# Patient Record
Sex: FEMALE | Race: BLACK OR AFRICAN AMERICAN | Employment: UNEMPLOYED | URBAN - METROPOLITAN AREA
[De-identification: names, ages, dates, MRNs, and addresses within clinical notes are randomized per-mention and may not be internally consistent; named-entity substitution may affect disease eponyms.]

---

## 2018-03-11 ENCOUNTER — APPOINTMENT (OUTPATIENT)
Dept: GENERAL RADIOLOGY | Age: 38
End: 2018-03-11
Attending: PHYSICIAN ASSISTANT
Payer: MEDICARE

## 2018-03-11 ENCOUNTER — APPOINTMENT (OUTPATIENT)
Dept: CT IMAGING | Age: 38
End: 2018-03-11
Attending: PHYSICIAN ASSISTANT
Payer: MEDICARE

## 2018-03-11 ENCOUNTER — HOSPITAL ENCOUNTER (EMERGENCY)
Age: 38
Discharge: ANOTHER HOSPITAL | End: 2018-03-11
Attending: EMERGENCY MEDICINE
Payer: MEDICARE

## 2018-03-11 VITALS
WEIGHT: 185 LBS | BODY MASS INDEX: 31.58 KG/M2 | RESPIRATION RATE: 18 BRPM | OXYGEN SATURATION: 100 % | TEMPERATURE: 99.1 F | SYSTOLIC BLOOD PRESSURE: 101 MMHG | HEIGHT: 64 IN | DIASTOLIC BLOOD PRESSURE: 58 MMHG | HEART RATE: 81 BPM

## 2018-03-11 DIAGNOSIS — N30.00 ACUTE CYSTITIS WITHOUT HEMATURIA: ICD-10-CM

## 2018-03-11 DIAGNOSIS — R55 SYNCOPE, UNSPECIFIED SYNCOPE TYPE: Primary | ICD-10-CM

## 2018-03-11 LAB
ALBUMIN SERPL-MCNC: 3.1 G/DL (ref 3.5–5)
ALBUMIN/GLOB SERPL: 0.7 {RATIO} (ref 1.1–2.2)
ALP SERPL-CCNC: 46 U/L (ref 45–117)
ALT SERPL-CCNC: 17 U/L (ref 12–78)
ANION GAP SERPL CALC-SCNC: 8 MMOL/L (ref 5–15)
APPEARANCE UR: ABNORMAL
AST SERPL-CCNC: 18 U/L (ref 15–37)
BACTERIA URNS QL MICRO: NEGATIVE /HPF
BASOPHILS # BLD: 0.1 K/UL (ref 0–0.1)
BASOPHILS NFR BLD: 1 % (ref 0–1)
BILIRUB SERPL-MCNC: 0.3 MG/DL (ref 0.2–1)
BILIRUB UR QL: NEGATIVE
BUN SERPL-MCNC: 12 MG/DL (ref 6–20)
BUN/CREAT SERPL: 10 (ref 12–20)
CALCIUM SERPL-MCNC: 8.9 MG/DL (ref 8.5–10.1)
CHLORIDE SERPL-SCNC: 108 MMOL/L (ref 97–108)
CK SERPL-CCNC: 168 U/L (ref 26–192)
CO2 SERPL-SCNC: 27 MMOL/L (ref 21–32)
COLOR UR: ABNORMAL
CREAT SERPL-MCNC: 1.26 MG/DL (ref 0.55–1.02)
DIFFERENTIAL METHOD BLD: ABNORMAL
EOSINOPHIL # BLD: 0.4 K/UL (ref 0–0.4)
EOSINOPHIL NFR BLD: 4 % (ref 0–7)
EPITH CASTS URNS QL MICRO: ABNORMAL /LPF
ERYTHROCYTE [DISTWIDTH] IN BLOOD BY AUTOMATED COUNT: 13.4 % (ref 11.5–14.5)
GLOBULIN SER CALC-MCNC: 4.2 G/DL (ref 2–4)
GLUCOSE BLD STRIP.AUTO-MCNC: 82 MG/DL (ref 65–100)
GLUCOSE SERPL-MCNC: 84 MG/DL (ref 65–100)
GLUCOSE UR STRIP.AUTO-MCNC: NEGATIVE MG/DL
HCT VFR BLD AUTO: 35.9 % (ref 35–47)
HGB BLD-MCNC: 12.7 G/DL (ref 11.5–16)
HGB UR QL STRIP: NEGATIVE
IMM GRANULOCYTES # BLD: 0 K/UL (ref 0–0.04)
IMM GRANULOCYTES NFR BLD AUTO: 0 % (ref 0–0.5)
KETONES UR QL STRIP.AUTO: NEGATIVE MG/DL
LEUKOCYTE ESTERASE UR QL STRIP.AUTO: ABNORMAL
LYMPHOCYTES # BLD: 2 K/UL (ref 0.8–3.5)
LYMPHOCYTES NFR BLD: 18 % (ref 12–49)
MCH RBC QN AUTO: 27.3 PG (ref 26–34)
MCHC RBC AUTO-ENTMCNC: 35.4 G/DL (ref 30–36.5)
MCV RBC AUTO: 77 FL (ref 80–99)
MONOCYTES # BLD: 0.7 K/UL (ref 0–1)
MONOCYTES NFR BLD: 6 % (ref 5–13)
NEUTS SEG # BLD: 8.1 K/UL (ref 1.8–8)
NEUTS SEG NFR BLD: 72 % (ref 32–75)
NITRITE UR QL STRIP.AUTO: NEGATIVE
NRBC # BLD: 0 K/UL (ref 0–0.01)
NRBC BLD-RTO: 0 PER 100 WBC
PH UR STRIP: 5.5 [PH] (ref 5–8)
PLATELET # BLD AUTO: 208 K/UL (ref 150–400)
PMV BLD AUTO: 10.6 FL (ref 8.9–12.9)
POTASSIUM SERPL-SCNC: 4.4 MMOL/L (ref 3.5–5.1)
PROT SERPL-MCNC: 7.3 G/DL (ref 6.4–8.2)
PROT UR STRIP-MCNC: ABNORMAL MG/DL
RBC # BLD AUTO: 4.66 M/UL (ref 3.8–5.2)
RBC #/AREA URNS HPF: ABNORMAL /HPF (ref 0–5)
SERVICE CMNT-IMP: NORMAL
SODIUM SERPL-SCNC: 143 MMOL/L (ref 136–145)
SP GR UR REFRACTOMETRY: 1.01 (ref 1–1.03)
TROPONIN I SERPL-MCNC: <0.04 NG/ML
UA: UC IF INDICATED,UAUC: ABNORMAL
UROBILINOGEN UR QL STRIP.AUTO: 1 EU/DL (ref 0.2–1)
WBC # BLD AUTO: 11.3 K/UL (ref 3.6–11)
WBC URNS QL MICRO: ABNORMAL /HPF (ref 0–4)

## 2018-03-11 PROCEDURE — 87086 URINE CULTURE/COLONY COUNT: CPT | Performed by: PHYSICIAN ASSISTANT

## 2018-03-11 PROCEDURE — 71045 X-RAY EXAM CHEST 1 VIEW: CPT

## 2018-03-11 PROCEDURE — 93005 ELECTROCARDIOGRAM TRACING: CPT

## 2018-03-11 PROCEDURE — 36415 COLL VENOUS BLD VENIPUNCTURE: CPT | Performed by: PHYSICIAN ASSISTANT

## 2018-03-11 PROCEDURE — 82962 GLUCOSE BLOOD TEST: CPT

## 2018-03-11 PROCEDURE — 81001 URINALYSIS AUTO W/SCOPE: CPT | Performed by: PHYSICIAN ASSISTANT

## 2018-03-11 PROCEDURE — 96366 THER/PROPH/DIAG IV INF ADDON: CPT

## 2018-03-11 PROCEDURE — 82550 ASSAY OF CK (CPK): CPT | Performed by: PHYSICIAN ASSISTANT

## 2018-03-11 PROCEDURE — 99285 EMERGENCY DEPT VISIT HI MDM: CPT

## 2018-03-11 PROCEDURE — 85025 COMPLETE CBC W/AUTO DIFF WBC: CPT | Performed by: PHYSICIAN ASSISTANT

## 2018-03-11 PROCEDURE — 80053 COMPREHEN METABOLIC PANEL: CPT | Performed by: PHYSICIAN ASSISTANT

## 2018-03-11 PROCEDURE — 96374 THER/PROPH/DIAG INJ IV PUSH: CPT

## 2018-03-11 PROCEDURE — 84484 ASSAY OF TROPONIN QUANT: CPT | Performed by: PHYSICIAN ASSISTANT

## 2018-03-11 PROCEDURE — 74011000258 HC RX REV CODE- 258: Performed by: PHYSICIAN ASSISTANT

## 2018-03-11 PROCEDURE — 96361 HYDRATE IV INFUSION ADD-ON: CPT

## 2018-03-11 PROCEDURE — 74011250636 HC RX REV CODE- 250/636: Performed by: PHYSICIAN ASSISTANT

## 2018-03-11 PROCEDURE — 70450 CT HEAD/BRAIN W/O DYE: CPT

## 2018-03-11 PROCEDURE — 96365 THER/PROPH/DIAG IV INF INIT: CPT

## 2018-03-11 RX ORDER — RISPERIDONE 0.5 MG/1
1 TABLET, FILM COATED ORAL
Status: ON HOLD | COMMUNITY
End: 2020-01-01 | Stop reason: DRUGHIGH

## 2018-03-11 RX ORDER — LEVOTHYROXINE SODIUM 50 UG/1
50 TABLET ORAL
COMMUNITY

## 2018-03-11 RX ORDER — MELATONIN
1000 DAILY
COMMUNITY

## 2018-03-11 RX ADMIN — SODIUM CHLORIDE 500 ML: 900 INJECTION, SOLUTION INTRAVENOUS at 14:37

## 2018-03-11 RX ADMIN — CEFTRIAXONE 1 G: 1 INJECTION, POWDER, FOR SOLUTION INTRAMUSCULAR; INTRAVENOUS at 16:23

## 2018-03-11 NOTE — ED NOTES
TRANSFER - OUT REPORT:    Verbal report given to HEMA Rodriguez (name) on Oj Moncada  being transferred to [facility] acute care medicine(unit) for routine progression of care       Report consisted of patients Situation, Background, Assessment and   Recommendations(SBAR). Information from the following report(s) SBAR, ED Summary, MAR, Recent Results, Med Rec Status and Cardiac Rhythm NSR with frequent unifocal PVC's was reviewed with the receiving nurse. Lines:   Peripheral IV 03/11/18 Left Antecubital (Active)   Site Assessment Clean, dry, & intact 3/11/2018 10:53 AM   Phlebitis Assessment 0 3/11/2018 10:53 AM   Infiltration Assessment 0 3/11/2018 10:53 AM   Dressing Status Clean, dry, & intact 3/11/2018 10:53 AM   Dressing Type Transparent 3/11/2018 10:53 AM   Hub Color/Line Status Pink;Flushed 3/11/2018 10:53 AM   Action Taken Blood drawn 3/11/2018 10:53 AM        Opportunity for questions and clarification was provided.       Patient transported with:  TARYN SIMS

## 2018-03-11 NOTE — ED NOTES
Pt with a hx of chromosomal abnormality and inability to care for self presents to the ED with c/o fall X2 hours ago. Pt presents with parents/caretaker. Pt unable to verbalize symptoms precipitating fall. Pt's speech is baseline per family. Pt's mother states \" She was in bed eating cereal and then fell onto the floor. \" Pt's mom states that \"after about 10 minutes the patient helped herself up and got onto the sofa. \" Pt has no hx of seizures. Pt is eating and drinking appropriately. Pt is ambulatory with assistance. Emergency Department Nursing Plan of Care       The Nursing Plan of Care is developed from the Nursing assessment and Emergency Department Attending provider initial evaluation. The plan of care may be reviewed in the ED Provider note.     The Plan of Care was developed with the following considerations:   Patient / Family readiness to learn indicated by:verbalized understanding  Persons(s) to be included in education: patient and family  Barriers to Learning/Limitations:No    Signed     Lashon Mitchell    3/11/2018   10:21 AM

## 2018-03-11 NOTE — ED NOTES
Patient provided with regular meal tray. Meal tray ordered and provided for family members as well. Awaiting transfer.

## 2018-03-11 NOTE — CONSULTS
Cardiology consultation:    I was asked to evaluate Ms. Marcelino Coburn in the [facility] ED. She is a 30year old female with severe handicaps, under the care of her mother and family. She began today with new onset syncopal episodes with possible seizure activity. She is nonverbal with significant skeletal deformity. 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 She has a history of having a heart murmur with no need for directed treatment. She menstruated in the past but she has been treated with depot style hormone manipulation and she is currently amenorrheic. Her mother is unable to add any further history, except that today's black out events are new. One such event was witnessed in the ED and there were no associated arrhythmias despite frequent unifocal PVC's at rest.     Ms. Delia Puente is completely nonverbal. She makes eye contact at times and responds to some verbal information with little high pitched grunts. She appears comfortable if frightened. JV do not fill supine. Lungs are clear. Cardiac rhythm is regular with ectopic beats. She has a high pitched midsystolic murmur at her left upper sternal border followed by an enhanced P2 and then an abbreviated diastolic murmur. There is a probable S4 lower down along the left sternal border. Peripheral pulses are intact. She has wide spaced nipples with rudimentary breast development. Her neck is short and webbed by splayed sternocleidomastoid placement. Her mandible is large for her body habitus. There is no stridor. There is no edema and no sign of DVT. Abdomen is nontender but was not examined in detail. There is no nystagmus and no twitching. 12 lead EKG initially shows sinus rhythm, leftward axis, frequent unifocal PVC's. A second EKG after a syncope or seizure was unchanged. She has a very short SD interval but there is no proximal deformity of the QRS. There is no sign of ASD. Chest X ray shows massive RV enlargement. Laboratory results are as follows:    Results for Ashok Rivas (MRN [id number]) as of 3/11/2018 17:24   Ref. Range 3/11/2018 10:35   WBC Latest Ref Range: 3.6 - 11.0 K/uL 11.3 (H)   NRBC Latest Ref Range: 0  WBC 0.0   RBC Latest Ref Range: 3.80 - 5.20 M/uL 4.66   HGB Latest Ref Range: 11.5 - 16.0 g/dL 12.7   HCT Latest Ref Range: 35.0 - 47.0 % 35.9   MCV Latest Ref Range: 80.0 - 99.0 FL 77.0 (L)   MCH Latest Ref Range: 26.0 - 34.0 PG 27.3   MCHC Latest Ref Range: 30.0 - 36.5 g/dL 35.4   RDW Latest Ref Range: 11.5 - 14.5 % 13.4   PLATELET Latest Ref Range: 150 - 400 K/uL 208   MPV Latest Ref Range: 8.9 - 12.9 FL 10.6   NEUTROPHILS Latest Ref Range: 32 - 75 % 72   LYMPHOCYTES Latest Ref Range: 12 - 49 % 18   MONOCYTES Latest Ref Range: 5 - 13 % 6   EOSINOPHILS Latest Ref Range: 0 - 7 % 4   BASOPHILS Latest Ref Range: 0 - 1 % 1   IMMATURE GRANULOCYTES Latest Ref Range: 0.0 - 0.5 % 0   DF Latest Units:   AUTOMATED   ABSOLUTE NRBC Latest Ref Range: 0.00 - 0.01 K/uL 0.00   ABS. NEUTROPHILS Latest Ref Range: 1.8 - 8.0 K/UL 8.1 (H)   ABS. IMM. GRANS. Latest Ref Range: 0.00 - 0.04 K/UL 0.0   ABS. LYMPHOCYTES Latest Ref Range: 0.8 - 3.5 K/UL 2.0   ABS. MONOCYTES Latest Ref Range: 0.0 - 1.0 K/UL 0.7   ABS. EOSINOPHILS Latest Ref Range: 0.0 - 0.4 K/UL 0.4   ABS. BASOPHILS Latest Ref Range: 0.0 - 0.1 K/UL 0.1     Results for Ashok Rivas (MRN [id number]) as of 3/11/2018 17:24   Ref.  Range 3/11/2018 13:37   Color Latest Units:   YELLOW/STRAW   Appearance Latest Ref Range: CLEAR   CLOUDY (A)   Specific gravity Latest Ref Range: 1.003 - 1.030   1.015   pH (UA) Latest Ref Range: 5.0 - 8.0   5.5   Protein Latest Ref Range: NEG mg/dL TRACE (A)   Glucose Latest Ref Range: NEG mg/dL NEGATIVE   Ketone Latest Ref Range: NEG mg/dL NEGATIVE   Blood Latest Ref Range: NEG   NEGATIVE   Bilirubin Latest Ref Range: NEG   NEGATIVE   Urobilinogen Latest Ref Range: 0.2 - 1.0 EU/dL 1.0   Nitrites Latest Ref Range: NEG   NEGATIVE   Leukocyte Esterase Latest Ref Range: NEG   LARGE (A)   Epithelial cells Latest Ref Range: FEW /lpf FEW   WBC Latest Ref Range: 0 - 4 /hpf 20-50   RBC Latest Ref Range: 0 - 5 /hpf 0-5   Bacteria Latest Ref Range: NEG /hpf NEGATIVE     Results for Laura Galaviz (MRN [id number]) as of 3/11/2018 17:24   Ref. Range 3/11/2018 10:35   Sodium Latest Ref Range: 136 - 145 mmol/L 143   Potassium Latest Ref Range: 3.5 - 5.1 mmol/L 4.4   Chloride Latest Ref Range: 97 - 108 mmol/L 108   CO2 Latest Ref Range: 21 - 32 mmol/L 27   Anion gap Latest Ref Range: 5 - 15 mmol/L 8   Glucose Latest Ref Range: 65 - 100 mg/dL 84   BUN Latest Ref Range: 6 - 20 MG/DL 12   Creatinine Latest Ref Range: 0.55 - 1.02 MG/DL 1.26 (H)   BUN/Creatinine ratio Latest Ref Range: 12 - 20   10 (L)   Calcium Latest Ref Range: 8.5 - 10.1 MG/DL 8.9   GFR est non-AA Latest Ref Range: >60 ml/min/1.73m2 48 (L)   GFR est AA Latest Ref Range: >60 ml/min/1.73m2 59 (L)   Bilirubin, total Latest Ref Range: 0.2 - 1.0 MG/DL 0.3   Protein, total Latest Ref Range: 6.4 - 8.2 g/dL 7.3   Albumin Latest Ref Range: 3.5 - 5.0 g/dL 3.1 (L)   Globulin Latest Ref Range: 2.0 - 4.0 g/dL 4.2 (H)   A-G Ratio Latest Ref Range: 1.1 - 2.2   0.7 (L)   ALT (SGPT) Latest Ref Range: 12 - 78 U/L 17   AST Latest Ref Range: 15 - 37 U/L 18   Alk. phosphatase Latest Ref Range: 45 - 117 U/L 46   CK Latest Ref Range: 26 - 192 U/L 168   Troponin-I, Qt. Latest Ref Range: <0.05 ng/mL <0.04     CT scan of the head does not reveal any injury    Bedside echocardiography was performed with saline contrast injection. The suprasternal window was not workable because of skeletal distortion. A supraclavicular window on the left was only minimally useful. The left ventricle is normal.  The right ventricle is significantly enlarged with some hypertrophy of the free wall.   There may be a small membranous VSD but saline contrast examination did not hint that right to left shunting. The atrial septum appears to be intact which fits with a normal twelve-lead EKG. Pulmonary artery pressure is estimated at 40-45 mm and there is moderate pulmonic valve regurgitation which is also audible on physical exam.    Impression: There is no sign of cyanotic heart disease    There may be a small membranous VSD. 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 Given the history offered by the mother comorbid Down syndrome is possible but not manifest    We were not fully able to assess the great vessels by echo. This would be the commonest vascular abnormality in Gn syndrome    New onset neurologic events are not related to cardiac rhythm. 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

## 2018-03-11 NOTE — ED NOTES
Provider at beside to re-evaluate patient. Cardiac leads replaced and repeat EKG in progress. Pt currently in bigeminy. Skin is warm and dry to touch.

## 2018-03-11 NOTE — PROGRESS NOTES
Echocardiogram 2D complete adult completed as ordered.  Procedure explained. Full report to follow.   Agitated Saline given intravenously for image enhancement,

## 2018-03-11 NOTE — ED NOTES
Entered patient's room to recheck vitals and reassess patient. Pt was sitting upright at the side of the bed. Pt started to shift body weight forward, nurse assisted patient back into the stretcher. Pt was drooling and appeared drowsy. Vital signs reassessed and provider notified of change in patient mental status.

## 2018-03-11 NOTE — PROGRESS NOTES
Cardiology:    Case discussed and I will evaluate in person shortly. EKG shows frequent PVC's. Echo ordered at bedside to exclude congenital heart disease, which is a bit more common in Down's Syndrome.      Para Domenico SOTO

## 2018-03-11 NOTE — ED PROVIDER NOTES
EMERGENCY DEPARTMENT HISTORY AND PHYSICAL EXAM    Date: 3/11/2018  Patient Name: Jose Alfredo Dale    History of Presenting Illness     Chief Complaint   Patient presents with    Fall         History Provided By: Patient's Mother    HPI: Jose Alfredo Dale is a 30 y.o. female with PMH of down's syndrome who presents with mother who states pt was eating a bowl of cereal and then fell out of the bed. Mom states she is unsure if pt had a seizure but \"her eyes rolled in the back of her head\"  Mom denies any shaking or history of seizures in the past.  Mom reports episode last about 10 minutes before pt was alert and back to her normal self. Pt is non verbal so unable to get any other info. PCP: Not On File [facility]    Current Facility-Administered Medications   Medication Dose Route Frequency Provider Last Rate Last Dose    cefTRIAXone (ROCEPHIN) 1 g in 0.9% sodium chloride (MBP/ADV) 50 mL  1 g IntraVENous NOW Jefry Ochoa PA-C 100 mL/hr at 03/11/18 1623 1 g at 03/11/18 1623     Current Outpatient Prescriptions   Medication Sig Dispense Refill    cholecalciferol (VITAMIN D3) 1,000 unit tablet Take 1,000 Units by mouth daily.  risperiDONE (RISPERDAL) 0.5 mg tablet Take 0.5 mg by mouth nightly.  LEVOTHYROXINE SODIUM (LEVOTHYROXINE PO) Take  by mouth. Past History     Past Medical History:  Past Medical History:   Diagnosis Date    Endocrine disease     Hypothyroid 03/11/2018    Ill-defined condition     Down's Syndrome       Past Surgical History:  History reviewed. No pertinent surgical history. Family History:  History reviewed. No pertinent family history.     Social History:  Social History   Substance Use Topics    Smoking status: Never Smoker    Smokeless tobacco: Never Used    Alcohol use No       Allergies:  No Known Allergies      Review of Systems   Review of Systems   Unable to perform ROS: Patient nonverbal       Physical Exam     Vitals:    03/11/18 1426 03/11/18 1442 03/11/18 1458 03/11/18 1615   BP: 99/78   133/77   Pulse: 76 (!) 58 68 80   Resp: 18      Temp: 98.6 °F (37 °C)   99.1 °F (37.3 °C)   SpO2: 100% 92%  94%   Weight:       Height:         Physical Exam   Constitutional: She appears well-developed and well-nourished. No distress. HENT:   Head: Normocephalic and atraumatic. Mouth/Throat: Oropharynx is clear and moist.   Eyes: Conjunctivae are normal.   Cardiovascular: Regular rhythm and normal heart sounds. Bradycardia present. Pulmonary/Chest: Effort normal and breath sounds normal. No respiratory distress. She has no wheezes. She has no rales. Abdominal: Soft. Musculoskeletal: Normal range of motion. Neurological: She is alert. Skin: Skin is warm and dry. Psychiatric: She has a normal mood and affect. She is noncommunicative. Nursing note and vitals reviewed.   at 4:30 PM      Diagnostic Study Results     Labs -     Recent Results (from the past 12 hour(s))   EKG, 12 LEAD, INITIAL    Collection Time: 03/11/18 10:33 AM   Result Value Ref Range    Ventricular Rate 69 BPM    Atrial Rate 60 BPM    P-R Interval 112 ms    QRS Duration 62 ms    Q-T Interval 370 ms    QTC Calculation (Bezet) 396 ms    Calculated P Axis 48 degrees    Calculated R Axis 11 degrees    Calculated T Axis 45 degrees    Diagnosis       Sinus rhythm with frequent premature ventricular complexes  Otherwise normal ECG     CBC WITH AUTOMATED DIFF    Collection Time: 03/11/18 10:35 AM   Result Value Ref Range    WBC 11.3 (H) 3.6 - 11.0 K/uL    RBC 4.66 3.80 - 5.20 M/uL    HGB 12.7 11.5 - 16.0 g/dL    HCT 35.9 35.0 - 47.0 %    MCV 77.0 (L) 80.0 - 99.0 FL    MCH 27.3 26.0 - 34.0 PG    MCHC 35.4 30.0 - 36.5 g/dL    RDW 13.4 11.5 - 14.5 %    PLATELET 516 631 - 424 K/uL    MPV 10.6 8.9 - 12.9 FL    NRBC 0.0 0  WBC    ABSOLUTE NRBC 0.00 0.00 - 0.01 K/uL    NEUTROPHILS 72 32 - 75 %    LYMPHOCYTES 18 12 - 49 %    MONOCYTES 6 5 - 13 %    EOSINOPHILS 4 0 - 7 %    BASOPHILS 1 0 - 1 %    IMMATURE GRANULOCYTES 0 0.0 - 0.5 %    ABS. NEUTROPHILS 8.1 (H) 1.8 - 8.0 K/UL    ABS. LYMPHOCYTES 2.0 0.8 - 3.5 K/UL    ABS. MONOCYTES 0.7 0.0 - 1.0 K/UL    ABS. EOSINOPHILS 0.4 0.0 - 0.4 K/UL    ABS. BASOPHILS 0.1 0.0 - 0.1 K/UL    ABS. IMM. GRANS. 0.0 0.00 - 0.04 K/UL    DF AUTOMATED     METABOLIC PANEL, COMPREHENSIVE    Collection Time: 03/11/18 10:35 AM   Result Value Ref Range    Sodium 143 136 - 145 mmol/L    Potassium 4.4 3.5 - 5.1 mmol/L    Chloride 108 97 - 108 mmol/L    CO2 27 21 - 32 mmol/L    Anion gap 8 5 - 15 mmol/L    Glucose 84 65 - 100 mg/dL    BUN 12 6 - 20 MG/DL    Creatinine 1.26 (H) 0.55 - 1.02 MG/DL    BUN/Creatinine ratio 10 (L) 12 - 20      GFR est AA 59 (L) >60 ml/min/1.73m2    GFR est non-AA 48 (L) >60 ml/min/1.73m2    Calcium 8.9 8.5 - 10.1 MG/DL    Bilirubin, total 0.3 0.2 - 1.0 MG/DL    ALT (SGPT) 17 12 - 78 U/L    AST (SGOT) 18 15 - 37 U/L    Alk.  phosphatase 46 45 - 117 U/L    Protein, total 7.3 6.4 - 8.2 g/dL    Albumin 3.1 (L) 3.5 - 5.0 g/dL    Globulin 4.2 (H) 2.0 - 4.0 g/dL    A-G Ratio 0.7 (L) 1.1 - 2.2     CK W/ REFLX CKMB    Collection Time: 03/11/18 10:35 AM   Result Value Ref Range     26 - 192 U/L   TROPONIN I    Collection Time: 03/11/18 10:35 AM   Result Value Ref Range    Troponin-I, Qt. <0.04 <0.05 ng/mL   URINALYSIS W/ REFLEX CULTURE    Collection Time: 03/11/18  1:37 PM   Result Value Ref Range    Color YELLOW/STRAW      Appearance CLOUDY (A) CLEAR      Specific gravity 1.015 1.003 - 1.030      pH (UA) 5.5 5.0 - 8.0      Protein TRACE (A) NEG mg/dL    Glucose NEGATIVE  NEG mg/dL    Ketone NEGATIVE  NEG mg/dL    Bilirubin NEGATIVE  NEG      Blood NEGATIVE  NEG      Urobilinogen 1.0 0.2 - 1.0 EU/dL    Nitrites NEGATIVE  NEG      Leukocyte Esterase LARGE (A) NEG      WBC 20-50 0 - 4 /hpf    RBC 0-5 0 - 5 /hpf    Epithelial cells FEW FEW /lpf    Bacteria NEGATIVE  NEG /hpf    UA:UC IF INDICATED URINE CULTURE ORDERED (A) CNI     GLUCOSE, POC    Collection Time: 03/11/18  2:32 PM   Result Value Ref Range    Glucose (POC) 82 65 - 100 mg/dL    Performed by Sara Champion    EKG, 12 LEAD, SUBSEQUENT    Collection Time: 03/11/18  2:32 PM   Result Value Ref Range    Ventricular Rate 57 BPM    Atrial Rate 57 BPM    P-R Interval 106 ms    QRS Duration 64 ms    Q-T Interval 386 ms    QTC Calculation (Bezet) 375 ms    Calculated P Axis 43 degrees    Calculated R Axis -6 degrees    Calculated T Axis 44 degrees    Diagnosis       Sinus bradycardia with short WV  Minimal voltage criteria for LVH, may be normal variant  Borderline ECG         Radiologic Studies -   XR CHEST PORT   Final Result      CT HEAD WO CONT   Final Result        CT Results  (Last 48 hours)               03/11/18 1058  CT HEAD WO CONT Final result    Impression:  IMPRESSION:        No acute intracranial process is identified. Narrative:  EXAM:  CT HEAD WO CONT   Clinical history: New onset of seizure this morning. INDICATION:   New onset of seizure. COMPARISON: None. CONTRAST:  None. TECHNIQUE: Unenhanced CT of the head was performed using 5 mm images. Brain and   bone windows were generated. CT dose reduction was achieved through use of a   standardized protocol tailored for this examination and automatic exposure   control for dose modulation. FINDINGS:   The ventricles and sulci are normal in size, shape and configuration and   midline. There is no significant white matter disease. There is no intracranial   hemorrhage, extra-axial collection, mass, mass effect or midline shift. The   basilar cisterns are open. No acute infarct is identified. The bone windows   demonstrate no abnormalities. The visualized portions of the paranasal sinuses   and mastoid air cells are clear. CXR Results  (Last 48 hours)               03/11/18 1057  XR CHEST PORT Final result    Impression:  IMPRESSION:   No acute thoracic disease.            Narrative:  Clinical history: Syncope, seizure INDICATION: Syncope, seizure       COMPARISON: None       FINDINGS:    AP semiupright view of the chest is obtained . The cardiopericardial silhouette is mildly prominent. . There is no pleural   effusion, pneumothorax or focal consolidation present. No acute osseous finding. Patient is on a cardiac monitor. Medical Decision Making   I am the first provider for this patient. I reviewed the vital signs, available nursing notes, past medical history, past surgical history, family history and social history. Vital Signs-Reviewed the patient's vital signs. Pulse Oximetry Analysis - 100% on RA    Cardiac Monitor:  Rate: 56  Rhythm: NSR    EKG: Interpreted by the EP. attending, Dr Lane Burkitt   Time Interpreted: [id number]   Rate: 69   Rhythm: NSR   Interpretation: frequent PVC's   Comparison: none    ED Course:   10:53 AM  Discussed case with attending, Dr Lane Burkitt, agrees with w/u thus far. Will wait test results and determine plan thereafter    11:50 AM    I spoke with Dr. Luzma Hidalgo, Consult for Cardiology. Discussed available diagnostic tests and clinical findings. He will come in and see pt  Janice Iniguez PA-C    1:48 PM  Dr Luzma Hidalgo in route in the next 10 min but already ordered ECHO for pt now. 2:33 PM  Per RN pt's BP dropped to 60/40 while pt was sitting on bed and she leaned over in the bed \"as if she was going to pass out\". Went in to see pt with attending, pt reexamined, gallop heard now on heart sounds, repeat EKG ordered. Pt did not appear post ictal.    2:44 PM  Dr Luzma Hidalgo in ED to see pt now    3:25 PM  Dr Luzma Hidalgo will consult hospitalist at [address] [place] [address] for transfer, he initially offered [facility] but family wants to stay locally. 3:28 PM  [facility] Transfer center will have cardiology call back    4:10 PM  Dr Luzma Hidalgo spoke with Dr Isidro Skelton, cardiologist at [address] [place] [address] and agrees pt needs admission and transfer.   We reid now await hospitalist call back for an accepting physician      4:29 PM    Attending,  Sony Cardoso, spoke with Dr. Wellington Jones, Consult for Hospitalist at [address] [place] [address]. Discussed available diagnostic tests and clinical findings. He is in agreement with care plans as outlined. He will accept pt for transfer and they will call back once they get a bed. Also to note, pt seems to be going in and out of [facility] while on cardiac monitor which was relayed to hospitalist during consult  Jer Chisholm PA-C    4:40P  Family made aware of plan for transfer. Disposition:  Transfer to [address] [place] [address]      Provider Notes (Medical Decision Making):   DDX: syncope, seizure, arrhythmia, electrolyte imbalance, orthostatic hypotension    Procedures      Diagnosis     Clinical Impression:   1. Syncope, unspecified syncope type    2.  Acute cystitis without hematuria

## 2018-03-11 NOTE — ED NOTES
ECHO tech at bedside. Exam in progress. Dr. Jean Paul Hawkins (cardiology at bedside to perform agitated saline exam).

## 2018-03-11 NOTE — ED TRIAGE NOTES
Patient arrives in ED with her mother after the patient fell to the ground out of her chair while eating this morning. 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 Patient's mother denies history of seizures.

## 2018-03-12 LAB
ATRIAL RATE: 57 BPM
ATRIAL RATE: 60 BPM
CALCULATED P AXIS, ECG09: 43 DEGREES
CALCULATED P AXIS, ECG09: 48 DEGREES
CALCULATED R AXIS, ECG10: -6 DEGREES
CALCULATED R AXIS, ECG10: 11 DEGREES
CALCULATED T AXIS, ECG11: 44 DEGREES
CALCULATED T AXIS, ECG11: 45 DEGREES
DIAGNOSIS, 93000: NORMAL
DIAGNOSIS, 93000: NORMAL
P-R INTERVAL, ECG05: 106 MS
P-R INTERVAL, ECG05: 112 MS
Q-T INTERVAL, ECG07: 370 MS
Q-T INTERVAL, ECG07: 386 MS
QRS DURATION, ECG06: 62 MS
QRS DURATION, ECG06: 64 MS
QTC CALCULATION (BEZET), ECG08: 375 MS
QTC CALCULATION (BEZET), ECG08: 396 MS
VENTRICULAR RATE, ECG03: 57 BPM
VENTRICULAR RATE, ECG03: 69 BPM

## 2018-03-12 NOTE — ED NOTES
Report given to [facility] crew. Copy of chart, face sheet, CT, and U/S given to crew for [facility]. Pr transferred to ambulance stretcher.  Pt transferred to  [address]

## 2018-03-13 LAB
BACTERIA SPEC CULT: NORMAL
CC UR VC: NORMAL
SERVICE CMNT-IMP: NORMAL

## 2020-01-01 ENCOUNTER — APPOINTMENT (OUTPATIENT)
Dept: GENERAL RADIOLOGY | Age: 40
DRG: 871 | End: 2020-01-01
Attending: EMERGENCY MEDICINE
Payer: MEDICARE

## 2020-01-01 ENCOUNTER — APPOINTMENT (OUTPATIENT)
Dept: CT IMAGING | Age: 40
DRG: 871 | End: 2020-01-01
Attending: STUDENT IN AN ORGANIZED HEALTH CARE EDUCATION/TRAINING PROGRAM
Payer: MEDICARE

## 2020-01-01 ENCOUNTER — TELEPHONE (OUTPATIENT)
Dept: CARDIOLOGY CLINIC | Age: 40
End: 2020-01-01

## 2020-01-01 ENCOUNTER — APPOINTMENT (OUTPATIENT)
Dept: GENERAL RADIOLOGY | Age: 40
DRG: 871 | End: 2020-01-01
Attending: INTERNAL MEDICINE
Payer: MEDICARE

## 2020-01-01 ENCOUNTER — APPOINTMENT (OUTPATIENT)
Dept: GENERAL RADIOLOGY | Age: 40
DRG: 871 | End: 2020-01-01
Attending: STUDENT IN AN ORGANIZED HEALTH CARE EDUCATION/TRAINING PROGRAM
Payer: MEDICARE

## 2020-01-01 ENCOUNTER — APPOINTMENT (OUTPATIENT)
Dept: GENERAL RADIOLOGY | Age: 40
DRG: 004 | End: 2020-01-01
Attending: INTERNAL MEDICINE
Payer: MEDICARE

## 2020-01-01 ENCOUNTER — APPOINTMENT (OUTPATIENT)
Dept: GENERAL RADIOLOGY | Age: 40
End: 2020-01-01
Attending: EMERGENCY MEDICINE
Payer: MEDICARE

## 2020-01-01 ENCOUNTER — HOSPITAL ENCOUNTER (EMERGENCY)
Age: 40
Discharge: OTHER INSTITUTION | End: 2020-11-19
Attending: EMERGENCY MEDICINE
Payer: MEDICARE

## 2020-01-01 ENCOUNTER — APPOINTMENT (OUTPATIENT)
Dept: GENERAL RADIOLOGY | Age: 40
DRG: 004 | End: 2020-01-01
Attending: HOSPITALIST
Payer: MEDICARE

## 2020-01-01 ENCOUNTER — APPOINTMENT (OUTPATIENT)
Dept: GENERAL RADIOLOGY | Age: 40
DRG: 004 | End: 2020-01-01
Attending: STUDENT IN AN ORGANIZED HEALTH CARE EDUCATION/TRAINING PROGRAM
Payer: MEDICARE

## 2020-01-01 ENCOUNTER — TELEPHONE (OUTPATIENT)
Dept: CASE MANAGEMENT | Age: 40
End: 2020-01-01

## 2020-01-01 ENCOUNTER — HOSPITAL ENCOUNTER (INPATIENT)
Age: 40
LOS: 2 days | Discharge: ANOTHER HOSPITAL | DRG: 871 | End: 2020-11-08
Attending: EMERGENCY MEDICINE | Admitting: STUDENT IN AN ORGANIZED HEALTH CARE EDUCATION/TRAINING PROGRAM
Payer: MEDICARE

## 2020-01-01 ENCOUNTER — APPOINTMENT (OUTPATIENT)
Dept: NON INVASIVE DIAGNOSTICS | Age: 40
DRG: 871 | End: 2020-01-01
Attending: INTERNAL MEDICINE
Payer: MEDICARE

## 2020-01-01 ENCOUNTER — HOSPITAL ENCOUNTER (EMERGENCY)
Age: 40
Discharge: HOME | End: 2020-11-02
Attending: EMERGENCY MEDICINE
Payer: MEDICARE

## 2020-01-01 ENCOUNTER — HOSPITAL ENCOUNTER (INPATIENT)
Age: 40
LOS: 26 days | DRG: 004 | End: 2021-01-18
Attending: STUDENT IN AN ORGANIZED HEALTH CARE EDUCATION/TRAINING PROGRAM | Admitting: INTERNAL MEDICINE
Payer: MEDICARE

## 2020-01-01 ENCOUNTER — HOSPITAL ENCOUNTER (INPATIENT)
Age: 40
LOS: 8 days | Discharge: HOME | DRG: 871 | End: 2020-11-16
Attending: FAMILY MEDICINE | Admitting: HOSPITALIST
Payer: MEDICARE

## 2020-01-01 ENCOUNTER — HOSPITAL ENCOUNTER (INPATIENT)
Age: 40
LOS: 17 days | Discharge: HOME HEALTH | DRG: 871 | End: 2020-12-06
Attending: FAMILY MEDICINE | Admitting: INTERNAL MEDICINE
Payer: MEDICARE

## 2020-01-01 ENCOUNTER — APPOINTMENT (OUTPATIENT)
Dept: ULTRASOUND IMAGING | Age: 40
DRG: 871 | End: 2020-01-01
Attending: STUDENT IN AN ORGANIZED HEALTH CARE EDUCATION/TRAINING PROGRAM
Payer: MEDICARE

## 2020-01-01 ENCOUNTER — HOSPITAL ENCOUNTER (INPATIENT)
Age: 40
LOS: 1 days | Discharge: ANOTHER HOSPITAL | DRG: 871 | End: 2020-12-23
Attending: EMERGENCY MEDICINE | Admitting: STUDENT IN AN ORGANIZED HEALTH CARE EDUCATION/TRAINING PROGRAM
Payer: MEDICARE

## 2020-01-01 ENCOUNTER — PATIENT OUTREACH (OUTPATIENT)
Dept: CASE MANAGEMENT | Age: 40
End: 2020-01-01

## 2020-01-01 ENCOUNTER — APPOINTMENT (OUTPATIENT)
Dept: CT IMAGING | Age: 40
DRG: 871 | End: 2020-01-01
Attending: INTERNAL MEDICINE
Payer: MEDICARE

## 2020-01-01 ENCOUNTER — APPOINTMENT (OUTPATIENT)
Dept: CT IMAGING | Age: 40
End: 2020-01-01
Attending: EMERGENCY MEDICINE
Payer: MEDICARE

## 2020-01-01 VITALS
HEART RATE: 79 BPM | DIASTOLIC BLOOD PRESSURE: 73 MMHG | SYSTOLIC BLOOD PRESSURE: 104 MMHG | HEIGHT: 64 IN | RESPIRATION RATE: 16 BRPM | BODY MASS INDEX: 31.76 KG/M2 | TEMPERATURE: 98.2 F | WEIGHT: 186 LBS | OXYGEN SATURATION: 95 %

## 2020-01-01 VITALS
HEART RATE: 96 BPM | OXYGEN SATURATION: 95 % | DIASTOLIC BLOOD PRESSURE: 72 MMHG | TEMPERATURE: 97.4 F | RESPIRATION RATE: 19 BRPM | HEIGHT: 64 IN | BODY MASS INDEX: 31.76 KG/M2 | WEIGHT: 186 LBS | SYSTOLIC BLOOD PRESSURE: 125 MMHG

## 2020-01-01 VITALS
WEIGHT: 191 LBS | RESPIRATION RATE: 20 BRPM | HEIGHT: 63 IN | SYSTOLIC BLOOD PRESSURE: 95 MMHG | TEMPERATURE: 103.4 F | OXYGEN SATURATION: 91 % | DIASTOLIC BLOOD PRESSURE: 52 MMHG | HEART RATE: 135 BPM | BODY MASS INDEX: 33.84 KG/M2

## 2020-01-01 VITALS
SYSTOLIC BLOOD PRESSURE: 130 MMHG | OXYGEN SATURATION: 99 % | DIASTOLIC BLOOD PRESSURE: 28 MMHG | HEART RATE: 102 BPM | BODY MASS INDEX: 31.58 KG/M2 | RESPIRATION RATE: 18 BRPM | HEIGHT: 64 IN | WEIGHT: 185 LBS | TEMPERATURE: 100.4 F

## 2020-01-01 VITALS
BODY MASS INDEX: 32.61 KG/M2 | SYSTOLIC BLOOD PRESSURE: 100 MMHG | HEIGHT: 64 IN | HEART RATE: 85 BPM | DIASTOLIC BLOOD PRESSURE: 68 MMHG | OXYGEN SATURATION: 97 % | RESPIRATION RATE: 16 BRPM | TEMPERATURE: 98.5 F | WEIGHT: 191 LBS

## 2020-01-01 VITALS
BODY MASS INDEX: 30.73 KG/M2 | HEIGHT: 64 IN | HEART RATE: 117 BPM | WEIGHT: 180 LBS | DIASTOLIC BLOOD PRESSURE: 47 MMHG | OXYGEN SATURATION: 96 % | TEMPERATURE: 99.9 F | RESPIRATION RATE: 22 BRPM | SYSTOLIC BLOOD PRESSURE: 133 MMHG

## 2020-01-01 DIAGNOSIS — D72.829 LEUKOCYTOSIS, UNSPECIFIED TYPE: ICD-10-CM

## 2020-01-01 DIAGNOSIS — H10.9 CONJUNCTIVITIS OF LEFT EYE, UNSPECIFIED CONJUNCTIVITIS TYPE: ICD-10-CM

## 2020-01-01 DIAGNOSIS — J12.82 PNEUMONIA DUE TO COVID-19 VIRUS: ICD-10-CM

## 2020-01-01 DIAGNOSIS — U07.1 PNEUMONIA DUE TO COVID-19 VIRUS: ICD-10-CM

## 2020-01-01 DIAGNOSIS — R00.0 TACHYCARDIA: ICD-10-CM

## 2020-01-01 DIAGNOSIS — J96.01 ACUTE RESPIRATORY FAILURE WITH HYPOXIA (HCC): Primary | ICD-10-CM

## 2020-01-01 DIAGNOSIS — U07.1 SEPSIS DUE TO COVID-19 (HCC): ICD-10-CM

## 2020-01-01 DIAGNOSIS — Q90.9 DOWN'S SYNDROME: ICD-10-CM

## 2020-01-01 DIAGNOSIS — Z71.89 GOALS OF CARE, COUNSELING/DISCUSSION: ICD-10-CM

## 2020-01-01 DIAGNOSIS — A41.89 SEPSIS DUE TO COVID-19 (HCC): ICD-10-CM

## 2020-01-01 DIAGNOSIS — I42.0 DILATED CARDIOMYOPATHY (HCC): ICD-10-CM

## 2020-01-01 DIAGNOSIS — R00.0 SINUS TACHYCARDIA: ICD-10-CM

## 2020-01-01 DIAGNOSIS — R91.8 PULMONARY INFILTRATES ON CXR: ICD-10-CM

## 2020-01-01 DIAGNOSIS — N17.9 AKI (ACUTE KIDNEY INJURY) (HCC): ICD-10-CM

## 2020-01-01 DIAGNOSIS — U07.1 PNEUMONIA DUE TO COVID-19 VIRUS: Primary | ICD-10-CM

## 2020-01-01 DIAGNOSIS — N28.9 ACUTE RENAL INSUFFICIENCY: ICD-10-CM

## 2020-01-01 DIAGNOSIS — J18.9 COMMUNITY ACQUIRED PNEUMONIA, UNSPECIFIED LATERALITY: Primary | ICD-10-CM

## 2020-01-01 DIAGNOSIS — A41.9 SEPSIS WITHOUT ACUTE ORGAN DYSFUNCTION, DUE TO UNSPECIFIED ORGANISM (HCC): Primary | ICD-10-CM

## 2020-01-01 DIAGNOSIS — Z99.11 DEPENDENT ON VENTILATOR (HCC): ICD-10-CM

## 2020-01-01 DIAGNOSIS — J96.01 ACUTE HYPOXEMIC RESPIRATORY FAILURE (HCC): ICD-10-CM

## 2020-01-01 DIAGNOSIS — J22 LOWER RESP. TRACT INFECTION: Primary | ICD-10-CM

## 2020-01-01 DIAGNOSIS — R06.82 TACHYPNEA: ICD-10-CM

## 2020-01-01 DIAGNOSIS — R06.03 RESPIRATORY DISTRESS: ICD-10-CM

## 2020-01-01 DIAGNOSIS — N17.0 ACUTE RENAL FAILURE WITH TUBULAR NECROSIS (HCC): ICD-10-CM

## 2020-01-01 DIAGNOSIS — J12.82 PNEUMONIA DUE TO COVID-19 VIRUS: Primary | ICD-10-CM

## 2020-01-01 DIAGNOSIS — Z20.822 SUSPECTED COVID-19 VIRUS INFECTION: ICD-10-CM

## 2020-01-01 DIAGNOSIS — R50.9 FEVER, UNSPECIFIED FEVER CAUSE: ICD-10-CM

## 2020-01-01 LAB
25(OH)D3 SERPL-MCNC: 22.2 NG/ML (ref 30–100)
25(OH)D3 SERPL-MCNC: 39.1 NG/ML (ref 30–100)
ABO + RH BLD: NORMAL
ABO + RH BLD: NORMAL
ALBUMIN SERPL-MCNC: 1.6 G/DL (ref 3.5–5)
ALBUMIN SERPL-MCNC: 1.7 G/DL (ref 3.5–5)
ALBUMIN SERPL-MCNC: 1.8 G/DL (ref 3.5–5)
ALBUMIN SERPL-MCNC: 2 G/DL (ref 3.5–5)
ALBUMIN SERPL-MCNC: 2.1 G/DL (ref 3.5–5)
ALBUMIN SERPL-MCNC: 2.2 G/DL (ref 3.5–5)
ALBUMIN SERPL-MCNC: 2.3 G/DL (ref 3.5–5)
ALBUMIN SERPL-MCNC: 2.3 G/DL (ref 3.5–5)
ALBUMIN SERPL-MCNC: 2.8 G/DL (ref 3.5–5)
ALBUMIN SERPL-MCNC: 2.9 G/DL (ref 3.5–5)
ALBUMIN/GLOB SERPL: 0.4 {RATIO} (ref 1.1–2.2)
ALBUMIN/GLOB SERPL: 0.5 {RATIO} (ref 1.1–2.2)
ALBUMIN/GLOB SERPL: 0.6 {RATIO} (ref 1.1–2.2)
ALBUMIN/GLOB SERPL: 0.7 {RATIO} (ref 1.1–2.2)
ALP SERPL-CCNC: 35 U/L (ref 45–117)
ALP SERPL-CCNC: 36 U/L (ref 45–117)
ALP SERPL-CCNC: 37 U/L (ref 45–117)
ALP SERPL-CCNC: 38 U/L (ref 45–117)
ALP SERPL-CCNC: 40 U/L (ref 45–117)
ALP SERPL-CCNC: 41 U/L (ref 45–117)
ALP SERPL-CCNC: 42 U/L (ref 45–117)
ALP SERPL-CCNC: 42 U/L (ref 45–117)
ALP SERPL-CCNC: 44 U/L (ref 45–117)
ALP SERPL-CCNC: 46 U/L (ref 45–117)
ALP SERPL-CCNC: 47 U/L (ref 45–117)
ALP SERPL-CCNC: 47 U/L (ref 45–117)
ALP SERPL-CCNC: 48 U/L (ref 45–117)
ALP SERPL-CCNC: 61 U/L (ref 45–117)
ALP SERPL-CCNC: 68 U/L (ref 45–117)
ALP SERPL-CCNC: 68 U/L (ref 45–117)
ALP SERPL-CCNC: 74 U/L (ref 45–117)
ALP SERPL-CCNC: 78 U/L (ref 45–117)
ALT SERPL-CCNC: 11 U/L (ref 12–78)
ALT SERPL-CCNC: 11 U/L (ref 12–78)
ALT SERPL-CCNC: 14 U/L (ref 12–78)
ALT SERPL-CCNC: 14 U/L (ref 12–78)
ALT SERPL-CCNC: 15 U/L (ref 12–78)
ALT SERPL-CCNC: 20 U/L (ref 12–78)
ALT SERPL-CCNC: 20 U/L (ref 12–78)
ALT SERPL-CCNC: 26 U/L (ref 12–78)
ALT SERPL-CCNC: 28 U/L (ref 12–78)
ALT SERPL-CCNC: 29 U/L (ref 12–78)
ALT SERPL-CCNC: 30 U/L (ref 12–78)
ALT SERPL-CCNC: 45 U/L (ref 12–78)
ALT SERPL-CCNC: 53 U/L (ref 12–78)
AMORPH CRY URNS QL MICRO: ABNORMAL
ANION GAP SERPL CALC-SCNC: 10 MMOL/L (ref 5–15)
ANION GAP SERPL CALC-SCNC: 11 MMOL/L (ref 5–15)
ANION GAP SERPL CALC-SCNC: 13 MMOL/L (ref 5–15)
ANION GAP SERPL CALC-SCNC: 14 MMOL/L (ref 5–15)
ANION GAP SERPL CALC-SCNC: 2 MMOL/L (ref 5–15)
ANION GAP SERPL CALC-SCNC: 3 MMOL/L (ref 5–15)
ANION GAP SERPL CALC-SCNC: 3 MMOL/L (ref 5–15)
ANION GAP SERPL CALC-SCNC: 4 MMOL/L (ref 5–15)
ANION GAP SERPL CALC-SCNC: 4 MMOL/L (ref 5–15)
ANION GAP SERPL CALC-SCNC: 5 MMOL/L (ref 5–15)
ANION GAP SERPL CALC-SCNC: 6 MMOL/L (ref 5–15)
ANION GAP SERPL CALC-SCNC: 7 MMOL/L (ref 5–15)
ANION GAP SERPL CALC-SCNC: 8 MMOL/L (ref 5–15)
ANION GAP SERPL CALC-SCNC: 9 MMOL/L (ref 5–15)
ANION GAP SERPL CALC-SCNC: 9 MMOL/L (ref 5–15)
APPEARANCE UR: ABNORMAL
APPEARANCE UR: CLEAR
APTT PPP: 26.8 SEC (ref 22.1–31)
APTT PPP: 28 SEC (ref 22.1–32)
APTT PPP: 31.9 SEC (ref 22.1–31)
APTT PPP: 40 SEC (ref 22.1–31)
APTT PPP: 56.3 SEC (ref 22.1–31)
APTT PPP: 56.7 SEC (ref 22.1–31)
APTT PPP: 68 SEC (ref 22.1–31)
ARTERIAL PATENCY WRIST A: ABNORMAL
ARTERIAL PATENCY WRIST A: NO
ARTERIAL PATENCY WRIST A: NO
AST SERPL-CCNC: 15 U/L (ref 15–37)
AST SERPL-CCNC: 16 U/L (ref 15–37)
AST SERPL-CCNC: 18 U/L (ref 15–37)
AST SERPL-CCNC: 20 U/L (ref 15–37)
AST SERPL-CCNC: 23 U/L (ref 15–37)
AST SERPL-CCNC: 23 U/L (ref 15–37)
AST SERPL-CCNC: 24 U/L (ref 15–37)
AST SERPL-CCNC: 26 U/L (ref 15–37)
AST SERPL-CCNC: 27 U/L (ref 15–37)
AST SERPL-CCNC: 27 U/L (ref 15–37)
AST SERPL-CCNC: 32 U/L (ref 15–37)
AST SERPL-CCNC: 34 U/L (ref 15–37)
AST SERPL-CCNC: 37 U/L (ref 15–37)
AST SERPL-CCNC: 44 U/L (ref 15–37)
AST SERPL-CCNC: 44 U/L (ref 15–37)
AST SERPL-CCNC: 45 U/L (ref 15–37)
ATRIAL RATE: 0 BPM
ATRIAL RATE: 102 BPM
ATRIAL RATE: 120 BPM
ATRIAL RATE: 120 BPM
ATRIAL RATE: 123 BPM
ATRIAL RATE: 123 BPM
ATRIAL RATE: 125 BPM
ATRIAL RATE: 126 BPM
ATRIAL RATE: 88 BPM
B PERT DNA SPEC QL NAA+PROBE: NOT DETECTED
BACTERIA SPEC CULT: NORMAL
BACTERIA URNS QL MICRO: ABNORMAL /HPF
BACTERIA URNS QL MICRO: NEGATIVE /HPF
BACTERIA URNS QL MICRO: NEGATIVE /HPF
BASE DEFICIT BLD-SCNC: 11 MMOL/L
BASE DEFICIT BLD-SCNC: 2 MMOL/L
BASE DEFICIT BLD-SCNC: 7 MMOL/L
BASE DEFICIT BLD-SCNC: 9 MMOL/L
BASE DEFICIT BLDA-SCNC: 1.4 MMOL/L
BASE DEFICIT BLDA-SCNC: 2.1 MMOL/L
BASE DEFICIT BLDA-SCNC: 3.3 MMOL/L
BASE DEFICIT BLDA-SCNC: 5.6 MMOL/L
BASOPHILS # BLD: 0 K/UL (ref 0–0.1)
BASOPHILS # BLD: 0.1 K/UL (ref 0–0.1)
BASOPHILS # BLD: 0.2 K/UL (ref 0–0.1)
BASOPHILS # BLD: 0.2 K/UL (ref 0–0.1)
BASOPHILS NFR BLD: 0 % (ref 0–1)
BASOPHILS NFR BLD: 1 % (ref 0–1)
BDY SITE: ABNORMAL
BILIRUB DIRECT SERPL-MCNC: 0.1 MG/DL (ref 0–0.2)
BILIRUB SERPL-MCNC: 0.2 MG/DL (ref 0.2–1)
BILIRUB SERPL-MCNC: 0.2 MG/DL (ref 0.2–1)
BILIRUB SERPL-MCNC: 0.3 MG/DL (ref 0.2–1)
BILIRUB SERPL-MCNC: 0.4 MG/DL (ref 0.2–1)
BILIRUB SERPL-MCNC: 0.5 MG/DL (ref 0.2–1)
BILIRUB SERPL-MCNC: 0.6 MG/DL (ref 0.2–1)
BILIRUB SERPL-MCNC: 0.8 MG/DL (ref 0.2–1)
BILIRUB UR QL CFM: NEGATIVE
BILIRUB UR QL: NEGATIVE
BLOOD GROUP ANTIBODIES SERPL: NORMAL
BLOOD GROUP ANTIBODIES SERPL: NORMAL
BNP SERPL-MCNC: 156 PG/ML (ref 0–125)
BNP SERPL-MCNC: 52 PG/ML
BNP SERPL-MCNC: 89 PG/ML
BNP SERPL-MCNC: 94 PG/ML
BORDETELLA PARAPERTUSSIS PCR, BORPAR: NOT DETECTED
BREATHS.SPONTANEOUS ON VENT: 32
BREATHS.SPONTANEOUS ON VENT: 32
BREATHS.SPONTANEOUS ON VENT: 33
BUN SERPL-MCNC: 11 MG/DL (ref 6–20)
BUN SERPL-MCNC: 11 MG/DL (ref 6–20)
BUN SERPL-MCNC: 13 MG/DL (ref 6–20)
BUN SERPL-MCNC: 15 MG/DL (ref 6–20)
BUN SERPL-MCNC: 16 MG/DL (ref 6–20)
BUN SERPL-MCNC: 16 MG/DL (ref 6–20)
BUN SERPL-MCNC: 17 MG/DL (ref 6–20)
BUN SERPL-MCNC: 17 MG/DL (ref 6–20)
BUN SERPL-MCNC: 18 MG/DL (ref 6–20)
BUN SERPL-MCNC: 18 MG/DL (ref 6–20)
BUN SERPL-MCNC: 19 MG/DL (ref 6–20)
BUN SERPL-MCNC: 21 MG/DL (ref 6–20)
BUN SERPL-MCNC: 21 MG/DL (ref 6–20)
BUN SERPL-MCNC: 22 MG/DL (ref 6–20)
BUN SERPL-MCNC: 22 MG/DL (ref 6–20)
BUN SERPL-MCNC: 23 MG/DL (ref 6–20)
BUN SERPL-MCNC: 25 MG/DL (ref 6–20)
BUN SERPL-MCNC: 25 MG/DL (ref 6–20)
BUN SERPL-MCNC: 26 MG/DL (ref 6–20)
BUN SERPL-MCNC: 26 MG/DL (ref 6–20)
BUN SERPL-MCNC: 28 MG/DL (ref 6–20)
BUN SERPL-MCNC: 28 MG/DL (ref 6–20)
BUN SERPL-MCNC: 29 MG/DL (ref 6–20)
BUN SERPL-MCNC: 36 MG/DL (ref 6–20)
BUN SERPL-MCNC: 49 MG/DL (ref 6–20)
BUN SERPL-MCNC: 54 MG/DL (ref 6–20)
BUN SERPL-MCNC: 70 MG/DL (ref 6–20)
BUN SERPL-MCNC: 73 MG/DL (ref 6–20)
BUN SERPL-MCNC: 83 MG/DL (ref 6–20)
BUN SERPL-MCNC: 91 MG/DL (ref 6–20)
BUN SERPL-MCNC: 97 MG/DL (ref 6–20)
BUN/CREAT SERPL: 10 (ref 12–20)
BUN/CREAT SERPL: 11 (ref 12–20)
BUN/CREAT SERPL: 11 (ref 12–20)
BUN/CREAT SERPL: 12 (ref 12–20)
BUN/CREAT SERPL: 13 (ref 12–20)
BUN/CREAT SERPL: 14 (ref 12–20)
BUN/CREAT SERPL: 15 (ref 12–20)
BUN/CREAT SERPL: 15 (ref 12–20)
BUN/CREAT SERPL: 16 (ref 12–20)
BUN/CREAT SERPL: 17 (ref 12–20)
BUN/CREAT SERPL: 20 (ref 12–20)
BUN/CREAT SERPL: 24 (ref 12–20)
BUN/CREAT SERPL: 25 (ref 12–20)
BUN/CREAT SERPL: 26 (ref 12–20)
BUN/CREAT SERPL: 28 (ref 12–20)
BUN/CREAT SERPL: 29 (ref 12–20)
BUN/CREAT SERPL: 9 (ref 12–20)
BUN/CREAT SERPL: 9 (ref 12–20)
C PNEUM DNA SPEC QL NAA+PROBE: NOT DETECTED
CA-I BLD-SCNC: 0.98 MMOL/L (ref 1.12–1.32)
CA-I BLD-SCNC: 0.98 MMOL/L (ref 1.12–1.32)
CA-I BLD-SCNC: 1.16 MMOL/L (ref 1.12–1.32)
CA-I BLD-SCNC: 1.2 MMOL/L (ref 1.12–1.32)
CALCIUM SERPL-MCNC: 10.1 MG/DL (ref 8.5–10.1)
CALCIUM SERPL-MCNC: 10.6 MG/DL (ref 8.5–10.1)
CALCIUM SERPL-MCNC: 10.9 MG/DL (ref 8.5–10.1)
CALCIUM SERPL-MCNC: 7 MG/DL (ref 8.5–10.1)
CALCIUM SERPL-MCNC: 7.1 MG/DL (ref 8.5–10.1)
CALCIUM SERPL-MCNC: 7.1 MG/DL (ref 8.5–10.1)
CALCIUM SERPL-MCNC: 7.3 MG/DL (ref 8.5–10.1)
CALCIUM SERPL-MCNC: 7.6 MG/DL (ref 8.5–10.1)
CALCIUM SERPL-MCNC: 7.8 MG/DL (ref 8.5–10.1)
CALCIUM SERPL-MCNC: 7.9 MG/DL (ref 8.5–10.1)
CALCIUM SERPL-MCNC: 8.1 MG/DL (ref 8.5–10.1)
CALCIUM SERPL-MCNC: 8.1 MG/DL (ref 8.5–10.1)
CALCIUM SERPL-MCNC: 8.2 MG/DL (ref 8.5–10.1)
CALCIUM SERPL-MCNC: 8.3 MG/DL (ref 8.5–10.1)
CALCIUM SERPL-MCNC: 8.4 MG/DL (ref 8.5–10.1)
CALCIUM SERPL-MCNC: 8.5 MG/DL (ref 8.5–10.1)
CALCIUM SERPL-MCNC: 8.6 MG/DL (ref 8.5–10.1)
CALCIUM SERPL-MCNC: 8.7 MG/DL (ref 8.5–10.1)
CALCIUM SERPL-MCNC: 8.8 MG/DL (ref 8.5–10.1)
CALCIUM SERPL-MCNC: 9 MG/DL (ref 8.5–10.1)
CALCIUM SERPL-MCNC: 9.4 MG/DL (ref 8.5–10.1)
CALCULATED P AXIS, ECG09: 30 DEGREES
CALCULATED P AXIS, ECG09: 35 DEGREES
CALCULATED P AXIS, ECG09: 41 DEGREES
CALCULATED P AXIS, ECG09: 42 DEGREES
CALCULATED P AXIS, ECG09: 43 DEGREES
CALCULATED P AXIS, ECG09: 44 DEGREES
CALCULATED P AXIS, ECG09: 51 DEGREES
CALCULATED P AXIS, ECG09: 54 DEGREES
CALCULATED R AXIS, ECG10: -2 DEGREES
CALCULATED R AXIS, ECG10: -5 DEGREES
CALCULATED R AXIS, ECG10: -6 DEGREES
CALCULATED R AXIS, ECG10: 0 DEGREES
CALCULATED R AXIS, ECG10: 1 DEGREES
CALCULATED R AXIS, ECG10: 3 DEGREES
CALCULATED R AXIS, ECG10: 47 DEGREES
CALCULATED R AXIS, ECG10: 62 DEGREES
CALCULATED R AXIS, ECG10: 66 DEGREES
CALCULATED T AXIS, ECG11: -2 DEGREES
CALCULATED T AXIS, ECG11: -91 DEGREES
CALCULATED T AXIS, ECG11: 0 DEGREES
CALCULATED T AXIS, ECG11: 14 DEGREES
CALCULATED T AXIS, ECG11: 21 DEGREES
CALCULATED T AXIS, ECG11: 57 DEGREES
CALCULATED T AXIS, ECG11: 58 DEGREES
CALCULATED T AXIS, ECG11: 68 DEGREES
CALCULATED T AXIS, ECG11: 81 DEGREES
CHLORIDE SERPL-SCNC: 102 MMOL/L (ref 97–108)
CHLORIDE SERPL-SCNC: 105 MMOL/L (ref 97–108)
CHLORIDE SERPL-SCNC: 105 MMOL/L (ref 97–108)
CHLORIDE SERPL-SCNC: 106 MMOL/L (ref 97–108)
CHLORIDE SERPL-SCNC: 107 MMOL/L (ref 97–108)
CHLORIDE SERPL-SCNC: 108 MMOL/L (ref 97–108)
CHLORIDE SERPL-SCNC: 109 MMOL/L (ref 97–108)
CHLORIDE SERPL-SCNC: 109 MMOL/L (ref 97–108)
CHLORIDE SERPL-SCNC: 110 MMOL/L (ref 97–108)
CHLORIDE SERPL-SCNC: 111 MMOL/L (ref 97–108)
CHLORIDE SERPL-SCNC: 112 MMOL/L (ref 97–108)
CHLORIDE SERPL-SCNC: 113 MMOL/L (ref 97–108)
CHLORIDE SERPL-SCNC: 114 MMOL/L (ref 97–108)
CHLORIDE SERPL-SCNC: 115 MMOL/L (ref 97–108)
CHLORIDE SERPL-SCNC: 116 MMOL/L (ref 97–108)
CK SERPL-CCNC: 297 U/L (ref 26–192)
CO2 SERPL-SCNC: 18 MMOL/L (ref 21–32)
CO2 SERPL-SCNC: 19 MMOL/L (ref 21–32)
CO2 SERPL-SCNC: 20 MMOL/L (ref 21–32)
CO2 SERPL-SCNC: 21 MMOL/L (ref 21–32)
CO2 SERPL-SCNC: 22 MMOL/L (ref 21–32)
CO2 SERPL-SCNC: 23 MMOL/L (ref 21–32)
CO2 SERPL-SCNC: 23 MMOL/L (ref 21–32)
CO2 SERPL-SCNC: 24 MMOL/L (ref 21–32)
CO2 SERPL-SCNC: 25 MMOL/L (ref 21–32)
CO2 SERPL-SCNC: 26 MMOL/L (ref 21–32)
CO2 SERPL-SCNC: 27 MMOL/L (ref 21–32)
CO2 SERPL-SCNC: 31 MMOL/L (ref 21–32)
COLOR UR: ABNORMAL
COMMENT, HOLDF: NORMAL
COVID-19 RAPID TEST, COVR: NOT DETECTED
COVID-19, XGCOVT: DETECTED
CREAT SERPL-MCNC: 0.89 MG/DL (ref 0.55–1.02)
CREAT SERPL-MCNC: 0.91 MG/DL (ref 0.55–1.02)
CREAT SERPL-MCNC: 0.93 MG/DL (ref 0.55–1.02)
CREAT SERPL-MCNC: 0.97 MG/DL (ref 0.55–1.02)
CREAT SERPL-MCNC: 0.97 MG/DL (ref 0.55–1.02)
CREAT SERPL-MCNC: 0.98 MG/DL (ref 0.55–1.02)
CREAT SERPL-MCNC: 1 MG/DL (ref 0.55–1.02)
CREAT SERPL-MCNC: 1.01 MG/DL (ref 0.55–1.02)
CREAT SERPL-MCNC: 1.02 MG/DL (ref 0.55–1.02)
CREAT SERPL-MCNC: 1.02 MG/DL (ref 0.55–1.02)
CREAT SERPL-MCNC: 1.03 MG/DL (ref 0.55–1.02)
CREAT SERPL-MCNC: 1.05 MG/DL (ref 0.55–1.02)
CREAT SERPL-MCNC: 1.06 MG/DL (ref 0.55–1.02)
CREAT SERPL-MCNC: 1.12 MG/DL (ref 0.55–1.02)
CREAT SERPL-MCNC: 1.15 MG/DL (ref 0.55–1.02)
CREAT SERPL-MCNC: 1.19 MG/DL (ref 0.55–1.02)
CREAT SERPL-MCNC: 1.19 MG/DL (ref 0.55–1.02)
CREAT SERPL-MCNC: 1.27 MG/DL (ref 0.55–1.02)
CREAT SERPL-MCNC: 1.27 MG/DL (ref 0.55–1.02)
CREAT SERPL-MCNC: 1.29 MG/DL (ref 0.55–1.02)
CREAT SERPL-MCNC: 1.31 MG/DL (ref 0.55–1.02)
CREAT SERPL-MCNC: 1.37 MG/DL (ref 0.55–1.02)
CREAT SERPL-MCNC: 1.44 MG/DL (ref 0.55–1.02)
CREAT SERPL-MCNC: 1.69 MG/DL (ref 0.55–1.02)
CREAT SERPL-MCNC: 2.2 MG/DL (ref 0.55–1.02)
CREAT SERPL-MCNC: 2.42 MG/DL (ref 0.55–1.02)
CREAT SERPL-MCNC: 3.18 MG/DL (ref 0.55–1.02)
CREAT SERPL-MCNC: 3.69 MG/DL (ref 0.55–1.02)
CREAT SERPL-MCNC: 3.72 MG/DL (ref 0.55–1.02)
CREAT SERPL-MCNC: 3.75 MG/DL (ref 0.55–1.02)
CREAT SERPL-MCNC: 3.78 MG/DL (ref 0.55–1.02)
CREAT SERPL-MCNC: 4.09 MG/DL (ref 0.55–1.02)
CREAT SERPL-MCNC: 4.16 MG/DL (ref 0.55–1.02)
CREAT SERPL-MCNC: 4.18 MG/DL (ref 0.55–1.02)
CREAT UR-MCNC: 67 MG/DL
CRP SERPL HS-MCNC: >9.5 MG/L
CRP SERPL-MCNC: 10.1 MG/DL (ref 0–0.6)
CRP SERPL-MCNC: 11 MG/DL (ref 0–0.6)
CRP SERPL-MCNC: 18.6 MG/DL (ref 0–0.6)
CRP SERPL-MCNC: 19.9 MG/DL (ref 0–0.6)
CRP SERPL-MCNC: 2.13 MG/DL (ref 0–0.6)
CRP SERPL-MCNC: 5.51 MG/DL (ref 0–0.6)
CRP SERPL-MCNC: 7.22 MG/DL (ref 0–0.6)
D DIMER PPP FEU-MCNC: 0.49 MG/L FEU (ref 0–0.65)
D DIMER PPP FEU-MCNC: 0.5 MG/L FEU (ref 0–0.65)
D DIMER PPP FEU-MCNC: 0.72 MG/L FEU (ref 0–0.65)
D DIMER PPP FEU-MCNC: 0.93 MG/L FEU (ref 0–0.65)
D DIMER PPP FEU-MCNC: 0.95 MG/L FEU (ref 0–0.65)
D DIMER PPP FEU-MCNC: 0.97 MG/L FEU (ref 0–0.65)
D DIMER PPP FEU-MCNC: 1.15 MG/L FEU (ref 0–0.65)
D DIMER PPP FEU-MCNC: 1.27 MG/L FEU (ref 0–0.65)
D DIMER PPP FEU-MCNC: 1.39 MG/L FEU (ref 0–0.65)
D DIMER PPP FEU-MCNC: 1.76 MG/L FEU (ref 0–0.65)
D DIMER PPP FEU-MCNC: 1.79 MG/L FEU (ref 0–0.65)
D DIMER PPP FEU-MCNC: 1.83 MG/L FEU (ref 0–0.65)
D DIMER PPP FEU-MCNC: 1.93 MG/L FEU (ref 0–0.65)
D DIMER PPP FEU-MCNC: 2.35 MG/L FEU (ref 0–0.65)
D DIMER PPP FEU-MCNC: 2.4 MG/L FEU (ref 0–0.65)
D DIMER PPP FEU-MCNC: 2.94 MG/L FEU (ref 0–0.65)
D DIMER PPP FEU-MCNC: 23.83 MG/L FEU (ref 0–0.65)
D DIMER PPP FEU-MCNC: 3.03 MG/L FEU (ref 0–0.65)
D DIMER PPP FEU-MCNC: 3.26 MG/L FEU (ref 0–0.65)
D DIMER PPP FEU-MCNC: 4.14 MG/L FEU (ref 0–0.65)
D DIMER PPP FEU-MCNC: 4.34 MG/L FEU (ref 0–0.65)
D DIMER PPP FEU-MCNC: 4.76 MG/L FEU (ref 0–0.65)
D DIMER PPP FEU-MCNC: 5.82 MG/L FEU (ref 0–0.65)
D DIMER PPP FEU-MCNC: 7.11 MG/L FEU (ref 0–0.65)
D DIMER PPP FEU-MCNC: 8.76 MG/L FEU (ref 0–0.65)
D DIMER PPP FEU-MCNC: 9.19 MG/L FEU (ref 0–0.65)
D DIMER PPP FEU-MCNC: >35.2 MG/L FEU (ref 0–0.65)
DATE LAST DOSE: ABNORMAL
DATE LAST DOSE: ABNORMAL
DEPRECATED S PYO AG THROAT QL EIA: NEGATIVE
DIAGNOSIS, 93000: NORMAL
DIFFERENTIAL METHOD BLD: ABNORMAL
ECHO AO ROOT DIAM: 2.43 CM
ECHO AV AREA PEAK VELOCITY: 1.3 CM2
ECHO AV AREA/BSA PEAK VELOCITY: 0.7 CM2/M2
ECHO AV PEAK GRADIENT: 13.33 MMHG
ECHO AV PEAK VELOCITY: 182.58 CM/S
ECHO LA MAJOR AXIS: 2.55 CM
ECHO LA MINOR AXIS: 1.33 CM
ECHO LV INTERNAL DIMENSION DIASTOLIC: 4.37 CM (ref 3.9–5.3)
ECHO LV INTERNAL DIMENSION SYSTOLIC: 3.13 CM
ECHO LV IVSD: 0.8 CM (ref 0.6–0.9)
ECHO LV MASS 2D: 93.5 G (ref 67–162)
ECHO LV MASS INDEX 2D: 48.7 G/M2 (ref 43–95)
ECHO LV POSTERIOR WALL DIASTOLIC: 0.63 CM (ref 0.6–0.9)
ECHO LVOT DIAM: 1.7 CM
ECHO LVOT PEAK GRADIENT: 4.31 MMHG
ECHO LVOT PEAK VELOCITY: 103.82 CM/S
ECHO MV A VELOCITY: 57.08 CM/S
ECHO MV AREA PHT: 6.4 CM2
ECHO MV E DECELERATION TIME (DT): 118.5 MS
ECHO MV E VELOCITY: 88.98 CM/S
ECHO MV E/A RATIO: 1.56
ECHO MV PRESSURE HALF TIME (PHT): 34.36 MS
ECHO PV PEAK INSTANTANEOUS GRADIENT SYSTOLIC: 4.71 MMHG
EOSINOPHIL # BLD: 0 K/UL (ref 0–0.4)
EOSINOPHIL # BLD: 0.2 K/UL (ref 0–0.4)
EOSINOPHIL # BLD: 0.2 K/UL (ref 0–0.4)
EOSINOPHIL # BLD: 0.3 K/UL (ref 0–0.4)
EOSINOPHIL # BLD: 0.5 K/UL (ref 0–0.4)
EOSINOPHIL # BLD: 0.5 K/UL (ref 0–0.4)
EOSINOPHIL # BLD: 0.6 K/UL (ref 0–0.4)
EOSINOPHIL # BLD: 1.1 K/UL (ref 0–0.4)
EOSINOPHIL # BLD: 2.5 K/UL (ref 0–0.4)
EOSINOPHIL NFR BLD: 0 % (ref 0–7)
EOSINOPHIL NFR BLD: 1 % (ref 0–7)
EOSINOPHIL NFR BLD: 11 % (ref 0–7)
EOSINOPHIL NFR BLD: 3 % (ref 0–7)
EOSINOPHIL NFR BLD: 4 % (ref 0–7)
EOSINOPHIL NFR BLD: 5 % (ref 0–7)
EOSINOPHIL NFR BLD: 5 % (ref 0–7)
EPAP/CPAP/PEEP, PAPEEP: 10
EPAP/CPAP/PEEP, PAPEEP: 18
EPITH CASTS URNS QL MICRO: ABNORMAL /LPF
ERYTHROCYTE [DISTWIDTH] IN BLOOD BY AUTOMATED COUNT: 13.6 % (ref 11.5–14.5)
ERYTHROCYTE [DISTWIDTH] IN BLOOD BY AUTOMATED COUNT: 13.7 % (ref 11.5–14.5)
ERYTHROCYTE [DISTWIDTH] IN BLOOD BY AUTOMATED COUNT: 13.8 % (ref 11.5–14.5)
ERYTHROCYTE [DISTWIDTH] IN BLOOD BY AUTOMATED COUNT: 13.9 % (ref 11.5–14.5)
ERYTHROCYTE [DISTWIDTH] IN BLOOD BY AUTOMATED COUNT: 13.9 % (ref 11.5–14.5)
ERYTHROCYTE [DISTWIDTH] IN BLOOD BY AUTOMATED COUNT: 14 % (ref 11.5–14.5)
ERYTHROCYTE [DISTWIDTH] IN BLOOD BY AUTOMATED COUNT: 14.1 % (ref 11.5–14.5)
ERYTHROCYTE [DISTWIDTH] IN BLOOD BY AUTOMATED COUNT: 15.9 % (ref 11.5–14.5)
ERYTHROCYTE [DISTWIDTH] IN BLOOD BY AUTOMATED COUNT: 16.2 % (ref 11.5–14.5)
ERYTHROCYTE [DISTWIDTH] IN BLOOD BY AUTOMATED COUNT: 16.4 % (ref 11.5–14.5)
ERYTHROCYTE [DISTWIDTH] IN BLOOD BY AUTOMATED COUNT: 16.6 % (ref 11.5–14.5)
ERYTHROCYTE [DISTWIDTH] IN BLOOD BY AUTOMATED COUNT: 16.7 % (ref 11.5–14.5)
ERYTHROCYTE [DISTWIDTH] IN BLOOD BY AUTOMATED COUNT: 16.9 % (ref 11.5–14.5)
ERYTHROCYTE [DISTWIDTH] IN BLOOD BY AUTOMATED COUNT: 17 % (ref 11.5–14.5)
ERYTHROCYTE [DISTWIDTH] IN BLOOD BY AUTOMATED COUNT: 17.1 % (ref 11.5–14.5)
ERYTHROCYTE [DISTWIDTH] IN BLOOD BY AUTOMATED COUNT: 17.2 % (ref 11.5–14.5)
ERYTHROCYTE [DISTWIDTH] IN BLOOD BY AUTOMATED COUNT: 17.2 % (ref 11.5–14.5)
ERYTHROCYTE [DISTWIDTH] IN BLOOD BY AUTOMATED COUNT: 17.3 % (ref 11.5–14.5)
ERYTHROCYTE [DISTWIDTH] IN BLOOD BY AUTOMATED COUNT: 17.4 % (ref 11.5–14.5)
ERYTHROCYTE [DISTWIDTH] IN BLOOD BY AUTOMATED COUNT: 17.5 % (ref 11.5–14.5)
ERYTHROCYTE [DISTWIDTH] IN BLOOD BY AUTOMATED COUNT: 17.5 % (ref 11.5–14.5)
ERYTHROCYTE [DISTWIDTH] IN BLOOD BY AUTOMATED COUNT: 17.6 % (ref 11.5–14.5)
ERYTHROCYTE [DISTWIDTH] IN BLOOD BY AUTOMATED COUNT: 17.7 % (ref 11.5–14.5)
ERYTHROCYTE [DISTWIDTH] IN BLOOD BY AUTOMATED COUNT: 18.6 % (ref 11.5–14.5)
ERYTHROCYTE [DISTWIDTH] IN BLOOD BY AUTOMATED COUNT: 18.8 % (ref 11.5–14.5)
ERYTHROCYTE [DISTWIDTH] IN BLOOD BY AUTOMATED COUNT: 18.8 % (ref 11.5–14.5)
ERYTHROCYTE [SEDIMENTATION RATE] IN BLOOD: 69 MM/HR (ref 0–20)
EST. AVERAGE GLUCOSE BLD GHB EST-MCNC: 94 MG/DL
FERRITIN SERPL-MCNC: 1007 NG/ML (ref 8–252)
FERRITIN SERPL-MCNC: 1234 NG/ML (ref 8–252)
FERRITIN SERPL-MCNC: 1401 NG/ML (ref 8–252)
FERRITIN SERPL-MCNC: 1594 NG/ML (ref 8–252)
FERRITIN SERPL-MCNC: 1606 NG/ML (ref 8–252)
FERRITIN SERPL-MCNC: 373 NG/ML (ref 26–388)
FERRITIN SERPL-MCNC: 394 NG/ML (ref 26–388)
FERRITIN SERPL-MCNC: 422 NG/ML (ref 8–252)
FERRITIN SERPL-MCNC: 441 NG/ML (ref 8–252)
FERRITIN SERPL-MCNC: 505 NG/ML (ref 26–388)
FERRITIN SERPL-MCNC: 555 NG/ML (ref 8–252)
FERRITIN SERPL-MCNC: 584 NG/ML (ref 8–252)
FERRITIN SERPL-MCNC: 601 NG/ML (ref 8–252)
FERRITIN SERPL-MCNC: 619 NG/ML (ref 8–252)
FERRITIN SERPL-MCNC: 704 NG/ML (ref 8–252)
FERRITIN SERPL-MCNC: 755 NG/ML (ref 26–388)
FERRITIN SERPL-MCNC: 795 NG/ML (ref 26–388)
FIBRINOGEN PPP-MCNC: 552 MG/DL (ref 200–475)
FIBRINOGEN PPP-MCNC: 595 MG/DL (ref 200–475)
FIBRINOGEN PPP-MCNC: 602 MG/DL (ref 200–475)
FIBRINOGEN PPP-MCNC: 613 MG/DL (ref 200–475)
FIBRINOGEN PPP-MCNC: 624 MG/DL (ref 200–475)
FIBRINOGEN PPP-MCNC: 624 MG/DL (ref 200–475)
FIBRINOGEN PPP-MCNC: 673 MG/DL (ref 200–475)
FIBRINOGEN PPP-MCNC: 684 MG/DL (ref 200–475)
FIBRINOGEN PPP-MCNC: 688 MG/DL (ref 200–475)
FIBRINOGEN PPP-MCNC: 745 MG/DL (ref 200–475)
FIBRINOGEN PPP-MCNC: 761 MG/DL (ref 200–475)
FIBRINOGEN PPP-MCNC: 774 MG/DL (ref 200–475)
FIBRINOGEN PPP-MCNC: >800 MG/DL (ref 200–475)
FIO2 ON VENT: 100 %
FIO2 ON VENT: 100 %
FIO2 ON VENT: 90 %
FLUAV AG NPH QL IA: NEGATIVE
FLUAV H1 2009 PAND RNA SPEC QL NAA+PROBE: NOT DETECTED
FLUAV H1 RNA SPEC QL NAA+PROBE: NOT DETECTED
FLUAV H3 RNA SPEC QL NAA+PROBE: NOT DETECTED
FLUAV SUBTYP SPEC NAA+PROBE: NOT DETECTED
FLUBV AG NOSE QL IA: NEGATIVE
FLUBV RNA SPEC QL NAA+PROBE: NOT DETECTED
FLUID CULTURE, SPNG2: NORMAL
FOLATE SERPL-MCNC: 18.2 NG/ML (ref 5–21)
GAS FLOW.O2 O2 DELIVERY SYS: 15 L/MIN
GAS FLOW.O2 O2 DELIVERY SYS: 4 L/MIN
GAS FLOW.O2 O2 DELIVERY SYS: ABNORMAL L/MIN
GAS FLOW.O2 SETTING OXYMISER: 22 BPM
GAS FLOW.O2 SETTING OXYMISER: 22 L/MIN
GAS FLOW.O2 SETTING OXYMISER: 24 BPM
GAS FLOW.O2 SETTING OXYMISER: 24 BPM
GLOBULIN SER CALC-MCNC: 3.7 G/DL (ref 2–4)
GLOBULIN SER CALC-MCNC: 3.7 G/DL (ref 2–4)
GLOBULIN SER CALC-MCNC: 3.8 G/DL (ref 2–4)
GLOBULIN SER CALC-MCNC: 3.9 G/DL (ref 2–4)
GLOBULIN SER CALC-MCNC: 4 G/DL (ref 2–4)
GLOBULIN SER CALC-MCNC: 4.2 G/DL (ref 2–4)
GLOBULIN SER CALC-MCNC: 4.2 G/DL (ref 2–4)
GLOBULIN SER CALC-MCNC: 4.3 G/DL (ref 2–4)
GLOBULIN SER CALC-MCNC: 4.4 G/DL (ref 2–4)
GLOBULIN SER CALC-MCNC: 4.5 G/DL (ref 2–4)
GLOBULIN SER CALC-MCNC: 4.7 G/DL (ref 2–4)
GLOBULIN SER CALC-MCNC: 4.8 G/DL (ref 2–4)
GLOBULIN SER CALC-MCNC: 4.9 G/DL (ref 2–4)
GLOBULIN SER CALC-MCNC: 4.9 G/DL (ref 2–4)
GLUCOSE BLD STRIP.AUTO-MCNC: 101 MG/DL (ref 65–100)
GLUCOSE BLD STRIP.AUTO-MCNC: 102 MG/DL (ref 65–100)
GLUCOSE BLD STRIP.AUTO-MCNC: 106 MG/DL (ref 65–100)
GLUCOSE BLD STRIP.AUTO-MCNC: 108 MG/DL (ref 65–100)
GLUCOSE BLD STRIP.AUTO-MCNC: 108 MG/DL (ref 65–100)
GLUCOSE BLD STRIP.AUTO-MCNC: 112 MG/DL (ref 65–100)
GLUCOSE BLD STRIP.AUTO-MCNC: 116 MG/DL (ref 65–100)
GLUCOSE BLD STRIP.AUTO-MCNC: 117 MG/DL (ref 65–100)
GLUCOSE BLD STRIP.AUTO-MCNC: 122 MG/DL (ref 65–100)
GLUCOSE BLD STRIP.AUTO-MCNC: 126 MG/DL (ref 65–100)
GLUCOSE BLD STRIP.AUTO-MCNC: 128 MG/DL (ref 65–100)
GLUCOSE BLD STRIP.AUTO-MCNC: 128 MG/DL (ref 65–100)
GLUCOSE BLD STRIP.AUTO-MCNC: 130 MG/DL (ref 65–100)
GLUCOSE BLD STRIP.AUTO-MCNC: 137 MG/DL (ref 65–100)
GLUCOSE BLD STRIP.AUTO-MCNC: 138 MG/DL (ref 65–100)
GLUCOSE BLD STRIP.AUTO-MCNC: 142 MG/DL (ref 65–100)
GLUCOSE BLD STRIP.AUTO-MCNC: 144 MG/DL (ref 65–100)
GLUCOSE BLD STRIP.AUTO-MCNC: 146 MG/DL (ref 65–100)
GLUCOSE BLD STRIP.AUTO-MCNC: 153 MG/DL (ref 65–100)
GLUCOSE BLD STRIP.AUTO-MCNC: 160 MG/DL (ref 65–100)
GLUCOSE BLD STRIP.AUTO-MCNC: 168 MG/DL (ref 65–100)
GLUCOSE BLD STRIP.AUTO-MCNC: 170 MG/DL (ref 65–100)
GLUCOSE BLD STRIP.AUTO-MCNC: 190 MG/DL (ref 65–100)
GLUCOSE BLD STRIP.AUTO-MCNC: 218 MG/DL (ref 65–100)
GLUCOSE BLD STRIP.AUTO-MCNC: 80 MG/DL (ref 65–100)
GLUCOSE BLD STRIP.AUTO-MCNC: 87 MG/DL (ref 65–100)
GLUCOSE BLD STRIP.AUTO-MCNC: 88 MG/DL (ref 65–100)
GLUCOSE BLD STRIP.AUTO-MCNC: 90 MG/DL (ref 65–100)
GLUCOSE BLD STRIP.AUTO-MCNC: 97 MG/DL (ref 65–100)
GLUCOSE BLD STRIP.AUTO-MCNC: 98 MG/DL (ref 65–100)
GLUCOSE BLD STRIP.AUTO-MCNC: 99 MG/DL (ref 65–100)
GLUCOSE SERPL-MCNC: 105 MG/DL (ref 65–100)
GLUCOSE SERPL-MCNC: 108 MG/DL (ref 65–100)
GLUCOSE SERPL-MCNC: 110 MG/DL (ref 65–100)
GLUCOSE SERPL-MCNC: 112 MG/DL (ref 65–100)
GLUCOSE SERPL-MCNC: 120 MG/DL (ref 65–100)
GLUCOSE SERPL-MCNC: 126 MG/DL (ref 65–100)
GLUCOSE SERPL-MCNC: 136 MG/DL (ref 65–100)
GLUCOSE SERPL-MCNC: 137 MG/DL (ref 65–100)
GLUCOSE SERPL-MCNC: 142 MG/DL (ref 65–100)
GLUCOSE SERPL-MCNC: 143 MG/DL (ref 65–100)
GLUCOSE SERPL-MCNC: 161 MG/DL (ref 65–100)
GLUCOSE SERPL-MCNC: 161 MG/DL (ref 65–100)
GLUCOSE SERPL-MCNC: 163 MG/DL (ref 65–100)
GLUCOSE SERPL-MCNC: 164 MG/DL (ref 65–100)
GLUCOSE SERPL-MCNC: 169 MG/DL (ref 65–100)
GLUCOSE SERPL-MCNC: 169 MG/DL (ref 65–100)
GLUCOSE SERPL-MCNC: 186 MG/DL (ref 65–100)
GLUCOSE SERPL-MCNC: 270 MG/DL (ref 65–100)
GLUCOSE SERPL-MCNC: 60 MG/DL (ref 65–100)
GLUCOSE SERPL-MCNC: 77 MG/DL (ref 65–100)
GLUCOSE SERPL-MCNC: 78 MG/DL (ref 65–100)
GLUCOSE SERPL-MCNC: 80 MG/DL (ref 65–100)
GLUCOSE SERPL-MCNC: 83 MG/DL (ref 65–100)
GLUCOSE SERPL-MCNC: 84 MG/DL (ref 65–100)
GLUCOSE SERPL-MCNC: 86 MG/DL (ref 65–100)
GLUCOSE SERPL-MCNC: 87 MG/DL (ref 65–100)
GLUCOSE SERPL-MCNC: 90 MG/DL (ref 65–100)
GLUCOSE SERPL-MCNC: 90 MG/DL (ref 65–100)
GLUCOSE SERPL-MCNC: 96 MG/DL (ref 65–100)
GLUCOSE SERPL-MCNC: 97 MG/DL (ref 65–100)
GLUCOSE SERPL-MCNC: 98 MG/DL (ref 65–100)
GLUCOSE SERPL-MCNC: 99 MG/DL (ref 65–100)
GLUCOSE SERPL-MCNC: 99 MG/DL (ref 65–100)
GLUCOSE UR STRIP.AUTO-MCNC: 100 MG/DL
GLUCOSE UR STRIP.AUTO-MCNC: NEGATIVE MG/DL
GRAM STN SPEC: NORMAL
GRAM STN SPEC: NORMAL
GRAN CASTS URNS QL MICRO: ABNORMAL /LPF
GRAN CASTS URNS QL MICRO: ABNORMAL /LPF
HADV DNA SPEC QL NAA+PROBE: NOT DETECTED
HBA1C MFR BLD: 4.9 % (ref 4–5.6)
HCG UR QL: NEGATIVE
HCO3 BLD-SCNC: 16.3 MMOL/L (ref 22–26)
HCO3 BLD-SCNC: 17.6 MMOL/L (ref 22–26)
HCO3 BLD-SCNC: 20.3 MMOL/L (ref 22–26)
HCO3 BLD-SCNC: 24.4 MMOL/L (ref 22–26)
HCO3 BLDA-SCNC: 20 MMOL/L (ref 22–26)
HCO3 BLDA-SCNC: 20 MMOL/L (ref 22–26)
HCO3 BLDA-SCNC: 21 MMOL/L (ref 22–26)
HCO3 BLDA-SCNC: 21 MMOL/L (ref 22–26)
HCOV 229E RNA SPEC QL NAA+PROBE: NOT DETECTED
HCOV HKU1 RNA SPEC QL NAA+PROBE: NOT DETECTED
HCOV NL63 RNA SPEC QL NAA+PROBE: NOT DETECTED
HCOV OC43 RNA SPEC QL NAA+PROBE: NOT DETECTED
HCT VFR BLD AUTO: 21 % (ref 35–47)
HCT VFR BLD AUTO: 21.9 % (ref 35–47)
HCT VFR BLD AUTO: 22.3 % (ref 35–47)
HCT VFR BLD AUTO: 23.4 % (ref 35–47)
HCT VFR BLD AUTO: 24.1 % (ref 35–47)
HCT VFR BLD AUTO: 24.3 % (ref 35–47)
HCT VFR BLD AUTO: 24.8 % (ref 35–47)
HCT VFR BLD AUTO: 25 % (ref 35–47)
HCT VFR BLD AUTO: 25.1 % (ref 35–47)
HCT VFR BLD AUTO: 25.2 % (ref 35–47)
HCT VFR BLD AUTO: 26.1 % (ref 35–47)
HCT VFR BLD AUTO: 26.4 % (ref 35–47)
HCT VFR BLD AUTO: 26.6 % (ref 35–47)
HCT VFR BLD AUTO: 27.3 % (ref 35–47)
HCT VFR BLD AUTO: 27.5 % (ref 35–47)
HCT VFR BLD AUTO: 28.5 % (ref 35–47)
HCT VFR BLD AUTO: 28.7 % (ref 35–47)
HCT VFR BLD AUTO: 29.3 % (ref 35–47)
HCT VFR BLD AUTO: 29.3 % (ref 35–47)
HCT VFR BLD AUTO: 29.6 % (ref 35–47)
HCT VFR BLD AUTO: 32.6 % (ref 35–47)
HCT VFR BLD AUTO: 33.1 % (ref 35–47)
HCT VFR BLD AUTO: 33.2 % (ref 35–47)
HCT VFR BLD AUTO: 33.3 % (ref 35–47)
HCT VFR BLD AUTO: 33.7 % (ref 35–47)
HCT VFR BLD AUTO: 34 % (ref 35–47)
HCT VFR BLD AUTO: 34 % (ref 35–47)
HCT VFR BLD AUTO: 34.8 % (ref 35–47)
HCT VFR BLD AUTO: 35.2 % (ref 35–47)
HCT VFR BLD AUTO: 39 % (ref 35–47)
HEALTH STATUS, XMCV2T: ABNORMAL
HEALTH STATUS, XMCV2T: ABNORMAL
HEALTH STATUS, XMCV2T: NORMAL
HEMOCCULT STL QL: POSITIVE
HGB BLD-MCNC: 10.1 G/DL (ref 11.5–16)
HGB BLD-MCNC: 11.2 G/DL (ref 11.5–16)
HGB BLD-MCNC: 11.2 G/DL (ref 11.5–16)
HGB BLD-MCNC: 11.3 G/DL (ref 11.5–16)
HGB BLD-MCNC: 11.4 G/DL (ref 11.5–16)
HGB BLD-MCNC: 11.5 G/DL (ref 11.5–16)
HGB BLD-MCNC: 11.5 G/DL (ref 11.5–16)
HGB BLD-MCNC: 11.8 G/DL (ref 11.5–16)
HGB BLD-MCNC: 11.8 G/DL (ref 11.5–16)
HGB BLD-MCNC: 11.9 G/DL (ref 11.5–16)
HGB BLD-MCNC: 12.1 G/DL (ref 11.5–16)
HGB BLD-MCNC: 12.3 G/DL (ref 11.5–16)
HGB BLD-MCNC: 13.4 G/DL (ref 11.5–16)
HGB BLD-MCNC: 6.7 G/DL (ref 11.5–16)
HGB BLD-MCNC: 7 G/DL (ref 11.5–16)
HGB BLD-MCNC: 7.4 G/DL (ref 11.5–16)
HGB BLD-MCNC: 7.5 G/DL (ref 11.5–16)
HGB BLD-MCNC: 7.9 G/DL (ref 11.5–16)
HGB BLD-MCNC: 8.1 G/DL (ref 11.5–16)
HGB BLD-MCNC: 8.2 G/DL (ref 11.5–16)
HGB BLD-MCNC: 8.4 G/DL (ref 11.5–16)
HGB BLD-MCNC: 8.5 G/DL (ref 11.5–16)
HGB BLD-MCNC: 8.6 G/DL (ref 11.5–16)
HGB BLD-MCNC: 8.6 G/DL (ref 11.5–16)
HGB BLD-MCNC: 8.7 G/DL (ref 11.5–16)
HGB BLD-MCNC: 8.7 G/DL (ref 11.5–16)
HGB BLD-MCNC: 8.9 G/DL (ref 11.5–16)
HGB BLD-MCNC: 9.2 G/DL (ref 11.5–16)
HGB BLD-MCNC: 9.3 G/DL (ref 11.5–16)
HGB BLD-MCNC: 9.6 G/DL (ref 11.5–16)
HGB BLD-MCNC: 9.6 G/DL (ref 11.5–16)
HGB UR QL STRIP: ABNORMAL
HISTORY CHECKED?,CKHIST: NORMAL
HMPV RNA SPEC QL NAA+PROBE: NOT DETECTED
HPIV1 RNA SPEC QL NAA+PROBE: NOT DETECTED
HPIV2 RNA SPEC QL NAA+PROBE: NOT DETECTED
HPIV3 RNA SPEC QL NAA+PROBE: NOT DETECTED
HPIV4 RNA SPEC QL NAA+PROBE: NOT DETECTED
HYALINE CASTS URNS QL MICRO: ABNORMAL /LPF (ref 0–5)
IL6 SERPL-MCNC: 64.4 PG/ML (ref 0–13)
IMM GRANULOCYTES # BLD AUTO: 0 K/UL
IMM GRANULOCYTES # BLD AUTO: 0 K/UL (ref 0–0.04)
IMM GRANULOCYTES # BLD AUTO: 0.1 K/UL (ref 0–0.04)
IMM GRANULOCYTES # BLD AUTO: 0.2 K/UL (ref 0–0.04)
IMM GRANULOCYTES # BLD AUTO: 0.2 K/UL (ref 0–0.04)
IMM GRANULOCYTES # BLD AUTO: 0.4 K/UL (ref 0–0.04)
IMM GRANULOCYTES # BLD AUTO: 0.5 K/UL (ref 0–0.04)
IMM GRANULOCYTES NFR BLD AUTO: 0 %
IMM GRANULOCYTES NFR BLD AUTO: 0 % (ref 0–0.5)
IMM GRANULOCYTES NFR BLD AUTO: 1 % (ref 0–0.5)
IMM GRANULOCYTES NFR BLD AUTO: 2 % (ref 0–0.5)
IMM GRANULOCYTES NFR BLD AUTO: 2 % (ref 0–0.5)
INR PPP: 1.1 (ref 0.9–1.1)
INR PPP: 1.2 (ref 0.9–1.1)
INSPIRATION.DURATION SETTING TIME VENT: 0.9 SEC
IPAP/PIP, IPAPIP: 30
IRON SATN MFR SERPL: 23 % (ref 20–50)
IRON SERPL-MCNC: 65 UG/DL (ref 35–150)
KETONES UR QL STRIP.AUTO: 40 MG/DL
KETONES UR QL STRIP.AUTO: ABNORMAL MG/DL
KETONES UR QL STRIP.AUTO: NEGATIVE MG/DL
L PNEUMO1 AG UR QL IA: NEGATIVE
LACTATE SERPL-SCNC: 0.6 MMOL/L (ref 0.4–2)
LACTATE SERPL-SCNC: 0.7 MMOL/L (ref 0.4–2)
LACTATE SERPL-SCNC: 0.8 MMOL/L (ref 0.4–2)
LACTATE SERPL-SCNC: 0.9 MMOL/L (ref 0.4–2)
LACTATE SERPL-SCNC: 1 MMOL/L (ref 0.4–2)
LACTATE SERPL-SCNC: 1.1 MMOL/L (ref 0.4–2)
LACTATE SERPL-SCNC: 1.1 MMOL/L (ref 0.4–2)
LACTATE SERPL-SCNC: 1.3 MMOL/L (ref 0.4–2)
LACTATE SERPL-SCNC: 1.6 MMOL/L (ref 0.4–2)
LACTATE SERPL-SCNC: 2.3 MMOL/L (ref 0.4–2)
LACTATE SERPL-SCNC: 3.3 MMOL/L (ref 0.4–2)
LDH SERPL L TO P-CCNC: 301 U/L (ref 81–246)
LDH SERPL L TO P-CCNC: 324 U/L (ref 81–246)
LDH SERPL L TO P-CCNC: 329 U/L (ref 81–246)
LDH SERPL L TO P-CCNC: 329 U/L (ref 81–246)
LDH SERPL L TO P-CCNC: 360 U/L (ref 81–246)
LDH SERPL L TO P-CCNC: 403 U/L (ref 81–246)
LDH SERPL L TO P-CCNC: 406 U/L (ref 81–246)
LDH SERPL L TO P-CCNC: 406 U/L (ref 81–246)
LDH SERPL L TO P-CCNC: 418 U/L (ref 81–246)
LDH SERPL L TO P-CCNC: 420 U/L (ref 81–246)
LDH SERPL L TO P-CCNC: 425 U/L (ref 81–246)
LDH SERPL L TO P-CCNC: 433 U/L (ref 81–246)
LDH SERPL L TO P-CCNC: 438 U/L (ref 81–246)
LDH SERPL L TO P-CCNC: 460 U/L (ref 81–246)
LDH SERPL L TO P-CCNC: 464 U/L (ref 81–246)
LDH SERPL L TO P-CCNC: 480 U/L (ref 81–246)
LDH SERPL L TO P-CCNC: 522 U/L (ref 81–246)
LEUKOCYTE ESTERASE UR QL STRIP.AUTO: ABNORMAL
LEUKOCYTE ESTERASE UR QL STRIP.AUTO: NEGATIVE
LEUKOCYTE ESTERASE UR QL STRIP.AUTO: NEGATIVE
LYMPHOCYTES # BLD: 0.6 K/UL (ref 0.8–3.5)
LYMPHOCYTES # BLD: 1 K/UL (ref 0.8–3.5)
LYMPHOCYTES # BLD: 1.1 K/UL (ref 0.8–3.5)
LYMPHOCYTES # BLD: 1.2 K/UL (ref 0.8–3.5)
LYMPHOCYTES # BLD: 1.2 K/UL (ref 0.8–3.5)
LYMPHOCYTES # BLD: 1.3 K/UL (ref 0.8–3.5)
LYMPHOCYTES # BLD: 1.3 K/UL (ref 0.8–3.5)
LYMPHOCYTES # BLD: 1.5 K/UL (ref 0.8–3.5)
LYMPHOCYTES # BLD: 1.5 K/UL (ref 0.8–3.5)
LYMPHOCYTES # BLD: 1.6 K/UL (ref 0.8–3.5)
LYMPHOCYTES # BLD: 1.6 K/UL (ref 0.8–3.5)
LYMPHOCYTES # BLD: 1.7 K/UL (ref 0.8–3.5)
LYMPHOCYTES # BLD: 1.9 K/UL (ref 0.8–3.5)
LYMPHOCYTES # BLD: 1.9 K/UL (ref 0.8–3.5)
LYMPHOCYTES # BLD: 2 K/UL (ref 0.8–3.5)
LYMPHOCYTES # BLD: 2.2 K/UL (ref 0.8–3.5)
LYMPHOCYTES # BLD: 2.3 K/UL (ref 0.8–3.5)
LYMPHOCYTES # BLD: 2.4 K/UL (ref 0.8–3.5)
LYMPHOCYTES # BLD: 2.4 K/UL (ref 0.8–3.5)
LYMPHOCYTES # BLD: 2.5 K/UL (ref 0.8–3.5)
LYMPHOCYTES # BLD: 4.4 K/UL (ref 0.8–3.5)
LYMPHOCYTES NFR BLD: 10 % (ref 12–49)
LYMPHOCYTES NFR BLD: 11 % (ref 12–49)
LYMPHOCYTES NFR BLD: 12 % (ref 12–49)
LYMPHOCYTES NFR BLD: 13 % (ref 12–49)
LYMPHOCYTES NFR BLD: 16 % (ref 12–49)
LYMPHOCYTES NFR BLD: 16 % (ref 12–49)
LYMPHOCYTES NFR BLD: 2 % (ref 12–49)
LYMPHOCYTES NFR BLD: 20 % (ref 12–49)
LYMPHOCYTES NFR BLD: 20 % (ref 12–49)
LYMPHOCYTES NFR BLD: 23 % (ref 12–49)
LYMPHOCYTES NFR BLD: 3 % (ref 12–49)
LYMPHOCYTES NFR BLD: 5 % (ref 12–49)
LYMPHOCYTES NFR BLD: 6 % (ref 12–49)
LYMPHOCYTES NFR BLD: 6 % (ref 12–49)
LYMPHOCYTES NFR BLD: 7 % (ref 12–49)
LYMPHOCYTES NFR BLD: 7 % (ref 12–49)
LYMPHOCYTES NFR BLD: 8 % (ref 12–49)
LYMPHOCYTES NFR BLD: 8 % (ref 12–49)
LYMPHOCYTES NFR BLD: 9 % (ref 12–49)
M PNEUMO DNA SPEC QL NAA+PROBE: NOT DETECTED
MAGNESIUM SERPL-MCNC: 1.2 MG/DL (ref 1.6–2.4)
MAGNESIUM SERPL-MCNC: 1.5 MG/DL (ref 1.6–2.4)
MAGNESIUM SERPL-MCNC: 1.8 MG/DL (ref 1.6–2.4)
MAGNESIUM SERPL-MCNC: 2 MG/DL (ref 1.6–2.4)
MAGNESIUM SERPL-MCNC: 2.1 MG/DL (ref 1.6–2.4)
MAGNESIUM SERPL-MCNC: 2.1 MG/DL (ref 1.6–2.4)
MAGNESIUM SERPL-MCNC: 2.3 MG/DL (ref 1.6–2.4)
MAGNESIUM SERPL-MCNC: 3 MG/DL (ref 1.6–2.4)
MAGNESIUM SERPL-MCNC: 3 MG/DL (ref 1.6–2.4)
MAGNESIUM SERPL-MCNC: 3.1 MG/DL (ref 1.6–2.4)
MAGNESIUM SERPL-MCNC: 3.2 MG/DL (ref 1.6–2.4)
MCH RBC QN AUTO: 26.3 PG (ref 26–34)
MCH RBC QN AUTO: 26.3 PG (ref 26–34)
MCH RBC QN AUTO: 26.4 PG (ref 26–34)
MCH RBC QN AUTO: 26.6 PG (ref 26–34)
MCH RBC QN AUTO: 26.6 PG (ref 26–34)
MCH RBC QN AUTO: 26.7 PG (ref 26–34)
MCH RBC QN AUTO: 26.8 PG (ref 26–34)
MCH RBC QN AUTO: 26.8 PG (ref 26–34)
MCH RBC QN AUTO: 26.9 PG (ref 26–34)
MCH RBC QN AUTO: 27 PG (ref 26–34)
MCH RBC QN AUTO: 27.1 PG (ref 26–34)
MCH RBC QN AUTO: 27.2 PG (ref 26–34)
MCH RBC QN AUTO: 27.2 PG (ref 26–34)
MCH RBC QN AUTO: 27.3 PG (ref 26–34)
MCH RBC QN AUTO: 27.4 PG (ref 26–34)
MCHC RBC AUTO-ENTMCNC: 33 G/DL (ref 30–36.5)
MCHC RBC AUTO-ENTMCNC: 33.3 G/DL (ref 30–36.5)
MCHC RBC AUTO-ENTMCNC: 33.3 G/DL (ref 30–36.5)
MCHC RBC AUTO-ENTMCNC: 33.4 G/DL (ref 30–36.5)
MCHC RBC AUTO-ENTMCNC: 33.5 G/DL (ref 30–36.5)
MCHC RBC AUTO-ENTMCNC: 33.5 G/DL (ref 30–36.5)
MCHC RBC AUTO-ENTMCNC: 33.6 G/DL (ref 30–36.5)
MCHC RBC AUTO-ENTMCNC: 33.7 G/DL (ref 30–36.5)
MCHC RBC AUTO-ENTMCNC: 33.7 G/DL (ref 30–36.5)
MCHC RBC AUTO-ENTMCNC: 33.8 G/DL (ref 30–36.5)
MCHC RBC AUTO-ENTMCNC: 33.9 G/DL (ref 30–36.5)
MCHC RBC AUTO-ENTMCNC: 34 G/DL (ref 30–36.5)
MCHC RBC AUTO-ENTMCNC: 34.1 G/DL (ref 30–36.5)
MCHC RBC AUTO-ENTMCNC: 34.3 G/DL (ref 30–36.5)
MCHC RBC AUTO-ENTMCNC: 34.4 G/DL (ref 30–36.5)
MCHC RBC AUTO-ENTMCNC: 34.4 G/DL (ref 30–36.5)
MCHC RBC AUTO-ENTMCNC: 34.5 G/DL (ref 30–36.5)
MCHC RBC AUTO-ENTMCNC: 34.5 G/DL (ref 30–36.5)
MCHC RBC AUTO-ENTMCNC: 34.6 G/DL (ref 30–36.5)
MCHC RBC AUTO-ENTMCNC: 34.7 G/DL (ref 30–36.5)
MCHC RBC AUTO-ENTMCNC: 34.9 G/DL (ref 30–36.5)
MCHC RBC AUTO-ENTMCNC: 35 G/DL (ref 30–36.5)
MCHC RBC AUTO-ENTMCNC: 35 G/DL (ref 30–36.5)
MCHC RBC AUTO-ENTMCNC: 35.3 G/DL (ref 30–36.5)
MCHC RBC AUTO-ENTMCNC: 35.6 G/DL (ref 30–36.5)
MCV RBC AUTO: 76.1 FL (ref 80–99)
MCV RBC AUTO: 76.2 FL (ref 80–99)
MCV RBC AUTO: 76.5 FL (ref 80–99)
MCV RBC AUTO: 76.9 FL (ref 80–99)
MCV RBC AUTO: 77.4 FL (ref 80–99)
MCV RBC AUTO: 77.5 FL (ref 80–99)
MCV RBC AUTO: 77.8 FL (ref 80–99)
MCV RBC AUTO: 77.9 FL (ref 80–99)
MCV RBC AUTO: 78.6 FL (ref 80–99)
MCV RBC AUTO: 78.6 FL (ref 80–99)
MCV RBC AUTO: 78.7 FL (ref 80–99)
MCV RBC AUTO: 78.9 FL (ref 80–99)
MCV RBC AUTO: 78.9 FL (ref 80–99)
MCV RBC AUTO: 79 FL (ref 80–99)
MCV RBC AUTO: 79.4 FL (ref 80–99)
MCV RBC AUTO: 79.7 FL (ref 80–99)
MCV RBC AUTO: 80.2 FL (ref 80–99)
MCV RBC AUTO: 80.4 FL (ref 80–99)
MCV RBC AUTO: 80.4 FL (ref 80–99)
MCV RBC AUTO: 80.5 FL (ref 80–99)
MCV RBC AUTO: 80.7 FL (ref 80–99)
MCV RBC AUTO: 80.8 FL (ref 80–99)
MCV RBC AUTO: 81.1 FL (ref 80–99)
MCV RBC AUTO: 81.2 FL (ref 80–99)
MCV RBC AUTO: 81.3 FL (ref 80–99)
MCV RBC AUTO: 81.4 FL (ref 80–99)
METAMYELOCYTES NFR BLD MANUAL: 1 %
METAMYELOCYTES NFR BLD MANUAL: 3 %
MONOCYTES # BLD: 0.2 K/UL (ref 0–1)
MONOCYTES # BLD: 0.3 K/UL (ref 0–1)
MONOCYTES # BLD: 0.3 K/UL (ref 0–1)
MONOCYTES # BLD: 0.6 K/UL (ref 0–1)
MONOCYTES # BLD: 0.7 K/UL (ref 0–1)
MONOCYTES # BLD: 0.8 K/UL (ref 0–1)
MONOCYTES # BLD: 0.8 K/UL (ref 0–1)
MONOCYTES # BLD: 0.9 K/UL (ref 0–1)
MONOCYTES # BLD: 1 K/UL (ref 0–1)
MONOCYTES # BLD: 1 K/UL (ref 0–1)
MONOCYTES # BLD: 1.1 K/UL (ref 0–1)
MONOCYTES # BLD: 1.2 K/UL (ref 0–1)
MONOCYTES # BLD: 1.3 K/UL (ref 0–1)
MONOCYTES # BLD: 1.4 K/UL (ref 0–1)
MONOCYTES # BLD: 1.5 K/UL (ref 0–1)
MONOCYTES # BLD: 1.6 K/UL (ref 0–1)
MONOCYTES # BLD: 2 K/UL (ref 0–1)
MONOCYTES # BLD: 2.2 K/UL (ref 0–1)
MONOCYTES # BLD: 2.8 K/UL (ref 0–1)
MONOCYTES NFR BLD: 1 % (ref 5–13)
MONOCYTES NFR BLD: 1 % (ref 5–13)
MONOCYTES NFR BLD: 10 % (ref 5–13)
MONOCYTES NFR BLD: 12 % (ref 5–13)
MONOCYTES NFR BLD: 2 % (ref 5–13)
MONOCYTES NFR BLD: 2 % (ref 5–13)
MONOCYTES NFR BLD: 3 % (ref 5–13)
MONOCYTES NFR BLD: 4 % (ref 5–13)
MONOCYTES NFR BLD: 5 % (ref 5–13)
MONOCYTES NFR BLD: 6 % (ref 5–13)
MONOCYTES NFR BLD: 7 % (ref 5–13)
MONOCYTES NFR BLD: 8 % (ref 5–13)
MONOCYTES NFR BLD: 8 % (ref 5–13)
MYELOCYTES NFR BLD MANUAL: 1 %
MYELOCYTES NFR BLD MANUAL: 1 %
MYELOCYTES NFR BLD MANUAL: 2 %
MYELOCYTES NFR BLD MANUAL: 4 %
NEUTS BAND NFR BLD MANUAL: 1 %
NEUTS BAND NFR BLD MANUAL: 1 % (ref 0–6)
NEUTS BAND NFR BLD MANUAL: 2 % (ref 0–6)
NEUTS BAND NFR BLD MANUAL: 3 % (ref 0–6)
NEUTS SEG # BLD: 12.8 K/UL (ref 1.8–8)
NEUTS SEG # BLD: 12.8 K/UL (ref 1.8–8)
NEUTS SEG # BLD: 13.9 K/UL (ref 1.8–8)
NEUTS SEG # BLD: 14.3 K/UL (ref 1.8–8)
NEUTS SEG # BLD: 15 K/UL (ref 1.8–8)
NEUTS SEG # BLD: 15.4 K/UL (ref 1.8–8)
NEUTS SEG # BLD: 15.9 K/UL (ref 1.8–8)
NEUTS SEG # BLD: 15.9 K/UL (ref 1.8–8)
NEUTS SEG # BLD: 16.8 K/UL (ref 1.8–8)
NEUTS SEG # BLD: 17.2 K/UL (ref 1.8–8)
NEUTS SEG # BLD: 18.6 K/UL (ref 1.8–8)
NEUTS SEG # BLD: 19.3 K/UL (ref 1.8–8)
NEUTS SEG # BLD: 20 K/UL (ref 1.8–8)
NEUTS SEG # BLD: 25.8 K/UL (ref 1.8–8)
NEUTS SEG # BLD: 30 K/UL (ref 1.8–8)
NEUTS SEG # BLD: 32.2 K/UL (ref 1.8–8)
NEUTS SEG # BLD: 38.7 K/UL (ref 1.8–8)
NEUTS SEG # BLD: 6.1 K/UL (ref 1.8–8)
NEUTS SEG # BLD: 6.6 K/UL (ref 1.8–8)
NEUTS SEG # BLD: 6.9 K/UL (ref 1.8–8)
NEUTS SEG # BLD: 7.3 K/UL (ref 1.8–8)
NEUTS SEG # BLD: 8.3 K/UL (ref 1.8–8)
NEUTS SEG # BLD: 8.3 K/UL (ref 1.8–8)
NEUTS SEG NFR BLD: 56 % (ref 32–75)
NEUTS SEG NFR BLD: 63 % (ref 32–75)
NEUTS SEG NFR BLD: 65 % (ref 32–75)
NEUTS SEG NFR BLD: 66 % (ref 32–75)
NEUTS SEG NFR BLD: 69 % (ref 32–75)
NEUTS SEG NFR BLD: 74 % (ref 32–75)
NEUTS SEG NFR BLD: 75 % (ref 32–75)
NEUTS SEG NFR BLD: 77 % (ref 32–75)
NEUTS SEG NFR BLD: 77 % (ref 32–75)
NEUTS SEG NFR BLD: 78 % (ref 32–75)
NEUTS SEG NFR BLD: 79 % (ref 32–75)
NEUTS SEG NFR BLD: 80 % (ref 32–75)
NEUTS SEG NFR BLD: 81 % (ref 32–75)
NEUTS SEG NFR BLD: 83 % (ref 32–75)
NEUTS SEG NFR BLD: 83 % (ref 32–75)
NEUTS SEG NFR BLD: 84 % (ref 32–75)
NEUTS SEG NFR BLD: 86 % (ref 32–75)
NEUTS SEG NFR BLD: 87 % (ref 32–75)
NEUTS SEG NFR BLD: 89 % (ref 32–75)
NEUTS SEG NFR BLD: 90 % (ref 32–75)
NEUTS SEG NFR BLD: 93 % (ref 32–75)
NEUTS SEG NFR BLD: 94 % (ref 32–75)
NEUTS SEG NFR BLD: 94 % (ref 32–75)
NITRITE UR QL STRIP.AUTO: NEGATIVE
NRBC # BLD: 0 K/UL (ref 0–0.01)
NRBC # BLD: 0.02 K/UL (ref 0–0.01)
NRBC # BLD: 0.03 K/UL (ref 0–0.01)
NRBC # BLD: 0.03 K/UL (ref 0–0.01)
NRBC # BLD: 0.05 K/UL (ref 0–0.01)
NRBC # BLD: 0.09 K/UL (ref 0–0.01)
NRBC # BLD: 0.1 K/UL (ref 0–0.01)
NRBC BLD-RTO: 0 PER 100 WBC
NRBC BLD-RTO: 0.1 PER 100 WBC
NRBC BLD-RTO: 0.2 PER 100 WBC
NRBC BLD-RTO: 0.3 PER 100 WBC
NRBC BLD-RTO: 0.3 PER 100 WBC
O2/TOTAL GAS SETTING VFR VENT: 100 %
ORGANISM ID, SPNG3: NORMAL
P-R INTERVAL, ECG05: 120 MS
P-R INTERVAL, ECG05: 122 MS
P-R INTERVAL, ECG05: 122 MS
P-R INTERVAL, ECG05: 124 MS
P-R INTERVAL, ECG05: 124 MS
P-R INTERVAL, ECG05: 128 MS
P-R INTERVAL, ECG05: 98 MS
P-R INTERVAL, ECG05: 98 MS
PATH REV BLD -IMP: ABNORMAL
PATH REV BLD -IMP: NORMAL
PCO2 BLD: 35.4 MMHG (ref 35–45)
PCO2 BLD: 37.4 MMHG (ref 35–45)
PCO2 BLD: 44.5 MMHG (ref 35–45)
PCO2 BLD: 47.7 MMHG (ref 35–45)
PCO2 BLDA: 29 MMHG (ref 35–45)
PCO2 BLDA: 30 MMHG (ref 35–45)
PCO2 BLDA: 36 MMHG (ref 35–45)
PCO2 BLDA: 38 MMHG (ref 35–45)
PEEP RESPIRATORY: 18 CMH2O
PH BLD: 7.27 [PH] (ref 7.35–7.45)
PH BLD: 7.27 [PH] (ref 7.35–7.45)
PH BLD: 7.28 [PH] (ref 7.35–7.45)
PH BLD: 7.32 [PH] (ref 7.35–7.45)
PH BLDA: 7.33 [PH] (ref 7.35–7.45)
PH BLDA: 7.39 [PH] (ref 7.35–7.45)
PH BLDA: 7.47 [PH] (ref 7.35–7.45)
PH BLDA: 7.47 [PH] (ref 7.35–7.45)
PH UR STRIP: 5 [PH] (ref 5–8)
PH UR STRIP: 5 [PH] (ref 5–8)
PH UR STRIP: 5.5 [PH] (ref 5–8)
PH UR STRIP: 5.5 [PH] (ref 5–8)
PH UR STRIP: 6 [PH] (ref 5–8)
PH UR STRIP: 6.5 [PH] (ref 5–8)
PHOSPHATE SERPL-MCNC: 2.9 MG/DL (ref 2.6–4.7)
PHOSPHATE SERPL-MCNC: 3.1 MG/DL (ref 2.6–4.7)
PHOSPHATE SERPL-MCNC: 4 MG/DL (ref 2.6–4.7)
PHOSPHATE SERPL-MCNC: 5.3 MG/DL (ref 2.6–4.7)
PHOSPHATE SERPL-MCNC: 5.4 MG/DL (ref 2.6–4.7)
PHOSPHATE SERPL-MCNC: 5.6 MG/DL (ref 2.6–4.7)
PHOSPHATE SERPL-MCNC: 5.7 MG/DL (ref 2.6–4.7)
PHOSPHATE SERPL-MCNC: 5.8 MG/DL (ref 2.6–4.7)
PHOSPHATE SERPL-MCNC: 5.8 MG/DL (ref 2.6–4.7)
PHOSPHATE SERPL-MCNC: 6.1 MG/DL (ref 2.6–4.7)
PIP ISTAT,IPIP: 30
PLATELET # BLD AUTO: 177 K/UL (ref 150–400)
PLATELET # BLD AUTO: 183 K/UL (ref 150–400)
PLATELET # BLD AUTO: 186 K/UL (ref 150–400)
PLATELET # BLD AUTO: 201 K/UL (ref 150–400)
PLATELET # BLD AUTO: 212 K/UL (ref 150–400)
PLATELET # BLD AUTO: 219 K/UL (ref 150–400)
PLATELET # BLD AUTO: 225 K/UL (ref 150–400)
PLATELET # BLD AUTO: 225 K/UL (ref 150–400)
PLATELET # BLD AUTO: 229 K/UL (ref 150–400)
PLATELET # BLD AUTO: 241 K/UL (ref 150–400)
PLATELET # BLD AUTO: 243 K/UL (ref 150–400)
PLATELET # BLD AUTO: 248 K/UL (ref 150–400)
PLATELET # BLD AUTO: 254 K/UL (ref 150–400)
PLATELET # BLD AUTO: 267 K/UL (ref 150–400)
PLATELET # BLD AUTO: 283 K/UL (ref 150–400)
PLATELET # BLD AUTO: 287 K/UL (ref 150–400)
PLATELET # BLD AUTO: 307 K/UL (ref 150–400)
PLATELET # BLD AUTO: 316 K/UL (ref 150–400)
PLATELET # BLD AUTO: 320 K/UL (ref 150–400)
PLATELET # BLD AUTO: 324 K/UL (ref 150–400)
PLATELET # BLD AUTO: 327 K/UL (ref 150–400)
PLATELET # BLD AUTO: 351 K/UL (ref 150–400)
PLATELET # BLD AUTO: 356 K/UL (ref 150–400)
PLATELET # BLD AUTO: 397 K/UL (ref 150–400)
PLATELET # BLD AUTO: 408 K/UL (ref 150–400)
PLATELET # BLD AUTO: 468 K/UL (ref 150–400)
PLATELET # BLD AUTO: 522 K/UL (ref 150–400)
PLATELET # BLD AUTO: 623 K/UL (ref 150–400)
PLATELET # BLD AUTO: 693 K/UL (ref 150–400)
PLATELET # BLD AUTO: 714 K/UL (ref 150–400)
PLATELET COMMENTS,PCOM: ABNORMAL
PLEASE NOTE, SPNG4: NORMAL
PMV BLD AUTO: 10.1 FL (ref 8.9–12.9)
PMV BLD AUTO: 10.2 FL (ref 8.9–12.9)
PMV BLD AUTO: 10.3 FL (ref 8.9–12.9)
PMV BLD AUTO: 10.3 FL (ref 8.9–12.9)
PMV BLD AUTO: 10.4 FL (ref 8.9–12.9)
PMV BLD AUTO: 10.5 FL (ref 8.9–12.9)
PMV BLD AUTO: 10.6 FL (ref 8.9–12.9)
PMV BLD AUTO: 10.6 FL (ref 8.9–12.9)
PMV BLD AUTO: 10.7 FL (ref 8.9–12.9)
PMV BLD AUTO: 10.8 FL (ref 8.9–12.9)
PMV BLD AUTO: 10.9 FL (ref 8.9–12.9)
PMV BLD AUTO: 11 FL (ref 8.9–12.9)
PMV BLD AUTO: 11.1 FL (ref 8.9–12.9)
PMV BLD AUTO: 11.2 FL (ref 8.9–12.9)
PMV BLD AUTO: 11.4 FL (ref 8.9–12.9)
PMV BLD AUTO: 11.5 FL (ref 8.9–12.9)
PMV BLD AUTO: 12 FL (ref 8.9–12.9)
PMV BLD AUTO: 8.9 FL (ref 8.9–12.9)
PMV BLD AUTO: 9 FL (ref 8.9–12.9)
PMV BLD AUTO: 9.1 FL (ref 8.9–12.9)
PMV BLD AUTO: 9.8 FL (ref 8.9–12.9)
PO2 BLD: 158 MMHG (ref 80–100)
PO2 BLD: 54 MMHG (ref 80–100)
PO2 BLD: 68 MMHG (ref 80–100)
PO2 BLD: 69 MMHG (ref 80–100)
PO2 BLDA: 59 MMHG (ref 80–100)
PO2 BLDA: 67 MMHG (ref 80–100)
PO2 BLDA: 68 MMHG (ref 80–100)
PO2 BLDA: 80 MMHG (ref 80–100)
POTASSIUM SERPL-SCNC: 3.4 MMOL/L (ref 3.5–5.1)
POTASSIUM SERPL-SCNC: 3.5 MMOL/L (ref 3.5–5.1)
POTASSIUM SERPL-SCNC: 3.6 MMOL/L (ref 3.5–5.1)
POTASSIUM SERPL-SCNC: 3.6 MMOL/L (ref 3.5–5.1)
POTASSIUM SERPL-SCNC: 3.7 MMOL/L (ref 3.5–5.1)
POTASSIUM SERPL-SCNC: 3.8 MMOL/L (ref 3.5–5.1)
POTASSIUM SERPL-SCNC: 3.9 MMOL/L (ref 3.5–5.1)
POTASSIUM SERPL-SCNC: 4 MMOL/L (ref 3.5–5.1)
POTASSIUM SERPL-SCNC: 4 MMOL/L (ref 3.5–5.1)
POTASSIUM SERPL-SCNC: 4.1 MMOL/L (ref 3.5–5.1)
POTASSIUM SERPL-SCNC: 4.2 MMOL/L (ref 3.5–5.1)
POTASSIUM SERPL-SCNC: 4.3 MMOL/L (ref 3.5–5.1)
POTASSIUM SERPL-SCNC: 4.3 MMOL/L (ref 3.5–5.1)
POTASSIUM SERPL-SCNC: 4.4 MMOL/L (ref 3.5–5.1)
POTASSIUM SERPL-SCNC: 4.5 MMOL/L (ref 3.5–5.1)
POTASSIUM SERPL-SCNC: 4.6 MMOL/L (ref 3.5–5.1)
POTASSIUM SERPL-SCNC: 4.7 MMOL/L (ref 3.5–5.1)
POTASSIUM SERPL-SCNC: 4.8 MMOL/L (ref 3.5–5.1)
POTASSIUM SERPL-SCNC: 4.9 MMOL/L (ref 3.5–5.1)
POTASSIUM SERPL-SCNC: 5 MMOL/L (ref 3.5–5.1)
POTASSIUM SERPL-SCNC: 5.1 MMOL/L (ref 3.5–5.1)
POTASSIUM SERPL-SCNC: 5.2 MMOL/L (ref 3.5–5.1)
POTASSIUM SERPL-SCNC: 5.3 MMOL/L (ref 3.5–5.1)
PROCALCITONIN SERPL-MCNC: 0.05 NG/ML
PROCALCITONIN SERPL-MCNC: 0.09 NG/ML
PROCALCITONIN SERPL-MCNC: 0.09 NG/ML
PROCALCITONIN SERPL-MCNC: 0.12 NG/ML
PROCALCITONIN SERPL-MCNC: 0.12 NG/ML
PROCALCITONIN SERPL-MCNC: 0.14 NG/ML
PROCALCITONIN SERPL-MCNC: 0.24 NG/ML
PROCALCITONIN SERPL-MCNC: 0.55 NG/ML
PROCALCITONIN SERPL-MCNC: 0.64 NG/ML
PROCALCITONIN SERPL-MCNC: 165.31 NG/ML
PROCALCITONIN SERPL-MCNC: 407.9 NG/ML
PROT SERPL-MCNC: 5.7 G/DL (ref 6.4–8.2)
PROT SERPL-MCNC: 5.8 G/DL (ref 6.4–8.2)
PROT SERPL-MCNC: 5.8 G/DL (ref 6.4–8.2)
PROT SERPL-MCNC: 5.9 G/DL (ref 6.4–8.2)
PROT SERPL-MCNC: 6 G/DL (ref 6.4–8.2)
PROT SERPL-MCNC: 6.1 G/DL (ref 6.4–8.2)
PROT SERPL-MCNC: 6.2 G/DL (ref 6.4–8.2)
PROT SERPL-MCNC: 6.2 G/DL (ref 6.4–8.2)
PROT SERPL-MCNC: 6.5 G/DL (ref 6.4–8.2)
PROT SERPL-MCNC: 6.6 G/DL (ref 6.4–8.2)
PROT SERPL-MCNC: 6.8 G/DL (ref 6.4–8.2)
PROT SERPL-MCNC: 7 G/DL (ref 6.4–8.2)
PROT SERPL-MCNC: 7.1 G/DL (ref 6.4–8.2)
PROT SERPL-MCNC: 7.8 G/DL (ref 6.4–8.2)
PROT UR STRIP-MCNC: 100 MG/DL
PROT UR STRIP-MCNC: 100 MG/DL
PROT UR STRIP-MCNC: 30 MG/DL
PROT UR STRIP-MCNC: >300 MG/DL
PROT UR STRIP-MCNC: ABNORMAL MG/DL
PROT UR STRIP-MCNC: NEGATIVE MG/DL
PROTHROMBIN TIME: 11.5 SEC (ref 9–11.1)
PROTHROMBIN TIME: 11.5 SEC (ref 9–11.1)
PROTHROMBIN TIME: 11.8 SEC (ref 9–11.1)
PROTHROMBIN TIME: 11.8 SEC (ref 9–11.1)
PROTHROMBIN TIME: 12.1 SEC (ref 9–11.1)
PROTHROMBIN TIME: 12.4 SEC (ref 9–11.1)
PROTHROMBIN TIME: 12.5 SEC (ref 9–11.1)
PROTHROMBIN TIME: 12.6 SEC (ref 9–11.1)
PROTHROMBIN TIME: 12.7 SEC (ref 9–11.1)
PTH-INTACT SERPL-MCNC: 21.3 PG/ML (ref 18.4–88)
Q-T INTERVAL, ECG07: 0 MS
Q-T INTERVAL, ECG07: 280 MS
Q-T INTERVAL, ECG07: 298 MS
Q-T INTERVAL, ECG07: 304 MS
Q-T INTERVAL, ECG07: 308 MS
Q-T INTERVAL, ECG07: 308 MS
Q-T INTERVAL, ECG07: 310 MS
Q-T INTERVAL, ECG07: 312 MS
Q-T INTERVAL, ECG07: 398 MS
QRS DURATION, ECG06: 0 MS
QRS DURATION, ECG06: 58 MS
QRS DURATION, ECG06: 60 MS
QRS DURATION, ECG06: 62 MS
QRS DURATION, ECG06: 64 MS
QRS DURATION, ECG06: 66 MS
QRS DURATION, ECG06: 82 MS
QTC CALCULATION (BEZET), ECG08: 0 MS
QTC CALCULATION (BEZET), ECG08: 372 MS
QTC CALCULATION (BEZET), ECG08: 404 MS
QTC CALCULATION (BEZET), ECG08: 406 MS
QTC CALCULATION (BEZET), ECG08: 421 MS
QTC CALCULATION (BEZET), ECG08: 435 MS
QTC CALCULATION (BEZET), ECG08: 435 MS
QTC CALCULATION (BEZET), ECG08: 443 MS
QTC CALCULATION (BEZET), ECG08: 576 MS
RBC # BLD AUTO: 2.66 M/UL (ref 3.8–5.2)
RBC # BLD AUTO: 2.71 M/UL (ref 3.8–5.2)
RBC # BLD AUTO: 2.77 M/UL (ref 3.8–5.2)
RBC # BLD AUTO: 2.9 M/UL (ref 3.8–5.2)
RBC # BLD AUTO: 2.96 M/UL (ref 3.8–5.2)
RBC # BLD AUTO: 3.08 M/UL (ref 3.8–5.2)
RBC # BLD AUTO: 3.15 M/UL (ref 3.8–5.2)
RBC # BLD AUTO: 3.21 M/UL (ref 3.8–5.2)
RBC # BLD AUTO: 3.25 M/UL (ref 3.8–5.2)
RBC # BLD AUTO: 3.28 M/UL (ref 3.8–5.2)
RBC # BLD AUTO: 3.42 M/UL (ref 3.8–5.2)
RBC # BLD AUTO: 3.57 M/UL (ref 3.8–5.2)
RBC # BLD AUTO: 3.59 M/UL (ref 3.8–5.2)
RBC # BLD AUTO: 3.73 M/UL (ref 3.8–5.2)
RBC # BLD AUTO: 4.15 M/UL (ref 3.8–5.2)
RBC # BLD AUTO: 4.2 M/UL (ref 3.8–5.2)
RBC # BLD AUTO: 4.21 M/UL (ref 3.8–5.2)
RBC # BLD AUTO: 4.25 M/UL (ref 3.8–5.2)
RBC # BLD AUTO: 4.29 M/UL (ref 3.8–5.2)
RBC # BLD AUTO: 4.29 M/UL (ref 3.8–5.2)
RBC # BLD AUTO: 4.42 M/UL (ref 3.8–5.2)
RBC # BLD AUTO: 4.42 M/UL (ref 3.8–5.2)
RBC # BLD AUTO: 4.47 M/UL (ref 3.8–5.2)
RBC # BLD AUTO: 4.49 M/UL (ref 3.8–5.2)
RBC # BLD AUTO: 4.6 M/UL (ref 3.8–5.2)
RBC # BLD AUTO: 5.01 M/UL (ref 3.8–5.2)
RBC #/AREA URNS HPF: ABNORMAL /HPF (ref 0–5)
RBC MORPH BLD: ABNORMAL
REPORTED DOSE,DOSE: ABNORMAL UNITS
REPORTED DOSE,DOSE: ABNORMAL UNITS
REPORTED DOSE/TIME,TMG: ABNORMAL
REPORTED DOSE/TIME,TMG: ABNORMAL
RSV RNA SPEC QL NAA+PROBE: NOT DETECTED
RV+EV RNA SPEC QL NAA+PROBE: NOT DETECTED
S PNEUM AG SPEC QL LA: NEGATIVE
SAMPLES BEING HELD,HOLD: NORMAL
SAO2 % BLD: 83 % (ref 92–97)
SAO2 % BLD: 91 % (ref 92–97)
SAO2 % BLD: 91 % (ref 92–97)
SAO2 % BLD: 93 % (ref 92–97)
SAO2 % BLD: 93 % (ref 92–97)
SAO2 % BLD: 95 % (ref 92–97)
SAO2 % BLD: 95 % (ref 92–97)
SAO2 % BLD: 99 % (ref 92–97)
SAO2% DEVICE SAO2% SENSOR NAME: ABNORMAL
SARS-COV-2, COV2: DETECTED
SERVICE CMNT-IMP: ABNORMAL
SERVICE CMNT-IMP: NORMAL
SODIUM SERPL-SCNC: 136 MMOL/L (ref 136–145)
SODIUM SERPL-SCNC: 138 MMOL/L (ref 136–145)
SODIUM SERPL-SCNC: 139 MMOL/L (ref 136–145)
SODIUM SERPL-SCNC: 140 MMOL/L (ref 136–145)
SODIUM SERPL-SCNC: 141 MMOL/L (ref 136–145)
SODIUM SERPL-SCNC: 141 MMOL/L (ref 136–145)
SODIUM SERPL-SCNC: 142 MMOL/L (ref 136–145)
SODIUM SERPL-SCNC: 143 MMOL/L (ref 136–145)
SODIUM SERPL-SCNC: 144 MMOL/L (ref 136–145)
SODIUM SERPL-SCNC: 146 MMOL/L (ref 136–145)
SODIUM SERPL-SCNC: 146 MMOL/L (ref 136–145)
SODIUM SERPL-SCNC: 147 MMOL/L (ref 136–145)
SOURCE, COVRS: ABNORMAL
SOURCE, COVRS: ABNORMAL
SOURCE, COVRS: NORMAL
SP GR UR REFRACTOMETRY: 1.01
SP GR UR REFRACTOMETRY: 1.01 (ref 1–1.03)
SP GR UR REFRACTOMETRY: 1.02 (ref 1–1.03)
SP GR UR REFRACTOMETRY: 1.02 (ref 1–1.03)
SP GR UR REFRACTOMETRY: 1.03 (ref 1–1.03)
SP GR UR REFRACTOMETRY: 1.03 (ref 1–1.03)
SPECIMEN EXP DATE BLD: NORMAL
SPECIMEN EXP DATE BLD: NORMAL
SPECIMEN SITE: ABNORMAL
SPECIMEN SOURCE, FCOV2M: ABNORMAL
SPECIMEN SOURCE, FCOV2M: ABNORMAL
SPECIMEN SOURCE, FCOV2M: NORMAL
SPECIMEN SOURCE: NORMAL
SPECIMEN SOURCE: NORMAL
SPECIMEN TYPE, XMCV1T: ABNORMAL
SPECIMEN TYPE, XMCV1T: ABNORMAL
SPECIMEN TYPE, XMCV1T: NORMAL
SPECIMEN TYPE: ABNORMAL
SPECIMEN, SPNG1: NORMAL
T3FREE SERPL-MCNC: 2 PG/ML (ref 2.2–4)
T4 FREE SERPL-MCNC: 1 NG/DL (ref 0.8–1.5)
THERAPEUTIC RANGE,PTTT: ABNORMAL SECS (ref 58–77)
THERAPEUTIC RANGE,PTTT: NORMAL SECS (ref 58–77)
THERAPEUTIC RANGE,PTTT: NORMAL SECS (ref 58–77)
TIBC SERPL-MCNC: 279 UG/DL (ref 250–450)
TOTAL RESP. RATE, ITRR: 26
TOTAL RESP. RATE, ITRR: 35
TOTAL RESP. RATE, ITRR: 41
TOTAL RESP. RATE, ITRR: 41
TROPONIN I SERPL-MCNC: <0.05 NG/ML
TSH SERPL DL<=0.05 MIU/L-ACNC: 33.2 UIU/ML (ref 0.36–3.74)
UA: UC IF INDICATED,UAUC: ABNORMAL
UR CULT HOLD, URHOLD: NORMAL
UROBILINOGEN UR QL STRIP.AUTO: 0.2 EU/DL (ref 0.2–1)
UROBILINOGEN UR QL STRIP.AUTO: 1 EU/DL (ref 0.2–1)
VANCOMYCIN SERPL-MCNC: 20.4 UG/ML
VANCOMYCIN SERPL-MCNC: 24.9 UG/ML
VANCOMYCIN SERPL-MCNC: 35.1 UG/ML
VANCOMYCIN TROUGH SERPL-MCNC: 12.7 UG/ML (ref 5–10)
VANCOMYCIN TROUGH SERPL-MCNC: 19.7 UG/ML (ref 5–10)
VENTILATION MODE VENT: ABNORMAL
VENTRICULAR RATE, ECG03: 0 BPM
VENTRICULAR RATE, ECG03: 102 BPM
VENTRICULAR RATE, ECG03: 120 BPM
VENTRICULAR RATE, ECG03: 120 BPM
VENTRICULAR RATE, ECG03: 123 BPM
VENTRICULAR RATE, ECG03: 123 BPM
VENTRICULAR RATE, ECG03: 125 BPM
VENTRICULAR RATE, ECG03: 126 BPM
VENTRICULAR RATE, ECG03: 88 BPM
VIT B12 SERPL-MCNC: 588 PG/ML (ref 193–986)
WBC # BLD AUTO: 10.1 K/UL (ref 3.6–11)
WBC # BLD AUTO: 10.4 K/UL (ref 3.6–11)
WBC # BLD AUTO: 10.5 K/UL (ref 3.6–11)
WBC # BLD AUTO: 10.6 K/UL (ref 3.6–11)
WBC # BLD AUTO: 10.7 K/UL (ref 3.6–11)
WBC # BLD AUTO: 11 K/UL (ref 3.6–11)
WBC # BLD AUTO: 13.8 K/UL (ref 3.6–11)
WBC # BLD AUTO: 14.1 K/UL (ref 3.6–11)
WBC # BLD AUTO: 14.7 K/UL (ref 3.6–11)
WBC # BLD AUTO: 15.5 K/UL (ref 3.6–11)
WBC # BLD AUTO: 15.6 K/UL (ref 3.6–11)
WBC # BLD AUTO: 16.3 K/UL (ref 3.6–11)
WBC # BLD AUTO: 18.4 K/UL (ref 3.6–11)
WBC # BLD AUTO: 19.1 K/UL (ref 3.6–11)
WBC # BLD AUTO: 19.1 K/UL (ref 3.6–11)
WBC # BLD AUTO: 19.3 K/UL (ref 3.6–11)
WBC # BLD AUTO: 19.3 K/UL (ref 3.6–11)
WBC # BLD AUTO: 19.9 K/UL (ref 3.6–11)
WBC # BLD AUTO: 20.4 K/UL (ref 3.6–11)
WBC # BLD AUTO: 21.4 K/UL (ref 3.6–11)
WBC # BLD AUTO: 21.6 K/UL (ref 3.6–11)
WBC # BLD AUTO: 22.7 K/UL (ref 3.6–11)
WBC # BLD AUTO: 23.3 K/UL (ref 3.6–11)
WBC # BLD AUTO: 27.7 K/UL (ref 3.6–11)
WBC # BLD AUTO: 30.7 K/UL (ref 3.6–11)
WBC # BLD AUTO: 31.9 K/UL (ref 3.6–11)
WBC # BLD AUTO: 33.4 K/UL (ref 3.6–11)
WBC # BLD AUTO: 34.2 K/UL (ref 3.6–11)
WBC # BLD AUTO: 40.7 K/UL (ref 3.6–11)
WBC # BLD AUTO: 9.4 K/UL (ref 3.6–11)
WBC CASTS URNS QL MICRO: ABNORMAL /LPF
WBC MORPH BLD: ABNORMAL
WBC MORPH BLD: ABNORMAL
WBC URNS QL MICRO: >100 /HPF (ref 0–4)
WBC URNS QL MICRO: ABNORMAL /HPF (ref 0–4)
YEAST BUDDING URNS QL: PRESENT

## 2020-01-01 PROCEDURE — 83880 ASSAY OF NATRIURETIC PEPTIDE: CPT

## 2020-01-01 PROCEDURE — 71045 X-RAY EXAM CHEST 1 VIEW: CPT

## 2020-01-01 PROCEDURE — 80048 BASIC METABOLIC PNL TOTAL CA: CPT

## 2020-01-01 PROCEDURE — 83735 ASSAY OF MAGNESIUM: CPT

## 2020-01-01 PROCEDURE — 82607 VITAMIN B-12: CPT

## 2020-01-01 PROCEDURE — 65660000000 HC RM CCU STEPDOWN

## 2020-01-01 PROCEDURE — 74011000250 HC RX REV CODE- 250: Performed by: NURSE PRACTITIONER

## 2020-01-01 PROCEDURE — C9113 INJ PANTOPRAZOLE SODIUM, VIA: HCPCS | Performed by: PHYSICIAN ASSISTANT

## 2020-01-01 PROCEDURE — 74011250637 HC RX REV CODE- 250/637: Performed by: INTERNAL MEDICINE

## 2020-01-01 PROCEDURE — 74011250636 HC RX REV CODE- 250/636: Performed by: NURSE PRACTITIONER

## 2020-01-01 PROCEDURE — 83605 ASSAY OF LACTIC ACID: CPT

## 2020-01-01 PROCEDURE — 84145 PROCALCITONIN (PCT): CPT

## 2020-01-01 PROCEDURE — 65270000029 HC RM PRIVATE

## 2020-01-01 PROCEDURE — 86140 C-REACTIVE PROTEIN: CPT

## 2020-01-01 PROCEDURE — 86141 C-REACTIVE PROTEIN HS: CPT

## 2020-01-01 PROCEDURE — P9016 RBC LEUKOCYTES REDUCED: HCPCS

## 2020-01-01 PROCEDURE — 74011250637 HC RX REV CODE- 250/637: Performed by: STUDENT IN AN ORGANIZED HEALTH CARE EDUCATION/TRAINING PROGRAM

## 2020-01-01 PROCEDURE — 85025 COMPLETE CBC W/AUTO DIFF WBC: CPT

## 2020-01-01 PROCEDURE — 81025 URINE PREGNANCY TEST: CPT

## 2020-01-01 PROCEDURE — 99285 EMERGENCY DEPT VISIT HI MDM: CPT

## 2020-01-01 PROCEDURE — 74011000250 HC RX REV CODE- 250: Performed by: EMERGENCY MEDICINE

## 2020-01-01 PROCEDURE — 85379 FIBRIN DEGRADATION QUANT: CPT

## 2020-01-01 PROCEDURE — 82728 ASSAY OF FERRITIN: CPT

## 2020-01-01 PROCEDURE — 82962 GLUCOSE BLOOD TEST: CPT

## 2020-01-01 PROCEDURE — 94760 N-INVAS EAR/PLS OXIMETRY 1: CPT

## 2020-01-01 PROCEDURE — 2709999900 HC NON-CHARGEABLE SUPPLY

## 2020-01-01 PROCEDURE — 36415 COLL VENOUS BLD VENIPUNCTURE: CPT

## 2020-01-01 PROCEDURE — 93005 ELECTROCARDIOGRAM TRACING: CPT

## 2020-01-01 PROCEDURE — 74011250636 HC RX REV CODE- 250/636: Performed by: HOSPITALIST

## 2020-01-01 PROCEDURE — 74011250637 HC RX REV CODE- 250/637: Performed by: HOSPITALIST

## 2020-01-01 PROCEDURE — 74011250636 HC RX REV CODE- 250/636: Performed by: INTERNAL MEDICINE

## 2020-01-01 PROCEDURE — 80053 COMPREHEN METABOLIC PANEL: CPT

## 2020-01-01 PROCEDURE — C9113 INJ PANTOPRAZOLE SODIUM, VIA: HCPCS | Performed by: NURSE PRACTITIONER

## 2020-01-01 PROCEDURE — 70491 CT SOFT TISSUE NECK W/DYE: CPT

## 2020-01-01 PROCEDURE — 65660000001 HC RM ICU INTERMED STEPDOWN

## 2020-01-01 PROCEDURE — 74011000258 HC RX REV CODE- 258

## 2020-01-01 PROCEDURE — 87070 CULTURE OTHR SPECIMN AEROBIC: CPT

## 2020-01-01 PROCEDURE — 97535 SELF CARE MNGMENT TRAINING: CPT

## 2020-01-01 PROCEDURE — 85610 PROTHROMBIN TIME: CPT

## 2020-01-01 PROCEDURE — 87635 SARS-COV-2 COVID-19 AMP PRB: CPT

## 2020-01-01 PROCEDURE — 92610 EVALUATE SWALLOWING FUNCTION: CPT

## 2020-01-01 PROCEDURE — 71260 CT THORAX DX C+: CPT

## 2020-01-01 PROCEDURE — 80202 ASSAY OF VANCOMYCIN: CPT

## 2020-01-01 PROCEDURE — 82570 ASSAY OF URINE CREATININE: CPT

## 2020-01-01 PROCEDURE — 74011000250 HC RX REV CODE- 250: Performed by: PHYSICIAN ASSISTANT

## 2020-01-01 PROCEDURE — 86901 BLOOD TYPING SEROLOGIC RH(D): CPT

## 2020-01-01 PROCEDURE — 81001 URINALYSIS AUTO W/SCOPE: CPT

## 2020-01-01 PROCEDURE — 83615 LACTATE (LD) (LDH) ENZYME: CPT

## 2020-01-01 PROCEDURE — 74011636637 HC RX REV CODE- 636/637: Performed by: INTERNAL MEDICINE

## 2020-01-01 PROCEDURE — 74011000258 HC RX REV CODE- 258: Performed by: INTERNAL MEDICINE

## 2020-01-01 PROCEDURE — 74011000258 HC RX REV CODE- 258: Performed by: STUDENT IN AN ORGANIZED HEALTH CARE EDUCATION/TRAINING PROGRAM

## 2020-01-01 PROCEDURE — 71275 CT ANGIOGRAPHY CHEST: CPT

## 2020-01-01 PROCEDURE — 74011000258 HC RX REV CODE- 258: Performed by: NURSE PRACTITIONER

## 2020-01-01 PROCEDURE — 84484 ASSAY OF TROPONIN QUANT: CPT

## 2020-01-01 PROCEDURE — 0BH17EZ INSERTION OF ENDOTRACHEAL AIRWAY INTO TRACHEA, VIA NATURAL OR ARTIFICIAL OPENING: ICD-10-PCS | Performed by: INTERNAL MEDICINE

## 2020-01-01 PROCEDURE — 36600 WITHDRAWAL OF ARTERIAL BLOOD: CPT

## 2020-01-01 PROCEDURE — 74011000250 HC RX REV CODE- 250: Performed by: INTERNAL MEDICINE

## 2020-01-01 PROCEDURE — 74011000258 HC RX REV CODE- 258: Performed by: RADIOLOGY

## 2020-01-01 PROCEDURE — 97165 OT EVAL LOW COMPLEX 30 MIN: CPT

## 2020-01-01 PROCEDURE — 94003 VENT MGMT INPAT SUBQ DAY: CPT

## 2020-01-01 PROCEDURE — 65220000003 HC RM SEMIPRIVATE PSYCH

## 2020-01-01 PROCEDURE — 74011000258 HC RX REV CODE- 258: Performed by: HOSPITALIST

## 2020-01-01 PROCEDURE — 96366 THER/PROPH/DIAG IV INF ADDON: CPT

## 2020-01-01 PROCEDURE — 85652 RBC SED RATE AUTOMATED: CPT

## 2020-01-01 PROCEDURE — 74011250636 HC RX REV CODE- 250/636: Performed by: PHYSICIAN ASSISTANT

## 2020-01-01 PROCEDURE — 85027 COMPLETE CBC AUTOMATED: CPT

## 2020-01-01 PROCEDURE — 77030038269 HC DRN EXT URIN PURWCK BARD -A

## 2020-01-01 PROCEDURE — 74011000250 HC RX REV CODE- 250: Performed by: HOSPITALIST

## 2020-01-01 PROCEDURE — 85384 FIBRINOGEN ACTIVITY: CPT

## 2020-01-01 PROCEDURE — 0BH17EZ INSERTION OF ENDOTRACHEAL AIRWAY INTO TRACHEA, VIA NATURAL OR ARTIFICIAL OPENING: ICD-10-PCS | Performed by: EMERGENCY MEDICINE

## 2020-01-01 PROCEDURE — 74011000636 HC RX REV CODE- 636: Performed by: EMERGENCY MEDICINE

## 2020-01-01 PROCEDURE — 65610000006 HC RM INTENSIVE CARE

## 2020-01-01 PROCEDURE — 97530 THERAPEUTIC ACTIVITIES: CPT

## 2020-01-01 PROCEDURE — 74011250637 HC RX REV CODE- 250/637: Performed by: EMERGENCY MEDICINE

## 2020-01-01 PROCEDURE — 86923 COMPATIBILITY TEST ELECTRIC: CPT

## 2020-01-01 PROCEDURE — 87086 URINE CULTURE/COLONY COUNT: CPT

## 2020-01-01 PROCEDURE — 97116 GAIT TRAINING THERAPY: CPT

## 2020-01-01 PROCEDURE — 85018 HEMOGLOBIN: CPT

## 2020-01-01 PROCEDURE — 90686 IIV4 VACC NO PRSV 0.5 ML IM: CPT | Performed by: INTERNAL MEDICINE

## 2020-01-01 PROCEDURE — 99233 SBSQ HOSP IP/OBS HIGH 50: CPT | Performed by: NURSE PRACTITIONER

## 2020-01-01 PROCEDURE — 97161 PT EVAL LOW COMPLEX 20 MIN: CPT

## 2020-01-01 PROCEDURE — 84100 ASSAY OF PHOSPHORUS: CPT

## 2020-01-01 PROCEDURE — 77030002996 HC SUT SLK J&J -A

## 2020-01-01 PROCEDURE — 96368 THER/DIAG CONCURRENT INF: CPT

## 2020-01-01 PROCEDURE — 94002 VENT MGMT INPAT INIT DAY: CPT

## 2020-01-01 PROCEDURE — 5A1955Z RESPIRATORY VENTILATION, GREATER THAN 96 CONSECUTIVE HOURS: ICD-10-PCS | Performed by: INTERNAL MEDICINE

## 2020-01-01 PROCEDURE — 85730 THROMBOPLASTIN TIME PARTIAL: CPT

## 2020-01-01 PROCEDURE — 77010033711 HC HIGH FLOW OXYGEN

## 2020-01-01 PROCEDURE — 74018 RADEX ABDOMEN 1 VIEW: CPT

## 2020-01-01 PROCEDURE — 77010033678 HC OXYGEN DAILY

## 2020-01-01 PROCEDURE — 74011250636 HC RX REV CODE- 250/636

## 2020-01-01 PROCEDURE — 87040 BLOOD CULTURE FOR BACTERIA: CPT

## 2020-01-01 PROCEDURE — 83540 ASSAY OF IRON: CPT

## 2020-01-01 PROCEDURE — 75810000455 HC PLCMT CENT VENOUS CATH LVL 2 5182

## 2020-01-01 PROCEDURE — 74011250636 HC RX REV CODE- 250/636: Performed by: STUDENT IN AN ORGANIZED HEALTH CARE EDUCATION/TRAINING PROGRAM

## 2020-01-01 PROCEDURE — 99223 1ST HOSP IP/OBS HIGH 75: CPT | Performed by: INTERNAL MEDICINE

## 2020-01-01 PROCEDURE — 84481 FREE ASSAY (FT-3): CPT

## 2020-01-01 PROCEDURE — 82746 ASSAY OF FOLIC ACID SERUM: CPT

## 2020-01-01 PROCEDURE — 99284 EMERGENCY DEPT VISIT MOD MDM: CPT

## 2020-01-01 PROCEDURE — 80076 HEPATIC FUNCTION PANEL: CPT

## 2020-01-01 PROCEDURE — 83970 ASSAY OF PARATHORMONE: CPT

## 2020-01-01 PROCEDURE — 87880 STREP A ASSAY W/OPTIC: CPT

## 2020-01-01 PROCEDURE — 96360 HYDRATION IV INFUSION INIT: CPT

## 2020-01-01 PROCEDURE — 65270000032 HC RM SEMIPRIVATE

## 2020-01-01 PROCEDURE — 74011000636 HC RX REV CODE- 636: Performed by: RADIOLOGY

## 2020-01-01 PROCEDURE — 84439 ASSAY OF FREE THYROXINE: CPT

## 2020-01-01 PROCEDURE — 96374 THER/PROPH/DIAG INJ IV PUSH: CPT

## 2020-01-01 PROCEDURE — 99233 SBSQ HOSP IP/OBS HIGH 50: CPT | Performed by: INTERNAL MEDICINE

## 2020-01-01 PROCEDURE — 86900 BLOOD TYPING SEROLOGIC ABO: CPT

## 2020-01-01 PROCEDURE — XW033E5 INTRODUCTION OF REMDESIVIR ANTI-INFECTIVE INTO PERIPHERAL VEIN, PERCUTANEOUS APPROACH, NEW TECHNOLOGY GROUP 5: ICD-10-PCS | Performed by: STUDENT IN AN ORGANIZED HEALTH CARE EDUCATION/TRAINING PROGRAM

## 2020-01-01 PROCEDURE — 5A1935Z RESPIRATORY VENTILATION, LESS THAN 24 CONSECUTIVE HOURS: ICD-10-PCS | Performed by: EMERGENCY MEDICINE

## 2020-01-01 PROCEDURE — P9047 ALBUMIN (HUMAN), 25%, 50ML: HCPCS | Performed by: INTERNAL MEDICINE

## 2020-01-01 PROCEDURE — 94640 AIRWAY INHALATION TREATMENT: CPT

## 2020-01-01 PROCEDURE — 82550 ASSAY OF CK (CPK): CPT

## 2020-01-01 PROCEDURE — 87899 AGENT NOS ASSAY W/OPTIC: CPT

## 2020-01-01 PROCEDURE — 87804 INFLUENZA ASSAY W/OPTIC: CPT

## 2020-01-01 PROCEDURE — 82803 BLOOD GASES ANY COMBINATION: CPT

## 2020-01-01 PROCEDURE — 74011250637 HC RX REV CODE- 250/637: Performed by: PHYSICIAN ASSISTANT

## 2020-01-01 PROCEDURE — 76770 US EXAM ABDO BACK WALL COMP: CPT

## 2020-01-01 PROCEDURE — 74011000250 HC RX REV CODE- 250: Performed by: STUDENT IN AN ORGANIZED HEALTH CARE EDUCATION/TRAINING PROGRAM

## 2020-01-01 PROCEDURE — 77030018798 HC PMP KT ENTRL FED COVD -A

## 2020-01-01 PROCEDURE — 77030005402 HC CATH RAD ART LN KT TELE -B

## 2020-01-01 PROCEDURE — 83036 HEMOGLOBIN GLYCOSYLATED A1C: CPT

## 2020-01-01 PROCEDURE — 82306 VITAMIN D 25 HYDROXY: CPT

## 2020-01-01 PROCEDURE — 74011636637 HC RX REV CODE- 636/637: Performed by: NURSE PRACTITIONER

## 2020-01-01 PROCEDURE — 99232 SBSQ HOSP IP/OBS MODERATE 35: CPT | Performed by: SPECIALIST

## 2020-01-01 PROCEDURE — 74011250637 HC RX REV CODE- 250/637: Performed by: NURSE PRACTITIONER

## 2020-01-01 PROCEDURE — 94762 N-INVAS EAR/PLS OXIMTRY CONT: CPT

## 2020-01-01 PROCEDURE — 74177 CT ABD & PELVIS W/CONTRAST: CPT

## 2020-01-01 PROCEDURE — 74011250636 HC RX REV CODE- 250/636: Performed by: EMERGENCY MEDICINE

## 2020-01-01 PROCEDURE — 99291 CRITICAL CARE FIRST HOUR: CPT

## 2020-01-01 PROCEDURE — 03HY32Z INSERTION OF MONITORING DEVICE INTO UPPER ARTERY, PERCUTANEOUS APPROACH: ICD-10-PCS | Performed by: INTERNAL MEDICINE

## 2020-01-01 PROCEDURE — 0100U RESPIRATORY PANEL,PCR,NASOPHARYNGEAL: CPT

## 2020-01-01 PROCEDURE — C8929 TTE W OR WO FOL WCON,DOPPLER: HCPCS

## 2020-01-01 PROCEDURE — 87449 NOS EACH ORGANISM AG IA: CPT

## 2020-01-01 PROCEDURE — 77030019896 HC KT ARTERIAL LN TELE -B

## 2020-01-01 PROCEDURE — 51702 INSERT TEMP BLADDER CATH: CPT

## 2020-01-01 PROCEDURE — 83520 IMMUNOASSAY QUANT NOS NONAB: CPT

## 2020-01-01 PROCEDURE — 84443 ASSAY THYROID STIM HORMONE: CPT

## 2020-01-01 PROCEDURE — 99223 1ST HOSP IP/OBS HIGH 75: CPT | Performed by: NURSE PRACTITIONER

## 2020-01-01 PROCEDURE — C1751 CATH, INF, PER/CENT/MIDLINE: HCPCS

## 2020-01-01 PROCEDURE — 36430 TRANSFUSION BLD/BLD COMPNT: CPT

## 2020-01-01 PROCEDURE — 96375 TX/PRO/DX INJ NEW DRUG ADDON: CPT

## 2020-01-01 PROCEDURE — 82272 OCCULT BLD FECES 1-3 TESTS: CPT

## 2020-01-01 PROCEDURE — 90471 IMMUNIZATION ADMIN: CPT

## 2020-01-01 PROCEDURE — 96365 THER/PROPH/DIAG IV INF INIT: CPT

## 2020-01-01 RX ORDER — UREA 10 %
220 LOTION (ML) TOPICAL DAILY
Qty: 7 TAB | Refills: 0 | Status: SHIPPED | OUTPATIENT
Start: 2020-01-01

## 2020-01-01 RX ORDER — ALBUTEROL SULFATE 90 UG/1
2 AEROSOL, METERED RESPIRATORY (INHALATION)
Status: DISCONTINUED | OUTPATIENT
Start: 2020-01-01 | End: 2020-01-01 | Stop reason: HOSPADM

## 2020-01-01 RX ORDER — ONDANSETRON 2 MG/ML
4 INJECTION INTRAMUSCULAR; INTRAVENOUS
Status: DISCONTINUED | OUTPATIENT
Start: 2020-01-01 | End: 2021-01-01 | Stop reason: HOSPADM

## 2020-01-01 RX ORDER — ENOXAPARIN SODIUM 100 MG/ML
80 INJECTION SUBCUTANEOUS EVERY 12 HOURS
Status: CANCELLED | OUTPATIENT
Start: 2020-01-01

## 2020-01-01 RX ORDER — ACETAMINOPHEN 325 MG/1
650 TABLET ORAL
Status: DISCONTINUED | OUTPATIENT
Start: 2020-01-01 | End: 2020-01-01 | Stop reason: HOSPADM

## 2020-01-01 RX ORDER — ENOXAPARIN SODIUM 100 MG/ML
80 INJECTION SUBCUTANEOUS EVERY 12 HOURS
Status: DISCONTINUED | OUTPATIENT
Start: 2020-01-01 | End: 2020-01-01 | Stop reason: HOSPADM

## 2020-01-01 RX ORDER — GUAIFENESIN/DEXTROMETHORPHAN 100-10MG/5
10 SYRUP ORAL
Status: DISCONTINUED | OUTPATIENT
Start: 2020-01-01 | End: 2020-01-01 | Stop reason: HOSPADM

## 2020-01-01 RX ORDER — PROMETHAZINE HYDROCHLORIDE 25 MG/1
12.5 TABLET ORAL
Status: DISCONTINUED | OUTPATIENT
Start: 2020-01-01 | End: 2020-01-01 | Stop reason: HOSPADM

## 2020-01-01 RX ORDER — HEPARIN SODIUM 5000 [USP'U]/ML
5000 INJECTION, SOLUTION INTRAVENOUS; SUBCUTANEOUS EVERY 8 HOURS
Status: DISCONTINUED | OUTPATIENT
Start: 2020-01-01 | End: 2021-01-01

## 2020-01-01 RX ORDER — HEPARIN 100 UNIT/ML
SYRINGE INTRAVENOUS
Status: COMPLETED
Start: 2020-01-01 | End: 2020-01-01

## 2020-01-01 RX ORDER — SENNOSIDES 8.6 MG/1
1 TABLET ORAL DAILY
Status: DISCONTINUED | OUTPATIENT
Start: 2020-01-01 | End: 2020-01-01

## 2020-01-01 RX ORDER — ZINC SULFATE 50(220)MG
1 CAPSULE ORAL DAILY
Status: DISCONTINUED | OUTPATIENT
Start: 2020-01-01 | End: 2020-01-01 | Stop reason: ALTCHOICE

## 2020-01-01 RX ORDER — OXYBUTYNIN CHLORIDE 5 MG/1
5 TABLET ORAL 2 TIMES DAILY
Status: DISCONTINUED | OUTPATIENT
Start: 2020-01-01 | End: 2020-01-01 | Stop reason: HOSPADM

## 2020-01-01 RX ORDER — HEPARIN SODIUM 5000 [USP'U]/ML
2000 INJECTION, SOLUTION INTRAVENOUS; SUBCUTANEOUS AS NEEDED
Status: DISCONTINUED | OUTPATIENT
Start: 2020-01-01 | End: 2020-01-01

## 2020-01-01 RX ORDER — MAGNESIUM SULFATE HEPTAHYDRATE 40 MG/ML
2 INJECTION, SOLUTION INTRAVENOUS ONCE
Status: DISCONTINUED | OUTPATIENT
Start: 2020-01-01 | End: 2020-01-01 | Stop reason: HOSPADM

## 2020-01-01 RX ORDER — POLYETHYLENE GLYCOL 3350 17 G/17G
17 POWDER, FOR SOLUTION ORAL DAILY
Status: DISCONTINUED | OUTPATIENT
Start: 2020-01-01 | End: 2020-01-01 | Stop reason: HOSPADM

## 2020-01-01 RX ORDER — BALSAM PERU/CASTOR OIL
OINTMENT (GRAM) TOPICAL 2 TIMES DAILY
Status: DISCONTINUED | OUTPATIENT
Start: 2020-01-01 | End: 2021-01-01

## 2020-01-01 RX ORDER — ALBUMIN HUMAN 250 G/1000ML
25 SOLUTION INTRAVENOUS EVERY 6 HOURS
Status: DISPENSED | OUTPATIENT
Start: 2020-01-01 | End: 2020-01-01

## 2020-01-01 RX ORDER — ACETAMINOPHEN 160 MG/5ML
1000 SOLUTION ORAL ONCE
Status: COMPLETED | OUTPATIENT
Start: 2020-01-01 | End: 2020-01-01

## 2020-01-01 RX ORDER — RISPERIDONE 0.25 MG/1
1 TABLET, FILM COATED ORAL
Status: DISCONTINUED | OUTPATIENT
Start: 2020-01-01 | End: 2021-01-01

## 2020-01-01 RX ORDER — DEXAMETHASONE 4 MG/1
6 TABLET ORAL EVERY 24 HOURS
Status: COMPLETED | OUTPATIENT
Start: 2020-01-01 | End: 2020-01-01

## 2020-01-01 RX ORDER — BISACODYL 5 MG
5 TABLET, DELAYED RELEASE (ENTERIC COATED) ORAL DAILY
Status: DISCONTINUED | OUTPATIENT
Start: 2020-01-01 | End: 2020-01-01

## 2020-01-01 RX ORDER — SODIUM CHLORIDE 0.9 % (FLUSH) 0.9 %
5-40 SYRINGE (ML) INJECTION AS NEEDED
Status: DISCONTINUED | OUTPATIENT
Start: 2020-01-01 | End: 2020-01-01 | Stop reason: HOSPADM

## 2020-01-01 RX ORDER — DEXAMETHASONE SODIUM PHOSPHATE 10 MG/ML
6 INJECTION INTRAMUSCULAR; INTRAVENOUS EVERY 24 HOURS
Status: DISCONTINUED | OUTPATIENT
Start: 2020-01-01 | End: 2020-01-01 | Stop reason: CLARIF

## 2020-01-01 RX ORDER — MAGNESIUM SULFATE HEPTAHYDRATE 40 MG/ML
4 INJECTION, SOLUTION INTRAVENOUS ONCE
Status: COMPLETED | OUTPATIENT
Start: 2020-01-01 | End: 2020-01-01

## 2020-01-01 RX ORDER — PANTOPRAZOLE SODIUM 40 MG/1
40 TABLET, DELAYED RELEASE ORAL
Status: DISCONTINUED | OUTPATIENT
Start: 2020-01-01 | End: 2020-01-01

## 2020-01-01 RX ORDER — MIDAZOLAM HYDROCHLORIDE 5 MG/ML
5 INJECTION, SOLUTION INTRAMUSCULAR; INTRAVENOUS
Status: DISCONTINUED | OUTPATIENT
Start: 2020-01-01 | End: 2020-01-01

## 2020-01-01 RX ORDER — ALBUTEROL SULFATE 90 UG/1
2 AEROSOL, METERED RESPIRATORY (INHALATION)
Status: CANCELLED | OUTPATIENT
Start: 2020-01-01

## 2020-01-01 RX ORDER — ROCURONIUM BROMIDE 10 MG/ML
60 INJECTION, SOLUTION INTRAVENOUS
Status: COMPLETED | OUTPATIENT
Start: 2020-01-01 | End: 2020-01-01

## 2020-01-01 RX ORDER — SODIUM CHLORIDE 900 MG/100ML
INJECTION INTRAVENOUS
Status: COMPLETED
Start: 2020-01-01 | End: 2020-01-01

## 2020-01-01 RX ORDER — ALBUTEROL SULFATE 90 UG/1
2 AEROSOL, METERED RESPIRATORY (INHALATION)
Status: DISCONTINUED | OUTPATIENT
Start: 2020-01-01 | End: 2020-01-01

## 2020-01-01 RX ORDER — RISPERIDONE 1 MG/1
1 TABLET, FILM COATED ORAL
Status: DISCONTINUED | OUTPATIENT
Start: 2020-01-01 | End: 2020-01-01 | Stop reason: HOSPADM

## 2020-01-01 RX ORDER — ENOXAPARIN SODIUM 100 MG/ML
40 INJECTION SUBCUTANEOUS EVERY 12 HOURS
Status: DISCONTINUED | OUTPATIENT
Start: 2020-01-01 | End: 2020-01-01

## 2020-01-01 RX ORDER — DEXAMETHASONE 4 MG/1
6 TABLET ORAL DAILY
Status: DISCONTINUED | OUTPATIENT
Start: 2020-01-01 | End: 2020-01-01

## 2020-01-01 RX ORDER — SODIUM CHLORIDE 0.9 % (FLUSH) 0.9 %
10 SYRINGE (ML) INJECTION
Status: COMPLETED | OUTPATIENT
Start: 2020-01-01 | End: 2020-01-01

## 2020-01-01 RX ORDER — DOCOSANOL 100 MG/G
CREAM TOPICAL
Status: DISCONTINUED | OUTPATIENT
Start: 2020-01-01 | End: 2020-01-01

## 2020-01-01 RX ORDER — METOPROLOL SUCCINATE 25 MG/1
25 TABLET, EXTENDED RELEASE ORAL DAILY
Qty: 30 TAB | Refills: 0 | Status: SHIPPED | OUTPATIENT
Start: 2020-01-01

## 2020-01-01 RX ORDER — LEVOFLOXACIN 5 MG/ML
750 INJECTION, SOLUTION INTRAVENOUS EVERY 24 HOURS
Status: DISCONTINUED | OUTPATIENT
Start: 2020-01-01 | End: 2020-01-01

## 2020-01-01 RX ORDER — ASCORBIC ACID 500 MG
500 TABLET ORAL 2 TIMES DAILY
Status: DISCONTINUED | OUTPATIENT
Start: 2020-01-01 | End: 2020-01-01 | Stop reason: HOSPADM

## 2020-01-01 RX ORDER — SODIUM CHLORIDE 9 MG/ML
75 INJECTION, SOLUTION INTRAVENOUS ONCE
Status: COMPLETED | OUTPATIENT
Start: 2020-01-01 | End: 2020-01-01

## 2020-01-01 RX ORDER — SENNOSIDES 8.6 MG/1
1 TABLET ORAL DAILY PRN
Status: DISCONTINUED | OUTPATIENT
Start: 2020-01-01 | End: 2020-01-01 | Stop reason: HOSPADM

## 2020-01-01 RX ORDER — ACETAMINOPHEN 325 MG/1
650 TABLET ORAL
Status: CANCELLED | OUTPATIENT
Start: 2020-01-01

## 2020-01-01 RX ORDER — PROPOFOL 10 MG/ML
0-50 VIAL (ML) INTRAVENOUS
Status: DISCONTINUED | OUTPATIENT
Start: 2020-01-01 | End: 2020-01-01 | Stop reason: HOSPADM

## 2020-01-01 RX ORDER — ONDANSETRON 2 MG/ML
4 INJECTION INTRAMUSCULAR; INTRAVENOUS
Status: DISCONTINUED | OUTPATIENT
Start: 2020-01-01 | End: 2020-01-01

## 2020-01-01 RX ORDER — ACETAMINOPHEN 650 MG/1
650 SUPPOSITORY RECTAL
Status: DISCONTINUED | OUTPATIENT
Start: 2020-01-01 | End: 2020-01-01 | Stop reason: HOSPADM

## 2020-01-01 RX ORDER — LEVOTHYROXINE SODIUM 25 UG/1
50 TABLET ORAL
Status: DISCONTINUED | OUTPATIENT
Start: 2020-01-01 | End: 2020-01-01 | Stop reason: HOSPADM

## 2020-01-01 RX ORDER — DEXMEDETOMIDINE HYDROCHLORIDE 4 UG/ML
.1-1.5 INJECTION, SOLUTION INTRAVENOUS
Status: DISCONTINUED | OUTPATIENT
Start: 2020-01-01 | End: 2020-01-01

## 2020-01-01 RX ORDER — VANCOMYCIN 2 GRAM/500 ML IN 0.9 % SODIUM CHLORIDE INTRAVENOUS
2000 ONCE
Status: COMPLETED | OUTPATIENT
Start: 2020-01-01 | End: 2020-01-01

## 2020-01-01 RX ORDER — ACETAMINOPHEN 650 MG/1
650 SUPPOSITORY RECTAL
Status: DISCONTINUED | OUTPATIENT
Start: 2020-01-01 | End: 2021-01-01 | Stop reason: HOSPADM

## 2020-01-01 RX ORDER — SODIUM CHLORIDE 9 MG/ML
50 INJECTION, SOLUTION INTRAVENOUS CONTINUOUS
Status: DISCONTINUED | OUTPATIENT
Start: 2020-01-01 | End: 2020-01-01

## 2020-01-01 RX ORDER — METOPROLOL SUCCINATE 25 MG/1
25 TABLET, EXTENDED RELEASE ORAL DAILY
Status: DISCONTINUED | OUTPATIENT
Start: 2020-01-01 | End: 2020-01-01 | Stop reason: HOSPADM

## 2020-01-01 RX ORDER — SODIUM CHLORIDE 0.9 % (FLUSH) 0.9 %
5-40 SYRINGE (ML) INJECTION AS NEEDED
Status: CANCELLED | OUTPATIENT
Start: 2020-01-01

## 2020-01-01 RX ORDER — SODIUM CHLORIDE 0.9 % (FLUSH) 0.9 %
5-40 SYRINGE (ML) INJECTION EVERY 8 HOURS
Status: DISCONTINUED | OUTPATIENT
Start: 2020-01-01 | End: 2020-01-01 | Stop reason: HOSPADM

## 2020-01-01 RX ORDER — ENOXAPARIN SODIUM 100 MG/ML
40 INJECTION SUBCUTANEOUS DAILY
Status: DISCONTINUED | OUTPATIENT
Start: 2020-01-01 | End: 2020-01-01

## 2020-01-01 RX ORDER — MIDAZOLAM HYDROCHLORIDE 1 MG/ML
3 INJECTION, SOLUTION INTRAMUSCULAR; INTRAVENOUS
Status: DISCONTINUED | OUTPATIENT
Start: 2020-01-01 | End: 2020-01-01

## 2020-01-01 RX ORDER — METOPROLOL SUCCINATE 25 MG/1
25 TABLET, EXTENDED RELEASE ORAL DAILY
Qty: 30 TAB | Refills: 0 | Status: SHIPPED | OUTPATIENT
Start: 2020-01-01 | End: 2020-01-01 | Stop reason: SDUPTHER

## 2020-01-01 RX ORDER — FAMOTIDINE 20 MG/1
20 TABLET, FILM COATED ORAL DAILY
Status: DISCONTINUED | OUTPATIENT
Start: 2020-01-01 | End: 2021-01-01

## 2020-01-01 RX ORDER — OXYBUTYNIN CHLORIDE 5 MG/1
10 TABLET, EXTENDED RELEASE ORAL DAILY
Status: CANCELLED | OUTPATIENT
Start: 2020-01-01

## 2020-01-01 RX ORDER — ZINC SULFATE 50(220)MG
220 CAPSULE ORAL DAILY
Status: DISCONTINUED | OUTPATIENT
Start: 2020-01-01 | End: 2020-01-01 | Stop reason: HOSPADM

## 2020-01-01 RX ORDER — ENOXAPARIN SODIUM 100 MG/ML
1 INJECTION SUBCUTANEOUS EVERY 12 HOURS
Status: DISCONTINUED | OUTPATIENT
Start: 2020-01-01 | End: 2020-01-01

## 2020-01-01 RX ORDER — SUCCINYLCHOLINE CHLORIDE 20 MG/ML
100 INJECTION INTRAMUSCULAR; INTRAVENOUS
Status: COMPLETED | OUTPATIENT
Start: 2020-01-01 | End: 2020-01-01

## 2020-01-01 RX ORDER — ACETAMINOPHEN 325 MG/1
650 TABLET ORAL
Status: DISCONTINUED | OUTPATIENT
Start: 2020-01-01 | End: 2020-01-01

## 2020-01-01 RX ORDER — MIDAZOLAM HYDROCHLORIDE 1 MG/ML
5 INJECTION, SOLUTION INTRAMUSCULAR; INTRAVENOUS
Status: DISCONTINUED | OUTPATIENT
Start: 2020-01-01 | End: 2020-01-01 | Stop reason: SDUPTHER

## 2020-01-01 RX ORDER — POLYETHYLENE GLYCOL 3350 17 G/17G
17 POWDER, FOR SOLUTION ORAL DAILY PRN
Status: DISCONTINUED | OUTPATIENT
Start: 2020-01-01 | End: 2020-01-01 | Stop reason: HOSPADM

## 2020-01-01 RX ORDER — PROMETHAZINE HYDROCHLORIDE 25 MG/1
12.5 TABLET ORAL
Status: CANCELLED | OUTPATIENT
Start: 2020-01-01

## 2020-01-01 RX ORDER — ENOXAPARIN SODIUM 100 MG/ML
40 INJECTION SUBCUTANEOUS EVERY 12 HOURS
Status: DISCONTINUED | OUTPATIENT
Start: 2020-01-01 | End: 2020-01-01 | Stop reason: HOSPADM

## 2020-01-01 RX ORDER — MELATONIN
1000 DAILY
Status: DISCONTINUED | OUTPATIENT
Start: 2020-01-01 | End: 2020-01-01 | Stop reason: HOSPADM

## 2020-01-01 RX ORDER — METOPROLOL SUCCINATE 25 MG/1
25 TABLET, EXTENDED RELEASE ORAL DAILY
Status: DISCONTINUED | OUTPATIENT
Start: 2020-01-01 | End: 2020-01-01

## 2020-01-01 RX ORDER — POLYETHYLENE GLYCOL 3350 17 G/17G
17 POWDER, FOR SOLUTION ORAL DAILY PRN
Status: DISCONTINUED | OUTPATIENT
Start: 2020-01-01 | End: 2020-01-01

## 2020-01-01 RX ORDER — OXYBUTYNIN CHLORIDE 5 MG/1
10 TABLET, EXTENDED RELEASE ORAL DAILY
Status: DISCONTINUED | OUTPATIENT
Start: 2020-01-01 | End: 2020-01-01 | Stop reason: HOSPADM

## 2020-01-01 RX ORDER — SODIUM CHLORIDE 0.9 % (FLUSH) 0.9 %
5-10 SYRINGE (ML) INJECTION
Status: COMPLETED | OUTPATIENT
Start: 2020-01-01 | End: 2020-01-01

## 2020-01-01 RX ORDER — ENOXAPARIN SODIUM 100 MG/ML
30 INJECTION SUBCUTANEOUS EVERY 12 HOURS
Status: DISCONTINUED | OUTPATIENT
Start: 2020-01-01 | End: 2020-01-01

## 2020-01-01 RX ORDER — ALBUTEROL SULFATE 90 UG/1
2 AEROSOL, METERED RESPIRATORY (INHALATION)
Qty: 1 INHALER | Refills: 0 | Status: SHIPPED | OUTPATIENT
Start: 2020-01-01

## 2020-01-01 RX ORDER — LEVOTHYROXINE SODIUM 50 UG/1
50 TABLET ORAL
Status: DISCONTINUED | OUTPATIENT
Start: 2020-01-01 | End: 2020-01-01 | Stop reason: HOSPADM

## 2020-01-01 RX ORDER — LEVOTHYROXINE SODIUM 50 UG/1
50 TABLET ORAL DAILY
Status: DISCONTINUED | OUTPATIENT
Start: 2020-01-01 | End: 2021-01-01

## 2020-01-01 RX ORDER — RISPERIDONE 1 MG/1
1 TABLET, FILM COATED ORAL
COMMUNITY

## 2020-01-01 RX ORDER — MELATONIN
2000 DAILY
Status: DISCONTINUED | OUTPATIENT
Start: 2020-01-01 | End: 2020-01-01

## 2020-01-01 RX ORDER — VANCOMYCIN HYDROCHLORIDE
1250
Status: DISCONTINUED | OUTPATIENT
Start: 2020-01-01 | End: 2020-01-01

## 2020-01-01 RX ORDER — RISPERIDONE 1 MG/1
1 TABLET, FILM COATED ORAL
Status: CANCELLED | OUTPATIENT
Start: 2020-01-01

## 2020-01-01 RX ORDER — ALBUTEROL SULFATE 90 UG/1
1 AEROSOL, METERED RESPIRATORY (INHALATION)
Status: DISCONTINUED | OUTPATIENT
Start: 2020-01-01 | End: 2020-01-01 | Stop reason: HOSPADM

## 2020-01-01 RX ORDER — ACETAMINOPHEN 650 MG/1
650 SUPPOSITORY RECTAL
Status: DISCONTINUED | OUTPATIENT
Start: 2020-01-01 | End: 2020-01-01

## 2020-01-01 RX ORDER — NOREPINEPHRINE BITARTRATE/D5W 8 MG/250ML
.5-3 PLASTIC BAG, INJECTION (ML) INTRAVENOUS
Status: DISCONTINUED | OUTPATIENT
Start: 2020-01-01 | End: 2020-01-01

## 2020-01-01 RX ORDER — LEVOTHYROXINE SODIUM 50 UG/1
50 TABLET ORAL
Status: DISCONTINUED | OUTPATIENT
Start: 2020-01-01 | End: 2020-01-01

## 2020-01-01 RX ORDER — MEDROXYPROGESTERONE ACETATE 150 MG/ML
INJECTION, SUSPENSION INTRAMUSCULAR
COMMUNITY
Start: 2019-12-05

## 2020-01-01 RX ORDER — AZITHROMYCIN 500 MG/1
500 TABLET, FILM COATED ORAL DAILY
Status: DISCONTINUED | OUTPATIENT
Start: 2020-01-01 | End: 2020-01-01

## 2020-01-01 RX ORDER — DEXAMETHASONE SODIUM PHOSPHATE 4 MG/ML
6 INJECTION, SOLUTION INTRA-ARTICULAR; INTRALESIONAL; INTRAMUSCULAR; INTRAVENOUS; SOFT TISSUE EVERY 24 HOURS
Status: DISCONTINUED | OUTPATIENT
Start: 2020-01-01 | End: 2020-01-01

## 2020-01-01 RX ORDER — POLYETHYLENE GLYCOL 3350 17 G/17G
17 POWDER, FOR SOLUTION ORAL DAILY
Status: DISCONTINUED | OUTPATIENT
Start: 2020-01-01 | End: 2021-01-01

## 2020-01-01 RX ORDER — OXYBUTYNIN CHLORIDE 5 MG/1
10 TABLET, EXTENDED RELEASE ORAL DAILY
Status: DISCONTINUED | OUTPATIENT
Start: 2020-01-01 | End: 2020-01-01 | Stop reason: ALTCHOICE

## 2020-01-01 RX ORDER — ACETAMINOPHEN 650 MG/1
650 SUPPOSITORY RECTAL
Status: DISCONTINUED | OUTPATIENT
Start: 2020-01-01 | End: 2020-01-01 | Stop reason: SDUPTHER

## 2020-01-01 RX ORDER — ALBUMIN HUMAN 250 G/1000ML
25 SOLUTION INTRAVENOUS EVERY 6 HOURS
Status: COMPLETED | OUTPATIENT
Start: 2020-01-01 | End: 2021-01-01

## 2020-01-01 RX ORDER — LEVOTHYROXINE SODIUM 50 UG/1
50 TABLET ORAL
Status: CANCELLED | OUTPATIENT
Start: 2020-01-01

## 2020-01-01 RX ORDER — SODIUM CHLORIDE 9 MG/ML
250 INJECTION, SOLUTION INTRAVENOUS AS NEEDED
Status: DISCONTINUED | OUTPATIENT
Start: 2020-01-01 | End: 2020-01-01

## 2020-01-01 RX ORDER — GUAIFENESIN/DEXTROMETHORPHAN 100-10MG/5
10 SYRUP ORAL
Status: CANCELLED | OUTPATIENT
Start: 2020-01-01

## 2020-01-01 RX ORDER — AZITHROMYCIN 200 MG/5ML
250 POWDER, FOR SUSPENSION ORAL DAILY
Qty: 25.2 ML | Refills: 0 | Status: SHIPPED | OUTPATIENT
Start: 2020-01-01 | End: 2020-01-01

## 2020-01-01 RX ORDER — SODIUM CHLORIDE 0.9 % (FLUSH) 0.9 %
5-40 SYRINGE (ML) INJECTION AS NEEDED
Status: DISCONTINUED | OUTPATIENT
Start: 2020-01-01 | End: 2021-01-01 | Stop reason: HOSPADM

## 2020-01-01 RX ORDER — DEXTROSE 50 % IN WATER (D50W) INTRAVENOUS SYRINGE
25-50 AS NEEDED
Status: DISCONTINUED | OUTPATIENT
Start: 2020-01-01 | End: 2021-01-01 | Stop reason: HOSPADM

## 2020-01-01 RX ORDER — ASCORBIC ACID 500 MG
500 TABLET ORAL 2 TIMES DAILY
Status: CANCELLED | OUTPATIENT
Start: 2020-01-01

## 2020-01-01 RX ORDER — GUAIFENESIN 100 MG/5ML
200 SOLUTION ORAL
Status: DISCONTINUED | OUTPATIENT
Start: 2020-01-01 | End: 2020-01-01 | Stop reason: HOSPADM

## 2020-01-01 RX ORDER — HEPARIN 100 UNIT/ML
300 SYRINGE INTRAVENOUS AS NEEDED
Status: DISCONTINUED | OUTPATIENT
Start: 2020-01-01 | End: 2021-01-01 | Stop reason: HOSPADM

## 2020-01-01 RX ORDER — NOREPINEPHRINE BITARTRATE/D5W 8 MG/250ML
1-100 PLASTIC BAG, INJECTION (ML) INTRAVENOUS
Status: DISCONTINUED | OUTPATIENT
Start: 2020-01-01 | End: 2020-01-01

## 2020-01-01 RX ORDER — SODIUM BICARBONATE 1 MEQ/ML
50 SYRINGE (ML) INTRAVENOUS ONCE
Status: COMPLETED | OUTPATIENT
Start: 2020-01-01 | End: 2020-01-01

## 2020-01-01 RX ORDER — ALBUTEROL SULFATE 90 UG/1
6 AEROSOL, METERED RESPIRATORY (INHALATION)
Status: DISCONTINUED | OUTPATIENT
Start: 2020-01-01 | End: 2020-01-01

## 2020-01-01 RX ORDER — BISACODYL 5 MG
5 TABLET, DELAYED RELEASE (ENTERIC COATED) ORAL DAILY PRN
Status: DISCONTINUED | OUTPATIENT
Start: 2020-01-01 | End: 2020-01-01 | Stop reason: HOSPADM

## 2020-01-01 RX ORDER — INSULIN GLARGINE 100 [IU]/ML
15 INJECTION, SOLUTION SUBCUTANEOUS DAILY
Status: DISCONTINUED | OUTPATIENT
Start: 2020-01-01 | End: 2020-01-01

## 2020-01-01 RX ORDER — MIDAZOLAM HYDROCHLORIDE 1 MG/ML
INJECTION, SOLUTION INTRAMUSCULAR; INTRAVENOUS
Status: COMPLETED
Start: 2020-01-01 | End: 2020-01-01

## 2020-01-01 RX ORDER — PROPOFOL 10 MG/ML
INJECTION, EMULSION INTRAVENOUS
Status: COMPLETED
Start: 2020-01-01 | End: 2020-01-01

## 2020-01-01 RX ORDER — RISPERIDONE 1 MG/1
1 TABLET, FILM COATED ORAL
Status: DISCONTINUED | OUTPATIENT
Start: 2020-01-01 | End: 2020-01-01 | Stop reason: ALTCHOICE

## 2020-01-01 RX ORDER — CHLORHEXIDINE GLUCONATE 1.2 MG/ML
15 RINSE ORAL EVERY 12 HOURS
Status: DISCONTINUED | OUTPATIENT
Start: 2020-01-01 | End: 2020-01-01

## 2020-01-01 RX ORDER — DEXAMETHASONE SODIUM PHOSPHATE 10 MG/ML
10 INJECTION INTRAMUSCULAR; INTRAVENOUS EVERY 24 HOURS
Status: DISCONTINUED | OUTPATIENT
Start: 2020-01-01 | End: 2020-01-01

## 2020-01-01 RX ORDER — MAGNESIUM SULFATE 100 %
4 CRYSTALS MISCELLANEOUS AS NEEDED
Status: DISCONTINUED | OUTPATIENT
Start: 2020-01-01 | End: 2020-01-01

## 2020-01-01 RX ORDER — INSULIN LISPRO 100 [IU]/ML
INJECTION, SOLUTION INTRAVENOUS; SUBCUTANEOUS EVERY 6 HOURS
Status: DISCONTINUED | OUTPATIENT
Start: 2020-01-01 | End: 2021-01-01

## 2020-01-01 RX ORDER — LANOLIN ALCOHOL/MO/W.PET/CERES
500 CREAM (GRAM) TOPICAL DAILY
Qty: 7 TAB | Refills: 0 | Status: SHIPPED | OUTPATIENT
Start: 2020-01-01 | End: 2020-01-01

## 2020-01-01 RX ORDER — DEXAMETHASONE SODIUM PHOSPHATE 10 MG/ML
10 INJECTION INTRAMUSCULAR; INTRAVENOUS
Status: COMPLETED | OUTPATIENT
Start: 2020-01-01 | End: 2020-01-01

## 2020-01-01 RX ORDER — BENZONATATE 100 MG/1
100 CAPSULE ORAL
Status: DISCONTINUED | OUTPATIENT
Start: 2020-01-01 | End: 2020-01-01 | Stop reason: HOSPADM

## 2020-01-01 RX ORDER — MAGNESIUM SULFATE 4 G/50ML
4 INJECTION INTRAVENOUS ONCE
Status: DISCONTINUED | OUTPATIENT
Start: 2020-01-01 | End: 2020-01-01 | Stop reason: SDUPTHER

## 2020-01-01 RX ORDER — DEXAMETHASONE 4 MG/1
6 TABLET ORAL DAILY
Status: DISCONTINUED | OUTPATIENT
Start: 2020-01-01 | End: 2020-01-01 | Stop reason: HOSPADM

## 2020-01-01 RX ORDER — ONDANSETRON 2 MG/ML
4 INJECTION INTRAMUSCULAR; INTRAVENOUS
Status: DISCONTINUED | OUTPATIENT
Start: 2020-01-01 | End: 2020-01-01 | Stop reason: HOSPADM

## 2020-01-01 RX ORDER — SODIUM CHLORIDE 0.9 % (FLUSH) 0.9 %
5-10 SYRINGE (ML) INJECTION AS NEEDED
Status: DISCONTINUED | OUTPATIENT
Start: 2020-01-01 | End: 2020-01-01 | Stop reason: HOSPADM

## 2020-01-01 RX ORDER — CHLORHEXIDINE GLUCONATE 0.12 MG/ML
15 RINSE ORAL EVERY 12 HOURS
Status: DISCONTINUED | OUTPATIENT
Start: 2020-01-01 | End: 2021-01-01 | Stop reason: HOSPADM

## 2020-01-01 RX ORDER — HEPARIN SODIUM 10000 [USP'U]/100ML
10-25 INJECTION, SOLUTION INTRAVENOUS
Status: DISCONTINUED | OUTPATIENT
Start: 2020-01-01 | End: 2020-01-01

## 2020-01-01 RX ORDER — ENOXAPARIN SODIUM 100 MG/ML
30 INJECTION SUBCUTANEOUS EVERY 12 HOURS
Status: DISCONTINUED | OUTPATIENT
Start: 2020-01-01 | End: 2020-01-01 | Stop reason: DRUGHIGH

## 2020-01-01 RX ORDER — DEXAMETHASONE SODIUM PHOSPHATE 10 MG/ML
10 INJECTION INTRAMUSCULAR; INTRAVENOUS
Status: DISCONTINUED | OUTPATIENT
Start: 2020-01-01 | End: 2020-01-01

## 2020-01-01 RX ORDER — ERYTHROMYCIN 5 MG/G
OINTMENT OPHTHALMIC
Qty: 1 G | Refills: 0 | Status: SHIPPED | OUTPATIENT
Start: 2020-01-01 | End: 2020-01-01

## 2020-01-01 RX ORDER — SODIUM CHLORIDE 0.9 % (FLUSH) 0.9 %
5-40 SYRINGE (ML) INJECTION EVERY 8 HOURS
Status: DISCONTINUED | OUTPATIENT
Start: 2020-01-01 | End: 2021-01-01

## 2020-01-01 RX ORDER — PROPOFOL 10 MG/ML
5 VIAL (ML) INTRAVENOUS CONTINUOUS
Status: DISCONTINUED | OUTPATIENT
Start: 2020-01-01 | End: 2020-01-01

## 2020-01-01 RX ORDER — PANTOPRAZOLE SODIUM 40 MG/1
40 TABLET, DELAYED RELEASE ORAL
Status: DISCONTINUED | OUTPATIENT
Start: 2020-01-01 | End: 2020-01-01 | Stop reason: ALTCHOICE

## 2020-01-01 RX ORDER — POLYETHYLENE GLYCOL 3350 17 G/17G
17 POWDER, FOR SOLUTION ORAL DAILY PRN
Status: CANCELLED | OUTPATIENT
Start: 2020-01-01

## 2020-01-01 RX ORDER — GUAIFENESIN/DEXTROMETHORPHAN 100-10MG/5
5 SYRUP ORAL
Status: DISCONTINUED | OUTPATIENT
Start: 2020-01-01 | End: 2020-01-01

## 2020-01-01 RX ORDER — ACETAMINOPHEN 650 MG/1
650 SUPPOSITORY RECTAL
Status: CANCELLED | OUTPATIENT
Start: 2020-01-01

## 2020-01-01 RX ORDER — PROMETHAZINE HYDROCHLORIDE 25 MG/1
12.5 TABLET ORAL
Status: DISCONTINUED | OUTPATIENT
Start: 2020-01-01 | End: 2020-01-01

## 2020-01-01 RX ORDER — ENOXAPARIN SODIUM 100 MG/ML
1 INJECTION SUBCUTANEOUS EVERY 24 HOURS
Status: DISCONTINUED | OUTPATIENT
Start: 2020-01-01 | End: 2020-01-01

## 2020-01-01 RX ORDER — RISPERIDONE 0.25 MG/1
1 TABLET, FILM COATED ORAL
Status: DISCONTINUED | OUTPATIENT
Start: 2020-01-01 | End: 2020-01-01

## 2020-01-01 RX ORDER — AZITHROMYCIN 250 MG/1
250 TABLET, FILM COATED ORAL DAILY
Qty: 4 TAB | Refills: 0 | OUTPATIENT
Start: 2020-01-01 | End: 2020-01-01

## 2020-01-01 RX ORDER — SOLIFENACIN SUCCINATE 10 MG/1
TABLET, FILM COATED ORAL
COMMUNITY
Start: 2020-01-01

## 2020-01-01 RX ORDER — SODIUM CHLORIDE 9 MG/ML
250 INJECTION, SOLUTION INTRAVENOUS AS NEEDED
Status: DISCONTINUED | OUTPATIENT
Start: 2020-01-01 | End: 2021-01-01 | Stop reason: SDUPTHER

## 2020-01-01 RX ORDER — ACETAMINOPHEN 325 MG/1
650 TABLET ORAL
Status: DISCONTINUED | OUTPATIENT
Start: 2020-01-01 | End: 2020-01-01 | Stop reason: SDUPTHER

## 2020-01-01 RX ORDER — FUROSEMIDE 20 MG/1
20 TABLET ORAL DAILY
Status: DISCONTINUED | OUTPATIENT
Start: 2020-01-01 | End: 2020-01-01

## 2020-01-01 RX ORDER — DOXYCYCLINE HYCLATE 100 MG
100 TABLET ORAL EVERY 12 HOURS
Status: DISCONTINUED | OUTPATIENT
Start: 2020-01-01 | End: 2020-01-01 | Stop reason: HOSPADM

## 2020-01-01 RX ORDER — ZINC SULFATE 50(220)MG
220 CAPSULE ORAL DAILY
Status: CANCELLED | OUTPATIENT
Start: 2020-01-01

## 2020-01-01 RX ORDER — ONDANSETRON 2 MG/ML
4 INJECTION INTRAMUSCULAR; INTRAVENOUS
Status: CANCELLED | OUTPATIENT
Start: 2020-01-01

## 2020-01-01 RX ORDER — AZITHROMYCIN 100 MG/5ML
500 POWDER, FOR SUSPENSION ORAL
Status: COMPLETED | OUTPATIENT
Start: 2020-01-01 | End: 2020-01-01

## 2020-01-01 RX ORDER — AZITHROMYCIN 500 MG/1
500 TABLET, FILM COATED ORAL
Status: DISCONTINUED | OUTPATIENT
Start: 2020-01-01 | End: 2020-01-01

## 2020-01-01 RX ORDER — RISPERIDONE 1 MG/ML
1 SOLUTION ORAL
Status: DISCONTINUED | OUTPATIENT
Start: 2020-01-01 | End: 2020-01-01 | Stop reason: HOSPADM

## 2020-01-01 RX ORDER — ACETAMINOPHEN 325 MG/1
650 TABLET ORAL
Status: DISCONTINUED | OUTPATIENT
Start: 2020-01-01 | End: 2021-01-01 | Stop reason: HOSPADM

## 2020-01-01 RX ORDER — DEXAMETHASONE 4 MG/1
6 TABLET ORAL DAILY
Status: CANCELLED | OUTPATIENT
Start: 2020-01-01 | End: 2020-01-01

## 2020-01-01 RX ORDER — ENOXAPARIN SODIUM 100 MG/ML
40 INJECTION SUBCUTANEOUS
Status: COMPLETED | OUTPATIENT
Start: 2020-01-01 | End: 2020-01-01

## 2020-01-01 RX ORDER — ETOMIDATE 2 MG/ML
30 INJECTION INTRAVENOUS ONCE
Status: COMPLETED | OUTPATIENT
Start: 2020-01-01 | End: 2020-01-01

## 2020-01-01 RX ORDER — SODIUM CHLORIDE 0.9 % (FLUSH) 0.9 %
5-40 SYRINGE (ML) INJECTION EVERY 8 HOURS
Status: CANCELLED | OUTPATIENT
Start: 2020-01-01

## 2020-01-01 RX ORDER — ACETAMINOPHEN 500 MG
1000 TABLET ORAL
Status: COMPLETED | OUTPATIENT
Start: 2020-01-01 | End: 2020-01-01

## 2020-01-01 RX ORDER — ZINC SULFATE 50(220)MG
1 CAPSULE ORAL DAILY
Status: DISPENSED | OUTPATIENT
Start: 2020-01-01 | End: 2020-01-01

## 2020-01-01 RX ADMIN — LEVOTHYROXINE SODIUM 50 MCG: 0.03 TABLET ORAL at 06:23

## 2020-01-01 RX ADMIN — SODIUM CHLORIDE 40 MG: 9 INJECTION INTRAMUSCULAR; INTRAVENOUS; SUBCUTANEOUS at 20:50

## 2020-01-01 RX ADMIN — OXYCODONE HYDROCHLORIDE AND ACETAMINOPHEN 500 MG: 500 TABLET ORAL at 17:33

## 2020-01-01 RX ADMIN — METOPROLOL SUCCINATE 25 MG: 25 TABLET, EXTENDED RELEASE ORAL at 13:51

## 2020-01-01 RX ADMIN — MEROPENEM 500 MG: 500 INJECTION, POWDER, FOR SOLUTION INTRAVENOUS at 20:38

## 2020-01-01 RX ADMIN — SODIUM CHLORIDE 75 ML/HR: 9 INJECTION, SOLUTION INTRAVENOUS at 23:47

## 2020-01-01 RX ADMIN — DOCOSANOL 10 % TOPICAL CREAM: at 17:52

## 2020-01-01 RX ADMIN — OXYCODONE HYDROCHLORIDE AND ACETAMINOPHEN 500 MG: 500 TABLET ORAL at 17:39

## 2020-01-01 RX ADMIN — ACETAMINOPHEN 650 MG: 650 SUPPOSITORY RECTAL at 02:50

## 2020-01-01 RX ADMIN — MEROPENEM 500 MG: 500 INJECTION, POWDER, FOR SOLUTION INTRAVENOUS at 21:04

## 2020-01-01 RX ADMIN — VANCOMYCIN HYDROCHLORIDE 1250 MG: 10 INJECTION, POWDER, LYOPHILIZED, FOR SOLUTION INTRAVENOUS at 13:03

## 2020-01-01 RX ADMIN — CEFEPIME 2 G: 2 INJECTION, POWDER, FOR SOLUTION INTRAVENOUS at 01:39

## 2020-01-01 RX ADMIN — CEFEPIME 2 G: 2 INJECTION, POWDER, FOR SOLUTION INTRAVENOUS at 01:13

## 2020-01-01 RX ADMIN — Medication 1 AMPULE: at 20:40

## 2020-01-01 RX ADMIN — INSULIN LISPRO 3 UNITS: 100 INJECTION, SOLUTION INTRAVENOUS; SUBCUTANEOUS at 11:56

## 2020-01-01 RX ADMIN — VANCOMYCIN HYDROCHLORIDE 2000 MG: 10 INJECTION, POWDER, LYOPHILIZED, FOR SOLUTION INTRAVENOUS at 12:19

## 2020-01-01 RX ADMIN — Medication 10 ML: at 20:32

## 2020-01-01 RX ADMIN — SODIUM CHLORIDE 10 ML: 9 INJECTION, SOLUTION INTRAMUSCULAR; INTRAVENOUS; SUBCUTANEOUS at 00:11

## 2020-01-01 RX ADMIN — CEFEPIME HYDROCHLORIDE 2 G: 2 INJECTION, POWDER, FOR SOLUTION INTRAVENOUS at 02:36

## 2020-01-01 RX ADMIN — DEXAMETHASONE SODIUM PHOSPHATE 10 MG: 10 INJECTION INTRAMUSCULAR; INTRAVENOUS at 18:00

## 2020-01-01 RX ADMIN — CASTOR OIL AND BALSAM, PERU: 788; 87 OINTMENT TOPICAL at 17:28

## 2020-01-01 RX ADMIN — OXYCODONE HYDROCHLORIDE AND ACETAMINOPHEN 500 MG: 500 TABLET ORAL at 09:12

## 2020-01-01 RX ADMIN — CHLORHEXIDINE GLUCONATE 15 ML: 0.12 RINSE ORAL at 08:31

## 2020-01-01 RX ADMIN — ENOXAPARIN SODIUM 30 MG: 30 INJECTION SUBCUTANEOUS at 17:48

## 2020-01-01 RX ADMIN — Medication 1 AMPULE: at 20:03

## 2020-01-01 RX ADMIN — OXYBUTYNIN CHLORIDE 5 MG: 5 TABLET ORAL at 09:44

## 2020-01-01 RX ADMIN — ACETAMINOPHEN 650 MG: 325 TABLET ORAL at 23:24

## 2020-01-01 RX ADMIN — HEPARIN SODIUM 10 UNITS/KG/HR: 10000 INJECTION, SOLUTION INTRAVENOUS at 12:00

## 2020-01-01 RX ADMIN — OXYCODONE HYDROCHLORIDE AND ACETAMINOPHEN 500 MG: 500 TABLET ORAL at 09:41

## 2020-01-01 RX ADMIN — ENOXAPARIN SODIUM 40 MG: 100 INJECTION SUBCUTANEOUS at 17:17

## 2020-01-01 RX ADMIN — OXYCODONE HYDROCHLORIDE AND ACETAMINOPHEN 500 MG: 500 TABLET ORAL at 18:03

## 2020-01-01 RX ADMIN — Medication 10 ML: at 13:34

## 2020-01-01 RX ADMIN — ENOXAPARIN SODIUM 40 MG: 100 INJECTION SUBCUTANEOUS at 22:25

## 2020-01-01 RX ADMIN — PANTOPRAZOLE SODIUM 40 MG: 40 TABLET, DELAYED RELEASE ORAL at 09:33

## 2020-01-01 RX ADMIN — Medication 10 ML: at 21:02

## 2020-01-01 RX ADMIN — Medication 1 MG: at 21:01

## 2020-01-01 RX ADMIN — ACETAMINOPHEN 650 MG: 650 SUPPOSITORY RECTAL at 23:46

## 2020-01-01 RX ADMIN — PANTOPRAZOLE SODIUM 40 MG: 40 TABLET, DELAYED RELEASE ORAL at 07:04

## 2020-01-01 RX ADMIN — OXYBUTYNIN CHLORIDE 5 MG: 5 TABLET ORAL at 17:50

## 2020-01-01 RX ADMIN — ALBUMIN (HUMAN) 25 G: 0.25 INJECTION, SOLUTION INTRAVENOUS at 17:29

## 2020-01-01 RX ADMIN — ACETAMINOPHEN 650 MG: 325 TABLET, FILM COATED ORAL at 14:44

## 2020-01-01 RX ADMIN — CEFEPIME 2 G: 2 INJECTION, POWDER, FOR SOLUTION INTRAVENOUS at 11:33

## 2020-01-01 RX ADMIN — MIDAZOLAM HYDROCHLORIDE 10 MG/HR: 5 INJECTION, SOLUTION INTRAMUSCULAR; INTRAVENOUS at 20:37

## 2020-01-01 RX ADMIN — CHLORHEXIDINE GLUCONATE 15 ML: 0.12 RINSE ORAL at 20:16

## 2020-01-01 RX ADMIN — OXYCODONE HYDROCHLORIDE AND ACETAMINOPHEN 500 MG: 500 TABLET ORAL at 08:59

## 2020-01-01 RX ADMIN — OXYCODONE HYDROCHLORIDE AND ACETAMINOPHEN 500 MG: 500 TABLET ORAL at 08:31

## 2020-01-01 RX ADMIN — ACETAMINOPHEN 650 MG: 325 TABLET ORAL at 17:52

## 2020-01-01 RX ADMIN — SODIUM CHLORIDE 40 MG: 9 INJECTION INTRAMUSCULAR; INTRAVENOUS; SUBCUTANEOUS at 17:45

## 2020-01-01 RX ADMIN — SODIUM CHLORIDE 50 ML/HR: 900 INJECTION, SOLUTION INTRAVENOUS at 02:02

## 2020-01-01 RX ADMIN — IVABRADINE 5 MG: 5 TABLET, FILM COATED ORAL at 17:51

## 2020-01-01 RX ADMIN — LANSOPRAZOLE 30 MG: KIT at 06:32

## 2020-01-01 RX ADMIN — SODIUM CHLORIDE 40 MG: 9 INJECTION INTRAMUSCULAR; INTRAVENOUS; SUBCUTANEOUS at 09:59

## 2020-01-01 RX ADMIN — VANCOMYCIN HYDROCHLORIDE 2000 MG: 1 INJECTION, POWDER, LYOPHILIZED, FOR SOLUTION INTRAVENOUS at 15:38

## 2020-01-01 RX ADMIN — SODIUM CHLORIDE 20 ML: 9 INJECTION, SOLUTION INTRAMUSCULAR; INTRAVENOUS; SUBCUTANEOUS at 13:43

## 2020-01-01 RX ADMIN — MIDAZOLAM HYDROCHLORIDE 5 MG: 1 INJECTION, SOLUTION INTRAMUSCULAR; INTRAVENOUS at 20:41

## 2020-01-01 RX ADMIN — SODIUM CHLORIDE 40 MG: 9 INJECTION INTRAMUSCULAR; INTRAVENOUS; SUBCUTANEOUS at 08:55

## 2020-01-01 RX ADMIN — OXYCODONE HYDROCHLORIDE AND ACETAMINOPHEN 500 MG: 500 TABLET ORAL at 17:42

## 2020-01-01 RX ADMIN — ENOXAPARIN SODIUM 40 MG: 100 INJECTION SUBCUTANEOUS at 11:19

## 2020-01-01 RX ADMIN — Medication 10 ML: at 13:33

## 2020-01-01 RX ADMIN — Medication 10 ML: at 13:05

## 2020-01-01 RX ADMIN — LANSOPRAZOLE 30 MG: KIT at 06:38

## 2020-01-01 RX ADMIN — OXYCODONE HYDROCHLORIDE AND ACETAMINOPHEN 500 MG: 500 TABLET ORAL at 09:34

## 2020-01-01 RX ADMIN — SODIUM CHLORIDE 10 ML: 9 INJECTION, SOLUTION INTRAMUSCULAR; INTRAVENOUS; SUBCUTANEOUS at 13:09

## 2020-01-01 RX ADMIN — POLYETHYLENE GLYCOL 3350 17 G: 17 POWDER, FOR SOLUTION ORAL at 12:42

## 2020-01-01 RX ADMIN — ACETAMINOPHEN 650 MG: 325 TABLET ORAL at 09:47

## 2020-01-01 RX ADMIN — CEFEPIME 2 G: 2 INJECTION, POWDER, FOR SOLUTION INTRAVENOUS at 19:25

## 2020-01-01 RX ADMIN — DEXMEDETOMIDINE 0.3 MCG/KG/HR: 100 INJECTION, SOLUTION, CONCENTRATE INTRAVENOUS at 02:19

## 2020-01-01 RX ADMIN — INSULIN GLARGINE 15 UNITS: 100 INJECTION, SOLUTION SUBCUTANEOUS at 10:20

## 2020-01-01 RX ADMIN — OXYCODONE HYDROCHLORIDE AND ACETAMINOPHEN 500 MG: 500 TABLET ORAL at 09:58

## 2020-01-01 RX ADMIN — AZITHROMYCIN 500 MG: 500 INJECTION, POWDER, LYOPHILIZED, FOR SOLUTION INTRAVENOUS at 23:53

## 2020-01-01 RX ADMIN — REMDESIVIR 100 MG: 100 INJECTION, POWDER, LYOPHILIZED, FOR SOLUTION INTRAVENOUS at 12:08

## 2020-01-01 RX ADMIN — ENOXAPARIN SODIUM 40 MG: 100 INJECTION SUBCUTANEOUS at 11:34

## 2020-01-01 RX ADMIN — INSULIN LISPRO 2 UNITS: 100 INJECTION, SOLUTION INTRAVENOUS; SUBCUTANEOUS at 05:58

## 2020-01-01 RX ADMIN — ZINC SULFATE 220 MG (50 MG) CAPSULE 1 CAPSULE: CAPSULE at 09:08

## 2020-01-01 RX ADMIN — SENNOSIDES 8.6 MG: 8.6 TABLET, FILM COATED ORAL at 08:59

## 2020-01-01 RX ADMIN — ENOXAPARIN SODIUM 40 MG: 100 INJECTION SUBCUTANEOUS at 23:12

## 2020-01-01 RX ADMIN — ENOXAPARIN SODIUM 40 MG: 100 INJECTION SUBCUTANEOUS at 10:58

## 2020-01-01 RX ADMIN — Medication 1 MG: at 22:03

## 2020-01-01 RX ADMIN — DOCOSANOL 10 % TOPICAL CREAM: at 20:41

## 2020-01-01 RX ADMIN — SACUBITRIL AND VALSARTAN 1 TABLET: 24; 26 TABLET, FILM COATED ORAL at 21:11

## 2020-01-01 RX ADMIN — DOCOSANOL 10 % TOPICAL CREAM: at 15:21

## 2020-01-01 RX ADMIN — DOCOSANOL 10 % TOPICAL CREAM: at 21:01

## 2020-01-01 RX ADMIN — Medication 1 AMPULE: at 20:36

## 2020-01-01 RX ADMIN — ONDANSETRON 4 MG: 2 INJECTION INTRAMUSCULAR; INTRAVENOUS at 23:16

## 2020-01-01 RX ADMIN — DEXAMETHASONE SODIUM PHOSPHATE 6 MG: 4 INJECTION, SOLUTION INTRAMUSCULAR; INTRAVENOUS at 00:03

## 2020-01-01 RX ADMIN — GUAIFENESIN AND DEXTROMETHORPHAN 10 ML: 100; 10 SYRUP ORAL at 12:45

## 2020-01-01 RX ADMIN — ONDANSETRON 4 MG: 2 INJECTION INTRAMUSCULAR; INTRAVENOUS at 08:07

## 2020-01-01 RX ADMIN — OXYCODONE HYDROCHLORIDE AND ACETAMINOPHEN 500 MG: 500 TABLET ORAL at 18:48

## 2020-01-01 RX ADMIN — Medication 1 AMPULE: at 21:02

## 2020-01-01 RX ADMIN — Medication 125 MCG/HR: at 04:45

## 2020-01-01 RX ADMIN — CEFEPIME 2 G: 2 INJECTION, POWDER, FOR SOLUTION INTRAVENOUS at 17:33

## 2020-01-01 RX ADMIN — OXYCODONE HYDROCHLORIDE AND ACETAMINOPHEN 500 MG: 500 TABLET ORAL at 09:20

## 2020-01-01 RX ADMIN — ZINC SULFATE 220 MG (50 MG) CAPSULE 1 CAPSULE: CAPSULE at 09:44

## 2020-01-01 RX ADMIN — DOCOSANOL 10 % TOPICAL CREAM: at 13:04

## 2020-01-01 RX ADMIN — OXYCODONE HYDROCHLORIDE AND ACETAMINOPHEN 500 MG: 500 TABLET ORAL at 09:22

## 2020-01-01 RX ADMIN — CEFTRIAXONE SODIUM 1 G: 1 INJECTION, POWDER, FOR SOLUTION INTRAMUSCULAR; INTRAVENOUS at 12:47

## 2020-01-01 RX ADMIN — Medication 10 ML: at 20:43

## 2020-01-01 RX ADMIN — REMDESIVIR 100 MG: 100 INJECTION, POWDER, LYOPHILIZED, FOR SOLUTION INTRAVENOUS at 11:54

## 2020-01-01 RX ADMIN — MIDAZOLAM HYDROCHLORIDE 4 MG/HR: 5 INJECTION, SOLUTION INTRAMUSCULAR; INTRAVENOUS at 00:55

## 2020-01-01 RX ADMIN — Medication 10 ML: at 00:35

## 2020-01-01 RX ADMIN — Medication 10 ML: at 06:24

## 2020-01-01 RX ADMIN — VANCOMYCIN HYDROCHLORIDE 1250 MG: 10 INJECTION, POWDER, LYOPHILIZED, FOR SOLUTION INTRAVENOUS at 04:29

## 2020-01-01 RX ADMIN — CHLORHEXIDINE GLUCONATE 15 ML: 0.12 RINSE ORAL at 20:36

## 2020-01-01 RX ADMIN — MEROPENEM 500 MG: 500 INJECTION, POWDER, FOR SOLUTION INTRAVENOUS at 08:30

## 2020-01-01 RX ADMIN — SODIUM CHLORIDE 50 ML/HR: 900 INJECTION, SOLUTION INTRAVENOUS at 17:34

## 2020-01-01 RX ADMIN — AZITHROMYCIN 500 MG: 500 INJECTION, POWDER, LYOPHILIZED, FOR SOLUTION INTRAVENOUS at 22:01

## 2020-01-01 RX ADMIN — LANSOPRAZOLE 30 MG: KIT at 06:30

## 2020-01-01 RX ADMIN — OXYCODONE HYDROCHLORIDE AND ACETAMINOPHEN 500 MG: 500 TABLET ORAL at 08:54

## 2020-01-01 RX ADMIN — Medication 1 AMPULE: at 08:35

## 2020-01-01 RX ADMIN — DOCOSANOL 10 % TOPICAL CREAM: at 11:33

## 2020-01-01 RX ADMIN — OXYCODONE HYDROCHLORIDE AND ACETAMINOPHEN 500 MG: 500 TABLET ORAL at 18:05

## 2020-01-01 RX ADMIN — CHLORHEXIDINE GLUCONATE 15 ML: 0.12 RINSE ORAL at 21:04

## 2020-01-01 RX ADMIN — VANCOMYCIN HYDROCHLORIDE 1250 MG: 10 INJECTION, POWDER, LYOPHILIZED, FOR SOLUTION INTRAVENOUS at 19:33

## 2020-01-01 RX ADMIN — SODIUM CHLORIDE 250 ML: 900 INJECTION, SOLUTION INTRAVENOUS at 15:24

## 2020-01-01 RX ADMIN — LEVOTHYROXINE SODIUM 50 MCG: 0.05 TABLET ORAL at 11:40

## 2020-01-01 RX ADMIN — Medication 125 MCG/HR: at 12:10

## 2020-01-01 RX ADMIN — ENOXAPARIN SODIUM 40 MG: 100 INJECTION SUBCUTANEOUS at 12:45

## 2020-01-01 RX ADMIN — LEVOTHYROXINE SODIUM 50 MCG: 0.03 TABLET ORAL at 05:56

## 2020-01-01 RX ADMIN — OXYCODONE HYDROCHLORIDE AND ACETAMINOPHEN 500 MG: 500 TABLET ORAL at 09:27

## 2020-01-01 RX ADMIN — SODIUM CHLORIDE 1000 ML: 9 INJECTION, SOLUTION INTRAVENOUS at 19:49

## 2020-01-01 RX ADMIN — LEVOTHYROXINE SODIUM 50 MCG: 0.05 TABLET ORAL at 09:00

## 2020-01-01 RX ADMIN — IVABRADINE 5 MG: 5 TABLET, FILM COATED ORAL at 10:19

## 2020-01-01 RX ADMIN — Medication 10 ML: at 22:21

## 2020-01-01 RX ADMIN — HEPARIN SODIUM 5000 UNITS: 5000 INJECTION INTRAVENOUS; SUBCUTANEOUS at 12:40

## 2020-01-01 RX ADMIN — SACUBITRIL AND VALSARTAN 1 TABLET: 24; 26 TABLET, FILM COATED ORAL at 10:10

## 2020-01-01 RX ADMIN — ENOXAPARIN SODIUM 40 MG: 100 INJECTION SUBCUTANEOUS at 11:05

## 2020-01-01 RX ADMIN — Medication 125 MCG/HR: at 00:51

## 2020-01-01 RX ADMIN — LEVOTHYROXINE SODIUM 50 MCG: 0.03 TABLET ORAL at 05:07

## 2020-01-01 RX ADMIN — CHLORHEXIDINE GLUCONATE 15 ML: 0.12 RINSE ORAL at 08:41

## 2020-01-01 RX ADMIN — CEFEPIME 2 G: 2 INJECTION, POWDER, FOR SOLUTION INTRAVENOUS at 09:00

## 2020-01-01 RX ADMIN — CASTOR OIL AND BALSAM, PERU: 788; 87 OINTMENT TOPICAL at 08:31

## 2020-01-01 RX ADMIN — ONDANSETRON 4 MG: 2 INJECTION INTRAMUSCULAR; INTRAVENOUS at 02:59

## 2020-01-01 RX ADMIN — Medication 5 MCG/KG/MIN: at 18:05

## 2020-01-01 RX ADMIN — OXYCODONE HYDROCHLORIDE AND ACETAMINOPHEN 500 MG: 500 TABLET ORAL at 10:13

## 2020-01-01 RX ADMIN — Medication 1 TABLET: at 08:58

## 2020-01-01 RX ADMIN — INSULIN LISPRO 2 UNITS: 100 INJECTION, SOLUTION INTRAVENOUS; SUBCUTANEOUS at 12:13

## 2020-01-01 RX ADMIN — ENOXAPARIN SODIUM 40 MG: 100 INJECTION SUBCUTANEOUS at 04:47

## 2020-01-01 RX ADMIN — CHLORHEXIDINE GLUCONATE 15 ML: 0.12 RINSE ORAL at 08:08

## 2020-01-01 RX ADMIN — SODIUM CHLORIDE 10 ML: 9 INJECTION, SOLUTION INTRAMUSCULAR; INTRAVENOUS; SUBCUTANEOUS at 14:37

## 2020-01-01 RX ADMIN — CASTOR OIL AND BALSAM, PERU: 788; 87 OINTMENT TOPICAL at 17:12

## 2020-01-01 RX ADMIN — ALBUTEROL SULFATE 2 PUFF: 90 AEROSOL, METERED RESPIRATORY (INHALATION) at 22:09

## 2020-01-01 RX ADMIN — SODIUM CHLORIDE 1000 ML: 900 INJECTION, SOLUTION INTRAVENOUS at 17:06

## 2020-01-01 RX ADMIN — REMDESIVIR 100 MG: 100 INJECTION, POWDER, LYOPHILIZED, FOR SOLUTION INTRAVENOUS at 11:25

## 2020-01-01 RX ADMIN — MIDAZOLAM HYDROCHLORIDE 3 MG/HR: 5 INJECTION, SOLUTION INTRAMUSCULAR; INTRAVENOUS at 12:00

## 2020-01-01 RX ADMIN — INFLUENZA VIRUS VACCINE 0.5 ML: 15; 15; 15; 15 SUSPENSION INTRAMUSCULAR at 16:22

## 2020-01-01 RX ADMIN — ENOXAPARIN SODIUM 40 MG: 100 INJECTION SUBCUTANEOUS at 23:09

## 2020-01-01 RX ADMIN — VASOPRESSIN 0.03 UNITS/MIN: 20 INJECTION INTRAVENOUS at 23:35

## 2020-01-01 RX ADMIN — DEXAMETHASONE SODIUM PHOSPHATE 6 MG: 10 INJECTION INTRAMUSCULAR; INTRAVENOUS at 17:49

## 2020-01-01 RX ADMIN — SODIUM CHLORIDE 75 ML/HR: 900 INJECTION, SOLUTION INTRAVENOUS at 11:05

## 2020-01-01 RX ADMIN — CHLORHEXIDINE GLUCONATE 15 ML: 0.12 RINSE ORAL at 20:03

## 2020-01-01 RX ADMIN — ZINC SULFATE 220 MG (50 MG) CAPSULE 1 CAPSULE: CAPSULE at 09:34

## 2020-01-01 RX ADMIN — ENOXAPARIN SODIUM 40 MG: 100 INJECTION SUBCUTANEOUS at 23:54

## 2020-01-01 RX ADMIN — ENOXAPARIN SODIUM 40 MG: 100 INJECTION SUBCUTANEOUS at 22:20

## 2020-01-01 RX ADMIN — Medication 1 MG: at 21:16

## 2020-01-01 RX ADMIN — ONDANSETRON 4 MG: 2 INJECTION INTRAMUSCULAR; INTRAVENOUS at 02:34

## 2020-01-01 RX ADMIN — SODIUM CHLORIDE 250 ML: 900 INJECTION, SOLUTION INTRAVENOUS at 10:01

## 2020-01-01 RX ADMIN — Medication 1 TABLET: at 08:54

## 2020-01-01 RX ADMIN — LEVOTHYROXINE SODIUM 50 MCG: 0.05 TABLET ORAL at 09:32

## 2020-01-01 RX ADMIN — SODIUM BICARBONATE 50 MEQ: 84 INJECTION, SOLUTION INTRAVENOUS at 03:14

## 2020-01-01 RX ADMIN — OXYCODONE HYDROCHLORIDE AND ACETAMINOPHEN 500 MG: 500 TABLET ORAL at 09:01

## 2020-01-01 RX ADMIN — DOCOSANOL 10 % TOPICAL CREAM: at 06:06

## 2020-01-01 RX ADMIN — SODIUM CHLORIDE 100 ML: 900 INJECTION INTRAVENOUS at 01:39

## 2020-01-01 RX ADMIN — ENOXAPARIN SODIUM 40 MG: 100 INJECTION SUBCUTANEOUS at 23:22

## 2020-01-01 RX ADMIN — SODIUM CHLORIDE 40 MG: 9 INJECTION INTRAMUSCULAR; INTRAVENOUS; SUBCUTANEOUS at 08:31

## 2020-01-01 RX ADMIN — IOPAMIDOL 100 ML: 755 INJECTION, SOLUTION INTRAVENOUS at 19:18

## 2020-01-01 RX ADMIN — OXYCODONE HYDROCHLORIDE AND ACETAMINOPHEN 500 MG: 500 TABLET ORAL at 09:44

## 2020-01-01 RX ADMIN — FAMOTIDINE 20 MG: 10 INJECTION, SOLUTION INTRAVENOUS at 08:24

## 2020-01-01 RX ADMIN — DEXAMETHASONE SODIUM PHOSPHATE 6 MG: 10 INJECTION INTRAMUSCULAR; INTRAVENOUS at 17:48

## 2020-01-01 RX ADMIN — OXYBUTYNIN CHLORIDE 10 MG: 5 TABLET, EXTENDED RELEASE ORAL at 09:33

## 2020-01-01 RX ADMIN — INSULIN GLARGINE 15 UNITS: 100 INJECTION, SOLUTION SUBCUTANEOUS at 10:38

## 2020-01-01 RX ADMIN — ZINC SULFATE 220 MG (50 MG) CAPSULE 220 MG: CAPSULE at 18:00

## 2020-01-01 RX ADMIN — Medication 10 ML: at 06:03

## 2020-01-01 RX ADMIN — OXYCODONE HYDROCHLORIDE AND ACETAMINOPHEN 500 MG: 500 TABLET ORAL at 17:30

## 2020-01-01 RX ADMIN — CHLORHEXIDINE GLUCONATE 15 ML: 0.12 RINSE ORAL at 21:02

## 2020-01-01 RX ADMIN — METOPROLOL SUCCINATE 25 MG: 25 TABLET, EXTENDED RELEASE ORAL at 14:01

## 2020-01-01 RX ADMIN — SACUBITRIL AND VALSARTAN 1 TABLET: 24; 26 TABLET, FILM COATED ORAL at 08:35

## 2020-01-01 RX ADMIN — OXYCODONE HYDROCHLORIDE AND ACETAMINOPHEN 500 MG: 500 TABLET ORAL at 19:00

## 2020-01-01 RX ADMIN — VANCOMYCIN HYDROCHLORIDE 1250 MG: 10 INJECTION, POWDER, LYOPHILIZED, FOR SOLUTION INTRAVENOUS at 11:23

## 2020-01-01 RX ADMIN — ACETAMINOPHEN 650 MG: 650 SUPPOSITORY RECTAL at 11:20

## 2020-01-01 RX ADMIN — CHLORHEXIDINE GLUCONATE 15 ML: 0.12 RINSE ORAL at 08:34

## 2020-01-01 RX ADMIN — METOPROLOL SUCCINATE 25 MG: 25 TABLET, EXTENDED RELEASE ORAL at 09:22

## 2020-01-01 RX ADMIN — SODIUM BICARBONATE 50 MEQ: 84 INJECTION INTRAVENOUS at 01:34

## 2020-01-01 RX ADMIN — VASOPRESSIN 0.03 UNITS/MIN: 20 INJECTION INTRAVENOUS at 20:42

## 2020-01-01 RX ADMIN — Medication 125 MCG/HR: at 01:17

## 2020-01-01 RX ADMIN — Medication 10 ML: at 17:51

## 2020-01-01 RX ADMIN — MEROPENEM 500 MG: 500 INJECTION, POWDER, FOR SOLUTION INTRAVENOUS at 19:49

## 2020-01-01 RX ADMIN — SODIUM CHLORIDE 1000 ML: 900 INJECTION, SOLUTION INTRAVENOUS at 18:30

## 2020-01-01 RX ADMIN — SODIUM CHLORIDE 10 ML: 9 INJECTION, SOLUTION INTRAMUSCULAR; INTRAVENOUS; SUBCUTANEOUS at 13:38

## 2020-01-01 RX ADMIN — NOREPINEPHRINE BITARTRATE 9 MCG/MIN: 1 INJECTION, SOLUTION, CONCENTRATE INTRAVENOUS at 04:00

## 2020-01-01 RX ADMIN — OXYCODONE HYDROCHLORIDE AND ACETAMINOPHEN 500 MG: 500 TABLET ORAL at 17:50

## 2020-01-01 RX ADMIN — SODIUM CHLORIDE 10 ML: 9 INJECTION, SOLUTION INTRAMUSCULAR; INTRAVENOUS; SUBCUTANEOUS at 22:43

## 2020-01-01 RX ADMIN — ALBUMIN (HUMAN) 25 G: 0.25 INJECTION, SOLUTION INTRAVENOUS at 01:42

## 2020-01-01 RX ADMIN — DOXYCYCLINE HYCLATE 100 MG: 100 TABLET, COATED ORAL at 14:44

## 2020-01-01 RX ADMIN — OXYBUTYNIN CHLORIDE 5 MG: 5 TABLET ORAL at 22:45

## 2020-01-01 RX ADMIN — Medication 1 MG: at 21:09

## 2020-01-01 RX ADMIN — MAGNESIUM SULFATE HEPTAHYDRATE 4 G: 40 INJECTION, SOLUTION INTRAVENOUS at 02:30

## 2020-01-01 RX ADMIN — CEFEPIME 2 G: 2 INJECTION, POWDER, FOR SOLUTION INTRAVENOUS at 01:08

## 2020-01-01 RX ADMIN — CHLORHEXIDINE GLUCONATE 15 ML: 0.12 RINSE ORAL at 08:47

## 2020-01-01 RX ADMIN — DOCOSANOL 10 % TOPICAL CREAM: at 14:32

## 2020-01-01 RX ADMIN — CEFEPIME 2 G: 2 INJECTION, POWDER, FOR SOLUTION INTRAVENOUS at 01:45

## 2020-01-01 RX ADMIN — CEFEPIME 2 G: 2 INJECTION, POWDER, FOR SOLUTION INTRAVENOUS at 14:50

## 2020-01-01 RX ADMIN — SODIUM CHLORIDE 10 ML: 9 INJECTION, SOLUTION INTRAMUSCULAR; INTRAVENOUS; SUBCUTANEOUS at 06:36

## 2020-01-01 RX ADMIN — Medication 10 ML: at 11:00

## 2020-01-01 RX ADMIN — DEXAMETHASONE SODIUM PHOSPHATE 6 MG: 10 INJECTION INTRAMUSCULAR; INTRAVENOUS at 17:17

## 2020-01-01 RX ADMIN — HEPARIN SODIUM 5000 UNITS: 5000 INJECTION INTRAVENOUS; SUBCUTANEOUS at 05:32

## 2020-01-01 RX ADMIN — ENOXAPARIN SODIUM 30 MG: 30 INJECTION SUBCUTANEOUS at 04:28

## 2020-01-01 RX ADMIN — Medication 1 AMPULE: at 08:08

## 2020-01-01 RX ADMIN — DOCOSANOL 10 % TOPICAL CREAM: at 08:55

## 2020-01-01 RX ADMIN — CEFEPIME 2 G: 2 INJECTION, POWDER, FOR SOLUTION INTRAVENOUS at 09:30

## 2020-01-01 RX ADMIN — CEFEPIME 2 G: 2 INJECTION, POWDER, FOR SOLUTION INTRAVENOUS at 09:24

## 2020-01-01 RX ADMIN — LEVOTHYROXINE SODIUM 50 MCG: 0.03 TABLET ORAL at 07:04

## 2020-01-01 RX ADMIN — SODIUM CHLORIDE 1000 ML: 900 INJECTION, SOLUTION INTRAVENOUS at 17:39

## 2020-01-01 RX ADMIN — SODIUM CHLORIDE 10 ML: 9 INJECTION, SOLUTION INTRAMUSCULAR; INTRAVENOUS; SUBCUTANEOUS at 21:03

## 2020-01-01 RX ADMIN — SODIUM CHLORIDE 10 ML: 9 INJECTION, SOLUTION INTRAMUSCULAR; INTRAVENOUS; SUBCUTANEOUS at 23:44

## 2020-01-01 RX ADMIN — CASTOR OIL AND BALSAM, PERU: 788; 87 OINTMENT TOPICAL at 08:47

## 2020-01-01 RX ADMIN — SODIUM CHLORIDE 10 ML: 9 INJECTION, SOLUTION INTRAMUSCULAR; INTRAVENOUS; SUBCUTANEOUS at 13:43

## 2020-01-01 RX ADMIN — OXYCODONE HYDROCHLORIDE AND ACETAMINOPHEN 500 MG: 500 TABLET ORAL at 17:47

## 2020-01-01 RX ADMIN — LANSOPRAZOLE 30 MG: KIT at 06:31

## 2020-01-01 RX ADMIN — VASOPRESSIN 0.03 UNITS/MIN: 20 INJECTION INTRAVENOUS at 20:35

## 2020-01-01 RX ADMIN — ACETAMINOPHEN 650 MG: 325 TABLET ORAL at 15:28

## 2020-01-01 RX ADMIN — PANTOPRAZOLE SODIUM 40 MG: 40 TABLET, DELAYED RELEASE ORAL at 07:30

## 2020-01-01 RX ADMIN — ENOXAPARIN SODIUM 40 MG: 100 INJECTION SUBCUTANEOUS at 17:08

## 2020-01-01 RX ADMIN — CEFEPIME 2 G: 2 INJECTION, POWDER, FOR SOLUTION INTRAVENOUS at 09:21

## 2020-01-01 RX ADMIN — Medication 10 ML: at 21:50

## 2020-01-01 RX ADMIN — Medication 125 MCG/HR: at 13:00

## 2020-01-01 RX ADMIN — OXYCODONE HYDROCHLORIDE AND ACETAMINOPHEN 500 MG: 500 TABLET ORAL at 19:25

## 2020-01-01 RX ADMIN — ZINC SULFATE 220 MG (50 MG) CAPSULE 1 CAPSULE: CAPSULE at 08:29

## 2020-01-01 RX ADMIN — DEXAMETHASONE 6 MG: 4 TABLET ORAL at 17:39

## 2020-01-01 RX ADMIN — SODIUM CHLORIDE 10 ML: 9 INJECTION, SOLUTION INTRAMUSCULAR; INTRAVENOUS; SUBCUTANEOUS at 05:33

## 2020-01-01 RX ADMIN — HEPARIN 300 UNITS: 100 SYRINGE at 15:46

## 2020-01-01 RX ADMIN — ZINC SULFATE 220 MG (50 MG) CAPSULE 1 CAPSULE: CAPSULE at 10:13

## 2020-01-01 RX ADMIN — SODIUM CHLORIDE 40 MG: 9 INJECTION INTRAMUSCULAR; INTRAVENOUS; SUBCUTANEOUS at 20:46

## 2020-01-01 RX ADMIN — Medication 10 ML: at 06:33

## 2020-01-01 RX ADMIN — FAMOTIDINE 20 MG: 10 INJECTION INTRAVENOUS at 08:08

## 2020-01-01 RX ADMIN — OXYBUTYNIN CHLORIDE 10 MG: 5 TABLET, EXTENDED RELEASE ORAL at 08:28

## 2020-01-01 RX ADMIN — OXYCODONE HYDROCHLORIDE AND ACETAMINOPHEN 500 MG: 500 TABLET ORAL at 08:24

## 2020-01-01 RX ADMIN — ENOXAPARIN SODIUM 40 MG: 100 INJECTION SUBCUTANEOUS at 11:41

## 2020-01-01 RX ADMIN — ALBUTEROL SULFATE 2 PUFF: 90 AEROSOL, METERED RESPIRATORY (INHALATION) at 08:26

## 2020-01-01 RX ADMIN — SODIUM CHLORIDE 30 ML: 9 INJECTION, SOLUTION INTRAMUSCULAR; INTRAVENOUS; SUBCUTANEOUS at 07:15

## 2020-01-01 RX ADMIN — SODIUM CHLORIDE 40 MG: 9 INJECTION INTRAMUSCULAR; INTRAVENOUS; SUBCUTANEOUS at 09:40

## 2020-01-01 RX ADMIN — SACUBITRIL AND VALSARTAN 1 TABLET: 24; 26 TABLET, FILM COATED ORAL at 21:49

## 2020-01-01 RX ADMIN — Medication 10 ML: at 15:24

## 2020-01-01 RX ADMIN — VANCOMYCIN HYDROCHLORIDE 1250 MG: 10 INJECTION, POWDER, LYOPHILIZED, FOR SOLUTION INTRAVENOUS at 20:50

## 2020-01-01 RX ADMIN — SODIUM CHLORIDE 40 MG: 9 INJECTION INTRAMUSCULAR; INTRAVENOUS; SUBCUTANEOUS at 21:33

## 2020-01-01 RX ADMIN — SODIUM CHLORIDE 40 MG: 9 INJECTION INTRAMUSCULAR; INTRAVENOUS; SUBCUTANEOUS at 21:38

## 2020-01-01 RX ADMIN — Medication 1 MG: at 21:40

## 2020-01-01 RX ADMIN — SODIUM CHLORIDE 40 MG: 9 INJECTION INTRAMUSCULAR; INTRAVENOUS; SUBCUTANEOUS at 20:57

## 2020-01-01 RX ADMIN — ONDANSETRON 4 MG: 2 INJECTION INTRAMUSCULAR; INTRAVENOUS at 13:31

## 2020-01-01 RX ADMIN — DOCOSANOL 10 % TOPICAL CREAM: at 22:40

## 2020-01-01 RX ADMIN — POLYETHYLENE GLYCOL 3350 17 G: 17 POWDER, FOR SOLUTION ORAL at 09:32

## 2020-01-01 RX ADMIN — SODIUM CHLORIDE 40 MG: 9 INJECTION INTRAMUSCULAR; INTRAVENOUS; SUBCUTANEOUS at 09:12

## 2020-01-01 RX ADMIN — OXYBUTYNIN CHLORIDE 5 MG: 5 TABLET ORAL at 10:13

## 2020-01-01 RX ADMIN — IVABRADINE 5 MG: 5 TABLET, FILM COATED ORAL at 08:35

## 2020-01-01 RX ADMIN — DEXMEDETOMIDINE 0.4 MCG/KG/HR: 100 INJECTION, SOLUTION, CONCENTRATE INTRAVENOUS at 12:04

## 2020-01-01 RX ADMIN — SODIUM CHLORIDE 40 MG: 9 INJECTION INTRAMUSCULAR; INTRAVENOUS; SUBCUTANEOUS at 20:33

## 2020-01-01 RX ADMIN — Medication 10 ML: at 19:19

## 2020-01-01 RX ADMIN — VANCOMYCIN HYDROCHLORIDE 1250 MG: 10 INJECTION, POWDER, LYOPHILIZED, FOR SOLUTION INTRAVENOUS at 03:20

## 2020-01-01 RX ADMIN — DOCOSANOL 10 % TOPICAL CREAM: at 06:34

## 2020-01-01 RX ADMIN — LEVOTHYROXINE SODIUM 50 MCG: 0.05 TABLET ORAL at 08:32

## 2020-01-01 RX ADMIN — ALBUTEROL SULFATE 2 PUFF: 90 AEROSOL, METERED RESPIRATORY (INHALATION) at 20:55

## 2020-01-01 RX ADMIN — Medication 60 MG: at 17:59

## 2020-01-01 RX ADMIN — Medication 300 UNITS: at 15:46

## 2020-01-01 RX ADMIN — MEROPENEM 500 MG: 500 INJECTION, POWDER, FOR SOLUTION INTRAVENOUS at 07:50

## 2020-01-01 RX ADMIN — DOCOSANOL 10 % TOPICAL CREAM: at 21:39

## 2020-01-01 RX ADMIN — DOCOSANOL 10 % TOPICAL CREAM: at 14:00

## 2020-01-01 RX ADMIN — NOREPINEPHRINE BITARTRATE 78 MCG/MIN: 1 INJECTION, SOLUTION, CONCENTRATE INTRAVENOUS at 04:33

## 2020-01-01 RX ADMIN — Medication 10 ML: at 06:32

## 2020-01-01 RX ADMIN — DOCOSANOL 10 % TOPICAL CREAM: at 06:23

## 2020-01-01 RX ADMIN — ENOXAPARIN SODIUM 40 MG: 40 INJECTION SUBCUTANEOUS at 23:17

## 2020-01-01 RX ADMIN — ACETAMINOPHEN 650 MG: 325 TABLET, FILM COATED ORAL at 13:33

## 2020-01-01 RX ADMIN — Medication 1 TABLET: at 09:44

## 2020-01-01 RX ADMIN — CHLORHEXIDINE GLUCONATE 15 ML: 0.12 RINSE ORAL at 08:48

## 2020-01-01 RX ADMIN — DOCOSANOL 10 % TOPICAL CREAM: at 07:05

## 2020-01-01 RX ADMIN — ALBUMIN (HUMAN) 25 G: 0.25 INJECTION, SOLUTION INTRAVENOUS at 06:36

## 2020-01-01 RX ADMIN — ENOXAPARIN SODIUM 40 MG: 100 INJECTION SUBCUTANEOUS at 18:05

## 2020-01-01 RX ADMIN — ZINC SULFATE 220 MG (50 MG) CAPSULE 220 MG: CAPSULE at 08:24

## 2020-01-01 RX ADMIN — Medication 10 ML: at 06:23

## 2020-01-01 RX ADMIN — OXYCODONE HYDROCHLORIDE AND ACETAMINOPHEN 500 MG: 500 TABLET ORAL at 17:52

## 2020-01-01 RX ADMIN — ALBUTEROL SULFATE 2 PUFF: 90 AEROSOL, METERED RESPIRATORY (INHALATION) at 00:00

## 2020-01-01 RX ADMIN — ALBUTEROL SULFATE 2 PUFF: 90 AEROSOL, METERED RESPIRATORY (INHALATION) at 11:54

## 2020-01-01 RX ADMIN — Medication 1 MG: at 21:39

## 2020-01-01 RX ADMIN — Medication 10 ML: at 23:47

## 2020-01-01 RX ADMIN — DEXAMETHASONE 6 MG: 4 TABLET ORAL at 08:28

## 2020-01-01 RX ADMIN — Medication 1 AMPULE: at 08:03

## 2020-01-01 RX ADMIN — OXYCODONE HYDROCHLORIDE AND ACETAMINOPHEN 500 MG: 500 TABLET ORAL at 17:17

## 2020-01-01 RX ADMIN — INSULIN LISPRO 2 UNITS: 100 INJECTION, SOLUTION INTRAVENOUS; SUBCUTANEOUS at 00:35

## 2020-01-01 RX ADMIN — CHLORHEXIDINE GLUCONATE 15 ML: 0.12 RINSE ORAL at 20:34

## 2020-01-01 RX ADMIN — OXYBUTYNIN CHLORIDE 10 MG: 5 TABLET, EXTENDED RELEASE ORAL at 08:58

## 2020-01-01 RX ADMIN — Medication 10 ML: at 14:42

## 2020-01-01 RX ADMIN — IVABRADINE 5 MG: 5 TABLET, FILM COATED ORAL at 17:30

## 2020-01-01 RX ADMIN — Medication 1 MG: at 22:27

## 2020-01-01 RX ADMIN — METOPROLOL SUCCINATE 25 MG: 25 TABLET, EXTENDED RELEASE ORAL at 11:38

## 2020-01-01 RX ADMIN — CEFEPIME 2 G: 2 INJECTION, POWDER, FOR SOLUTION INTRAVENOUS at 02:00

## 2020-01-01 RX ADMIN — CASTOR OIL AND BALSAM, PERU: 788; 87 OINTMENT TOPICAL at 17:06

## 2020-01-01 RX ADMIN — VANCOMYCIN HYDROCHLORIDE 1250 MG: 10 INJECTION, POWDER, LYOPHILIZED, FOR SOLUTION INTRAVENOUS at 09:21

## 2020-01-01 RX ADMIN — ENOXAPARIN SODIUM 40 MG: 100 INJECTION SUBCUTANEOUS at 21:40

## 2020-01-01 RX ADMIN — OXYCODONE HYDROCHLORIDE AND ACETAMINOPHEN 500 MG: 500 TABLET ORAL at 17:45

## 2020-01-01 RX ADMIN — ENOXAPARIN SODIUM 40 MG: 100 INJECTION SUBCUTANEOUS at 06:41

## 2020-01-01 RX ADMIN — ENOXAPARIN SODIUM 40 MG: 100 INJECTION SUBCUTANEOUS at 11:23

## 2020-01-01 RX ADMIN — SODIUM CHLORIDE 500 ML: 900 INJECTION, SOLUTION INTRAVENOUS at 17:57

## 2020-01-01 RX ADMIN — Medication 10 ML: at 06:06

## 2020-01-01 RX ADMIN — OXYCODONE HYDROCHLORIDE AND ACETAMINOPHEN 500 MG: 500 TABLET ORAL at 17:44

## 2020-01-01 RX ADMIN — HEPARIN SODIUM 5000 UNITS: 5000 INJECTION INTRAVENOUS; SUBCUTANEOUS at 19:49

## 2020-01-01 RX ADMIN — METOPROLOL SUCCINATE 25 MG: 25 TABLET, EXTENDED RELEASE ORAL at 11:23

## 2020-01-01 RX ADMIN — NOREPINEPHRINE BITARTRATE 10 MCG/MIN: 1 INJECTION, SOLUTION, CONCENTRATE INTRAVENOUS at 17:14

## 2020-01-01 RX ADMIN — FAMOTIDINE 20 MG: 10 INJECTION, SOLUTION INTRAVENOUS at 00:07

## 2020-01-01 RX ADMIN — NOREPINEPHRINE BITARTRATE 32 MCG/MIN: 1 INJECTION, SOLUTION, CONCENTRATE INTRAVENOUS at 15:42

## 2020-01-01 RX ADMIN — Medication 1 MG: at 22:21

## 2020-01-01 RX ADMIN — Medication 1 TABLET: at 10:13

## 2020-01-01 RX ADMIN — Medication 10 ML: at 05:12

## 2020-01-01 RX ADMIN — FAMOTIDINE 20 MG: 10 INJECTION INTRAVENOUS at 08:28

## 2020-01-01 RX ADMIN — SACUBITRIL AND VALSARTAN 1 TABLET: 24; 26 TABLET, FILM COATED ORAL at 21:39

## 2020-01-01 RX ADMIN — ACETAMINOPHEN 650 MG: 325 TABLET ORAL at 15:51

## 2020-01-01 RX ADMIN — BISACODYL 5 MG: 5 TABLET, COATED ORAL at 08:59

## 2020-01-01 RX ADMIN — DEXMEDETOMIDINE 0.4 MCG/KG/HR: 100 INJECTION, SOLUTION, CONCENTRATE INTRAVENOUS at 22:31

## 2020-01-01 RX ADMIN — ENOXAPARIN SODIUM 40 MG: 100 INJECTION SUBCUTANEOUS at 17:48

## 2020-01-01 RX ADMIN — MIDAZOLAM HYDROCHLORIDE 10 MG/HR: 5 INJECTION, SOLUTION INTRAMUSCULAR; INTRAVENOUS at 08:36

## 2020-01-01 RX ADMIN — DEXAMETHASONE 6 MG: 4 TABLET ORAL at 18:05

## 2020-01-01 RX ADMIN — Medication 10 ML: at 06:45

## 2020-01-01 RX ADMIN — SODIUM CHLORIDE 40 MG: 9 INJECTION INTRAMUSCULAR; INTRAVENOUS; SUBCUTANEOUS at 21:22

## 2020-01-01 RX ADMIN — Medication 125 MCG/HR: at 02:16

## 2020-01-01 RX ADMIN — LEVOTHYROXINE SODIUM 50 MCG: 0.03 TABLET ORAL at 06:02

## 2020-01-01 RX ADMIN — Medication 1 AMPULE: at 20:06

## 2020-01-01 RX ADMIN — ENOXAPARIN SODIUM 40 MG: 100 INJECTION SUBCUTANEOUS at 05:56

## 2020-01-01 RX ADMIN — FAMOTIDINE 20 MG: 10 INJECTION INTRAVENOUS at 08:30

## 2020-01-01 RX ADMIN — DOCOSANOL 10 % TOPICAL CREAM: at 17:27

## 2020-01-01 RX ADMIN — DOCOSANOL 10 % TOPICAL CREAM: at 22:20

## 2020-01-01 RX ADMIN — RISPERIDONE 1 MG: 0.25 TABLET ORAL at 22:43

## 2020-01-01 RX ADMIN — ENOXAPARIN SODIUM 40 MG: 100 INJECTION SUBCUTANEOUS at 17:47

## 2020-01-01 RX ADMIN — Medication 10 ML: at 22:27

## 2020-01-01 RX ADMIN — SODIUM CHLORIDE 10 ML: 9 INJECTION, SOLUTION INTRAMUSCULAR; INTRAVENOUS; SUBCUTANEOUS at 05:09

## 2020-01-01 RX ADMIN — SACUBITRIL AND VALSARTAN 1 TABLET: 24; 26 TABLET, FILM COATED ORAL at 08:59

## 2020-01-01 RX ADMIN — SODIUM CHLORIDE 40 MG: 9 INJECTION INTRAMUSCULAR; INTRAVENOUS; SUBCUTANEOUS at 09:22

## 2020-01-01 RX ADMIN — POLYETHYLENE GLYCOL 3350 17 G: 17 POWDER, FOR SOLUTION ORAL at 06:08

## 2020-01-01 RX ADMIN — CEFEPIME 2 G: 2 INJECTION, POWDER, FOR SOLUTION INTRAVENOUS at 17:42

## 2020-01-01 RX ADMIN — Medication 10 ML: at 05:31

## 2020-01-01 RX ADMIN — INSULIN LISPRO 2 UNITS: 100 INJECTION, SOLUTION INTRAVENOUS; SUBCUTANEOUS at 01:17

## 2020-01-01 RX ADMIN — DOCOSANOL 10 % TOPICAL CREAM: at 21:10

## 2020-01-01 RX ADMIN — ENOXAPARIN SODIUM 40 MG: 100 INJECTION SUBCUTANEOUS at 12:40

## 2020-01-01 RX ADMIN — SODIUM CHLORIDE 250 ML: 900 INJECTION, SOLUTION INTRAVENOUS at 17:33

## 2020-01-01 RX ADMIN — CASTOR OIL AND BALSAM, PERU: 788; 87 OINTMENT TOPICAL at 08:49

## 2020-01-01 RX ADMIN — SODIUM CHLORIDE 200 MG: 9 INJECTION, SOLUTION INTRAVENOUS at 11:50

## 2020-01-01 RX ADMIN — SODIUM CHLORIDE 250 ML: 900 INJECTION, SOLUTION INTRAVENOUS at 15:32

## 2020-01-01 RX ADMIN — Medication 10 ML: at 15:09

## 2020-01-01 RX ADMIN — MIDAZOLAM HYDROCHLORIDE 8 MG/HR: 5 INJECTION, SOLUTION INTRAMUSCULAR; INTRAVENOUS at 20:35

## 2020-01-01 RX ADMIN — AZITHROMYCIN 500 MG: 100 POWDER, FOR SUSPENSION ORAL at 22:03

## 2020-01-01 RX ADMIN — Medication 10 ML: at 20:24

## 2020-01-01 RX ADMIN — DOCOSANOL 10 % TOPICAL CREAM: at 13:15

## 2020-01-01 RX ADMIN — Medication 1 MG: at 21:49

## 2020-01-01 RX ADMIN — Medication 1 AMPULE: at 08:47

## 2020-01-01 RX ADMIN — CEFEPIME 2 G: 2 INJECTION, POWDER, FOR SOLUTION INTRAVENOUS at 18:23

## 2020-01-01 RX ADMIN — Medication 10 ML: at 21:40

## 2020-01-01 RX ADMIN — VANCOMYCIN HYDROCHLORIDE 1250 MG: 10 INJECTION, POWDER, LYOPHILIZED, FOR SOLUTION INTRAVENOUS at 12:58

## 2020-01-01 RX ADMIN — ACETAMINOPHEN 650 MG: 650 SUPPOSITORY RECTAL at 17:08

## 2020-01-01 RX ADMIN — ACETAMINOPHEN 1000 MG: 500 TABLET ORAL at 15:55

## 2020-01-01 RX ADMIN — INSULIN GLARGINE 15 UNITS: 100 INJECTION, SOLUTION SUBCUTANEOUS at 10:42

## 2020-01-01 RX ADMIN — Medication 10 ML: at 20:06

## 2020-01-01 RX ADMIN — AZITHROMYCIN 500 MG: 500 INJECTION, POWDER, LYOPHILIZED, FOR SOLUTION INTRAVENOUS at 22:10

## 2020-01-01 RX ADMIN — INSULIN LISPRO 2 UNITS: 100 INJECTION, SOLUTION INTRAVENOUS; SUBCUTANEOUS at 18:35

## 2020-01-01 RX ADMIN — Medication 10 ML: at 15:21

## 2020-01-01 RX ADMIN — Medication 1 MG: at 21:28

## 2020-01-01 RX ADMIN — PROPOFOL 5 MCG/KG/MIN: 10 INJECTION, EMULSION INTRAVENOUS at 18:05

## 2020-01-01 RX ADMIN — SODIUM CHLORIDE 10 ML: 9 INJECTION, SOLUTION INTRAMUSCULAR; INTRAVENOUS; SUBCUTANEOUS at 22:00

## 2020-01-01 RX ADMIN — OXYBUTYNIN CHLORIDE 5 MG: 5 TABLET ORAL at 18:05

## 2020-01-01 RX ADMIN — Medication 10 ML: at 14:32

## 2020-01-01 RX ADMIN — DOCOSANOL 10 % TOPICAL CREAM: at 18:00

## 2020-01-01 RX ADMIN — IVABRADINE 5 MG: 5 TABLET, FILM COATED ORAL at 08:59

## 2020-01-01 RX ADMIN — IVABRADINE 5 MG: 5 TABLET, FILM COATED ORAL at 21:40

## 2020-01-01 RX ADMIN — ENOXAPARIN SODIUM 40 MG: 100 INJECTION SUBCUTANEOUS at 23:02

## 2020-01-01 RX ADMIN — DOCOSANOL 10 % TOPICAL CREAM: at 06:32

## 2020-01-01 RX ADMIN — ENOXAPARIN SODIUM 40 MG: 100 INJECTION SUBCUTANEOUS at 22:43

## 2020-01-01 RX ADMIN — OXYCODONE HYDROCHLORIDE AND ACETAMINOPHEN 500 MG: 500 TABLET ORAL at 08:29

## 2020-01-01 RX ADMIN — DOCOSANOL 10 % TOPICAL CREAM: at 18:23

## 2020-01-01 RX ADMIN — LEVOTHYROXINE SODIUM 50 MCG: 0.03 TABLET ORAL at 06:32

## 2020-01-01 RX ADMIN — SODIUM CHLORIDE 1000 ML: 900 INJECTION, SOLUTION INTRAVENOUS at 17:40

## 2020-01-01 RX ADMIN — SODIUM CHLORIDE 40 MG: 9 INJECTION INTRAMUSCULAR; INTRAVENOUS; SUBCUTANEOUS at 09:43

## 2020-01-01 RX ADMIN — SODIUM CHLORIDE 30 ML: 9 INJECTION, SOLUTION INTRAMUSCULAR; INTRAVENOUS; SUBCUTANEOUS at 22:01

## 2020-01-01 RX ADMIN — Medication 10 ML: at 13:18

## 2020-01-01 RX ADMIN — POLYETHYLENE GLYCOL 3350 17 G: 17 POWDER, FOR SOLUTION ORAL at 08:36

## 2020-01-01 RX ADMIN — SODIUM CHLORIDE 100 ML: 900 INJECTION, SOLUTION INTRAVENOUS at 11:00

## 2020-01-01 RX ADMIN — HEPARIN SODIUM 5000 UNITS: 5000 INJECTION INTRAVENOUS; SUBCUTANEOUS at 20:06

## 2020-01-01 RX ADMIN — HUMAN INSULIN 10 UNITS: 100 INJECTION, SOLUTION SUBCUTANEOUS at 07:05

## 2020-01-01 RX ADMIN — NOREPINEPHRINE BITARTRATE 78 MCG/MIN: 1 INJECTION, SOLUTION, CONCENTRATE INTRAVENOUS at 01:44

## 2020-01-01 RX ADMIN — LANSOPRAZOLE 30 MG: KIT at 06:58

## 2020-01-01 RX ADMIN — DOCOSANOL 10 % TOPICAL CREAM: at 14:13

## 2020-01-01 RX ADMIN — CASTOR OIL AND BALSAM, PERU: 788; 87 OINTMENT TOPICAL at 20:21

## 2020-01-01 RX ADMIN — OXYBUTYNIN CHLORIDE 10 MG: 5 TABLET, EXTENDED RELEASE ORAL at 11:39

## 2020-01-01 RX ADMIN — OXYBUTYNIN CHLORIDE 5 MG: 5 TABLET ORAL at 09:20

## 2020-01-01 RX ADMIN — Medication 1 MG: at 21:10

## 2020-01-01 RX ADMIN — NOREPINEPHRINE BITARTRATE 8 MCG/MIN: 1 INJECTION, SOLUTION, CONCENTRATE INTRAVENOUS at 14:39

## 2020-01-01 RX ADMIN — CEFEPIME HYDROCHLORIDE 2 G: 2 INJECTION, POWDER, FOR SOLUTION INTRAVENOUS at 02:26

## 2020-01-01 RX ADMIN — Medication 10 ML: at 13:16

## 2020-01-01 RX ADMIN — OXYCODONE HYDROCHLORIDE AND ACETAMINOPHEN 500 MG: 500 TABLET ORAL at 18:00

## 2020-01-01 RX ADMIN — AZITHROMYCIN 500 MG: 500 INJECTION, POWDER, LYOPHILIZED, FOR SOLUTION INTRAVENOUS at 22:31

## 2020-01-01 RX ADMIN — LANSOPRAZOLE 30 MG: KIT at 05:56

## 2020-01-01 RX ADMIN — ALBUTEROL SULFATE 2 PUFF: 90 AEROSOL, METERED RESPIRATORY (INHALATION) at 11:38

## 2020-01-01 RX ADMIN — SODIUM CHLORIDE 250 ML: 900 INJECTION, SOLUTION INTRAVENOUS at 15:52

## 2020-01-01 RX ADMIN — OXYCODONE HYDROCHLORIDE AND ACETAMINOPHEN 500 MG: 500 TABLET ORAL at 09:43

## 2020-01-01 RX ADMIN — VANCOMYCIN HYDROCHLORIDE 1250 MG: 10 INJECTION, POWDER, LYOPHILIZED, FOR SOLUTION INTRAVENOUS at 05:12

## 2020-01-01 RX ADMIN — Medication 1 MG: at 23:01

## 2020-01-01 RX ADMIN — LANSOPRAZOLE 30 MG: KIT at 07:31

## 2020-01-01 RX ADMIN — Medication 10 ML: at 21:11

## 2020-01-01 RX ADMIN — DEXAMETHASONE SODIUM PHOSPHATE 6 MG: 10 INJECTION INTRAMUSCULAR; INTRAVENOUS at 17:08

## 2020-01-01 RX ADMIN — CEFEPIME HYDROCHLORIDE 2 G: 2 INJECTION, POWDER, FOR SOLUTION INTRAVENOUS at 03:21

## 2020-01-01 RX ADMIN — ENOXAPARIN SODIUM 40 MG: 100 INJECTION SUBCUTANEOUS at 14:22

## 2020-01-01 RX ADMIN — MEROPENEM 500 MG: 500 INJECTION, POWDER, FOR SOLUTION INTRAVENOUS at 20:00

## 2020-01-01 RX ADMIN — DEXAMETHASONE 6 MG: 4 TABLET ORAL at 17:47

## 2020-01-01 RX ADMIN — LEVOTHYROXINE SODIUM 50 MCG: 0.03 TABLET ORAL at 06:38

## 2020-01-01 RX ADMIN — CEFEPIME 2 G: 2 INJECTION, POWDER, FOR SOLUTION INTRAVENOUS at 01:09

## 2020-01-01 RX ADMIN — IOPAMIDOL 80 ML: 755 INJECTION, SOLUTION INTRAVENOUS at 11:00

## 2020-01-01 RX ADMIN — MEROPENEM 500 MG: 500 INJECTION, POWDER, FOR SOLUTION INTRAVENOUS at 08:47

## 2020-01-01 RX ADMIN — DOCOSANOL 10 % TOPICAL CREAM: at 17:34

## 2020-01-01 RX ADMIN — ACETAMINOPHEN 650 MG: 325 TABLET ORAL at 20:32

## 2020-01-01 RX ADMIN — ALBUMIN (HUMAN) 25 G: 0.25 INJECTION, SOLUTION INTRAVENOUS at 23:16

## 2020-01-01 RX ADMIN — LEVOTHYROXINE SODIUM 50 MCG: 0.03 TABLET ORAL at 06:30

## 2020-01-01 RX ADMIN — Medication 10 ML: at 14:00

## 2020-01-01 RX ADMIN — REMDESIVIR 100 MG: 100 INJECTION, POWDER, LYOPHILIZED, FOR SOLUTION INTRAVENOUS at 11:22

## 2020-01-01 RX ADMIN — Medication 10 ML: at 06:12

## 2020-01-01 RX ADMIN — SODIUM CHLORIDE 10 ML: 9 INJECTION, SOLUTION INTRAMUSCULAR; INTRAVENOUS; SUBCUTANEOUS at 21:04

## 2020-01-01 RX ADMIN — Medication 10 ML: at 21:29

## 2020-01-01 RX ADMIN — OXYCODONE HYDROCHLORIDE AND ACETAMINOPHEN 500 MG: 500 TABLET ORAL at 08:55

## 2020-01-01 RX ADMIN — DOCOSANOL 10 % TOPICAL CREAM: at 11:23

## 2020-01-01 RX ADMIN — CASTOR OIL AND BALSAM, PERU: 788; 87 OINTMENT TOPICAL at 17:37

## 2020-01-01 RX ADMIN — NOREPINEPHRINE BITARTRATE 0.5 MCG/MIN: 1 INJECTION, SOLUTION, CONCENTRATE INTRAVENOUS at 23:32

## 2020-01-01 RX ADMIN — Medication 1 TABLET: at 09:34

## 2020-01-01 RX ADMIN — LEVOTHYROXINE SODIUM 50 MCG: 0.03 TABLET ORAL at 06:06

## 2020-01-01 RX ADMIN — ENOXAPARIN SODIUM 40 MG: 100 INJECTION SUBCUTANEOUS at 06:43

## 2020-01-01 RX ADMIN — CASTOR OIL AND BALSAM, PERU: 788; 87 OINTMENT TOPICAL at 08:08

## 2020-01-01 RX ADMIN — LEVOTHYROXINE SODIUM 50 MCG: 0.03 TABLET ORAL at 09:00

## 2020-01-01 RX ADMIN — ENOXAPARIN SODIUM 40 MG: 100 INJECTION SUBCUTANEOUS at 05:53

## 2020-01-01 RX ADMIN — SODIUM CHLORIDE 50 ML/HR: 900 INJECTION, SOLUTION INTRAVENOUS at 03:31

## 2020-01-01 RX ADMIN — OXYCODONE HYDROCHLORIDE AND ACETAMINOPHEN 500 MG: 500 TABLET ORAL at 17:27

## 2020-01-01 RX ADMIN — SODIUM CHLORIDE 10 ML: 9 INJECTION, SOLUTION INTRAMUSCULAR; INTRAVENOUS; SUBCUTANEOUS at 16:11

## 2020-01-01 RX ADMIN — ENOXAPARIN SODIUM 40 MG: 100 INJECTION SUBCUTANEOUS at 10:00

## 2020-01-01 RX ADMIN — ENOXAPARIN SODIUM 40 MG: 100 INJECTION SUBCUTANEOUS at 22:44

## 2020-01-01 RX ADMIN — LEVOTHYROXINE SODIUM 50 MCG: 0.03 TABLET ORAL at 06:57

## 2020-01-01 RX ADMIN — IVABRADINE 5 MG: 5 TABLET, FILM COATED ORAL at 13:32

## 2020-01-01 RX ADMIN — MIDAZOLAM HYDROCHLORIDE 10 MG/HR: 5 INJECTION, SOLUTION INTRAMUSCULAR; INTRAVENOUS at 10:07

## 2020-01-01 RX ADMIN — SODIUM CHLORIDE 10 ML: 9 INJECTION, SOLUTION INTRAMUSCULAR; INTRAVENOUS; SUBCUTANEOUS at 05:25

## 2020-01-01 RX ADMIN — Medication 10 ML: at 21:34

## 2020-01-01 RX ADMIN — POLYETHYLENE GLYCOL 3350 17 G: 17 POWDER, FOR SOLUTION ORAL at 10:01

## 2020-01-01 RX ADMIN — OXYCODONE HYDROCHLORIDE AND ACETAMINOPHEN 500 MG: 500 TABLET ORAL at 10:10

## 2020-01-01 RX ADMIN — Medication 10 ML: at 18:04

## 2020-01-01 RX ADMIN — ENOXAPARIN SODIUM 40 MG: 100 INJECTION SUBCUTANEOUS at 05:42

## 2020-01-01 RX ADMIN — SODIUM CHLORIDE 40 MG: 9 INJECTION INTRAMUSCULAR; INTRAVENOUS; SUBCUTANEOUS at 21:16

## 2020-01-01 RX ADMIN — Medication 75 MCG/HR: at 18:07

## 2020-01-01 RX ADMIN — LEVOTHYROXINE SODIUM 50 MCG: 0.03 TABLET ORAL at 07:21

## 2020-01-01 RX ADMIN — DOCOSANOL 10 % TOPICAL CREAM: at 11:00

## 2020-01-01 RX ADMIN — ENOXAPARIN SODIUM 90 MG: 100 INJECTION SUBCUTANEOUS at 10:18

## 2020-01-01 RX ADMIN — LEVOTHYROXINE SODIUM 50 MCG: 0.03 TABLET ORAL at 05:28

## 2020-01-01 RX ADMIN — DOCOSANOL 10 % TOPICAL CREAM: at 06:12

## 2020-01-01 RX ADMIN — Medication 50 MCG/HR: at 15:36

## 2020-01-01 RX ADMIN — Medication 10 ML: at 06:57

## 2020-01-01 RX ADMIN — DOCOSANOL 10 % TOPICAL CREAM: at 21:29

## 2020-01-01 RX ADMIN — ALBUMIN (HUMAN) 25 G: 0.25 INJECTION, SOLUTION INTRAVENOUS at 11:32

## 2020-01-01 RX ADMIN — ZINC SULFATE 220 MG (50 MG) CAPSULE 220 MG: CAPSULE at 08:31

## 2020-01-01 RX ADMIN — ALBUMIN (HUMAN) 25 G: 0.25 INJECTION, SOLUTION INTRAVENOUS at 18:29

## 2020-01-01 RX ADMIN — Medication 125 MCG/HR: at 16:00

## 2020-01-01 RX ADMIN — DOCOSANOL 10 % TOPICAL CREAM: at 22:00

## 2020-01-01 RX ADMIN — OXYBUTYNIN CHLORIDE 5 MG: 5 TABLET ORAL at 17:47

## 2020-01-01 RX ADMIN — SODIUM CHLORIDE 40 MG: 9 INJECTION INTRAMUSCULAR; INTRAVENOUS; SUBCUTANEOUS at 09:24

## 2020-01-01 RX ADMIN — Medication 10 ML: at 21:01

## 2020-01-01 RX ADMIN — DOCOSANOL 10 % TOPICAL CREAM: at 17:44

## 2020-01-01 RX ADMIN — DOCOSANOL 10 % TOPICAL CREAM: at 21:23

## 2020-01-01 RX ADMIN — DOCOSANOL 10 % TOPICAL CREAM: at 11:05

## 2020-01-01 RX ADMIN — ZINC SULFATE 220 MG (50 MG) CAPSULE 1 CAPSULE: CAPSULE at 08:58

## 2020-01-01 RX ADMIN — ENOXAPARIN SODIUM 40 MG: 100 INJECTION SUBCUTANEOUS at 17:51

## 2020-01-01 RX ADMIN — VASOPRESSIN 0.03 UNITS/MIN: 20 INJECTION INTRAVENOUS at 09:14

## 2020-01-01 RX ADMIN — CHLORHEXIDINE GLUCONATE 15 ML: 0.12 RINSE ORAL at 08:03

## 2020-01-01 RX ADMIN — Medication 1 MG: at 22:45

## 2020-01-01 RX ADMIN — FAMOTIDINE 20 MG: 10 INJECTION INTRAVENOUS at 08:47

## 2020-01-01 RX ADMIN — Medication 1 AMPULE: at 20:34

## 2020-01-01 RX ADMIN — NOREPINEPHRINE BITARTRATE 22 MCG/MIN: 1 INJECTION, SOLUTION, CONCENTRATE INTRAVENOUS at 20:27

## 2020-01-01 RX ADMIN — NOREPINEPHRINE BITARTRATE 100 MCG/MIN: 1 INJECTION, SOLUTION, CONCENTRATE INTRAVENOUS at 23:30

## 2020-01-01 RX ADMIN — ENOXAPARIN SODIUM 40 MG: 100 INJECTION SUBCUTANEOUS at 23:38

## 2020-01-01 RX ADMIN — ENOXAPARIN SODIUM 40 MG: 100 INJECTION SUBCUTANEOUS at 22:26

## 2020-01-01 RX ADMIN — LEVOTHYROXINE SODIUM 50 MCG: 0.03 TABLET ORAL at 06:41

## 2020-01-01 RX ADMIN — IVABRADINE 5 MG: 5 TABLET, FILM COATED ORAL at 17:45

## 2020-01-01 RX ADMIN — OXYCODONE HYDROCHLORIDE AND ACETAMINOPHEN 500 MG: 500 TABLET ORAL at 18:15

## 2020-01-01 RX ADMIN — CHLORHEXIDINE GLUCONATE 15 ML: 0.12 RINSE ORAL at 08:39

## 2020-01-01 RX ADMIN — ENOXAPARIN SODIUM 40 MG: 100 INJECTION SUBCUTANEOUS at 23:16

## 2020-01-01 RX ADMIN — SODIUM CHLORIDE 10 ML: 9 INJECTION, SOLUTION INTRAMUSCULAR; INTRAVENOUS; SUBCUTANEOUS at 05:38

## 2020-01-01 RX ADMIN — DEXAMETHASONE SODIUM PHOSPHATE 6 MG: 4 INJECTION, SOLUTION INTRAMUSCULAR; INTRAVENOUS at 22:12

## 2020-01-01 RX ADMIN — SODIUM CHLORIDE 10 ML: 9 INJECTION, SOLUTION INTRAMUSCULAR; INTRAVENOUS; SUBCUTANEOUS at 05:28

## 2020-01-01 RX ADMIN — Medication 10 ML: at 22:17

## 2020-01-01 RX ADMIN — CEFEPIME 2 G: 2 INJECTION, POWDER, FOR SOLUTION INTRAVENOUS at 02:58

## 2020-01-01 RX ADMIN — ENOXAPARIN SODIUM 40 MG: 100 INJECTION SUBCUTANEOUS at 11:27

## 2020-01-01 RX ADMIN — AZITHROMYCIN 500 MG: 500 INJECTION, POWDER, LYOPHILIZED, FOR SOLUTION INTRAVENOUS at 22:54

## 2020-01-01 RX ADMIN — CHLORHEXIDINE GLUCONATE 15 ML: 0.12 RINSE ORAL at 20:40

## 2020-01-01 RX ADMIN — ENOXAPARIN SODIUM 40 MG: 100 INJECTION SUBCUTANEOUS at 23:46

## 2020-01-01 RX ADMIN — FUROSEMIDE 20 MG: 20 TABLET ORAL at 09:08

## 2020-01-01 RX ADMIN — CEFEPIME 2 G: 2 INJECTION, POWDER, FOR SOLUTION INTRAVENOUS at 18:03

## 2020-01-01 RX ADMIN — SODIUM CHLORIDE 40 MG: 9 INJECTION INTRAMUSCULAR; INTRAVENOUS; SUBCUTANEOUS at 09:58

## 2020-01-01 RX ADMIN — PANTOPRAZOLE SODIUM 40 MG: 40 TABLET, DELAYED RELEASE ORAL at 06:39

## 2020-01-01 RX ADMIN — Medication 10 ML: at 21:38

## 2020-01-01 RX ADMIN — IVABRADINE 5 MG: 5 TABLET, FILM COATED ORAL at 09:01

## 2020-01-01 RX ADMIN — CASTOR OIL AND BALSAM, PERU: 788; 87 OINTMENT TOPICAL at 08:03

## 2020-01-01 RX ADMIN — DOCOSANOL 10 % TOPICAL CREAM: at 17:42

## 2020-01-01 RX ADMIN — ENOXAPARIN SODIUM 90 MG: 100 INJECTION SUBCUTANEOUS at 00:02

## 2020-01-01 RX ADMIN — SODIUM CHLORIDE 30 ML: 9 INJECTION, SOLUTION INTRAMUSCULAR; INTRAVENOUS; SUBCUTANEOUS at 13:29

## 2020-01-01 RX ADMIN — DOCOSANOL 10 % TOPICAL CREAM: at 11:24

## 2020-01-01 RX ADMIN — HEPARIN SODIUM 5000 UNITS: 5000 INJECTION INTRAVENOUS; SUBCUTANEOUS at 11:52

## 2020-01-01 RX ADMIN — ENOXAPARIN SODIUM 40 MG: 100 INJECTION SUBCUTANEOUS at 13:31

## 2020-01-01 RX ADMIN — Medication 10 ML: at 06:10

## 2020-01-01 RX ADMIN — INSULIN LISPRO 2 UNITS: 100 INJECTION, SOLUTION INTRAVENOUS; SUBCUTANEOUS at 17:24

## 2020-01-01 RX ADMIN — ENOXAPARIN SODIUM 40 MG: 40 INJECTION SUBCUTANEOUS at 08:32

## 2020-01-01 RX ADMIN — INSULIN LISPRO 2 UNITS: 100 INJECTION, SOLUTION INTRAVENOUS; SUBCUTANEOUS at 18:00

## 2020-01-01 RX ADMIN — MIDAZOLAM HYDROCHLORIDE 2 MG/HR: 5 INJECTION, SOLUTION INTRAMUSCULAR; INTRAVENOUS at 00:25

## 2020-01-01 RX ADMIN — MEROPENEM 500 MG: 500 INJECTION, POWDER, FOR SOLUTION INTRAVENOUS at 08:32

## 2020-01-01 RX ADMIN — MIDAZOLAM HYDROCHLORIDE 7 MG/HR: 5 INJECTION, SOLUTION INTRAMUSCULAR; INTRAVENOUS at 10:43

## 2020-01-01 RX ADMIN — Medication 1 MG: at 21:37

## 2020-01-01 RX ADMIN — CEFEPIME 2 G: 2 INJECTION, POWDER, FOR SOLUTION INTRAVENOUS at 17:52

## 2020-01-01 RX ADMIN — VANCOMYCIN HYDROCHLORIDE 1250 MG: 10 INJECTION, POWDER, LYOPHILIZED, FOR SOLUTION INTRAVENOUS at 04:03

## 2020-01-01 RX ADMIN — Medication 1 MG: at 21:11

## 2020-01-01 RX ADMIN — CEFEPIME 2 G: 2 INJECTION, POWDER, FOR SOLUTION INTRAVENOUS at 18:13

## 2020-01-01 RX ADMIN — OXYCODONE HYDROCHLORIDE AND ACETAMINOPHEN 500 MG: 500 TABLET ORAL at 09:08

## 2020-01-01 RX ADMIN — Medication 1 MG: at 20:24

## 2020-01-01 RX ADMIN — CHLORHEXIDINE GLUCONATE 15 ML: 0.12 RINSE ORAL at 20:06

## 2020-01-01 RX ADMIN — ZINC SULFATE 220 MG (50 MG) CAPSULE 1 CAPSULE: CAPSULE at 09:20

## 2020-01-01 RX ADMIN — ACETAMINOPHEN ORAL SOLUTION 1000 MG: 650 SOLUTION ORAL at 18:00

## 2020-01-01 RX ADMIN — INSULIN LISPRO 2 UNITS: 100 INJECTION, SOLUTION INTRAVENOUS; SUBCUTANEOUS at 00:05

## 2020-01-01 RX ADMIN — OXYBUTYNIN CHLORIDE 5 MG: 5 TABLET ORAL at 08:54

## 2020-01-01 RX ADMIN — FAMOTIDINE 20 MG: 20 TABLET, FILM COATED ORAL at 08:02

## 2020-01-01 RX ADMIN — CASTOR OIL AND BALSAM, PERU: 788; 87 OINTMENT TOPICAL at 18:30

## 2020-01-01 RX ADMIN — ENOXAPARIN SODIUM 90 MG: 100 INJECTION SUBCUTANEOUS at 10:37

## 2020-01-01 RX ADMIN — SODIUM CHLORIDE 250 ML: 9 INJECTION, SOLUTION INTRAVENOUS at 11:55

## 2020-01-01 RX ADMIN — SODIUM CHLORIDE 50 ML/HR: 900 INJECTION, SOLUTION INTRAVENOUS at 03:20

## 2020-01-01 RX ADMIN — Medication 10 ML: at 21:16

## 2020-01-01 RX ADMIN — DOCOSANOL 10 % TOPICAL CREAM: at 15:24

## 2020-01-01 RX ADMIN — SACUBITRIL AND VALSARTAN 1 TABLET: 24; 26 TABLET, FILM COATED ORAL at 09:02

## 2020-01-01 RX ADMIN — Medication 10 ML: at 13:50

## 2020-01-01 RX ADMIN — OXYCODONE HYDROCHLORIDE AND ACETAMINOPHEN 500 MG: 500 TABLET ORAL at 17:09

## 2020-01-01 RX ADMIN — IOPAMIDOL 100 ML: 755 INJECTION, SOLUTION INTRAVENOUS at 20:06

## 2020-01-01 RX ADMIN — Medication 1 MG: at 20:42

## 2020-01-01 RX ADMIN — Medication 10 ML: at 21:23

## 2020-01-01 RX ADMIN — LEVOTHYROXINE SODIUM 50 MCG: 0.03 TABLET ORAL at 06:39

## 2020-01-01 RX ADMIN — DOCOSANOL 10 % TOPICAL CREAM: at 13:18

## 2020-01-01 RX ADMIN — HEPARIN SODIUM 5000 UNITS: 5000 INJECTION INTRAVENOUS; SUBCUTANEOUS at 12:13

## 2020-01-01 RX ADMIN — ENOXAPARIN SODIUM 40 MG: 100 INJECTION SUBCUTANEOUS at 04:04

## 2020-01-01 RX ADMIN — Medication 10 ML: at 23:24

## 2020-01-01 RX ADMIN — SODIUM CHLORIDE 10 ML: 9 INJECTION, SOLUTION INTRAMUSCULAR; INTRAVENOUS; SUBCUTANEOUS at 05:45

## 2020-01-01 RX ADMIN — Medication 1 TABLET: at 09:20

## 2020-01-01 RX ADMIN — DOCOSANOL 10 % TOPICAL CREAM: at 10:01

## 2020-01-01 RX ADMIN — VANCOMYCIN HYDROCHLORIDE 1250 MG: 10 INJECTION, POWDER, LYOPHILIZED, FOR SOLUTION INTRAVENOUS at 20:33

## 2020-01-01 RX ADMIN — ENOXAPARIN SODIUM 40 MG: 100 INJECTION SUBCUTANEOUS at 13:16

## 2020-01-01 RX ADMIN — SODIUM CHLORIDE 40 MG: 9 INJECTION INTRAMUSCULAR; INTRAVENOUS; SUBCUTANEOUS at 20:41

## 2020-01-01 RX ADMIN — DOCOSANOL 10 % TOPICAL CREAM: at 06:04

## 2020-01-01 RX ADMIN — Medication 1 TABLET: at 08:29

## 2020-01-01 RX ADMIN — Medication 50 MCG/HR: at 19:47

## 2020-01-01 RX ADMIN — DOCOSANOL 10 % TOPICAL CREAM: at 18:03

## 2020-01-01 RX ADMIN — Medication 10 ML: at 05:07

## 2020-01-01 RX ADMIN — Medication 1 AMPULE: at 08:31

## 2020-01-01 RX ADMIN — CEFEPIME 2 G: 2 INJECTION, POWDER, FOR SOLUTION INTRAVENOUS at 09:22

## 2020-01-01 RX ADMIN — ENOXAPARIN SODIUM 40 MG: 100 INJECTION SUBCUTANEOUS at 13:03

## 2020-01-01 RX ADMIN — GUAIFENESIN AND DEXTROMETHORPHAN 10 ML: 100; 10 SYRUP ORAL at 13:33

## 2020-01-01 RX ADMIN — Medication 10 ML: at 15:32

## 2020-01-01 RX ADMIN — SODIUM CHLORIDE 10 ML: 9 INJECTION, SOLUTION INTRAMUSCULAR; INTRAVENOUS; SUBCUTANEOUS at 13:15

## 2020-01-01 RX ADMIN — NOREPINEPHRINE BITARTRATE 78 MCG/MIN: 1 INJECTION, SOLUTION, CONCENTRATE INTRAVENOUS at 03:33

## 2020-01-01 RX ADMIN — DEXAMETHASONE 6 MG: 4 TABLET ORAL at 22:45

## 2020-01-01 RX ADMIN — Medication 1 MG: at 22:26

## 2020-01-01 RX ADMIN — POLYETHYLENE GLYCOL 3350 17 G: 17 POWDER, FOR SOLUTION ORAL at 08:31

## 2020-01-01 RX ADMIN — IVABRADINE 5 MG: 5 TABLET, FILM COATED ORAL at 09:41

## 2020-01-01 RX ADMIN — SUCCINYLCHOLINE CHLORIDE 100 MG: 20 INJECTION, SOLUTION INTRAMUSCULAR; INTRAVENOUS at 17:55

## 2020-01-01 RX ADMIN — SODIUM CHLORIDE 1.5 ML: 9 INJECTION INTRAMUSCULAR; INTRAVENOUS; SUBCUTANEOUS at 09:01

## 2020-01-01 RX ADMIN — Medication 10 ML: at 21:10

## 2020-01-01 RX ADMIN — Medication 10 ML: at 07:38

## 2020-01-01 RX ADMIN — DEXAMETHASONE SODIUM PHOSPHATE 10 MG: 10 INJECTION INTRAMUSCULAR; INTRAVENOUS at 08:24

## 2020-01-01 RX ADMIN — POLYETHYLENE GLYCOL 3350 17 G: 17 POWDER, FOR SOLUTION ORAL at 08:04

## 2020-01-01 RX ADMIN — SODIUM CHLORIDE 75 ML/HR: 900 INJECTION, SOLUTION INTRAVENOUS at 15:58

## 2020-01-01 RX ADMIN — Medication 125 MCG/HR: at 10:17

## 2020-01-01 RX ADMIN — INSULIN GLARGINE 15 UNITS: 100 INJECTION, SOLUTION SUBCUTANEOUS at 11:29

## 2020-01-01 RX ADMIN — CEFEPIME 2 G: 2 INJECTION, POWDER, FOR SOLUTION INTRAVENOUS at 09:43

## 2020-01-01 RX ADMIN — OXYCODONE HYDROCHLORIDE AND ACETAMINOPHEN 500 MG: 500 TABLET ORAL at 17:51

## 2020-01-01 RX ADMIN — ETOMIDATE 30 MG: 40 INJECTION, SOLUTION INTRAVENOUS at 17:55

## 2020-01-01 RX ADMIN — SODIUM CHLORIDE 10 ML: 9 INJECTION, SOLUTION INTRAMUSCULAR; INTRAVENOUS; SUBCUTANEOUS at 22:15

## 2020-01-01 RX ADMIN — IVABRADINE 5 MG: 5 TABLET, FILM COATED ORAL at 19:02

## 2020-01-01 RX ADMIN — HEPARIN SODIUM 5000 UNITS: 5000 INJECTION INTRAVENOUS; SUBCUTANEOUS at 06:36

## 2020-01-01 RX ADMIN — ACETAMINOPHEN 650 MG: 325 TABLET ORAL at 20:56

## 2020-01-01 RX ADMIN — Medication 10 ML: at 15:51

## 2020-01-01 RX ADMIN — LEVOTHYROXINE SODIUM 50 MCG: 0.03 TABLET ORAL at 05:12

## 2020-01-01 RX ADMIN — DOCOSANOL 10 % TOPICAL CREAM: at 10:00

## 2020-01-01 RX ADMIN — OXYCODONE HYDROCHLORIDE AND ACETAMINOPHEN 500 MG: 500 TABLET ORAL at 08:36

## 2020-01-01 RX ADMIN — Medication 10 ML: at 06:30

## 2020-01-01 RX ADMIN — IVABRADINE 5 MG: 5 TABLET, FILM COATED ORAL at 05:28

## 2020-01-01 RX ADMIN — ENOXAPARIN SODIUM 40 MG: 40 INJECTION SUBCUTANEOUS at 08:24

## 2020-01-01 RX ADMIN — DEXMEDETOMIDINE 0.6 MCG/KG/HR: 100 INJECTION, SOLUTION, CONCENTRATE INTRAVENOUS at 10:23

## 2020-01-01 RX ADMIN — Medication 10 ML: at 10:02

## 2020-01-01 RX ADMIN — VASOPRESSIN 0.03 UNITS/MIN: 20 INJECTION INTRAVENOUS at 09:00

## 2020-01-01 RX ADMIN — DOCOSANOL 10 % TOPICAL CREAM: at 18:15

## 2020-01-01 RX ADMIN — DOCOSANOL 10 % TOPICAL CREAM: at 10:58

## 2020-01-01 RX ADMIN — VANCOMYCIN HYDROCHLORIDE 1000 MG: 1 INJECTION, POWDER, LYOPHILIZED, FOR SOLUTION INTRAVENOUS at 10:33

## 2020-01-01 RX ADMIN — METOPROLOL SUCCINATE 25 MG: 25 TABLET, EXTENDED RELEASE ORAL at 08:59

## 2020-01-01 RX ADMIN — Medication 10 ML: at 14:13

## 2020-01-01 RX ADMIN — VASOPRESSIN 0.03 UNITS/MIN: 20 INJECTION INTRAVENOUS at 08:32

## 2020-01-01 RX ADMIN — CASTOR OIL AND BALSAM, PERU: 788; 87 OINTMENT TOPICAL at 17:24

## 2020-01-01 RX ADMIN — ALBUTEROL SULFATE 2 PUFF: 90 AEROSOL, METERED RESPIRATORY (INHALATION) at 04:00

## 2020-01-01 RX ADMIN — DOCOSANOL 10 % TOPICAL CREAM: at 06:08

## 2020-01-01 RX ADMIN — BENZONATATE 100 MG: 100 CAPSULE ORAL at 14:44

## 2020-01-01 RX ADMIN — Medication 50 MCG/HR: at 22:53

## 2020-01-01 RX ADMIN — Medication 40 ML: at 14:37

## 2020-01-01 RX ADMIN — ENOXAPARIN SODIUM 40 MG: 100 INJECTION SUBCUTANEOUS at 00:17

## 2020-01-01 RX ADMIN — OXYCODONE HYDROCHLORIDE AND ACETAMINOPHEN 500 MG: 500 TABLET ORAL at 22:45

## 2020-01-01 RX ADMIN — MEROPENEM 500 MG: 500 INJECTION, POWDER, FOR SOLUTION INTRAVENOUS at 07:56

## 2020-01-01 RX ADMIN — SODIUM CHLORIDE 40 MG: 9 INJECTION INTRAMUSCULAR; INTRAVENOUS; SUBCUTANEOUS at 21:28

## 2020-01-01 RX ADMIN — OXYCODONE HYDROCHLORIDE AND ACETAMINOPHEN 500 MG: 500 TABLET ORAL at 08:37

## 2020-01-01 RX ADMIN — HEPARIN SODIUM 5000 UNITS: 5000 INJECTION INTRAVENOUS; SUBCUTANEOUS at 19:59

## 2020-01-01 RX ADMIN — ONDANSETRON 4 MG: 2 INJECTION INTRAMUSCULAR; INTRAVENOUS at 06:04

## 2020-01-01 RX ADMIN — AZITHROMYCIN 500 MG: 500 INJECTION, POWDER, LYOPHILIZED, FOR SOLUTION INTRAVENOUS at 12:48

## 2020-01-01 RX ADMIN — LEVOTHYROXINE SODIUM 50 MCG: 0.03 TABLET ORAL at 06:45

## 2020-01-01 RX ADMIN — OXYCODONE HYDROCHLORIDE AND ACETAMINOPHEN 500 MG: 500 TABLET ORAL at 09:40

## 2020-01-01 RX ADMIN — HEPARIN SODIUM 5000 UNITS: 5000 INJECTION INTRAVENOUS; SUBCUTANEOUS at 03:06

## 2020-01-01 RX ADMIN — SODIUM CHLORIDE 40 MG: 9 INJECTION INTRAMUSCULAR; INTRAVENOUS; SUBCUTANEOUS at 08:36

## 2020-01-01 RX ADMIN — HEPARIN SODIUM 5000 UNITS: 5000 INJECTION INTRAVENOUS; SUBCUTANEOUS at 21:04

## 2020-01-01 RX ADMIN — DOCOSANOL 10 % TOPICAL CREAM: at 15:44

## 2020-01-01 RX ADMIN — PANTOPRAZOLE SODIUM 40 MG: 40 TABLET, DELAYED RELEASE ORAL at 06:32

## 2020-01-01 RX ADMIN — Medication 125 MCG/HR: at 23:03

## 2020-01-01 RX ADMIN — MEROPENEM 500 MG: 500 INJECTION, POWDER, FOR SOLUTION INTRAVENOUS at 19:33

## 2020-01-01 RX ADMIN — MEROPENEM 500 MG: 500 INJECTION, POWDER, FOR SOLUTION INTRAVENOUS at 20:06

## 2020-01-01 RX ADMIN — Medication 10 ML: at 07:21

## 2020-01-01 RX ADMIN — RISPERIDONE 1 MG: 1 TABLET ORAL at 22:55

## 2020-01-01 RX ADMIN — OXYCODONE HYDROCHLORIDE AND ACETAMINOPHEN 500 MG: 500 TABLET ORAL at 18:23

## 2020-01-01 RX ADMIN — Medication 1 AMPULE: at 21:04

## 2020-01-01 RX ADMIN — DEXMEDETOMIDINE 0.4 MCG/KG/HR: 100 INJECTION, SOLUTION, CONCENTRATE INTRAVENOUS at 18:24

## 2020-01-01 RX ADMIN — HEPARIN SODIUM 8 UNITS/KG/HR: 10000 INJECTION, SOLUTION INTRAVENOUS at 17:28

## 2020-01-01 RX ADMIN — Medication 125 MCG/HR: at 13:38

## 2020-01-01 RX ADMIN — DOCOSANOL 10 % TOPICAL CREAM: at 19:25

## 2020-01-01 RX ADMIN — FAMOTIDINE 20 MG: 10 INJECTION INTRAVENOUS at 08:34

## 2020-01-01 RX ADMIN — DOCOSANOL 10 % TOPICAL CREAM: at 06:58

## 2020-01-01 RX ADMIN — ENOXAPARIN SODIUM 40 MG: 100 INJECTION SUBCUTANEOUS at 23:57

## 2020-11-02 NOTE — ED PROVIDER NOTES
EMERGENCY DEPARTMENT HISTORY AND PHYSICAL EXAM 
 
 
Date: 11/2/2020 Patient Name: Fletcher Saenz History of Presenting Illness Chief Complaint Patient presents with  Fever History Provided By: Patient HPI: Fletcher Saenz, 47 y.o. female with PMHx as noted below presents emergency department for evaluation of left eye redness. Patient's caregiver noted her eyes to be red this afternoon after coming home from her adult  so brought her here for evaluation. History is limited as patient is nonverbal.  Otherwise caregiver notes she has been at her baseline and her but no other concerns. She has had no vomiting, diarrhea, change in urine output, altered mental status. PCP: Diego, MD [place] 
 
Facility-Administered Medications Ordered in Other Encounters Medication Dose Route Frequency Provider Last Rate Last Dose  enoxaparin (LOVENOX) injection 40 mg  40 mg SubCUTAneous Q12H Selina Eden MD      
 acetaminophen (TYLENOL) tablet 650 mg  650 mg Oral Q6H PRN Meek WFFLVSEAN WOOD MD      
 Or  
 acetaminophen (TYLENOL) suppository 650 mg  650 mg Rectal Q6H PRN STAN Eden MD      
 albuterol (PROVENTIL HFA, VENTOLIN HFA, PROAIR HFA) inhaler 2 Puff  2 Puff Inhalation Q4H PRN Harriet Eden MD   2 Puff at 11/08/20 2209  ascorbic acid (vitamin C) (VITAMIN C) tablet 500 mg  500 mg Oral BID Perla Crystal MD   Stopped at 11/09/20 0900  cholecalciferol (VITAMIN D3) (1000 Units /25 mcg) tablet 1 Tab  1,000 Units Oral DAILY Perla Crystal MD   Stopped at 11/09/20 0900  levothyroxine (SYNTHROID) tablet 50 mcg  50 mcg Oral TAMMY Eden IQNXCHIP WOOD MD   Stopped at 11/09/20 0730  
 risperiDONE (RisperDAL) tablet 1 mg  1 mg Oral QHS Perla Crystal MD   Stopped at 11/08/20 2200  pantoprazole (PROTONIX) tablet 40 mg  40 mg Oral Harriet Arthur MD   Stopped at 11/09/20 0730  
 dexamethasone (PF) (DECADRON) 10 mg/mL injection 6 mg  6 mg IntraVENous Q24H Meek ORSUDQH MD [place]   6 mg at 11/08/20 1749  oxybutynin chloride XL (DITROPAN XL) tablet 10 mg  10 mg Oral DAILY Cal Kathleen MD   Stopped at 11/09/20 0900  zinc sulfate (ZINCATE) 220 (50) mg capsule 1 Cap  1 Cap Oral DAILY Kolton Eden MD   Stopped at 11/09/20 0900  remdesivir 100 mg in 0.9% sodium chloride 250 mL IVPB  100 mg IntraVENous Q24H EPIFANIO Eden MD   100 mg at 11/09/20 1122 Past History Past Medical History: 
Past Medical History:  
Diagnosis Date  Endocrine disease  Hypothyroid 03/11/2018  Ill-defined condition Down's Syndrome Past Surgical History: 
History reviewed. No pertinent surgical history. Family History: 
History reviewed. No pertinent family history. Social History: 
Social History Tobacco Use  Smoking status: Never Smoker  Smokeless tobacco: Never Used Substance Use Topics  Alcohol use: No  
 Drug use: No  
 
 
Allergies: 
No Known Allergies Review of Systems Review of Systems Unable to perform ROS: Patient nonverbal  
 
 
Physical Exam  
Physical Exam 
 
GENERAL: alert and oriented, no acute distress EYES: PEERL, No injection, discharge or icterus. ENT: Mucous membranes pink and moist. 
NECK: Supple LUNGS: Airway patent. Non-labored respirations. Breath sounds clear with good air entry bilaterally. HEART: Tachycardic, regular rhythm. No peripheral edema ABDOMEN: Non-distended and non-tender, without guarding or rebound. SKIN:  warm, dry MSK/EXTREMITIES: Without swelling, tenderness or deformity, symmetric with normal ROM 
NEUROLOGICAL: Alert, oriented Diagnostic Study Results Labs - Recent Results (from the past 12 hour(s)) METABOLIC PANEL, COMPREHENSIVE Collection Time: 11/09/20  4:43 AM  
Result Value Ref Range Sodium 146 (H) 136 - 145 mmol/L Potassium 4.6 3.5 - 5.1 mmol/L Chloride 115 (H) 97 - 108 mmol/L  
 CO2 26 21 - 32 mmol/L  Anion gap 5 5 - 15 mmol/L  
 Glucose 137 (H) 65 - 100 mg/dL BUN 26 (H) 6 - 20 MG/DL Creatinine 1.05 (H) 0.55 - 1.02 MG/DL  
 BUN/Creatinine ratio 25 (H) 12 - 20 GFR est AA >60 >60 ml/min/1.73m2 GFR est non-AA 59 (L) >60 ml/min/1.73m2 Calcium 8.3 (L) 8.5 - 10.1 MG/DL Bilirubin, total 0.3 0.2 - 1.0 MG/DL  
 ALT (SGPT) 30 12 - 78 U/L  
 AST (SGOT) 45 (H) 15 - 37 U/L Alk. phosphatase 44 (L) 45 - 117 U/L Protein, total 6.2 (L) 6.4 - 8.2 g/dL Albumin 2.3 (L) 3.5 - 5.0 g/dL Globulin 3.9 2.0 - 4.0 g/dL A-G Ratio 0.6 (L) 1.1 - 2.2    
CBC WITH AUTOMATED DIFF Collection Time: 11/09/20  4:43 AM  
Result Value Ref Range WBC 21.4 (H) 3.6 - 11.0 K/uL  
 RBC 4.29 3.80 - 5.20 M/uL  
 HGB 11.4 (L) 11.5 - 16.0 g/dL HCT 33.2 (L) 35.0 - 47.0 % MCV 77.4 (L) 80.0 - 99.0 FL  
 MCH 26.6 26.0 - 34.0 PG  
 MCHC 34.3 30.0 - 36.5 g/dL  
 RDW 13.6 11.5 - 14.5 % PLATELET 989 210 - 889 K/uL MPV 11.2 8.9 - 12.9 FL  
 NRBC 0.0 0  WBC ABSOLUTE NRBC 0.00 0.00 - 0.01 K/uL NEUTROPHILS 90 (H) 32 - 75 % LYMPHOCYTES 9 (L) 12 - 49 % MONOCYTES 1 (L) 5 - 13 % EOSINOPHILS 0 0 - 7 % BASOPHILS 0 0 - 1 % IMMATURE GRANULOCYTES 0 %  
 ABS. NEUTROPHILS 19.3 (H) 1.8 - 8.0 K/UL  
 ABS. LYMPHOCYTES 1.9 0.8 - 3.5 K/UL  
 ABS. MONOCYTES 0.2 0.0 - 1.0 K/UL  
 ABS. EOSINOPHILS 0.0 0.0 - 0.4 K/UL  
 ABS. BASOPHILS 0.0 0.0 - 0.1 K/UL  
 ABS. IMM. GRANS. 0.0 K/UL  
 DF MANUAL    
 RBC COMMENTS MICROCYTOSIS 
1+ WBC COMMENTS ATYPICAL LYMPHOCYTES PRESENT    
PROTHROMBIN TIME + INR Collection Time: 11/09/20  4:43 AM  
Result Value Ref Range INR 1.1 0.9 - 1.1 Prothrombin time 11.8 (H) 9.0 - 11.1 sec FIBRINOGEN Collection Time: 11/09/20  4:43 AM  
Result Value Ref Range Fibrinogen 673 (H) 200 - 475 mg/dL LD Collection Time: 11/09/20  4:43 AM  
Result Value Ref Range  (H) 81 - 246 U/L FERRITIN Collection Time: 11/09/20  4:43 AM  
Result Value Ref Range Ferritin 1,234 (H) 8 - 252 NG/ML  
D DIMER Collection Time: 11/09/20  4:43 AM  
Result Value Ref Range D-dimer 8.76 (H) 0.00 - 0.65 mg/L FEU  
PROCALCITONIN Collection Time: 11/09/20  5:30 AM  
Result Value Ref Range Procalcitonin 0.05 ng/mL Radiologic Studies -  
CT NECK SOFT TISSUE W CONT Final Result IMPRESSION: No significant abnormalities. [facility] CT ABD PELV W CONT Final Result IMPRESSION:  
  
1. Scattered patchy areas of airspace disease in the lungs bilaterally, greater  
on the left. Nonspecific distribution. 2. Moderately large hiatus hernia. Distended fluid-filled esophagus. 3. No acute findings in the abdomen or pelvis. CT CHEST W CONT Final Result IMPRESSION:  
  
1. Scattered patchy areas of airspace disease in the lungs bilaterally, greater  
on the left. Nonspecific distribution. 2. Moderately large hiatus hernia. Distended fluid-filled esophagus. 3. No acute findings in the abdomen or pelvis. XR CHEST PORT Final Result  
 impression: Limited inspiration no infiltrate. CT Results  (Last 48 hours) None CXR Results  (Last 48 hours) 11/08/20 0927  XR CHEST PORT Final result Impression:  IMPRESSION: Small left pleural effusion and left basilar atelectasis. Left upper  
lung increased interstitial markings. Narrative:  INDICATION: Hypoxia. Shortness of breath. Portable AP semiupright view of the chest.  
   
Direct comparison made to prior chest x-ray dated November 6, 2020. Cardiomediastinal silhouette is stable. There is a small left pleural effusion  
and mild left basilar atelectasis. There are increased interstitial markings in  
the left upper lung. Right lung is grossly clear. No right pleural fluid is  
visualized. There is no pneumothorax. Medical Decision Making Nirmala Encarnacion MD am the first provider for this patient and am the attending of record for this patient encounter. I reviewed the vital signs, available nursing notes, past medical history, past surgical history, family history and social history. Vital Signs-Reviewed the patient's vital signs. No data found. Pulse Oximetry Analysis - 100% on RA Cardiac Monitor:  
Rate: 136 bpm 
Rhythm: Sinus tachycardia The cardiac monitor was ordered secondary to possible sepsis 
and to monitor the patient for dysrhythmia EKG interpretation: (Preliminary) Rhythm: Sinus tachycardia with frequent PVCs in a bigeminy pattern. Rate (approx.):120; Axis: normal; P wave: normal; QRS interval: normal ; ST/T wave: Nonspecific changes;  was interpreted by Yuri Chávez MD,ED Provider. Records Reviewed: Nursing Notes and Old Medical Records Provider Notes (Medical Decision Making): This is a 53-year-old female presented the emergency department with fever and conjunctival injection. On arrival she is tachycardic. History is limited secondary to her nonverbal status. Differential diagnosis was broad and included sepsis, severe sepsis, COVID-19, pneumonia, dehydration, electrolyte abnormalities, metabolic abnormalities. Patient at baseline mental status so meningitis felt less likely. I did consider pharyngeal pathology as she is not swallowing so obtain CT scan which showed no pathology in the neck. Findings most consistent with pneumonia, concern for possible COVID-19. Patient was started on antibiotics and given IV fluids and antipyretics with improvement in the patient's tachycardia. At this time, the patient is breathing comfortably maintaining adequate oxygen saturations on room air.   Given her underlying medical conditions I did discuss admission to the hospital with the patient's caregiver however she is comfortable with taking the patient home and will return to the emergency department with any worsening in her work of breathing, altered mental status or persistent nausea vomiting. ED Course:  
Initial assessment performed. The patients presenting problems have been discussed, and they are in agreement with the care plan formulated and outlined with them. I have encouraged them to ask questions as they arise throughout their visit. Medications  
acetaminophen (TYLENOL) tablet 1,000 mg (1,000 mg Oral Given 11/2/20 2025)  
sodium chloride 0.9 % bolus infusion 1,000 mL (0 mL IntraVENous IV Completed 11/2/20 1954) iopamidoL (ISOVUE-370) 76 % injection 100 mL (100 mL IntraVENous Given 11/2/20 2006)  
sodium chloride (NS) flush 5-10 mL (10 mL IntraVENous Given 11/2/20 2006) azithromycin (ZITHROMAX) 100 mg/5 mL oral suspension 500 mg (500 mg Oral Given 11/2/20 2203) PROGRESS [place]  results have been reviewed with her caregiver. She has been counseled regarding her diagnosis. She verbally conveys understanding and agreement of the signs, symptoms, diagnosis, treatment and prognosis and additionally agrees to follow up as recommended. She also agrees with the care-plan and conveys that all of her questions have been answered. I have also put together some discharge instructions for her that include: 1) educational information regarding their diagnosis, 2) how to care for their diagnosis at home, as well a 3) list of reasons why they would want to return to the ED prior to their follow-up appointment, should their condition change. Disposition: 
home PLAN: 
1. Discharge Medication List as of 11/2/2020  9:48 PM  
  
START taking these medications Details  
albuterol (PROVENTIL HFA, VENTOLIN HFA, PROAIR HFA) 90 mcg/actuation inhaler Take 2 Puffs by inhalation every four (4) hours as needed for Wheezing., Normal, Disp-1 Inhaler,R-0  
  
zinc sulfate 220 mg tablet Take 1 Tab by mouth daily. , Normal, Disp-7 Tab,R-0  
  
 ascorbic acid, vitamin C, (Vitamin C) 500 mg chew Take 1 Tab by mouth daily for 7 days. , Normal, Disp-7 Tab,R-0  
  
erythromycin (ILOTYCIN) ophthalmic ointment Apply 1 cm ribbon to the affected lower eyelid 4 times daily until symptoms resolve, Normal, Disp-1 g,R-0  
  
azithromycin (ZITHROMAX) 200 mg/5 mL suspension Take 6.3 mL by mouth daily for 4 days. , Normal, Disp-25.2 mL,R-0  
  
  
CONTINUE these medications which have NOT CHANGED Details  
solifenacin (VESICARE) 10 mg tablet TAKE 1 TABLET BY MOUTH EVERY DAY, Historical Med  
  
medroxyPROGESTERone (DEPO-PROVERA) 150 mg/mL injection 150 mg, IM, once, To be administered in clinic by nurse. See order comments for schedule., # 1 vial, 4 Refills, Pharmacy: [facility]/pharmacy #[id number], Historical Med  
  
cholecalciferol (VITAMIN D3) 1,000 unit tablet Take 1,000 Units by mouth daily. , Historical Med  
  
levothyroxine (SYNTHROID) 50 mcg tablet Take  by mouth., Historical Med  
  
risperiDONE (RISPERDAL) 0.5 mg tablet Take 0.5 mg by mouth nightly., Historical Med 2. Follow-up Information Follow up With Specialties Details Why Contact Info [facility] EMERGENCY DEPT Emergency Medicine   [phone] 
[phone] Follow-up with primary care physician in 48 hours Return to ED if worse Diagnosis Clinical Impression: 1. Lower resp. tract infection 2. Fever, unspecified fever cause 3. Conjunctivitis of left eye, unspecified conjunctivitis type Please note that this dictation was completed with Dragon, computer voice recognition software. Quite often unanticipated grammatical, syntax, homophones, and other interpretive errors are inadvertently transcribed by the computer software. Please disregard these errors. Additionally, please excuse any errors that have escaped final proofreading.

## 2020-11-02 NOTE — LETTER
[facility] EMERGENCY DEPT 
[address] [address] 
[phone] Work/School Note Date: 11/2/2020 To Whom It May concern: 
 
Emiliano Homans was evaulated by the following provider(s): 
Attending Provider: Nain Livingston MD.   [address] virus is suspected. Per the CDC guidelines we recommend home isolation until the following conditions are all met: 1. At least 10 days have passed since symptoms first appeared and 2. At least 24 hours have passed since last fever without the use of fever-reducing medications and 
3. Symptoms (e.g., cough, shortness of breath) have improved Sincerely, 
 
 
 
 
Mario Moreno MD

## 2020-11-02 NOTE — ED TRIAGE NOTES
Pt arrived in ED accompanied by family member for complaints of left eye redness noticed at her day support group. Per family member, pt has been acting normally at home. Pt febrile and tachycardic in triage.   Pt non-verbal.

## 2020-11-03 NOTE — ED NOTES
Bedside and Verbal shift change report given to Kat Catalan (oncoming nurse) by Neva Jamil RN (offgoing nurse).  Report included the following information SBAR, Kardex, ED Summary and MAR.

## 2020-11-03 NOTE — ED NOTES
Patient (s) mother given copy of dc instructions and 5 script(s). Patient (s) mother verbalized understanding of instructions and script (s). Patient given a current medication reconciliation form and verbalized understanding of their medications. Patient (s) mother verbalized understanding of the importance of discussing medications with  his or her physician or clinic they will be following up with. Patient alert and oriented and in no acute distress. Patient discharged home ambulatory with mother. Taken to car via wheelchair

## 2020-11-03 NOTE — ED NOTES
Patient arrives in ED with her mother. Pt is non verbal, Downs Syndrome. She went to the adult day care center today without a red left eye and came home with a red left eye. Pt's mother is looking for an answer about this red left eye. The patient was holding food in her mouth when she arrived in the treatment area and took a considerable amount of encouragement to swallow. Pt given water and I cut the Tylenol in half for her, she did swallow it, again with quite a lot of encouragement.

## 2020-11-03 NOTE — DISCHARGE INSTRUCTIONS
Patient Education        Pinkeye: Care Instructions  Your Care Instructions     Pinkeye is redness and swelling of the eye surface and the conjunctiva (the lining of the eyelid and the covering of the white part of the eye). Pinkeye is also called conjunctivitis. Pinkeye is often caused by infection with bacteria or a virus. Dry air, allergies, smoke, and chemicals are other common causes. Pinkeye often clears on its own in 7 to 10 days. Antibiotics only help if the pinkeye is caused by bacteria. Pinkeye caused by infection spreads easily. If an allergy or chemical is causing pinkeye, it will not go away unless you can avoid whatever is causing it. Follow-up care is a key part of your treatment and safety. Be sure to make and go to all appointments, and call your doctor if you are having problems. It's also a good idea to know your test results and keep a list of the medicines you take. How can you care for yourself at home? · Wash your hands often. Always wash them before and after you treat pinkeye or touch your eyes or face. · Use moist cotton or a clean, wet cloth to remove crust. Wipe from the inside corner of the eye to the outside. Use a clean part of the cloth for each wipe. · Put cold or warm wet cloths on your eye a few times a day if the eye hurts. · Do not wear contact lenses or eye makeup until the pinkeye is gone. Throw away any eye makeup you were using when you got pinkeye. Clean your contacts and storage case. If you wear disposable contacts, use a new pair when your eye has cleared and it is safe to wear contacts again. · If the doctor gave you antibiotic ointment or eyedrops, use them as directed. Use the medicine for as long as instructed, even if your eye starts looking better soon. Keep the bottle tip clean, and do not let it touch the eye area. · To put in eyedrops or ointment:  ? Tilt your head back, and pull your lower eyelid down with one finger. ?  Drop or squirt the medicine inside the lower lid. ? Close your eye for 30 to 60 seconds to let the drops or ointment move around. ? Do not touch the ointment or dropper tip to your eyelashes or any other surface. · Do not share towels, pillows, or washcloths while you have pinkeye. When should you call for help? Call your doctor now or seek immediate medical care if:    · You have pain in your eye, not just irritation on the surface.     · You have a change in vision or loss of vision.     · You have an increase in discharge from the eye.     · Your eye has not started to improve or begins to get worse within 48 hours after you start using antibiotics.     · Pinkeye lasts longer than 7 days. Watch closely for changes in your health, and be sure to contact your doctor if you have any problems. Where can you learn more? Go to http://www.gray.com/  Enter Y392 in the search box to learn more about \"Pinkeye: Care Instructions. \"  Current as of: June 26, 2019               Content Version: 12.6  © 4476-8105 [employer]. Care instructions adapted under license by [employer] (which disclaims liability or warranty for this information). If you have questions about a medical condition or this instruction, always ask your healthcare professional. [address] any warranty or liability for your use of this information. Patient Education        Learning About Coronavirus [phone]  Coronavirus [phone]): Overview  What is coronavirus (VGDOK-11)? The coronavirus disease (COVID-19) is caused by a virus. It is an illness that was first found in December 2019. It has since spread worldwide. The virus can cause fever, cough, and trouble breathing. In severe cases, it can cause pneumonia and make it hard to breathe without help. It can cause death. This virus spreads person-to-person through droplets from coughing and sneezing.  It can also spread when you are close to someone who is infected. And it can spread when you touch something that has the virus on it, such as a doorknob or a tabletop. Coronaviruses are a large group of viruses. They cause the common cold. They also cause more serious illnesses like Middle East respiratory syndrome (MERS) and severe acute respiratory syndrome (SARS). COVID-19 is caused by a novel coronavirus. That means it's a new type that has not been seen in people before. How is COVID-19 treated? Mild illness can be treated at home, but more serious illness needs to be treated in the hospital. Treatment may include medicines to reduce symptoms, plus breathing support such as oxygen therapy or a ventilator. Other treatments, such as antiviral medicines, may help people who have COVID-19. What can you do to protect yourself from COVID-19? The best way to protect yourself from getting sick is to:  · Avoid areas where there is an outbreak. · Avoid contact with people who may be infected. · Avoid crowds and try to stay at least 6 feet away from other people. · Wash your hands often, especially after you cough or sneeze. Use soap and water, and scrub for at least 20 seconds. If soap and water aren't available, use an alcohol-based hand . · Avoid touching your mouth, nose, and eyes. What can you do to avoid spreading the virus to others? To help avoid spreading the virus to others:  · Wash your hands often with soap or alcohol-based hand sanitizers. · Cover your mouth with a tissue when you cough or sneeze. Then throw the tissue in the trash. · Use a disinfectant to clean things that you touch often. These include doorknobs, remote controls, phones, and handles on your refrigerator and microwave. And don't forget countertops, tabletops, bathrooms, and computer keyboards. · Wear a cloth face cover if you have to go to public areas. If you know or suspect that you have COVID-19:  · Stay home. Don't go to school, work, or public areas. And don't use public transportation, ride-shares, or taxis unless you have no choice. · Leave your home only if you need to get medical care or testing. But call the doctor's office first so they know you're coming. And wear a face cover. · Limit contact with people in your home. If possible, stay in a separate bedroom and use a separate bathroom. · Wear a face cover whenever you're around other people. It can help stop the spread of the virus when you cough or sneeze. · Clean and disinfect your home every day. Use household  and disinfectant wipes or sprays. Take special care to clean things that you grab with your hands. · Self-isolate until it's safe to be around others again. ? If you have symptoms, it's safe when you haven't had a fever for 3 days and your symptoms have improved and it's been at least 10 days since your symptoms started. ? If you were exposed to the virus but don't have symptoms, it's safe to be around others 14 days after exposure. ? Talk to your doctor about whether you also need testing, especially if you have a weakened immune system. When to call for help  Call 911 anytime you think you may need emergency care. For example, call if:  · You have severe trouble breathing. (You can't talk at all.)  · You have constant chest pain or pressure. · You are severely dizzy or lightheaded. · You are confused or can't think clearly. · Your face and lips have a blue color. · You passed out (lost consciousness) or are very hard to wake up. Call your doctor now if you develop symptoms such as:  · Shortness of breath. · Fever. · Cough. If you need to get care, call ahead to the doctor's office for instructions before you go. Make sure you wear a face cover to prevent exposing other people to the virus. Where can you get the latest information? The following health organizations are tracking and studying this virus. Their websites contain the most up-to-date information.  Andrew Dhillon also learn what to do if you think you may have been exposed to the virus. · U.S. Centers for Disease Control and Prevention (CDC): The CDC provides updated news about the disease and travel advice. The website also tells you how to prevent the spread of infection. www.cdc.gov  · World Health Organization [facility]): WHO offers information about the virus outbreaks. WHO also has travel advice. www.who.int  Current as of: July 10, 2020               Content Version: 12.6  © 2006-2020 [employer]. Care instructions adapted under license by [employer] (which disclaims liability or warranty for this information). If you have questions about a medical condition or this instruction, always ask your healthcare professional. [address] any warranty or liability for your use of this information. Patient Education        Learning About Fever  What is a fever? A fever is a high body temperature. It's one way your body fights being sick. A fever shows that the body is responding to infection or other illnesses, both minor and severe. A fever is a symptom, not an illness by itself. A fever can be a sign that you are ill, but most fevers are not caused by a serious problem. You may have a fever with a minor illness, such as a cold. But sometimes a very serious infection may cause little or no fever. It is important to look at other symptoms, other conditions you have, and how you feel in general. In children, notice how they act and see what symptoms they complain of. What is a normal body temperature? A normal body temperature is about 98. 6ºF. Some people have a normal temperature that is a little higher or a little lower than this. Your temperature may be a little lower in the morning than it is later in the day. It may go up during hot weather or when you exercise, wear heavy clothes, or take a hot bath.   Your temperature may also be different depending on how you take it. A temperature taken in the mouth (oral) or under the arm may be a little lower than your core temperature (rectal). What is a fever temperature? A core temperature of 100.4°F or above is considered a fever. What can cause a fever? A fever may be caused by:  · Infections. This is the most common cause of a fever. Examples of infections that can cause a fever include the flu, a kidney infection, or pneumonia. · Some medicines. · Severe trauma or injury, such as a heart attack, stroke, heatstroke, or burns. · Other medical conditions, such as arthritis and some cancers. How can you treat a fever at home? · Ask your doctor if you can take an over-the-counter pain medicine, such as acetaminophen (Tylenol), ibuprofen (Advil, Motrin), or naproxen (Aleve). Be safe with medicines. Read and follow all instructions on the label. · To prevent dehydration, drink plenty of fluids. Choose water and other caffeine-free clear liquids until you feel better. If you have kidney, heart, or liver disease and have to limit fluids, talk with your doctor before you increase the amount of fluids you drink. Follow-up care is a key part of your treatment and safety. Be sure to make and go to all appointments, and call your doctor if you are having problems. It's also a good idea to know your test results and keep a list of the medicines you take. Where can you learn more? Go to http://www.gray.com/  Enter G732 in the search box to learn more about \"Learning About Fever. \"  Current as of: June 26, 2019               Content Version: 12.6  © 6025-2731 [employer]. Care instructions adapted under license by [employer] (which disclaims liability or warranty for this information).  If you have questions about a medical condition or this instruction, always ask your healthcare professional. [facility] any warranty or liability for your use of this information.

## 2020-11-03 NOTE — ED NOTES
Emergency Department Nursing Plan of Care The Nursing Plan of Care is developed from the Nursing assessment and Emergency Department Attending provider initial evaluation. The plan of care may be reviewed in the ED Provider note. The Plan of Care was developed with the following considerations:  
Patient / Family readiness to learn indicated by:verbalized understanding Persons(s) to be included in education: patient, mom Barriers to Learning/Limitations:No 
 
Signed Rommel Riddle RN   
11/3/2020   5:42 AM

## 2020-11-04 NOTE — PROGRESS NOTES
The patient was called for notification of a POSITIVE test result for COVID-19. The following information was given to the patient:  
   
The COVID-19 test result was positive Mild and stable symptoms are managed at home    
Treatment of coronavirus does not require an antibiotic Remain isolated for 10 days since symptoms first appeared AND at least 3 days have passed after recovery    
Recovery is defined as resolution of fever without the use of fever-reducing medications with progressive improvement or resolution of other symptoms    
Wash hands often with soap and water for at least 20 seconds or alternatively use hand  with at least 60% alcohol content Cover coughs and sneezes Wear a mask when around others if possible Clean all high-touch surfaces every day, such as doorknobs and cellphones Continually monitor symptoms.  Contact your medical provider if symptoms are worsening, such as difficulty breathing

## 2020-11-06 PROBLEM — J12.82 PNEUMONIA DUE TO COVID-19 VIRUS: Status: ACTIVE | Noted: 2020-01-01

## 2020-11-06 PROBLEM — U07.1 PNEUMONIA DUE TO COVID-19 VIRUS: Status: ACTIVE | Noted: 2020-01-01

## 2020-11-06 NOTE — H&P
Hospitalist Admission Note NAME: Twan Morales :  1980 MRN:  [id number] Room Number: TJ32/[id number]  @ [facility]  
 
Date/Time:  2020 5:52 PM 
 
Patient PCP: Keri Stratton MD 
______________________________________________________________________ Given the patient's current clinical presentation, I have a high level of concern for decompensation if discharged from the emergency department. Complex decision making was performed, which includes reviewing the patient's available past medical records, laboratory results, and x-ray films. My assessment of this patient's clinical condition and my plan of care is as follows. Assessment / Plan: Active Problems: 
  Pneumonia due to COVID-19 virus (2020) Sepsis due to COVID-19 PNA Sinus tachycardia d/t sepsis POA COVID test  positive. CT Chest  shows bilateral infiltrates. Patient is febrile, tachycardic, tachypneic. Chest x-ray pending. Follow-up  inflammatory markers and lactic acid Zinc, ascorbic acid, dexamethasone Supplemental oxygen to maintain O2 sats greater than 94% Albuterol as needed for dyspnea Robitussin-DM scheduled. Completed 5-day course of azithromycin as outpatient. Hypothyroidism POA Down's Syndrome POA 
-risperdal 0.5 mg at night 
-Levothyroxine Body mass index is 31.93 kg/m². Code Status: full Surrogate Decision Maker: Mother, Viridiana Suggs, [phone] DVT Prophylaxis: Lovenox GI Prophylaxis: not indicated Subjective: CHIEF COMPLAINT: Shortness of breath HISTORY OF PRESENT ILLNESS:    
Ean Ulloa is a 47 y.o. female with PMH of Hypothyroidism, Down's syndrome who presents to ED with c/o shortness of breath. Patient is accompanied by mother, Viridiana Suggs who is her primary caregiver and provides most of the history. Patient is nonverbal at baseline.   Patient was recently in the ER on 11/2 for left eye redness and was found to be febrile, tachycardic, CT scan with bilateral interstitial infiltrates, Covid test positive. She was at her baseline level of functioning without any shortness of breath or cough at the time and was sent home with prescription for zinc, ascorbic acid, azithromycin, dexamethasone which her mom states she is completed. However for the past few days her general condition has been worsening and she has been having fevers, lethargy, decreased oral intake, cough, shortness of breath, wheezing prompting her mother to bring her to the emergency room. We were asked to admit for work up and evaluation of the above problems. Past Medical History:  
Diagnosis Date  Endocrine disease  Hypothyroid 03/11/2018  Ill-defined condition Down's Syndrome No past surgical history on file. Social History Tobacco Use  Smoking status: Never Smoker  Smokeless tobacco: Never Used Substance Use Topics  Alcohol use: No  
  
 
No family history on file. No Known Allergies Prior to Admission medications Medication Sig Start Date End Date Taking? Authorizing Provider  
solifenacin (VESICARE) 10 mg tablet TAKE 1 TABLET BY MOUTH EVERY DAY 9/18/20   OtherKilo MD  
medroxyPROGESTERone (DEPO-PROVERA) 150 mg/mL injection 150 mg, IM, once, To be administered in clinic by nurse. See order comments for schedule., # 1 vial, 4 Refills, Pharmacy: [facility]/pharmacy #[id number] 12/5/19   Kilo Cortez MD  
albuterol (PROVENTIL HFA, VENTOLIN HFA, PROAIR HFA) 90 mcg/actuation inhaler Take 2 Puffs by inhalation every four (4) hours as needed for Wheezing. 11/2/20   Murray Etienne MD  
zinc sulfate 220 mg tablet Take 1 Tab by mouth daily. 11/2/20   Murray Etienne MD  
ascorbic acid, vitamin C, (Vitamin C) 500 mg chew Take 1 Tab by mouth daily for 7 days.  11/2/20 11/9/20  Murray Etienne MD  
 erythromycin (ILOTYCIN) ophthalmic ointment Apply 1 cm ribbon to the affected lower eyelid 4 times daily until symptoms resolve 11/2/20 11/9/20  Andres Magana MD  
azithromycin (ZITHROMAX) 200 mg/5 mL suspension Take 6.3 mL by mouth daily for 4 days. 11/2/20 11/6/20  Andres Magana MD  
cholecalciferol (VITAMIN D3) 1,000 unit tablet Take 1,000 Units by mouth daily. Kilo Cortez MD  
risperiDONE (RISPERDAL) 0.5 mg tablet Take 0.5 mg by mouth nightly. Kilo Cortez MD  
LEVOTHYROXINE SODIUM (LEVOTHYROXINE PO) Take  by mouth. Kilo Cortez MD  
 
 
REVIEW OF SYSTEMS:    
I am not able to complete the review of systems because: The patient is intubated and sedated The patient has altered mental status due to his acute medical problems The patient has baseline aphasia from prior stroke(s) The patient has baseline dementia and is not reliable historian The patient is in acute medical distress and unable to provide information  
x nonverbal at baseline Total of 12 systems reviewed as follows:   
   POSITIVE= underlined text  Negative = text not underlined General:  fever, chills, sweats, generalized weakness, weight loss/gain,  
   loss of appetite Eyes:    blurred vision, eye pain, loss of vision, double vision ENT:    rhinorrhea, pharyngitis Respiratory:   cough, sputum production, SOB, HALL, wheezing, pleuritic pain  
Cardiology:   chest pain, palpitations, orthopnea, PND, edema, syncope Gastrointestinal:  abdominal pain , N/V, diarrhea, dysphagia, constipation, bleeding Genitourinary:  frequency, urgency, dysuria, hematuria, incontinence Muskuloskeletal :  arthralgia, myalgia, back pain Hematology:  easy bruising, nose or gum bleeding, lymphadenopathy Dermatological: rash, ulceration, pruritis, color change / jaundice Endocrine:   hot flashes or polydipsia Neurological:  headache, dizziness, confusion, focal weakness, paresthesia, 
 Speech difficulties, memory loss, gait difficulty Psychological: Feelings of anxiety, depression, agitation Objective: VITALS:   
Visit Vitals /77 (BP 1 Location: Left arm, BP Patient Position: At rest) Pulse (!) 119 Temp (!) 100.6 °F (38.1 °C) Resp 26 Ht 5' 4\" (1.626 m) Wt 84.4 kg (186 lb) SpO2 93% BMI 31.93 kg/m² PHYSICAL EXAM: 
 
General:    Alert, cooperative, no distress, appears stated age. HEENT: Atraumatic, anicteric sclerae, pink conjunctivae No oral ulcers, mucosa moist, throat clear, dentition fair Neck:  Supple, symmetrical,  thyroid: non tender Lungs:   Decreased at left base, otherwise clear to auscultation with scattered wheezes,  No Rhonchi. No rales. Chest wall:  No tenderness  mild Accessory muscle use. Heart:   Tachycardic, Regular  rhythm,  No  murmur   No edema Abdomen:   Soft, non-tender. Not distended. Bowel sounds normal 
Extremities: No cyanosis. No clubbing,   
  Skin turgor normal, Capillary refill normal, Radial dial pulse 2+ Skin:     Not pale. Not Jaundiced  No rashes Psych:  Poor insight. Not depressed. Not anxious or agitated. Neurologic: EOMs intact. No facial asymmetry. Nonverbal at baseline  Alert 
 
______________________________________________________________________ Care Plan discussed with: Family  Nurse Expected  Disposition:  Home w/Family 
________________________________________________________________________ TOTAL TIME:  40 Minutes Critical Care Provided     Minutes non procedure based Comments  
 x Reviewed previous records  
>50% of visit spent in counseling and coordination of care x Discussion with patient and/or family and questions answered 
  
 
________________________________________________________________________ Signed: Jeannie Trinidad, MD 
 
Procedures: see electronic medical records for all procedures/Xrays and details which were not copied into this note but were reviewed prior to creation of Plan. LAB DATA REVIEWED:   
No results found for this or any previous visit (from the past 24 hour(s)).

## 2020-11-06 NOTE — ED NOTES
Patient brought here by mother with c/o SOB and cough. Patient was seen in the ER 4 days ago, sent home with medications however family reports patient's symptoms did not improve. Patient's family was called and informed of positive covid result during time at home. Patient non-verbal, history provided by family. Emergency Department Nursing Plan of Care The Nursing Plan of Care is developed from the Nursing assessment and Emergency Department Attending provider initial evaluation. The plan of care may be reviewed in the ED Provider note. The Plan of Care was developed with the following considerations:  
Patient / Family readiness to learn indicated by:verbalized understanding Persons(s) to be included in education: patient and family Barriers to Learning/Limitations:Cognitive/physical impairment:speech Signed [address]   
11/6/2020   7:32 PM

## 2020-11-06 NOTE — PROGRESS NOTES
Reason for Admission:   PNA  Due to COVID. RUR Score:   9 % Plan for utilizing home health:      Not at this time. Following for discharge planning. PCP: First and Last name:  Dr. Rob Novak 
 Name of Practice:  [facility] Are you a current patient: Yes/No: Yes Approximate date of last visit:  Week Can you participate in a virtual visit with your PCP:  No. Patient non-verbal  
                 
Current Advanced Directive/Advance Care Plan:  No ACP docs. Patient mother listed as emergency contact. Patient is Down's syndrome nonverbal at baseline. Legal next of kin by law. Transition of Care Plan:      [employer] Measure patient. CM opened case for assessment of D/C planning needs, CM reviewed chart. Pt presents with mother who answers questions for pt. Patient is accompanied by mother, Yamel Fransisco who is her primary caregiver and provides most of the history. Patient is Down's syndrome nonverbal at baseline. Covid test positive. Patient lives in a house with her mother no additional caretakers. No stairs in the home. No DME at home. Per mother patient non-verbal and normally points to things or \"mumbles\" to say what she wants. No MPOA on file for patient. No Pulmonologist. Patient PCP is at [facility] Dr. Rob Novak family in agreement for follow-up. Discuss follow-up appointment and family in agreement. Discuss IM notification with mother and verbalize understand and consent. Copy place in patient chart Plan of care for discharge 1. PCP follow-up with Dr. Mindi Wilks 2. Mother is mode of transportation on discharge 3. Confirm if mother is patient guardian (documentation) [facility] 
[phone]

## 2020-11-06 NOTE — ACP (ADVANCE CARE PLANNING)
Advance Care Planning (ACP) Provider Note - Comprehensive Date of ACP Conversation: 11/06/20 Persons included in Conversation:  POA Length of ACP Conversation in minutes:  16 minutes Authorized Decision Maker (if patient is incapable of making informed decisions): This person is: 
Next of Kin by law (only applies in absence of a Healthcare Power of  or Legal Guardian) True Payment For Serious or Chronic Illness: 
Understanding of medical condition Understanding of CPR, goals and expected outcomes, benefits and burdens discussed. Information on CPR success rates provided (e.g. for CPR in hospital, survival to d/c at two weeks is 22%, for chronically ill or elderly/frail survival is less than 3%); Individual asked to communicate understanding of information in his/her own words. Explored fears and concerns regarding CPR or possible outcomes Interventions Provided: 
Other Treatment or Goals of Care Decisions: Full code status noted (e.g. continue/discontinue dialysis; elect/forgo surgery)

## 2020-11-06 NOTE — ED PROVIDER NOTES
EMERGENCY DEPARTMENT HISTORY AND PHYSICAL EXAM 
 
 
Date: 11/6/2020 Patient Name: Ryan Hinson History of Presenting Illness Chief Complaint Patient presents with  Shortness of Breath History Provided By: Patient HPI: Ryan Hinson, 47 y.o. female with PMHx as noted below presents the emergency department with shortness of breath. History is limited secondary to patient being nonverbal.  Patient's caregiver reports that after being diagnosed with COVID-19 and is continued to have worsening shortness of breath despite using prescribed home medications. She does note intermittent fevers and a cough. Has had no change in mental status. There is been no vomiting or diarrhea. Reportedly has been tolerating p.o. PCP: Other, MD Kilo 
 
Current Facility-Administered Medications Medication Dose Route Frequency Provider Last Rate Last Dose  zinc sulfate (ZINCATE) 220 (50) mg capsule 220 mg  220 mg Oral DAILY Monica Burroughs MD   220 mg at 11/06/20 1800  
 ascorbic acid (vitamin C) (VITAMIN C) tablet 500 mg  500 mg Oral BID Monica Burroughs MD   500 mg at 11/06/20 1800  
 sodium chloride (NS) flush 5-40 mL  5-40 mL IntraVENous Q8H Monica Burroughs MD   10 mL at 11/06/20 1804  sodium chloride (NS) flush 5-40 mL  5-40 mL IntraVENous PRN Monica Burroughs MD      
 acetaminophen (TYLENOL) tablet 650 mg  650 mg Oral Q6H PRN Monica Burroughs MD      
 Or  
[facility] acetaminophen (TYLENOL) suppository 650 mg  650 mg Rectal Q6H PRN Monica Burroughs MD      
 polyethylene glycol (MIRALAX) packet 17 g  17 g Oral DAILY PRN Monica Burroughs MD      
 promethazine (PHENERGAN) tablet 12.5 mg  12.5 mg Oral Q6H PRN Monica Burroughs MD      
 Or  
 [facility]etron [facility]) injection 4 mg  4 mg IntraVENous Q6H PRN Monica Burroughs MD      
[facility] [START ON 11/7/2020] enoxaparin (LOVENOX) injection 40 mg  40 mg SubCUTAneous DAILY Monica Burroughs MD      
  albuterol (PROVENTIL HFA, VENTOLIN HFA, PROAIR HFA) inhaler 2 Puff++KEEP at BEDSIDE++  2 Puff Inhalation Q4H PRN Dmitry Cardoza MD      
[facility] [START ON 11/7/2020] dexamethasone (PF) (DECADRON) 10 mg/mL injection 10 mg  10 mg IntraVENous Q24H Dmitry Cardoza MD      
 
Current Outpatient Medications Medication Sig Dispense Refill  solifenacin (VESICARE) 10 mg tablet TAKE 1 TABLET BY MOUTH EVERY DAY  medroxyPROGESTERone (DEPO-PROVERA) 150 mg/mL injection 150 mg, IM, once, To be administered in clinic by nurse. See order comments for schedule., # 1 vial, 4 Refills, Pharmacy: [facility]/pharmacy #[id number]  albuterol (PROVENTIL HFA, VENTOLIN HFA, PROAIR HFA) 90 mcg/actuation inhaler Take 2 Puffs by inhalation every four (4) hours as needed for Wheezing. 1 Inhaler 0  
 zinc sulfate 220 mg tablet Take 1 Tab by mouth daily. 7 Tab 0  
 ascorbic acid, vitamin C, (Vitamin C) 500 mg chew Take 1 Tab by mouth daily for 7 days. 7 Tab 0  
 erythromycin (ILOTYCIN) ophthalmic ointment Apply 1 cm ribbon to the affected lower eyelid 4 times daily until symptoms resolve 1 g 0  
 azithromycin (ZITHROMAX) 200 mg/5 mL suspension Take 6.3 mL by mouth daily for 4 days. 25.2 mL 0  
 cholecalciferol (VITAMIN D3) 1,000 unit tablet Take 1,000 Units by mouth daily.  risperiDONE (RISPERDAL) 0.5 mg tablet Take 0.5 mg by mouth nightly.  LEVOTHYROXINE SODIUM (LEVOTHYROXINE PO) Take  by mouth. Past History Past Medical History: 
Past Medical History:  
Diagnosis Date  Endocrine disease  Hypothyroid 03/11/2018  Ill-defined condition Down's Syndrome Past Surgical History: No past surgical history on file. Family History: No family history on file. Social History: 
Social History Tobacco Use  Smoking status: Never Smoker  Smokeless tobacco: Never Used Substance Use Topics  Alcohol use: No  
 Drug use: No  
 
 
Allergies: 
No Known Allergies Review of Systems Review of Systems Unable to perform ROS: Patient nonverbal  
 
 
Physical Exam  
Physical Exam 
 
GENERAL: alert , no acute distress EYES: PEERL, No injection, discharge or icterus. ENT: Mucous membranes pink and moist. 
NECK: Supple LUNGS: Airway patent. Labored respirations, tachypneic, diffuse expiratory wheezes HEART: Tachycardic, regular rhythm. . No peripheral edema ABDOMEN: Non-distended and non-tender, without guarding or rebound. SKIN:  warm, dry EXTREMITIES: Without swelling, tenderness or deformity, symmetric with normal ROM 
NEUROLOGICAL: Alert, moves all 4 extremities Diagnostic Study Results Labs - Recent Results (from the past 12 hour(s)) CBC WITH AUTOMATED DIFF Collection Time: 11/06/20  5:51 PM  
Result Value Ref Range WBC 10.5 3.6 - 11.0 K/uL  
 RBC 4.47 3.80 - 5.20 M/uL  
 HGB 12.1 11.5 - 16.0 g/dL HCT 34.0 (L) 35.0 - 47.0 % MCV 76.1 (L) 80.0 - 99.0 FL  
 MCH 27.1 26.0 - 34.0 PG  
 MCHC 35.6 30.0 - 36.5 g/dL  
 RDW 13.7 11.5 - 14.5 % PLATELET 778 324 - 629 K/uL MPV 11.2 8.9 - 12.9 FL  
 NRBC 0.0 0  WBC ABSOLUTE NRBC 0.00 0.00 - 0.01 K/uL NEUTROPHILS 78 (H) 32 - 75 % LYMPHOCYTES 11 (L) 12 - 49 % MONOCYTES 10 5 - 13 % EOSINOPHILS 0 0 - 7 % BASOPHILS 0 0 - 1 % IMMATURE GRANULOCYTES 1 (H) 0.0 - 0.5 % ABS. NEUTROPHILS 8.3 (H) 1.8 - 8.0 K/UL  
 ABS. LYMPHOCYTES 1.1 0.8 - 3.5 K/UL  
 ABS. MONOCYTES 1.1 (H) 0.0 - 1.0 K/UL  
 ABS. EOSINOPHILS 0.0 0.0 - 0.4 K/UL  
 ABS. BASOPHILS 0.0 0.0 - 0.1 K/UL  
 ABS. IMM. GRANS. 0.1 (H) 0.00 - 0.04 K/UL  
 DF AUTOMATED METABOLIC PANEL, BASIC Collection Time: 11/06/20  5:51 PM  
Result Value Ref Range Sodium 140 136 - 145 mmol/L Potassium 4.0 3.5 - 5.1 mmol/L Chloride 106 97 - 108 mmol/L  
 CO2 25 21 - 32 mmol/L Anion gap 9 5 - 15 mmol/L Glucose 110 (H) 65 - 100 mg/dL BUN 19 6 - 20 MG/DL  Creatinine 1.31 (H) 0.55 - 1.02 MG/DL  
 BUN/Creatinine ratio 15 12 - 20    
 GFR est AA 55 (L) >60 ml/min/1.73m2 GFR est non-AA 45 (L) >60 ml/min/1.73m2 Calcium 8.3 (L) 8.5 - 10.1 MG/DL  
TROPONIN I Collection Time: 11/06/20  5:51 PM  
Result Value Ref Range Troponin-I, Qt. <0.05 <0.05 ng/mL MAGNESIUM Collection Time: 11/06/20  5:51 PM  
Result Value Ref Range Magnesium 1.8 1.6 - 2.4 mg/dL LACTIC ACID Collection Time: 11/06/20  5:51 PM  
Result Value Ref Range Lactic acid 0.7 0.4 - 2.0 MMOL/L  
LD Collection Time: 11/06/20  5:51 PM  
Result Value Ref Range  (H) 81 - 246 U/L  
PTT Collection Time: 11/06/20  5:51 PM  
Result Value Ref Range aPTT 28.0 22.1 - 32.0 sec  
 aPTT, therapeutic range     58.0 - 77.0 SECS  
PROTHROMBIN TIME + INR Collection Time: 11/06/20  5:51 PM  
Result Value Ref Range INR 1.2 (H) 0.9 - 1.1 Prothrombin time 11.5 (H) 9.0 - 11.1 sec Radiologic Studies -  
XR CHEST PORT Final Result IMPRESSION:  
Poor inspiratory effort with bilateral airspace disease roughlystable since 11/2/2020. CT Results  (Last 48 hours) None CXR Results  (Last 48 hours) 11/06/20 1739  XR CHEST PORT Final result Impression:  IMPRESSION:  
Poor inspiratory effort with bilateral airspace disease roughlystable since 11/2/2020. Narrative:  INDICATION:  SOB EXAM: Chest single view. COMPARISON: Chest x-ray and chest CT 11/2/2020. FINDINGS: A single frontal view of the chest at 1702 hours shows poor  
inspiratory effort with crowded lung markings in the lung bases and possible  
airspace disease bilaterally, as shown on prior chest CT. Thomasena Dilling The heart,  
mediastinum and pulmonary vasculature are stable . The bony thorax is  
unremarkable for age. .  
   
  
  
 
 
 
Medical Decision Making Vandana Lorenz MD am the first provider for this patient and am the attending of record for this patient encounter. I reviewed the vital signs, available nursing notes, past medical history, past surgical history, family history and social history. Vital Signs-Reviewed the patient's vital signs. Patient Vitals for the past 12 hrs: 
 Temp Pulse Resp BP SpO2  
11/06/20 1733     93 % 11/06/20 1552 (!) 100.6 °F (38.1 °C) (!) 119 26 119/77 94 % Pulse Oximetry Analysis - 94% on RA Cardiac Monitor:  
Rate: 119bpm 
Rhythm: Sinus Tachycardia The cardiac monitor was ordered secondary to respiratory distress 
and to monitor the patient for dysrhythmia Records Reviewed: Nursing Notes and Old Medical Records Provider Notes (Medical Decision Making):  
21-year-old female presenting in respiratory distress with recent diagnosis of COVID-19. On exam she does have labored respirations with wheezing noted. Differential included COVID-19 pneumonia, bacterial pneumonia, pulmonary embolism, anemia, metabolic abnormalities. Reviewed work-up from recent emergency department visit. Imaging at that time showed patchy airspace disease consistent with COVID-19 pneumonia. Otherwise reviewed labs today and the mitral rotation showed no significant derangements. Obtained x-ray today and on my interpretation did show patchy airspace disease as well. Patient was given steroids and albuterol treatment with improvement in her work of breathing. Given her initial tachypnea and respiratory distress will plan to admit for further observation and management. ED Course:  
Initial assessment performed. The patients presenting problems have been discussed, and they are in agreement with the care plan formulated and outlined with them. I have encouraged them to ask questions as they arise throughout their visit. PROGRESS: 
The patient has been re-evaluated and sx have improved. Reviewed available results with patient's caregiver and a are in agreement with plan for admission.  
 
 
CONSULT: 
 Marciano Black MD spoke with the hospitalist.  Discussed HPI and PE, available diagnostic tests and clinical findings. He is in agreement with care plans as outlined and will evaluate for admission Admit Note Patient is being admitted to the hospitalist.   
 
Disposition: 
admission PLAN: 
1. Admit Diagnosis Clinical Impression: 1. Pneumonia due to COVID-19 virus 2. Respiratory distress 3. [facility]) Please note that this dictation was completed with Dragon, computer voice recognition software. Quite often unanticipated grammatical, syntax, homophones, and other interpretive errors are inadvertently transcribed by the computer software. Please disregard these errors. Additionally, please excuse any errors that have escaped final proofreading.

## 2020-11-06 NOTE — ED TRIAGE NOTES
Pt presents with mother who answers questions for pt. Mother reports pt has been SOB since last visit despite taking prescriptions.

## 2020-11-07 NOTE — PROGRESS NOTES
[facility] Admission Pharmacy Medication Reconciliation Information obtained from: interview with primary caregiver, Tavo Aceves (patient's mother), RX Query RxQuery data available1: Y Comments/recommendations: 
 
1) COVID +; Patient is non-verbal. Medication information provided by patient's mother via telephone interview. 2) Medication changes (since last review): Added Per ED visit on 11/2, patient was prescribed: 
Azithromycin oral susp 250 mg po daily x 4 days Ascorbic acid 500 mg po daily x 7 days Zinc 220 mg po daily x 7 days Erythromycin ophthalmic ointment Albuterol inhaler Removed 
none Adjusted Risperidone from 0.5 mg to 1 mg po qhs 
  
1RxQuery pharmacy benefit data reflects medications filled and processed through the patient's insurance, however  
             this data does NOT capture whether the medication was picked up or is currently being taken by the patient. Total Time Spent: 15 minutes Past Medical History/Disease States: 
Past Medical History:  
Diagnosis Date Endocrine disease Hypothyroid 03/11/2018 Ill-defined condition Down's Syndrome Patient allergies: Allergies as of 11/06/2020 (No Known Allergies) Prior to Admission Medications Prescriptions Last Dose Informant  Taking? albuterol (PROVENTIL HFA, VENTOLIN HFA, PROAIR HFA) 90 mcg/actuation inhaler  Family Member  Yes Sig: Take 2 Puffs by inhalation every four (4) hours as needed for Wheezing. ascorbic acid, vitamin C, (Vitamin C) 500 mg chew 11/6/2020 at Unknown time Family Member  Yes Sig: Take 1 Tab by mouth daily for 7 days. azithromycin (ZITHROMAX) 200 mg/5 mL suspension 11/6/2020 at Unknown time Family Member  Yes Sig: Take 6.3 mL by mouth daily for 4 days. cholecalciferol (VITAMIN D3) 1,000 unit tablet 11/6/2020 at Unknown time Family Member  Yes Sig: Take 1,000 Units by mouth daily.   
erythromycin (ILOTYCIN) ophthalmic ointment 11/6/2020 at Unknown time Family Member  Yes Sig: Apply 1 cm ribbon to the affected lower eyelid 4 times daily until symptoms resolve  
levothyroxine (SYNTHROID) 50 mcg tablet 2020 at Unknown time Family Member  Yes Sig: Take 50 mcg by mouth Daily (before breakfast). medroxyPROGESTERone (DEPO-PROVERA) 150 mg/mL injection 10/28/2020 Family Member  Yes Si mg, IM, once, To be administered in clinic by nurse. See order comments for schedule., # 1 vial, 4 Refills, Pharmacy: [facility]/pharmacy #[id number]  
risperiDONE (RisperDAL) 1 mg tablet 2020 at Unknown time Family Member  Yes Sig: Take 1 mg by mouth nightly. solifenacin (VESICARE) 10 mg tablet 2020 at Unknown time Family Member  Yes Sig: TAKE 1 TABLET BY MOUTH EVERY DAY  
zinc sulfate 220 mg tablet 2020 at Unknown time Family Member  Yes Sig: Take 1 Tab by mouth daily. Thank you, Otis Esqueda, [facility]

## 2020-11-07 NOTE — PROGRESS NOTES
Problem: Altered Thought Process (Adult/Pediatric) Goal: *STG: Remains safe in hospital 
Outcome: Progressing Towards Goal 
  
Problem: Patient Education: Go to Patient Education Activity Goal: Patient/Family Education Outcome: Progressing Towards Goal 
 
Mother at bedside - mother stated understanding. Pt unable to D/T being mute but pt is following commands at this time.

## 2020-11-07 NOTE — PROGRESS NOTES
Bedside shift change report given to [address] (oncoming nurse) by Kurt Ramirez (offgoing nurse). Report included the following information SBAR, Kardex, Intake/Output, MAR and Accordion. 0830) In to see pt. Pt is nonverbal, in no obvious signs of pain or discomfort. Resp. Rate remains elevated, as it was overnight. RR ~30. Lung sounds diminished - or no obvious adverse sounds heard. Pt follows basic commands or is able to be guided some, but will not deep breathe upon request / does not understand what RN is asking. Breakfast tray set up for pt. Pt sat up 90 degrees, took a few bites iof eggs and potatoes, but did not seem to swallow. RN gave her a few sips of her orange juice and she swallowed. Some intermittent coughing and drooling. Tray and breakfast removed and MD notified. Requesting speech/swallow to see pt. Also, RN called pt mother who is FT caretaker. Her mother said this is not normal for her, but she was doing the same thing yesterday for her at home. 0900) Message to Dr. Maude Andres as follows: \"Hi Dr. Maude Andres, messaging in regards to [address]. Pt is covid +, pnuemonia. Non-verbal, Downs syndrome. Her mother is not here at present, who is her FT caretaker. Pt seemed to be having trouble swallowing breakfast, or not really attempting to swallow, but intermittent coughing. I removed breakfast and had her sip her orange juice. Called mother, and mother said this is not typical, but thats what she was doing yesterday for her mother as well. I would like speech/swallow to see her. Also, there were several labs ordered and completed in ER, but many reordered for 6 am. I am not sure that all of these needs to be repeated and told Laurita Pierre (night RN) that I would confirm with you, what actually needs to be drawn and when. Thank you, Renetta Rolle RN \" 
 
[phone]) Paged speech therapy for a call back. [phone]) Mother in to visit and assist with pt.  Mother inquired about cough. Spoke with Dr. Robin Wang, ordered cough syrup, administered at lunch time. 1440) All labs reviewed with Dr. Robin Wang, Dr. Robin Wang in to see pt. Pt. Remains afebrile oral temp 98.7F. Mother still in visiting and caring for pt. No complaints at this time, no obvious signs of distress or pain. RR remains elevated. O2 sat still good at 95%, all other vitals WNL. Sinus tach on the monitor. 1800) In to assist pt. With dinner. Pt in no obvious signs of distress, discomfort or pain. Ate 4-5 bites of pasta and bread, seemed to be swallowing OK. Tray cleared and pt. Rested comfortably in bed after dinner. All due labs drawn by Casandra Saini earlier in the shift, once verifying with Dr. Robin Wang what needed to be draw. Sent to lab. Results in. All due meds administered per orders with no adverse reactions noted.  
  
Safety measures in place, bed low and locked, call bell in reach, pt belongings bedside. Bedside shift change report given to [address] [place] [address] (oncoming nurse) by Celestina Mackey (offgoing nurse). Report included the following information SBAR, Kardex, Intake/Output, MAR and Accordion.

## 2020-11-07 NOTE — ED NOTES
TRANSFER - OUT REPORT: 
 
TELEPHONEVerbal report given to [facility] RN (name) on Twan Morales  being transferred to [address] [place] [address],[address] (unit) for routine progression of care Report consisted of patients Situation, Background, Assessment and  
Recommendations(SBAR). Information from the following report(s) SBAR, Kardex, ED Summary, Intake/Output, MAR, Recent Results and Cardiac Rhythm SINUS TACH was reviewed with the receiving nurse. Lines:  
Peripheral IV 11/06/20 Left Antecubital (Active) Opportunity for questions and clarification was provided. Patient transported with: 
 Monitor

## 2020-11-07 NOTE — PROGRESS NOTES
Telemed: Pt is experiencing Ventricular Bigeminy - please advise. Thank you. Plan: Spoke with nurse.  EKG

## 2020-11-07 NOTE — PROGRESS NOTES
Hospitalist Progress Note NAME: Patricia Gonzalez :  1980 MRN:  [id number] Room Number:  [address]  @ [facility]  
 
 
Interim Hospital Summary: 47 y.o. female whom presented on 2020 with Assessment / Plan: 
 
 
Sepsis due to COVID-19 PNA Sinus tachycardia d/t sepsis POA COVID test  positive. CT Chest  shows bilateral infiltrates. Patient febrile, tachycardic, tachypneic on admission. Chest x-ray reviewed - shows bilateral airspace disease. Completed 5-day course of azithromycin as outpatient. Inflammatory markers stable compared to yesterday. D-dimer 5.82.  
  
Trend inflammatory markers Zinc, ascorbic acid, dexamethasone Supplemental oxygen to maintain O2 sats greater than 94% Albuterol as needed for dyspnea Robitussin-DM scheduled. 
 
  
  
Hypothyroidism POA Down's Syndrome POA 
-risperdal 0.5 mg at night 
-Levothyroxine 
  
Body mass index is 31.93 kg/m². Code Status: full Surrogate Decision Maker: MotherBillie, [phone] 
  
DVT Prophylaxis: Lovenox GI Prophylaxis: not indicated Subjective: Chief Complaint / Reason for Physician Visit Patient is non verbal. Mother at bedside. RN and mother have noted that patient is not accepting food orally. Mother was able to convince patient to have juice. Discussed with RN events overnight. Review of Systems: 
Unable to obtain as patient is nonverbal at baseline Objective: VITALS:  
Last 24hrs VS reviewed since prior progress note. Most recent are: 
Patient Vitals for the past 24 hrs: 
 Temp Pulse Resp BP SpO2  
20 1200  (!) 110 (!) 36 117/78 95 % 20 1138  (!) 117 (!) 40 132/80 93 % 20 0800  (!) 111   93 % 20 2347  (!) 102 (!) 36 111/67 94 % 20 2345  (!) 101 (!) 33  93 % 20 2330  (!) 102 (!) 37 102/64 94 % 20 2329  (!) 103 (!) 38  93 % 20 2328  (!) 102 (!) 34  94 % 11/06/20 2327  (!) 102 (!) 35  93 % 11/06/20 2326  (!) 104 (!) 33  94 % 11/06/20 2325  (!) 107 (!) 31  95 % 11/06/20 2324  (!) 102 (!) 34  94 % 11/06/20 2323  (!) 104 (!) 31  94 % 11/06/20 2322  (!) 101 (!) 33  94 % 11/06/20 2321  (!) 104 (!) 37  94 % 11/06/20 2320  (!) 104 (!) 37  94 % 11/06/20 2319  (!) 106 (!) 33  94 % 11/06/20 2318  (!) 105 (!) 38  94 % 11/06/20 2317  (!) 105 (!) 31  94 % 11/06/20 2316  (!) 102 (!) 37  92 % 11/06/20 2315  (!) 101 30 113/78 93 % 11/06/20 2314  (!) 107 29  94 % 11/06/20 2313  (!) 102 (!) 38  94 % 11/06/20 2312  (!) 104 (!) 39  94 % 11/06/20 2311  (!) 104 (!) 40  94 % 11/06/20 2310  (!) 104 (!) 36  94 % 11/06/20 2309  (!) 103 (!) 34  94 % 11/06/20 2308  (!) 110 (!) 31  94 % 11/06/20 2307  (!) 103 (!) 34  93 % 11/06/20 2306  (!) 103 (!) 35  94 % 11/06/20 2305  100 29  93 % 11/06/20 2304  (!) 101 (!) 32  93 % 11/06/20 2303  (!) 101 (!) 31  93 % 11/06/20 2302  (!) 102 (!) 33  94 % 11/06/20 2301  (!) 103 (!) 34  90 % 11/06/20 2300  (!) 103 (!) 35 105/63 93 % 11/06/20 2259  (!) 103 (!) 36  93 % 11/06/20 2258  (!) 103 (!) 38  94 % 11/06/20 2257  (!) 105 (!) 36  92 % 11/06/20 2256  (!) 105 (!) 39  92 % 11/06/20 2255  (!) 105 (!) 37  92 % 11/06/20 2254  (!) 105 (!) 37  92 % 11/06/20 2253  (!) 105 (!) 38  93 % 11/06/20 2252  (!) 105 (!) 36  93 % 11/06/20 2251  (!) 103 (!) 39  92 % 11/06/20 2250  (!) 103 (!) 35  93 % 11/06/20 2249  (!) 103 (!) 32  93 % 11/06/20 2248  (!) 102 (!) 34  93 % 11/06/20 2247  (!) 103 (!) 35  93 % 11/06/20 2246  (!) 103 (!) 33  93 % 11/06/20 2245  (!) 104 30 108/72 93 % 11/06/20 2244  (!) 105 (!) 35  94 % 11/06/20 2243  (!) 107 (!) 35  93 % 11/06/20 2242  (!) 107 (!) 35  92 % 11/06/20 2241  (!) 107 (!) 37  93 % 11/06/20 2240  (!) 106 (!) 39  92 % 11/06/20 2239  (!) 107 (!) 38  93 % 11/06/20 2238  (!) 105 (!) 36  93 % 11/06/20 2237  (!) 106 (!) 36  93 % 11/06/20 2236  (!) 107 (!) 39  93 % 11/06/20 2235  (!) 107 (!) 39  93 % 11/06/20 2234  (!) 107 (!) 37  92 % 11/06/20 2233  (!) 107 (!) 38  93 % 11/06/20 2232  (!) 106 (!) 36  93 % 11/06/20 2231  (!) 107 (!) 35  92 % 11/06/20 2230  (!) 106 (!) 38 103/69 92 % 11/06/20 2229  (!) 105 (!) 34  92 % 11/06/20 2228  (!) 103 (!) 33  92 % 11/06/20 2227  (!) 105 (!) 36  93 % 11/06/20 2226  (!) 105 (!) 34  93 % 11/06/20 2225  (!) 104 30  93 % 11/06/20 2224  (!) 105 (!) 36  93 % 11/06/20 2223  (!) 105 (!) 35  93 % 11/06/20 2222  (!) 106 (!) 38  93 % 11/06/20 2221  (!) 106 (!) 40  93 % 11/06/20 2220  (!) 106 (!) 42  93 % 11/06/20 2219  (!) 107 (!) 39  93 % 11/06/20 2218  (!) 109 (!) 33  93 % 11/06/20 2217  (!) 104 (!) 32  93 % 11/06/20 2216  (!) 106 (!) 35  91 % 11/06/20 2215  (!) 104 (!) 32 115/73 92 % 11/06/20 2214  (!) 106 (!) 34  93 % 11/06/20 2213  (!) 106 (!) 34  93 % 11/06/20 2212  (!) 106 (!) 36  93 % 11/06/20 2211  (!) 106 (!) 35  93 % 11/06/20 2210  (!) 106 (!) 34  93 % 11/06/20 2209  (!) 106 (!) 35  93 % 11/06/20 2208  (!) 105 (!) 33  94 % 11/06/20 2207  (!) 106 (!) 33  94 % 11/06/20 2206  (!) 105 (!) 37  94 % 11/06/20 2205  (!) 106 (!) 37  94 % 11/06/20 2204  (!) 107 30  94 % 11/06/20 2203  (!) 107 (!) 40  94 % 11/06/20 2202  (!) 107 (!) 41  94 % 11/06/20 2201  (!) 107 (!) 41  92 % 11/06/20 2200  (!) 107 (!) 43 116/74 94 % 11/06/20 2159  (!) 107 (!) 40  94 % 11/06/20 2158  (!) 106 30  94 % 11/06/20 2157  (!) 106 (!) 41  94 % 11/06/20 2156  (!) 106 (!) 40  94 % 11/06/20 2155  (!) 106 (!) 41  94 % 11/06/20 2154  (!) 106 (!) 40  94 % 11/06/20 2153  (!) 106 (!) 41  94 % 11/06/20 2152  (!) 103 (!) 39  93 % 11/06/20 2151  (!) 109 (!) 37  93 % 11/06/20 2150  (!) 109 (!) 38  94 % 11/06/20 2149  (!) 107 (!) 39  93 % 11/06/20 2148  (!) 108 (!) 36  93 % 11/06/20 2147  (!) 107 (!) 41  93 % 11/06/20 2146  (!) 107 (!) 40  94 % 11/06/20 2145  (!) 107 (!) 38 124/69 94 % 11/06/20 2144  (!) 107 (!) 42  94 % 11/06/20 2143  (!) 107 (!) 40  93 % 11/06/20 2142  (!) 107 (!) 43  93 % 11/06/20 2141  (!) 108 (!) 43  93 % 11/06/20 2140  (!) 108 (!) 38  93 % 11/06/20 2139  (!) 106 (!) 42  94 % 11/06/20 2138  (!) 109 (!) 35  97 % 11/06/20 2137  (!) 110 (!) 34  95 % 11/06/20 2136  (!) 109 25  (!) 80 % 11/06/20 2135  (!) 106 (!) 38  (!) 63 % 11/06/20 2134  (!) 108 (!) 38  (!) 71 % 11/06/20 2133  (!) 109 (!) 42  100 % 11/06/20 2132  (!) 110 (!) 41  100 % 11/06/20 2131  (!) 110 (!) 41  100 % 11/06/20 2130  (!) 110 (!) 41 115/77   
11/06/20 2129  (!) 110 (!) 45  (!) 87 % 11/06/20 2128  (!) 110 (!) 42  99 % 11/06/20 2127  (!) 111 (!) 45  94 % 11/06/20 2126  (!) 114 (!) 38  95 % 11/06/20 2125  (!) 115 (!) 43  96 % 11/06/20 2124  (!) 113 (!) 41  97 % 11/06/20 2123  (!) 109 (!) 34  97 % 11/06/20 2122  (!) 113 26  92 % 11/06/20 2121  (!) 118 23  98 % 11/06/20 2120  (!) 114 (!) 37  97 % 11/06/20 2119  (!) 113 (!) 38  96 % 11/06/20 2118  (!) 117 (!) 34  98 % 11/06/20 2117  (!) 118 (!) 34  97 % 11/06/20 2116  (!) 117 (!) 36  100 % 11/06/20 2115  (!) 118 (!) 32 123/81 98 % 11/06/20 2114  (!) 118 26  98 % 11/06/20 2113  (!) 118 (!) 37  96 % 11/06/20 2112  (!) 119 (!) 36  97 % 11/06/20 2111  (!) 118 (!) 33  96 % 11/06/20 2110  (!) 119 (!) 40  96 % 11/06/20 2109  (!) 115 (!) 33  96 % 11/06/20 2108  (!) 115 25  98 % 11/06/20 2107  (!) 108 (!) 34  95 % 11/06/20 2106  (!) 108 (!) 34 110/86 96 % 11/06/20 2105  (!) 109 (!) 36  95 % 11/06/20 2104  (!) 116 24  94 % 11/06/20 2103  (!) 112 28  100 % 11/06/20 2102  (!) 116 24  98 % 11/06/20 2101  (!) 107 (!) 35  95 % 11/06/20 2100  (!) 104 (!) 42  95 % 11/06/20 2059  (!) 102 (!) 42  95 % 11/06/20 2058  (!) 103 (!) 42  95 % 11/06/20 2057  (!) 104 (!) 39  96 % 11/06/20 2056  (!) 102 (!) 36  96 % 11/06/20 2055  (!) 103 (!) 34  95 % 11/06/20 2054  (!) 103 (!) 38  96 % 11/06/20 2053  (!) 102 (!) 36  95 % 11/06/20 2052  (!) 103 (!) 33  95 % 11/06/20 2051  (!) 104 (!) 37  95 % 11/06/20 2050  (!) 102 (!) 37  95 % 11/06/20 2049  (!) 104 (!) 41  95 % 11/06/20 2048  (!) 105 (!) 35  95 % 11/06/20 2047  (!) 105 (!) 43  95 % 11/06/20 2046  (!) 105 (!) 45  95 % 11/06/20 2045  (!) 104 (!) 45  95 % 11/06/20 2044  (!) 107 (!) 33  96 % 11/06/20 2043  (!) 103 (!) 40  95 % 11/06/20 2042  (!) 104 (!) 39  95 % 11/06/20 2041  (!) 105 (!) 35  95 % 11/06/20 2040  (!) 105 (!) 39  95 % 11/06/20 2039  (!) 104 (!) 37  96 % 11/06/20 2038  (!) 104 (!) 40  96 % 11/06/20 2037  (!) 105 (!) 40  95 % 11/06/20 2036  (!) 105 (!) 38  95 % 11/06/20 2035  (!) 105 (!) 44  95 % 11/06/20 2034  (!) 106 (!) 39  95 % 11/06/20 2033  (!) 108 (!) 37  96 % 11/06/20 2032  (!) 105 (!) 41  96 % 11/06/20 2031  (!) 107 (!) 35  95 % 11/06/20 2030  (!) 105 (!) 34  95 % 11/06/20 2029  (!) 105 (!) 36  94 % 11/06/20 2028  (!) 104 (!) 39  95 % 11/06/20 2027  (!) 104 (!) 39  95 % 11/06/20 2026  (!) 106 (!) 43  96 % 11/06/20 2025  (!) 105 (!) 44  95 % 11/06/20 2024  (!) 106 (!) 43  95 % 11/06/20 2023  (!) 106 (!) 42  95 % 11/06/20 2022  (!) 104 (!) 42  96 % 11/06/20 2021  (!) 106 (!) 39  95 % 11/06/20 2020  (!) 107 (!) 40  95 % 11/06/20 2019  (!) 106 (!) 41  96 % 11/06/20 2018  (!) 107 (!) 40  96 % 11/06/20 2017  (!) 107 (!) 41  95 % 11/06/20 2016  (!) 106 (!) 40  95 % 11/06/20 2015  (!) 106 (!) 41  94 % 11/06/20 2014  (!) 107 (!) 40  95 % 11/06/20 2013  (!) 106 (!) 41  95 % 11/06/20 2012  (!) 107 (!) 41  95 % 11/06/20 2011  (!) 107 (!) 42    
11/06/20 2009  (!) 107 (!) 42    
11/06/20 2008  (!) 106 (!) 42    
11/06/20 2007  (!) 106 (!) 38    
11/06/20 2006  (!) 109 (!) 43    
11/06/20 2005  (!) 109 (!) 41    
11/06/20 2004  (!) 108 (!) 41    
11/06/20 2003  (!) 110 (!) 34    
11/06/20 2002  (!) 108 (!) 40    
11/06/20 2001  (!) 112 (!) 35    
11/06/20 1957 98 °F (36.7 °C) (!) 110 (!) 34 125/81 95 % 11/06/20 1952  (!) 111 (!) 43    
11/06/20 1951  (!) 112 (!) 48    
11/06/20 1950  (!) 108 (!) 42  95 % 11/06/20 1949  (!) 110 (!) 46  97 % 11/06/20 1948  (!) 111 (!) 44  95 % 11/06/20 1947  (!) 110 (!) 43  97 % 11/06/20 1946  (!) 111 (!) 45  100 % 11/06/20 1945  (!) 110 (!) 43  98 % 11/06/20 1944  (!) 112 28  98 % 11/06/20 1943  (!) 112 (!) 45  98 % 11/06/20 1942  (!) 112 (!) 47  96 % 11/06/20 1941  (!) 113 (!) 43  93 % 11/06/20 1940  (!) 114 (!) 48  93 % 11/06/20 1939  (!) 115 (!) 35  94 % 11/06/20 1938  (!) 116 (!) 42  95 % 11/06/20 1937  (!) 127 (!) 41 96/63 92 % 11/06/20 1936  (!) 124 29  (!) 86 % 11/06/20 1935  (!) 122 28  93 % 11/06/20 1934  (!) 116 (!) 31  95 % 11/06/20 1933  (!) 114 (!) 36  95 % 11/06/20 1932  (!) 113 (!) 42  95 % 11/06/20 1931  (!) 112 (!) 40  94 % 11/06/20 1930  (!) 112 (!) 37 112/70 94 % 11/06/20 1929  (!) 112 (!) 42  94 % 11/06/20 1928  (!) 113 (!) 38  94 % 11/06/20 1927  (!) 114 (!) 37  95 % 11/06/20 1926  (!) 112 (!) 37  94 % 11/06/20 1925  (!) 111 (!) 35  95 % 11/06/20 1924  (!) 112 (!) 33  95 % 11/06/20 1923  (!) 112 (!) 36  95 % 11/06/20 1922  (!) 113 (!) 37  95 % 11/06/20 1921  (!) 113 (!) 42  95 % 11/06/20 1920  (!) 113 (!) 38  96 % 11/06/20 1919  (!) 112 (!) 42  95 % 11/06/20 1918  (!) 113 (!) 42  95 % 11/06/20 1917  (!) 114 (!) 39  95 % 11/06/20 1916  (!) 113 (!) 31  95 % 11/06/20 1915  (!) 113 (!) 36  95 % 11/06/20 1914  (!) 114 30  95 % 11/06/20 1913  (!) 114 (!) 34  95 % 11/06/20 1912  (!) 114 (!) 37  95 % 11/06/20 1911  (!) 115 (!) 48  95 % 11/06/20 1910  (!) 114 (!) 41  95 % 11/06/20 1909  (!) 114 (!) 39  94 % 11/06/20 1908  (!) 114 (!) 38  95 % 11/06/20 1907  (!) 115 (!) 34  94 % 11/06/20 1906  (!) 115 (!) 42  95 % 11/06/20 1905  (!) 114 28  94 % 11/06/20 1904  (!) 114 (!) 39  95 % 11/06/20 1903  (!) 115 28  95 % 11/06/20 1902  (!) 113 (!) 43  94 % 11/06/20 1901  (!) 115 (!) 39  94 % 11/06/20 1900  (!) 114 (!) 37 113/66 94 % 11/06/20 1859  (!) 115 (!) 35  94 % 11/06/20 1858  (!) 115 (!) 41  95 % 11/06/20 1857  (!) 115 (!) 41  94 % 11/06/20 1856  (!) 115 (!) 39  94 % 11/06/20 1855  (!) 116 (!) 36  95 % 11/06/20 1854  (!) 113 (!) 34  95 % 11/06/20 1853  (!) 116 (!) 39  95 % 11/06/20 1852  (!) 114 (!) 35  94 % 11/06/20 1851  (!) 116 (!) 39  94 % 11/06/20 1850  (!) 115 (!) 39  94 % 11/06/20 1849  (!) 115 (!) 40  94 % 11/06/20 1848  (!) 115 (!) 40  94 % 11/06/20 1847  (!) 115 (!) 43  94 % 11/06/20 1846  (!) 116 (!) 36  95 % 11/06/20 1845  (!) 115 (!) 31  94 % 11/06/20 1844  (!) 114 (!) 35  94 % 11/06/20 1843  (!) 115 (!) 36  94 % 11/06/20 1842  (!) 115 (!) 36  94 % 11/06/20 1841  (!) 115 (!) 37  94 % 11/06/20 1840  (!) 115 (!) 35  94 % 11/06/20 1839  (!) 115 (!) 37  94 % 11/06/20 1838  (!) 115 (!) 34  94 % 11/06/20 1837  (!) 116 (!) 35  94 % 11/06/20 1836  (!) 117 (!) 34  94 % 11/06/20 1835  (!) 117 (!) 35  94 % 11/06/20 1834  (!) 118 (!) 40  94 % 11/06/20 1833  (!) 118 (!) 36  93 % 11/06/20 1832  (!) 118 (!) 38  94 % 11/06/20 1831  (!) 119 (!) 44  93 % 11/06/20 1830  (!) 119 (!) 44 109/66 94 % 11/06/20 1829  (!) 120 (!) 46  93 % 11/06/20 1828  (!) 120 (!) 48  93 % 11/06/20 1827  (!) 119 (!) 43  94 % 11/06/20 1826  (!) 117 (!) 46  93 % 11/06/20 1825  (!) 120 (!) 44  94 % 11/06/20 1824  (!) 119 (!) 41  93 % 11/06/20 1823  (!) 120 (!) 45  94 % 11/06/20 1822  (!) 120 (!) 43  93 % 11/06/20 1821  (!) 121 (!) 41  93 % 11/06/20 1820  (!) 121 (!) 43  94 % 11/06/20 1819   20  93 % 11/06/20 1818     93 % 11/06/20 1817     93 % 11/06/20 1816     93 % 11/06/20 1815     93 % 11/06/20 1814     93 % 11/06/20 1813    (!) 115/57 93 % 11/06/20 1812     93 % 11/06/20 1811     91 % 11/06/20 1810     92 % 11/06/20 1809     93 % 11/06/20 1808     92 % 11/06/20 1807     94 % 11/06/20 1806     94 % 11/06/20 1805     93 % 11/06/20 1804     94 % 11/06/20 1803     94 % 11/06/20 1802     94 % 11/06/20 1801     94 % 11/06/20 1800     93 % 11/06/20 1759     94 % 11/06/20 1758     93 % 11/06/20 1757     93 % 11/06/20 1756     93 % 11/06/20 1755     94 % 11/06/20 1754     94 % 11/06/20 1753     93 % 11/06/20 1752     94 % 11/06/20 1751     92 % 11/06/20 1750     91 % 11/06/20 1749     91 % 11/06/20 1748     92 % 11/06/20 1747     92 % 11/06/20 1746     91 % 11/06/20 1745     92 % 11/06/20 1744     94 % 11/06/20 1743     94 % 11/06/20 1742     94 % 11/06/20 1741     94 % 11/06/20 1740     94 % 11/06/20 1739     94 % 11/06/20 1738     94 % 11/06/20 1737     94 % 11/06/20 1736     94 % 11/06/20 1735     94 % 11/06/20 1734     94 % 11/06/20 1733     93 % 11/06/20 1552 (!) 100.6 °F (38.1 °C) (!) 119 26 119/77 94 % No intake or output data in the 24 hours ending 11/07/20 1819 PHYSICAL EXAM: 
General: Alert, mild resp distress, tachypneic EENT:  EOMI. Anicteric sclerae. MMM Resp:  CTA bilaterally, no wheezing or rales. mild accessory muscle use CV:  Regular  rhythm,  normal S1/S2, no murmurs rubs gallops, No edema GI:  Soft, Non distended, Non tender. +Bowel sounds Neurologic:  Alert, nonverbal 
Psych:  Not anxious nor agitated Skin:  No rashes. No jaundice Reviewed most current lab test results and cultures  YES Reviewed most current radiology test results   YES Review and summation of old records today    NO Reviewed patient's current orders and MAR    YES 
PMH/SH reviewed - no change compared to H&P 
________________________________________________________________________ Care Plan discussed with: 
  Comments Patient Family  x   
RN x Care Manager Consultant Multidiciplinary team rounds were held today with , nursing, pharmacist and clinical coordinator. Patient's plan of care was discussed; medications were reviewed and discharge planning was addressed. ________________________________________________________________________ Total NON critical care TIME:  35   Minutes Total CRITICAL CARE TIME Spent:   Minutes non procedure based Comments >50% of visit spent in counseling and coordination of care    
________________________________________________________________________ Myra Keller MD  
 
Procedures: see electronic medical records for all procedures/Xrays and details which were not copied into this note but were reviewed prior to creation of Plan. LABS: 
I reviewed today's most current labs and imaging studies. Pertinent labs include: 
Recent Labs 11/07/20 
1134 11/06/20 
1751 WBC 15.6* 10.5 HGB 12.3 12.1 HCT 35.2 34.0*  
 186 Recent Labs 11/07/20 
1134 11/06/20 
1751  140  
K 3.7 4.0  
 106 CO2 22 25 * 110* BUN 21* 19  
CREA 1.29* 1.31* CA 8.3* 8.3*  
MG 2.0 1.8 PHOS 2.9  --   
ALB 2.2*  --   
TBILI 0.4  --   
ALT 26  --   
INR  --  1.2* Signed: Sukhjinder Arellano MD 
 
 
 
 
 no

## 2020-11-07 NOTE — PROGRESS NOTES
36 - received pt from ER. Ambulated to bed independently and w/o difficulty. Skin assessment completed and was unremarkable. No breakdown noted. Pt hooked up to cardiac monitor: respirations, O2 Sats, BP and HR are continuously being monitored. Alarms on. Explained to pt every move this writer made to get her situated in her room. Does not appear to understand as she's mute, per mother, but follows commands well. Alert, but not orientation unable to be assessed D/T being mute. Makes eye contact some of the time. Mother at pt bedside and stayed for a brief while until pt was situated. Provided hot frozen dinner to pt and mother made sure she ate before she left. Ate 100% and drank 1/2 of an orange juice to total 56mL PO intake. Obtained information for assessment from mother. 2300 - per telemetry tech, pt had 2 episodes of ventricular bigeminy - notified MD via RIGID - MD called back and ordered STAT EKG which was completed and scanned into the chart. 4100 - 9856: Pt was incontinent of urine - performed perineal care, changed linens, placed incontinent pad on bed, put on gripper socks. Asked pt to walk to bathroom w/ this writer and she was able to do so independently w/o assistance w/ nurse next to her side w/ steady gait. Voiced large amt of urine in toilet, cleaned pt again and placed brief on patient. No skin breakdown. Gown changed. 5430 - one episode of one missed beat noted on telemetry abraham. Respirations on monitor have been averaging 41 over the past few minutes, after pt exerted herself ambulating to and from bed to bathroom and to chair. Manually checked respirations and they were 36/min. Lung sounds remain diminished bilaterally. Slight intermittent productive cough noted. Bedside shift change report given to Sona Haines RN to include SBAR, Kardex, [facility] and Recent Results.

## 2020-11-08 NOTE — PROGRESS NOTES
ABG Results: 
 
PH:        7.47 PCO2:   30.0 P02:       68 
HC03:    21.1 BE:         -1.4 SATS:     95% On 4L NC

## 2020-11-08 NOTE — PROGRESS NOTES
6524) Consult Dr. Robin Artis, pt 84% on room air. On 2L, pt saturation at 92%. Pt respiratory rate 41. Order for 4 L for 94% oxygen saturation. 0424) Order for CXR from Dr. Robin Artis 
1250) Discuss CT with Dr. Robin Artis; order for EKG for possible ST depression 1252) Discuss with Dr. Robin Artis, CT delay [phone]) Discuss with mother Deidra Richey, pt shoes left in room. Plan to pick them up tonight 
1800) Pt belongings collected by mother Deidra Richey

## 2020-11-08 NOTE — PROGRESS NOTES
1512: Patient arrived to [facility] 415 via transport stretcher from [facility]. Patient on 6L nasal cannula. Primary Nurse Kaylene Medina and Rober RN performed a dual skin assessment on this patient No impairment noted Jai score is 18 Admitting and triage MD notified of patients arrival.  
 
Problem: Falls - Risk of 
Goal: *Absence of Falls Description: Document Ita Arias Fall Risk and appropriate interventions in the flowsheet. Outcome: Progressing Towards Goal 
Note: Fall Risk Interventions: 
Mobility Interventions: Assess mobility with egress test, Communicate number of staff needed for ambulation/transfer, OT consult for ADLs, Patient to call before getting OOB, PT Consult for mobility concerns, PT Consult for assist device competence, Strengthening exercises (ROM-active/passive), Utilize walker, cane, or other assistive device, Utilize gait belt for transfers/ambulation Mentation Interventions: Adequate sleep, hydration, pain control, Door open when patient unattended, Evaluate medications/consider consulting pharmacy, Eyeglasses and hearing aids, Familiar objects from home, Reorient patient, Room close to nurse's station, Self-releasing belt, Toileting rounds, Update white board, More frequent rounding Medication Interventions: Assess postural VS orthostatic hypotension, Evaluate medications/consider consulting pharmacy, Patient to call before getting OOB, Teach patient to arise slowly, Utilize gait belt for transfers/ambulation Elimination Interventions: Call light in reach, Elevated toilet seat, Patient to call for help with toileting needs, Stay With Me (per policy), Toilet paper/wipes in reach, Toileting schedule/hourly rounds Problem: Pressure Injury - Risk of 
Goal: *Prevention of pressure injury Description: Document Jai Scale and appropriate interventions in the flowsheet. Outcome: Progressing Towards Goal 
Note: Pressure Injury Interventions: Sensory Interventions: Assess changes in LOC, Assess need for specialty bed, Check visual cues for pain, Discuss PT/OT consult with provider, Float heels, Keep linens dry and wrinkle-free, Maintain/enhance activity level, Minimize linen layers, Pad between skin to skin, Turn and reposition approx. every two hours (pillows and wedges if needed) Moisture Interventions: Absorbent underpads, Limit adult briefs, Maintain skin hydration (lotion/cream), Minimize layers, Moisture barrier, Offer toileting Q_hr Activity Interventions: Assess need for specialty bed, Pressure redistribution bed/mattress(bed type), Increase time out of bed, PT/OT evaluation Mobility Interventions: Assess need for specialty bed, Float heels, HOB 30 degrees or less, Pressure redistribution bed/mattress (bed type), PT/OT evaluation, Turn and reposition approx. every two hours(pillow and wedges) Nutrition Interventions: Document food/fluid/supplement intake, Discuss nutritional consult with provider, Offer support with meals,snacks and hydration Bedside shift change report given to [address] [place] [address] (oncoming nurse) by Maame Menendez (offgoing nurse). Report included the following information SBAR, Kardex, ED Summary, Intake/Output, MAR, Recent Results, Med Rec Status, Cardiac Rhythm Sinus tach , Alarm Parameters  and Quality Measures.

## 2020-11-08 NOTE — PROGRESS NOTES
9379) Bedside shift change report given to Kj Taylor and Laura Adams (oncoming nurse) by Tomas Matias (offgoing nurse). Report included the following information SBAR, Intake/Output, MAR, Recent Results and Cardiac Rhythm ST.  
 
0824) Pt up to bathroom and returned to bed. Desat to mid-70s and placed on 2 L oxygen. O2sat up to 95%. Pt eating breakfast. 
 
1145) This RN attempted IV once. Trudy attempted IV once with success. Well-tolerated. 0) Notified MD of gely on heart monitor. 1433) TRANSFER - OUT REPORT: 
 
Verbal report given to Anna Merlos RN (name) on Amy Aguilar  being transferred to [facility] for change in patient condition(decompensation) Report consisted of patients Situation, Background, Assessment and  
Recommendations(SBAR). Information from the following report(s) SBAR, Intake/Output, MAR, Recent Results and Cardiac Rhythm ST was reviewed with the receiving nurse. Opportunity for questions and clarification was provided. Patient transported with: 
[facility] **note to receiving RN: no IV fluids**

## 2020-11-08 NOTE — H&P
HISTORY AND PHYSICAL Viviana Brown MD 
 
 
 
PCP: Diego, MD Kilo 
 
Please note that this dictation was completed with needmade, the computer voice recognition software. Quite often unanticipated grammatical, syntax, homophones, and other interpretive errors are inadvertently transcribed by the computer software. Please disregard these errors. Please excuse any errors that have escaped final proofreading. CHIEF COMPLAINTS Patient transferred from [facility] for higher level of care due to severe Covid with hypoxic respiratory failure. HISTORY OF PRESENT ILLNESS Source of history: Chart reviewed. Patient has history of Down syndrome, nonverbal at baseline thus could not provide history. Patient was admitted to [facility] on 11/6 for acute hypoxic respiratory failure of due to COVID-19 pneumonia and sepsis. She tested positive for Covid on 11/2. Patient reportedly had completed azithromycin prior to her hospitalization. Patient had received a dose of Remdesevir at [facility]. 
Lab and imaging data reviewed. Medication administration data reviewed. PMH/PSH: 
Past Medical History:  
Diagnosis Date  Endocrine disease  Hypothyroid 03/11/2018  Ill-defined condition Down's Syndrome No past surgical history on file. Home meds:  
Prior to Admission medications Medication Sig Start Date End Date Taking? Authorizing Provider  
risperiDONE (RisperDAL) 1 mg tablet Take 1 mg by mouth nightly. Provider, Historical  
solifenacin (VESICARE) 10 mg tablet TAKE 1 TABLET BY MOUTH EVERY DAY 9/18/20   Other, MD Kilo  
medroxyPROGESTERone (DEPO-PROVERA) 150 mg/mL injection 150 mg, IM, once, To be administered in clinic by nurse.  See order comments for schedule., # 1 vial, 4 Refills, Pharmacy: [facility]/pharmacy #[id number] 12/5/19   Other, MD Kilo  
albuterol (PROVENTIL HFA, VENTOLIN HFA, PROAIR HFA) 90 mcg/actuation inhaler Take 2 Puffs by inhalation every four (4) hours as needed for Wheezing. 11/2/20   Puma Leach MD  
zinc sulfate 220 mg tablet Take 1 Tab by mouth daily. 11/2/20   Puma Leach MD  
ascorbic acid, vitamin C, (Vitamin C) 500 mg chew Take 1 Tab by mouth daily for 7 days. 11/2/20 11/9/20  Puma Leach MD  
erythromycin (ILOTYCIN) ophthalmic ointment Apply 1 cm ribbon to the affected lower eyelid 4 times daily until symptoms resolve 11/2/20 11/9/20  Puma Leach MD  
cholecalciferol (VITAMIN D3) 1,000 unit tablet Take 1,000 Units by mouth daily. Kilo Cotrez MD  
levothyroxine (SYNTHROID) 50 mcg tablet Take 50 mcg by mouth Daily (before breakfast). Kilo Cortez MD  
 
 
Allergies: 
No Known Allergies FH: No family history on file. SH: Social History Tobacco Use  Smoking status: Never Smoker  Smokeless tobacco: Never Used Substance Use Topics  Alcohol use: No  
 
 
ROS: Review of systems not obtained due to patient factors. PHYSICAL EXAM: 
Visit Vitals BP (!) 122/53 (BP 1 Location: Right arm, BP Patient Position: At rest) Pulse (!) 108 Temp 97 °F (36.1 °C) Resp 22 Wt 84.8 kg (186 lb 15.2 oz) SpO2 98% BMI 32.09 kg/m² General:           Patient alert. Nonverbal.  On oxygen via nasal cannula. HEENT:           Atraumatic, anicteric sclerae, pink conjunctivae No oral ulcers, mucosa moist, throat clear, dentition fair Neck:               Supple, symmetrical,  thyroid: non tender Lungs: On oxygen via nasal collar. Crepitations on the posterior lung field. No evidence of accessory muscle use. Chest wall:      No tenderness  No Accessory muscle use. Heart:              Regular  rhythm,  No  murmur   No edema Abdomen:        Soft, non-tender. Not distended. Bowel sounds normal 
Extremities:     No cyanosis.   No clubbing,   
                        Skin turgor normal, Capillary refill normal, Radial dial pulse 2+ Skin:                Not pale. Not Jaundiced  No rashes Psych:             Not anxious or agitated. Neurologic:     Alert and nonverbal. Motor and sensory exam grossly intact. Labs/Imaging: 
Recent Results (from the past 24 hour(s)) CBC WITH AUTOMATED DIFF Collection Time: 11/08/20  6:07 AM  
Result Value Ref Range WBC 23.3 (HH) 3.6 - 11.0 K/uL  
 RBC 4.42 3.80 - 5.20 M/uL  
 HGB 11.8 11.5 - 16.0 g/dL HCT 33.7 (L) 35.0 - 47.0 % MCV 76.2 (L) 80.0 - 99.0 FL  
 MCH 26.7 26.0 - 34.0 PG  
 MCHC 35.0 30.0 - 36.5 g/dL  
 RDW 13.6 11.5 - 14.5 % PLATELET 912 935 - 715 K/uL MPV 11.4 8.9 - 12.9 FL  
 NRBC 0.0 0  WBC ABSOLUTE NRBC 0.00 0.00 - 0.01 K/uL NEUTROPHILS 86 (H) 32 - 75 % LYMPHOCYTES 6 (L) 12 - 49 % MONOCYTES 6 5 - 13 % EOSINOPHILS 0 0 - 7 % BASOPHILS 0 0 - 1 % IMMATURE GRANULOCYTES 2 (H) 0.0 - 0.5 % ABS. NEUTROPHILS 20.0 (H) 1.8 - 8.0 K/UL  
 ABS. LYMPHOCYTES 1.3 0.8 - 3.5 K/UL  
 ABS. MONOCYTES 1.4 (H) 0.0 - 1.0 K/UL  
 ABS. EOSINOPHILS 0.0 0.0 - 0.4 K/UL  
 ABS. BASOPHILS 0.1 0.0 - 0.1 K/UL  
 ABS. IMM. GRANS. 0.5 (H) 0.00 - 0.04 K/UL  
 DF AUTOMATED METABOLIC PANEL, BASIC Collection Time: 11/08/20  6:07 AM  
Result Value Ref Range Sodium 146 (H) 136 - 145 mmol/L Potassium 4.2 3.5 - 5.1 mmol/L Chloride 110 (H) 97 - 108 mmol/L  
 CO2 22 21 - 32 mmol/L Anion gap 14 5 - 15 mmol/L Glucose 136 (H) 65 - 100 mg/dL BUN 26 (H) 6 - 20 MG/DL Creatinine 1.27 (H) 0.55 - 1.02 MG/DL  
 BUN/Creatinine ratio 20 12 - 20 GFR est AA 57 (L) >60 ml/min/1.73m2 GFR est non-AA 47 (L) >60 ml/min/1.73m2 Calcium 8.1 (L) 8.5 - 10.1 MG/DL MAGNESIUM Collection Time: 11/08/20  6:07 AM  
Result Value Ref Range Magnesium 2.1 1.6 - 2.4 mg/dL PHOSPHORUS Collection Time: 11/08/20  6:07 AM  
Result Value Ref Range Phosphorus 4.0 2.6 - 4.7 MG/DL  
LD Collection Time: 11/08/20  6:07 AM  
Result Value Ref Range  (H) 81 - 246 U/L FERRITIN Collection Time: 11/08/20  6:07 AM  
Result Value Ref Range Ferritin 1,594 (H) 8 - 252 NG/ML  
C REACTIVE PROTEIN, QT Collection Time: 11/08/20  6:07 AM  
Result Value Ref Range C-Reactive protein 5.51 (H) 0.00 - 0.60 mg/dL PROCALCITONIN Collection Time: 11/08/20  6:07 AM  
Result Value Ref Range Procalcitonin 0.12 ng/mL D DIMER Collection Time: 11/08/20  6:07 AM  
Result Value Ref Range D-dimer 9.19 (H) 0.00 - 0.65 mg/L FEU FIBRINOGEN Collection Time: 11/08/20  6:07 AM  
Result Value Ref Range Fibrinogen 745 (H) 200 - 475 mg/dL HEPATIC FUNCTION PANEL Collection Time: 11/08/20 11:32 AM  
Result Value Ref Range Protein, total 7.0 6.4 - 8.2 g/dL Albumin 2.2 (L) 3.5 - 5.0 g/dL Globulin 4.8 (H) 2.0 - 4.0 g/dL A-G Ratio 0.5 (L) 1.1 - 2.2 Bilirubin, total 0.3 0.2 - 1.0 MG/DL Bilirubin, direct 0.1 0.0 - 0.2 MG/DL Alk. phosphatase 36 (L) 45 - 117 U/L  
 AST (SGOT) 44 (H) 15 - 37 U/L  
 ALT (SGPT) 29 12 - 78 U/L  
EKG, 12 LEAD, SUBSEQUENT Collection Time: 11/08/20  1:03 PM  
Result Value Ref Range Ventricular Rate 102 BPM  
 Atrial Rate 102 BPM  
 P-R Interval 98 ms QRS Duration 82 ms Q-T Interval 312 ms QTC Calculation (Bezet) 406 ms Calculated P Axis 42 degrees Calculated R Axis 47 degrees Calculated T Axis -91 degrees Diagnosis Sinus tachycardia with short NM T wave abnormality, consider inferior ischemia T wave abnormality, consider anterolateral ischemia Abnormal ECG When compared with ECG of 06-NOV-2020 23:09, 
MANUAL COMPARISON REQUIRED, DATA IS UNCONFIRMED Recent Labs 11/08/20 
0607 11/07/20 
1134 WBC 23.3* 15.6* HGB 11.8 12.3 HCT 33.7* 35.2  243 Recent Labs 11/08/20 
2231 11/07/20 
1134 11/06/20 
1751 * 143 140  
K 4.2 3.7 4.0  
* 108 106 CO2 22 22 25 BUN 26* 21* 19  
CREA 1.27* 1.29* 1.31* * 186* 110* CA 8.1* 8.3* 8.3*  
MG 2.1 2.0 1.8 PHOS 4.0 2.9  --   
 
Recent Labs 11/08/20 
1132 11/07/20 
1134 ALT 29 26 AP 36* 35* TBILI 0.3 0.4 TP 7.0 7.1 ALB 2.2* 2.2*  
GLOB 4.8* 4.9* Recent Labs 11/06/20 
1751 TROIQ <0.05 Recent Labs 11/06/20 
1751 INR 1.2* PTP 11.5* APTT 28.0 No results for input(s): PH, PCO2, PO2 in the last 72 hours. XR CHEST PORT Narrative: INDICATION: Hypoxia. Shortness of breath. Portable AP semiupright view of the chest. 
 
Direct comparison made to prior chest x-ray dated November 6, 2020. Cardiomediastinal silhouette is stable. There is a small left pleural effusion 
and mild left basilar atelectasis. There are increased interstitial markings in 
the left upper lung. Right lung is grossly clear. No right pleural fluid is 
visualized. There is no pneumothorax. Impression: IMPRESSION: Small left pleural effusion and left basilar atelectasis. Left upper 
lung increased interstitial markings. Assessment & Plan: This is a 30-year-old female was tested positive for Covid on 11/2 and was admitted at [facility] for acute hypoxic respiratory failure secondary to Covid pneumonia is transferred to [facility] for higher level of care. Acute hypoxic respiratory failure secondary to Covid pneumonia. Sepsis due to Covid pneumonia, POA 
-CAT scan of the chest on 11/10 showed scattered patchy areas of airspace disease in the lungs bilaterally, greater on the left. -Chest x-ray on 11/8 showed small left pleural effusion and left basilar atelectasis. Left upper lung increased interstitial markings. -Maintain droplet plus isolation 
-Supplemental oxygen via NC/mid flow/high flow to keep SPO2> 94% 
-Continue with Decadron and Remdesevir -DVT prophylaxis per Covid protocol 
-Monitor inflammatory markers. -Monitor LFTs 
-Consult ID and pulmonary. Leukocytosis most probably due to steroids. Monitor.   Procalcitonin is low.  She has recently completed outpatient oral antibiotics. - 
CKD stage III: Creatinine about baseline now. Mild hypernatremia could be due to viral sepsis. Monitor and if continues to rise may consider IV fluid at low rate Moderately large hiatus hernia. Distended fluid-filled esophagus. Detected on CT on 11/2 
-Per her mom, she is on mechanical soft diet. Her mom is not sure if he ever had GI evaluation. 
-She definitely needs GI evaluation once she is over this acute illness. - 
 
Hypothyroidism: Continue levothyroxine Down syndrome: Continue with Risperdal 
 
 
Patient's Baseline: Ambulates without assistive devices. DVT ppx: Lovenox Code status: Full code Disposition: Anticipate home Surrogate decision maker, POA: Mariposa Carbajal. Phone number [phone] Called updated patient's mother. Questions answered. Signed By: Jorge A Powell MD   
 November 8, 2020

## 2020-11-08 NOTE — PROGRESS NOTES
1915 - received bedside shift change report from Adeline Oration. 
 
2030 - Pt resting in bed upon assessment. Cardiac monitor on w/ attachments in place. Pt remains tachycardic between 110-115, respirations in the 30's, BP WDL and O2 Sats between 94 and 98%. Changed pt gown. Assisted pt to bathroom - ambulated well w/ assist x1 for safety. Pt assisted this writer with removing soiled brief (incontinent of urine) and did urinate in toilet. Perineal care completed and new brief placed on pt. Intermittent mild productive cough noted. Pt did well at using Albuterol Inhaler. Drinking orange juice frequently. Respirations unlabored and lung sounds diminished bilaterally. 2300 - tolerated PO Risperdal and Lovenox SQ Inj well  
 
0000 - pt was awake - administered albuterol Inh - pt did well using it properly. Assisted pt to bathroom again and she voided in toilet. Perineal care completed again. Brief was dry. 0400 - administered albuterol Inh per MAR - pt was incontinent of urine - perineal care completed and brief changed. Cardiac monitor alarms remain on. Pt remains tachycardic between 110-115, respirations in 30's, afebrile, no respiratory distress, slight wheezing on expiration noted, O2 Sats vary, fluctuating between % and pt will dip into upper 80's momentarily, but only when exerting herself when moving around in bed or coughing, and quickly rises back up above 94%. 3172-2208 - pt appeared to have slept for a couple of hours. Awoke pt for NS IV flush @ 0610.  
 
4858 - labs drawn and taken to laboratory. 0615 - Pt grabbing at lines intermittently and has to be reconnected to monitor. Slight Dyspnea on exertion noted when ambulating to bathroom or moving around in bed - mild accessory muscle use noted w/ breathing when exerted. 26 - Laboratory called - spoke w/ MsNafisa Ji who reported elevated WBC of 23.3 - informed Trudy GARRIDO and Pablo Rojas RN. 0720 - Bedside shift change report given to Antonia Tejada, RN and Murray Martinez RN to include SBAR, Kardex, [facility] and Recent Results.

## 2020-11-08 NOTE — CONSULTS
ID on call Impression Covid pneumonia SARS CoV 2+ on 11/2 CT chestB/L scattered patchy infiltrates Acute hypoxic respiratory failure 95% on 4 L Elevated acute phase reactants WBC 23.3, patient also on steroids DANY creatinine 1.27,e GFR 57 Down syndrome Plan Patient meets inclusion criteria for remdesivir IV,will start now Daily CMP-monitor creatinine, AST, ALT Hold remdesivir if- e GFR<30 or AST/ ALT >5 ULN Continue ceftriaxone azithromycin IV If concern for secondary bacterial infection-repeat CT chest 
Continue Decadron, vitamin C, zinc 
If further decline consider convalescent plasma D/w Dr Lani Greenwood.

## 2020-11-08 NOTE — CONSULTS
ID on call Patient was evaluated while at [address] [place] [address] earlier today and started on remdesivir IV, now transferred to [facility]. Peg Shafer Julio César Call was admitted to [facility] on 11/6 for acute hypoxic respiratory failure of due to COVID-19 pneumonia and sepsis. She tested positive for Covid on 11/2. Patient per history had completed azithromycin prior to her hospitalization. Patient received a dose 
 of Remdesevir at [facility] today and was transferred to [facility] for higher level of care due to worsening hypoxic respiratory failure. Impression Covid pneumonia SARS CoV 2+ on 11/2 CT chestB/L scattered patchy infiltrates Acute hypoxic respiratory failure was at 95% on 4 L, now on 6L Elevated acute phase [id number] 9.19, procalcitonin 0.12, ferritin 1594  WBC 23.3, patient also on steroids DANY creatinine 1.27,e GFR 57 Down's syndrome Hypothyroidism on Synthroid Plan Patient meets inclusion criteria for remdesivir IV,continue same [address] [place] [address] pharmacy notified that patient received dose of remdesivir 200 mg IV x1 this a.m. Daily CMP-monitor creatinine, AST, ALT Hold remdesivir if- e GFR<30 or AST/ ALT >5 ULN Continue ceftriaxone azithromycin IV If concern for secondary bacterial infection, also increasing D dimer -consider CTA chest 
Continue Decadron, vitamin C, zinc 
Monitor acute phase reactants If further decline consider convalescent plasma.

## 2020-11-08 NOTE — PROGRESS NOTES
Remdesivir Initiation Note Alissa Montalvo meets criteria for initiation of remdesivir under the emergency use authorization (EUA) based on the following: 
? Known or suspected COVID-19 
? Severe disease (SpO2 ? 94% on RA, requiring supplemental O2, or requiring invasive mechanical ventilation) ? Acceptable renal function 
o CrCl ? 30 ml/min based on SCr obtained prior to initiation OR  
o CrCl < 30 ml/min but the potential benefit of remdesivir outweighs the risk ? Acceptable hepatic function (ALT within 5 times ULN) I have discussed with the patient/proxy information consistent with the Fact Sheet for Patients and Parents/Caregivers\" and the patient/proxy has agreed to initiating remdesivir after being: ? Given the Fact Sheet for Patients and Parents/Caregivers ? Informed of the alternatives to receiving remdesivir, and  
? Informed that remdesivir is an unapproved drug that is authorized for use under EUA Liver function tests will be monitored daily while on remdesivir.

## 2020-11-08 NOTE — DISCHARGE SUMMARY
Hospitalist Discharge Summary Patient ID: 
Julissa Gonzalez 
[id number] 
61 y.o. 
1980 PCP on record: Diego, MD Kilo 
 
Admit date: 11/6/2020 Discharge date and time: 11/8/2020 Admission Diagnoses: Pneumonia due to COVID-19 virus [U07.1, J12.89] Discharge Diagnoses: Active Problems: 
  Pneumonia due to COVID-19 virus (11/6/2020) Hospital Course:  
Acute hypoxic respiratory failure due to COVID-19 PNA Sepsis due to COVID-19 PNA  
Sinus tachycardia d/t sepsis POA COVID test 11/2 positive. CT Chest 11/2 shows bilateral infiltrates.  Patient febrile, tachycardic, tachypneic on admission.  Admission Chest x-ray reviewed - shows bilateral airspace disease. Completed 5-day course of azithromycin as outpatient. Started on zinc, ascorbic acid, dexamethasone. Progressively developed more respiratory distress, new onset hypoxia now requiring 6 L NC. Inflammatory markers including d-dimer, Ferritin, LD, Procalcitonin, Fibrinogen elevated but stable compared to yesterday. Consulted with ID, Remdesivir initiated after discussion with mother Nargis Elizabeth - Received 200 mg IV Remdesivir at our facility. Due to concern for further clinical deterioration, need for pulmonary service/ICU and possible candidacy for convalescent plasma/Sophia, transferred to [facility].  
Started on treatment dose Lovenox empirically for possible PE. CTA Chest could not be performed at our hospital due to technical/schedule reasons.  
 
  
  
  
Hypothyroidism POA Down's Syndrome POA 
-risperdal 0.5 mg at night 
-Levothyroxine CONSULTATIONS: 
None Excerpted HPI from H&P of Meghann Dillon MD: 
47 y.o. female with PMH of Hypothyroidism, Down's syndrome who presents to ED with c/o shortness of breath. Patient is accompanied by mother, Nargis Johnson who is her primary caregiver and provides most of the history. Patient is nonverbal at baseline.   Patient was recently in the ER on 11/2 for left eye redness and was found to be febrile, tachycardic, CT scan with bilateral interstitial infiltrates, Covid test positive. She was at her baseline level of functioning without any shortness of breath or cough at the time and was sent home with prescription for zinc, ascorbic acid, azithromycin, dexamethasone which her mom states she is completed. However for the past few days her general condition has been worsening and she has been having fevers, lethargy, decreased oral intake, cough, shortness of breath, wheezing prompting her mother to bring her to the emergency room. ______________________________________________________________________ DISCHARGE SUMMARY/HOSPITAL COURSE:  for full details see H&P, daily progress notes, labs, consult notes. Visit Vitals /72 Pulse 96 Temp 97.4 °F (36.3 °C) Resp 19 Ht 5' 4\" (1.626 m) Wt 84.4 kg (186 lb) SpO2 95% BMI 31.93 kg/m²  
 
_______________________________________________________________ Patient seen and examined by me on discharge day. Pertinent Findings: 
Gen:    In mod resp distress Chest: Diminished breath sounds anterior lung fields CVS:   Tachycardic Regular rhythm. No edema Abd:  Soft, not distended, not tender Neuro:  Alert with Poor insight.   
_______________________________________________________________________ DISCHARGE MEDICATIONS:  
Current Discharge Medication List  
  
 
 
My Recommended Diet, Activity, Wound Care, and follow-up labs are listed in the patient's Discharge Insturctions which I have personally completed and reviewed. _______________________________________________________________________ DISPOSITION:    
Home with Family:   
Home with HH/PT/OT/RN:   
SNF/LTC:   
MIGUEL:   
OTHER: Transfer to [address] [place] [address] Condition at Discharge:  Stable 
_______________________________________________________________________ Follow up with: PCP : Other, Phys, MD 
Follow-up Information Follow up With Specialties Details Why Contact Info Dr. Palak Obrien Other, MD Kilo    Patient can only remember the practice name and not the physician Total time in minutes spent coordinating this discharge (includes going over instructions, follow-up, prescriptions, and preparing report for sign off to her PCP) :  40 minutes Signed: 
Shital Johnson MD

## 2020-11-09 NOTE — PROGRESS NOTES
SLP Contact Note SLP evaluation complete. Pt cleared for baseline diet of mechanical soft and thin liquids. Full note to follow. Thank you, Sydnie Krause M.Ed, CCC-SLP Speech-Language Pathologist

## 2020-11-09 NOTE — PROGRESS NOTES
Problem: Risk for Spread of Infection Goal: Prevent transmission of infectious organism to others Description: Prevent the transmission of infectious organisms to other patients, staff members, and visitors. Outcome: Progressing Towards Goal 
Note: Pt on airborne and droplet + precautions, pt COVID+ Problem: Falls - Risk of 
Goal: *Absence of Falls Description: Document Antonia Quintana Fall Risk and appropriate interventions in the flowsheet. Outcome: Progressing Towards Goal 
Note: Fall Risk Interventions: 
Mobility Interventions: Communicate number of staff needed for ambulation/transfer, Patient to call before getting OOB, Strengthening exercises (ROM-active/passive), Utilize gait belt for transfers/ambulation Mentation Interventions: Adequate sleep, hydration, pain control, Bed/chair exit alarm, Evaluate medications/consider consulting pharmacy, Increase mobility, More frequent rounding, Reorient patient, Toileting rounds Medication Interventions: Patient to call before getting OOB, Evaluate medications/consider consulting pharmacy, Teach patient to arise slowly, Bed/chair exit alarm Elimination Interventions: Bed/chair exit alarm, Call light in reach, Elevated toilet seat, Patient to call for help with toileting needs, Stay With Me (per policy), Toilet paper/wipes in reach, Toileting schedule/hourly rounds Problem: Pressure Injury - Risk of 
Goal: *Prevention of pressure injury Description: Document Jai Scale and appropriate interventions in the flowsheet. Outcome: Progressing Towards Goal 
Note: Pressure Injury Interventions: 
Sensory Interventions: Assess changes in LOC, Avoid rigorous massage over bony prominences, Check visual cues for pain, Float heels, Keep linens dry and wrinkle-free, Maintain/enhance activity level, Minimize linen layers, Pressure redistribution bed/mattress (bed type), Turn and reposition approx. every two hours (pillows and wedges if needed) Moisture Interventions: Absorbent underpads, Apply protective barrier, creams and emollients, Check for incontinence Q2 hours and as needed, Limit adult briefs, Maintain skin hydration (lotion/cream), Minimize layers, Moisture barrier Activity Interventions: Assess need for specialty bed, Chair cushion, Increase time out of bed, Pressure redistribution bed/mattress(bed type) Mobility Interventions: Assess need for specialty bed, Float heels, HOB 30 degrees or less, Turn and reposition approx. every two hours(pillow and wedges) Nutrition Interventions: Document food/fluid/supplement intake, Discuss nutritional consult with provider, Offer support with meals,snacks and hydration Problem: Aspiration - Risk of 
Goal: *Absence of aspiration Outcome: Progressing Towards Goal 
  
Problem: Pneumonia: Day 1 Goal: Respiratory Outcome: Progressing Towards Goal 
Note: Pt on 6L NC with humidity, oxygen saturations >90%. Will wean as tolerated. Bedside shift change report given to Rachel Vargas RN (oncoming nurse) by Rabia Jaramillo (offgoing nurse). Report included the following information SBAR, Kardex, ED Summary, Procedure Summary, Intake/Output, MAR, Recent Results and Cardiac Rhythm NSR.

## 2020-11-09 NOTE — CONSULTS
PULMONARY MEDICINE Initial Physician Consultation Note Name: Emiliano Homans : 1980 MRN: [id number] Date: 2020 Subjective:  
Consult Note: 2020 Requesting Physician: Dr. Micaela Kramer Reason for consult: COVID-19 pneumonia and hypoxemia Medical records and data reviewed. Patient is a 47 y.o. female who presented to the hospital with shortness of breath. She was admitted to [facility].  Chest x-ray and CT had suggested bilateral patchy infiltrates and COVID-19 infection was confirmed on . She presented to the hospital again on the sixth with worsening symptoms and was requiring increased oxygen flow at 6 L/min. She was started on remdesivir and transferred to [facility] for further management in case of clinical decline. Patient has been placed on Decadron and is receiving remdesivir. She has underlying Down syndrome and is unable to consent for inhaled nitric oxide clinical trial.  She could benefit from convalescent plasma and hospitalist is following. Procalcitonin level is not elevated. Review of Systems: A comprehensive 12 system review of systems was limited from the patient Assessment:  
 
Acute hypoxic pulmonary insufficiency Pneumonia due to COVID-19 virus with elevated inflammatory markers Down syndrome Other medical problems per chart Recommendations:  
 
Wean oxygen as tolerated Continue supportive treatment with vitamin C and zinc 
On remdesivir On Decadron On Lovenox Monitor clinical course-if further worsening, consider convalescent plasma She is unable to consent for inhaled nitric oxide study She is acutely ill and is at risk for further decline, continue close monitoring Active Problem List:  
 
Problem List  Never Reviewed Codes Class Pneumonia due to COVID-19 virus ICD-10-CM: U07.1, J12.89 ICD-9-CM: 480.8 Past Medical History: has a past medical history of Endocrine disease, Hypothyroid (03/11/2018), and Ill-defined condition. Past Surgical History:  
 
 has no past surgical history on file. Home Medications:  
 
Prior to Admission medications Medication Sig Start Date End Date Taking? Authorizing Provider  
risperiDONE (RisperDAL) 1 mg tablet Take 1 mg by mouth nightly. Provider, Aicha  
solifenacin (VESICARE) 10 mg tablet TAKE 1 TABLET BY MOUTH EVERY DAY 9/18/20   OtherKilo MD  
medroxyPROGESTERone (DEPO-PROVERA) 150 mg/mL injection 150 mg, IM, once, To be administered in clinic by nurse. See order comments for schedule., # 1 vial, 4 Refills, Pharmacy: [facility]/pharmacy #[id number] 12/5/19   Kilo Cortez MD  
albuterol (PROVENTIL HFA, VENTOLIN HFA, PROAIR HFA) 90 mcg/actuation inhaler Take 2 Puffs by inhalation every four (4) hours as needed for Wheezing. 11/2/20   Wesley Skelton MD  
zinc sulfate 220 mg tablet Take 1 Tab by mouth daily. 11/2/20   Wesley Skelton MD  
ascorbic acid, vitamin C, (Vitamin C) 500 mg chew Take 1 Tab by mouth daily for 7 days. 11/2/20 11/9/20  Wesley Skelton MD  
erythromycin (ILOTYCIN) ophthalmic ointment Apply 1 cm ribbon to the affected lower eyelid 4 times daily until symptoms resolve 11/2/20 11/9/20  Wesley Skelton MD  
cholecalciferol (VITAMIN D3) 1,000 unit tablet Take 1,000 Units by mouth daily. Kilo Cortez MD  
levothyroxine (SYNTHROID) 50 mcg tablet Take 50 mcg by mouth Daily (before breakfast). Kilo Cortez MD  
 
 
Allergies/Social/Family History: No Known Allergies Social History Tobacco Use  Smoking status: Never Smoker  Smokeless tobacco: Never Used Substance Use Topics  Alcohol use: No  
  
No family history on file. Objective:  
Vital Signs: 
Visit Vitals /63 (BP 1 Location: Right arm, BP Patient Position: At rest) Pulse 88 Temp 97.8 °F (36.6 °C) Resp 23 Wt 84.8 kg (186 lb 15.2 oz) SpO2 97% BMI 32.09 kg/m² O2 Flow Rate (L/min): 6 l/min O2 Device: Nasal cannula Temp (24hrs), Av.8 °F (36.6 °C), Min:97 °F (36.1 °C), Max:98.6 °F (37 °C) Intake/Output:  
No intake or output data in the 24 hours ending 20 1259 Pertinent physical exam performed with PPE and COVID 19 distancing precautions as indicated Physical exam findings of attending physician rounding on patient today reviewed as documented Physical Exam:  
General:  Alert, cooperative, no distress, appears stated age. Tired appearing, noncommunicative Head:  Normocephalic Lungs:   Symmetrical expansion Chest wall:  No deformity. Heart:  Regular rate and rhythm Abdomen:   Non-distended Extremities:  Contractures Skin: No rashes or lesions Neurologic:  Known Down syndrome LABS AND  DATA: Personally reviewed Recent Labs 20 
[id number] 20 
2121 WBC 21.4* 23.3* HGB 11.4* 11.8 HCT 33.2* 33.7*  
 287 Recent Labs 20 
0607 20 
1134 * 146* 143  
K 4.6 4.2 3.7 * 110* 108 CO2 26 22 22 BUN 26* 26* 21* CREA 1.05* 1.27* 1.29* * 136* 186* CA 8.3* 8.1* 8.3*  
MG  --  2.1 2.0 PHOS  --  4.0 2.9 Recent Labs 20 
1132 AP 44* 36* TP 6.2* 7.0 ALB 2.3* 2.2*  
GLOB 3.9 4.8* Recent Labs 20 
0443 20 
1751 INR 1.1 1.2* PTP 11.8* 11.5* APTT  --  28.0 No results for input(s): PHI, PCO2I, PO2I, FIO2I in the last 72 hours. Recent Labs 20 
1751 TROIQ <0.05  
 
 
MEDS: Reviewed Chest Imaging: personally reviewed and report checked Tele- reviewed Medical decision making:  
I have reviewed the flowsheet and previous day's notes Patient has acute or chronic illness that poses a threat to life or bodily function Review and order of Clinical lab tests Review and Order of Radiology tests Independent visualization of Image High Risk Drug therapy requiring intensive monitoring for toxicity: eg steroids, pressors, antibiotics Thank you for allowing me to participate in this patient's care. Martin Singleton MD 
 
 
[facility]

## 2020-11-09 NOTE — PROGRESS NOTES
[address] [facility] Adult  Hospitalist Group Hospitalist Progress Note Tegan Rogers MD 
Answering service: [phone] OR [phone] from in house phone Date of Service:  2020 NAME:  Ryan Hinson :  1980 MRN:  [id number] This documentation was facilitated by a Voice Recognition software and may contain inadvertent typographical errors. Interval history / Subjective:  
    
Sitting on BC,nurse in the room assisting patient. Patient is non verbal.On oxygen via NC,not in distress Assessment & Plan: This is a 73-year-old female was tested positive for Covid on  and was admitted at [facility] for acute hypoxic respiratory failure secondary to Covid pneumonia is transferred to [facility] for higher level of care. Acute hypoxic respiratory failure secondary to Covid pneumonia. Sepsis due to Covid pneumonia, POA 
-CAT scan of the chest on 11/10 showed scattered patchy areas of airspace disease in the lungs bilaterally, greater on the left. -Chest x-ray on  showed small left pleural effusion and left basilar atelectasis. Left upper lung increased interstitial markings. -Maintain droplet plus isolation 
-Supplemental oxygen via NC/mid flow/high flow to keep SPO2> 94% 
-Continue with Decadron and Remdesevir -DVT prophylaxis per Covid protocol(intermediate risk) -Monitor inflammatory markers. -Monitor LFTs 
-Consult ID and pulmonary. Leukocytosis most probably due to steroids. Monitor. Procalcitonin is low. She has recently completed outpatient oral antibiotics. - 
CKD stage III: Creatinine about baseline now. Mild hypernatremia could be due to viral sepsis. Monitor and if continues to rise may consider IV fluid at low rate Moderately large hiatus hernia. Distended fluid-filled esophagus. Detected on CT on  -Per her mom, she is on mechanical soft diet. Her mom is not sure if he ever had GI evaluation. 
-She definitely needs GI evaluation once she is over this acute illness. - 
  
Hypothyroidism: Continue levothyroxine Down syndrome: Continue with Risperdal 
 
Diet: speech evaluated and cleared her for baseline diet of mechanical soft and thin liquids Patient's Baseline: Ambulates without assistive devices. DVT ppx: Lovenox Code status: Full code Disposition: Anticipate home Surrogate decision maker, POA: Vipul Murillo Phone number [phone] Dispo: 3-5 days,when she is weaned off oxygen,completed at least 5 days of Remdesivir. Hospital Problems  Never Reviewed Codes Class Noted POA Pneumonia due to COVID-19 virus ICD-10-CM: U07.1, J12.89 ICD-9-CM: 480.8  11/6/2020 Unknown Review of Systems:  
Review of systems not obtained due to patient factors. Vital Signs:  
 Last 24hrs VS reviewed since prior progress note. Most recent are: 
Visit Vitals /63 (BP 1 Location: Right arm, BP Patient Position: At rest) Pulse 88 Temp 97.8 °F (36.6 °C) Resp 23 Wt 84.8 kg (186 lb 15.2 oz) SpO2 97% BMI 32.09 kg/m² Intake/Output Summary (Last 24 hours) at 11/9/2020 1416 Last data filed at 11/9/2020 1123 Gross per 24 hour Intake 250 ml Output  Net 250 ml Physical Examination:  
 
 
     
Constitutional:  Non verbal.On oxygen via NC. Not in distress HEENT:  Atraumatic. Oral mucosa moist,. Non icteric sclera. No pallor. Resp:  On oxygen via nc,not in distress, not tachypneic,+scattered wheezing. No accessory muscle use Chest Wall: No deformity CV:  Regular rhythm, normal rate, no murmurs, gallops, rubs GI:  Soft, non distended, non tender. normoactive bowel sounds, no hepatosplenomegaly :  No CVA or suprapubic tenderness Musculoskeletal:  No edema, warm, 2+ pulses throughout  Neurologic:  Mental status:AAOx3,  
 Cranial nerves II-XII : WNL Motor exam:Moves all extremities symmetrically Data Review:  
 Review and/or order of clinical lab test 
Review and/or order of tests in the radiology section of CPT Review and/or order of tests in the medicine section of [facility] Labs:  
 
Recent Labs 11/09/20 
077 [id number] 7032 11/08/20 
4271 WBC 21.4* 23.3* HGB 11.4* 11.8 HCT 33.2* 33.7*  
 287 Recent Labs 11/09/20 
0443 11/08/20 
0607 11/07/20 
1134 11/06/20 
1751 * 146* 143 140  
K 4.6 4.2 3.7 4.0  
* 110* 108 106 CO2 26 22 22 25 BUN 26* 26* 21* 19  
CREA 1.05* 1.27* 1.29* 1.31* * 136* 186* 110* CA 8.3* 8.1* 8.3* 8.3*  
MG  --  2.1 2.0 1.8 PHOS  --  4.0 2.9  --   
 
Recent Labs 11/09/20 
0443 11/08/20 
1132 11/07/20 
1134 ALT 30 29 26 AP 44* 36* 35* TBILI 0.3 0.3 0.4 TP 6.2* 7.0 7.1 ALB 2.3* 2.2* 2.2*  
GLOB 3.9 4.8* 4.9* Recent Labs 11/09/20 
0443 11/06/20 
1751 INR 1.1 1.2* PTP 11.8* 11.5* APTT  --  28.0 Recent Labs 11/09/20 
077 0987 7032 11/08/20 
8462 FERR 1,234* 1,594* No results found for: FOL, RBCF Recent Labs 11/08/20 
1320 PH 7.47* PCO2 30* PO2 68* Recent Labs 11/06/20 
1751 TROIQ <0.05 No results found for: CHOL, CHOLX, CHLST, CHOLV, HDL, HDLP, LDL, LDLC, DLDLP, TGLX, TRIGL, TRIGP, CHHD, CHHDX Lab Results Component Value Date/Time Glucose (POC) 82 03/11/2018 02:32 PM  
 
Lab Results Component Value Date/Time  Color YELLOW/STRAW 11/02/2020 06:16 PM  
 Appearance CLOUDY (A) 11/02/2020 06:16 PM  
 Specific gravity 1.030 11/02/2020 06:16 PM  
 Specific gravity 1.015 03/11/2018 01:37 PM  
 pH (UA) 5.5 11/02/2020 06:16 PM  
 Protein >300 (A) 11/02/2020 06:16 PM  
 Glucose Negative 11/02/2020 06:16 PM  
 Ketone Negative 11/02/2020 06:16 PM  
 Bilirubin NEGATIVE  03/11/2018 01:37 PM  
 Urobilinogen 1.0 11/02/2020 06:16 PM  
 Nitrites Negative 11/02/2020 06:16 PM  
 Leukocyte Esterase Negative 11/02/2020 06:16 PM  
 Epithelial cells MODERATE (A) 11/02/2020 06:16 PM  
 Bacteria 1+ (A) 11/02/2020 06:16 PM  
 WBC 5-10 11/02/2020 06:16 PM  
 RBC 0-5 11/02/2020 06:16 PM  
 
 
 
Medications Reviewed:  
 
Current Facility-Administered Medications Medication Dose Route Frequency  enoxaparin (LOVENOX) injection 40 mg  40 mg SubCUTAneous Q12H  
 acetaminophen (TYLENOL) tablet 650 mg  650 mg Oral Q6H PRN Or  
 acetaminophen (TYLENOL) suppository 650 mg  650 mg Rectal Q6H PRN  
 albuterol (PROVENTIL HFA, VENTOLIN HFA, PROAIR HFA) inhaler 2 Puff  2 Puff Inhalation Q4H PRN  
 ascorbic acid (vitamin C) (VITAMIN C) tablet 500 mg  500 mg Oral BID  cholecalciferol (VITAMIN D3) (1000 Units /25 mcg) tablet 1 Tab  1,000 Units Oral DAILY  levothyroxine (SYNTHROID) tablet 50 mcg  50 mcg Oral ACB  risperiDONE (RisperDAL) tablet 1 mg  1 mg Oral QHS  pantoprazole (PROTONIX) tablet 40 mg  40 mg Oral ACB  dexamethasone (PF) (DECADRON) 10 mg/mL injection 6 mg  6 mg IntraVENous Q24H  
 oxybutynin chloride XL (DITROPAN XL) tablet 10 mg  10 mg Oral DAILY  zinc sulfate (ZINCATE) 220 (50) mg capsule 1 Cap  1 Cap Oral DAILY  remdesivir 100 mg in 0.9% sodium chloride 250 mL IVPB  100 mg IntraVENous Q24H  
 
______________________________________________________________________ EXPECTED LENGTH OF STAY: - - - 
ACTUAL LENGTH OF STAY:          1 Nas Dunlap MD

## 2020-11-09 NOTE — PROGRESS NOTES
SPEECH PATHOLOGY BEDSIDE SWALLOW EVALUATION/DISCHARGE Patient: Amy Aguilar (71 y.o. female) Date: 11/9/2020 Primary Diagnosis: Pneumonia due to COVID-19 virus [U07.1, J12.89] Precautions: Aspiration, COVID-19 ASSESSMENT : 
Based on the objective data described below, the patient presents with mostly functional oropharyngeal phases of swallow on this date with no overt difficulty appreciated nor overt s/s of aspiration. Pt with slightly prolonged mastication with solid consistency. Given this and given pt with hx of pocketing and requiring soft solids, recommend pt reinitiate baseline diet as outlined below. Suspect pt is at her baseline swallow function. Given hx of down's syndrome suspect drooling is more related to this. Can consider placing medications in ice cream or applesauce to assist. Skilled acute therapy provided by a speech-language pathologist is not indicated at this time. PLAN : 
Recommendations: 
--Mechanical soft diet/thin liquids 
--Pt able to feed self 
--Meds in applesauce vs ice cream 
--Alternate liquids/solids to clear pocketing Discharge Recommendations: None SUBJECTIVE:  
Patient very eager to drink and drank first cup of water immediately after receiving it from SLP. Holland Kay OBJECTIVE:  
 
Past Medical History:  
Diagnosis Date  Endocrine disease  Hypothyroid 03/11/2018  Ill-defined condition Down's Syndrome No past surgical history on file. Diet prior to admission: Mechanical soft diet/thin liquids Current Diet:  NPO Cognitive and Communication Status: 
Neurologic State: Alert Orientation Level: Unable to verbalize Cognition: Follows commands Oral Assessment: 
Oral Assessment Labial: No impairment Dentition: Natural;Intact Lingual: No impairment Velum: No impairment Mandible: No impairment P.O. Trials: 
Patient Position: upright in bed Vocal quality prior to P.O.: No impairment Consistency Presented: Thin liquid; Solid;Puree How Presented: Self-fed/presented;Cup/sip;Spoon;Straw;Successive swallows Bolus Acceptance: No impairment Bolus Formation/Control: Impaired Type of Impairment: Delayed Propulsion: Discoordination Oral Residue: None Initiation of Swallow: No impairment Laryngeal Elevation: Functional 
Aspiration Signs/Symptoms: None Pharyngeal Phase Characteristics: No impairment, issues, or problems Oral Phase Severity: Mild Pharyngeal Phase Severity : No impairment NOMS:  
The NOMS functional outcome measure was used to quantify this patient's level of swallowing impairment. Based on the NOMS, the patient was determined to be at level 5 for swallow function NOMS Swallowing Levels: 
Level 1 (CN): NPO Level 2 (CM): NPO but takes consistency in therapy Level 3 (CL): Takes less than 50% of nutrition p.o. and continues with nonoral feedings; and/or safe with mod cues; and/or max diet restriction Level 4 (CK): Safe swallow but needs mod cues; and/or mod diet restriction; and/or still requires some nonoral feeding/supplements Level 5 (CJ): Safe swallow with min diet restriction; and/or needs min cues Level 6 (CI): Independent with p.o.; rare cues; usually self cues; may need to avoid some foods or needs extra time Level 7 (CH): Independent for all p.o. SID. (2003). National Outcomes Measurement System (NOMS): Adult Speech-Language Pathology User's Guide. Pain: 
Pain Scale 1: Adult Nonverbal Pain Scale Pain Intensity 1: 0 After treatment:  
Call bell within reach and Nursing notified COMMUNICATION/EDUCATION:  
 
The patient's plan of care including recommendations, planned interventions, and recommended diet changes were discussed with: Registered nurse. Thank you for this referral. 
Delmar Dawkins, SLP Time Calculation: 10 mins

## 2020-11-09 NOTE — PROGRESS NOTES
After eating dinner pt was asked to swallow a pill. Pt did not swallow, and when asked to spit out pill she spit out a large amount of pocked food, and secretions. RN made patient NPO overnight. RN suctioned pt orally q2 hours due to not being able to clear secretions well. Pt respiratory rate was normal with saturations above 90%. Speech eval today. Problem: Risk for Spread of Infection Goal: Prevent transmission of infectious organism to others Description: Prevent the transmission of infectious organisms to other patients, staff members, and visitors. Outcome: Progressing Towards Goal 
  
Problem: Falls - Risk of 
Goal: *Absence of Falls Description: Document Jaison Quintero Fall Risk and appropriate interventions in the flowsheet. Outcome: Progressing Towards Goal 
Note: Fall Risk Interventions: 
Mobility Interventions: Assess mobility with egress test, Bed/chair exit alarm, Patient to call before getting OOB, PT Consult for mobility concerns Mentation Interventions: Adequate sleep, hydration, pain control, Bed/chair exit alarm, Evaluate medications/consider consulting pharmacy, Toileting rounds, Update white board Medication Interventions: Assess postural VS orthostatic hypotension, Evaluate medications/consider consulting pharmacy, Patient to call before getting OOB, Teach patient to arise slowly Elimination Interventions: Bed/chair exit alarm, Call light in reach, Patient to call for help with toileting needs, Stay With Me (per policy), Toilet paper/wipes in reach, Toileting schedule/hourly rounds Problem: Aspiration - Risk of 
Goal: *Absence of aspiration Outcome: Progressing Towards Goal 
  
Problem: Patient Education: Go to Patient Education Activity Goal: Patient/Family Education Outcome: Progressing Towards Goal 
  
Last 3 Recorded Weights in this Encounter 11/08/20 1512 Weight: 84.8 kg (186 lb 15.2 oz) Bedside and Verbal shift change report given to Timi Mckenzie (oncoming nurse) by Singh Merrill (offgoing nurse). Report included the following information SBAR, Kardex, ED Summary, Intake/Output, MAR, Accordion, Recent Results, Med Rec Status, Cardiac Rhythm Sinus tach, Alarm Parameters , and Procedure Verification.

## 2020-11-09 NOTE — PROGRESS NOTES
Transition of Care RUR 21% COVID 19-Positive Receiving Remdesivir Disposition  Home with mother, Frandy Shows Transportation   Mother- Scherry Shows Contact Mother Frandy Shows [phone] Patient transferred from [address] [place] [address] to [facility] due to pneumonia due to   COVID 19, and  hypoxic respiratory failure. CM reviewed CM assessment 11/6/ 2020. Patient has  Down's syndrome and is non verbal at baseline. Her mother, Frandy Atkinson  is patient's  main caretaker. There are no additional caretakers. Patient lives with her mother in one level home. Patient uses, no DME. No AMD but mother is next of kin and makes decisions. The plan is for patient to be discharged home in 3-5 days and being weaned off oxygen. Patient will need 2nd IM letter prior to discharge. First one given on 11/6/2020 Care Management Interventions PCP Verified by CM: Yes Mode of Transport at Discharge: (car with mother) Transition of Care Consult (CM Consult): Discharge Planning Discharge Durable Medical Equipment: No 
Occupational Therapy Consult: No 
Speech Therapy Consult: Yes Current Support Network: Relative's Home(lives with mother in one level home  Mother main caretaker-  Patient Down's Syndrome   No AMD  Mother makes decisions) Confirm Follow Up Transport: Family Discharge Location Discharge Placement: Home

## 2020-11-10 NOTE — PROGRESS NOTES
[facility] [facility] Adult  Hospitalist Group Hospitalist Progress Note Tesfaye Lemus MD 
Answering service: [phone] OR [phone] from in house phone Date of Service:  11/10/2020 NAME:  Juan Flanagan :  1980 MRN:  [id number] This documentation was facilitated by a Voice Recognition software and may contain inadvertent typographical errors. Interval history / Subjective:  
    
Patient is non verbal.On [address] 8lpm, spo2 97%,RR 22-25 during my rounding. She does not appear to be in distress Called her mother to discuss if she would be interested in convalescent plasma,got vm,will try later. Sunitha Brewer Assessment & Plan: This is a 53-year-old female was tested positive for Covid on  and was admitted at [facility] for acute hypoxic respiratory failure secondary to Covid pneumonia is transferred to [address] [place] [address] for higher level of care. Acute hypoxic respiratory failure secondary to Covid pneumonia. Sepsis due to Covid pneumonia, POA 
-CAT scan of the chest on 11/10 showed scattered patchy areas of airspace disease in the lungs bilaterally, greater on the left. -Chest x-ray on  showed small left pleural effusion and left basilar atelectasis. Left upper lung increased interstitial markings. -Maintain droplet plus isolation 
-Supplemental oxygen via NC/mid flow/high flow to keep SPO2> 94% 
-Continue with Decadron and Remdesevir -DVT prophylaxis per Covid protocol(intermediate risk) -Monitor inflammatory markers. -Monitor LFTs,stable so far. -Consult ID and pulmonary. Leukocytosis most probably due to steroids. Monitor. Procalcitonin is low. She has recently completed outpatient oral antibiotics. - 
CKD stage III: Creatinine about baseline now. Mild hypernatremia could be due to viral sepsis. improved. Moderately large hiatus hernia. Distended fluid-filled esophagus. Detected on CT on 11/2 
-Per her mom, she is on mechanical soft diet. Her mom is not sure if he ever had GI evaluation. 
-She definitely needs GI evaluation once she is over this acute illness. - 
  
Hypothyroidism: Continue levothyroxine Down syndrome: Continue with Risperdal 
 
 
 
Diet: speech evaluated and cleared her for baseline diet of mechanical soft and thin liquids Patient's Baseline: Ambulates without assistive devices. DVT ppx: Lovenox Code status: Full code Disposition: Anticipate home Surrogate decision maker, POA: Laurkimberleyn Beverage. Phone number [phone] Dispo: 3-5 days,when she is weaned off oxygen,completed at least 5 days of Remdesivir. Hospital Problems  Never Reviewed Codes Class Noted POA Pneumonia due to COVID-19 virus ICD-10-CM: U07.1, J12.89 ICD-9-CM: 480.8  11/6/2020 Unknown Review of Systems:  
Review of systems not obtained due to patient factors. Vital Signs:  
 Last 24hrs VS reviewed since prior progress note. Most recent are: 
Visit Vitals /68 (BP 1 Location: Right arm, BP Patient Position: At rest) Pulse 85 Temp 98.1 °F (36.7 °C) Resp (!) 35 Wt 84 kg (185 lb 1.6 oz) SpO2 95% BMI 31.77 kg/m² Intake/Output Summary (Last 24 hours) at 11/10/2020 1219 Last data filed at 11/10/2020 4632 Gross per 24 hour Intake 0 ml Output 200 ml Net -200 ml Physical Examination:  
 
 
     
Constitutional:  Non verbal.On oxygen via NC. Not in distress HEENT:  Atraumatic. Oral mucosa moist,. Non icteric sclera. No pallor. Resp:  On oxygen via mfnc,not in distress, not tachypneic,+scattered wheezing. No accessory muscle use Chest Wall: No deformity CV:  Regular rhythm, normal rate, no murmurs, gallops, rubs GI:  Soft, non distended, non tender. normoactive bowel sounds, no hepatosplenomegaly :  No CVA or suprapubic tenderness Musculoskeletal:  No edema, warm, 2+ pulses throughout Neurologic:  Mental status:AAOx3,  
Cranial nerves II-XII : WNL Motor exam:Moves all extremities symmetrically Data Review:  
 Review and/or order of clinical lab test 
Review and/or order of tests in the radiology section of CPT Review and/or order of tests in the medicine section of [facility] Labs:  
 
Recent Labs 11/10/20 
0358 11/09/20 
4135 WBC 20.4* 21.4* HGB 11.2* 11.4* HCT 33.1* 33.2*  
 320 Recent Labs 11/10/20 
9187 11/09/20 0443 11/08/20 
3136  146* 146*  
K 4.6 4.6 4.2 * 115* 110* CO2 24 26 22 BUN 29* 26* 26* CREA 1.02 1.05* 1.27* * 137* 136* CA 9.0 8.3* 8.1*  
MG  --   --  2.1 PHOS  --   --  4.0 Recent Labs 11/10/20 
0358 11/09/20 
0443 11/08/20 
1132 ALT 28 30 29 AP 42* 44* 36* TBILI 0.2 0.3 0.3 TP 6.8 6.2* 7.0 ALB 2.3* 2.3* 2.2*  
GLOB 4.5* 3.9 4.8* Recent Labs 11/10/20 
0358 11/09/20 
0506 INR 1.2* 1.1 PTP 12.4* 11.8* Recent Labs 11/10/20 
0358 11/09/20 
7756 FERR 1,007* 1,234* No results found for: FOL, RBCF Recent Labs 11/08/20 
1320 PH 7.47* PCO2 30* PO2 68* No results for input(s): CPK, CKNDX, TROIQ in the last 72 hours. No lab exists for component: CPKMB No results found for: CHOL, CHOLX, CHLST, CHOLV, HDL, HDLP, LDL, LDLC, DLDLP, TGLX, TRIGL, TRIGP, CHHD, CHHDX Lab Results Component Value Date/Time Glucose (POC) 82 03/11/2018 02:32 PM  
 
Lab Results Component Value Date/Time  Color YELLOW/STRAW 11/02/2020 06:16 PM  
 Appearance CLOUDY (A) 11/02/2020 06:16 PM  
 Specific gravity 1.030 11/02/2020 06:16 PM  
 Specific gravity 1.015 03/11/2018 01:37 PM  
 pH (UA) 5.5 11/02/2020 06:16 PM  
 Protein >300 (A) 11/02/2020 06:16 PM  
 Glucose Negative 11/02/2020 06:16 PM  
 Ketone Negative 11/02/2020 06:16 PM  
 Bilirubin NEGATIVE  03/11/2018 01:37 PM  
 Urobilinogen 1.0 11/02/2020 06:16 PM  
 Nitrites Negative 11/02/2020 06:16 PM  
 Leukocyte Esterase Negative 11/02/2020 06:16 PM  
 Epithelial cells MODERATE (A) 11/02/2020 06:16 PM  
 Bacteria 1+ (A) 11/02/2020 06:16 PM  
 WBC 5-10 11/02/2020 06:16 PM  
 RBC 0-5 11/02/2020 06:16 PM  
 
 
 
Medications Reviewed:  
 
Current Facility-Administered Medications Medication Dose Route Frequency  enoxaparin (LOVENOX) injection 40 mg  40 mg SubCUTAneous Q12H  
 risperiDONE (RisperDAL) 1 mg/mL oral solution soln 1 mg  1 mg Oral QHS  acetaminophen (TYLENOL) tablet 650 mg  650 mg Oral Q6H PRN Or  
 acetaminophen (TYLENOL) suppository 650 mg  650 mg Rectal Q6H PRN  
 albuterol (PROVENTIL HFA, VENTOLIN HFA, PROAIR HFA) inhaler 2 Puff  2 Puff Inhalation Q4H PRN  
 ascorbic acid (vitamin C) (VITAMIN C) tablet 500 mg  500 mg Oral BID  cholecalciferol (VITAMIN D3) (1000 Units /25 mcg) tablet 1 Tab  1,000 Units Oral DAILY  levothyroxine (SYNTHROID) tablet 50 mcg  50 mcg Oral ACB  pantoprazole (PROTONIX) tablet 40 mg  40 mg Oral ACB  dexamethasone (PF) (DECADRON) 10 mg/mL injection 6 mg  6 mg IntraVENous Q24H  
 oxybutynin chloride XL (DITROPAN XL) tablet 10 mg  10 mg Oral DAILY  zinc sulfate (ZINCATE) 220 (50) mg capsule 1 Cap  1 Cap Oral DAILY  remdesivir 100 mg in 0.9% sodium chloride 250 mL IVPB  100 mg IntraVENous Q24H  
 
______________________________________________________________________ EXPECTED LENGTH OF STAY: 4d 19h ACTUAL LENGTH OF STAY:          2 Mary Lou Toure MD

## 2020-11-10 NOTE — PROGRESS NOTES
Verbal shift change report given to Norma Danielle RN (oncoming nurse) by Andre Guzmán RN (offgoing nurse). Report included the following information SBAR, Kardex, ED Summary, Intake/Output, MAR, Accordion, Recent Results, Med Rec Status and Cardiac Rhythm NSR/ ST. Patient Vitals for the past 12 hrs: 
 Temp Pulse Resp BP SpO2  
11/10/20 0814 97.3 °F (36.3 °C) 98 (!) 34 128/63 92 % 11/10/20 0557     92 % 11/10/20 0343     97 % 11/10/20 0312 97.3 °F (36.3 °C) 67 19 136/84 96 % 11/09/20 2315  93 24 134/86 96 % Last 3 Recorded Weights in this Encounter 11/08/20 1512 11/09/20 1908 11/10/20 1200 Weight: 84.8 kg (186 lb 15.2 oz) 83.8 kg (184 lb 12.8 oz) 84 kg (185 lb 1.6 oz) Pt kept NPO overnight d/t pocketing of food and excessive secretions overflowing onto chin/gown. NP notified that medications were held. Problem: Risk for Spread of Infection Goal: Prevent transmission of infectious organism to others Description: Prevent the transmission of infectious organisms to other patients, staff members, and visitors. Outcome: Progressing Towards Goal 
Note: Pt in negative pressure room. Gown, gloves, respirator, and face shield worn when in room. Problem: Falls - Risk of 
Goal: *Absence of Falls Description: Document Debbie White Fall Risk and appropriate interventions in the flowsheet. Outcome: Progressing Towards Goal 
Note: Fall Risk Interventions: 
Mobility Interventions: Communicate number of staff needed for ambulation/transfer, Patient to call before getting OOB, PT Consult for mobility concerns, PT Consult for assist device competence Mentation Interventions: Adequate sleep, hydration, pain control, Bed/chair exit alarm, Evaluate medications/consider consulting pharmacy, More frequent rounding, Reorient patient, Room close to nurse's station, Toileting rounds, Update white board Medication Interventions: Patient to call before getting OOB, Teach patient to arise slowly, Evaluate medications/consider consulting pharmacy, Bed/chair exit alarm Elimination Interventions: Bed/chair exit alarm, Call light in reach, Patient to call for help with toileting needs, Toileting schedule/hourly rounds Problem: Pressure Injury - Risk of 
Goal: *Prevention of pressure injury Description: Document Jai Scale and appropriate interventions in the flowsheet. Outcome: Progressing Towards Goal 
Note: Pressure Injury Interventions: 
Sensory Interventions: Assess changes in LOC, Assess need for specialty bed, Avoid rigorous massage over bony prominences, Check visual cues for pain, Discuss PT/OT consult with provider, Float heels, Keep linens dry and wrinkle-free, Maintain/enhance activity level, Minimize linen layers, Monitor skin under medical devices, Pad between skin to skin, Pressure redistribution bed/mattress (bed type) Moisture Interventions: Absorbent underpads, Apply protective barrier, creams and emollients, Assess need for specialty bed, Limit adult briefs, Maintain skin hydration (lotion/cream), Minimize layers, Moisture barrier Activity Interventions: Assess need for specialty bed, Increase time out of bed, Pressure redistribution bed/mattress(bed type), PT/OT evaluation Mobility Interventions: Assess need for specialty bed, Pressure redistribution bed/mattress (bed type), PT/OT evaluation Nutrition Interventions: Document food/fluid/supplement intake, Offer support with meals,snacks and hydration Problem: Aspiration - Risk of 
Goal: *Absence of aspiration Outcome: Progressing Towards Goal 
Note: Risperidone held d/t pt pocketing spoonfuls of applesauce.

## 2020-11-10 NOTE — PROGRESS NOTES
Problem: Risk for Spread of Infection Goal: Prevent transmission of infectious organism to others Description: Prevent the transmission of infectious organisms to other patients, staff members, and visitors. Outcome: Progressing Towards Goal 
  
Problem: Patient Education:  Go to Education Activity Goal: Patient/Family Education Outcome: Progressing Towards Goal 
  
Problem: Falls - Risk of 
Goal: *Absence of Falls Description: Document Kalyan Pricetz Fall Risk and appropriate interventions in the flowsheet. Outcome: Progressing Towards Goal 
Note: Fall Risk Interventions: 
Mobility Interventions: Patient to call before getting OOB, Bed/chair exit alarm Mentation Interventions: Adequate sleep, hydration, pain control, Bed/chair exit alarm, Evaluate medications/consider consulting pharmacy, More frequent rounding, Reorient patient, Room close to nurse's station, Toileting rounds, Update white board Medication Interventions: Teach patient to arise slowly, Bed/chair exit alarm, Patient to call before getting OOB Elimination Interventions: Bed/chair exit alarm, Call light in reach, Toileting schedule/hourly rounds Problem: Patient Education: Go to Patient Education Activity Goal: Patient/Family Education Outcome: Progressing Towards Goal 
  
Problem: Pressure Injury - Risk of 
Goal: *Prevention of pressure injury Description: Document Jai Scale and appropriate interventions in the flowsheet. Outcome: Progressing Towards Goal 
Note: Pressure Injury Interventions: 
Sensory Interventions: Keep linens dry and wrinkle-free, Maintain/enhance activity level, Check visual cues for pain Moisture Interventions: Check for incontinence Q2 hours and as needed, Internal/External urinary devices Activity Interventions: Pressure redistribution bed/mattress(bed type) Mobility Interventions: Pressure redistribution bed/mattress (bed type), HOB 30 degrees or less Nutrition Interventions: Offer support with meals,snacks and hydration Problem: Patient Education: Go to Patient Education Activity Goal: Patient/Family Education Outcome: Progressing Towards Goal 
  
Problem: Aspiration - Risk of 
Goal: *Absence of aspiration Outcome: Progressing Towards Goal 
  
Problem: Patient Education: Go to Patient Education Activity Goal: Patient/Family Education Outcome: Progressing Towards Goal 
  
Problem: Patient Education: Go to Patient Education Activity Goal: Patient/Family Education Outcome: Progressing Towards Goal 
  
Problem: Pneumonia: Day 1 Goal: Off Pathway (Use only if patient is Off Pathway) Outcome: Progressing Towards Goal 
Goal: Activity/Safety Outcome: Progressing Towards Goal 
Goal: Consults, if ordered Outcome: Progressing Towards Goal 
Goal: Diagnostic Test/Procedures Outcome: Progressing Towards Goal 
Goal: Nutrition/Diet Outcome: Progressing Towards Goal 
Goal: Medications Outcome: Progressing Towards Goal 
Goal: Respiratory Outcome: Progressing Towards Goal 
Goal: Treatments/Interventions/Procedures Outcome: Progressing Towards Goal 
Goal: Psychosocial 
Outcome: Progressing Towards Goal 
Goal: *Oxygen saturation within defined limits Outcome: Progressing Towards Goal 
Goal: *Influenza vaccine administered (October-March) Outcome: Progressing Towards Goal 
Goal: *Pneumoccocal vaccine administered Outcome: Progressing Towards Goal 
Goal: *Hemodynamically stable Outcome: Progressing Towards Goal 
Goal: *Demonstrates progressive activity Outcome: Progressing Towards Goal 
Goal: *Tolerating diet Outcome: Progressing Towards Goal 
  
Problem: Pneumonia: Day 2 Goal: Off Pathway (Use only if patient is Off Pathway) Outcome: Progressing Towards Goal 
Goal: Activity/Safety Outcome: Progressing Towards Goal 
Goal: Consults, if ordered Outcome: Progressing Towards Goal 
Goal: Diagnostic Test/Procedures Outcome: Progressing Towards Goal 
Goal: Nutrition/Diet Outcome: Progressing Towards Goal 
Goal: Discharge Planning Outcome: Progressing Towards Goal 
Goal: Medications Outcome: Progressing Towards Goal 
Goal: Respiratory Outcome: Progressing Towards Goal 
Goal: Treatments/Interventions/Procedures Outcome: Progressing Towards Goal 
Goal: Psychosocial 
Outcome: Progressing Towards Goal 
Goal: *Oxygen saturation within defined limits Outcome: Progressing Towards Goal 
Goal: *Hemodynamically stable Outcome: Progressing Towards Goal 
Goal: *Demonstrates progressive activity Outcome: Progressing Towards Goal 
Goal: *Tolerating diet Outcome: Progressing Towards Goal 
Goal: *Optimal pain control at patient's stated goal 
Outcome: Progressing Towards Goal 
  
Problem: Pneumonia: Day 3 Goal: Off Pathway (Use only if patient is Off Pathway) Outcome: Progressing Towards Goal 
Goal: Activity/Safety Outcome: Progressing Towards Goal 
Goal: Consults, if ordered Outcome: Progressing Towards Goal 
Goal: Diagnostic Test/Procedures Outcome: Progressing Towards Goal 
Goal: Nutrition/Diet Outcome: Progressing Towards Goal 
Goal: Discharge Planning Outcome: Progressing Towards Goal 
Goal: Medications Outcome: Progressing Towards Goal 
Goal: Respiratory Outcome: Progressing Towards Goal 
Goal: Treatments/Interventions/Procedures Outcome: Progressing Towards Goal 
Goal: Psychosocial 
Outcome: Progressing Towards Goal 
Goal: *Oxygen saturation within defined limits Outcome: Progressing Towards Goal 
Goal: *Hemodynamically stable Outcome: Progressing Towards Goal 
Goal: *Demonstrates progressive activity Outcome: Progressing Towards Goal 
Goal: *Tolerating diet Outcome: Progressing Towards Goal 
Goal: *Describes available resources and support systems Outcome: Progressing Towards Goal 
Goal: *Optimal pain control at patient's stated goal 
Outcome: Progressing Towards Goal 
  
Problem: Pneumonia: Day 4 
 Goal: Off Pathway (Use only if patient is Off Pathway) Outcome: Progressing Towards Goal 
Goal: Activity/Safety Outcome: Progressing Towards Goal 
Goal: Nutrition/Diet Outcome: Progressing Towards Goal 
Goal: Discharge Planning Outcome: Progressing Towards Goal 
Goal: Medications Outcome: Progressing Towards Goal 
Goal: Respiratory Outcome: Progressing Towards Goal 
Goal: Treatments/Interventions/Procedures Outcome: Progressing Towards Goal 
Goal: Psychosocial 
Outcome: Progressing Towards Goal 
  
Problem: Pneumonia: Discharge Outcomes Goal: *Demonstrates progressive activity Outcome: Progressing Towards Goal 
Goal: *Describes follow-up/return visits to physicians Outcome: Progressing Towards Goal 
Goal: *Tolerating diet Outcome: Progressing Towards Goal 
Goal: *Verbalizes name, dosage, time, side effects, and number of days to continue medications Outcome: Progressing Towards Goal 
Goal: *Influenza immunization Outcome: Progressing Towards Goal 
Goal: *Pneumococcal immunization Outcome: Progressing Towards Goal 
Goal: *Respiratory status at baseline Outcome: Progressing Towards Goal 
Goal: *Vital signs within defined limits Outcome: Progressing Towards Goal 
Goal: *Describes available resources and support systems Outcome: Progressing Towards Goal 
Goal: *Optimal pain control at patient's stated goal 
Outcome: Progressing Towards Goal

## 2020-11-10 NOTE — PROGRESS NOTES
PCCM: 
 
VSS, O2 requirements are at 11lpm  
 
WBC 20.4 D dimer down to 7.11 Plan: 
 
-chest film tomorrow to monitor pleural effusions  
-check bnp, inflammatory markers 
-decadron/remdesivir  
-lovenox bid dosing  
-type and screen~ would benefit from convalescent plasma Aylin Price PA-C

## 2020-11-11 NOTE — PROGRESS NOTES
PULMONARY MEDICINE Progress Note Name: Alissa Montalvo : 1980 MRN: [id number] Date: 2020 Subjective:  
 
20 O+ D dimer down to 4.14 
CRP 2.13 Ferritin down to 755 O2 requirements are at 8lpm 
 
11/10 VSS, O2 requirements are at 11lpm  
  
WBC 20.4 D dimer down to 7.11 
  
Plan: 
  
-chest film tomorrow to monitor pleural effusions  
-check bnp, inflammatory markers 
-decadron/remdesivir  
-lovenox bid dosing  
-type and screen~ would benefit from convalescent plasma Consult Note:  Requesting Physician: Dr. Enriqueta Moss Reason for consult: COVID-19 pneumonia and hypoxemia Medical records and data reviewed. Patient is a 47 y.o. female who presented to the hospital with shortness of breath. She was admitted to [facility].  Chest x-ray and CT had suggested bilateral patchy infiltrates and COVID-19 infection was confirmed on . She presented to the hospital again on the sixth with worsening symptoms and was requiring increased oxygen flow at 6 L/min. She was started on remdesivir and transferred to [facility] for further management in case of clinical decline. Patient has been placed on Decadron and is receiving remdesivir. She has underlying Down syndrome and is unable to consent for inhaled nitric oxide clinical trial.  She could benefit from convalescent plasma and hospitalist is following. Procalcitonin level is not elevated. Review of Systems: A comprehensive 12 system review of systems was limited from the patient Assessment:  
 
Acute hypoxic pulmonary insufficiency Pneumonia due to COVID-19 virus with elevated inflammatory markers Down syndrome Other medical problems per chart Recommendations:  
 
Wean oxygen as tolerated Continue supportive treatment with vitamin C and zinc 
On remdesivir On Decadron On Lovenox She is acutely ill and is at risk for further decline, continue close monitoring Active Problem List:  
 
Problem List  Never Reviewed Codes Class Pneumonia due to COVID-19 virus ICD-10-CM: U07.1, J12.89 ICD-9-CM: 480.8 Past Medical History:  
 
 has a past medical history of Endocrine disease, Hypothyroid (03/11/2018), and Ill-defined condition. Past Surgical History:  
 
 has no past surgical history on file. Home Medications:  
 
Prior to Admission medications Medication Sig Start Date End Date Taking? Authorizing Provider  
risperiDONE (RisperDAL) 1 mg tablet Take 1 mg by mouth nightly. Provider, Aicha  
solifenacin (VESICARE) 10 mg tablet TAKE 1 TABLET BY MOUTH EVERY DAY 9/18/20   Other, MD Kilo  
medroxyPROGESTERone (DEPO-PROVERA) 150 mg/mL injection 150 mg, IM, once, To be administered in clinic by nurse. See order comments for schedule., # 1 vial, 4 Refills, Pharmacy: [facility]/pharmacy #[id number] 12/5/19   Other, MD Kilo  
albuterol (PROVENTIL HFA, VENTOLIN HFA, PROAIR HFA) 90 mcg/actuation inhaler Take 2 Puffs by inhalation every four (4) hours as needed for Wheezing. 11/2/20   Ruby Desir MD  
zinc sulfate 220 mg tablet Take 1 Tab by mouth daily. 11/2/20   Ruby Desir MD  
cholecalciferol (VITAMIN D3) 1,000 unit tablet Take 1,000 Units by mouth daily. Kilo Cortez MD  
levothyroxine (SYNTHROID) 50 mcg tablet Take 50 mcg by mouth Daily (before breakfast). Other, MD Kilo  
 
 
Allergies/Social/Family History: No Known Allergies Social History Tobacco Use  Smoking status: Never Smoker  Smokeless tobacco: Never Used Substance Use Topics  Alcohol use: No  
  
No family history on file. Objective:  
Vital Signs: 
Visit Vitals /69 (BP 1 Location: Left arm, BP Patient Position: At rest) Pulse 94 Temp 98.2 °F (36.8 °C) Resp 20 Wt 83.2 kg (183 lb 6.8 oz) SpO2 99% BMI 31.48 kg/m²  O2 Flow Rate (L/min): 8 l/min O2 Device: Hi flow nasal cannula(midflow) Temp (24hrs), Av.3 °F (36.8 °C), Min:97.7 °F (36.5 °C), Max:99.1 °F (37.3 °C) Intake/Output:  
 
Intake/Output Summary (Last 24 hours) at 2020 1114 Last data filed at 2020 0700 Gross per 24 hour Intake  Output 4600 ml Net -4600 ml Pertinent physical exam performed with PPE and COVID 19 distancing precautions as indicated Physical exam findings of attending physician rounding on patient today reviewed as documented Physical Exam:  
General:  Alert, cooperative, no distress, appears stated age. Tired appearing, noncommunicative Head:  Normocephalic Lungs:   Symmetrical expansion Chest wall:  No deformity. Heart:  Regular rate and rhythm Abdomen:   Non-distended Extremities:  Contractures Skin: No rashes or lesions Neurologic:  Known Down syndrome LABS AND  DATA: Personally reviewed Recent Labs 11/11/20 
0100 11/10/20 
0358 WBC 18.4* 20.4* HGB 11.5 11.2* HCT 33.2* 33.1*  
* 397 Recent Labs 11/11/20 
0100 11/10/20 
0358  144  
K 5.0 4.6 * 114* CO2 24 24 BUN 28* 29* CREA 0.97 1.02  
* 143* CA 8.6 9.0 MG 2.3  --   
 
Recent Labs 11/11/20 
0100 11/10/20 
0358 AP 41* 42* TP 6.5 6.8 ALB 2.2* 2.3*  
GLOB 4.3* 4.5* Recent Labs 11/11/20 
0100 11/10/20 
0358 INR 1.2* 1.2* PTP 12.7* 12.4* No results for input(s): PHI, PCO2I, PO2I, FIO2I in the last 72 hours. Recent Labs 20 * TROIQ <0.05  
 
 
MEDS: Reviewed Chest Imaging: personally reviewed and report checked Tele- reviewed Medical decision making:  
I have reviewed the flowsheet and previous day's notes Patient has acute or chronic illness that poses a threat to life or bodily function Review and order of Clinical lab tests Review and Order of Radiology tests Independent visualization of Image High Risk Drug therapy requiring intensive monitoring for toxicity: eg steroids, pressors, antibiotics Thank you for allowing me to participate in this patient's care. Tracey Everett PA-C [facility] [address] [facility]

## 2020-11-11 NOTE — PROGRESS NOTES
[facility] [facility] Adult  Hospitalist Group Hospitalist Progress Note Betzy Block MD 
Answering service: [phone] OR [phone] from in house phone Date of Service:  2020 NAME:  Dewayne Tobin :  1980 MRN:  [id number] This documentation was facilitated by a Voice Recognition software and may contain inadvertent typographical errors. Interval history / Subjective:  
    
Patient is non verbal.In bed, on oxygen via NC,not in distress Spo2 100% on NC 6 plm,decreased to 4 LPM on my rounding Assessment & Plan: This is a 30-year-old female was tested positive for Covid on  and was admitted at [address] [place] [address] for acute hypoxic respiratory failure secondary to Covid pneumonia is transferred to [facility] for higher level of care. Acute hypoxic respiratory failure secondary to Covid pneumonia,still requiring oxygen. Sepsis due to Covid pneumonia, POA 
-CAT scan of the chest on 11/10 showed scattered patchy areas of airspace disease in the lungs bilaterally, greater on the left. -Chest x-ray on  showed small left pleural effusion and left basilar atelectasis. Left upper lung increased interstitial markings. -Maintain droplet plus isolation 
-Supplemental oxygen via NC/mid flow/high flow to keep SPO2> 94% 
-Continue with Decadron and Remdesevir -DVT prophylaxis per Covid protocol(intermediate risk) -Monitor inflammatory markers. -Monitor LFTs,stable so far. -Consult ID and pulmonary. Leukocytosis most probably due to steroids. Monitor. Procalcitonin is low. She has recently completed outpatient oral antibiotics. - 
CKD stage III: Creatinine about baseline now. Mild hypernatremia could be due to viral sepsis. improved. Moderately large hiatus hernia. Distended fluid-filled esophagus. Detected on CT on  -Per her mom, she is on mechanical soft diet. Her mom is not sure if he ever had GI evaluation. 
-She definitely needs GI evaluation once she is over this acute illness. - 
  
Hypothyroidism: Continue levothyroxine Down syndrome: Continue with Risperdal 
 
 
 
Diet: speech evaluated and cleared her for baseline diet of mechanical soft and thin liquids Patient's Baseline: Ambulates without assistive devices. DVT ppx: Lovenox Code status: Full code Disposition: Anticipate home Surrogate decision maker, POA: Maribel Disla. Phone number [phone] Updated non on 11/1 Dispo: 3-5 days,when she is weaned off oxygen,completed at least 5 days of Remdesivir. Hospital Problems  Never Reviewed Codes Class Noted POA Pneumonia due to COVID-19 virus ICD-10-CM: U07.1, J12.89 ICD-9-CM: 480.8  11/6/2020 Unknown Review of Systems:  
Review of systems not obtained due to patient factors. Vital Signs:  
 Last 24hrs VS reviewed since prior progress note. Most recent are: 
Visit Vitals /62 (BP 1 Location: Left arm, BP Patient Position: At rest) Pulse 100 Temp 99.4 °F (37.4 °C) Resp (!) 37 Wt 83.2 kg (183 lb 6.8 oz) SpO2 96% BMI 31.48 kg/m² Intake/Output Summary (Last 24 hours) at 11/11/2020 1804 Last data filed at 11/11/2020 1600 Gross per 24 hour Intake 1220 ml Output 1300 ml Net -80 ml Physical Examination:  
 
I had a face to face encounter and have examined patient today,11/11 Constitutional:  Non verbal.On oxygen via NC. Not in distress HEENT:  Atraumatic. Oral mucosa moist,. Non icteric sclera. No pallor. Resp:  On oxygen via mfnc,not in distress, not tachypneic,+scattered wheezing. No accessory muscle use Chest Wall: No deformity CV:  Regular rhythm, normal rate, no murmurs, gallops, rubs GI:  Soft, non distended, non tender. normoactive bowel sounds, no hepatosplenomegaly :  No CVA or suprapubic tenderness Musculoskeletal:  No edema, warm, 2+ pulses throughout Neurologic:  Mental status:Alert,non verbal  
Cranial nerves II-XII : WNL Motor exam:Moves all extremities symmetrically Data Review:  
 Review and/or order of clinical lab test 
Review and/or order of tests in the radiology section of CPT Review and/or order of tests in the medicine section of [facility] Labs:  
 
Recent Labs 11/11/20 
0100 11/10/20 
0358 WBC 18.4* 20.4* HGB 11.5 11.2* HCT 33.2* 33.1*  
* 397 Recent Labs 11/11/20 
0100 11/10/20 
0358 11/09/20 
0443  144 146*  
K 5.0 4.6 4.6 * 114* 115* CO2 24 24 26 BUN 28* 29* 26* CREA 0.97 1.02 1.05* * 143* 137* CA 8.6 9.0 8.3*  
MG 2.3  --   --   
 
Recent Labs 11/11/20 
0100 11/10/20 
0358 11/09/20 
0443 ALT 29 28 30 AP 41* 42* 44* TBILI 0.3 0.2 0.3 TP 6.5 6.8 6.2* ALB 2.2* 2.3* 2.3*  
GLOB 4.3* 4.5* 3.9 Recent Labs 11/11/20 
0100 11/10/20 
0358 11/09/20 
0443 INR 1.2* 1.2* 1.1 PTP 12.7* 12.4* 11.8* Recent Labs 11/11/20 
0100 11/10/20 
0358 FERR 755* 1,007* No results found for: FOL, RBCF No results for input(s): PH, PCO2, PO2 in the last 72 hours. Recent Labs 11/11/20 0100 * TROIQ <0.05 No results found for: CHOL, CHOLX, CHLST, CHOLV, HDL, HDLP, LDL, LDLC, DLDLP, TGLX, TRIGL, TRIGP, CHHD, CHHDX Lab Results Component Value Date/Time Glucose (POC) 82 03/11/2018 02:32 PM  
 
Lab Results Component Value Date/Time  Color YELLOW/STRAW 11/02/2020 06:16 PM  
 Appearance CLOUDY (A) 11/02/2020 06:16 PM  
 Specific gravity 1.030 11/02/2020 06:16 PM  
 Specific gravity 1.015 03/11/2018 01:37 PM  
 pH (UA) 5.5 11/02/2020 06:16 PM  
 Protein >300 (A) 11/02/2020 06:16 PM  
 Glucose Negative 11/02/2020 06:16 PM  
 Ketone Negative 11/02/2020 06:16 PM  
 Bilirubin NEGATIVE  03/11/2018 01:37 PM  
 Urobilinogen 1.0 11/02/2020 06:16 PM  
 Nitrites Negative 11/02/2020 06:16 PM  
 Leukocyte Esterase Negative 11/02/2020 06:16 PM  
 Epithelial cells MODERATE (A) 11/02/2020 06:16 PM  
 Bacteria 1+ (A) 11/02/2020 06:16 PM  
 WBC 5-10 11/02/2020 06:16 PM  
 RBC 0-5 11/02/2020 06:16 PM  
 
 
 
Medications Reviewed:  
 
Current Facility-Administered Medications Medication Dose Route Frequency  enoxaparin (LOVENOX) injection 40 mg  40 mg SubCUTAneous Q12H  
 risperiDONE (RisperDAL) 1 mg/mL oral solution soln 1 mg  1 mg Oral QHS  acetaminophen (TYLENOL) tablet 650 mg  650 mg Oral Q6H PRN Or  
 acetaminophen (TYLENOL) suppository 650 mg  650 mg Rectal Q6H PRN  
 albuterol (PROVENTIL HFA, VENTOLIN HFA, PROAIR HFA) inhaler 2 Puff  2 Puff Inhalation Q4H PRN  
 ascorbic acid (vitamin C) (VITAMIN C) tablet 500 mg  500 mg Oral BID  cholecalciferol (VITAMIN D3) (1000 Units /25 mcg) tablet 1 Tab  1,000 Units Oral DAILY  levothyroxine (SYNTHROID) tablet 50 mcg  50 mcg Oral ACB  pantoprazole (PROTONIX) tablet 40 mg  40 mg Oral ACB  dexamethasone (PF) (DECADRON) 10 mg/mL injection 6 mg  6 mg IntraVENous Q24H  
 oxybutynin chloride XL (DITROPAN XL) tablet 10 mg  10 mg Oral DAILY  zinc sulfate (ZINCATE) 220 (50) mg capsule 1 Cap  1 Cap Oral DAILY  remdesivir 100 mg in 0.9% sodium chloride 250 mL IVPB  100 mg IntraVENous Q24H  
 
______________________________________________________________________ EXPECTED LENGTH OF STAY: 4d 19h ACTUAL LENGTH OF STAY:          3 Tesfaye Lemus MD

## 2020-11-11 NOTE — PROGRESS NOTES
Bedside shift change report given to Lauris Goodpasture (oncoming nurse) by Le Saxena (offgoing nurse).  Report included the following information SBAR, MAR, Recent Results and Cardiac Rhythm SR.

## 2020-11-11 NOTE — PROGRESS NOTES
Problem: Gas Exchange - Impaired Goal: Absence of hypoxia Outcome: Progressing Towards Goal 
Note: Pts O2 sats 100% on 10L midflow, pt turned down to 8L. Will continue to monitor and attempt to wean. 0930- pt coughing after medications given, no desaturation or difficulty breathing, Adamaris with speech notified of above, likely esophageal in nature per speech, recommendation to attempt to give liquid form of medication or crush in applesauce as needed. 1200- pt tolerating 4L nasal cannula Bedside shift change report given to Josefina Farah RN (oncoming nurse) by Naveen Bruner RN (offgoing nurse). Report included the following information SBAR, Kardex, ED Summary, Procedure Summary, Intake/Output, MAR, Accordion, Recent Results, Med Rec Status, Cardiac Rhythm NSR and Alarm Parameters .

## 2020-11-11 NOTE — PROGRESS NOTES
Bedside and Verbal shift change report given to Alice (oncoming nurse) by Thiago Baron (offgoing nurse). Report included the following information SBAR, Kardex, MAR, Recent Results and Cardiac Rhythm NSR. Patient Vitals for the past 12 hrs: 
 Temp Pulse Resp BP SpO2  
11/11/20 0715 98.2 °F (36.8 °C) 94 20  99 % 11/11/20 0422     97 % 11/11/20 0327 98.3 °F (36.8 °C) (!) 108 18 (!) 142/54 94 % 11/11/20 0041     96 % 11/10/20 2319 98.5 °F (36.9 °C) 90 (!) 34 123/80 94 % 11/10/20 2129     99 % 11/10/20 2000     94 % 0200 pt experiencing increased bigeminal PVC's, obtained an EKG, troponin, mg and potassium. Notified Knowable NP, keep monitoring for now. Problem: Falls - Risk of 
Goal: *Absence of Falls Description: Document Maritzadelisa Ingrid Fall Risk and appropriate interventions in the flowsheet. Outcome: Progressing Towards Goal 
Note: Fall Risk Interventions: 
Mobility Interventions: OT consult for ADLs, Patient to call before getting OOB, PT Consult for mobility concerns, PT Consult for assist device competence Mentation Interventions: Adequate sleep, hydration, pain control, Door open when patient unattended, More frequent rounding Medication Interventions: Teach patient to arise slowly, Patient to call before getting OOB Elimination Interventions: Call light in reach, Toileting schedule/hourly rounds Problem: Pressure Injury - Risk of 
Goal: *Prevention of pressure injury Description: Document Jai Scale and appropriate interventions in the flowsheet. Outcome: Progressing Towards Goal 
Note: Pressure Injury Interventions: 
Sensory Interventions: Keep linens dry and wrinkle-free, Pad between skin to skin, Minimize linen layers Moisture Interventions: Check for incontinence Q2 hours and as needed, Minimize layers Activity Interventions: Pressure redistribution bed/mattress(bed type), PT/OT evaluation Mobility Interventions: Pressure redistribution bed/mattress (bed type), PT/OT evaluation Nutrition Interventions: Offer support with meals,snacks and hydration Problem: Aspiration - Risk of 
Goal: *Absence of aspiration Outcome: Progressing Towards Goal 
 Pt passes swallow test. Suction set up at bedside.

## 2020-11-12 NOTE — PROGRESS NOTES
Problem: Aspiration - Risk of 
Goal: *Absence of aspiration Outcome: Progressing Towards Goal 
 Pts medications crushed and put in applesauce per speech recommendations, pt coughing after medication administration. Dr. Epifanio Elliott notified. Problem: Gas Exchange - Impaired Goal: Absence of hypoxia Outcome: Progressing Towards Goal 
 Pts O2 sats 90's on 3.5L nasal cannula. Pt turned down to 2L.

## 2020-11-12 NOTE — ROUTINE PROCESS
Occupational Therapy Screening: 
Services are not indicated at this time. [facility] for [facility] therapy based on results obtained during the nursing admission assessment. The patients chart was reviewed and the patient is not appropriate for a skilled therapy evaluation at this time. Please consult occupational therapy if any therapy needs arise. Thank you.  
 
Montrell Roy OT

## 2020-11-12 NOTE — PROGRESS NOTES
PULMONARY MEDICINE Progress Note Name: Shani Gutierrez : 1980 MRN: [id number] Date: 2020 Subjective:  
 
 WBC trending downward D dimer down to 1.83 
O2 requirements are going down 20 O+ D dimer down to 4.14 
CRP 2.13 Ferritin down to 755 O2 requirements are at 8lpm 
 
11/10 VSS, O2 requirements are at 11lpm  
  
WBC 20.4 D dimer down to 7.11 
  
Plan: 
  
-chest film tomorrow to monitor pleural effusions  
-check bnp, inflammatory markers 
-decadron/remdesivir  
-lovenox bid dosing  
-type and screen~ would benefit from convalescent plasma Consult Note:  Requesting Physician: Dr. Rosy Becerra Reason for consult: COVID-19 pneumonia and hypoxemia Medical records and data reviewed. Patient is a 47 y.o. female who presented to the hospital with shortness of breath. She was admitted to [facility].  Chest x-ray and CT had suggested bilateral patchy infiltrates and COVID-19 infection was confirmed on . She presented to the hospital again on the sixth with worsening symptoms and was requiring increased oxygen flow at 6 L/min. She was started on remdesivir and transferred to [facility] for further management in case of clinical decline. Patient has been placed on Decadron and is receiving remdesivir. She has underlying Down syndrome and is unable to consent for inhaled nitric oxide clinical trial.  She could benefit from convalescent plasma and hospitalist is following. Procalcitonin level is not elevated. Review of Systems: A comprehensive 12 system review of systems was limited from the patient Assessment:  
 
Acute hypoxic pulmonary insufficiency Pneumonia due to COVID-19 virus with elevated inflammatory markers Down syndrome Other medical problems per chart Recommendations:  
 
Wean oxygen as tolerated with saturations above 90% Continue supportive treatment with vitamin C and zinc 
On remdesivir On Decadron On Lovenox bid dosing We will be available again to see if needed Active Problem List:  
 
Problem List  Never Reviewed Codes Class Pneumonia due to COVID-19 virus ICD-10-CM: U07.1, J12.89 ICD-9-CM: 480.8 Past Medical History:  
 
 has a past medical history of Endocrine disease, Hypothyroid (03/11/2018), and Ill-defined condition. Past Surgical History:  
 
 has no past surgical history on file. Home Medications:  
 
Prior to Admission medications Medication Sig Start Date End Date Taking? Authorizing Provider  
risperiDONE (RisperDAL) 1 mg tablet Take 1 mg by mouth nightly. Provider, Aicha  
solifenacin (VESICARE) 10 mg tablet TAKE 1 TABLET BY MOUTH EVERY DAY 9/18/20   OtherKilo MD  
medroxyPROGESTERone (DEPO-PROVERA) 150 mg/mL injection 150 mg, IM, once, To be administered in clinic by nurse. See order comments for schedule., # 1 vial, 4 Refills, Pharmacy: [facility]/pharmacy #[id number] 12/5/19   OtherKilo MD  
albuterol (PROVENTIL HFA, VENTOLIN HFA, PROAIR HFA) 90 mcg/actuation inhaler Take 2 Puffs by inhalation every four (4) hours as needed for Wheezing. 11/2/20   Ruby Desir MD  
zinc sulfate 220 mg tablet Take 1 Tab by mouth daily. 11/2/20   Ruby Desir MD  
cholecalciferol (VITAMIN D3) 1,000 unit tablet Take 1,000 Units by mouth daily. Kilo Cortez MD  
levothyroxine (SYNTHROID) 50 mcg tablet Take 50 mcg by mouth Daily (before breakfast). Kilo Cortez MD  
 
 
Allergies/Social/Family History: No Known Allergies Social History Tobacco Use  Smoking status: Never Smoker  Smokeless tobacco: Never Used Substance Use Topics  Alcohol use: No  
  
No family history on file. Objective:  
Vital Signs: 
Visit Vitals BP (!) 100/56 (BP 1 Location: Left arm, BP Patient Position: At rest) Pulse 96 Temp 98.3 °F (36.8 °C) Resp 23 Wt 83.9 kg (185 lb) SpO2 93% BMI 31.76 kg/m² O2 Flow Rate (L/min): 2 l/min O2 Device: Nasal cannula Temp (24hrs), Av.5 °F (36.9 °C), Min:97.7 °F (36.5 °C), Max:99.4 °F (37.4 °C) Intake/Output:  
 
Intake/Output Summary (Last 24 hours) at 2020 1101 Last data filed at 2020 5761 Gross per 24 hour Intake 490 ml Output 1200 ml Net -710 ml Pertinent physical exam performed with PPE and COVID 19 distancing precautions as indicated Physical exam findings of attending physician rounding on patient today reviewed as documented Physical Exam:  
General:  Alert, cooperative, no distress, appears stated age. Tired appearing, noncommunicative Head:  Normocephalic Lungs:   Symmetrical expansion Chest wall:  No deformity. Heart:  Regular rate and rhythm Abdomen:   Non-distended Extremities:  Contractures Skin: No rashes or lesions Neurologic:  Known Down syndrome LABS AND  DATA: Personally reviewed Recent Labs 20 WBC 15.5* 18.4* HGB 11.8 11.5 HCT 34.8* 33.2*  
* 408* Recent Labs 20  142  
K 4.8 5.0  
* 111* CO2 24 24 BUN 23* 28* CREA 0.97 0.97 * 163* CA 8.6 8.6 MG  --  2.3 Recent Labs 20 AP 40* 41* TP 6.5 6.5 ALB 2.0* 2.2*  
GLOB 4.5* 4.3* Recent Labs 20 INR 1.2* 1.2* PTP 12.5* 12.7* No results for input(s): PHI, PCO2I, PO2I, FIO2I in the last 72 hours. Recent Labs 20 * TROIQ <0.05  
 
 
MEDS: Reviewed Chest Imaging: personally reviewed and report checked Tele- reviewed Medical decision making:  
I have reviewed the flowsheet and previous day's notes Patient has acute or chronic illness that poses a threat to life or bodily function Review and order of Clinical lab tests Review and Order of Radiology tests Independent visualization of Image High Risk Drug therapy requiring intensive monitoring for toxicity: eg steroids, pressors, antibiotics Thank you for allowing me to participate in this patient's care. Noelle Monaco PA-C [facility]

## 2020-11-12 NOTE — PROGRESS NOTES
[facility] [facility] Adult  Hospitalist Group Hospitalist Progress Note Michael Romero MD 
Answering service: [phone] OR [phone] from in house phone Date of Service:  2020 NAME:  Lorie Query :  1980 MRN:  [id number] This documentation was facilitated by a Voice Recognition software and may contain inadvertent typographical errors. 81-year-old female was tested positive for Covid on  and was admitted at [address] [place] [address] for acute hypoxic respiratory failure secondary to Covid pneumonia is transferred to [facility] for higher level of care. Interval history / Subjective:  
  
Patient is non verbal at baseline. Appears comfortable, no resp distress signs. On 2L NC, sats 98%. Assessment & Plan:  
Acute hypoxic respiratory failure: 2nd to Covid pneumonia Sepsis due to Covid pneumonia, POA- resolved. -CAT scan of the chest on 11/10 showed scattered patchy areas of airspace disease in the lungs bilaterally, greater on the left. -Chest x-ray on  showed small left pleural effusion and left basilar atelectasis. Left upper lung increased interstitial markings. -Maintain droplet plus isolation 
-Supplemental oxygen via NC/mid flow/high flow to keep SPO2> 94% 
-Continue with Decadron and Remdesevir-Monitor inflammatory markers. -Monitor LFTs,stable so far. - ID and pulmonary following Leukocytosis: probably due to steroids. Monitor. Procalcitonin is low. CKD stage III: Creatinine about baseline now. Mild hypernatremia could be due to viral sepsis. improved. Moderately large hiatus hernia. Distended fluid-filled esophagus. Detected on CT on  Hypothyroidism: Continue levothyroxine Down syndrome: Continue with Risperdal 
Diet: SLP cleared her for baseline diet of mechanical soft and thin liquids Patient's Baseline: Ambulates without assistive devices. DVT ppx: Lovenox Code status: Full code Disposition: Anticipate home Kt: Mariposa Quiñones [phone]. Hospital Problems  Never Reviewed Codes Class Noted POA Pneumonia due to COVID-19 virus ICD-10-CM: U07.1, J12.89 ICD-9-CM: 480.8  11/6/2020 Unknown Review of Systems:  
Review of systems not obtained due to patient factors. Vital Signs:  
 Last 24hrs VS reviewed since prior progress note. Most recent are: 
Visit Vitals /69 Pulse 96 Temp 98.7 °F (37.1 °C) Resp 24 Wt 83.9 kg (185 lb) SpO2 92% BMI 31.76 kg/m² Intake/Output Summary (Last 24 hours) at 11/12/2020 1348 Last data filed at 11/12/2020 1210 Gross per 24 hour Intake 350 ml Output 1200 ml Net -850 ml Physical Examination:  
 
I had a face to face encounter and have examined patient today,11/11 Constitutional:  Non verbal.On oxygen via NC. Not in distress Resp: not tachypneic,+scattered wheezing. No accessory muscle use GI:  Soft, non distended, non tender. normoactive bowel sounds, no hepatosplenomegaly :  No CVA or suprapubic tenderness Musculoskeletal:  No edema, warm Neurologic:  Mental status:Alert,non verbal  
Motor exam:Moves all extremities symmetrically Data Review:  
 Review and/or order of clinical lab test 
Review and/or order of tests in the radiology section of CPT Review and/or order of tests in the medicine section of CPT Labs:  
 
Recent Labs 11/12/20 0211 11/11/20 0100 WBC 15.5* 18.4* HGB 11.8 11.5 HCT 34.8* 33.2*  
* 408* Recent Labs 11/12/20 
0211 11/11/20 
0100 11/10/20 
0358  142 144  
K 4.8 5.0 4.6 * 111* 114* CO2 24 24 24 BUN 23* 28* 29* CREA 0.97 0.97 1.02  
* 163* 143* CA 8.6 8.6 9.0 MG  --  2.3  --   
 
Recent Labs 11/12/20 
0211 11/11/20 
0100 11/10/20 
0358 ALT 29 29 28 AP 40* 41* 42* TBILI 0.3 0.3 0.2 TP 6.5 6.5 6.8 ALB 2.0* 2.2* 2.3*  
GLOB 4.5* 4.3* 4.5*  
 
 Recent Labs 11/12/20 
0211 11/11/20 
0100 11/10/20 
0358 INR 1.2* 1.2* 1.2* PTP 12.5* 12.7* 12.4* Recent Labs 11/12/20 0211 11/11/20 
0100 FERR 619* 755* No results found for: FOL, RBCF No results for input(s): PH, PCO2, PO2 in the last 72 hours. Recent Labs 11/11/20 0100 * TROIQ <0.05 No results found for: CHOL, CHOLX, CHLST, CHOLV, HDL, HDLP, LDL, LDLC, DLDLP, TGLX, TRIGL, TRIGP, CHHD, CHHDX Lab Results Component Value Date/Time Glucose (POC) 82 03/11/2018 02:32 PM  
 
Lab Results Component Value Date/Time Color YELLOW/STRAW 11/02/2020 06:16 PM  
 Appearance CLOUDY (A) 11/02/2020 06:16 PM  
 Specific gravity 1.030 11/02/2020 06:16 PM  
 Specific gravity 1.015 03/11/2018 01:37 PM  
 pH (UA) 5.5 11/02/2020 06:16 PM  
 Protein >300 (A) 11/02/2020 06:16 PM  
 Glucose Negative 11/02/2020 06:16 PM  
 Ketone Negative 11/02/2020 06:16 PM  
 Bilirubin NEGATIVE  03/11/2018 01:37 PM  
 Urobilinogen 1.0 11/02/2020 06:16 PM  
 Nitrites Negative 11/02/2020 06:16 PM  
 Leukocyte Esterase Negative 11/02/2020 06:16 PM  
 Epithelial cells MODERATE (A) 11/02/2020 06:16 PM  
 Bacteria 1+ (A) 11/02/2020 06:16 PM  
 WBC 5-10 11/02/2020 06:16 PM  
 RBC 0-5 11/02/2020 06:16 PM  
 
 
 
Medications Reviewed:  
 
Current Facility-Administered Medications Medication Dose Route Frequency  dexAMETHasone (DECADRON) tablet 6 mg  6 mg Oral Q24H  
 enoxaparin (LOVENOX) injection 40 mg  40 mg SubCUTAneous Q12H  
 risperiDONE (RisperDAL) 1 mg/mL oral solution soln 1 mg  1 mg Oral QHS  acetaminophen (TYLENOL) tablet 650 mg  650 mg Oral Q6H PRN Or  
 acetaminophen (TYLENOL) suppository 650 mg  650 mg Rectal Q6H PRN  
 albuterol (PROVENTIL HFA, VENTOLIN HFA, PROAIR HFA) inhaler 2 Puff  2 Puff Inhalation Q4H PRN  
 ascorbic acid (vitamin C) (VITAMIN C) tablet 500 mg  500 mg Oral BID  cholecalciferol (VITAMIN D3) (1000 Units /25 mcg) tablet 1 Tab  1,000 Units Oral DAILY  levothyroxine (SYNTHROID) tablet 50 mcg  50 mcg Oral ACB  pantoprazole (PROTONIX) tablet 40 mg  40 mg Oral ACB  oxybutynin chloride XL (DITROPAN XL) tablet 10 mg  10 mg Oral DAILY  zinc sulfate (ZINCATE) 220 (50) mg capsule 1 Cap  1 Cap Oral DAILY  
 
______________________________________________________________________ EXPECTED LENGTH OF STAY: 4d 19h ACTUAL LENGTH OF STAY:          4 Ani Ramirez MD

## 2020-11-12 NOTE — PROGRESS NOTES
Bedside and Verbal shift change report given to Starla [address] (oncoming nurse) by Ravi Stephens (offgoing nurse). Report included the following information SBAR, Kardex, MAR, Recent Results and Cardiac Rhythm NSR. Patient Vitals for the past 12 hrs: 
 Temp Pulse Resp BP SpO2  
11/12/20 0332 98.6 °F (37 °C) 95 25 100/60 97 % 11/11/20 2324 98.2 °F (36.8 °C) 65 15 109/68 98 % 11/11/20 2116     100 % 11/11/20 1935 98.7 °F (37.1 °C) 68 19 109/65 100 % Problem: Falls - Risk of 
Goal: *Absence of Falls Description: Document Jose Roberto Camara Fall Risk and appropriate interventions in the flowsheet. Outcome: Progressing Towards Goal 
Note: Fall Risk Interventions: 
Mobility Interventions: Communicate number of staff needed for ambulation/transfer, OT consult for ADLs, Patient to call before getting OOB, PT Consult for mobility concerns, PT Consult for assist device competence Mentation Interventions: Adequate sleep, hydration, pain control, Door open when patient unattended, More frequent rounding Medication Interventions: Patient to call before getting OOB, Teach patient to arise slowly Elimination Interventions: Call light in reach, Toileting schedule/hourly rounds Problem: Pressure Injury - Risk of 
Goal: *Prevention of pressure injury Description: Document Jai Scale and appropriate interventions in the flowsheet. Outcome: Progressing Towards Goal 
Note: Pressure Injury Interventions: 
Sensory Interventions: Assess changes in LOC, Assess need for specialty bed, Keep linens dry and wrinkle-free, Pad between skin to skin Moisture Interventions: Check for incontinence Q2 hours and as needed, Minimize layers Activity Interventions: Increase time out of bed, PT/OT evaluation Mobility Interventions: HOB 30 degrees or less Nutrition Interventions: Offer support with meals,snacks and hydration

## 2020-11-12 NOTE — PROGRESS NOTES
Clinical Pharmacy Note: IV to PO Automatic Conversion Please note: Latanya Juarez medication- dexamethasone has been changed from IV to PO based on the following critiera: 
 
Patient is taking scheduled oral medications Patient is tolerating tube feeds at goal rate or a full liquid, soft or regular diet This IV to PO conversion is based on the P&[employer] approved automatic conversion policy for eligible patients. Please call with questions.

## 2020-11-13 NOTE — PROGRESS NOTES
Last 3 Recorded Weights in this Encounter 11/11/20 0327 11/12/20 0936 11/13/20 0579 Weight: 83.2 kg (183 lb 6.8 oz) 83.9 kg (185 lb) 84.6 kg (186 lb 6.4 oz) Bedside shift change report given to Mayo Khan RN (oncoming nurse) by Cleta Leyden, RN (offgoing nurse). Report included the following information SBAR, Kardex, ED Summary, Procedure Summary, Intake/Output, MAR, Accordion, Recent Results, Med Rec Status, Cardiac Rhythm NSR w/Bigeminal PVCs and Alarm Parameters . Problem: Risk for Spread of Infection Goal: Prevent transmission of infectious organism to others Description: Prevent the transmission of infectious organisms to other patients, staff members, and visitors. Outcome: Progressing Towards Goal 
  
Problem: Falls - Risk of 
Goal: *Absence of Falls Description: Document Hattie Lazaro Fall Risk and appropriate interventions in the flowsheet. Outcome: Progressing Towards Goal 
Note: Fall Risk Interventions: 
Mobility Interventions: Communicate number of staff needed for ambulation/transfer, OT consult for ADLs, Patient to call before getting OOB, PT Consult for mobility concerns, PT Consult for assist device competence Mentation Interventions: Adequate sleep, hydration, pain control, More frequent rounding Medication Interventions: Patient to call before getting OOB, Teach patient to arise slowly Elimination Interventions: Call light in reach, Toileting schedule/hourly rounds Problem: Pressure Injury - Risk of 
Goal: *Prevention of pressure injury Description: Document Jai Scale and appropriate interventions in the flowsheet. Outcome: Progressing Towards Goal 
Note: Pressure Injury Interventions: 
Sensory Interventions: Assess need for specialty bed, Assess changes in LOC, Discuss PT/OT consult with provider, Minimize linen layers Moisture Interventions: Absorbent underpads, Apply protective barrier, creams and emollients, Minimize layers Activity Interventions: Increase time out of bed, PT/OT evaluation Mobility Interventions: HOB 30 degrees or less, PT/OT evaluation Nutrition Interventions: Document food/fluid/supplement intake Problem: Aspiration - Risk of 
Goal: *Absence of aspiration Outcome: Progressing Towards Goal 
  
Problem: Patient Education: Go to Patient Education Activity Goal: Patient/Family Education Outcome: Progressing Towards Goal 
  
Problem: Pneumonia: Day 1 Goal: Medications Outcome: Progressing Towards Goal 
Goal: Treatments/Interventions/Procedures Outcome: Progressing Towards Goal 
  
Problem: Breathing Pattern - Ineffective Goal: *Absence of hypoxia Outcome: Progressing Towards Goal 
  
Problem: Gas Exchange - Impaired Goal: Absence of hypoxia Outcome: Progressing Towards Goal

## 2020-11-13 NOTE — PROGRESS NOTES
Problem: Falls - Risk of 
Goal: *Absence of Falls Description: Document Kalyan Kirby Fall Risk and appropriate interventions in the flowsheet. Outcome: Progressing Towards Goal 
Note: Fall Risk Interventions: 
Mobility Interventions: Assess mobility with egress test, Communicate number of staff needed for ambulation/transfer, OT consult for ADLs, Patient to call before getting OOB, PT Consult for mobility concerns, PT Consult for assist device competence, Strengthening exercises (ROM-active/passive), Utilize walker, cane, or other assistive device, Utilize gait belt for transfers/ambulation, Bed/chair exit alarm Mentation Interventions: Adequate sleep, hydration, pain control, Door open when patient unattended, Evaluate medications/consider consulting pharmacy, Eyeglasses and hearing aids, Increase mobility, Toileting rounds, Update white board, Self-releasing belt, Room close to nurse's station, Reorient patient, More frequent rounding, Bed/chair exit alarm Medication Interventions: Assess postural VS orthostatic hypotension, Teach patient to arise slowly, Utilize gait belt for transfers/ambulation, Patient to call before getting OOB, Evaluate medications/consider consulting pharmacy, Bed/chair exit alarm Elimination Interventions: Call light in reach, Patient to call for help with toileting needs, Bed/chair exit alarm, Elevated toilet seat, Stay With Me (per policy), Toilet paper/wipes in reach, Toileting schedule/hourly rounds Problem: Pressure Injury - Risk of 
Goal: *Prevention of pressure injury Description: Document Jai Scale and appropriate interventions in the flowsheet.  
Note: Pressure Injury Interventions: 
Sensory Interventions: Assess changes in LOC, Discuss PT/OT consult with provider, Float heels, Keep linens dry and wrinkle-free, Maintain/enhance activity level, Minimize linen layers, Monitor skin under medical devices, Pad between skin to skin, Pressure redistribution bed/mattress (bed type), Turn and reposition approx. every two hours (pillows and wedges if needed) Moisture Interventions: Absorbent underpads, Limit adult briefs, Maintain skin hydration (lotion/cream), Minimize layers, Moisture barrier, Offer toileting Q_hr Activity Interventions: Assess need for specialty bed, Increase time out of bed, Pressure redistribution bed/mattress(bed type), PT/OT evaluation Mobility Interventions: Assess need for specialty bed, Float heels, HOB 30 degrees or less, Pressure redistribution bed/mattress (bed type), PT/OT evaluation, Turn and reposition approx. every two hours(pillow and wedges) Nutrition Interventions: Document food/fluid/supplement intake, Discuss nutritional consult with provider, Offer support with meals,snacks and hydration Friction and Shear Interventions: Apply protective barrier, creams and emollients, HOB 30 degrees or less, Lift team/patient mobility team 
 
  
 
TRANSFER - OUT REPORT: 
 
Verbal report given to Olamide GARRIDO(name) on Earlyne Shorts  being transferred to [facility](unit) for routine progression of care Report consisted of patients Situation, Background, Assessment and  
Recommendations(SBAR). Information from the following report(s) SBAR, Kardex, ED Summary, Intake/Output, MAR, Recent Results, Med Rec Status, Cardiac Rhythm NSR, Alarm Parameters , and Quality Measures was reviewed with the receiving nurse. Lines:  
Peripheral IV 11/08/20 Left;Posterior Wrist (Active) Site Assessment Clean, dry, & intact 11/13/20 1600 Phlebitis Assessment 0 11/13/20 1600 Infiltration Assessment 0 11/13/20 1600 Dressing Status Clean, dry, & intact 11/13/20 1600 Dressing Type Tape;Transparent 11/13/20 1600 Hub Color/Line Status Blue;Capped 11/13/20 1600 Action Taken Open ports on tubing capped 11/13/20 1600 Alcohol Cap Used Yes 11/13/20 1600 Opportunity for questions and clarification was provided. Patient transported with: 
 O2 @ 5 liters Tech, patient transported to  via stretcher with all belongings sent with the patient.

## 2020-11-13 NOTE — PROGRESS NOTES
Transition Plan of Care 
RUR 19%-Med 
Covid positive being treated with Remdesivir and steroids. Patient has history of MR with Down syndrome. She is basically non-verbal. She is independent with ADLs and she lives with her mother Mally Rao [phone]. Patient is followed by Angela Han is Jose Willis [phone] and her fax is [phone]. Will sent copy of AVS once discharged. Will likely discharge home with mother. Morris Manzanares RN CRM Ext [phone]

## 2020-11-13 NOTE — PROGRESS NOTES
Problem: Mobility Impaired (Adult and Pediatric) Goal: *Acute Goals and Plan of Care (Insert Text) Description: FUNCTIONAL STATUS PRIOR TO ADMISSION: Patient required moderate assistance for basic and instrumental ADLs. HOME SUPPORT PRIOR TO ADMISSION: The patient lived with mother who assists with ADLs. Physical Therapy Goals Initiated 11/13/2020 1. Patient will move from supine to sit and sit to supine , scoot up and down, and roll side to side in bed with supervision/set-up within 7 day(s). 2.  Patient will transfer from bed to chair and chair to bed with supervision/set-up using the least restrictive device within 7 day(s). 3.  Patient will perform sit to stand with supervision/set-up within 7 day(s). 4.  Patient will ambulate with supervision/set-up for 150 feet with the least restrictive device within 7 day(s). Outcome: Progressing Towards Goal 
  
 
PHYSICAL THERAPY EVALUATION Patient: Ryan Hinson (27 y.o. female) Date: 11/13/2020 Primary Diagnosis: Pneumonia due to COVID-19 virus [U07.1, J12.89] Precautions: falls ASSESSMENT Based on the objective data described below, the patient presents with need for supplemental O2 with coughing due to acute COVID-19. Pt is non verbal at baseline, hx of Down's syndrome and lives with her mom. Pt demos good understanding of one step instructions e.g. can you get out of bed however demos no awareness of lines and leads, impulsively performing supine to sit to standing and immediately ambulating forward for the bathroom. Pt able to perform gait training with overall CGA however demos large LOB x2 and absent protective reactions with LOB. Pt is an extremely high fall risk when walking. Pt climbs into bed forwards onto knees and lays down fully prone without UE use to roll into bed. Will continue to follow, recommend pt returns home with HHPT if patients mother amenable. Current Level of Function Impacting Discharge (mobility/balance): poor safety awareness, loss of balance, decreased protective reactions Functional Outcome Measure: The patient scored Total: 35/100 on the Barthel Index which is indicative of high impaired ability to care for basic self needs/dependency on others. Other factors to consider for discharge: Pt has downs syndrome Patient will benefit from skilled therapy intervention to address the above noted impairments. PLAN : 
Recommendations and Planned Interventions: bed mobility training, transfer training, gait training, therapeutic exercises, neuromuscular re-education, patient and family training/education and therapeutic activities Frequency/Duration: Patient will be followed by physical therapy:  3 times a week to address goals. Recommendation for discharge: (in order for the patient to meet his/her long term goals) Physical therapy at least 2 days/week in the home This discharge recommendation: 
Has been made in collaboration with the attending provider and/or case management IF patient discharges home will need the following DME: none SUBJECTIVE:  
Patient nonverbal  
 
OBJECTIVE DATA SUMMARY:  
HISTORY:   
Past Medical History:  
Diagnosis Date  Endocrine disease  Hypothyroid 03/11/2018  Ill-defined condition Down's Syndrome No past surgical history on file. Personal factors and/or comorbidities impacting plan of care:  
 
Home Situation Home Environment: Private residence One/Two Story Residence: One story Living Alone: No 
Support Systems: Family member(s) Patient Expects to be Discharged to[de-identified] Private residence Current DME Used/Available at Home: None EXAMINATION/PRESENTATION/DECISION MAKING:  
Critical Behavior: 
Neurologic State: Alert, Eyes open spontaneously Orientation Level: Unable to verbalize Cognition: Decreased attention/concentration, Decreased command following Hearing: Auditory Auditory Impairment: None Skin:  intact Edema: none Range Of Motion: 
  
  
  
  
  
  
  
  
Strength: Tone & Sensation:  
  
  
  
  
  
  
  
  
  
   
Coordination: 
  
Vision:  
  
Functional Mobility: 
Bed Mobility: 
Rolling: Supervision Supine to Sit: Supervision Sit to Supine: Supervision(unsafe, goes in with knees first, rolls over into bed) Scooting: Supervision Transfers: 
Sit to Stand: Minimum assistance;Contact guard assistance Stand to Sit: Minimum assistance;Contact guard assistance Stand Pivot Transfers: Minimum assistance Balance:  
Sitting: Intact; With support Standing: Impaired; Without support Standing - Static: Fair;Constant support Standing - Dynamic : Constant support;Fair;Poor Ambulation/Gait Training: 
Distance (ft): 15 Feet (ft) Assistive Device: Gait belt Ambulation - Level of Assistance: Minimal assistance(highly impulsive, unsafe, LOB x3) Gait Description (WDL): Exceptions to [facility] Gait Abnormalities: Shuffling gait Base of Support: Widened Speed/Glenny: Shuffled; Slow Step Length: Right shortened;Left shortened Functional Measure: 
Barthel Index: 
 
Bathin Bladder: 5 Bowels: 5 Groomin Dressin Feedin Mobility: 0 Stairs: 0 Toilet Use: 5 Transfer (Bed to Chair and Back): 10 Total: 35/100 The Barthel ADL Index: Guidelines 1. The index should be used as a record of what a patient does, not as a record of what a patient could do. 2. The main aim is to establish degree of independence from any help, physical or verbal, however minor and for whatever reason. 3. The need for supervision renders the patient not independent. 4. A patient's performance should be established using the best available evidence. Asking the patient, friends/relatives and nurses are the usual sources, but direct observation and common sense are also important. However direct testing is not needed. 5. Usually the patient's performance over the preceding 24-48 hours is important, but occasionally longer periods will be relevant. 6. Middle categories imply that the patient supplies over 50 per cent of the effort. 7. Use of aids to be independent is allowed. Tiffanie Wakefield., Barthel, D.W. ([id number]). Functional evaluation: the Barthel Index. [address] [facility] (14)2. Frida maribel ARTEMIO Garland, Malissa Emmanuel., Hai Sesay., [place], [address] [place] [address] (1999). Measuring the change indisability after inpatient rehabilitation; comparison of the responsiveness of the Barthel Index and Functional [place] Measure. Journal of Neurology, Neurosurgery, and Psychiatry, 66(4), 875-224. ISIAH Parker, DEEDEE Bernstein, & Leila Richardson MBERNARD. (2004.) Assessment of post-stroke quality of life in cost-effectiveness studies: The usefulness of the Barthel Index and the EuroQoL-5D. [facility], 13, [phone] Physical Therapy Evaluation Charge Determination History Examination Presentation Decision-Making HIGH Complexity :3+ comorbidities / personal factors will impact the outcome/ POC  MEDIUM Complexity : 3 Standardized tests and measures addressing body structure, function, activity limitation and / or participation in recreation  LOW Complexity : Stable, uncomplicated  Other outcome measures barthel  HIGH Based on the above components, the patient evaluation is determined to be of the following complexity level: LOW Pain Rating: 
None reported Activity Tolerance:  
Fair After treatment patient left in no apparent distress:  
Supine in bed, Call bell within reach and Bed / chair alarm activated COMMUNICATION/EDUCATION:  
The patients plan of care was discussed with: Registered nurse and Case management. Fall prevention education was provided and the patient/caregiver indicated understanding.  and Patient/family have participated as able in goal setting and plan of care. Thank you for this referral. 
Jose Carlos Acosta, PT Time Calculation: 20 mins

## 2020-11-13 NOTE — PROGRESS NOTES
Bedside and Verbal shift change report given to Shailesh Ho (oncoming nurse) by Adrienne Burk (offgoing nurse). Report included the following information SBAR, Kardex, MAR, Recent Results and Cardiac Rhythm NSR w/PVCS. Problem: Falls - Risk of 
Goal: *Absence of Falls Description: Document Brad Apo Fall Risk and appropriate interventions in the flowsheet. Outcome: Progressing Towards Goal 
Note: Fall Risk Interventions: 
Mobility Interventions: Communicate number of staff needed for ambulation/transfer, OT consult for ADLs, Patient to call before getting OOB, PT Consult for mobility concerns, PT Consult for assist device competence Mentation Interventions: Adequate sleep, hydration, pain control, More frequent rounding Medication Interventions: Patient to call before getting OOB, Teach patient to arise slowly Elimination Interventions: Call light in reach, Toileting schedule/hourly rounds Problem: Pressure Injury - Risk of 
Goal: *Prevention of pressure injury Description: Document Jai Scale and appropriate interventions in the flowsheet. Outcome: Progressing Towards Goal 
Note: Pressure Injury Interventions: 
Sensory Interventions: Assess need for specialty bed, Assess changes in LOC, Discuss PT/OT consult with provider, Minimize linen layers Moisture Interventions: Absorbent underpads, Apply protective barrier, creams and emollients, Minimize layers Activity Interventions: Increase time out of bed, PT/OT evaluation Mobility Interventions: HOB 30 degrees or less, PT/OT evaluation Nutrition Interventions: Document food/fluid/supplement intake

## 2020-11-13 NOTE — PROGRESS NOTES
[facility] Adult  Hospitalist Group Hospitalist Progress Note Marty Schrader MD 
Answering service: [phone] OR [phone] from in house phone Date of Service:  2020 NAME:  Luis Cole :  1980 MRN:  [id number] This documentation was facilitated by a Voice Recognition software and may contain inadvertent typographical errors. 68-year-old female was tested positive for Covid on  and was admitted at [facility] for acute hypoxic respiratory failure secondary to Covid pneumonia is transferred to [facility] for higher level of care. Interval history / Subjective:  
  
Patient is non verbal at baseline. Appears well, comfortable, on [address] 8L, sats 96%. Assessment & Plan:  
Acute hypoxic respiratory failure: 2nd to Covid pneumonia Sepsis due to Covid pneumonia, POA- resolved. -CAT scan of the chest on 11/10 showed scattered patchy areas of airspace disease in the lungs bilaterally, greater on the left. -Chest x-ray on  showed small left pleural effusion and left basilar atelectasis. Left upper lung increased interstitial markings. -Maintain droplet plus isolation 
-Supplemental oxygen via NC/mid flow/high flow to keep SPO2> 94% 
-Continue with Decadron and Remdesevir-Monitor inflammatory markers. -Monitor LFTs,stable so far. - ID and pulmonary following Leukocytosis: probably due to steroids. Monitor. Procalcitonin is low. CKD stage III: Creatinine about baseline now. Mild hypernatremia could be due to viral sepsis. improved. Moderately large hiatus hernia. Distended fluid-filled esophagus. Detected on CT on  Hypothyroidism: Continue levothyroxine Down syndrome: Continue with Risperdal 
Diet: SLP cleared her for baseline diet of mechanical soft and thin liquids Patient's Baseline: Ambulates without assistive devices. DVT ppx: Lovenox Code status: Full code Disposition: Anticipate home mPOERNIE: Jadiel Jarvis. [phone]. Hospital Problems  Never Reviewed Codes Class Noted POA Pneumonia due to COVID-19 virus ICD-10-CM: U07.1, J12.89 ICD-9-CM: 480.8  11/6/2020 Unknown Review of Systems:  
Review of systems not obtained due to patient factors. Vital Signs:  
 Last 24hrs VS reviewed since prior progress note. Most recent are: 
Visit Vitals /72 Pulse 94 Temp 98.3 °F (36.8 °C) Resp 14 Wt 84.6 kg (186 lb 6.4 oz) SpO2 95% BMI 32.00 kg/m² Intake/Output Summary (Last 24 hours) at 11/13/2020 1131 Last data filed at 11/13/2020 3766 Gross per 24 hour Intake 350 ml Output 850 ml Net -500 ml Physical Examination:  
 
I had a face to face encounter and have examined patient today,11/11 Constitutional:  Non verbal.On oxygen via NC. Not in distress Resp: not tachypneic,+scattered wheezing. No accessory muscle use GI:  Soft, non distended, non tender. normoactive bowel sounds, no hepatosplenomegaly :  No CVA or suprapubic tenderness Musculoskeletal:  No edema, warm Neurologic:  Mental status:Alert,non verbal  
Motor exam:Moves all extremities symmetrically Data Review:  
 Review and/or order of clinical lab test 
Review and/or order of tests in the radiology section of CPT Review and/or order of tests in the medicine section of CPT Labs:  
 
Recent Labs 11/13/20 0238 11/12/20 0211 WBC 14.7* 15.5* HGB 11.9 11.8 HCT 34.0* 34.8*  
* 468* Recent Labs 11/13/20 0238 11/12/20 0211 11/11/20 
0100  139 142  
K 5.0 4.8 5.0  
* 109* 111* CO2 25 24 24 BUN 25* 23* 28* CREA 0.98 0.97 0.97 * 169* 163* CA 8.3* 8.6 8.6 MG  --   --  2.3 Recent Labs 11/13/20 0238 11/12/20 0211 11/11/20 
0100 ALT 29 29 29 AP 42* 40* 41* TBILI 0.3 0.3 0.3 TP 5.8* 6.5 6.5 ALB 2.1* 2.0* 2.2*  
GLOB 3.7 4.5* 4.3* Recent Labs 11/13/20 0238 11/12/20 0211 11/11/20 
0100 INR 1.2* 1.2* 1.2* PTP 12.6* 12.5* 12.7* Recent Labs 11/13/20 0238 11/12/20 
0211 FERR 505* 619* No results found for: FOL, RBCF No results for input(s): PH, PCO2, PO2 in the last 72 hours. Recent Labs 11/11/20 
0100 * TROIQ <0.05 No results found for: CHOL, CHOLX, CHLST, CHOLV, HDL, HDLP, LDL, LDLC, DLDLP, TGLX, TRIGL, TRIGP, CHHD, CHHDX Lab Results Component Value Date/Time Glucose (POC) 82 03/11/2018 02:32 PM  
 
Lab Results Component Value Date/Time Color YELLOW/STRAW 11/02/2020 06:16 PM  
 Appearance CLOUDY (A) 11/02/2020 06:16 PM  
 Specific gravity 1.030 11/02/2020 06:16 PM  
 Specific gravity 1.015 03/11/2018 01:37 PM  
 pH (UA) 5.5 11/02/2020 06:16 PM  
 Protein >300 (A) 11/02/2020 06:16 PM  
 Glucose Negative 11/02/2020 06:16 PM  
 Ketone Negative 11/02/2020 06:16 PM  
 Bilirubin NEGATIVE  03/11/2018 01:37 PM  
 Urobilinogen 1.0 11/02/2020 06:16 PM  
 Nitrites Negative 11/02/2020 06:16 PM  
 Leukocyte Esterase Negative 11/02/2020 06:16 PM  
 Epithelial cells MODERATE (A) 11/02/2020 06:16 PM  
 Bacteria 1+ (A) 11/02/2020 06:16 PM  
 WBC 5-10 11/02/2020 06:16 PM  
 RBC 0-5 11/02/2020 06:16 PM  
 
 
 
Medications Reviewed:  
 
Current Facility-Administered Medications Medication Dose Route Frequency  oxybutynin (DITROPAN) tablet 5 mg  5 mg Oral BID  [START ON 11/14/2020] lansoprazole (PREVACID) 3 mg/mL oral suspension 30 mg  30 mg Oral ACB  dexAMETHasone (DECADRON) tablet 6 mg  6 mg Oral Q24H  
 enoxaparin (LOVENOX) injection 40 mg  40 mg SubCUTAneous Q12H  
 risperiDONE (RisperDAL) 1 mg/mL oral solution soln 1 mg  1 mg Oral QHS  acetaminophen (TYLENOL) tablet 650 mg  650 mg Oral Q6H PRN  Or  
 acetaminophen (TYLENOL) suppository 650 mg  650 mg Rectal Q6H PRN  
 albuterol (PROVENTIL HFA, VENTOLIN HFA, PROAIR HFA) inhaler 2 Puff  2 Puff Inhalation Q4H PRN  
  ascorbic acid (vitamin C) (VITAMIN C) tablet 500 mg  500 mg Oral BID  cholecalciferol (VITAMIN D3) (1000 Units /25 mcg) tablet 1 Tab  1,000 Units Oral DAILY  levothyroxine (SYNTHROID) tablet 50 mcg  50 mcg Oral ACB  zinc sulfate (ZINCATE) 220 (50) mg capsule 1 Cap  1 Cap Oral DAILY  
 
______________________________________________________________________ EXPECTED LENGTH OF STAY: 4d 19h ACTUAL LENGTH OF STAY:          5 Codi Hopkins MD  
 Complex Repair And Xenograft Text: The defect edges were debeveled with a #15 scalpel blade.  The primary defect was closed partially with a complex linear closure.  Given the location of the defect, shape of the defect and the proximity to free margins a xenograft was deemed most appropriate to repair the remaining defect.  The graft was trimmed to fit the size of the remaining defect.  The graft was then placed in the primary defect, oriented appropriately, and sutured into place.

## 2020-11-13 NOTE — PROGRESS NOTES
OCCUPATIONAL THERAPY EVALUATION/DISCHARGE Patient: Althea Price (49 y.o. female) Date: 11/13/2020 Primary Diagnosis: Pneumonia due to COVID-19 virus [U07.1, J12.89] Precautions:   Contact ASSESSMENT Based on the objective data described below, the patient presents with need for supplemental O2 with coughing due to acute COVID-19. Pt is non verbal at baseline, hx of Down's syndrome and lives with her mom. She followed all commands during session but demonstrated poor safety and impulsivity with lines and leads. Setup to occasional min A for self feeding. Pt is a fall risk and noted 2 LOB in bathroom today. Recommend continued transfers with gait belt and BSC with nursing staff. O2 stable via 5 L NC. Pt is likely at baseline for ADLs and not in need of acute OT services. Current Level of Function (ADLs/self-care): CGA to min A, impulsive with lines and leads Functional Outcome Measure: The patient scored 35 on the barthel outcome measure which is indicative of 65%impairmetn. Other factors to consider for discharge: from home with mom PLAN : 
Recommend with staff: OOB to [facility] with gait belt and A x 1 (high fall risk), use of purewick as it appears pt is retaining urine and cannot communicate need to go Recommendation for discharge: (in order for the patient to meet his/her long term goals) No skilled occupational therapy/ follow up rehabilitation needs identified at this time. This discharge recommendation: 
Has not yet been discussed the attending provider and/or case management IF patient discharges home will need the following DME: none SUBJECTIVE:  
Patient stated non verbal. OBJECTIVE DATA SUMMARY:  
HISTORY:  
Past Medical History:  
Diagnosis Date  Endocrine disease  Hypothyroid 03/11/2018  Ill-defined condition Down's Syndrome No past surgical history on file.  
 
Prior Level of Function/Environment/Context: lives with her mother, per chart, is independent for ADLs Expanded or extensive additional review of patient history:  
Home Situation Home Environment: Private residence One/Two Story Residence: One story Living Alone: No 
Support Systems: Family member(s) Patient Expects to be Discharged to[de-identified] Private residence Current DME Used/Available at Home: None Hand dominance: Right EXAMINATION OF PERFORMANCE DEFICITS: 
Cognitive/Behavioral Status: 
Neurologic State: Alert;Eyes open spontaneously Orientation Level: Unable to verbalize Cognition: Decreased attention/concentration;Decreased command following Perception: Appears intact Safety/Judgement: Decreased insight into deficits Skin: intact Edema: none note Hearing: Auditory Auditory Impairment: None Vision/Perceptual:   
    
    
    
  
    
Acuity: Within Defined Limits Range of Motion: 
 
AROM: Generally decreased, functional 
  
  
  
  
  
  
  
 
Strength: 
 
Strength: Generally decreased, functional 
  
  
  
  
 
Coordination: 
Coordination: Generally decreased, functional 
Fine Motor Skills-Upper: Left Impaired;Right Impaired Gross Motor Skills-Upper: Right Impaired;Left Impaired Tone & Sensation: 
 
  
  
  
  
  
  
  
  
 
Balance: 
Sitting: Intact; With support Standing: Impaired; Without support Standing - Static: Fair;Constant support Standing - Dynamic : Constant support;Fair;Poor Functional Mobility and Transfers for ADLs: 
Bed Mobility: 
Rolling: Supervision Supine to Sit: Supervision Sit to Supine: Supervision(unsafe, goes in with knees first, rolls over into bed) Scooting: Supervision Transfers: 
Sit to Stand: Minimum assistance;Contact guard assistance Stand to Sit: Minimum assistance;Contact guard assistance ADL Assessment: 
Feeding: Minimum assistance Oral Facial Hygiene/Grooming: Minimum assistance Bathing: Minimum assistance Upper Body Dressing: Minimum assistance Lower Body Dressing: Minimum assistance Toileting: Minimum assistance ADL Intervention and task modifications: 
  
 
  
 
  
 
  
 
  
 
  
 
  
 
Cognitive Retraining Safety/Judgement: Decreased insight into deficits Functional Measure: 
Barthel Index: 
 
Bathin Bladder: 5 Bowels: 5 Groomin Dressin Feedin Mobility: 0 Stairs: 0 Toilet Use: 5 Transfer (Bed to Chair and Back): 10 Total: 35/100 The Barthel ADL Index: Guidelines 1. The index should be used as a record of what a patient does, not as a record of what a patient could do. 2. The main aim is to establish degree of independence from any help, physical or verbal, however minor and for whatever reason. 3. The need for supervision renders the patient not independent. 4. A patient's performance should be established using the best available evidence. Asking the patient, friends/relatives and nurses are the usual sources, but direct observation and common sense are also important. However direct testing is not needed. 5. Usually the patient's performance over the preceding 24-48 hours is important, but occasionally longer periods will be relevant. 6. Middle categories imply that the patient supplies over 50 per cent of the effort. 7. Use of aids to be independent is allowed. Alice De León., Barthel, DNafisaW. (3831). Functional evaluation: the Barthel Index. [address] [facility] (14)2. Lorre Gums maribel [place], J.J.M.F, Noé Bourgeois., Nestor Boyle., Edilma Moreno, [address] [place] [address] (). Measuring the change indisability after inpatient rehabilitation; comparison of the responsiveness of the Barthel Index and Functional Crystal Measure. Journal of Neurology, Neurosurgery, and Psychiatry, 66(4), 765-509. Juan Trejo, N.J.A, OLESYA Bernstein.J.M, & Diamante Valero, M.A. (2004.) Assessment of post-stroke quality of life in cost-effectiveness studies: The usefulness of the Barthel Index and the EuroQoL-5D.  Quality of Life Research, 13, 365-70 Occupational Therapy Evaluation Charge Determination History Examination Decision-Making LOW Complexity : Brief history review  LOW Complexity : 1-3 performance deficits relating to physical, cognitive , or psychosocial skils that result in activity limitations and / or participation restrictions  MEDIUM Complexity : Patient may present with comorbidities that affect occupational performnce. Miniml to moderate modification of tasks or assistance (eg, physical or verbal ) with assesment(s) is necessary to enable patient to complete evaluation Based on the above components, the patient evaluation is determined to be of the following complexity level: LOW Pain Rating: 
none Activity Tolerance:  
Good and desaturates with exertion and requires oxygen After treatment patient left in no apparent distress:   
Supine in bed, Call bell within reach, Bed / chair alarm activated and Side rails x 3 
 
COMMUNICATION/EDUCATION:  
The patients plan of care was discussed with: Physical therapist and Registered nurse. Thank you for this referral. 
Donis Whitlock OT Time Calculation: 20 mins

## 2020-11-14 NOTE — PROGRESS NOTES
Problem: Pressure Injury - Risk of 
Goal: *Prevention of pressure injury Description: Document Jai Scale and appropriate interventions in the flowsheet. Outcome: Progressing Towards Goal 
Note: Pressure Injury Interventions: 
Sensory Interventions: Assess changes in LOC, Assess need for specialty bed, Pressure redistribution bed/mattress (bed type) Moisture Interventions: Absorbent underpads, Apply protective barrier, creams and emollients, Maintain skin hydration (lotion/cream) Activity Interventions: Increase time out of bed, Pressure redistribution bed/mattress(bed type) Mobility Interventions: Pressure redistribution bed/mattress (bed type) Nutrition Interventions: Document food/fluid/supplement intake Friction and Shear Interventions: Apply protective barrier, creams and emollients, HOB 30 degrees or less Problem: Aspiration - Risk of 
Goal: *Absence of aspiration Outcome: Progressing Towards Goal 
  
Problem: Patient Education: Go to Patient Education Activity Goal: Patient/Family Education Outcome: Progressing Towards Goal

## 2020-11-14 NOTE — PROGRESS NOTES
[facility] Adult  Hospitalist Group Hospitalist Progress Note Peggy Rey MD 
Answering service: [phone] OR [phone] from in house phone Date of Service:  2020 NAME:  Manas Caldera :  1980 MRN:  [id number] This documentation was facilitated by a Voice Recognition software and may contain inadvertent typographical errors. 81-year-old female was tested positive for Covid on  and was admitted at [facility] for acute hypoxic respiratory failure secondary to Covid pneumonia is transferred to [facility] for higher level of care. Interval history / Subjective:  
  
Patient is non verbal at baseline. Appears calm, comfortable. No signs of distress. Still requiring supplemental O2 on 4L NC. sats mid 90s. Assessment & Plan:  
Acute hypoxic respiratory failure: 2nd to Covid pneumonia Sepsis due to Covid pneumonia, POA- resolved. -CAT scan of the chest on 11/10 showed scattered patchy areas of airspace disease in the lungs bilaterally, greater on the left. -Chest x-ray on  showed small left pleural effusion and left basilar atelectasis. Left upper lung increased interstitial markings. -Maintain droplet plus isolation 
-Supplemental oxygen via NC- continue weaning goal sats >92% 
-Continue with Decadron and Remdesevir-Monitor inflammatory markers. -Monitor LFTs,stable so far. - ID and pulmonary following CKD stage III: Creatinine about baseline now. Mild hypernatremia could be due to viral sepsis. improved. Hypothyroidism: Continue levothyroxine Down syndrome: Continue with Risperdal 
Diet: SLP cleared her for baseline diet of mechanical soft and thin liquids Moderately large hiatus hernia. Distended fluid-filled esophagus.  
- Detected on CT on  - able to swallow/po intake good. f/u op with GI Patient's Baseline: Ambulates without assistive devices. DVT ppx: Lovenox Code status: Full code Disposition: Anticipate home mPOERNIE: Viridiana Suggs. [phone]. Hospital Problems  Never Reviewed Codes Class Noted POA Pneumonia due to COVID-19 virus ICD-10-CM: U07.1, J12.89 ICD-9-CM: 480.8  11/6/2020 Unknown Review of Systems:  
Review of systems not obtained due to patient factors. Vital Signs:  
 Last 24hrs VS reviewed since prior progress note. Most recent are: 
Visit Vitals BP 90/65 (BP 1 Location: Left arm, BP Patient Position: At rest) Pulse 76 Temp 99.2 °F (37.3 °C) Resp 20 Wt 84 kg (185 lb 3.2 oz) SpO2 98% BMI 31.79 kg/m² No intake or output data in the 24 hours ending 11/14/20 0808 Physical Examination:  
 
I had a face to face encounter and have examined patient today. 11/14/20 Constitutional:  Non verbal.On oxygen via NC. Not in distress Resp: not tachypneic,+scattered wheezing. No accessory muscle use GI:  Soft, non distended, non tender. Musculoskeletal:  No edema, warm Neurologic:  Mental status:Alert,non verbal  
Motor exam:Moves all extremities symmetrically Data Review:  
 Review and/or order of clinical lab test 
Review and/or order of tests in the radiology section of CPT Review and/or order of tests in the medicine section of CPT Labs:  
 
Recent Labs 11/13/20 0238 11/12/20 0211 WBC 14.7* 15.5* HGB 11.9 11.8 HCT 34.0* 34.8*  
* 468* Recent Labs 11/13/20 0238 11/12/20 0211  139  
K 5.0 4.8  
* 109* CO2 25 24 BUN 25* 23* CREA 0.98 0.97 * 169* CA 8.3* 8.6 Recent Labs 11/13/20 0238 11/12/20 0211 ALT 29 29 AP 42* 40* TBILI 0.3 0.3 TP 5.8* 6.5 ALB 2.1* 2.0*  
GLOB 3.7 4.5* Recent Labs 11/14/20 
0409 11/13/20 0238 11/12/20 0211 INR 1.2* 1.2* 1.2* PTP 12.1* 12.6* 12.5* Recent Labs 11/14/20 
0409 11/13/20 
6294 FERR 422* 505* No results found for: FOL, RBCF  
 No results for input(s): PH, PCO2, PO2 in the last 72 hours. No results for input(s): CPK, CKNDX, TROIQ in the last 72 hours. No lab exists for component: CPKMB No results found for: CHOL, CHOLX, CHLST, CHOLV, HDL, HDLP, LDL, LDLC, DLDLP, TGLX, TRIGL, TRIGP, CHHD, CHHDX Lab Results Component Value Date/Time Glucose (POC) 82 03/11/2018 02:32 PM  
 
Lab Results Component Value Date/Time Color YELLOW/STRAW 11/02/2020 06:16 PM  
 Appearance CLOUDY (A) 11/02/2020 06:16 PM  
 Specific gravity 1.030 11/02/2020 06:16 PM  
 Specific gravity 1.015 03/11/2018 01:37 PM  
 pH (UA) 5.5 11/02/2020 06:16 PM  
 Protein >300 (A) 11/02/2020 06:16 PM  
 Glucose Negative 11/02/2020 06:16 PM  
 Ketone Negative 11/02/2020 06:16 PM  
 Bilirubin NEGATIVE  03/11/2018 01:37 PM  
 Urobilinogen 1.0 11/02/2020 06:16 PM  
 Nitrites Negative 11/02/2020 06:16 PM  
 Leukocyte Esterase Negative 11/02/2020 06:16 PM  
 Epithelial cells MODERATE (A) 11/02/2020 06:16 PM  
 Bacteria 1+ (A) 11/02/2020 06:16 PM  
 WBC 5-10 11/02/2020 06:16 PM  
 RBC 0-5 11/02/2020 06:16 PM  
 
 
 
Medications Reviewed:  
 
Current Facility-Administered Medications Medication Dose Route Frequency  oxybutynin (DITROPAN) tablet 5 mg  5 mg Oral BID  lansoprazole (PREVACID) 3 mg/mL oral suspension 30 mg  30 mg Oral ACB  dexAMETHasone (DECADRON) tablet 6 mg  6 mg Oral Q24H  
 enoxaparin (LOVENOX) injection 40 mg  40 mg SubCUTAneous Q12H  
 risperiDONE (RisperDAL) 1 mg/mL oral solution soln 1 mg  1 mg Oral QHS  acetaminophen (TYLENOL) tablet 650 mg  650 mg Oral Q6H PRN Or  
 acetaminophen (TYLENOL) suppository 650 mg  650 mg Rectal Q6H PRN  
 albuterol (PROVENTIL HFA, VENTOLIN HFA, PROAIR HFA) inhaler 2 Puff  2 Puff Inhalation Q4H PRN  
 ascorbic acid (vitamin C) (VITAMIN C) tablet 500 mg  500 mg Oral BID  cholecalciferol (VITAMIN D3) (1000 Units /25 mcg) tablet 1 Tab  1,000 Units Oral DAILY  levothyroxine (SYNTHROID) tablet 50 mcg  50 mcg Oral ACB  zinc sulfate (ZINCATE) 220 (50) mg capsule 1 Cap  1 Cap Oral DAILY  
 
______________________________________________________________________ EXPECTED LENGTH OF STAY: 4d 19h ACTUAL LENGTH OF STAY:          6 Cheli Alas MD

## 2020-11-14 NOTE — PROGRESS NOTES
Problem: Risk for Spread of Infection Goal: Prevent transmission of infectious organism to others Description: Prevent the transmission of infectious organisms to other patients, staff members, and visitors. Outcome: Progressing Towards Goal 
  
Problem: Falls - Risk of 
Goal: *Absence of Falls Description: Document [place] Jolly Fall Risk and appropriate interventions in the flowsheet. Outcome: Progressing Towards Goal 
Note: Fall Risk Interventions: 
Mobility Interventions: Communicate number of staff needed for ambulation/transfer, Strengthening exercises (ROM-active/passive) Mentation Interventions: Adequate sleep, hydration, pain control, Bed/chair exit alarm Medication Interventions: Patient to call before getting OOB, Evaluate medications/consider consulting pharmacy Elimination Interventions: Call light in reach, Patient to call for help with toileting needs, Bed/chair exit alarm History of Falls Interventions: Utilize gait belt for transfer/ambulation, Bed/chair exit alarm, Evaluate medications/consider consulting pharmacy, Room close to nurse's station, Assess for delayed presentation/identification of injury for 48 hrs (comment for end date), Consult care management for discharge planning, Vital signs minimum Q4HRs X 24 hrs (comment for end date) Problem: Pressure Injury - Risk of 
Goal: *Prevention of pressure injury Description: Document Jai Scale and appropriate interventions in the flowsheet. Outcome: Progressing Towards Goal 
Note: Pressure Injury Interventions: 
Sensory Interventions: Assess changes in LOC, Assess need for specialty bed, Pressure redistribution bed/mattress (bed type) Moisture Interventions: Absorbent underpads, Apply protective barrier, creams and emollients, Maintain skin hydration (lotion/cream) Activity Interventions: Increase time out of bed, Pressure redistribution bed/mattress(bed type) Mobility Interventions: Pressure redistribution bed/mattress (bed type) Nutrition Interventions: Document food/fluid/supplement intake Friction and Shear Interventions: Apply protective barrier, creams and emollients, HOB 30 degrees or less Problem: Aspiration - Risk of 
Goal: *Absence of aspiration Outcome: Progressing Towards Goal 
  
Problem: Pneumonia: Discharge Outcomes Goal: *Tolerating diet Outcome: Progressing Towards Goal 
Goal: *Respiratory status at baseline Outcome: Progressing Towards Goal 
Goal: *Vital signs within defined limits Outcome: Progressing Towards Goal

## 2020-11-15 NOTE — PROGRESS NOTES
[facility] [facility] Adult  Hospitalist Group Hospitalist Progress Note Jennifer Mcdonnell MD 
Answering service: [phone] OR [phone] from in house phone Date of Service:  11/15/2020 NAME:  Yamel Rowe :  1980 MRN:  [id number] 
 
29-year-old female was tested positive for Covid on  and was admitted at [facility] for acute hypoxic respiratory failure secondary to Covid pneumonia is transferred to [facility] for higher level of care. Interval history / Subjective:  
  
Patient is non verbal at baseline. Appears comfortable, her oxygen NC almost off her nostrils, no signs of distress. Continue weaning off. Start planning dc. Needs continued therapy evaluation. Assessment & Plan:  
Acute hypoxic respiratory failure: 2nd to Covid pneumonia Sepsis due to Covid pneumonia, POA- resolved. -CT chest 11/10: airspace disease in the lungs bilaterally, greater on the left. -Maintain droplet plus isolation-Supplemental oxygen- weaning goal sats >92% -Decadron and Remdesevir-Monitor inflammatory markers. -Monitor LFTs- ID and pulmonary following CKD stage III: Creatinine about baseline now. Mild hypernatremia could be due to viral sepsis. improved. Hypothyroidism: Continue levothyroxine Down syndrome: Continue with Risperdal 
Diet: SLP cleared her for baseline diet of mechanical soft and thin liquids Moderately large hiatus hernia. Distended fluid-filled esophagus.  
- Detected on CT on  - able to swallow/po intake good. f/u op with GI Patient's Baseline: Ambulates without assistive devices. DVT ppx: Lovenox Code status: Full code Disposition: Anticipate home. updated mPOA: [employer]. [phone]. Hospital Problems  Never Reviewed Codes Class Noted POA Pneumonia due to COVID-19 virus ICD-10-CM: U07.1, J12.89 ICD-9-CM: 480.8  2020 Unknown Review of Systems: Review of systems not obtained due to patient factors. Vital Signs:  
 Last 24hrs VS reviewed since prior progress note. Most recent are: 
Visit Vitals BP (!) 134/58 (BP 1 Location: Left arm, BP Patient Position: At rest) Pulse 60 Temp 98.2 °F (36.8 °C) Resp 16 Wt 84 kg (185 lb 3.2 oz) SpO2 96% BMI 31.79 kg/m² Intake/Output Summary (Last 24 hours) at 11/15/2020 2259 Last data filed at 11/14/2020 1615 Gross per 24 hour Intake 200 ml Output  Net 200 ml Physical Examination:  
 
I had a face to face encounter and have examined patient today. 11/15/20 Constitutional:  Non verbal.Not in distress, obese Resp: not tachypneic,+scattered wheezing. No accessory muscle use GI:  Soft, non distended, non tender. Musculoskeletal:  No edema, warm Neurologic:  Mental status:Alert,non verbal Moves all extremities symmetrical 
  
 
Data Review:  
 Review and/or order of clinical lab test 
Review and/or order of tests in the radiology section of CPT Review and/or order of tests in the medicine section of CPT Labs:  
 
Recent Labs 11/13/20 
1413 WBC 14.7* HGB 11.9 HCT 34.0*  
* Recent Labs 11/13/20 
9575   
K 5.0  
* CO2 25 BUN 25* CREA 0.98  
* CA 8.3* Recent Labs 11/13/20 
3412 ALT 29 AP 42* TBILI 0.3 TP 5.8* ALB 2.1*  
GLOB 3.7 Recent Labs 11/15/20 
2478 11/14/20 
0409 11/13/20 
1469 INR 1.1 1.2* 1.2* PTP 11.5* 12.1* 12.6* Recent Labs 11/15/20 
0257 11/14/20 
0409 FERR 394* 422* No results found for: FOL, RBCF No results for input(s): PH, PCO2, PO2 in the last 72 hours. No results for input(s): CPK, CKNDX, TROIQ in the last 72 hours. No lab exists for component: CPKMB No results found for: CHOL, CHOLX, CHLST, CHOLV, HDL, HDLP, LDL, LDLC, DLDLP, TGLX, TRIGL, TRIGP, CHHD, CHHDX Lab Results Component Value Date/Time Glucose (POC) 82 03/11/2018 02:32 PM  
 
Lab Results Component Value Date/Time Color YELLOW/STRAW 11/02/2020 06:16 PM  
 Appearance CLOUDY (A) 11/02/2020 06:16 PM  
 Specific gravity 1.030 11/02/2020 06:16 PM  
 Specific gravity 1.015 03/11/2018 01:37 PM  
 pH (UA) 5.5 11/02/2020 06:16 PM  
 Protein >300 (A) 11/02/2020 06:16 PM  
 Glucose Negative 11/02/2020 06:16 PM  
 Ketone Negative 11/02/2020 06:16 PM  
 Bilirubin NEGATIVE  03/11/2018 01:37 PM  
 Urobilinogen 1.0 11/02/2020 06:16 PM  
 Nitrites Negative 11/02/2020 06:16 PM  
 Leukocyte Esterase Negative 11/02/2020 06:16 PM  
 Epithelial cells MODERATE (A) 11/02/2020 06:16 PM  
 Bacteria 1+ (A) 11/02/2020 06:16 PM  
 WBC 5-10 11/02/2020 06:16 PM  
 RBC 0-5 11/02/2020 06:16 PM  
 
 
 
Medications Reviewed:  
 
Current Facility-Administered Medications Medication Dose Route Frequency  oxybutynin (DITROPAN) tablet 5 mg  5 mg Oral BID  lansoprazole (PREVACID) 3 mg/mL oral suspension 30 mg  30 mg Oral ACB  dexAMETHasone (DECADRON) tablet 6 mg  6 mg Oral Q24H  
 enoxaparin (LOVENOX) injection 40 mg  40 mg SubCUTAneous Q12H  
 risperiDONE (RisperDAL) 1 mg/mL oral solution soln 1 mg  1 mg Oral QHS  acetaminophen (TYLENOL) tablet 650 mg  650 mg Oral Q6H PRN Or  
 acetaminophen (TYLENOL) suppository 650 mg  650 mg Rectal Q6H PRN  
 albuterol (PROVENTIL HFA, VENTOLIN HFA, PROAIR HFA) inhaler 2 Puff  2 Puff Inhalation Q4H PRN  
 ascorbic acid (vitamin C) (VITAMIN C) tablet 500 mg  500 mg Oral BID  cholecalciferol (VITAMIN D3) (1000 Units /25 mcg) tablet 1 Tab  1,000 Units Oral DAILY  levothyroxine (SYNTHROID) tablet 50 mcg  50 mcg Oral ACB  zinc sulfate (ZINCATE) 220 (50) mg capsule 1 Cap  1 Cap Oral DAILY  
 
______________________________________________________________________ EXPECTED LENGTH OF STAY: 4d 19h ACTUAL LENGTH OF STAY:          7 Susan Trimble MD

## 2020-11-15 NOTE — PROGRESS NOTES
Problem: Falls - Risk of 
Goal: *Absence of Falls Description: Document Hattie Lazaro Fall Risk and appropriate interventions in the flowsheet. Outcome: Progressing Towards Goal 
Note: Fall Risk Interventions: 
Mobility Interventions: Bed/chair exit alarm Mentation Interventions: Adequate sleep, hydration, pain control Medication Interventions: Bed/chair exit alarm Elimination Interventions: Bed/chair exit alarm History of Falls Interventions: Utilize gait belt for transfer/ambulation, Bed/chair exit alarm, Evaluate medications/consider consulting pharmacy, Room close to nurse's station, Assess for delayed presentation/identification of injury for 48 hrs (comment for end date), Consult care management for discharge planning, Vital signs minimum Q4HRs X 24 hrs (comment for end date)

## 2020-11-15 NOTE — PROGRESS NOTES
Verbal shift change report given to Giulia Bhatt (oncoming nurse) by Marilin Montoya (offgoing nurse). Report included the following information SBAR, Kardex and MAR.

## 2020-11-15 NOTE — PROGRESS NOTES
Bedside and verbal shift change report given to Jeison Magaña RN (oncoming nurse) by Lissy Henriquez (offgoing nurse). Report included the following information SBAR, Kardex and MAR.

## 2020-11-15 NOTE — PROGRESS NOTES
Problem: Risk for Spread of Infection Goal: Prevent transmission of infectious organism to others Description: Prevent the transmission of infectious organisms to other patients, staff members, and visitors. Outcome: Progressing Towards Goal 
  
Problem: Falls - Risk of 
Goal: *Absence of Falls Description: Document Yosi Cross Fall Risk and appropriate interventions in the flowsheet. Outcome: Progressing Towards Goal 
Note: Fall Risk Interventions: 
Mobility Interventions: Bed/chair exit alarm, Communicate number of staff needed for ambulation/transfer, OT consult for ADLs, Patient to call before getting OOB, PT Consult for mobility concerns, Strengthening exercises (ROM-active/passive) Mentation Interventions: Bed/chair exit alarm, More frequent rounding, Reorient patient Medication Interventions: Bed/chair exit alarm, Evaluate medications/consider consulting pharmacy, Patient to call before getting OOB, Teach patient to arise slowly Elimination Interventions: Bed/chair exit alarm, Call light in reach, Patient to call for help with toileting needs, Toileting schedule/hourly rounds History of Falls Interventions: Utilize gait belt for transfer/ambulation, Bed/chair exit alarm, Evaluate medications/consider consulting pharmacy, Room close to nurse's station, Assess for delayed presentation/identification of injury for 48 hrs (comment for end date), Consult care management for discharge planning, Vital signs minimum Q4HRs X 24 hrs (comment for end date) Problem: Aspiration - Risk of 
Goal: *Absence of aspiration Outcome: Progressing Towards Goal 
  
Problem: Breathing Pattern - Ineffective Goal: *Absence of hypoxia Outcome: Progressing Towards Goal 
  
Problem: Airway Clearance - Ineffective Goal: Achieve or maintain patent airway Outcome: Progressing Towards Goal 
  
Problem: Gas Exchange - Impaired Goal: Absence of hypoxia Outcome: Progressing Towards Goal 
 Goal: Promote optimal lung function Outcome: Progressing Towards Goal 
  
Problem: Breathing Pattern - Ineffective Goal: Ability to achieve and maintain a regular respiratory rate Outcome: Progressing Towards Goal 
  
Problem: Nutrition Deficits Goal: Optimize nutrtional status Outcome: Progressing Towards Goal

## 2020-11-16 NOTE — PROGRESS NOTES
[address] [facility] Adult  Hospitalist Group Hospitalist Progress Note Sundeep Campbell MD 
Answering service: [phone] OR [phone] from in house phone Date of Service:  2020 NAME:  Chris Umaña :  1980 MRN:  [id number] 
 
77-year-old female was tested positive for Covid on  and was admitted at [facility] for acute hypoxic respiratory failure secondary to Covid pneumonia is transferred to [facility] for higher level of care. Interval history / Subjective:  
  
Patient is non verbal at baseline. Appears comfortable, her NC oxygen is off her nostrils, down on her neck. No signs of distress. Lungs clear. Ready for dc medically. Assessment & Plan:  
Acute hypoxic respiratory failure: 2nd to Covid pneumonia Sepsis due to Covid pneumonia, POA- resolved. -CT chest 11/10: airspace disease in the lungs bilaterally, greater on the left. -Maintain droplet plus isolation-Supplemental oxygen- weaning goal sats >92% -Decadron and Remdesevir-Monitor inflammatory markers. -Monitor LFTs- ID and pulmonary following CKD stage III: Creatinine about baseline now. Mild hypernatremia could be due to viral sepsis. improved. Hypothyroidism: Continue levothyroxine Down syndrome: Continue with Risperdal 
Diet: SLP cleared her for baseline diet of mechanical soft and thin liquids Moderately large hiatus hernia. Distended fluid-filled esophagus.  
- Detected on CT on  - able to swallow/po intake good. f/u op with GI Patient's Baseline: Ambulates without assistive devices. DVT ppx: Lovenox Code status: Full code- updated mPOA: True Payment. [phone]. Disposition: Anticipate home. Medically cleared. Hospital Problems  Never Reviewed Codes Class Noted POA Pneumonia due to COVID-19 virus ICD-10-CM: U07.1, J12.89 ICD-9-CM: 480.8  2020 Unknown Review of Systems: Review of systems not obtained due to patient factors. Vital Signs:  
 Last 24hrs VS reviewed since prior progress note. Most recent are: 
Visit Vitals /68 (BP 1 Location: Left arm, BP Patient Position: At rest) Pulse 88 Temp 98.3 °F (36.8 °C) Resp 16 Wt 84 kg (185 lb 3.2 oz) SpO2 93% BMI 31.79 kg/m² No intake or output data in the 24 hours ending 11/16/20 0754 Physical Examination:  
 
I had a face to face encounter and have examined patient today. 11/16/20 Constitutional:  Non verbal. Not in distress, obese BW. Resp: not tachypneic, no wheezing. No accessory muscle use GI:  Soft, non distended, non tender. Musculoskeletal:  No edema, warm Neurologic:  Mental status:Alert,non verbal Moves all extremities symmetrical 
  
 
Data Review:  
 Review and/or order of clinical lab test 
Review and/or order of tests in the radiology section of CPT Review and/or order of tests in the medicine section of CPT Labs:  
 
No results for input(s): WBC, HGB, HCT, PLT, HGBEXT, HCTEXT, PLTEXT, HGBEXT, HCTEXT, PLTEXT in the last 72 hours. No results for input(s): NA, K, CL, CO2, BUN, CREA, GLU, CA, MG, PHOS, URICA in the last 72 hours. No results for input(s): ALT, AP, TBIL, TBILI, TP, ALB, GLOB, GGT, AML, LPSE in the last 72 hours. No lab exists for component: SGOT, GPT, AMYP, HLPSE Recent Labs 11/15/20 
0257 11/14/20 
0409 INR 1.1 1.2* PTP 11.5* 12.1* Recent Labs 11/16/20 
0451 11/15/20 
0257 FERR 373 394* No results found for: FOL, RBCF No results for input(s): PH, PCO2, PO2 in the last 72 hours. No results for input(s): CPK, CKNDX, TROIQ in the last 72 hours. No lab exists for component: CPKMB No results found for: CHOL, CHOLX, CHLST, CHOLV, HDL, HDLP, LDL, LDLC, DLDLP, TGLX, TRIGL, TRIGP, CHHD, CHHDX Lab Results Component Value Date/Time Glucose (POC) 82 03/11/2018 02:32 PM  
 
Lab Results Component Value Date/Time Color YELLOW/STRAW 11/02/2020 06:16 PM  
 Appearance CLOUDY (A) 11/02/2020 06:16 PM  
 Specific gravity 1.030 11/02/2020 06:16 PM  
 Specific gravity 1.015 03/11/2018 01:37 PM  
 pH (UA) 5.5 11/02/2020 06:16 PM  
 Protein >300 (A) 11/02/2020 06:16 PM  
 Glucose Negative 11/02/2020 06:16 PM  
 Ketone Negative 11/02/2020 06:16 PM  
 Bilirubin NEGATIVE  03/11/2018 01:37 PM  
 Urobilinogen 1.0 11/02/2020 06:16 PM  
 Nitrites Negative 11/02/2020 06:16 PM  
 Leukocyte Esterase Negative 11/02/2020 06:16 PM  
 Epithelial cells MODERATE (A) 11/02/2020 06:16 PM  
 Bacteria 1+ (A) 11/02/2020 06:16 PM  
 WBC 5-10 11/02/2020 06:16 PM  
 RBC 0-5 11/02/2020 06:16 PM  
 
 
 
Medications Reviewed:  
 
Current Facility-Administered Medications Medication Dose Route Frequency  oxybutynin (DITROPAN) tablet 5 mg  5 mg Oral BID  lansoprazole (PREVACID) 3 mg/mL oral suspension 30 mg  30 mg Oral ACB  enoxaparin (LOVENOX) injection 40 mg  40 mg SubCUTAneous Q12H  
 risperiDONE (RisperDAL) 1 mg/mL oral solution soln 1 mg  1 mg Oral QHS  acetaminophen (TYLENOL) tablet 650 mg  650 mg Oral Q6H PRN Or  
 acetaminophen (TYLENOL) suppository 650 mg  650 mg Rectal Q6H PRN  
 albuterol (PROVENTIL HFA, VENTOLIN HFA, PROAIR HFA) inhaler 2 Puff  2 Puff Inhalation Q4H PRN  
 ascorbic acid (vitamin C) (VITAMIN C) tablet 500 mg  500 mg Oral BID  cholecalciferol (VITAMIN D3) (1000 Units /25 mcg) tablet 1 Tab  1,000 Units Oral DAILY  levothyroxine (SYNTHROID) tablet 50 mcg  50 mcg Oral ACB  zinc sulfate (ZINCATE) 220 (50) mg capsule 1 Cap  1 Cap Oral DAILY  
 
______________________________________________________________________ EXPECTED LENGTH OF STAY: 4d 19h ACTUAL LENGTH OF STAY:          8 Shilpa Rodarte MD

## 2020-11-16 NOTE — PROGRESS NOTES
Problem: Pneumonia: Day 1 Goal: *Oxygen saturation within defined limits Outcome: Progressing Towards Goal 
Goal: *Tolerating diet Outcome: Progressing Towards Goal 
  
Problem: Pneumonia: Day 2 Goal: *Oxygen saturation within defined limits Outcome: Progressing Towards Goal 
Goal: *Hemodynamically stable Outcome: Progressing Towards Goal 
Goal: *Optimal pain control at patient's stated goal 
Outcome: Progressing Towards Goal 
  
Problem: Pneumonia: Day 3 Goal: Consults, if ordered Outcome: Progressing Towards Goal 
Goal: Nutrition/Diet Outcome: Progressing Towards Goal 
Goal: Discharge Planning Outcome: Progressing Towards Goal 
Goal: Medications Outcome: Progressing Towards Goal 
  
Problem: Pneumonia: Discharge Outcomes Goal: *Describes available resources and support systems Outcome: Progressing Towards Goal

## 2020-11-16 NOTE — DISCHARGE INSTRUCTIONS
Discharge Instructions       PATIENT ID: Julissa Gonzalez  MRN: [id number]   YOB: 1980    DATE OF ADMISSION: 11/8/2020  3:45 PM    DATE OF DISCHARGE: 11/16/2020    PRIMARY CARE PROVIDER: Rivera Magaña MD     ATTENDING PHYSICIAN: Mitcheal Frankel, MD  DISCHARGING PROVIDER: Lisa Trinidad MD    To contact this individual call [phone] and ask the  to page. If unavailable ask to be transferred the Adult Hospitalist Department. DISCHARGE DIAGNOSES COVID 19 viral PNA    CONSULTATIONS: IP CONSULT TO INFECTIOUS DISEASES  IP CONSULT TO PULMONOLOGY    PROCEDURES/SURGERIES: * No surgery found *    FOLLOW UP APPOINTMENTS:   Follow-up Information     Follow up With Specialties Details Why Contact Info      In 1 week  PCP           ADDITIONAL CARE RECOMMENDATIONS: follow up with PCP     DIET: Resume previous diet    DISCHARGE MEDICATIONS:  PPI, vitamins    · It is important that you take the medication exactly as they are prescribed. · Keep your medication in the bottles provided by the pharmacist and keep a list of the medication names, dosages, and times to be taken in your wallet. · Do not take other medications without consulting your doctor. NOTIFY YOUR PHYSICIAN FOR ANY OF THE FOLLOWING:   Fever over 101 degrees for 24 hours. Chest pain, shortness of breath, fever, chills, nausea, vomiting, diarrhea, change in mentation, falling, weakness, bleeding. Severe pain or pain not relieved by medications. Or, any other signs or symptoms that you may have questions about. It was our pleasure to help take care of you and we hope you get well very soon.     DISPOSITION:    Home With:   OT  PT  HH  RN       SNF/Inpatient Rehab/LTAC   x Independent/assisted living    Hospice    Other:     CDMP Checked:   Yes x     PROBLEM LIST Updated:  Yes x     Signed:   Lisa Trinidad MD  11/16/2020  2:35 PM

## 2020-11-16 NOTE — PROGRESS NOTES
Problem: Mobility Impaired (Adult and Pediatric) Goal: *Acute Goals and Plan of Care (Insert Text) Description: FUNCTIONAL STATUS PRIOR TO ADMISSION: Patient required moderate assistance for basic and instrumental ADLs. HOME SUPPORT PRIOR TO ADMISSION: The patient lived with mother who assists with ADLs. Physical Therapy Goals Initiated 11/13/2020 1. Patient will move from supine to sit and sit to supine , scoot up and down, and roll side to side in bed with supervision/set-up within 7 day(s). 2.  Patient will transfer from bed to chair and chair to bed with supervision/set-up using the least restrictive device within 7 day(s). 3.  Patient will perform sit to stand with supervision/set-up within 7 day(s). 4.  Patient will ambulate with supervision/set-up for 150 feet with the least restrictive device within 7 day(s). Outcome: Progressing Towards Goal 
  
 
PHYSICAL THERAPY TREATMENT Patient: Vineet Macedo (77 y.o. female) Date: 11/16/2020 Diagnosis: Pneumonia due to COVID-19 virus [U07.1, J12.89]  
<principal problem not specified> Precautions: Contact Chart, physical therapy assessment, plan of care and goals were reviewed. ASSESSMENT Patient continues with skilled PT services and is progressing towards goals. Pt received in supine, eagerly gets out of bed without assistance and continued cues required for line management and for pacing self as she performs standing and intial movement very quickly. Pt demos mild imbalances during accelerative gait that are absent during slow gait. Pt ambulates to toilet and toilets adequately, ambulates back again with cues for pacing and balance. Discussed performance with pts mother, discussed DC home and HHPT as well as instructing mother on utilizing gait belt put with pts things. Will continue to follow. Current Level of Function Impacting Discharge (mobility/balance): impulsive, poor safety Other factors to consider for discharge: downs syndrome PLAN : 
Patient continues to benefit from skilled intervention to address the above impairments. Continue treatment per established plan of care. to address goals. Recommendation for discharge: (in order for the patient to meet his/her long term goals) Physical therapy at least 2 days/week in the home AND ensure assist and/or supervision for safety with OOB by family This discharge recommendation: 
Has been made in collaboration with the attending provider and/or case management IF patient discharges home will need the following DME: none SUBJECTIVE:  
Patient nonverbal 
 
OBJECTIVE DATA SUMMARY:  
Critical Behavior: 
Neurologic State: Agitated Orientation Level: Unable to verbalize Cognition: Follows commands, Decreased attention/concentration Safety/Judgement: Decreased insight into deficits Functional Mobility Training: 
Bed Mobility: 
Rolling: Supervision Supine to Sit: Supervision Sit to Supine: Supervision Scooting: Supervision Transfers: 
Sit to Stand: Contact guard assistance Stand to Sit: Contact guard assistance Balance: 
Sitting: Intact; Without support Standing: Impaired; Without support Standing - Static: Fair;Constant support Standing - Dynamic : Fair;Constant support Ambulation/Gait Training: 
Distance (ft): 20 Feet (ft)(20+20) Assistive Device: Gait belt Ambulation - Level of Assistance: Minimal assistance Gait Abnormalities: Shuffling gait Base of Support: Widened Speed/Glenny: Shuffled; Slow Step Length: Left shortened;Right shortened Activity Tolerance:  
Fair and requires rest breaks After treatment patient left in no apparent distress:  
Supine in bed, Call bell within reach, and Bed / chair alarm activated COMMUNICATION/COLLABORATION:  
The patients plan of care was discussed with: Registered nurse and Case management. Kerrie Holbrook, PT Time Calculation: 28 mins

## 2020-11-16 NOTE — PROGRESS NOTES
ALESSANDRA: 
1. [facility]. 2. Her sister will transport. CM noted patient will return home with her sister care. Referral sent to [facility], patient sister did not have preference.   
 
Chelo Freire [facility]

## 2020-11-16 NOTE — PROGRESS NOTES
Problem: Risk for Spread of Infection Goal: Prevent transmission of infectious organism to others Description: Prevent the transmission of infectious organisms to other patients, staff members, and visitors. Outcome: Progressing Towards Goal 
  
Problem: Falls - Risk of 
Goal: *Absence of Falls Description: Document Antonia Quintana Fall Risk and appropriate interventions in the flowsheet. Outcome: Progressing Towards Goal 
Note: Fall Risk Interventions: 
Mobility Interventions: Bed/chair exit alarm, Patient to call before getting OOB, Communicate number of staff needed for ambulation/transfer Mentation Interventions: Bed/chair exit alarm, More frequent rounding Medication Interventions: Bed/chair exit alarm, Evaluate medications/consider consulting pharmacy, Patient to call before getting OOB Elimination Interventions: Call light in reach, Bed/chair exit alarm, Toileting schedule/hourly rounds History of Falls Interventions: Utilize gait belt for transfer/ambulation, Bed/chair exit alarm, Evaluate medications/consider consulting pharmacy, Room close to nurse's station, Assess for delayed presentation/identification of injury for 48 hrs (comment for end date), Consult care management for discharge planning, Vital signs minimum Q4HRs X 24 hrs (comment for end date)

## 2020-11-16 NOTE — PROGRESS NOTES
Problem: Falls - Risk of 
Goal: *Absence of Falls Description: Document Jessica Cisneros Fall Risk and appropriate interventions in the flowsheet. Outcome: Progressing Towards Goal 
Note: Fall Risk Interventions: 
Mobility Interventions: Bed/chair exit alarm, Patient to call before getting OOB, Communicate number of staff needed for ambulation/transfer Mentation Interventions: Bed/chair exit alarm, More frequent rounding Medication Interventions: Bed/chair exit alarm, Evaluate medications/consider consulting pharmacy, Patient to call before getting OOB Elimination Interventions: Call light in reach, Bed/chair exit alarm, Toileting schedule/hourly rounds History of Falls Interventions: Utilize gait belt for transfer/ambulation, Bed/chair exit alarm, Evaluate medications/consider consulting pharmacy, Room close to nurse's station, Assess for delayed presentation/identification of injury for 48 hrs (comment for end date), Consult care management for discharge planning, Vital signs minimum Q4HRs X 24 hrs (comment for end date) Problem: Patient Education: Go to Patient Education Activity Goal: Patient/Family Education Outcome: Progressing Towards Goal 
  
Problem: Pressure Injury - Risk of 
Goal: *Prevention of pressure injury Description: Document Jai Scale and appropriate interventions in the flowsheet. Outcome: Progressing Towards Goal 
Note: Pressure Injury Interventions: 
Sensory Interventions: Assess changes in LOC, Keep linens dry and wrinkle-free, Float heels, Minimize linen layers Moisture Interventions: Check for incontinence Q2 hours and as needed, Apply protective barrier, creams and emollients, Absorbent underpads Activity Interventions: Increase time out of bed Mobility Interventions: Pressure redistribution bed/mattress (bed type), Float heels, HOB 30 degrees or less Nutrition Interventions: Discuss nutritional consult with provider Friction and Shear Interventions: Apply protective barrier, creams and emollients, Minimize layers Problem: Patient Education: Go to Patient Education Activity Goal: Patient/Family Education Outcome: Progressing Towards Goal 
  
Problem: Patient Education: Go to Patient Education Activity Goal: Patient/Family Education Outcome: Progressing Towards Goal 
  
Problem: Patient Education: Go to Patient Education Activity Goal: Patient/Family Education Outcome: Progressing Towards Goal 
  
Problem: Pneumonia: Day 1 Goal: Activity/Safety Outcome: Progressing Towards Goal 
Goal: Nutrition/Diet Outcome: Progressing Towards Goal 
Goal: Medications Outcome: Progressing Towards Goal 
Goal: Respiratory Outcome: Progressing Towards Goal 
Goal: *Oxygen saturation within defined limits Outcome: Progressing Towards Goal 
Goal: *Tolerating diet Outcome: Progressing Towards Goal 
  
Problem: Pneumonia: Day 2 Goal: *Oxygen saturation within defined limits Outcome: Progressing Towards Goal 
Goal: *Hemodynamically stable Outcome: Progressing Towards Goal 
Goal: *Optimal pain control at patient's stated goal 
Outcome: Progressing Towards Goal 
  
Problem: Pneumonia: Day 3 Goal: Consults, if ordered Outcome: Progressing Towards Goal 
Goal: Nutrition/Diet Outcome: Progressing Towards Goal 
Goal: Discharge Planning Outcome: Progressing Towards Goal 
Goal: Medications Outcome: Progressing Towards Goal 
  
Problem: Pneumonia: Discharge Outcomes Goal: *Describes available resources and support systems Outcome: Progressing Towards Goal 
  
Problem: Loneliness or Risk for Loneliness Goal: Demonstrate positive use of time alone when socialization is not possible Outcome: Progressing Towards Goal

## 2020-11-16 NOTE — DISCHARGE SUMMARY
Discharge Summary PATIENT ID: Emiliano Homans MRN: [id number] YOB: 1980 DATE OF ADMISSION: 11/8/2020  3:45 PM   
DATE OF DISCHARGE: 11/16/2020 PRIMARY CARE PROVIDER: Kilo Cortez MD  
 
ATTENDING PHYSICIAN: Connor Mirza MD 
DISCHARGING PROVIDER: Connor Mirza MD   
To contact this individual call [phone] and ask the  to page. If unavailable ask to be transferred the Adult Hospitalist Department. CONSULTATIONS: IP CONSULT TO INFECTIOUS DISEASES 
IP CONSULT TO PULMONOLOGY PROCEDURES/SURGERIES: * No surgery found * [address] COURSE:  
Evaluated and treated for COVID 19 viral PNA, made a gradual and good recovery. Risk of Re-Admission: mod DISCHARGE DIAGNOSES / PLAN:   
 
Acute hypoxic respiratory failure: 2nd to Covid pneumonia- resolved. Sepsis due to Covid pneumonia, POA- resolved. -CT chest 11/10: airspace disease in the lungs bilaterally, greater on the left. -Maintain droplet plus isolation-Supplemental oxygen weaned off. 
- treated with Decadron and Remdesevir-Monitor inflammatory markers. -Monitor LFTs- ID and pulmonary following. Made full recovery, evaluated by PT/OT, dc home with family. 
  
CKD stage III: Creatinine about baseline now. Mild hypernatremia could be due to viral sepsis.  improved. Hypothyroidism: Continue levothyroxine Down syndrome: Continue with Risperdal 
Diet: SLP cleared her for baseline diet of mechanical soft and thin liquids Moderately large hiatus hernia. Distended fluid-filled esophagus.  
- Detected on CT on 11/2 - able to swallow/po intake good. f/u op with GI  
 
FOLLOW UP APPOINTMENTS:   
Follow-up Information Follow up With Specialties Details Why Contact Info In 1 week  PCP  
  
  
ADDITIONAL CARE RECOMMENDATIONS:  Follow up with PCP and Gastroenterology DIET: Resume previous diet ACTIVITY: Activity as tolerated DISCHARGE MEDICATIONS: 
 Current Discharge Medication List  
  
START taking these medications Details  
lansoprazole (PREVACID) 3 mg/mL oral suspension Take 10 mL by mouth Daily (before breakfast) for 30 days. Qty: 300 mL, Refills: 0 CONTINUE these medications which have NOT CHANGED Details  
risperiDONE (RisperDAL) 1 mg tablet Take 1 mg by mouth nightly. solifenacin (VESICARE) 10 mg tablet TAKE 1 TABLET BY MOUTH EVERY DAY  
  
medroxyPROGESTERone (DEPO-PROVERA) 150 mg/mL injection 150 mg, IM, once, To be administered in clinic by nurse. See order comments for schedule., # 1 vial, 4 Refills, Pharmacy: [facility]/pharmacy #[id number] 
  
albuterol (PROVENTIL HFA, VENTOLIN HFA, PROAIR HFA) 90 mcg/actuation inhaler Take 2 Puffs by inhalation every four (4) hours as needed for Wheezing. Qty: 1 Inhaler, Refills: 0  
  
zinc sulfate 220 mg tablet Take 1 Tab by mouth daily. Qty: 7 Tab, Refills: 0  
  
cholecalciferol (VITAMIN D3) 1,000 unit tablet Take 1,000 Units by mouth daily. levothyroxine (SYNTHROID) 50 mcg tablet Take 50 mcg by mouth Daily (before breakfast). STOP taking these medications  
  
 ascorbic acid, vitamin C, (Vitamin C) 500 mg chew Comments:  
Reason for Stopping:   
   
 erythromycin (ILOTYCIN) ophthalmic ointment Comments:  
Reason for Stopping:   
   
  
 
NOTIFY YOUR PHYSICIAN FOR ANY OF THE FOLLOWING:  
Fever over 101 degrees for 24 hours. Chest pain, shortness of breath, fever, chills, nausea, vomiting, diarrhea, change in mentation, falling, weakness, bleeding. Severe pain or pain not relieved by medications. Or, any other signs or symptoms that you may have questions about. DISPOSITION: 
  Home With: 
 OT  PT  HH  RN  
  
 Long term SNF/Inpatient Rehab  
x Independent/assisted living Hospice Other:  
 
PATIENT CONDITION AT DISCHARGE:  
 
Functional status Poor Deconditioned Independent Cognition Tavo Putbalwinder Forgetful Dementia Catheters/lines (plus indication) Dutta PICC   
 PEG None Code status Full code DNR   
 
PHYSICAL EXAMINATION AT DISCHARGE: 
Patient seen and examined at bedside, Condition stable, explained discharge and follow up plans. /73 (BP 1 Location: Left arm, BP Patient Position: At rest)   Pulse 79   Temp 98.2 °F (36.8 °C)   Resp 16   Ht 5' 4.02\" (1.626 m)   Wt 84.4 kg (186 lb)   SpO2 95%   BMI 31.91 kg/m² General:  Alert, non verbal. No acute distress. Resp:  No accessory muscle use, Good AE. sats good off supplemental oxygen Neuro:  No focal neuro deficits, follows commands CHRONIC MEDICAL DIAGNOSES: 
Problem List as of 11/16/2020 Never Reviewed Codes Class Noted - Resolved Pneumonia due to COVID-19 virus ICD-10-CM: U07.1, J12.89 ICD-9-CM: 480.8  11/6/2020 - Present 37 minutes were spent with the patient on counseling and coordination of care.  
 
Signed:  
Bindu Smith MD 
11/16/2020 
2:35 PM

## 2020-11-16 NOTE — PROGRESS NOTES
Bedside shift change report given to Rhodia Mohs and Karo Pierce RN (oncoming nurse) by Alfa Lock (offgoing nurse). Report included the following information SBAR, Kardex, MAR and Recent Results.

## 2020-11-16 NOTE — PROGRESS NOTES
Bedside shift change report given to [address] (oncoming nurse) by Garcia Vines  (offgoing nurse). Report included the following information SBAR, Kardex and MAR.

## 2020-11-19 PROBLEM — A41.9 SEPSIS (HCC): Status: ACTIVE | Noted: 2020-01-01

## 2020-11-19 NOTE — ED PROVIDER NOTES
EMERGENCY DEPARTMENT HISTORY AND PHYSICAL EXAM 
 
 
Date: 11/19/2020 Patient Name: Juan Flanagan History of Presenting Illness Chief Complaint Patient presents with  Rapid Heart Rate History Provided By: Patient's Father HPI: Juan Flanagan, 47 y.o. female with past medical history significant for hypothyroidism and Down's syndrome who presents via private vehicle accompanied by her mother to the ED with cc of elevated heart rate. Patient was recently admitted to this hospital and subsequently transferred to [facility] due to a COVID-19 infection and acute respiratory failure. She was discharged from their facility 3 days ago in good condition. When the home health nurse came today to evaluate the patient, she was found to be tachycardic with a heart rate in the 140s and was referred back to the emergency department for further evaluation. Her mom states that she has been doing well at home, denies any cough, fevers, chills, nausea, vomiting, or diarrhea. She has been sleeping a lot but otherwise is eating and drinking well. PMHx: Hypothyroidism and Down's syndrome Social Hx: Lives at home with her mother, nonverbal at baseline PCP: Diego, MD Kilo 
 
History and review of systems limited secondary to patient being nonverbal. 
 
No current facility-administered medications on file prior to encounter. Current Outpatient Medications on File Prior to Encounter Medication Sig Dispense Refill  lansoprazole (PREVACID) 3 mg/mL oral suspension Take 10 mL by mouth Daily (before breakfast) for 30 days. 300 mL 0  
 risperiDONE (RisperDAL) 1 mg tablet Take 1 mg by mouth nightly.  solifenacin (VESICARE) 10 mg tablet TAKE 1 TABLET BY MOUTH EVERY DAY  medroxyPROGESTERone (DEPO-PROVERA) 150 mg/mL injection 150 mg, IM, once, To be administered in clinic by nurse. See order comments for schedule., # 1 vial, 4 Refills, Pharmacy: [facility]/pharmacy #[id number]  albuterol (PROVENTIL HFA, VENTOLIN HFA, PROAIR HFA) 90 mcg/actuation inhaler Take 2 Puffs by inhalation every four (4) hours as needed for Wheezing. 1 Inhaler 0  
 zinc sulfate 220 mg tablet Take 1 Tab by mouth daily. 7 Tab 0  cholecalciferol (VITAMIN D3) 1,000 unit tablet Take 1,000 Units by mouth daily.  levothyroxine (SYNTHROID) 50 mcg tablet Take 50 mcg by mouth Daily (before breakfast). Past History Past Medical History: 
Past Medical History:  
Diagnosis Date  Endocrine disease  Hypothyroid 03/11/2018  Ill-defined condition Down's Syndrome Past Surgical History: 
History reviewed. No pertinent surgical history. Family History: 
History reviewed. No pertinent family history. Social History: 
Social History Tobacco Use  Smoking status: Never Smoker  Smokeless tobacco: Never Used Substance Use Topics  Alcohol use: No  
 Drug use: No  
 
Allergies: 
No Known Allergies Review of Systems Review of Systems Unable to perform ROS: Patient nonverbal  
 
Physical Exam  
Physical Exam 
Vitals signs and nursing note reviewed. Constitutional:   
   Appearance: Normal appearance. She is well-developed. Comments: Grunting on exam (which mother states is baseline for patient) HENT:  
   Head: Normocephalic and atraumatic. Eyes:  
   Conjunctiva/sclera: Conjunctivae normal.  
Neck: Musculoskeletal: Full passive range of motion without pain, normal range of motion and neck supple. Cardiovascular:  
   Rate and Rhythm: Regular rhythm. Tachycardia present. Pulses: Normal pulses. Heart sounds: Normal heart sounds, S1 normal and S2 normal. No murmur. Pulmonary:  
   Effort: Pulmonary effort is normal. Tachypnea present. No respiratory distress. Breath sounds: Normal breath sounds. No wheezing. Abdominal:  
   General: Bowel sounds are normal. There is no distension. Palpations: Abdomen is soft. Tenderness: There is no abdominal tenderness. There is no rebound. Musculoskeletal: Normal range of motion. Skin: 
   General: Skin is warm and dry. Findings: No rash. Neurological:  
   Comments: Awake, nonverbal  
Psychiatric:     
   Speech: She is noncommunicative. Diagnostic Study Results Labs - Recent Results (from the past 12 hour(s)) EKG, 12 LEAD, INITIAL Collection Time: 11/19/20  5:22 PM  
Result Value Ref Range Ventricular Rate 120 BPM  
 Atrial Rate 120 BPM  
 P-R Interval 122 ms QRS Duration 64 ms Q-T Interval 298 ms QTC Calculation (Bezet) 421 ms Calculated P Axis 30 degrees Calculated R Axis -5 degrees Calculated T Axis 14 degrees Diagnosis Sinus tachycardia Possible Left atrial enlargement Borderline ECG When compared with ECG of 11-NOV-2020 01:37, 
premature ventricular complexes are no longer present LACTIC ACID Collection Time: 11/19/20  5:33 PM  
Result Value Ref Range Lactic acid 1.1 0.4 - 2.0 MMOL/L  
METABOLIC PANEL, COMPREHENSIVE Collection Time: 11/19/20  5:33 PM  
Result Value Ref Range Sodium 139 136 - 145 mmol/L Potassium 4.1 3.5 - 5.1 mmol/L Chloride 105 97 - 108 mmol/L  
 CO2 27 21 - 32 mmol/L Anion gap 7 5 - 15 mmol/L Glucose 98 65 - 100 mg/dL BUN 17 6 - 20 MG/DL Creatinine 1.37 (H) 0.55 - 1.02 MG/DL  
 BUN/Creatinine ratio 12 12 - 20 GFR est AA 52 (L) >60 ml/min/1.73m2 GFR est non-AA 43 (L) >60 ml/min/1.73m2 Calcium 8.3 (L) 8.5 - 10.1 MG/DL Bilirubin, total 0.6 0.2 - 1.0 MG/DL  
 ALT (SGPT) 53 12 - 78 U/L  
 AST (SGOT) 37 15 - 37 U/L Alk. phosphatase 37 (L) 45 - 117 U/L Protein, total 6.0 (L) 6.4 - 8.2 g/dL Albumin 2.0 (L) 3.5 - 5.0 g/dL Globulin 4.0 2.0 - 4.0 g/dL A-G Ratio 0.5 (L) 1.1 - 2.2    
CBC WITH AUTOMATED DIFF Collection Time: 11/19/20  5:33 PM  
Result Value Ref Range WBC 21.6 (HH) 3.6 - 11.0 K/uL  
 RBC 4.21 3.80 - 5.20 M/uL HGB 11.5 11.5 - 16.0 g/dL HCT 32.6 (L) 35.0 - 47.0 % MCV 77.4 (L) 80.0 - 99.0 FL  
 MCH 27.3 26.0 - 34.0 PG  
 MCHC 35.3 30.0 - 36.5 g/dL  
 RDW 15.9 (H) 11.5 - 14.5 % PLATELET 913 986 - 299 K/uL MPV 10.1 8.9 - 12.9 FL  
 NRBC 0.0 0  WBC ABSOLUTE NRBC 0.00 0.00 - 0.01 K/uL NEUTROPHILS 79 (H) 32 - 75 % LYMPHOCYTES 12 12 - 49 % MONOCYTES 7 5 - 13 % EOSINOPHILS 1 0 - 7 % BASOPHILS 0 0 - 1 % IMMATURE GRANULOCYTES 1 (H) 0.0 - 0.5 % ABS. NEUTROPHILS 17.2 (H) 1.8 - 8.0 K/UL  
 ABS. LYMPHOCYTES 2.5 0.8 - 3.5 K/UL  
 ABS. MONOCYTES 1.5 (H) 0.0 - 1.0 K/UL  
 ABS. EOSINOPHILS 0.2 0.0 - 0.4 K/UL  
 ABS. BASOPHILS 0.1 0.0 - 0.1 K/UL  
 ABS. IMM. GRANS. 0.2 (H) 0.00 - 0.04 K/UL  
 DF AUTOMATED Radiologic Studies -  
XR CHEST PORT Final Result IMPRESSION:  
  
Increased bilateral pneumonia, differential diagnosis of progression of COVID19  
pneumonia versus superimposed pneumonia. CTA CHEST W OR W WO CONT    (Results Pending) Xr Chest [facility] Result Date: 11/19/2020 IMPRESSION: Increased bilateral pneumonia, differential diagnosis of progression of COVID19 pneumonia versus superimposed pneumonia. Medical Decision Making I am the first provider for this patient. I reviewed the vital signs, available nursing notes, past medical history, past surgical history, family history and social history. Vital Signs-Reviewed the patient's vital signs. Patient Vitals for the past 24 hrs: 
 Temp Pulse Resp BP SpO2  
11/19/20 1830  (!) 109 24 128/67 99 % 11/19/20 1800  (!) 117 24 109/75 96 % 11/19/20 1745 99.9 °F (37.7 °C) (!) 119 29 119/77 97 % 11/19/20 1730  (!) 123 (!) 34  95 % 11/19/20 1707 98.1 °F (36.7 °C) (!) 142 22 113/87 94 % Pulse Oximetry Analysis - 94% on RA Cardiac Monitor:  
Rate: 132 bpm 
Rhythm: Sinus Tachycardia ED EKG interpretation: 17:22 Rhythm: sinus tachycardia; and regular .  Rate (approx.): 120; Axis: normal; P wave: normal; QRS interval: normal ; ST/T wave: normal; Other findings: borderline ekg. This EKG was interpreted by Carolann Sheffield MD,ED Provider. Records Reviewed: Nursing Notes, Old Medical Records, Previous Radiology Studies and Previous Laboratory Studies Provider Notes (Medical Decision Making):  
82-year-old female presents to the emergency department with a heart rate of 142, tachypnea, and a temperature of 99.9. She was recently discharged from [facility] for COVID-19. She meets 2 out of 4 SIRS criteria at this time with her heart rate and her tachypnea. Will initiate Sepsis protocol and reassess. ED Course:  
Initial assessment performed. The patients presenting problems have been discussed, and they are in agreement with the care plan formulated and outlined with them. I have encouraged them to ask questions as they arise throughout their visit. Progress Note 
5:56 PM 
I have re-evaluated pt and her heart rate is down to 119. She is still tachypneic. The lab just informed me of a critical white blood cell count of 21.6 which is elevated from discharge (14.7) Progress Note 6:11 PM 
I have re-evaluated pt and her heart rate has improved (down to 114) but her chest x-ray shows progression of her pneumonia versus COVID-19. Discussed with patient's mother that she needs to be admitted back to the hospital and we should transfer her back to [facility]. We give her a dose of antibiotics to cover her for healthcare associated pneumonia. 6:24 PM 
Alverto Navarrete MD spoke with Dr. Elaine Lala, Consult for Hospitalist at [facility]. Discussed HPI and PE, available diagnostic tests and clinical findings. He is in agreement with care plans as outlined. He recommends holding off on antibiotics for now and only giving her 1 L of IV fluids as he is concerned that the sepsis protocol will cause flash pulmonary edema. He would also like patient to have a CTA to assess for PE. Otherwise, he accepts the patient to [facility] inpatient medicine unit. CRITICAL CARE NOTE : 
 
6:35 PM 
 
IMPENDING DETERIORATION -Airway, Respiratory, Cardiovascular, Metabolic and Renal 
 
ASSOCIATED RISK FACTORS - Shock, Hypoxia, Metabolic changes and Dehydration MANAGEMENT- Bedside Assessment and Transfer INTERPRETATION -  Xrays, CT Scan, ECG and Blood Pressure INTERVENTIONS - Metobolic interventions CASE REVIEW - Hospitalist, Nursing and Family TREATMENT RESPONSE -Improved PERFORMED BY - Self NOTES   : 
 
I have spent 60 minutes of critical care time involved in lab review, consultations with specialist, family decision- making, bedside attention and documentation. During this entire length of time I was immediately available to the patient. Critical Care: The reason for providing this level of medical care for this critically ill patient was due to a critical illness that impaired one or more vital organ systems such that there was a high probability of imminent or life threatening deterioration in the patients condition. This care involved high complexity decision making to assess, manipulate, and support vital system functions. Elyssa Frank MD 
 
Progress Note:  
Updated pt on all returned results and findings. Discussed the importance of proper follow up as referred below along with return precautions. Pt in agreement with the care plan and expresses agreement with and understanding of all items discussed. Disposition: 
Transfer Note: 
6:36 PM 
Patient is being transferred to inpatient medicine at [facility], transfer accepted by Dr. Cassi Srivastava. The reasons for patient's transfer have been discussed with the patient and available family.   They convey agreement and understanding for the need to be transferred as explained to them by Genora Snellen, MD.  
 
PLAN: 
 1.  Transfer to [place] [facility], stepdown bed Diagnosis Clinical Impression: 1. Sepsis without acute organ dysfunction, due to unspecified organism ([address] 2. Pneumonia due to COVID-19 virus 3. Leukocytosis, unspecified type 4. Acute renal insufficiency 5. Tachypnea Please note that this dictation was completed with Dragon, computer voice recognition software. Quite often unanticipated grammatical, syntax, homophones, and other interpretive errors are inadvertently transcribed by the computer software. Please disregard these errors. Additionally, please excuse any errors that have escaped final proofreading.

## 2020-11-19 NOTE — ED TRIAGE NOTES
Pt was discharged from [facility] on 11/16/2020 from [place] [address]. Home health nurse came out today to pt residence and told them to come to ED. Pt HR in 140s

## 2020-11-19 NOTE — ED NOTES
Pt in with mother, sent by [facility] for tachycardia. Pt was diagnosed with COVID on 11/2, readmitted on 11/6 with hypoxic respiratory failure and transferred to [address] [place] [address]. Pts mother states she was discharged 11/16 to home with home health to follow up. Pt has been progressing well per mom with the exception of fatigue. Pt tachypneic and tachycardic upon ED arrival, HR 150s and RR30s. Pt with increased effort with activity. Oral temp 99.9. Skin hot, dry. Pt has Downs Syndrome and her mother is her primary caretaker. Pt is minimally verbal but is able to communicate with her mother. EKG completed. IV established. Blood cx drawn. Labs sent. Xray completed. Mother remains at the bedside. Emergency Department Nursing Plan of Care The Nursing Plan of Care is developed from the Nursing assessment and Emergency Department Attending provider initial evaluation. The plan of care may be reviewed in the ED Provider note. The Plan of Care was developed with the following considerations:  
Patient / Family readiness to learn indicated by:verbalized understanding Persons(s) to be included in education: family Barriers to Learning/Limitations:No 
 
Signed Samantha Johnson RN   
11/19/2020   5:57 PM

## 2020-11-20 NOTE — ED NOTES
TRANSFER - OUT REPORT: 
 
Verbal report given to Zackery Bhatti RN(name) on Nikko Scott  being transferred to [facility] [address] (unit) for routine progression of care Report consisted of patients Situation, Background, Assessment and  
Recommendations(SBAR). Information from the following report(s) SBAR, Kardex, ED Summary, Intake/Output, MAR and Recent Results, VS, and Pain was reviewed with the receiving nurse. Lines:  
Peripheral IV 11/19/20 Right Antecubital (Active) Peripheral IV 11/19/20 Left Antecubital (Active) Site Assessment Clean, dry, & intact 11/19/20 1840 Opportunity for questions and clarification was provided. Patient transported with: 
 Monitor x 3 ALS Transport - RAA

## 2020-11-20 NOTE — ED NOTES
Attempted to call report to [facility], Room 422 for transfer of pt. Staff member states \"Nurse currently unavailable because they are getting report\". [place] to call back.

## 2020-11-20 NOTE — PROGRESS NOTES
[address] [facility] Adult  Hospitalist Group Hospitalist Progress Note Riley Manzanares,  Answering service: [phone] OR [phone] from in house phone Date of Service:  2020 NAME:  Amy Aguilar :  1980 MRN:  [id number] Admission Summary:  
47 y.o. female past medical history significant for hypothyroidism, Down syndrome and recently treated for COVID-19 infection was brought to the emergency room for further evaluation of elevated heart rate. Patient was admitted to [facility] on 2 2020 for acute respiratory failure secondary to COVID-19 infection. As per mother report at the time of discharge patient was doing better in no acute respiratory distress and not having any fever. Today home health nurse came to check on the patient and found that her heart rate was in the 140s 150s. Patient was sent her to the emergency room for evaluation. On arrival to the emergency room at [facility] she was found to be tachycardic and tachypneic. Patient has not been hypoxic or requiring O2 supplementation. CBC showed a WBC of 21,000 from 14,000 at the time of discharge. CTA of the chest was negative for PE but found to have persistent COVID-19 pneumonia. As per mother patient has not been coughing, being febrile, having diarrhea. Patient has been eating well. During her prior hospitalization patient was treated with remdesivir and completed a course of Decadron. Given persistent tachycardia after 1L iv fluids she admission was requested. Interval history / Subjective: Follow up sepsis. Patient seen and examined. Non-verbal. Not in any distress. Nursing reports she tolerated mechanical soft diet. Discussed with her mother via phone.    
 
Assessment & Plan:  
 
  
Sepsis (POA) due to COVID 19 Pneumonia:    
-CT compatible with COVID 19 
 -no other source of infection found- UA negative, blood cultures pending. procalcitonin unremarkable 
-Daily vitamin C 
-completed remdesmivir and dexamethazone 
-s/p 1 liter IVFs. Will hold further due to infiltrates on CT. Did give lasix x 1 DANY on CKD stage III: improved Received 1 L of IV fluid in the ED. Monitor renal function. Encourage p.o. intake. We will monitor urine output for now. 
  
Hypothyroidism: Continue levothyroxine 
  
Down syndrome: Continue with Risperdal 
  
Hiatal hernia: Known finding. continue Protonix 40 mg p.o. daily. 
-Outpatient follow-up with GI. Oral labial herpes: abreva Mechanical soft diet 
  
Code status: full DVT prophylaxis: lovenox Care Plan discussed with: Patient/Family Anticipated Disposition: Home w/Family Anticipated Discharge: Greater than 48 hours Hospital Problems  Never Reviewed Codes Class Noted POA Sepsis ([facility] [address] ICD-10-CM: A41.9 ICD-9-CM: 038.9, 995.91  11/19/2020 Unknown Review of Systems:  
Negative unless stated above Vital Signs:  
 Last 24hrs VS reviewed since prior progress note. Most recent are: 
Visit Vitals BP (!) 97/52 (BP 1 Location: Left arm, BP Patient Position: At rest) Pulse (!) 106 Temp 98.4 °F (36.9 °C) Resp 18 Wt 86 kg (189 lb 9.5 oz) SpO2 97% BMI 32.54 kg/m² Intake/Output Summary (Last 24 hours) at 11/20/2020 1623 Last data filed at 11/19/2020 2145 Gross per 24 hour Intake 0 ml Output  Net 0 ml Physical Examination:  
 
I had a face to face encounter with this patient and independently examined them on 11/20/2020 as outlined below: 
 
     
Constitutional:  No acute distress, non verbal   
ENT:  Oral mucosa moist, oropharynx benign. Resp:  CTA dimished. No accessory muscle use CV:  Regular rhythm, normal rate, no murmurs, gallops, rubs GI:  Soft, non distended, non tender. normoactive bowel sounds, no hepatosplenomegaly Musculoskeletal:  No edema, warm, 2+ pulses throughout Neurologic:  Moves all extremities. Data Review:  
 Review and/or order of clinical lab test 
Review and/or order of tests in the radiology section of CPT Review and/or order of tests in the medicine section of CPT Labs:  
 
Recent Labs  
  11/20/20 0157 11/19/20 
1733 WBC 19.3* 21.6* HGB 10.1* 11.5 HCT 29.6* 32.6*  
 327 Recent Labs  
  11/20/20 0157 11/19/20 
1733  139  
K 3.9 4.1 * 105 CO2 26 27 BUN 13 17 CREA 1.01 1.37* GLU 80 98 CA 7.9* 8.3* Recent Labs  
  11/20/20 0157 11/19/20 1733 ALT 45 53 AP 38* 37* TBILI 0.6 0.6 TP 5.7* 6.0* ALB 2.0* 2.0*  
GLOB 3.7 4.0 Recent Labs  
  11/20/20 
0157 INR 1.1 PTP 11.8* APTT 26.8 Recent Labs  
  11/20/20 
0157 FERR 795* No results found for: FOL, RBCF No results for input(s): PH, PCO2, PO2 in the last 72 hours. Recent Labs  
  11/20/20 0157 TROIQ <0.05 No results found for: CHOL, CHOLX, CHLST, CHOLV, HDL, HDLP, LDL, LDLC, DLDLP, TGLX, TRIGL, TRIGP, CHHD, CHHDX Lab Results Component Value Date/Time Glucose (POC) 82 03/11/2018 02:32 PM  
 
Lab Results Component Value Date/Time Color YELLOW/STRAW 11/20/2020 01:57 AM  
 Appearance CLEAR 11/20/2020 01:57 AM  
 Specific gravity 1.010 11/20/2020 01:57 AM  
 Specific gravity 1.015 03/11/2018 01:37 PM  
 pH (UA) 6.5 11/20/2020 01:57 AM  
 Protein Negative 11/20/2020 01:57 AM  
 Glucose Negative 11/20/2020 01:57 AM  
 Ketone Negative 11/20/2020 01:57 AM  
 Bilirubin Negative 11/20/2020 01:57 AM  
 Urobilinogen 1.0 11/20/2020 01:57 AM  
 Nitrites Negative 11/20/2020 01:57 AM  
 Leukocyte Esterase Negative 11/20/2020 01:57 AM  
 Epithelial cells FEW 11/20/2020 01:57 AM  
 Bacteria Negative 11/20/2020 01:57 AM  
 WBC 0-4 11/20/2020 01:57 AM  
 RBC 5-10 11/20/2020 01:57 AM  
 
 
 
Medications Reviewed:  
 
Current Facility-Administered Medications Medication Dose Route Frequency  furosemide (LASIX) tablet 20 mg  20 mg Oral DAILY  [START ON 11/21/2020] pantoprazole (PROTONIX) tablet 40 mg  40 mg Oral ACB  [START ON 11/21/2020] levothyroxine (SYNTHROID) tablet 50 mcg  50 mcg Oral 6am  
 risperiDONE (RisperDAL) 1 mg/mL oral solution soln 1 mg  1 mg Oral QHS  docosanol (ABREVA) 10 % cream   Topical 5XD  acetaminophen (TYLENOL) tablet 650 mg  650 mg Oral Q6H PRN Or  
 acetaminophen (TYLENOL) suppository 650 mg  650 mg Rectal Q6H PRN  
 sodium chloride (NS) flush 5-40 mL  5-40 mL IntraVENous Q8H  
 sodium chloride (NS) flush 5-40 mL  5-40 mL IntraVENous PRN  polyethylene glycol (MIRALAX) packet 17 g  17 g Oral DAILY PRN  promethazine (PHENERGAN) tablet 12.5 mg  12.5 mg Oral Q6H PRN Or  
 ondansetron (ZOFRAN) injection 4 mg  4 mg IntraVENous Q6H PRN  
 ascorbic acid (vitamin C) (VITAMIN C) tablet 500 mg  500 mg Oral BID  enoxaparin (LOVENOX) injection 40 mg  40 mg SubCUTAneous Q12H  
 
______________________________________________________________________ EXPECTED LENGTH OF STAY: 3d 17h ACTUAL LENGTH OF STAY:          1 [address] [facility]

## 2020-11-20 NOTE — H&P
6818 [facility] Adult  Hospitalist Group History and Physical 
 
Primary Care Provider: Diego, MD Kilo 
Date of Service:  11/19/2020 Subjective:  
 
Carlos A Roman is a 47 y.o. female past medical history significant for hypothyroidism, Down syndrome and recently treated for COVID-19 infection was brought to the emergency room for further evaluation of elevated heart rate. Patient was admitted to [facility] on 2 11/16/2020 for acute respiratory failure secondary to COVID-19 infection. As per mother report at the time of discharge patient was doing better in no acute respiratory distress and not having any fever. Today home health nurse came to check on the patient and found that her heart rate was in the 140s 150s. Patient was sent her to the emergency room for evaluation. On arrival to the emergency room at [facility] she was found to be tachycardic and tachypneic. Patient has not been hypoxic or requiring O2 supplementation. CBC showed a WBC of 21,000 from 14,000 at the time of discharge. CTA of the chest was negative for PE but found to have persistent COVID-19 pneumonia. As per mother patient has not been coughing, being febrile, having diarrhea. Patient has been eating well. During her prior hospitalization patient was treated with remdesivir and completed a course of Decadron. Given persistent tachycardia after 1L iv fluids she admission was requested. Review of Systems: 
 
Review of systems not obtained due to patient factors. Past Medical History:  
Diagnosis Date  Endocrine disease  Hypothyroid 03/11/2018  Ill-defined condition Down's Syndrome No past surgical history on file. Prior to Admission medications Medication Sig Start Date End Date Taking? Authorizing Provider  
lansoprazole (PREVACID) 3 mg/mL oral suspension Take 10 mL by mouth Daily (before breakfast) for 30 days.  11/17/20 12/17/20  Enriqueta Holden MD  
 risperiDONE (RisperDAL) 1 mg tablet Take 1 mg by mouth nightly. Provider, Aicha  
solifenacin (VESICARE) 10 mg tablet TAKE 1 TABLET BY MOUTH EVERY DAY 9/18/20   Kilo Cortez MD  
medroxyPROGESTERone (DEPO-PROVERA) 150 mg/mL injection 150 mg, IM, once, To be administered in clinic by nurse. See order comments for schedule., # 1 vial, 4 Refills, Pharmacy: [facility]/pharmacy #[id number] 12/5/19   OtherKilo MD  
albuterol (PROVENTIL HFA, VENTOLIN HFA, PROAIR HFA) 90 mcg/actuation inhaler Take 2 Puffs by inhalation every four (4) hours as needed for Wheezing. 11/2/20   Carin Pedro MD  
zinc sulfate 220 mg tablet Take 1 Tab by mouth daily. 11/2/20   Carin Pedro MD  
cholecalciferol (VITAMIN D3) 1,000 unit tablet Take 1,000 Units by mouth daily. Kilo Cortez MD  
levothyroxine (SYNTHROID) 50 mcg tablet Take 50 mcg by mouth Daily (before breakfast). Kilo Cortez MD  
 
No Known Allergies Family Medical History: un contributory as per mother report. SOCIAL HISTORY: 
Patient resides at home Patient ambulates independently Smoking history: never Alcohol history: none Objective:  
 
 
Physical Exam:  
Patient Vitals for the past 12 hrs: 
 Temp Pulse Resp BP SpO2  
11/19/20 2145 99.2 °F (37.3 °C) (!) 111 28 119/68 97 % GEN APPEARANCE: Patient resting in bed in NAD HEENT: Conjunctiva Clear CVS: Tachycardic, No M/G/R 
LUNGS: Diffuse rales. ABD: Soft; No TTP; + Normoactive BS 
EXT: no edema Skin exam: No gross lesions noted on exposed skin surfaces MENTAL STATUS: Awake. Non verbal. Does not follow verbal commands. NEURO:  no facial asymmetry noted. Moves all extremities to stimuli Data Review:  
Recent Results (from the past 24 hour(s)) EKG, 12 LEAD, INITIAL Collection Time: 11/19/20  5:22 PM  
Result Value Ref Range Ventricular Rate 120 BPM  
 Atrial Rate 120 BPM  
 P-R Interval 122 ms QRS Duration 64 ms Q-T Interval 298 ms  QTC Calculation (Bezet) 421 ms  
 Calculated P Axis 30 degrees Calculated R Axis -5 degrees Calculated T Axis 14 degrees Diagnosis Sinus tachycardia Possible Left atrial enlargement Borderline ECG When compared with ECG of 11-NOV-2020 01:37, 
premature ventricular complexes are no longer present LACTIC ACID Collection Time: 11/19/20  5:33 PM  
Result Value Ref Range Lactic acid 1.1 0.4 - 2.0 MMOL/L  
METABOLIC PANEL, COMPREHENSIVE Collection Time: 11/19/20  5:33 PM  
Result Value Ref Range Sodium 139 136 - 145 mmol/L Potassium 4.1 3.5 - 5.1 mmol/L Chloride 105 97 - 108 mmol/L  
 CO2 27 21 - 32 mmol/L Anion gap 7 5 - 15 mmol/L Glucose 98 65 - 100 mg/dL BUN 17 6 - 20 MG/DL Creatinine 1.37 (H) 0.55 - 1.02 MG/DL  
 BUN/Creatinine ratio 12 12 - 20 GFR est AA 52 (L) >60 ml/min/1.73m2 GFR est non-AA 43 (L) >60 ml/min/1.73m2 Calcium 8.3 (L) 8.5 - 10.1 MG/DL Bilirubin, total 0.6 0.2 - 1.0 MG/DL  
 ALT (SGPT) 53 12 - 78 U/L  
 AST (SGOT) 37 15 - 37 U/L Alk. phosphatase 37 (L) 45 - 117 U/L Protein, total 6.0 (L) 6.4 - 8.2 g/dL Albumin 2.0 (L) 3.5 - 5.0 g/dL Globulin 4.0 2.0 - 4.0 g/dL A-G Ratio 0.5 (L) 1.1 - 2.2    
CBC WITH AUTOMATED DIFF Collection Time: 11/19/20  5:33 PM  
Result Value Ref Range WBC 21.6 (HH) 3.6 - 11.0 K/uL  
 RBC 4.21 3.80 - 5.20 M/uL  
 HGB 11.5 11.5 - 16.0 g/dL HCT 32.6 (L) 35.0 - 47.0 % MCV 77.4 (L) 80.0 - 99.0 FL  
 MCH 27.3 26.0 - 34.0 PG  
 MCHC 35.3 30.0 - 36.5 g/dL  
 RDW 15.9 (H) 11.5 - 14.5 % PLATELET 162 003 - 521 K/uL MPV 10.1 8.9 - 12.9 FL  
 NRBC 0.0 0  WBC ABSOLUTE NRBC 0.00 0.00 - 0.01 K/uL NEUTROPHILS 79 (H) 32 - 75 % LYMPHOCYTES 12 12 - 49 % MONOCYTES 7 5 - 13 % EOSINOPHILS 1 0 - 7 % BASOPHILS 0 0 - 1 % IMMATURE GRANULOCYTES 1 (H) 0.0 - 0.5 % ABS. NEUTROPHILS 17.2 (H) 1.8 - 8.0 K/UL  
 ABS. LYMPHOCYTES 2.5 0.8 - 3.5 K/UL  
 ABS. MONOCYTES 1.5 (H) 0.0 - 1.0 K/UL ABS. EOSINOPHILS 0.2 0.0 - 0.4 K/UL  
 ABS. BASOPHILS 0.1 0.0 - 0.1 K/UL  
 ABS. IMM. GRANS. 0.2 (H) 0.00 - 0.04 K/UL  
 DF AUTOMATED Cta Chest W Or W Wo Cont Result Date: 11/19/2020 IMPRESSION: 1. COVID-19 pneumonia. 2. No large central pulmonary embolism. 3. Dilated esophagus with GERD. This puts the patient at risk for aspiration. No evidence of aspiration on this study, however. Xr Chest [facility] Result Date: 11/19/2020 IMPRESSION: Increased bilateral pneumonia, differential diagnosis of progression of COVID19 pneumonia versus superimposed pneumonia. Assessment:  
 
Active Problems: 
  Sepsis ([address] (11/19/2020) Plan: 1. Sepsis (POA) likely due to COVID 19 Pneumonia. At this time patient is mildly tachypneic not requiring oxygen. She does not appear to be in acute respiratory distress. Worsening leukocytosis like related to acute infection. Will check UA to rule out other causes of infection. Daily vitamin C and zinc p.o. Check procalcitonin. If elevated will add empiric antibiotic therapy. Follow blood culture Check inflammatory markers  Supplemental O2 to keep O2 saturations greater than 92%. MDI as needed. Placed on droplet precaution plus contact Sepsis assessment completed. Supported by leukocytosis, tachycardia and tachypnea. 2. DANY on CKD stage III: Creatinine today 1.37 up from 1.98 on 11/13/2020 Received 1 L of IV fluid in the ED. Monitor renal function. Encourage p.o. intake. We will monitor urine output for now. 3. Hypothyroidism: Continue levothyroxine 4. Down syndrome: Continue with Risperdal 
 
5. Hiatal hernia: Known finding. Will reviewed patient on Protonix 40 mg p.o. daily. 
-Outpatient follow-up with GI. DVT prophy; lovenox as per COVID-19 protocol. Code status; full code Plan discussed with RN and patient's mother over the phone. FUNCTIONAL STATUS PRIOR TO HOSPITALIZATION Ambulates Independently Signed By: Coty Maldonado MD   
 November 19, 2020

## 2020-11-20 NOTE — PROGRESS NOTES
0730: Bedside and Verbal shift change report given to Rober RN (oncoming nurse) by Alvarez Pathak RN (offgoing nurse). Report included the following information SBAR, Kardex, ED Summary, Intake/Output, MAR, Med Rec Status and Cardiac Rhythm ST/NSR.  
 
2145: TRANSFER - IN REPORT:Verbal report received from Terrance Massey RN(name) on Juany Quispe  being received from [facility] ED (unit) for routine progression of care Report consisted of patients Situation, Background, Assessment and  
Recommendations(SBAR). Information from the following report(s) SBAR, Kardex, ED Summary, Intake/Output, MAR, Recent Results and Cardiac Rhythm ST/NSR was reviewed with the receiving nurse. Opportunity for questions and clarification was provided. Assessment completed upon patients arrival to unit and care assumed. Primary Nurse Syed Torres, HEMA and Brody Salvador RN, RN performed a dual skin assessment on this patient No impairment noted Jai score is 19 Problem: Falls - Risk of 
Goal: *Absence of Falls Description: Document Linda Solis Fall Risk and appropriate interventions in the flowsheet. Outcome: Progressing Towards Goal 
Note: Fall Risk Interventions: 
Mobility Interventions: Communicate number of staff needed for ambulation/transfer, Patient to call before getting OOB, Utilize gait belt for transfers/ambulation Mentation Interventions: Adequate sleep, hydration, pain control, Increase mobility, More frequent rounding, Reorient patient, Toileting rounds, Update white board, Room close to nurse's station Medication Interventions: Patient to call before getting OOB, Teach patient to arise slowly Elimination Interventions: Call light in reach, Patient to call for help with toileting needs, Stay With Me (per policy), Toileting schedule/hourly rounds Problem: General Medical Care Plan Goal: *Vital signs within specified parameters Outcome: Progressing Towards Goal 
Goal: *Skin integrity maintained Outcome: Progressing Towards Goal 
  
Problem: Airway Clearance - Ineffective Goal: Achieve or maintain patent airway Outcome: Progressing Towards Goal 
  
Problem: Gas Exchange - Impaired Goal: Absence of hypoxia Outcome: Progressing Towards Goal 
  
Problem: Body Temperature -  Risk of, Imbalanced Goal: Ability to maintain a body temperature within defined limits Outcome: Progressing Towards Goal 
  
Problem: Isolation Precautions - Risk of Spread of Infection Goal: Prevent transmission of infectious organism to others Outcome: Progressing Towards Goal

## 2020-11-20 NOTE — ACP (ADVANCE CARE PLANNING)
Advance Care Planning Advance Care Planning Activator (Inpatient) Conversation Note Date of ACP Conversation: 11/20/20 Conversation Conducted with: Mother ACP Activator: Justin Edmonds RN Health Care Decision Maker: 
 
Current Designated Health Care Decision Maker:   Primary Decision Maker: Andreia Contreras - Mother, Kofi Ramírez - [phone] Secondary Decision Maker: Savanna Robles - [phone] If no Decision Maker listed above or available through scanned documents, then: 
 
If no Authorized Decision Maker has previously been identified, then patient chooses [place]: \"Who would you like to name as your primary health care decision-maker? \" Name: Krystle Paniagua   Relationship: mom Phone number: [phone] \"Can this person be reached easily? \" Rhonda Hoffmann \"Who would you like to name as your back-up decision maker? \" Name: Chelo Biggs  Relationship: brother Phone number: [phone] \"Can this person be reached easily? \" Rhonda Hoffmann Care Preferences Ventilation: \"If you were in your present state of health and suddenly became very ill and were unable to breathe on your own, what would your preference be about the use of a ventilator (breathing machine) if it were available to you? \" If patient would desire the use of a ventilator (breathing machine), answer \"yes\", if not \"no\":yes \"If your health worsens and it becomes clear that your chance of recovery is unlikely, what would your preference be about the use of a ventilator (breathing machine) if it were available to you? \" Would the patient desire the use of a ventilator (breathing machine)? YES Resuscitation \"CPR works best to restart the heart when there is a sudden event, like a heart attack, in someone who is otherwise healthy. Unfortunately, CPR does not typically restart the heart for people who have serious health conditions or who are very sick. \" 
 
 \"In the event your heart stopped as a result of an underlying serious health condition, would you want attempts to be made to restart your heart (answer \"yes\" for attempt to resuscitate) or would you prefer a natural death (answer \"no\" for do not attempt to resuscitate)? \" yes [x] Yes  [] No   Educated Patient / Era Moots regarding differences between Advance Directives and portable DNR orders. Length of ACP Conversation in minutes:  8 with mom Conversation Outcomes: 
[x] ACP discussion completed 
[] Existing advance directive reviewed with patient; no changes to patient's previously recorded wishes 
 
 [] New Advance Directive completed 
 [] Portable Do Not Resuscitate prepared for Provider review and signature 
[] POLST/POST/MOLST/MOST prepared for Provider review and signature Follow-up plan:   
[] Schedule follow-up conversation to continue planning 
[] Referred individual to Provider for additional questions/concerns  
[] Advised patient/agent/surrogate to review completed ACP document and update if needed with changes in condition, patient preferences or care setting  
 
[] This note routed to one or more involved healthcare providers

## 2020-11-20 NOTE — PROGRESS NOTES
Readmission Assessment Number of days since last admission?: 1-7 days Previous disposition: Home with Home Health Who is being interviewed?: Caregiver(mom) Did you visit your Primary Care Physician after you left the hospital, before you returned this time?: No(Was onlt homefor 3 days before readmit) Did you see a specialist, such as Cardiac, Pulmonary, Orthopedic Physician, etc. after you left the hospital?: No 
Who advised the patient to return to the hospital?: Other (Comment)(mother) Does the patient report anything that got in the way of taking their medications?: No 
In our efforts to provide the best possible care to you and others like you, can you think of anything that we could have done to help you after you left the hospital the first time, so that you might not have needed to return so soon?: Teach back instructions regarding management of illness Reason for Admission: admitted from home  With complaints of shortness of breath. Recently discharged for her 5 days ago following treatment for Covid positive. History of Downs. RUR Score:  17% Plan for utilizing home health:  Discharged home with [facility] PCP: First and Last name:  Dr Elmer Hunter 
 Name of Practice:  
 Are you a current patient: Yes/No: YES Approximate date of last visit: 10/2020 Can you participate in a virtual visit with your PCP: NO 
                 
Current Advanced Directive/Advance Care Plan: Does not have mother is [facility] and primary caregiver. Transition of Care Plan: Once medically stable will likely discharge home with mother Tasneem Giron [phone] Medicare pt has received, reviewed, and signed  IM letter informing them of their right to appeal the discharge. Signed copy has been placed on pt bedside chart. Reviewed with mother by phone. Charmaine Mane RN CRM Ext [phone]

## 2020-11-20 NOTE — ED NOTES
Attempted to collect clean catch urine w/ the bedside commode provided at bedside. Pt up w/ two person assist. Mother at bedside easily able to assist w/ ambulation and transfers. Pt unable to provide urine sample at this time. Pt reconnected to monitor and KVO IVF.

## 2020-11-20 NOTE — PROGRESS NOTES
Problem: Falls - Risk of 
Goal: *Absence of Falls Description: Document Ita Arias Fall Risk and appropriate interventions in the flowsheet. Outcome: Progressing Towards Goal 
Note: Fall Risk Interventions: 
Mobility Interventions: Communicate number of staff needed for ambulation/transfer, OT consult for ADLs, Patient to call before getting OOB, PT Consult for assist device competence, Strengthening exercises (ROM-active/passive) Mentation Interventions: Door open when patient unattended, Evaluate medications/consider consulting pharmacy, Familiar objects from home, Increase mobility, More frequent rounding, Reorient patient, Room close to nurse's station Medication Interventions: Assess postural VS orthostatic hypotension, Evaluate medications/consider consulting pharmacy Elimination Interventions: Elevated toilet seat, Patient to call for help with toileting needs, Toilet paper/wipes in reach Problem: General Medical Care Plan Goal: *Absence of infection signs and symptoms Outcome: Progressing Towards Goal 
Goal: *Optimal pain control at patient's stated goal 
Outcome: Progressing Towards Goal 
  
Problem: Pressure Injury - Risk of 
Goal: *Prevention of pressure injury Description: Document Jai Scale and appropriate interventions in the flowsheet. Outcome: Progressing Towards Goal 
Note: Pressure Injury Interventions: 
Sensory Interventions: Check visual cues for pain, Avoid rigorous massage over bony prominences, Minimize linen layers, Maintain/enhance activity level, Pad between skin to skin, Turn and reposition approx. every two hours (pillows and wedges if needed) Moisture Interventions: Apply protective barrier, creams and emollients, Check for incontinence Q2 hours and as needed, Limit adult briefs, Maintain skin hydration (lotion/cream), Minimize layers Activity Interventions: Assess need for specialty bed, Increase time out of bed, PT/OT evaluation Mobility Interventions: Assess need for specialty bed, Float heels, PT/OT evaluation, Turn and reposition approx. every two hours(pillow and wedges) Nutrition Interventions: Document food/fluid/supplement intake, Discuss nutritional consult with provider, Offer support with meals,snacks and hydration Friction and Shear Interventions: Apply protective barrier, creams and emollients, Foam dressings/transparent film/skin sealants, Lift team/patient mobility team, Minimize layers Bedside and Verbal shift change report given to HEMA Jarquin (oncoming nurse) by Star Leo (offgoing nurse). Report included the following information SBAR, Kardex, MAR, Recent Results and Cardiac Rhythm NSR to ST. Patient Vitals for the past 12 hrs: 
 Temp Pulse Resp BP SpO2  
11/20/20 1902 98.6 °F (37 °C) (!) 102 20 (!) 118/52 99 % 11/20/20 1708     95 % 11/20/20 1507 98.4 °F (36.9 °C) (!) 106 18 (!) 97/52 97 % 11/20/20 1308     96 % 11/20/20 1123 98.6 °F (37 °C) 85 24 125/61 96 % 11/20/20 0908  93  129/75 98 % 11/20/20 0725 98.9 °F (37.2 °C) 100 18 120/71 99 %

## 2020-11-21 NOTE — PROGRESS NOTES
[address] [facility] Adult  Hospitalist Group Hospitalist Progress Note [address] [facility],  Answering service: [phone] OR [phone] from in house phone Date of Service:  2020 NAME:  Juany Quispe :  1980 MRN:  [id number] Admission Summary:  
47 y.o. female past medical history significant for hypothyroidism, Down syndrome and recently treated for COVID-19 infection was brought to the emergency room for further evaluation of elevated heart rate. Patient was admitted to [facility] on 2 2020 for acute respiratory failure secondary to COVID-19 infection. As per mother report at the time of discharge patient was doing better in no acute respiratory distress and not having any fever. Today home health nurse came to check on the patient and found that her heart rate was in the 140s 150s. Patient was sent her to the emergency room for evaluation. On arrival to the emergency room at [facility] she was found to be tachycardic and tachypneic. Patient has not been hypoxic or requiring O2 supplementation. CBC showed a WBC of 21,000 from 14,000 at the time of discharge. CTA of the chest was negative for PE but found to have persistent COVID-19 pneumonia. As per mother patient has not been coughing, being febrile, having diarrhea. Patient has been eating well. During her prior hospitalization patient was treated with remdesivir and completed a course of Decadron. Given persistent tachycardia after 1L iv fluids she admission was requested. Interval history / Subjective: Follow up sepsis. Patient seen and examined. Tachycardic. Difficult to assess volume status. Assessment & Plan:  
 
Sepsis (POA) due to COVID 19 Pneumonia:   
Tachycardia: persistent 
-CT compatible with COVID 19 
-no other source of infection found- UA negative, blood cultures NGTD. procalcitonin unremarkable 
-Daily vitamin C 
-completed remdesmivir and dexamethazone during previous hospitalization 
-s/p 1 liter IVFs. Additional 250 ml bolus 11/21. Cautious with fluids due to bilateral opacities on CT DANY on CKD stage III: improved Received 1 L of IV fluid in the ED. Monitor renal function. Encourage p.o. intake 
  
Hypothyroidism: Continue levothyroxine 
  
Down syndrome: Continue with Risperdal 
  
Hiatal hernia: Known finding. continue Protonix 40 mg p.o. daily. 
-Outpatient follow-up with GI. Oral labial herpes: abreva Mechanical soft diet 
  
Code status: full DVT prophylaxis: lovenox Care Plan discussed with: Patient/Family Anticipated Disposition: Home w/Family Anticipated Discharge: Greater than 48 hours Hospital Problems  Never Reviewed Codes Class Noted POA Sepsis ([facility] [address] ICD-10-CM: A41.9 ICD-9-CM: 038.9, 995.91  11/19/2020 Unknown Review of Systems:  
Negative unless stated above Vital Signs:  
 Last 24hrs VS reviewed since prior progress note. Most recent are: 
Visit Vitals /67 (BP 1 Location: Right arm, BP Patient Position: At rest) Pulse (!) 115 Temp 99.3 °F (37.4 °C) Resp (!) 31 Wt 82.7 kg (182 lb 5.1 oz) SpO2 97% BMI 31.30 kg/m² Intake/Output Summary (Last 24 hours) at 11/21/2020 1508 Last data filed at 11/21/2020 0700 Gross per 24 hour Intake  Output 150 ml Net -150 ml Physical Examination:  
 
I had a face to face encounter with this patient and independently examined them on 11/21/2020 as outlined below: 
 
     
Constitutional:  No acute distress, non verbal   
ENT:  Oral mucosa moist, oropharynx benign. Resp:  CTA dimished. No accessory muscle use CV:  tachycardic, no murmurs, gallops, rubs GI:  Soft, non distended, non tender. normoactive bowel sounds, no hepatosplenomegaly Musculoskeletal:  No edema, warm, 2+ pulses throughout Neurologic:  Moves all extremities Data Review:  
 Review and/or order of clinical lab test 
Review and/or order of tests in the radiology section of CPT Review and/or order of tests in the medicine section of CPT Labs:  
 
Recent Labs  
  11/21/20 0329 11/20/20 0157 WBC 14.1* 19.3* HGB 11.3* 10.1* HCT 33.2* 29.6*  283 Recent Labs  
  11/21/20 
0329 11/20/20 0157 11/19/20 
1733  141 139  
K 4.6 3.9 4.1  110* 105 CO2 22 26 27 BUN 11 13 17 CREA 0.89 1.01 1.37* GLU 78 80 98 CA 8.2* 7.9* 8.3* Recent Labs  
  11/20/20 0157 11/19/20 
1733 ALT 45 53 AP 38* 37* TBILI 0.6 0.6 TP 5.7* 6.0* ALB 2.0* 2.0*  
GLOB 3.7 4.0 Recent Labs  
  11/20/20 0157 INR 1.1 PTP 11.8* APTT 26.8 Recent Labs  
  11/20/20 
0157 FERR 795* No results found for: FOL, RBCF No results for input(s): PH, PCO2, PO2 in the last 72 hours. Recent Labs  
  11/20/20 0157 TROIQ <0.05 No results found for: CHOL, CHOLX, CHLST, CHOLV, HDL, HDLP, LDL, LDLC, DLDLP, TGLX, TRIGL, TRIGP, CHHD, CHHDX Lab Results Component Value Date/Time Glucose (POC) 82 03/11/2018 02:32 PM  
 
Lab Results Component Value Date/Time Color YELLOW/STRAW 11/20/2020 01:57 AM  
 Appearance CLEAR 11/20/2020 01:57 AM  
 Specific gravity 1.010 11/20/2020 01:57 AM  
 Specific gravity 1.015 03/11/2018 01:37 PM  
 pH (UA) 6.5 11/20/2020 01:57 AM  
 Protein Negative 11/20/2020 01:57 AM  
 Glucose Negative 11/20/2020 01:57 AM  
 Ketone Negative 11/20/2020 01:57 AM  
 Bilirubin Negative 11/20/2020 01:57 AM  
 Urobilinogen 1.0 11/20/2020 01:57 AM  
 Nitrites Negative 11/20/2020 01:57 AM  
 Leukocyte Esterase Negative 11/20/2020 01:57 AM  
 Epithelial cells FEW 11/20/2020 01:57 AM  
 Bacteria Negative 11/20/2020 01:57 AM  
 WBC 0-4 11/20/2020 01:57 AM  
 RBC 5-10 11/20/2020 01:57 AM  
 
 
 
Medications Reviewed:  
 
Current Facility-Administered Medications Medication Dose Route Frequency  pantoprazole (PROTONIX) 40 mg in 0.9% sodium chloride 10 mL injection  40 mg IntraVENous Q12H  
 sodium chloride 0.9 % bolus infusion 250 mL  250 mL IntraVENous ONCE  
 levothyroxine (SYNTHROID) tablet 50 mcg  50 mcg Oral 6am  
 risperiDONE (RisperDAL) 1 mg/mL oral solution soln 1 mg  1 mg Oral QHS  docosanol (ABREVA) 10 % cream   Topical 5XD  acetaminophen (TYLENOL) tablet 650 mg  650 mg Oral Q6H PRN Or  
 acetaminophen (TYLENOL) suppository 650 mg  650 mg Rectal Q6H PRN  
 sodium chloride (NS) flush 5-40 mL  5-40 mL IntraVENous Q8H  
 sodium chloride (NS) flush 5-40 mL  5-40 mL IntraVENous PRN  polyethylene glycol (MIRALAX) packet 17 g  17 g Oral DAILY PRN  promethazine (PHENERGAN) tablet 12.5 mg  12.5 mg Oral Q6H PRN Or  
 ondansetron (ZOFRAN) injection 4 mg  4 mg IntraVENous Q6H PRN  
 ascorbic acid (vitamin C) (VITAMIN C) tablet 500 mg  500 mg Oral BID  enoxaparin (LOVENOX) injection 40 mg  40 mg SubCUTAneous Q12H  
 
______________________________________________________________________ EXPECTED LENGTH OF STAY: 3d 17h ACTUAL LENGTH OF STAY:          2 [address] [facility]

## 2020-11-21 NOTE — PROGRESS NOTES
Problem: Falls - Risk of 
Goal: *Absence of Falls Description: Document Yosi Cross Fall Risk and appropriate interventions in the flowsheet. Outcome: Progressing Towards Goal 
Note: Fall Risk Interventions: 
Mobility Interventions: Communicate number of staff needed for ambulation/transfer, OT consult for ADLs, Patient to call before getting OOB, PT Consult for mobility concerns, PT Consult for assist device competence Mentation Interventions: Adequate sleep, hydration, pain control, Door open when patient unattended, Evaluate medications/consider consulting pharmacy, Familiar objects from home, Family/sitter at bedside, HELP ([address]) if available, More frequent rounding, Reorient patient Medication Interventions: Assess postural VS orthostatic hypotension, Evaluate medications/consider consulting pharmacy, Patient to call before getting OOB Elimination Interventions: Call light in reach, Elevated toilet seat, Stay With Me (per policy), Toilet paper/wipes in reach History of Falls Interventions: Consult care management for discharge planning, Door open when patient unattended, Investigate reason for fall, Room close to nurse's station, Utilize gait belt for transfer/ambulation Problem: Patient Education: Go to Patient Education Activity Goal: Patient/Family Education Outcome: Progressing Towards Goal 
  
Problem: General Medical Care Plan Goal: *Fluid volume balance Outcome: Progressing Towards Goal 
Goal: *Optimize nutritional status Outcome: Progressing Towards Goal 
  
Problem: Breathing Pattern - Ineffective Goal: Ability to achieve and maintain a regular respiratory rate Outcome: Progressing Towards Goal 
  
Problem: Pressure Injury - Risk of 
Goal: *Prevention of pressure injury Description: Document Jai Scale and appropriate interventions in the flowsheet. Outcome: Progressing Towards Goal 
Note: Pressure Injury Interventions: Sensory Interventions: Assess need for specialty bed, Avoid rigorous massage over bony prominences, Discuss PT/OT consult with provider, Float heels, Keep linens dry and wrinkle-free, Maintain/enhance activity level, Minimize linen layers, Monitor skin under medical devices Moisture Interventions: Apply protective barrier, creams and emollients, Check for incontinence Q2 hours and as needed, Limit adult briefs, Maintain skin hydration (lotion/cream), Minimize layers, Offer toileting Q_hr Activity Interventions: Assess need for specialty bed, Increase time out of bed, PT/OT evaluation Mobility Interventions: Assess need for specialty bed, Float heels, PT/OT evaluation, Turn and reposition approx. every two hours(pillow and wedges) Nutrition Interventions: Document food/fluid/supplement intake, Discuss nutritional consult with provider, Offer support with meals,snacks and hydration Friction and Shear Interventions: Apply protective barrier, creams and emollients, Lift team/patient mobility team, Lift sheet, Minimize layers, Transfer aides:transfer board/Carina lift/ceiling lift Bedside and Verbal shift change report given to HEMA Jarquin (oncoming nurse) by Preeti Bates (offgoing nurse). Report included the following information SBAR, Kardex, MAR, Recent Results and Cardiac Rhythm ST. Patient Vitals for the past 12 hrs: 
 Temp Pulse Resp BP SpO2  
11/21/20 1900 99.7 °F (37.6 °C) (!) 117 27 (!) 92/35 96 % 11/21/20 1600     97 % 11/21/20 1500 99.8 °F (37.7 °C) (!) 115 (!) 31 112/67 97 % 11/21/20 1200     98 % 11/21/20 1100 99.3 °F (37.4 °C) (!) 116 20 123/61 97 % 11/21/20 0800     98 %

## 2020-11-21 NOTE — PROGRESS NOTES
Bedside shift change report given to Rober RN (oncoming nurse) by [employer] Brandee, RN (offgoing nurse). Report included the following information Kardex, Intake/Output, MAR, Recent Results, Med Rec Status and Cardiac Rhythm ST Patient Vitals for the past 12 hrs: 
 Temp Pulse Resp BP SpO2  
11/21/20 0700 99 °F (37.2 °C) (!) 116 19 111/66 95 % 11/21/20 0322 99.3 °F (37.4 °C) (!) 110 20 115/70 97 % 11/21/20 0030 99 °F (37.2 °C) (!) 119 21 112/68 96 % 11/20/20 2300 99.2 °F (37.3 °C) (!) 107 24 110/69 96 % Last 3 Recorded Weights in this Encounter 11/19/20 2145 11/20/20 0340 11/21/20 6541 Weight: 86 kg (189 lb 9.5 oz) 86 kg (189 lb 9.5 oz) 82.7 kg (182 lb 5.1 oz) Weight variance noted. Patient weighed with 1 blanket, 1 sheet and 1 pillow; no additional items, such as scd machine on bed when weighed. Problem: Falls - Risk of 
Goal: *Absence of Falls Description: Document Roche Sheryl Fall Risk and appropriate interventions in the flowsheet. Outcome: Progressing Towards Goal 
Note: Fall Risk Interventions: 
Mobility Interventions: Communicate number of staff needed for ambulation/transfer Mentation Interventions: Door open when patient unattended Medication Interventions: Assess postural VS orthostatic hypotension Elimination Interventions: Call light in reach, Toileting schedule/hourly rounds Problem: General Medical Care Plan Goal: *Vital signs within specified parameters Outcome: Progressing Towards Goal 
  
Problem: General Medical Care Plan Goal: *Labs within defined limits Outcome: Progressing Towards Goal

## 2020-11-22 NOTE — PROGRESS NOTES
Problem: Falls - Risk of 
Goal: *Absence of Falls Description: Document Albino Messing Fall Risk and appropriate interventions in the flowsheet. Outcome: Progressing Towards Goal 
Note: Fall Risk Interventions: 
Mobility Interventions: Communicate number of staff needed for ambulation/transfer, Patient to call before getting OOB, OT consult for ADLs, Strengthening exercises (ROM-active/passive) Mentation Interventions: Adequate sleep, hydration, pain control, Door open when patient unattended, Family/sitter at bedside, Increase mobility Medication Interventions: Assess postural VS orthostatic hypotension, Evaluate medications/consider consulting pharmacy, Patient to call before getting OOB Elimination Interventions: Call light in reach, Elevated toilet seat, Patient to call for help with toileting needs, Toilet paper/wipes in reach History of Falls Interventions: Consult care management for discharge planning, Door open when patient unattended, Investigate reason for fall, Room close to nurse's station Problem: Patient Education: Go to Patient Education Activity Goal: Patient/Family Education Outcome: Progressing Towards Goal 
  
Problem: General Medical Care Plan Goal: *Vital signs within specified parameters Outcome: Progressing Towards Goal 
Goal: *Optimal pain control at patient's stated goal 
Outcome: Progressing Towards Goal 
  
Problem: Gas Exchange - Impaired Goal: Promote optimal lung function Outcome: Progressing Towards Goal 
  
Problem: Breathing Pattern - Ineffective Goal: Ability to achieve and maintain a regular respiratory rate Outcome: Progressing Towards Goal 
  
Problem: Pressure Injury - Risk of 
Goal: *Prevention of pressure injury Description: Document Jai Scale and appropriate interventions in the flowsheet.  
Outcome: Progressing Towards Goal 
Note: Pressure Injury Interventions: 
Sensory Interventions: Avoid rigorous massage over bony prominences, Discuss PT/OT consult with provider, Float heels, Keep linens dry and wrinkle-free, Minimize linen layers, Monitor skin under medical devices Moisture Interventions: Apply protective barrier, creams and emollients, Check for incontinence Q2 hours and as needed, Limit adult briefs, Maintain skin hydration (lotion/cream), Minimize layers Activity Interventions: Assess need for specialty bed, Increase time out of bed, PT/OT evaluation Mobility Interventions: Assess need for specialty bed, Float heels, PT/OT evaluation, Turn and reposition approx. every two hours(pillow and wedges) Nutrition Interventions: Document food/fluid/supplement intake, Discuss nutritional consult with provider Friction and Shear Interventions: Apply protective barrier, creams and emollients, HOB 30 degrees or less, Lift sheet, Minimize layers, Transfer aides:transfer board/Carina lift/ceiling lift Bedside and Verbal shift change report given to HEMA Jarquin (oncoming nurse) by Ladarius Bruce (offgoing nurse). Report included the following information SBAR, Kardex, MAR, Recent Results and Cardiac Rhythm ST. Patient Vitals for the past 12 hrs: 
 Temp Pulse Resp BP SpO2  
11/22/20 1900 99 °F (37.2 °C) (!) 123 17 124/77 99 % 11/22/20 1603     99 % 11/22/20 1500 99.1 °F (37.3 °C) (!) 110 18 110/68 100 % 11/22/20 1200     100 % 11/22/20 1100 99 °F (37.2 °C) (!) 102 22 (!) 110/58 100 % 11/22/20 0800     100 %

## 2020-11-22 NOTE — PROGRESS NOTES
[address] [facility] Adult  Hospitalist Group Hospitalist Progress Note 9810 [facility],  Answering service: [phone] OR [phone] from in house phone Date of Service:  2020 NAME:  Vineet Macedo :  1980 MRN:  [id number] Admission Summary:  
47 y.o. female past medical history significant for hypothyroidism, Down syndrome and recently treated for COVID-19 infection was brought to the emergency room for further evaluation of elevated heart rate. Patient was admitted to [facility] on 2 2020 for acute respiratory failure secondary to COVID-19 infection. As per mother report at the time of discharge patient was doing better in no acute respiratory distress and not having any fever. Today home health nurse came to check on the patient and found that her heart rate was in the 140s 150s. Patient was sent her to the emergency room for evaluation. On arrival to the emergency room at [facility] she was found to be tachycardic and tachypneic. Patient has not been hypoxic or requiring O2 supplementation. CBC showed a WBC of 21,000 from 14,000 at the time of discharge. CTA of the chest was negative for PE but found to have persistent COVID-19 pneumonia. As per mother patient has not been coughing, being febrile, having diarrhea. Patient has been eating well. During her prior hospitalization patient was treated with remdesivir and completed a course of Decadron. Given persistent tachycardia after 1L iv fluids she admission was requested. Interval history / Subjective: Follow up sepsis. Patient seen and examined. Persistently tachycardic- placed on 2 liters supplemental oxygen with improvement. Nursing expressing concerns regarding swallowing and possible aspiration. Has significant drooling. Assessment & Plan: Sepsis (POA) due to COVID 19 Pneumonia:   
Tachycardia: improving 
-CT compatible with COVID 19 
-no other source of infection found- UA negative, blood cultures NGTD. procalcitonin unremarkable 
-Daily vitamin C 
-completed remdesmivir and dexamethazone during previous hospitalization 
-s/p fluid bolus 
-continue supplemental oxygen 2 liters Dysphagia: speech consult, pureed diet if able DANY on CKD stage III: improved Received 1 L of IV fluid in the ED. Monitor renal function. Encourage p.o. intake 
  
Hypothyroidism: Continue levothyroxine 
  
Down syndrome: Continue with Risperdal 
  
Hiatal hernia: Known finding. continue Protonix 40 mg p.o. daily. 
-Outpatient follow-up with GI. Oral labial herpes: abreva 
 
  
Code status: full DVT prophylaxis: lovenox Care Plan discussed with: Patient/Family Anticipated Disposition: Home w/Family Anticipated Discharge: Greater than 48 hours Hospital Problems  Never Reviewed Codes Class Noted POA Sepsis ([facility] [address] ICD-10-CM: A41.9 ICD-9-CM: 038.9, 995.91  11/19/2020 Unknown Review of Systems:  
Negative unless stated above Vital Signs:  
 Last 24hrs VS reviewed since prior progress note. Most recent are: 
Visit Vitals BP (!) 110/58 (BP 1 Location: Right arm, BP Patient Position: At rest) Pulse (!) 102 Temp 99 °F (37.2 °C) Resp 22 Wt 81.9 kg (180 lb 8.9 oz) SpO2 100% BMI 30.99 kg/m² Intake/Output Summary (Last 24 hours) at 11/22/2020 1326 Last data filed at 11/22/2020 0400 Gross per 24 hour Intake  Output 700 ml Net -700 ml Physical Examination:  
 
I had a face to face encounter with this patient and independently examined them on 11/22/2020 as outlined below: 
 
     
Constitutional:  No acute distress, non verbal   
ENT:  Oral mucosa moist, oropharynx benign. Resp:   diminished bilaterally. No accessory muscle use CV:  tachycardic, no murmurs, gallops, rubs GI:  Soft, non distended, non tender. normoactive bowel sounds, no hepatosplenomegaly Musculoskeletal:  No edema, warm, 2+ pulses throughout Neurologic:  Moves all extremities Data Review:  
 Review and/or order of clinical lab test 
Review and/or order of tests in the radiology section of [facility] Review and/or order of tests in the medicine section of [facility] Labs:  
 
Recent Labs  
  11/22/20 
0236 11/21/20 
0329 WBC 13.8* 14.1* HGB 10.1* 11.3* HCT 29.3* 33.2*  
 267 Recent Labs  
  11/22/20 
0236 11/21/20 
0329 11/20/20 
0157  138 141  
K 3.9 4.6 3.9 * 108 110* CO2 25 22 26 BUN 13 11 13 CREA 1.03* 0.89 1.01  
GLU 86 78 80  
CA 8.4* 8.2* 7.9* Recent Labs  
  11/20/20 
0157 11/19/20 
1733 ALT 45 53 AP 38* 37* TBILI 0.6 0.6 TP 5.7* 6.0* ALB 2.0* 2.0*  
GLOB 3.7 4.0 Recent Labs  
  11/20/20 
0157 INR 1.1 PTP 11.8* APTT 26.8 Recent Labs  
  11/20/20 
0157 FERR 795* No results found for: FOL, RBCF No results for input(s): PH, PCO2, PO2 in the last 72 hours. Recent Labs  
  11/20/20 
0157 TROIQ <0.05 No results found for: CHOL, CHOLX, CHLST, CHOLV, HDL, HDLP, LDL, LDLC, DLDLP, TGLX, TRIGL, TRIGP, CHHD, CHHDX Lab Results Component Value Date/Time Glucose (POC) 82 03/11/2018 02:32 PM  
 
Lab Results Component Value Date/Time  Color YELLOW/STRAW 11/20/2020 01:57 AM  
 Appearance CLEAR 11/20/2020 01:57 AM  
 Specific gravity 1.010 11/20/2020 01:57 AM  
 Specific gravity 1.015 03/11/2018 01:37 PM  
 pH (UA) 6.5 11/20/2020 01:57 AM  
 Protein Negative 11/20/2020 01:57 AM  
 Glucose Negative 11/20/2020 01:57 AM  
 Ketone Negative 11/20/2020 01:57 AM  
 Bilirubin Negative 11/20/2020 01:57 AM  
 Urobilinogen 1.0 11/20/2020 01:57 AM  
 Nitrites Negative 11/20/2020 01:57 AM  
 Leukocyte Esterase Negative 11/20/2020 01:57 AM  
 Epithelial cells FEW 11/20/2020 01:57 AM  
 Bacteria Negative 11/20/2020 01:57 AM  
 WBC 0-4 11/20/2020 01:57 AM  
 RBC 5-10 11/20/2020 01:57 AM  
 
 
 
Medications Reviewed:  
 
Current Facility-Administered Medications Medication Dose Route Frequency  pantoprazole (PROTONIX) 40 mg in 0.9% sodium chloride 10 mL injection  40 mg IntraVENous Q12H  levothyroxine (SYNTHROID) tablet 50 mcg  50 mcg Oral 6am  
 risperiDONE (RisperDAL) 1 mg/mL oral solution soln 1 mg  1 mg Oral QHS  docosanol (ABREVA) 10 % cream   Topical 5XD  acetaminophen (TYLENOL) tablet 650 mg  650 mg Oral Q6H PRN Or  
 acetaminophen (TYLENOL) suppository 650 mg  650 mg Rectal Q6H PRN  
 sodium chloride (NS) flush 5-40 mL  5-40 mL IntraVENous Q8H  
 sodium chloride (NS) flush 5-40 mL  5-40 mL IntraVENous PRN  polyethylene glycol (MIRALAX) packet 17 g  17 g Oral DAILY PRN  promethazine (PHENERGAN) tablet 12.5 mg  12.5 mg Oral Q6H PRN Or  
 ondansetron (ZOFRAN) injection 4 mg  4 mg IntraVENous Q6H PRN  
 ascorbic acid (vitamin C) (VITAMIN C) tablet 500 mg  500 mg Oral BID  enoxaparin (LOVENOX) injection 40 mg  40 mg SubCUTAneous Q12H  
 
______________________________________________________________________ EXPECTED LENGTH OF STAY: 3d 17h ACTUAL LENGTH OF STAY:          3 Patricia Ann DO

## 2020-11-22 NOTE — PROGRESS NOTES
Bedside shift change report given to Rober RN (oncoming nurse) by [employer] Brandee, RN (offgoing nurse). Report included the following information SBAR, Kardex, Intake/Output, MAR, Recent Results and Cardiac Rhythm ST. Patient Vitals for the past 12 hrs: 
 Temp Pulse Resp BP SpO2  
11/22/20 0708 98.6 °F (37 °C) (!) 120 24 (!) 107/57 96 % 11/22/20 0400 98.4 °F (36.9 °C) (!) 107 26 (!) 110/49 97 % 11/21/20 2356 99.2 °F (37.3 °C) (!) 117 24 106/61 97 % 11/21/20 1952 98.9 °F (37.2 °C) (!) 119 24 96/65 96 % Last 3 Recorded Weights in this Encounter 11/20/20 0340 11/21/20 0322 11/22/20 6745 Weight: 86 kg (189 lb 9.5 oz) 82.7 kg (182 lb 5.1 oz) 81.9 kg (180 lb 8.9 oz) Problem: Falls - Risk of 
Goal: *Absence of Falls Description: Document Ashly Perez Fall Risk and appropriate interventions in the flowsheet. Outcome: Progressing Towards Goal 
Note: Fall Risk Interventions: 
Mobility Interventions: Communicate number of staff needed for ambulation/transfer, PT Consult for mobility concerns, Strengthening exercises (ROM-active/passive), Patient to call before getting OOB, OT consult for ADLs Mentation Interventions: Adequate sleep, hydration, pain control Medication Interventions: Assess postural VS orthostatic hypotension Elimination Interventions: Call light in reach, Elevated toilet seat, Patient to call for help with toileting needs, Stay With Me (per policy), Toilet paper/wipes in reach, Toileting schedule/hourly rounds History of Falls Interventions: Consult care management for discharge planning, Evaluate medications/consider consulting pharmacy, Room close to nurse's station, Utilize gait belt for transfer/ambulation Problem: Patient Education: Go to Patient Education Activity Goal: Patient/Family Education Outcome: Progressing Towards Goal 
  
Problem: General Medical Care Plan Goal: *Vital signs within specified parameters Outcome: Progressing Towards Goal 
 Goal: *Labs within defined limits Outcome: Progressing Towards Goal 
Goal: *Absence of infection signs and symptoms Outcome: Progressing Towards Goal 
Goal: *Skin integrity maintained Outcome: Progressing Towards Goal 
Goal: *Anxiety reduced or absent Outcome: Progressing Towards Goal 
  
Problem: Gas Exchange - Impaired Goal: Promote optimal lung function Outcome: Progressing Towards Goal 
  
Problem: Body Temperature -  Risk of, Imbalanced Goal: Complications related to the disease process, condition or treatment will be avoided or minimized Outcome: Progressing Towards Goal 
  
Problem: Fatigue Goal: Verbalize increase energy and improved vitality Outcome: Progressing Towards Goal 
  
Problem: Pressure Injury - Risk of 
Goal: *Prevention of pressure injury Description: Document Jai Scale and appropriate interventions in the flowsheet. Outcome: Progressing Towards Goal 
Note: Pressure Injury Interventions: 
Sensory Interventions: Assess need for specialty bed, Chair cushion, Float heels, Keep linens dry and wrinkle-free, Maintain/enhance activity level, Monitor skin under medical devices, Turn and reposition approx. every two hours (pillows and wedges if needed) Moisture Interventions: Apply protective barrier, creams and emollients, Assess need for specialty bed Activity Interventions: Assess need for specialty bed, Chair cushion, Pressure redistribution bed/mattress(bed type), Trapeze to reposition Mobility Interventions: Assess need for specialty bed, Float heels, Turn and reposition approx. every two hours(pillow and wedges), PT/OT evaluation Nutrition Interventions: Document food/fluid/supplement intake, Discuss nutritional consult with provider, Offer support with meals,snacks and hydration Friction and Shear Interventions: Apply protective barrier, creams and emollients, Feet elevated on foot rest, Lift sheet, Minimize layers, Sit at 90-degree angle, Transferring/repositioning devices

## 2020-11-22 NOTE — PROGRESS NOTES
Problem: Falls - Risk of 
Goal: *Absence of Falls Description: Document Albino Messing Fall Risk and appropriate interventions in the flowsheet. 11/22/2020 1605 by Teddy Otto RN Outcome: Progressing Towards Goal 
Note: Fall Risk Interventions: 
Mobility Interventions: Communicate number of staff needed for ambulation/transfer, Patient to call before getting OOB, OT consult for ADLs, Strengthening exercises (ROM-active/passive) Mentation Interventions: Adequate sleep, hydration, pain control, Door open when patient unattended, Family/sitter at bedside, Increase mobility Medication Interventions: Assess postural VS orthostatic hypotension, Evaluate medications/consider consulting pharmacy, Patient to call before getting OOB Elimination Interventions: Call light in reach, Elevated toilet seat, Patient to call for help with toileting needs, Toilet paper/wipes in reach History of Falls Interventions: Consult care management for discharge planning, Door open when patient unattended, Investigate reason for fall, Room close to nurse's station 11/22/2020 1036 by Teddy Otto RN Outcome: Progressing Towards Goal 
Note: Fall Risk Interventions: 
Mobility Interventions: Communicate number of staff needed for ambulation/transfer, Patient to call before getting OOB, OT consult for ADLs, Strengthening exercises (ROM-active/passive) Mentation Interventions: Adequate sleep, hydration, pain control, Door open when patient unattended, Family/sitter at bedside, Increase mobility Medication Interventions: Assess postural VS orthostatic hypotension, Evaluate medications/consider consulting pharmacy, Patient to call before getting OOB Elimination Interventions: Call light in reach, Elevated toilet seat, Patient to call for help with toileting needs, Toilet paper/wipes in reach History of Falls Interventions: Consult care management for discharge planning, Door open when patient unattended, Investigate reason for fall, Room close to nurse's station

## 2020-11-23 NOTE — PROGRESS NOTES
1930: Bedside shift change report given to Nataly Paulson, HEMA (oncoming nurse) by Shawnee Rodríguez RN (offgoing nurse). Report included the following information SBAR, Kardex, ED Summary, Intake/Output, MAR, Accordion, Recent Results and Cardiac Rhythm ST.  
 
0800: Notified at shift change that pt's heart rate in 120s-130s overnight. Pt HR currently in 130s. Notified Prince Valverde MD. No new orders received. Problem: Falls - Risk of 
Goal: *Absence of Falls Description: Document Lemon Abran Fall Risk and appropriate interventions in the flowsheet. Outcome: Progressing Towards Goal 
Note: Fall Risk Interventions: 
Mobility Interventions: Communicate number of staff needed for ambulation/transfer, Patient to call before getting OOB Mentation Interventions: Adequate sleep, hydration, pain control, More frequent rounding Medication Interventions: Evaluate medications/consider consulting pharmacy Elimination Interventions: Call light in reach, Toileting schedule/hourly rounds History of Falls Interventions: Investigate reason for fall, Room close to nurse's station Problem: General Medical Care Plan Goal: *Vital signs within specified parameters Outcome: Progressing Towards Goal 
Goal: *Labs within defined limits Outcome: Progressing Towards Goal 
Goal: *Absence of infection signs and symptoms Outcome: Progressing Towards Goal 
Goal: *Optimal pain control at patient's stated goal 
Outcome: Progressing Towards Goal 
Goal: *Skin integrity maintained Outcome: Progressing Towards Goal 
Goal: *Fluid volume balance Outcome: Progressing Towards Goal 
Goal: *Anxiety reduced or absent Outcome: Progressing Towards Goal 
  
Problem: Airway Clearance - Ineffective Goal: Achieve or maintain patent airway Outcome: Progressing Towards Goal 
  
Problem: Gas Exchange - Impaired Goal: Absence of hypoxia Outcome: Progressing Towards Goal 
Goal: Promote optimal lung function Outcome: Progressing Towards Goal 
  
 Problem: Breathing Pattern - Ineffective Goal: Ability to achieve and maintain a regular respiratory rate Outcome: Progressing Towards Goal 
  
Problem: Body Temperature -  Risk of, Imbalanced Goal: Ability to maintain a body temperature within defined limits Outcome: Progressing Towards Goal 
  
Problem: Isolation Precautions - Risk of Spread of Infection Goal: Prevent transmission of infectious organism to others Outcome: Progressing Towards Goal 
  
Problem: Nutrition Deficits Goal: Optimize nutrtional status Outcome: Progressing Towards Goal 
  
Problem: Risk for Fluid Volume Deficit Goal: Maintain normal heart rhythm Outcome: Progressing Towards Goal 
  
Problem: Fatigue Goal: Verbalize increase energy and improved vitality Outcome: Progressing Towards Goal 
  
Problem: Pressure Injury - Risk of 
Goal: *Prevention of pressure injury Description: Document Jai Scale and appropriate interventions in the flowsheet. Outcome: Progressing Towards Goal 
Note: Pressure Injury Interventions: 
Sensory Interventions: Assess need for specialty bed, Assess changes in LOC, Keep linens dry and wrinkle-free, Minimize linen layers Moisture Interventions: Apply protective barrier, creams and emollients, Absorbent underpads, Internal/External urinary devices, Minimize layers Activity Interventions: Increase time out of bed, Pressure redistribution bed/mattress(bed type) Mobility Interventions: HOB 30 degrees or less, Pressure redistribution bed/mattress (bed type) Nutrition Interventions: Document food/fluid/supplement intake Friction and Shear Interventions: HOB 30 degrees or less, Minimize layers

## 2020-11-23 NOTE — PROGRESS NOTES
SLP Contact Note SLP evaluation complete. Recommend [facility] soft diet/thin liquids. Full note to follow. Thank you, Venancio Shirley M.Ed, CCC-SLP Speech-Language Pathologist

## 2020-11-23 NOTE — PROGRESS NOTES
Transition Plan of Care RUR19%-Med 
Covid positive-during previous hospitalization received Remdesivir and steroids. Currently receiving supplemental oxygen at 2 lpm. 
Has history of [place] [facility] and resides at home with mom Virginia Orta as primary caregiver. Will likely discharge home and pending progress home health. Mitchell Mena RN CRM Ext [phone]

## 2020-11-23 NOTE — PROGRESS NOTES
SPEECH PATHOLOGY BEDSIDE SWALLOW EVALUATION/DISCHARGE Patient: Miladis Blount (30 y.o. female) Date: 11/23/2020 Primary Diagnosis: Sepsis ([facility] [address] [A41.9] Precautions: n/a ASSESSMENT : 
Based on the objective data described below, the patient presents with mild oral dysphagia and functional pharyngeal phase of swallow. Pt with slightly prolonged mastication consistent with previous evaluation by this SLP. No overt s/s of aspiration nor overt difficulty. RN has noted drooling, however, suspect this is more related to pt's diagnosis of down's syndrome over reduced secretion management. Therefore, recommend pt initiate baseline diet as outlined below. Skilled acute therapy provided by a speech-language pathologist is not indicated at this time. PLAN : 
Recommendations: 
--mechanical soft diet/thin liquids 
--small sips/bites 
--alternate liquids/solids 
--1:1 assistance feeding 
--meds in applesauce Discharge Recommendations: To Be Determined SUBJECTIVE:  
Patient non-verbal but smiled and was cooperative. OBJECTIVE:  
 
Past Medical History:  
Diagnosis Date  Endocrine disease  Hypothyroid 03/11/2018  Ill-defined condition Down's Syndrome No past surgical history on file. Diet prior to admission: Mechanical soft diet/thin liquids Current Diet:  Pureed diet/thin liquids Cognitive and Communication Status: 
Neurologic State: Alert, Eyes open spontaneously Orientation Level: Unable to verbalize Cognition: Follows commands Oral Assessment: 
Oral Assessment Labial: No impairment(herpes (per RN) noted on pt's right bilabially) Dentition: Natural 
Oral Hygiene: oral mucosa moist and clear of secretions; RN has noted drooling consistent with down syndrome Lingual: No impairment Velum: No impairment Mandible: No impairment P.O. Trials: 
Patient Position: upright in bed Vocal quality prior to P.O.: No impairment Consistency Presented: Thin liquid;Puree; Solid How Presented: Self-fed/presented;Cup/sip;Spoon;Straw Bolus Acceptance: No impairment Bolus Formation/Control: Impaired Type of Impairment: Delayed;Mastication Propulsion: No impairment Oral Residue: None Initiation of Swallow: No impairment Laryngeal Elevation: Functional 
Aspiration Signs/Symptoms: None Pharyngeal Phase Characteristics: No impairment, issues, or problems Effective Modifications: None Oral Phase Severity: Mild Pharyngeal Phase Severity : No impairment NOMS:  
The NOMS functional outcome measure was used to quantify this patient's level of swallowing impairment. Based on the NOMS, the patient was determined to be at level 5 for swallow function NOMS Swallowing Levels: 
Level 1 (CN): NPO Level 2 (CM): NPO but takes consistency in therapy Level 3 (CL): Takes less than 50% of nutrition p.o. and continues with nonoral feedings; and/or safe with mod cues; and/or max diet restriction Level 4 (CK): Safe swallow but needs mod cues; and/or mod diet restriction; and/or still requires some nonoral feeding/supplements Level 5 (CJ): Safe swallow with min diet restriction; and/or needs min cues Level 6 (CI): Independent with p.o.; rare cues; usually self cues; may need to avoid some foods or needs extra time Level 7 (CH): Independent for all p.o. SID. (2003). National Outcomes Measurement System (NOMS): Adult Speech-Language Pathology User's Guide. Pain: 
Pain Scale 1: Adult Nonverbal Pain Scale After treatment:  
Call bell within reach and Nursing notified COMMUNICATION/EDUCATION:  
 
The patient's plan of care including recommendations, planned interventions, and recommended diet changes were discussed with: Registered nurse and Physician. Thank you for this referral. 
Keyshawn Costa, SLP Time Calculation: 10 mins

## 2020-11-23 NOTE — PROGRESS NOTES
Day #1 of Cefepime Indication:  Sepsis of unclear source; oovid + Current regimen:  1 gram IV Q 8 hours Abx regimen: Vanc + Cefepime Recent Labs  
  20 
0236 20 
0329 WBC 13.8* 14.1*  
CREA 1.03* 0.89 BUN 13 11 Est CrCl: 76.2 ml/min Temp (24hrs), Av.3 °F (37.9 °C), Min:99 °F (37.2 °C), Max:101.9 °F (38.8 °C) Plan: Change to 2 grams IV Q 8 hours  per [facility] P&T Committee Protocol with respect to renal function. Pharmacy will continue to monitor patient daily and will make dosage adjustments based upon changing renal function.  
 
Levy Umaña, PharmD, BCPS

## 2020-11-23 NOTE — PROGRESS NOTES
Spiritual Care Assessment/Progress Note [address] 
 
 
NAME: Fatimah An      MRN: [id number] AGE: 47 y.o. SEX: female Latter day Affiliation: Anabaptism  
Language: English  
 
11/23/2020     Total Time (in minutes): 5 Spiritual Assessment begun in [facility] 4 IMCU 2 through conversation with: 
  
    []Patient        [] Family    [] Friend(s) Reason for Consult: Palliative Care, Initial/Spiritual Assessment Spiritual beliefs: (Please include comment if needed) 
   [] Identifies with a jr tradition:     
   [] Supported by a jr community:        
   [] Claims no spiritual orientation:       
   [] Seeking spiritual identity:            
   [] Adheres to an individual form of spirituality:       
   [x] Not able to assess:                   
 
    
Identified resources for coping:  
   [] Prayer                           
   [] Music                  [] Guided Imagery 
   [] Family/friends                 [] Pet visits [] Devotional reading                         [x] Unknown 
   [] Other Interventions offered during this visit: (See comments for more details) Patient Interventions: Initial visit Plan of Care: 
 
 [] Support spiritual and/or cultural needs  
 [] Support AMD and/or advance care planning process    
 [] Support grieving process 
 [] Coordinate Rites and/or Rituals  
 [] Coordination with community clergy [] No spiritual needs identified at this time 
 [] Detailed Plan of Care below (See Comments)  [] Make referral to Music Therapy 
[] Make referral to Pet Therapy    
[] Make referral to Addiction services 
[] Make referral to [facility] 
[] Make referral to Spiritual Care Partner 
[] No future visits requested       
[x] Follow up visits as needed Comments: Pt's chart reviewed after palliative consult received. Unable to assess pt directly at this time due to isolation precautions.   Chaplains remain available for support and can reach out to family as needed. Laurel Mckeon, Palliative

## 2020-11-23 NOTE — PROGRESS NOTES
Patient appears to be uncomfortable; change in her respiratory status noted, such as expiratory wheezes on the left anterior. Tachycardia in the 130s and tachypnea in the 30s. MD on-call Alysha Guzman) notified and saw the patient. New order for inhaler given. 12 MD notified of patient's rectal temperature and her respiratory and cardiac status. Will continue to monitor patient.

## 2020-11-23 NOTE — PROGRESS NOTES
Physician Progress Note Dipika Martinez 
[facility] #:                  [id number] :                       1980 ADMIT DATE:       2020 9:31 PM 
[address] DATE: 
RESPONDING 
PROVIDER #:        TAWANA COLORADO DO 
 
 
 
 
QUERY TEXT: 
 
Patient admitted with Sepsis. Noted documentation of Acute Kidney Injury on H&P 20. Please document in progress notes and discharge summary: The medical record reflects the following: 
Risk Factors: 48 y/o female to [address] [place] [address] and transferred to [facility] due to Tachypnea, tachycardia, hypoxia requiring 02, elevated WBC. PMHx: Downs syndrome, CKD. Recent hospitalization at [facility] 20- 20 for Covid Pneumonia. Clinical Indicators: 20 H&P \"On arrival to the emergency room at [facility] she was found to be tachycardic and tachypneic. Patient has not been hypoxic or requiring O2 supplementation. CBC showed a WBC of 21,000 from 14,000 at the time of discharge\". DANY on CKD stage III: Creatinine today 1.37 up from 1.98 on 2020 
-Received 1 L of IV fluid in the ED 
2020 17:33 BUN: 17 Creatinine: 1.37 (H) GFR est AA: 52 (L) 
2020 01:57 BUN: 13 Creatinine: 1.01 GFR est AA: >60 
2020 10:39 BUN: 11 Creatinine: 1.00 GFR est AA: >60 Treatment: Tx to higher level of care [facility], hospitalist consult, IV fluids, Monitor renal function, monitor urine output. Defined by Kidney Disease Improving Global Outcomes (KDIGO) clinical practice guideline for acute kidney injury: 
-Increase in SCr by greater than or equal to 0.3 mg/dl within 48 hours; or 
-Increase in SCr to greater than or equal to  1.5 times baseline, which is known or presumed to have occurred within the prior 7 days; or 
-Urine volume < 0.5ml/kg/h for 6 hours Options provided: 
-- Acute kidney injury evidenced by, Please document evidence as well as baseline creatinine, if known.  
-- Currently resolved acute kidney injury was evidenced by, Please document evidence as well as baseline creatinine, if known. -- Acute kidney injury ruled out after study -- Acute kidney insufficiency -- Other - I will add my own diagnosis -- Disagree - Not applicable / Not valid -- Disagree - Clinically unable to determine / Unknown 
-- Refer to Clinical Documentation Reviewer PROVIDER RESPONSE TEXT: 
 
This patient has Acute kidney insufficiency.  
 
Query created by: Boom Hoskins on 11/23/2020 3:34 PM 
 
 
Electronically signed by:  Alisha Boyer DO 11/23/2020 5:53 PM

## 2020-11-23 NOTE — PROGRESS NOTES
[facility] Adult  Hospitalist Group Hospitalist Progress Note Dyana Farmer DO Answering service: [phone] OR [phone] from in house phone Date of Service:  2020 NAME:  Eden Fink :  1980 MRN:  [id number] Admission Summary:  
47 y.o. female past medical history significant for hypothyroidism, Down syndrome and recently treated for COVID-19 infection was brought to the emergency room for further evaluation of elevated heart rate. Patient was admitted to [facility] on 2 2020 for acute respiratory failure secondary to COVID-19 infection. As per mother report at the time of discharge patient was doing better in no acute respiratory distress and not having any fever. Today home health nurse came to check on the patient and found that her heart rate was in the 140s 150s. Patient was sent her to the emergency room for evaluation. On arrival to the emergency room at [facility] she was found to be tachycardic and tachypneic. Patient has not been hypoxic or requiring O2 supplementation. CBC showed a WBC of 21,000 from 14,000 at the time of discharge. CTA of the chest was negative for PE but found to have persistent COVID-19 pneumonia. As per mother patient has not been coughing, being febrile, having diarrhea. Patient has been eating well. During her prior hospitalization patient was treated with remdesivir and completed a course of Decadron. Given persistent tachycardia after 1L iv fluids she admission was requested. Interval history / Subjective: Follow up sepsis. Patient seen and examined. Overnight, persistently febrile and tachycardic. Repeat CXR with upper lobe pneumonia. Assessment & Plan:  
 
Sepsis (POA) due to COVID 19 Pneumonia and suspected superimposed bacterial pneumonia:   
Tachycardia: persistent -CT compatible with COVID 19 
-Repeat CXR 11/23 due to new fevers concerning for superimposed bacterial pneumonia 
-Continue vancomycin, cefepime 
-resend cultures again 
-MRSA swab 
-sputum culture if able  
-Daily vitamin C 
-completed remdesmivir and dexamethazone during previous hospitalization 
-s/p fluid bolus, caution with fluids  
-continue supplemental oxygen 2 liters 
-patient with gradual decline since hospitalization, appreciate palliative care Dysphagia: appreciate speech therapy, okay to mechanical soft diet DANY on CKD stage III: improved Received 1 L of IV fluid in the ED. Monitor renal function. Encourage p.o. intake 
  
Hypothyroidism: Continue levothyroxine 
  
Down syndrome: Continue with Risperdal 
  
Hiatal hernia: Known finding. continue Protonix 40 mg p.o. daily. 
-Outpatient follow-up with GI. Oral labial herpes: abreva 
 
  
Code status: full DVT prophylaxis: lovenox Care Plan discussed with: Patient/Family Anticipated Disposition: Home w/Family Anticipated Discharge: Greater than 48 hours Hospital Problems  Never Reviewed Codes Class Noted POA Sepsis ([facility] [address] ICD-10-CM: A41.9 ICD-9-CM: 038.9, 995.91  11/19/2020 Unknown Review of Systems:  
Negative unless stated above Vital Signs:  
 Last 24hrs VS reviewed since prior progress note. Most recent are: 
Visit Vitals /80 (BP 1 Location: Right arm, BP Patient Position: At rest) Pulse (!) 136 Temp 100.2 °F (37.9 °C) Resp 22 Wt 81 kg (178 lb 9.2 oz) SpO2 99% BMI 30.65 kg/m² Intake/Output Summary (Last 24 hours) at 11/23/2020 1544 Last data filed at 11/23/2020 9297 Gross per 24 hour Intake  Output 250 ml Net -250 ml Physical Examination:  
 
I had a face to face encounter with this patient and independently examined them on 11/23/2020 as outlined below: 
 
     
Constitutional:  No acute distress, non verbal   
 ENT:  Oral mucosa moist, oropharynx benign. Resp:   diminished bilaterally. No accessory muscle use CV:  tachycardic, no murmurs, gallops, rubs GI:  Soft, non distended, non tender. normoactive bowel sounds, no hepatosplenomegaly Musculoskeletal:  No edema, warm, 2+ pulses throughout Neurologic:  Moves all extremities Data Review:  
 Review and/or order of clinical lab test 
Review and/or order of tests in the radiology section of CPT Review and/or order of tests in the medicine section of CPT Labs:  
 
Recent Labs  
  11/23/20 
1039 11/22/20 
0236 WBC 19.3* 13.8* HGB 10.1* 10.1* HCT 29.3* 29.3*  
 248 Recent Labs  
  11/23/20 
1039 11/22/20 
0236 11/21/20 
0329  140 138  
K 4.3 3.9 4.6  109* 108 CO2 20* 25 22 BUN 11 13 11 CREA 1.00 1.03* 0.89 GLU 84 86 78 CA 8.7 8.4* 8.2* No results for input(s): ALT, AP, TBIL, TBILI, TP, ALB, GLOB, GGT, AML, LPSE in the last 72 hours. No lab exists for component: SGOT, GPT, AMYP, HLPSE No results for input(s): INR, PTP, APTT, INREXT, INREXT in the last 72 hours. No results for input(s): FE, TIBC, PSAT, FERR in the last 72 hours. No results found for: FOL, RBCF No results for input(s): PH, PCO2, PO2 in the last 72 hours. No results for input(s): CPK, CKNDX, TROIQ in the last 72 hours. No lab exists for component: CPKMB No results found for: CHOL, CHOLX, CHLST, CHOLV, HDL, HDLP, LDL, LDLC, DLDLP, TGLX, TRIGL, TRIGP, CHHD, CHHDX Lab Results Component Value Date/Time Glucose (POC) 82 03/11/2018 02:32 PM  
 
Lab Results Component Value Date/Time  Color YELLOW/STRAW 11/23/2020 10:39 AM  
 Appearance CLOUDY (A) 11/23/2020 10:39 AM  
 Specific gravity 1.021 11/23/2020 10:39 AM  
 Specific gravity 1.010 11/20/2020 01:57 AM  
 pH (UA) 6.0 11/23/2020 10:39 AM  
 Protein TRACE (A) 11/23/2020 10:39 AM  
 Glucose Negative 11/23/2020 10:39 AM  
 Ketone 40 (A) 11/23/2020 10:39 AM  
 Bilirubin Negative 11/23/2020 10:39 AM  
 Urobilinogen 1.0 11/23/2020 10:39 AM  
 Nitrites Negative 11/23/2020 10:39 AM  
 Leukocyte Esterase TRACE (A) 11/23/2020 10:39 AM  
 Epithelial cells FEW 11/23/2020 10:39 AM  
 Bacteria 1+ (A) 11/23/2020 10:39 AM  
 WBC 0-4 11/23/2020 10:39 AM  
 RBC 20-50 11/23/2020 10:39 AM  
 
 
 
Medications Reviewed:  
 
Current Facility-Administered Medications Medication Dose Route Frequency  albuterol (PROVENTIL HFA, VENTOLIN HFA, PROAIR HFA) inhaler 2 Puff  2 Puff Inhalation Q4H PRN  
 cefepime (MAXIPIME) 2 g in 0.9% sodium chloride (MBP/ADV) 100 mL MBP  2 g IntraVENous Q8H  Vancomycin- Pharmacy Dosing   Other Rx Dosing/Monitoring  [START ON 11/24/2020] vancomycin (VANCOCIN) 1250 mg in  ml infusion  1,250 mg IntraVENous Q16H  
 pantoprazole (PROTONIX) 40 mg in 0.9% sodium chloride 10 mL injection  40 mg IntraVENous Q12H  levothyroxine (SYNTHROID) tablet 50 mcg  50 mcg Oral 6am  
 risperiDONE (RisperDAL) 1 mg/mL oral solution soln 1 mg  1 mg Oral QHS  docosanol (ABREVA) 10 % cream   Topical 5XD  acetaminophen (TYLENOL) tablet 650 mg  650 mg Oral Q6H PRN Or  
 acetaminophen (TYLENOL) suppository 650 mg  650 mg Rectal Q6H PRN  
 sodium chloride (NS) flush 5-40 mL  5-40 mL IntraVENous Q8H  
 sodium chloride (NS) flush 5-40 mL  5-40 mL IntraVENous PRN  polyethylene glycol (MIRALAX) packet 17 g  17 g Oral DAILY PRN  promethazine (PHENERGAN) tablet 12.5 mg  12.5 mg Oral Q6H PRN Or  
 ondansetron (ZOFRAN) injection 4 mg  4 mg IntraVENous Q6H PRN  
 ascorbic acid (vitamin C) (VITAMIN C) tablet 500 mg  500 mg Oral BID  enoxaparin (LOVENOX) injection 40 mg  40 mg SubCUTAneous Q12H  
 
______________________________________________________________________ EXPECTED LENGTH OF STAY: 3d 17h ACTUAL LENGTH OF STAY:          4 [facility],

## 2020-11-23 NOTE — PROGRESS NOTES
Pharmacist Note - Vancomycin Dosing Consult provided for this 47 y.o. female for indication of Sepsis of unclear source 
-covid + but completed treatment during previous hospitalization. Antibiotic regimen(s): Vanc + Cefepime Patient on vancomycin PTA? NO Recent Labs  
  20 
1039 20 
0236 20 
0329 WBC 19.3* 13.8* 14.1*  
CREA 1.00 1.03* 0.89 BUN 11 13 11 Frequency of BMP: Daily through  Height: 162.6 cm Weight: 81 kg Est CrCl: ~75-80 ml/min; UO: 0.1 ml/kg/hr Temp (24hrs), Av.5 °F (38.1 °C), Min:99 °F (37.2 °C), Max:101.9 °F (38.8 °C) Cultures: 
 Blood x 2- NGTD 
 Urine- NG, final 
 Blood- in process Goal trough = 15 - 20 mcg/mL Therapy will be initiated with a loading dose of 2000 mg IV x 1 to be followed by a maintenance dose of 1250 mg IV every 16 hours. Pharmacy to follow patient daily and order levels / make dose adjustments as appropriate.  
 
Monse Dietz, JakeD, BCPS

## 2020-11-23 NOTE — CONSULTS
Palliative Medicine Consult [place]: [phone] ([phone]) Patient Name: Fatimah An YOB: 1980 Date of Initial Consult: 11/23/2020 Reason for Consult: care decisions Requesting Provider: Dr. Paige Grant Primary Care Physician: Diego, MD Kilo 
 
 SUMMARY:  
Fatimah An is a 47 y.o. with a past history of Down's syndrome, hypothyroidism, obesity (BMI 30), CKD3, CHF (ECHO 2018 mod diffuse hypokinesis, EF 30%)  large hiatal hernia, Recent admission (11-8 to 11-16-20 for COVID pneumonia)  who was admitted on 11/19/2020 from home with a diagnosis of tachycardia. Current medical issues leading to Palliative Medicine involvement include: Ongoing decline from COVID pneumonia, readmitted after initial improvement, now with tachycardia, tachypnea, rising CRP/ WBC, low procalcitonin, on empiric antibiotics, has worsening infiltrates on CXR. Patient is cared for by her mother, Tavo Aceves [phone]. Muriel Grijalva [phone] PALLIATIVE DIAGNOSES:  
1. Acute respiratory failure, COVID 19 pneumonia 2. Tachypnea, tachycardia, fever 3. Morbid obesity BMI 30 
4. Large hiatal hernia, aspiration risk, on modified diet 5. Debility 6. High risk of dying from COVID due to underlying CHF, obesity PLAN:  
1. Case discussed with bedside nurse. 2. Call placed to family, including mother Tavo Aceves and brother Carolina Jj. 1. Introduced palliative medicine as added layer of support. 2. Reviewed clinical situation. Explained our high level of concern that she is not making progress 3. Ongoing high HR, RR, fever, worsening pneumonia by CXR. Explain she is high risk category due to underlying health 4. Education provided that she is high risk for decline, dying from COVID pneumonia. 5. Explain that they are her voice, we look to them for guidance, how best to care for her.   Need to consider if want to protect her from things we know won't work (like ventilator, CPR/ shock if she dies). Education provided about poor outcomes of ill patients with COVID once placed on ventilator, likely she would die anyway on machines. Have option to protect her from machines, instead advocate for a comfort approach if things get worse despite all we are doing. Explain that comfort approach would allow her to have distressing symptoms managed with medications. I recommend they talk about this as family tonight, consider what to do if she isn't getting better like hoped. 6. They both want to be able to come visit her (one at a time) . Explain that visitation policy is strict  Due to COVID , I put in request on their behalf and see if they are allowed to visit. 7. Made plan to call them again tomorrow, check in on their conversations about care goals, give update on how she's doing. 8. At this time, patient remains FULL CODE. 3. Initial consult note routed to primary continuity provider and/or primary health care team members 4. Communicated plan of care with: Palliative Alber [facility] Team 
 
 GOALS OF CARE / TREATMENT PREFERENCES:  
 
GOALS OF CARE: 
Patient/Health Care Proxy Stated Goals: Prolong life TREATMENT PREFERENCES:  
Code Status: Full Code Advance Care Planning: 
[] The [facility] Interdisciplinary Team has updated the ACP Navigator with Francine and Patient Capacity Primary Decision Maker (Active): [employer] - Monica Oneill - [phone] Secondary Decision Maker: Holger Avelar - [phone] Advance Care Planning 11/20/2020 Patient's Healthcare Decision Maker is: Verbal statement (Legal Next of Kin remains as decision maker) Confirm Advance Directive None Patient Would Like to Complete Advance Directive Unable Medical Interventions: Full interventions Other Instructions: Other: As far as possible, the palliative care team has discussed with patient / health care proxy about goals of care / treatment preferences for patient. HISTORY:  
 
History obtained from: chart CHIEF COMPLAINT: elevated HR 
 
HPI/SUBJECTIVE: The patient is:  
[] Verbal and participatory [x] Non-participatory due to:  
 
47year old female with Down's Syndrome, admitted with known COVID recently (in hospital 11-8 to 11-16) and found by home health aide to have elevated HR. (140s) Clinical Pain Assessment (nonverbal scale for severity on nonverbal patients):  
Clinical Pain Assessment Severity: 0 Activity (Movement): Lying quietly, normal position Duration: for how long has pt been experiencing pain (e.g., 2 days, 1 month, years) Frequency: how often pain is an issue (e.g., several times per day, once every few days, constant) FUNCTIONAL ASSESSMENT:  
 
Palliative Performance Scale (PPS): PPS: 20 
 
 
 PSYCHOSOCIAL/SPIRITUAL SCREENING:  
 
Palliative IDT has assessed this patient for cultural preferences / practices and a referral made as appropriate to needs (Cultural Services, Patient Advocacy, Ethics, etc.) Any spiritual / Restoration concerns: 
[] Yes /  [x] No 
 
Caregiver Burnout: 
[] Yes /  [x] No /  [] No Caregiver Present Anticipatory grief assessment:  
[x] Normal  / [] Maladaptive ESAS Anxiety: Anxiety: 0 
 
ESAS Depression:    
 
 
 REVIEW OF SYSTEMS:  
 
Positive and pertinent negative findings in ROS are noted above in HPI. The following systems were [x] reviewed / [] unable to be reviewed as noted in HPI Other findings are noted below. Systems: constitutional, ears/nose/mouth/throat, respiratory, gastrointestinal, genitourinary, musculoskeletal, integumentary, neurologic, psychiatric, endocrine. Positive findings noted below. Modified ESAS Completed by: provider Fatigue: 10 Drowsiness: 5 Pain: 0 Anxiety: 0 Nausea: 0 Anorexia: 5 Dyspnea: 8 PHYSICAL EXAM:  
 
From RN flowsheet: 
Wt Readings from Last 3 Encounters:  
11/23/20 178 lb 9.2 oz (81 kg)  
11/19/20 180 lb (81.6 kg)  
11/16/20 186 lb (84.4 kg) Blood pressure 129/80, pulse (!) 136, temperature 100.2 °F (37.9 °C), resp. rate 22, weight 178 lb 9.2 oz (81 kg), SpO2 99 %. Pain Scale 1: Adult Nonverbal Pain Scale Pain Intensity 1: 0 Last bowel movement, if known:  
 
Constitutional: awake, looks to voice, nonverbal 
Eyes: pupils equal, anicteric ENMT: no nasal discharge, moist mucous membranes Cardiovascular: tachy regular rhythm, distal pulses intact Respiratory: breathing  Labored and shallow,  symmetric Gastrointestinal: soft non-tender, +bowel sounds, obese Musculoskeletal: no deformity, no tenderness to palpation Skin: warm, dry Neurologic: awake, nonverbal, does not follow commands Moves covers up to reposition to her liking HISTORY:  
 
Active Problems: 
  Sepsis ([address] (11/19/2020) Past Medical History:  
Diagnosis Date  Endocrine disease  Hypothyroid 03/11/2018  Ill-defined condition Down's Syndrome No past surgical history on file. No family history on file. History reviewed, no pertinent family history. Social History Tobacco Use  Smoking status: Never Smoker  Smokeless tobacco: Never Used Substance Use Topics  Alcohol use: No  
 
No Known Allergies Current Facility-Administered Medications Medication Dose Route Frequency  albuterol (PROVENTIL HFA, VENTOLIN HFA, PROAIR HFA) inhaler 2 Puff  2 Puff Inhalation Q4H PRN  
 cefepime (MAXIPIME) 2 g in 0.9% sodium chloride (MBP/ADV) 100 mL MBP  2 g IntraVENous Q8H  Vancomycin- Pharmacy Dosing   Other Rx Dosing/Monitoring  [START ON 11/24/2020] vancomycin (VANCOCIN) 1250 mg in  ml infusion  1,250 mg IntraVENous Q16H  
 pantoprazole (PROTONIX) 40 mg in 0.9% sodium chloride 10 mL injection  40 mg IntraVENous Q12H  levothyroxine (SYNTHROID) tablet 50 mcg  50 mcg Oral 6am  
 risperiDONE (RisperDAL) 1 mg/mL oral solution soln 1 mg  1 mg Oral QHS  docosanol (ABREVA) 10 % cream   Topical 5XD  acetaminophen (TYLENOL) tablet 650 mg  650 mg Oral Q6H PRN Or  
 acetaminophen (TYLENOL) suppository 650 mg  650 mg Rectal Q6H PRN  
 sodium chloride (NS) flush 5-40 mL  5-40 mL IntraVENous Q8H  
 sodium chloride (NS) flush 5-40 mL  5-40 mL IntraVENous PRN  polyethylene glycol (MIRALAX) packet 17 g  17 g Oral DAILY PRN  promethazine (PHENERGAN) tablet 12.5 mg  12.5 mg Oral Q6H PRN Or  
 ondansetron (ZOFRAN) injection 4 mg  4 mg IntraVENous Q6H PRN  
 ascorbic acid (vitamin C) (VITAMIN C) tablet 500 mg  500 mg Oral BID  enoxaparin (LOVENOX) injection 40 mg  40 mg SubCUTAneous Q12H  
 
 
 
 LAB AND IMAGING FINDINGS:  
 
Lab Results Component Value Date/Time WBC 19.3 (H) 11/23/2020 10:39 AM  
 HGB 10.1 (L) 11/23/2020 10:39 AM  
 PLATELET 017 09/71/6513 10:39 AM  
 
Lab Results Component Value Date/Time Sodium 139 11/23/2020 10:39 AM  
 Potassium 4.3 11/23/2020 10:39 AM  
 Chloride 108 11/23/2020 10:39 AM  
 CO2 20 (L) 11/23/2020 10:39 AM  
 BUN 11 11/23/2020 10:39 AM  
 Creatinine 1.00 11/23/2020 10:39 AM  
 Calcium 8.7 11/23/2020 10:39 AM  
 Magnesium 2.3 11/11/2020 01:00 AM  
 Phosphorus 4.0 11/08/2020 06:07 AM  
  
Lab Results Component Value Date/Time Alk. phosphatase 38 (L) 11/20/2020 01:57 AM  
 Protein, total 5.7 (L) 11/20/2020 01:57 AM  
 Albumin 2.0 (L) 11/20/2020 01:57 AM  
 Globulin 3.7 11/20/2020 01:57 AM  
 
Lab Results Component Value Date/Time INR 1.1 11/20/2020 01:57 AM  
 Prothrombin time 11.8 (H) 11/20/2020 01:57 AM  
 aPTT 26.8 11/20/2020 01:57 AM  
  
Lab Results Component Value Date/Time Ferritin 795 (H) 11/20/2020 01:57 AM  
  
Lab Results Component Value Date/Time  pH 7.47 (H) 11/08/2020 01:20 PM  
 PCO2 30 (L) 11/08/2020 01:20 PM  
 PO2 68 (L) 11/08/2020 01:20 PM  
 No components found for: Dimitri [place] Lab Results Component Value Date/Time  (H) 11/11/2020 01:00 AM  
  
 
 
   
 
Total time: 75min Counseling / coordination time, spent as noted above: 65 
> 50% counseling / coordination?:  
yes Prolonged service was provided for  []30 min   []75 min in face to face time in the presence of the patient, spent as noted above. Time Start:  
Time End:  
Note: this can only be billed with 83291 (initial) or 78856 (follow up). If multiple start / stop times, list each separately.

## 2020-11-23 NOTE — PROGRESS NOTES
Bedside shift change report given to Shawnee Rodríguez RN (oncoming nurse) by Benjamin Quintanilla RN (offgoing nurse). Report included the following information SBAR, Kardex, Intake/Output, MAR, Recent Results, and Cardiac Rhythm ST . Last 3 Recorded Weights in this Encounter 11/21/20 4551 11/22/20 7166 11/23/20 5847 Weight: 82.7 kg (182 lb 5.1 oz) 81.9 kg (180 lb 8.9 oz) 81 kg (178 lb 9.2 oz) Problem: Body Temperature -  Risk of, Imbalanced Goal: Ability to maintain a body temperature within defined limits Outcome: Not Progressing Towards Goal 
 Patient with elevated temp of over 101.0F(rectal). MD on-call notified. Patient received rectal Tylenol. Temp measured orally with result of 101.6F. No new orders; will continue to monitor. Problem: Falls - Risk of 
Goal: *Absence of Falls Description: Document Lemon Abran Fall Risk and appropriate interventions in the flowsheet. Outcome: Progressing Towards Goal 
Note: Fall Risk Interventions: 
Mobility Interventions: Communicate number of staff needed for ambulation/transfer, Patient to call before getting OOB, Strengthening exercises (ROM-active/passive) Mentation Interventions: Adequate sleep, hydration, pain control Medication Interventions: Assess postural VS orthostatic hypotension Elimination Interventions: Call light in reach History of Falls Interventions: Consult care management for discharge planning Problem: Patient Education: Go to Patient Education Activity Goal: Patient/Family Education Outcome: Progressing Towards Goal 
  
Problem: General Medical Care Plan Goal: *Vital signs within specified parameters Outcome: Progressing Towards Goal 
  
Problem: Gas Exchange - Impaired Goal: Absence of hypoxia Outcome: Progressing Towards Goal 
  
Problem: Isolation Precautions - Risk of Spread of Infection Goal: Prevent transmission of infectious organism to others Outcome: Progressing Towards Goal

## 2020-11-24 NOTE — PROGRESS NOTES
[address] [facility] Adult  Hospitalist Group Hospitalist Progress Note [address] [facility],  Answering service: [phone] OR [phone] from in house phone Date of Service:  2020 NAME:  Manas Caldera :  1980 MRN:  [id number] Admission Summary:  
47 y.o. female past medical history significant for hypothyroidism, Down syndrome and recently treated for COVID-19 infection was brought to the emergency room for further evaluation of elevated heart rate. Patient was admitted to [facility] on 2 2020 for acute respiratory failure secondary to COVID-19 infection. As per mother report at the time of discharge patient was doing better in no acute respiratory distress and not having any fever. Today home health nurse came to check on the patient and found that her heart rate was in the 140s 150s. Patient was sent her to the emergency room for evaluation. On arrival to the emergency room at [facility] she was found to be tachycardic and tachypneic. Patient has not been hypoxic or requiring O2 supplementation. CBC showed a WBC of 21,000 from 14,000 at the time of discharge. CTA of the chest was negative for PE but found to have persistent COVID-19 pneumonia. As per mother patient has not been coughing, being febrile, having diarrhea. Patient has been eating well. During her prior hospitalization patient was treated with remdesivir and completed a course of Decadron. Given persistent tachycardia after 1L iv fluids she admission was requested. Interval history / Subjective: Follow up sepsis. Patient seen and examined. Has persistent fevers. CXR demonstrated increased of upper lobes. Will order CTA to further evaluate. Per nursing, patient able to drink Ensure. Has increased HR to 150s with intake. Assessment & Plan: Sepsis (POA) due to COVID 19 Pneumonia and suspected superimposed bacterial pneumonia:   
Tachycardia: persistent  
-CT compatible with COVID 19 
-Repeat CXR 11/23 due to new fevers concerning for superimposed bacterial pneumonia. Order CTA 11/24 to further evaluate 
-Continue vancomycin, cefepime. Follow cultures -MRSA swab pending 
-sputum culture if able  
-Daily vitamin C 
-completed remdesmivir and dexamethazone during previous hospitalization 
-s/p additional fluid bolus, caution with fluids  
-continue supplemental oxygen 2 liters 
-patient with gradual decline since hospitalization, appreciate palliative care input and goals of care discussion with family Dysphagia: appreciate speech therapy, okay for mechanical soft diet 
-nutrition consult to monitor appropriate intake DANY on CKD stage III: improved and stable Received 1 L of IV fluid in the ED. Monitor renal function. Encourage p.o. intake 
  
Hypothyroidism: Continue levothyroxine 
  
Down syndrome: Continue with Risperdal 
  
Hiatal hernia: Known finding. continue Protonix 40 mg p.o. daily. 
-Outpatient follow-up with GI. Oral labial herpes: abreva 
 
  
Code status: full DVT prophylaxis: lovenox Care Plan discussed with: Patient/Family Anticipated Disposition: Home w/Family Anticipated Discharge: Greater than 48 hours Hospital Problems  Never Reviewed Codes Class Noted POA Sepsis [facility] ICD-10-CM: A41.9 ICD-9-CM: 038.9, 995.91  11/19/2020 Unknown Review of Systems:  
Negative unless stated above Vital Signs:  
 Last 24hrs VS reviewed since prior progress note. Most recent are: 
Visit Vitals BP (!) 102/45 (BP 1 Location: Right arm, BP Patient Position: At rest) Pulse (!) 122 Temp (!) 100.5 °F (38.1 °C) Resp 28 Ht 5' 4\" (1.626 m) Wt 81.7 kg (180 lb 1.9 oz) SpO2 97% BMI 30.92 kg/m² Intake/Output Summary (Last 24 hours) at 11/24/2020 3351 Last data filed at 11/24/2020 1324 Gross per 24 hour Intake  Output 550 ml Net -550 ml Physical Examination:  
 
I had a face to face encounter with this patient and independently examined them on 11/24/2020 as outlined below: 
 
     
Constitutional:  No acute distress, non verbal   
ENT:  right sided labial crusted lesions Resp:   diminished bilaterally. No accessory muscle use CV:  tachycardic, no murmurs, gallops, rubs GI:  Soft, non distended, non tender. normoactive bowel sounds, no hepatosplenomegaly Musculoskeletal:  No edema, warm, 2+ pulses throughout Neurologic:  Moves all extremities Data Review:  
 Review and/or order of clinical lab test 
Review and/or order of tests in the radiology section of CPT Review and/or order of tests in the medicine section of CPT Labs:  
 
Recent Labs  
  11/24/20 
0142 11/23/20 
1039 WBC 19.1* 19.3* HGB 9.6* 10.1* HCT 28.7* 29.3*  
 177 Recent Labs  
  11/24/20 
0142 11/23/20 
1039 11/22/20 
0236  139 140  
K 4.0 4.3 3.9 * 108 109* CO2 20* 20* 25 BUN 13 11 13 CREA 1.05* 1.00 1.03* GLU 60* 84 86  
CA 8.4* 8.7 8.4* No results for input(s): ALT, AP, TBIL, TBILI, TP, ALB, GLOB, GGT, AML, LPSE in the last 72 hours. No lab exists for component: SGOT, GPT, AMYP, HLPSE No results for input(s): INR, PTP, APTT, INREXT, INREXT in the last 72 hours. No results for input(s): FE, TIBC, PSAT, FERR in the last 72 hours. No results found for: FOL, RBCF No results for input(s): PH, PCO2, PO2 in the last 72 hours. No results for input(s): CPK, CKNDX, TROIQ in the last 72 hours. No lab exists for component: CPKMB No results found for: CHOL, CHOLX, CHLST, CHOLV, HDL, HDLP, LDL, LDLC, DLDLP, TGLX, TRIGL, TRIGP, CHHD, CHHDX Lab Results Component Value Date/Time Glucose (POC) 122 (H) 11/24/2020 05:36 AM  
 Glucose (POC) 82 03/11/2018 02:32 PM  
 
Lab Results Component Value Date/Time Color YELLOW/STRAW 11/23/2020 10:39 AM  
 Appearance CLOUDY (A) 11/23/2020 10:39 AM  
 Specific gravity 1.021 11/23/2020 10:39 AM  
 Specific gravity 1.010 11/20/2020 01:57 AM  
 pH (UA) 6.0 11/23/2020 10:39 AM  
 Protein TRACE (A) 11/23/2020 10:39 AM  
 Glucose Negative 11/23/2020 10:39 AM  
 Ketone 40 (A) 11/23/2020 10:39 AM  
 Bilirubin Negative 11/23/2020 10:39 AM  
 Urobilinogen 1.0 11/23/2020 10:39 AM  
 Nitrites Negative 11/23/2020 10:39 AM  
 Leukocyte Esterase TRACE (A) 11/23/2020 10:39 AM  
 Epithelial cells FEW 11/23/2020 10:39 AM  
 Bacteria 1+ (A) 11/23/2020 10:39 AM  
 WBC 0-4 11/23/2020 10:39 AM  
 RBC 20-50 11/23/2020 10:39 AM  
 
 
 
Medications Reviewed:  
 
Current Facility-Administered Medications Medication Dose Route Frequency  albuterol (PROVENTIL HFA, VENTOLIN HFA, PROAIR HFA) inhaler 2 Puff  2 Puff Inhalation Q4H PRN  
 cefepime (MAXIPIME) 2 g in 0.9% sodium chloride (MBP/ADV) 100 mL MBP  2 g IntraVENous Q8H  Vancomycin- Pharmacy Dosing   Other Rx Dosing/Monitoring  vancomycin (VANCOCIN) 1250 mg in  ml infusion  1,250 mg IntraVENous Q16H  
 pantoprazole (PROTONIX) 40 mg in 0.9% sodium chloride 10 mL injection  40 mg IntraVENous Q12H  levothyroxine (SYNTHROID) tablet 50 mcg  50 mcg Oral 6am  
 risperiDONE (RisperDAL) 1 mg/mL oral solution soln 1 mg  1 mg Oral QHS  docosanol (ABREVA) 10 % cream   Topical 5XD  acetaminophen (TYLENOL) tablet 650 mg  650 mg Oral Q6H PRN Or  
 acetaminophen (TYLENOL) suppository 650 mg  650 mg Rectal Q6H PRN  
 sodium chloride (NS) flush 5-40 mL  5-40 mL IntraVENous Q8H  
 sodium chloride (NS) flush 5-40 mL  5-40 mL IntraVENous PRN  polyethylene glycol (MIRALAX) packet 17 g  17 g Oral DAILY PRN  promethazine (PHENERGAN) tablet 12.5 mg  12.5 mg Oral Q6H PRN  Or  
 ondansetron (ZOFRAN) injection 4 mg  4 mg IntraVENous Q6H PRN  
  ascorbic acid (vitamin C) (VITAMIN C) tablet 500 mg  500 mg Oral BID  enoxaparin (LOVENOX) injection 40 mg  40 mg SubCUTAneous Q12H  
 
______________________________________________________________________ EXPECTED LENGTH OF STAY: 3d 17h ACTUAL LENGTH OF STAY:          5 Dyana Farmer DO

## 2020-11-24 NOTE — PROGRESS NOTES
1930: Bedside shift change report given to Daniel Mackey RN (oncoming nurse) by Suzanna Fitch RN (offgoing nurse). Report included the following information SBAR, Kardex, ED Summary, Intake/Output, MAR, Accordion, Recent Results and Cardiac Rhythm ST. Pt able to drink 3 Ensure clears ,1 magic cup, and 2 applesauce cups throughout the day. Appears to have more of an appetite today. Problem: Falls - Risk of 
Goal: *Absence of Falls Description: Document Veatrice Marker Fall Risk and appropriate interventions in the flowsheet. Outcome: Progressing Towards Goal 
Note: Fall Risk Interventions: 
Mobility Interventions: Communicate number of staff needed for ambulation/transfer Mentation Interventions: Adequate sleep, hydration, pain control, Room close to nurse's station Medication Interventions: Evaluate medications/consider consulting pharmacy Elimination Interventions: Patient to call for help with toileting needs, Toileting schedule/hourly rounds History of Falls Interventions: Evaluate medications/consider consulting pharmacy Problem: General Medical Care Plan Goal: *Vital signs within specified parameters Outcome: Progressing Towards Goal 
Goal: *Labs within defined limits Outcome: Progressing Towards Goal 
Goal: *Absence of infection signs and symptoms Outcome: Progressing Towards Goal 
Goal: *Optimal pain control at patient's stated goal 
Outcome: Progressing Towards Goal 
Goal: *Skin integrity maintained Outcome: Progressing Towards Goal 
Goal: *Fluid volume balance Outcome: Progressing Towards Goal 
Goal: *Optimize nutritional status Outcome: Progressing Towards Goal 
Goal: *Anxiety reduced or absent Outcome: Progressing Towards Goal 
  
Problem: Gas Exchange - Impaired Goal: Absence of hypoxia Outcome: Progressing Towards Goal 
  
Problem: Breathing Pattern - Ineffective Goal: Ability to achieve and maintain a regular respiratory rate Outcome: Progressing Towards Goal 
  
 Problem: Body Temperature -  Risk of, Imbalanced Goal: Ability to maintain a body temperature within defined limits Outcome: Progressing Towards Goal 
Goal: Complications related to the disease process, condition or treatment will be avoided or minimized Outcome: Progressing Towards Goal 
  
Problem: Isolation Precautions - Risk of Spread of Infection Goal: Prevent transmission of infectious organism to others Outcome: Progressing Towards Goal 
  
Problem: Nutrition Deficits Goal: Optimize nutrtional status Outcome: Progressing Towards Goal 
  
Problem: Risk for Fluid Volume Deficit Goal: Maintain normal heart rhythm Outcome: Progressing Towards Goal 
  
Problem: Loneliness or Risk for Loneliness Goal: Demonstrate positive use of time alone when socialization is not possible Outcome: Progressing Towards Goal

## 2020-11-24 NOTE — CONSULTS
Comprehensive Nutrition Assessment Type and Reason for Visit: Initial, Consult Nutrition Recommendations/Plan: 1. Mechanical soft diet with Ensure Clear TID 2. Added Magic Cup BID, Boost Pudding, and Ensure Enlive once daily 3. Please use ONS with med pass to maximize intake 4. Assistance/ encouragement with all meals Nutrition Assessment:    
47 y.o. female with PMHx of hypothyroidism, Down Syndrome, CKD III, CHF, large hiatal hernia, and recently treated for COVID-19 infection earlier this month (11/8-11/16) at [facility]. Admitted with sepsis 2° COVID-19 PNA. Noted: tachycardia; DANY on CKD - improving; dysphagia - seen by SLP 11/23, now back on baseline mechanical soft diet (previously purees); and oral labial herpes - on abreva. Saturating well on 2 L O2, however persistently febrile and tachy, rising CRP/ WBC, low procalcitonin, on empiric antibiotics, has worsening infiltrates on CXR. Palliative care following. Noted 5-6 lb wt loss since beginning of the month (2.7-3.2% BW) despite mother indicating pt has been eating well PTA. Spoke with RN, pt drank 100% of Ensure Clear, but not much other PO. Did well with applesauce and meds. HR increased after PO. Pt able to feed self at baseline, but currently too weak. ONS should be used to ease/concentration of nutrients while unable to consume large amounts. Will add Magic Cup and Boost Pudding. Also trial Ensure Enlive to assess acceptance. Encouraged nursing to use ONS with med pass. Will also continue Ensure Clear for now as pt seems to enjoy (TID provides 720 kcal, 24 gm pro). RD will continue to follow along closely. Malnutrition Assessment: 
Malnutrition Status:  Insufficient data Context:  Acute illness Nutritionally Significant Medications:  
Vit C, cefepime, Abreva, Synthroid, Protonix, vancomycin Estimated Daily Nutrient Needs: 
Energy (kcal): 6844-0976(MSJ x 1 AF x 1.2 SF) Protein (g): 75-90(20%) Fluid (ml/day): 1 mL/kcal 
 
Nutrition Related Findings:  
Edema: none Last BM: 11/23 - soft, formed Wounds:   
None Current Nutrition Therapies: DIET NUTRITIONAL SUPPLEMENTS All Meals; Ensure Clear DIET MECHANICAL SOFT Meal intake: No data found. Anthropometric Measures: 
· Height:  5' 4\" (162.6 cm) · Current Body Wt:  81.7 kg (180 lb 1.9 oz) · Admission Body Wt:  180 lb(81.647 kg - standing) · Usual Body Wt:  83.9 kg (185 lb) · Ideal Body Wt:  120 lbs:  150.1 % · BMI Category:  Obese class 1 (BMI 30.0-34. 9) Wt Readings from Last 20 Encounters:  
11/24/20 81.7 kg (180 lb 1.9 oz)  
11/19/20 81.6 kg (180 lb) - standing, admission 11/16/20 84.4 kg (186 lb) 11/06/20 84.4 kg (186 lb) 11/02/20 83.9 kg (185 lb)  
03/11/18 83.9 kg (185 lb) Nutrition Diagnosis:  
· Inadequate oral intake related to (acute illness) as evidenced by intake 0-25%, weight loss Nutrition Interventions:  
Food and/or Nutrient Delivery: Continue current diet, Modify oral nutrition supplement Nutrition Education and Counseling: No recommendations at this time Coordination of Nutrition Care: Continue to monitor while inpatient, Feeding assistance/environmental change, Speech therapy, Interdisciplinary rounds Goals: 
PO >50% of meals with 1-2 ONS daily over 5-7 days Nutrition Monitoring and Evaluation:  
Behavioral-Environmental Outcomes: Knowledge or skill Food/Nutrient Intake Outcomes: Diet advancement/tolerance, Food and nutrient intake, Supplement intake, Vitamin/mineral intake Physical Signs/Symptoms Outcomes: Chewing or swallowing, Biochemical data, GI status, Hemodynamic status, Meal time behavior, Weight Discharge Planning: Too soon to determine Recent Labs  
  11/24/20 
0142 11/23/20 
1039 11/22/20 
0236 GLU 60* 84 86 BUN 13 11 13 CREA 1.05* 1.00 1.03*  139 140  
K 4.0 4.3 3.9 * 108 109* CO2 20* 20* 25  
CA 8.4* 8.7 8.4*  
 
 
 No results for input(s): ALT, AP, TBIL, TBILI, TP, ALB, GLOB, GGT, AML, LPSE in the last 72 hours. No lab exists for component: SGOT, GPT, AMYP, HLPSE Recent Labs  
  11/23/20 
1039 LAC 1.0 Recent Labs  
  11/24/20 
0142 11/23/20 
1039 WBC 19.1* 19.3* HGB 9.6* 10.1* HCT 28.7* 29.3*  
 177 No results for input(s): PREALB in the last 72 hours. No results for input(s): TRIGL in the last 72 hours. Recent Labs  
  11/24/20 
0536 GLUCPOC 122* No results found for: HBA1C, HGBE8, YDG2ACPG, ATM6THMJ, EGA7MPNH Iron Profile No results found for: IRON, TIBC, PSAT Ferritin Date Value Ref Range Status 11/20/2020 795 (H) 26 - 388 NG/ML Final  
11/16/2020 373 26 - 388 NG/ML Final  
11/15/2020 394 (H) 26 - 388 NG/ML Final  
 
 
Mary Kimble RD Available via [employer] Pager# [phone]

## 2020-11-24 NOTE — PROGRESS NOTES
Bedside shift change report given to Didi Barahona, RN by Shad Daugherty RN . Report included the following information SBAR, Kardex, ED Summary, Intake/Output, MAR, and Recent Results. Pt nonverbal at baseline, Normal Sinus Rhythm , 2 liters/min via nasal prongs, Pain none. No acute events overnight. Last 3 Recorded Weights in this Encounter 11/22/20 0889 11/23/20 9170 11/24/20 0319 Weight: 81.9 kg (180 lb 8.9 oz) 81 kg (178 lb 9.2 oz) 81.7 kg (180 lb 1.9 oz) Problem: Falls - Risk of 
Goal: *Absence of Falls Description: Document Preethi Peace Fall Risk and appropriate interventions in the flowsheet. Outcome: Progressing Towards Goal 
Note: Fall Risk Interventions: 
Mobility Interventions: Communicate number of staff needed for ambulation/transfer Mentation Interventions: Adequate sleep, hydration, pain control, Update white board Medication Interventions: Evaluate medications/consider consulting pharmacy Elimination Interventions: Call light in reach History of Falls Interventions: Vital signs minimum Q4HRs X 24 hrs (comment for end date) Problem: Gas Exchange - Impaired Goal: Absence of hypoxia Outcome: Progressing Towards Goal 
Note: Pt on 2L o2, sats remain above 90%, will continue to monitor and wean Problem: Pressure Injury - Risk of 
Goal: *Prevention of pressure injury Description: Document Jai Scale and appropriate interventions in the flowsheet. Outcome: Progressing Towards Goal 
Note: Pressure Injury Interventions: 
Sensory Interventions: Check visual cues for pain, Float heels, Keep linens dry and wrinkle-free, Minimize linen layers Moisture Interventions: Check for incontinence Q2 hours and as needed, Absorbent underpads, Apply protective barrier, creams and emollients, Internal/External urinary devices Activity Interventions: Increase time out of bed, Assess need for specialty bed Mobility Interventions: Float heels, HOB 30 degrees or less Nutrition Interventions: Document food/fluid/supplement intake Friction and Shear Interventions: Apply protective barrier, creams and emollients, HOB 30 degrees or less

## 2020-11-24 NOTE — CONSULTS
Palliative Medicine Consult Mortensen: [phone]) Patient Name: Dewayne Tobin YOB: 1980 Date of Initial Consult: 11/23/2020 Reason for Consult: care decisions Requesting Provider: Dr. Shivani Rod Primary Care Physician: Diego, MD Kilo 
 
 SUMMARY:  
Dewayne Tobin is a 47 y.o. with a past history of Down's syndrome, hypothyroidism, obesity (BMI 30), CKD3, CHF (ECHO 2018 mod diffuse hypokinesis, EF 30%)  large hiatal hernia, Recent admission (11-8 to 11-16-20 for COVID pneumonia)  who was admitted on 11/19/2020 from home with a diagnosis of tachycardia. Current medical issues leading to Palliative Medicine involvement include: Ongoing decline from COVID pneumonia, readmitted after initial improvement, now with tachycardia, tachypnea, rising CRP/ WBC, low procalcitonin, on empiric antibiotics, has worsening infiltrates on CXR. Patient is cared for by her mother, Eliana Tipton [phone]. Johann Lou [phone] PALLIATIVE DIAGNOSES:  
1. Acute respiratory failure, COVID 19 pneumonia 2. Tachypnea, tachycardia, fever 3. Morbid obesity BMI 30 
4. Large hiatal hernia, aspiration risk, on modified diet 5. Debility 6. High risk of dying from COVID due to underlying CHF, obesity PLAN:  
1. Case discussed with bedside nurse, attending MD 
2. 11/24/2020 1. Update provided to [facility] - ongoing high risk, fever, elevated WBC, looking for other sources of infection, CT scan is pending for today, possible aspiration pneumonia, she is already on good antibiotics. 2. They discussed care goals last night, we review again option of protecting her from machines is all we are doing doesn't work. Family has considered and they want all aggressive measures.   IF she does not improve as they hope, they would consider a peaceful dying to include Heikemelindatomas Bradly tried everything, fighting to the end with everything possible to keep her here\"   I recommend they continue to consider this-- from what I hear from them, I do not anticipate care goals ever changing even if she deteriorates. Maile Venegas expressed his concern, doesn't want his sister \"neglected or forgotten\"-- reassurance provided again that we are doing all we can right now to support her recovery. 3. Answered questions about prior hospital stay/ what changed/ why she's doing worse now. 4. Family looking to hear results of CT scan when that's available. 5. We talked about how to arrange a ZOOM call -- Maile Venegas will call the floor tomorrow when he has the email and phone they want to use. 6. I let them know that I'd be off the rest of this week, but that they can use Palliative number for my office if they have questions. Our team will continue to follow peripherally, goals are clear for ongoing aggressive interventions. 3. 11/23/2020 Call placed to family, [facility] and brother Moe Robledo. 1. Introduced palliative medicine as added layer of support. 2. Reviewed clinical situation. Explained our high level of concern that she is not making progress 3. Ongoing high HR, RR, fever, worsening pneumonia by CXR. Explain she is high risk category due to underlying health 4. Education provided that she is high risk for decline, dying from COVID pneumonia. 5. Explain that they are her voice, we look to them for guidance, how best to care for her. Need to consider if want to protect her from things we know won't work (like ventilator, CPR/ shock if she dies). Education provided about poor outcomes of ill patients with COVID once placed on ventilator, likely she would die anyway on machines. Have option to protect her from machines, instead advocate for a comfort approach if things get worse despite all we are doing. Explain that comfort approach would allow her to have distressing symptoms managed with medications.    I recommend they talk about this as family tonight, consider what to do if she isn't getting better like hoped. 6. They both want to be able to come visit her (one at a time) . Explain that visitation policy is strict  Due to COVID , I put in request on their behalf and see if they are allowed to visit. 7. Made plan to call them again tomorrow, check in on their conversations about care goals, give update on how she's doing. 8. At this time, patient remains FULL CODE. 4. Initial consult note routed to primary continuity provider and/or primary health care team members 5. Communicated plan of care with: Palliative IDT, [address] Team 
 
 GOALS OF CARE / TREATMENT PREFERENCES:  
 
GOALS OF CARE: 
Patient/Health Care Proxy Stated Goals: Prolong life TREATMENT PREFERENCES:  
Code Status: Full Code Advance Care Planning: 
[] [facility] Interdisciplinary Team has updated the ACP Navigator with [place] and Patient Capacity Primary Decision Maker (Active): [employer] - [facility] - [phone] Secondary Decision Maker: Arina Sos - [phone] Advance Care Planning 11/20/2020 Patient's Healthcare Decision Maker is: Verbal statement (Legal Next of Kin remains as decision maker) Confirm Advance Directive None Patient Would Like to Complete Advance Directive Unable Medical Interventions: Full interventions Other Instructions: Other: As far as possible, the palliative care team has discussed with patient / health care proxy about goals of care / treatment preferences for patient. HISTORY:  
 
History obtained from: chart CHIEF COMPLAINT: elevated HR 
 
HPI/SUBJECTIVE: The patient is:  
[] Verbal and participatory [x] Non-participatory due to:  
 
47year old female with Down's Syndrome, admitted with known COVID recently (in hospital 11-8 to 11-16) and found by home health aide to have elevated HR. (140s) Clinical Pain Assessment (nonverbal scale for severity on nonverbal patients):  
Clinical Pain Assessment Severity: 0 Activity (Movement): Lying quietly, normal position Duration: for how long has pt been experiencing pain (e.g., 2 days, 1 month, years) Frequency: how often pain is an issue (e.g., several times per day, once every few days, constant) FUNCTIONAL ASSESSMENT:  
 
Palliative Performance Scale (PPS): PPS: 20 
 
 
 PSYCHOSOCIAL/SPIRITUAL SCREENING:  
 
Palliative IDT has assessed this patient for cultural preferences / practices and a referral made as appropriate to needs (Cultural Services, Patient Advocacy, Ethics, etc.) Any spiritual / Buddhist concerns: 
[] Yes /  [x] No 
 
Caregiver Burnout: 
[] Yes /  [x] No /  [] No Caregiver Present Anticipatory grief assessment:  
[x] Normal  / [] Maladaptive ESAS Anxiety: Anxiety: 0 
 
ESAS Depression:    
 
 
 REVIEW OF SYSTEMS:  
 
Positive and pertinent negative findings in ROS are noted above in HPI. The following systems were [x] reviewed / [] unable to be reviewed as noted in HPI Other findings are noted below. Systems: constitutional, ears/nose/mouth/throat, respiratory, gastrointestinal, genitourinary, musculoskeletal, integumentary, neurologic, psychiatric, endocrine. Positive findings noted below. Modified ESAS Completed by: provider Fatigue: 10 Drowsiness: 5 Pain: 0 Anxiety: 0 Nausea: 0 Anorexia: 5 Dyspnea: 8 Stool Occurrence(s): 1 PHYSICAL EXAM:  
 
From RN flowsheet: 
Wt Readings from Last 3 Encounters:  
11/24/20 180 lb 1.9 oz (81.7 kg) 11/19/20 180 lb (81.6 kg)  
11/16/20 186 lb (84.4 kg) Blood pressure (!) 102/45, pulse (!) 122, temperature (!) 100.5 °F (38.1 °C), resp. rate 28, height 5' 4\" (1.626 m), weight 180 lb 1.9 oz (81.7 kg), SpO2 97 %. Pain Scale 1: Adult Nonverbal Pain Scale Pain Intensity 1: 0 Last bowel movement, if known: Constitutional: awake, looks to voice, nonverbal 
Eyes: pupils equal, anicteric ENMT: no nasal discharge, moist mucous membranes Cardiovascular: tachy regular rhythm, distal pulses intact Respiratory: breathing  Labored and shallow,  symmetric Gastrointestinal: soft non-tender, +bowel sounds, obese Musculoskeletal: no deformity, no tenderness to palpation Skin: warm, dry Neurologic: awake, nonverbal, does not follow commands Moves covers up to reposition to her liking HISTORY:  
 
Active Problems: 
  Sepsis ([address] (11/19/2020) Past Medical History:  
Diagnosis Date  Endocrine disease  Hypothyroid 03/11/2018  Ill-defined condition Down's Syndrome No past surgical history on file. No family history on file. History reviewed, no pertinent family history. Social History Tobacco Use  Smoking status: Never Smoker  Smokeless tobacco: Never Used Substance Use Topics  Alcohol use: No  
 
No Known Allergies Current Facility-Administered Medications Medication Dose Route Frequency  albuterol (PROVENTIL HFA, VENTOLIN HFA, PROAIR HFA) inhaler 2 Puff  2 Puff Inhalation Q4H PRN  
 cefepime (MAXIPIME) 2 g in 0.9% sodium chloride (MBP/ADV) 100 mL MBP  2 g IntraVENous Q8H  Vancomycin- Pharmacy Dosing   Other Rx Dosing/Monitoring  vancomycin (VANCOCIN) 1250 mg in  ml infusion  1,250 mg IntraVENous Q16H  
 pantoprazole (PROTONIX) 40 mg in 0.9% sodium chloride 10 mL injection  40 mg IntraVENous Q12H  levothyroxine (SYNTHROID) tablet 50 mcg  50 mcg Oral 6am  
 risperiDONE (RisperDAL) 1 mg/mL oral solution soln 1 mg  1 mg Oral QHS  docosanol (ABREVA) 10 % cream   Topical 5XD  acetaminophen (TYLENOL) tablet 650 mg  650 mg Oral Q6H PRN  Or  
 acetaminophen (TYLENOL) suppository 650 mg  650 mg Rectal Q6H PRN  
 sodium chloride (NS) flush 5-40 mL  5-40 mL IntraVENous Q8H  
 sodium chloride (NS) flush 5-40 mL  5-40 mL IntraVENous PRN  
  polyethylene glycol (MIRALAX) packet 17 g  17 g Oral DAILY PRN  promethazine (PHENERGAN) tablet 12.5 mg  12.5 mg Oral Q6H PRN Or  
 ondansetron (ZOFRAN) injection 4 mg  4 mg IntraVENous Q6H PRN  
 ascorbic acid (vitamin C) (VITAMIN C) tablet 500 mg  500 mg Oral BID  enoxaparin (LOVENOX) injection 40 mg  40 mg SubCUTAneous Q12H  
 
 
 
 LAB AND IMAGING FINDINGS:  
 
Lab Results Component Value Date/Time WBC 19.1 (H) 11/24/2020 01:42 AM  
 HGB 9.6 (L) 11/24/2020 01:42 AM  
 PLATELET 566 64/93/7559 01:42 AM  
 
Lab Results Component Value Date/Time Sodium 140 11/24/2020 01:42 AM  
 Potassium 4.0 11/24/2020 01:42 AM  
 Chloride 110 (H) 11/24/2020 01:42 AM  
 CO2 20 (L) 11/24/2020 01:42 AM  
 BUN 13 11/24/2020 01:42 AM  
 Creatinine 1.05 (H) 11/24/2020 01:42 AM  
 Calcium 8.4 (L) 11/24/2020 01:42 AM  
 Magnesium 2.3 11/11/2020 01:00 AM  
 Phosphorus 4.0 11/08/2020 06:07 AM  
  
Lab Results Component Value Date/Time Alk. phosphatase 38 (L) 11/20/2020 01:57 AM  
 Protein, total 5.7 (L) 11/20/2020 01:57 AM  
 Albumin 2.0 (L) 11/20/2020 01:57 AM  
 Globulin 3.7 11/20/2020 01:57 AM  
 
Lab Results Component Value Date/Time INR 1.1 11/20/2020 01:57 AM  
 Prothrombin time 11.8 (H) 11/20/2020 01:57 AM  
 aPTT 26.8 11/20/2020 01:57 AM  
  
Lab Results Component Value Date/Time Ferritin 795 (H) 11/20/2020 01:57 AM  
  
Lab Results Component Value Date/Time pH 7.47 (H) 11/08/2020 01:20 PM  
 PCO2 30 (L) 11/08/2020 01:20 PM  
 PO2 68 (L) 11/08/2020 01:20 PM  
 
No components found for: Dimitri Point Lab Results Component Value Date/Time  (H) 11/11/2020 01:00 AM  
  
 
 
   
 
Total time: 45min Counseling / coordination time, spent as noted above: 35 
> 50% counseling / coordination?:  
yes Prolonged service was provided for  []30 min   []75 min in face to face time in the presence of the patient, spent as noted above. Time Start:  
Time End: Note: this can only be billed with 10030 (initial) or 22373 (follow up). If multiple start / stop times, list each separately.

## 2020-11-25 NOTE — PROGRESS NOTES
Bedside shift change report given to Gautam Costa RN by Jillian Cobb RN . Report included the following information SBAR, Kardex, ED Summary, Intake/Output, MAR, and Recent Results. Pt nonverbal at baseline, Sinus Tach, 2 liters/min via nasal prongs, Pain none. No acute events overnight. Last 3 Recorded Weights in this Encounter 11/23/20 8706 11/24/20 0319 11/25/20 7188 Weight: 81 kg (178 lb 9.2 oz) 81.7 kg (180 lb 1.9 oz) 81.4 kg (179 lb 7.3 oz) Problem: Gas Exchange - Impaired Goal: Absence of hypoxia Outcome: Progressing Towards Goal 
Note: Pt remains on 2L, o2 sats remain above 95% Problem: Body Temperature -  Risk of, Imbalanced Goal: Ability to maintain a body temperature within defined limits Outcome: Progressing Towards Goal 
  
Problem: Pressure Injury - Risk of 
Goal: *Prevention of pressure injury Description: Document Jai Scale and appropriate interventions in the flowsheet. Outcome: Progressing Towards Goal 
Note: Pressure Injury Interventions: 
Sensory Interventions: Check visual cues for pain, Float heels, Keep linens dry and wrinkle-free, Minimize linen layers Moisture Interventions: Absorbent underpads, Apply protective barrier, creams and emollients, Check for incontinence Q2 hours and as needed, Internal/External urinary devices Activity Interventions: Increase time out of bed, Assess need for specialty bed Mobility Interventions: Float heels, HOB 30 degrees or less Nutrition Interventions: Document food/fluid/supplement intake Friction and Shear Interventions: Apply protective barrier, creams and emollients, HOB 30 degrees or less, Minimize layers

## 2020-11-25 NOTE — PROGRESS NOTES
[facility] Adult  Hospitalist Group Hospitalist Progress Note Marylou Rodarte DO Answering service: [phone] OR [phone] from in house phone Date of Service:  2020 NAME:  Masha Horne :  1980 MRN:  [id number] Admission Summary:  
47 y.o. female past medical history significant for hypothyroidism, Down syndrome and recently treated for COVID-19 infection was brought to the emergency room for further evaluation of elevated heart rate. Patient was admitted to [facility] on 2 2020 for acute respiratory failure secondary to COVID-19 infection. As per mother report at the time of discharge patient was doing better in no acute respiratory distress and not having any fever. Today home health nurse came to check on the patient and found that her heart rate was in the 140s 150s. Patient was sent her to the emergency room for evaluation. On arrival to the emergency room at [facility] she was found to be tachycardic and tachypneic. Patient has not been hypoxic or requiring O2 supplementation. CBC showed a WBC of 21,000 from 14,000 at the time of discharge. CTA of the chest was negative for PE but found to have persistent COVID-19 pneumonia. As per mother patient has not been coughing, being febrile, having diarrhea. Patient has been eating well. During her prior hospitalization patient was treated with remdesivir and completed a course of Decadron. Given persistent tachycardia after 1L iv fluids she admission was requested. Interval history / Subjective: Follow up sepsis. Patient seen and examined. Fevers and heart rate improved. Chest CT without evidence of superimposed pneumonia. Was able to eat more with nursing staff. Updated mother via phone Assessment & Plan:  
 
Sepsis (POA) due to COVID 19 Pneumonia:   
Tachycardia: improved -CT compatible with COVID 19. Repeat 11/24 due to recurrent new fevers and tachycardia 
-Continue vancomycin, cefepime x 7 days. Cultures NGTD  
-Daily vitamin C 
-completed remdesmivir and dexamethazone during previous hospitalization 
-s/p fluid bolus, caution with fluids due to pulmonary edema  
-continue supplemental oxygen 2 liters 
-patient with gradual decline since hospitalization, appreciate palliative care input and goals of care discussion with family Dysphagia: appreciate speech therapy, okay for mechanical soft diet 
-nutrition consult to monitor appropriate intake DANY on CKD stage III: improved and stable Received 1 L of IV fluid in the ED. Monitor renal function. Encourage p.o. intake 
  
Hypothyroidism: Continue levothyroxine 
  
Down syndrome: Continue with Risperdal 
  
Hiatal hernia: Known finding. continue Protonix 40 mg p.o. daily. 
-Outpatient follow-up with GI. Oral labial herpes: abreva 
 
  
Code status: full DVT prophylaxis: lovenox Care Plan discussed with: Patient/Family Anticipated Disposition: Home w/Family Anticipated Discharge: Greater than 48 hours Hospital Problems  Never Reviewed Codes Class Noted POA Sepsis ([facility] [address] ICD-10-CM: A41.9 ICD-9-CM: 038.9, 995.91  11/19/2020 Unknown Review of Systems:  
Negative unless stated above Vital Signs:  
 Last 24hrs VS reviewed since prior progress note. Most recent are: 
Visit Vitals BP (!) 120/56 (BP 1 Location: Right arm, BP Patient Position: At rest) Pulse (!) 111 Temp 98.2 °F (36.8 °C) Resp (!) 31 Ht 5' 4\" (1.626 m) Wt 81.4 kg (179 lb 7.3 oz) SpO2 100% BMI 30.80 kg/m² Intake/Output Summary (Last 24 hours) at 11/25/2020 1457 Last data filed at 11/25/2020 1562 Gross per 24 hour Intake  Output 500 ml Net -500 ml Physical Examination:  
 
I had a face to face encounter with this patient and independently examined them on 11/25/2020 as outlined below: 
 
     
Constitutional:  No acute distress, non verbal   
ENT:  right sided labial crusted lesions Resp:   diminished bilaterally. No accessory muscle use CV:  tachycardic, no murmurs, gallops, rubs GI:  Soft, non distended, non tender. normoactive bowel sounds, no hepatosplenomegaly Musculoskeletal:  No edema, warm, 2+ pulses throughout Neurologic:  Moves all extremities Data Review:  
 Review and/or order of clinical lab test 
Review and/or order of tests in the radiology section of CPT Review and/or order of tests in the medicine section of [facility] Labs:  
 
Recent Labs  
  11/24/20 
0142 11/23/20 
1039 WBC 19.1* 19.3* HGB 9.6* 10.1* HCT 28.7* 29.3*  
 177 Recent Labs  
  11/25/20 
0114 11/24/20 
0142 11/23/20 
1039  140 139  
K 3.5 4.0 4.3 * 110* 108 CO2 20* 20* 20* BUN 13 13 11 CREA 0.93 1.05* 1.00 GLU 83 60* 84  
CA 7.8* 8.4* 8.7 No results for input(s): ALT, AP, TBIL, TBILI, TP, ALB, GLOB, GGT, AML, LPSE in the last 72 hours. No lab exists for component: SGOT, GPT, AMYP, HLPSE No results for input(s): INR, PTP, APTT, INREXT, INREXT in the last 72 hours. No results for input(s): FE, TIBC, PSAT, FERR in the last 72 hours. No results found for: FOL, RBCF No results for input(s): PH, PCO2, PO2 in the last 72 hours. No results for input(s): CPK, CKNDX, TROIQ in the last 72 hours. No lab exists for component: CPKMB No results found for: CHOL, CHOLX, CHLST, CHOLV, HDL, HDLP, LDL, LDLC, DLDLP, TGLX, TRIGL, TRIGP, CHHD, CHHDX Lab Results Component Value Date/Time Glucose (POC) 122 (H) 11/24/2020 05:36 AM  
 Glucose (POC) 82 03/11/2018 02:32 PM  
 
Lab Results Component Value Date/Time  Color YELLOW/STRAW 11/23/2020 10:39 AM  
 Appearance CLOUDY (A) 11/23/2020 10:39 AM  
 Specific gravity 1.021 11/23/2020 10:39 AM  
 Specific gravity 1.010 11/20/2020 01:57 AM  
 pH (UA) 6.0 11/23/2020 10:39 AM  
 Protein TRACE (A) 11/23/2020 10:39 AM  
 Glucose Negative 11/23/2020 10:39 AM  
 Ketone 40 (A) 11/23/2020 10:39 AM  
 Bilirubin Negative 11/23/2020 10:39 AM  
 Urobilinogen 1.0 11/23/2020 10:39 AM  
 Nitrites Negative 11/23/2020 10:39 AM  
 Leukocyte Esterase TRACE (A) 11/23/2020 10:39 AM  
 Epithelial cells FEW 11/23/2020 10:39 AM  
 Bacteria 1+ (A) 11/23/2020 10:39 AM  
 WBC 0-4 11/23/2020 10:39 AM  
 RBC 20-50 11/23/2020 10:39 AM  
 
 
 
Medications Reviewed:  
 
Current Facility-Administered Medications Medication Dose Route Frequency  [START ON 11/26/2020] Vancomycin trough- please draw immediately prior to 0400 vanc dose on 11/26. Thanks! Other ONCE  
 albuterol (PROVENTIL HFA, VENTOLIN HFA, PROAIR HFA) inhaler 2 Puff  2 Puff Inhalation Q4H PRN  
 cefepime (MAXIPIME) 2 g in 0.9% sodium chloride (MBP/ADV) 100 mL MBP  2 g IntraVENous Q8H  Vancomycin- Pharmacy Dosing   Other Rx Dosing/Monitoring  vancomycin (VANCOCIN) 1250 mg in  ml infusion  1,250 mg IntraVENous Q16H  
 pantoprazole (PROTONIX) 40 mg in 0.9% sodium chloride 10 mL injection  40 mg IntraVENous Q12H  levothyroxine (SYNTHROID) tablet 50 mcg  50 mcg Oral 6am  
 risperiDONE (RisperDAL) 1 mg/mL oral solution soln 1 mg  1 mg Oral QHS  docosanol (ABREVA) 10 % cream   Topical 5XD  acetaminophen (TYLENOL) tablet 650 mg  650 mg Oral Q6H PRN Or  
 acetaminophen (TYLENOL) suppository 650 mg  650 mg Rectal Q6H PRN  
 sodium chloride (NS) flush 5-40 mL  5-40 mL IntraVENous Q8H  
 sodium chloride (NS) flush 5-40 mL  5-40 mL IntraVENous PRN  polyethylene glycol (MIRALAX) packet 17 g  17 g Oral DAILY PRN  promethazine (PHENERGAN) tablet 12.5 mg  12.5 mg Oral Q6H PRN Or  
 ondansetron (ZOFRAN) injection 4 mg  4 mg IntraVENous Q6H PRN  
 ascorbic acid (vitamin C) (VITAMIN C) tablet 500 mg  500 mg Oral BID  enoxaparin (LOVENOX) injection 40 mg  40 mg SubCUTAneous Q12H ______________________________________________________________________ EXPECTED LENGTH OF STAY: 3d 17h ACTUAL LENGTH OF STAY:          6 2200 [facility], DO

## 2020-11-25 NOTE — PROGRESS NOTES
1930: Bedside shift change report given to Paramjit Dwyer, RN (oncoming nurse) by Carmen Beltran RN (offgoing nurse). Report included the following information SBAR, Kardex, ED Summary, Intake/Output, MAR, Accordion, Recent Results and Cardiac Rhythm ST. Pt ate/drank all supplements today. Problem: Falls - Risk of 
Goal: *Absence of Falls Description: Document Uziel Osullivan Fall Risk and appropriate interventions in the flowsheet. Outcome: Progressing Towards Goal 
Note: Fall Risk Interventions: 
Mobility Interventions: Communicate number of staff needed for ambulation/transfer, Patient to call before getting OOB Mentation Interventions: Adequate sleep, hydration, pain control, Toileting rounds, More frequent rounding, Evaluate medications/consider consulting pharmacy Medication Interventions: Patient to call before getting OOB, Evaluate medications/consider consulting pharmacy Elimination Interventions: Call light in reach, Patient to call for help with toileting needs, Toileting schedule/hourly rounds History of Falls Interventions: Vital signs minimum Q4HRs X 24 hrs (comment for end date) Problem: General Medical Care Plan Goal: *Vital signs within specified parameters Outcome: Progressing Towards Goal 
Goal: *Labs within defined limits Outcome: Progressing Towards Goal 
Goal: *Absence of infection signs and symptoms Outcome: Progressing Towards Goal 
Goal: *Optimal pain control at patient's stated goal 
Outcome: Progressing Towards Goal 
Goal: *Optimize nutritional status Outcome: Progressing Towards Goal 
Goal: *Anxiety reduced or absent Outcome: Progressing Towards Goal 
  
Problem: Airway Clearance - Ineffective Goal: Achieve or maintain patent airway Outcome: Progressing Towards Goal 
  
Problem: Gas Exchange - Impaired Goal: Absence of hypoxia Outcome: Progressing Towards Goal 
Goal: Promote optimal lung function Outcome: Progressing Towards Goal 
  
 Problem: Breathing Pattern - Ineffective Goal: Ability to achieve and maintain a regular respiratory rate Outcome: Progressing Towards Goal 
  
Problem: Body Temperature -  Risk of, Imbalanced Goal: Ability to maintain a body temperature within defined limits Outcome: Progressing Towards Goal 
Goal: Complications related to the disease process, condition or treatment will be avoided or minimized Outcome: Progressing Towards Goal 
  
Problem: Isolation Precautions - Risk of Spread of Infection Goal: Prevent transmission of infectious organism to others Outcome: Progressing Towards Goal 
  
Problem: Nutrition Deficits Goal: Optimize nutrtional status Outcome: Progressing Towards Goal 
  
Problem: Risk for Fluid Volume Deficit Goal: Maintain normal heart rhythm Outcome: Progressing Towards Goal

## 2020-11-26 NOTE — PROGRESS NOTES
Problem: Falls - Risk of 
Goal: *Absence of Falls Description: Document Preethi Peace Fall Risk and appropriate interventions in the flowsheet. Outcome: Progressing Towards Goal 
Note: Fall Risk Interventions: 
Mobility Interventions: Communicate number of staff needed for ambulation/transfer, OT consult for ADLs, Patient to call before getting OOB, PT Consult for assist device competence, PT Consult for mobility concerns, Strengthening exercises (ROM-active/passive) Mentation Interventions: Adequate sleep, hydration, pain control, Update white board Medication Interventions: Assess postural VS orthostatic hypotension, Patient to call before getting OOB, Evaluate medications/consider consulting pharmacy, Teach patient to arise slowly Elimination Interventions: Call light in reach, Elevated toilet seat, Patient to call for help with toileting needs, Stay With Me (per policy), Toilet paper/wipes in reach, Toileting schedule/hourly rounds History of Falls Interventions: Vital signs minimum Q4HRs X 24 hrs (comment for end date) Problem: Gas Exchange - Impaired Goal: Absence of hypoxia Outcome: Progressing Towards Goal 
  
Problem: Nutrition Deficits Goal: Optimize nutrtional status Outcome: Progressing Towards Goal 
Note: Pt ate 100% of both lunch and dinner. Problem: General Medical Care Plan Goal: *Vital signs within specified parameters Outcome: Not Progressing Towards Goal 
Note: Pt tachycardic at rest throughout shift. Patient Vitals for the past 12 hrs: 
 Temp Pulse Resp BP SpO2  
11/26/20 1559 99.4 °F (37.4 °C) (!) 111 28 126/75 100 % 11/26/20 1135 98.1 °F (36.7 °C) (!) 115 25 111/70 98 % 11/26/20 0706 98.5 °F (36.9 °C) 99 16 107/68 99 % Verbal shift change report given to [address] [place] [address] (oncoming nurse) by [address] [place] [address] (offgoing nurse). Report included the following information SBAR, Kardex, ED Summary, Procedure Summary, Intake/Output, MAR, Recent Results and Cardiac Rhythm ST.

## 2020-11-26 NOTE — PROGRESS NOTES
Bedside shift change report given to Rene Fitch, RN by Billy Mackenzie RN . Report included the following information SBAR, Kardex, ED Summary, Intake/Output, MAR, and Recent Results. Pt nonverbal at baseline, Normal Sinus Rhythm and Sinus Tachycardia , room air, Pain none. No acute events overnight. Last 3 Recorded Weights in this Encounter 11/24/20 0319 11/25/20 6436 11/26/20 0344 Weight: 81.7 kg (180 lb 1.9 oz) 81.4 kg (179 lb 7.3 oz) 82.2 kg (181 lb 3.5 oz) Problem: Gas Exchange - Impaired Goal: Absence of hypoxia Outcome: Progressing Towards Goal 
Note: Pt on 2L o2 for comfort, sats remain above 95% Problem: Pressure Injury - Risk of 
Goal: *Prevention of pressure injury Description: Document Jai Scale and appropriate interventions in the flowsheet. Outcome: Progressing Towards Goal 
Note: Pressure Injury Interventions: 
Sensory Interventions: Check visual cues for pain, Float heels, Keep linens dry and wrinkle-free, Minimize linen layers Moisture Interventions: Absorbent underpads, Apply protective barrier, creams and emollients, Internal/External urinary devices, Check for incontinence Q2 hours and as needed Activity Interventions: Increase time out of bed, Pressure redistribution bed/mattress(bed type) Mobility Interventions: Float heels, HOB 30 degrees or less Nutrition Interventions: Document food/fluid/supplement intake Friction and Shear Interventions: Apply protective barrier, creams and emollients, HOB 30 degrees or less

## 2020-11-26 NOTE — PROGRESS NOTES
[address] [facility] Adult  Hospitalist Group Hospitalist Progress Note Cristiana Gallagher MD 
Answering service: [phone] from in house phone Date of Service:  2020 NAME:  Abel Naik :  1980 MRN:  [id number] Admission Summary:  
47 y.o. female past medical history significant for hypothyroidism, Down syndrome and recently treated for COVID-19 infection was brought to the emergency room for further evaluation of elevated heart rate. Patient was admitted to [facility] on 2 2020 for acute respiratory failure secondary to COVID-19 infection. As per mother report at the time of discharge patient was doing better in no acute respiratory distress and not having any fever. Today home health nurse came to check on the patient and found that her heart rate was in the 140s 150s. Patient was sent her to the emergency room for evaluation. On arrival to the emergency room at [facility] she was found to be tachycardic and tachypneic. Patient has not been hypoxic or requiring O2 supplementation. CBC showed a WBC of 21,000 from 14,000 at the time of discharge. CTA of the chest was negative for PE but found to have persistent COVID-19 pneumonia. As per mother patient has not been coughing, being febrile, having diarrhea. Patient has been eating well. During her prior hospitalization patient was treated with remdesivir and completed a course of Decadron. Given persistent tachycardia after 1L iv fluids she admission was requested. Interval history / Subjective: Follow up sepsis. Heart rate is somewhat better. Currently on room air. Mild elevation in serum creatinine On mechanical soft diet No acute events overnight . PT/ OT consulted today Assessment & Plan:  
 
Sepsis (POA) due to COVID 19 Pneumonia:   
Tachycardia: improved -CT compatible with COVID 19. Repeat 11/24 due to recurrent new fevers and tachycardia -on vancomycin, cefepime. Cultures NGTD  
-Daily vitamin C 
-completed remdesmivir and dexamethazone during previous hospitalization 
-s/p fluid bolus, caution with fluids due to pulmonary edema  
-continue supplemental oxygen 2 liters 
-patient with gradual decline since hospitalization, appreciate palliative care input and goals of care discussion with family . Currently full code Dysphagia: appreciate speech therapy, okay for mechanical soft diet 
-nutrition consult to monitor appropriate intake DANY on CKD stage III: improved and stable Received 1 L of IV fluid in the ED. Monitor renal function. Encourage p.o. intake Most recent serum creatinine 1.12 On gentle hydration 
  
Hypothyroidism: Continue levothyroxine 
  
Down syndrome: Continue with Risperdal 
  
Hiatal hernia: Known finding. continue Protonix 40 mg p.o. daily. 
-Outpatient follow-up with GI. Oral labial herpes: abreva 
 
  
Code status: full DVT prophylaxis: lovenox Care Plan discussed with: Patient/Family Anticipated Disposition: Home w/Family Anticipated Discharge: Greater than 48 hours, pt/ot consult Hospital Problems  Never Reviewed Codes Class Noted POA Sepsis ([facility] [address] ICD-10-CM: A41.9 ICD-9-CM: 038.9, 995.91  11/19/2020 Unknown Review of Systems:  
Negative unless stated above Vital Signs:  
 Last 24hrs VS reviewed since prior progress note. Most recent are: 
Visit Vitals /70 (BP 1 Location: Right arm, BP Patient Position: At rest) Pulse (!) 115 Temp 98.1 °F (36.7 °C) Resp 25 Ht 5' 4\" (1.626 m) Wt 82.2 kg (181 lb 3.5 oz) SpO2 98% BMI 31.11 kg/m² Intake/Output Summary (Last 24 hours) at 11/26/2020 1528 Last data filed at 11/26/2020 1193 Gross per 24 hour Intake  Output 500 ml Net -500 ml Physical Examination: I had a face to face encounter with this patient and independently examined them on 11/26/2020 as outlined below: 
 
     
Constitutional:  No acute distress, non verbal   
ENT:  right sided labial crusted lesions Resp:    No accessory muscle use, nom audible wheezing CV:  tachycardic, no murmurs, gallops, rubs GI:  Soft, non distended, non tender. normoactive bowel sounds, no hepatosplenomegaly Musculoskeletal:  No edema, warm, 2+ pulses throughout Neurologic:  Moves all extremities Data Review:  
 Review and/or order of clinical lab test 
Review and/or order of tests in the radiology section of CPT Review and/or order of tests in the medicine section of CPT Labs:  
 
Recent Labs  
  11/26/20 
0406 11/24/20 
0142 WBC 10.4 19.1* HGB 8.6* 9.6* HCT 24.8* 28.7*  
 219 Recent Labs  
  11/26/20 
0406 11/25/20 
0114 11/24/20 
0142  140 140  
K 3.4* 3.5 4.0  
* 113* 110* CO2 22 20* 20* BUN 16 13 13 CREA 1.12* 0.93 1.05* GLU 87 83 60* CA 8.5 7.8* 8.4* MG 2.1  --   --   
PHOS 3.1  --   -- No results for input(s): ALT, AP, TBIL, TBILI, TP, ALB, GLOB, GGT, AML, LPSE in the last 72 hours. No lab exists for component: SGOT, GPT, AMYP, HLPSE No results for input(s): INR, PTP, APTT, INREXT, INREXT in the last 72 hours. No results for input(s): FE, TIBC, PSAT, FERR in the last 72 hours. No results found for: FOL, RBCF No results for input(s): PH, PCO2, PO2 in the last 72 hours. No results for input(s): CPK, CKNDX, TROIQ in the last 72 hours. No lab exists for component: CPKMB No results found for: CHOL, CHOLX, CHLST, CHOLV, HDL, HDLP, LDL, LDLC, DLDLP, TGLX, TRIGL, TRIGP, CHHD, CHHDX Lab Results Component Value Date/Time Glucose (POC) 122 (H) 11/24/2020 05:36 AM  
 Glucose (POC) 82 03/11/2018 02:32 PM  
 
Lab Results Component Value Date/Time  Color YELLOW/STRAW 11/23/2020 10:39 AM  
 Appearance CLOUDY (A) 11/23/2020 10:39 AM  
 Specific gravity 1.021 11/23/2020 10:39 AM  
 Specific gravity 1.010 11/20/2020 01:57 AM  
 pH (UA) 6.0 11/23/2020 10:39 AM  
 Protein TRACE (A) 11/23/2020 10:39 AM  
 Glucose Negative 11/23/2020 10:39 AM  
 Ketone 40 (A) 11/23/2020 10:39 AM  
 Bilirubin Negative 11/23/2020 10:39 AM  
 Urobilinogen 1.0 11/23/2020 10:39 AM  
 Nitrites Negative 11/23/2020 10:39 AM  
 Leukocyte Esterase TRACE (A) 11/23/2020 10:39 AM  
 Epithelial cells FEW 11/23/2020 10:39 AM  
 Bacteria 1+ (A) 11/23/2020 10:39 AM  
 WBC 0-4 11/23/2020 10:39 AM  
 RBC 20-50 11/23/2020 10:39 AM  
 
 
 
Medications Reviewed:  
 
Current Facility-Administered Medications Medication Dose Route Frequency  albuterol (PROVENTIL HFA, VENTOLIN HFA, PROAIR HFA) inhaler 2 Puff  2 Puff Inhalation Q4H PRN  
 cefepime (MAXIPIME) 2 g in 0.9% sodium chloride (MBP/ADV) 100 mL MBP  2 g IntraVENous Q8H  Vancomycin- Pharmacy Dosing   Other Rx Dosing/Monitoring  vancomycin (VANCOCIN) 1250 mg in  ml infusion  1,250 mg IntraVENous Q16H  
 pantoprazole (PROTONIX) 40 mg in 0.9% sodium chloride 10 mL injection  40 mg IntraVENous Q12H  levothyroxine (SYNTHROID) tablet 50 mcg  50 mcg Oral 6am  
 risperiDONE (RisperDAL) 1 mg/mL oral solution soln 1 mg  1 mg Oral QHS  docosanol (ABREVA) 10 % cream   Topical 5XD  acetaminophen (TYLENOL) tablet 650 mg  650 mg Oral Q6H PRN Or  
 acetaminophen (TYLENOL) suppository 650 mg  650 mg Rectal Q6H PRN  
 sodium chloride (NS) flush 5-40 mL  5-40 mL IntraVENous Q8H  
 sodium chloride (NS) flush 5-40 mL  5-40 mL IntraVENous PRN  polyethylene glycol (MIRALAX) packet 17 g  17 g Oral DAILY PRN  promethazine (PHENERGAN) tablet 12.5 mg  12.5 mg Oral Q6H PRN Or  
 ondansetron (ZOFRAN) injection 4 mg  4 mg IntraVENous Q6H PRN  
 ascorbic acid (vitamin C) (VITAMIN C) tablet 500 mg  500 mg Oral BID  enoxaparin (LOVENOX) injection 40 mg  40 mg SubCUTAneous Q12H ______________________________________________________________________ EXPECTED LENGTH OF STAY: 3d 17h ACTUAL LENGTH OF STAY:          7 Raj Garcia MD

## 2020-11-27 NOTE — PROGRESS NOTES
Problem: Self Care Deficits Care Plan (Adult) Goal: *Acute Goals and Plan of Care (Insert Text) Description:  
FUNCTIONAL STATUS PRIOR TO ADMISSION: Patient required up to minimal assistance for basic ADLs and total assistance for instrumental ADLs. HOME SUPPORT: The patient lived with mother who provides assistance PRN. Occupational Therapy Goals Initiated 11/27/2020 1. Patient will perform grooming sitting unsupported with supervision/set-up within 7 day(s). 2.  Patient will perform anterior neck to thigh bathing with minimal assistance/contact guard assist within 7 day(s). 3.  Patient will perform toilet transfers with minimal assistance/contact guard assist within 7 day(s). 4.  Patient will perform all aspects of toileting with minimal assistance/contact guard assist within 7 day(s). 5.  Patient will participate in upper extremity therapeutic exercise/activities with supervision/set-up for 5 minutes within 7 day(s). 6.  Patient will utilize energy conservation techniques during functional activities with verbal cues within 7 day(s). Outcome: Not Met OCCUPATIONAL THERAPY EVALUATION Patient: Ryan Hinson (56 y.o. female) Date: 11/27/2020 Primary Diagnosis: Sepsis ([facility] [address] [A41.9] Precautions: (droplet)  Fall ASSESSMENT Based on the objective data described below, the patient presents with limited ADL performance s/p admission for sepsis. Patient has had recent admission to [facility] for COVID 19 and continues to be positive for this admission. Patient with PMHx of Down's Syndrome - non verbal. Patient ADLs limited by impaired balance, limited lower body access and impaired cardiopulmonary endurance (noted RR up to 38 and HR up to 145 with minimal exertion). Patient able to come to sitting on EOB with min A. Patient required max A to nickolas socks in prep for standing and transfer. Noted patient HR up to 145 - evaluation limited to bed level.  Patient with min A to return to semisupine and was left with call bell in reach, RN aware and all needs met. Anticipate patient will be able to D/C home with assist from family and return to familiar environment/routine. Will continue to follow. Current Level of Function Impacting Discharge (ADLs/self-care): up to mod/max A for ADLs Functional Outcome Measure: The patient scored 30/100 on the Barthel Index outcome measure which is indicative of moderate deficits in ADLs. Other factors to consider for discharge: none - has good support and all needed DME at home Patient will benefit from skilled therapy intervention to address the above noted impairments. PLAN : 
Recommendations and Planned Interventions: self care training, functional mobility training, therapeutic exercise, balance training, therapeutic activities, endurance activities, patient education, home safety training, and family training/education Frequency/Duration: Patient will be followed by occupational therapy 3 times a week to address goals. Recommendation for discharge: (in order for the patient to meet his/her long term goals) Occupational therapy at least 2 days/week in the home This discharge recommendation: 
Has not yet been discussed the attending provider and/or case management IF patient discharges home will need the following DME: patient owns DME required for discharge SUBJECTIVE:  
Patient non-verbal 
 
OBJECTIVE DATA SUMMARY:  
HISTORY:  
Past Medical History:  
Diagnosis Date Endocrine disease Hypothyroid 03/11/2018 Ill-defined condition Down's Syndrome No past surgical history on file. Expanded or extensive additional review of patient history:  
 
Home Situation Home Environment: Private residence One/Two Story Residence: Other (Comment) Living Alone: No 
Support Systems: Parent Patient Expects to be Discharged to[de-identified] Private residence Current DME Used/Available at Home: None Tub or Shower Type: (unable to provide background) Hand dominance: Right EXAMINATION OF PERFORMANCE DEFICITS: 
Cognitive/Behavioral Status: 
Neurologic State: Alert Orientation Level: Unable to verbalize(patient non-verbal) Cognition: Follows commands; Appropriate for age attention/concentration Perception: Appears intact Perseveration: No perseveration noted Safety/Judgement: Awareness of environment; Fall prevention Skin: Appears intact Edema: none noted in BUEs Hearing: Auditory Auditory Impairment: None Vision/Perceptual:   
Tracking: Unable to test secondary due to decreased visual attention Range of Motion: In [address] [place] [address] Road AROM: Generally decreased, functional 
PROM: Generally decreased, functional 
 
Strength: In [address] [place] [address] Strength: Generally decreased, functional 
 
Coordination: 
Coordination: Generally decreased, functional 
Fine Motor Skills-Upper: Left Impaired;Right Impaired Gross Motor Skills-Upper: Left Impaired;Right Impaired Tone & Sensation: In [address] [place] [address] Tone: Abnormal(noted LUE appears contracted) Sensation: (unable to formally assess) Balance: 
Sitting: Impaired Sitting - Static: Fair (occasional) Sitting - Dynamic: Fair (occasional) Functional Mobility and Transfers for ADLs: 
Bed Mobility: 
Rolling: Minimum assistance Supine to Sit: Minimum assistance Sit to Supine: Minimum assistance Transfers: 
 NT this session ADL Assessment: 
Feeding: Moderate assistance* Oral Facial Hygiene/Grooming: Moderate assistance* Bathing: Moderate assistance* Upper Body Dressing: Moderate assistance* Lower Body Dressing: Moderate assistance Toileting: Moderate assistance* 
 
*Infer per observation of functional reach, balance, strength ADL Intervention and task modifications: 
 
Lower Body Dressing Assistance Socks: Moderate assistance Leg Crossed Method Used: Yes Position Performed: Seated edge of bed Cues: Don;Physical assistance;Verbal cues provided Cognitive Retraining Safety/Judgement: Awareness of environment; Fall prevention Functional Measure: 
Barthel Index: 
 
Bathin Bladder: 5 Bowels: 5 Groomin Dressin Feedin Mobility: 0 Stairs: 0 Toilet Use: 5 Transfer (Bed to Chair and Back): 5 Total: 30/100 The Barthel ADL Index: Guidelines 1. The index should be used as a record of what a patient does, not as a record of what a patient could do. 2. The main aim is to establish degree of independence from any help, physical or verbal, however minor and for whatever reason. 3. The need for supervision renders the patient not independent. 4. A patient's performance should be established using the best available evidence. Asking the patient, friends/relatives and nurses are the usual sources, but direct observation and common sense are also important. However direct testing is not needed. 5. Usually the patient's performance over the preceding 24-48 hours is important, but occasionally longer periods will be relevant. 6. Middle categories imply that the patient supplies over 50 per cent of the effort. 7. Use of aids to be independent is allowed. [address] Barthel, D.W. (6059). Functional evaluation: the Barthel Index. [facility] (14)2. ARTEMIO Mahoney, Leisa Vieyra.Tatum, [place], [address] [place] [address] (). Measuring the change indisability after inpatient rehabilitation; comparison of the responsiveness of the Barthel Index and Functional [place] Measure. Journal of Neurology, Neurosurgery, and Psychiatry, 66(4), 865-665. RICARDO Sandhu.ERNIE, DEEDEE Bernstein, & Shelton Fothergill, M.A. (2004.) Assessment of post-stroke quality of life in cost-effectiveness studies: The usefulness of the Barthel Index and the EuroQoL-5D. [facility], 13, 152-20 Occupational Therapy Evaluation Charge Determination History Examination Decision-Making LOW Complexity : Brief history review  LOW Complexity : 1-3 performance deficits relating to physical, cognitive , or psychosocial skils that result in activity limitations and / or participation restrictions  MEDIUM Complexity : Patient may present with comorbidities that affect occupational performnce. Miniml to moderate modification of tasks or assistance (eg, physical or verbal ) with assesment(s) is necessary to enable patient to complete evaluation Based on the above components, the patient evaluation is determined to be of the following complexity level: MEDIUM Pain Rating: 
Unable to verbalized however no noted grimacing or distress Activity Tolerance:  
Fair and Poor After treatment patient left in no apparent distress:   
Supine in bed, Call bell within reach, Side rails x 3, and RN aware COMMUNICATION/EDUCATION:  
The patients plan of care was discussed with: Physical therapist and Registered nurse. Home safety education was provided and the patient/caregiver indicated understanding. and Patient/family have participated as able in goal setting and plan of care. This patients plan of care is appropriate for delegation to ARETHA. Thank you for this referral. 
Anton Connelly OT Time Calculation: 16 mins

## 2020-11-27 NOTE — PROGRESS NOTES
Physical Therapy Orders received, chart reviewed and patient evaluated by physical therapy. Pending progression with skilled acute physical therapy, recommend: 
Physical therapy at least 2 days/week in the home Recommend with nursing patient to complete as able in order to maintain strength, endurance and independence: OOB to chair 3x/day with staff assist.  Thank you for your assistance. Full evaluation to follow.

## 2020-11-27 NOTE — PROGRESS NOTES
[address] [facility] Adult  Hospitalist Group Hospitalist Progress Note Diane Louise MD 
Answering service: [phone] from in house phone Date of Service:  2020 NAME:  Julio César Call :  1980 MRN:  [id number] Admission Summary:  
47 y.o. female past medical history significant for hypothyroidism, Down syndrome and recently treated for COVID-19 infection was brought to the emergency room for further evaluation of elevated heart rate. Patient was admitted to [facility] on 2 2020 for acute respiratory failure secondary to COVID-19 infection. As per mother report at the time of discharge patient was doing better in no acute respiratory distress and not having any fever. Today home health nurse came to check on the patient and found that her heart rate was in the 140s 150s. Patient was sent her to the emergency room for evaluation. On arrival to the emergency room at [facility] she was found to be tachycardic and tachypneic. Patient has not been hypoxic or requiring O2 supplementation. CBC showed a WBC of 21,000 from 14,000 at the time of discharge. CTA of the chest was negative for PE but found to have persistent COVID-19 pneumonia. As per mother patient has not been coughing, being febrile, having diarrhea. Patient has been eating well. During her prior hospitalization patient was treated with remdesivir and completed a course of Decadron. Given persistent tachycardia after 1L iv fluids she admission was requested. Interval history / Subjective: Follow up sepsis. Today patient is tachycardiac. Talked to mother in detail and updated her regarding patient condition. She told me that she was never told that her daughter has any cardiac  Issues. Patient prior echo reported EF of 30% in 2018.  Mother wants to consult cardiology and also wants to establish cardiology as outpatient She was very appreciative. Patient is on room air. Assessment & Plan:  
 
Sepsis (POA) due to COVID 19 Pneumonia:   
Tachycardia: improved 
-CT compatible with COVID 19. Repeat 11/24 due to recurrent new fevers and tachycardia -on vancomycin, cefepime. Cultures NGTD  
-Daily vitamin C 
-completed remdesmivir and dexamethazone during previous hospitalization 
-s/p fluid bolus, caution with fluids due to pulmonary edema  
-continue supplemental oxygen 2 liters 
-patient with gradual decline since hospitalization, appreciate palliative care input and goals of care discussion with family . Currently full code #tachycardia BNP CXR lactic acid ordered Echo ordered Cardiology consulted On IV fluids Will monitor Already on abx EKG Ordered Dysphagia: appreciate speech therapy, okay for mechanical soft diet 
-nutrition consult to monitor appropriate intake DANY on CKD stage III: improved and stable Received 1 L of IV fluid in the ED. Monitor renal function. Encourage p.o. intake Most recent serum creatinine 1.12 On gentle hydration 
  
Hypothyroidism: Continue levothyroxine 
  
Down syndrome: Continue with Risperdal 
  
Hiatal hernia: Known finding. continue Protonix 40 mg p.o. daily. 
-Outpatient follow-up with GI. Oral labial herpes: abreva 
 
  
Code status: full DVT prophylaxis: lovenox Care Plan discussed with: Patient/Family Anticipated Disposition: Home w/Family Anticipated Discharge: Greater than 48 hours, pt/ot consult Hospital Problems  Never Reviewed Codes Class Noted POA Sepsis ([facility] [address] ICD-10-CM: A41.9 ICD-9-CM: 038.9, 995.91  11/19/2020 Unknown Review of Systems:  
Negative unless stated above Vital Signs:  
 Last 24hrs VS reviewed since prior progress note. Most recent are: 
Visit Vitals /65 (BP 1 Location: Right arm, BP Patient Position: At rest) Pulse (!) 122 Temp 98.8 °F (37.1 °C) Resp 29 Ht 5' 4\" (1.626 m) Wt 82.5 kg (181 lb 14.1 oz) SpO2 99% BMI 31.22 kg/m² Intake/Output Summary (Last 24 hours) at 11/27/2020 1654 Last data filed at 11/27/2020 0301 Gross per 24 hour Intake  Output 800 ml Net -800 ml Physical Examination:  
 
I had a face to face encounter with this patient and independently examined them on 11/27/2020 as outlined below: 
 
     
Constitutional:  No acute distress, non verbal   
ENT:  right sided labial crusted lesions Resp:    No accessory muscle use, nom audible wheezing CV:  tachycardic, no murmurs, gallops, rubs GI:  Soft, non distended, non tender. normoactive bowel sounds, no hepatosplenomegaly Musculoskeletal:  No edema, warm, 2+ pulses throughout Neurologic:  Moves all extremities Data Review:  
 Review and/or order of clinical lab test 
Review and/or order of tests in the radiology section of CPT Review and/or order of tests in the medicine section of CPT Labs:  
 
Recent Labs  
  11/26/20 
0406 WBC 10.4 HGB 8.6* HCT 24.8*  
 Recent Labs  
  11/27/20 
0321 11/26/20 
0406 11/25/20 
0094  142 140  
K 3.6 3.4* 3.5 * 113* 113* CO2 21 22 20* BUN 17 16 13 CREA 1.19* 1.12* 0.93 GLU 96 87 83  
CA 8.7 8.5 7.8*  
MG  --  2.1  --   
PHOS  --  3.1  -- No results for input(s): ALT, AP, TBIL, TBILI, TP, ALB, GLOB, GGT, AML, LPSE in the last 72 hours. No lab exists for component: SGOT, GPT, AMYP, HLPSE No results for input(s): INR, PTP, APTT, INREXT, INREXT in the last 72 hours. No results for input(s): FE, TIBC, PSAT, FERR in the last 72 hours. No results found for: FOL, RBCF No results for input(s): PH, PCO2, PO2 in the last 72 hours. No results for input(s): CPK, CKNDX, TROIQ in the last 72 hours. No lab exists for component: CPKMB No results found for: CHOL, CHOLX, CHLST, CHOLV, HDL, HDLP, LDL, LDLC, Pierce Ferguson[address], [address] [facility], [address] [facility] Lab Results Component Value Date/Time Glucose (POC) 122 (H) 11/24/2020 05:36 AM  
 Glucose (POC) 82 03/11/2018 02:32 PM  
 
Lab Results Component Value Date/Time Color YELLOW/STRAW 11/23/2020 10:39 AM  
 Appearance CLOUDY (A) 11/23/2020 10:39 AM  
 Specific gravity 1.021 11/23/2020 10:39 AM  
 Specific gravity 1.010 11/20/2020 01:57 AM  
 pH (UA) 6.0 11/23/2020 10:39 AM  
 Protein TRACE (A) 11/23/2020 10:39 AM  
 Glucose Negative 11/23/2020 10:39 AM  
 Ketone 40 (A) 11/23/2020 10:39 AM  
 Bilirubin Negative 11/23/2020 10:39 AM  
 Urobilinogen 1.0 11/23/2020 10:39 AM  
 Nitrites Negative 11/23/2020 10:39 AM  
 Leukocyte Esterase TRACE (A) 11/23/2020 10:39 AM  
 Epithelial cells FEW 11/23/2020 10:39 AM  
 Bacteria 1+ (A) 11/23/2020 10:39 AM  
 WBC 0-4 11/23/2020 10:39 AM  
 RBC 20-50 11/23/2020 10:39 AM  
 
 
 
Medications Reviewed:  
 
Current Facility-Administered Medications Medication Dose Route Frequency  sodium chloride 0.9 % bolus infusion 250 mL  250 mL IntraVENous ONCE  
 0.9% sodium chloride infusion  50 mL/hr IntraVENous CONTINUOUS  
 albuterol (PROVENTIL HFA, VENTOLIN HFA, PROAIR HFA) inhaler 2 Puff  2 Puff Inhalation Q4H PRN  
 cefepime (MAXIPIME) 2 g in 0.9% sodium chloride (MBP/ADV) 100 mL MBP  2 g IntraVENous Q8H  Vancomycin- Pharmacy Dosing   Other Rx Dosing/Monitoring  vancomycin (VANCOCIN) 1250 mg in  ml infusion  1,250 mg IntraVENous Q16H  
 pantoprazole (PROTONIX) 40 mg in 0.9% sodium chloride 10 mL injection  40 mg IntraVENous Q12H  levothyroxine (SYNTHROID) tablet 50 mcg  50 mcg Oral 6am  
 risperiDONE (RisperDAL) 1 mg/mL oral solution soln 1 mg  1 mg Oral QHS  docosanol (ABREVA) 10 % cream   Topical 5XD  acetaminophen (TYLENOL) tablet 650 mg  650 mg Oral Q6H PRN  Or  
 acetaminophen (TYLENOL) suppository 650 mg  650 mg Rectal Q6H PRN  
 sodium chloride (NS) flush 5-40 mL  5-40 mL IntraVENous Q8H  
  sodium chloride (NS) flush 5-40 mL  5-40 mL IntraVENous PRN  polyethylene glycol (MIRALAX) packet 17 g  17 g Oral DAILY PRN  promethazine (PHENERGAN) tablet 12.5 mg  12.5 mg Oral Q6H PRN Or  
 ondansetron (ZOFRAN) injection 4 mg  4 mg IntraVENous Q6H PRN  
 ascorbic acid (vitamin C) (VITAMIN C) tablet 500 mg  500 mg Oral BID  enoxaparin (LOVENOX) injection 40 mg  40 mg SubCUTAneous Q12H  
 
______________________________________________________________________ EXPECTED LENGTH OF STAY: 3d 17h ACTUAL LENGTH OF STAY:          8 Diane Louise MD

## 2020-11-27 NOTE — ROUTINE PROCESS
Orders received, chart reviewed and patient evaluated by occupational therapy. Pending progression with skilled acute occupational therapy, recommend: 
Occupational therapy at least 2 days/week in the home Recommend with nursing patient to complete as able in order to maintain strength, endurance and independence: OOB to chair 3x/day with 2 assist and functional mobility to the [facility] with 2 assist. Thank you for your assistance. Full evaluation to follow.

## 2020-11-27 NOTE — PROGRESS NOTES
Problem: Mobility Impaired (Adult and Pediatric) Goal: *Acute Goals and Plan of Care (Insert Text) Description: FUNCTIONAL STATUS PRIOR TO ADMISSION: Patient required moderate assistance for basic and instrumental ADLs. HOME SUPPORT PRIOR TO ADMISSION: The patient lived with mother who assists with ADLs Physical Therapy Goals Initiated 11/27/2020 1. Patient will move from supine to sit and sit to supine , scoot up and down, and roll side to side in bed with supervision/set-up within 7 day(s). 2.  Patient will transfer from bed to chair and chair to bed with supervision/set-up using the least restrictive device within 7 day(s). 3.  Patient will perform sit to stand with supervision/set-up within 7 day(s). 4.  Patient will ambulate with supervision/set-up for 50 feet with the least restrictive device within 7 day(s). Outcome: Not Met PHYSICAL THERAPY EVALUATION Patient: Carlos A Roman (67 y.o. female) Date: 11/27/2020 Primary Diagnosis: Sepsis ([facility] [address] [A41.9] Precautions: fall ASSESSMENT Based on the objective data described below, the patient presents with impaired functional mobility. Patient has h/o Down's syndrome. She is non verbal therefore unable to provide history. During the previous admission, patient ambulated 20 ft with min assist of PT w/ large LOB w/ no protective responses during. On assessment today, patient transitioned from supine<->sit with min assist and sat EOB with good sitting balance. RR 39,  when sitting EOB therefore not appropriate to attempt gait assessment. Therapy will continue to follow and address next session as appropriate. Hopeful patient will be able to return home with HHPT and caregiver assist pending further assessment, functional progress and caregiver support in the home. Recommended to  RN, placing a chair in patient's room to allow her to sit up throughout the day. For the weekend, recommend with nursing patient to complete as able in order to maintain strength, endurance and independence once Egress Test complete: OOB to chair 3x/day. Thank you for your assistance. Current Level of Function Impacting Discharge (mobility/balance): Min assist for bed mobility. Unable to assess sit<->stand or gait d/t tachycardic sitting EOB. Functional Outcome Measure: The patient scored 30/100 on the Barthel outcome measure. Other factors to consider for discharge: Down's syndrome, Required assistance for ADLs at baseline Patient will benefit from skilled therapy intervention to address the above noted impairments. PLAN : 
Recommendations and Planned Interventions: bed mobility training, transfer training, gait training, therapeutic exercises, patient and family training/education and therapeutic activities Frequency/Duration: Patient will be followed by physical therapy:  3 times a week to address goals. Recommendation for discharge: (in order for the patient to meet his/her long term goals) To be determined: Hopeful for home with HHPT pending further assessment of mobility (gait and transfers). This discharge recommendation: 
Has not yet been discussed the attending provider and/or case management IF patient discharges home will need the following DME: to be determined (TBD) SUBJECTIVE:  
Patient stated non verbal. OBJECTIVE DATA SUMMARY:  
HISTORY:   
Past Medical History:  
Diagnosis Date  Endocrine disease  Hypothyroid 03/11/2018  Ill-defined condition Down's Syndrome No past surgical history on file. Personal factors and/or comorbidities impacting plan of care: PMH Home Situation Home Environment: Private residence One/Two Story Residence: Other (Comment) Living Alone: No 
Support Systems: Family member(s), Friends \ neighbors Patient Expects to be Discharged to[de-identified] Private residence Current DME Used/Available at Home: None EXAMINATION/PRESENTATION/DECISION MAKING:  
Critical Behavior: 
Neurologic State: Alert, Eyes open spontaneously Orientation Level: Unable to verbalize Cognition: Follows commands Hearing: Auditory Auditory Impairment: None Range Of Motion: 
Decreased, functional 
  
  
  
  
  
  
  
Strength:   
Decreased, functional 
  
  
  
  
  
  
  
   
Coordination: 
Decreased, functional 
Functional Mobility: 
Bed Mobility: 
Supine to sit: Min assist 
Sit to supine: Min assist 
Scooting: Min assist EOB; Max assist supine to [facility] Transfers: 
Not attempted today Balance:  
 Sitting, unsupported: intact Functional Measure: 
Barthel Index: 
 
Bathin Bladder: 5 Bowels: 5 Groomin Dressin Feedin Mobility: 0 Stairs: 0 Toilet Use: 5 Transfer (Bed to Chair and Back): 5 Total: 30/100 The Barthel ADL Index: Guidelines 1. The index should be used as a record of what a patient does, not as a record of what a patient could do. 2. The main aim is to establish degree of independence from any help, physical or verbal, however minor and for whatever reason. 3. The need for supervision renders the patient not independent. 4. A patient's performance should be established using the best available evidence. Asking the patient, friends/relatives and nurses are the usual sources, but direct observation and common sense are also important. However direct testing is not needed. 5. Usually the patient's performance over the preceding 24-48 hours is important, but occasionally longer periods will be relevant. 6. Middle categories imply that the patient supplies over 50 per cent of the effort. 7. Use of aids to be independent is allowed. Mat Dhillon., Barthel, D.W. (1196). Functional evaluation: the Barthel Index. [facility] (14)2.  
[facility] maribel ARTEMIO Garland, Linda Styles., Lakeshia Dumont., [employer] (1999). Measuring the change indisability after inpatient rehabilitation; comparison of the responsiveness of the Barthel Index and Functional [place] Measure. Journal of Neurology, Neurosurgery, and Psychiatry, 66(4), 154-308. ISIAH Thurman, DEEDEE Bernstein, & Bri George M.A. (2004.) Assessment of post-stroke quality of life in cost-effectiveness studies: The usefulness of the Barthel Index and the EuroQoL-5D. [facility], 13, 751-62 Physical Therapy Evaluation Charge Determination History Examination Presentation Decision-Making MEDIUM  Complexity : 1-2 comorbidities / personal factors will impact the outcome/ POC  LOW Complexity : 1-2 Standardized tests and measures addressing body structure, function, activity limitation and / or participation in recreation  LOW Complexity : Stable, uncomplicated  LOW Complexity : FOTO score of  Based on the above components, the patient evaluation is determined to be of the following complexity level: LOW Pain Rating: 
Indicated no pain; Observed no pain behaviors. Activity Tolerance:  
Fair and Tachycardic & w/ increased RR sitting EOB After treatment patient left in no apparent distress:  
Supine in bed and Call bell within reach COMMUNICATION/EDUCATION:  
The patients plan of care was discussed with: Occupational therapist and Registered nurse. Fall prevention education was provided. Patient is unable to participate in goal setting and plan of care. Thank you for this referral. 
David Zarate PT Time Calculation: 14 mins

## 2020-11-27 NOTE — PROGRESS NOTES
Bedside shift change report given to Stalin Jackson (oncoming nurse) by Allegra Jackson RN (offgoing nurse). Report included the following information SBAR, Kardex, Intake/Output, MAR, Recent Results and Cardiac Rhythm ST with PVC's. Last 3 Recorded Weights in this Encounter 11/25/20 6892 11/26/20 0344 11/27/20 0013 Weight: 81.4 kg (179 lb 7.3 oz) 82.2 kg (181 lb 3.5 oz) 82.5 kg (181 lb 14.1 oz) Problem: Falls - Risk of 
Goal: *Absence of Falls Description: Document Alexis Jolly Fall Risk and appropriate interventions in the flowsheet. Outcome: Progressing Towards Goal 
Note: Fall Risk Interventions: 
Mobility Interventions: Communicate number of staff needed for ambulation/transfer, Patient to call before getting OOB Mentation Interventions: Adequate sleep, hydration, pain control Medication Interventions: Evaluate medications/consider consulting pharmacy Elimination Interventions: Call light in reach, Patient to call for help with toileting needs History of Falls Interventions: Evaluate medications/consider consulting pharmacy, Room close to nurse's station Problem: General Medical Care Plan Goal: *Vital signs within specified parameters Outcome: Progressing Towards Goal 
Goal: *Labs within defined limits Outcome: Progressing Towards Goal 
Goal: *Fluid volume balance Outcome: Progressing Towards Goal 
Goal: *Optimize nutritional status Outcome: Progressing Towards Goal 
  
Problem: Risk for Fluid Volume Deficit Goal: Maintain normal heart rhythm Outcome: Not Progressing Towards Goal 
Note: Pt is sinus tachycardic in 100-110's

## 2020-11-28 NOTE — PROGRESS NOTES
[address] [facility] Adult  Hospitalist Group Hospitalist Progress Note Armen Michael MD 
Answering service: [phone] from in house phone Date of Service:  2020 NAME:  Shani Gutierrez :  1980 MRN:  [id number] Admission Summary:  
47 y.o. female past medical history significant for hypothyroidism, Down syndrome and recently treated for COVID-19 infection was brought to the emergency room for further evaluation of elevated heart rate. Patient was admitted to [facility] on 2 2020 for acute respiratory failure secondary to COVID-19 infection. As per mother report at the time of discharge patient was doing better in no acute respiratory distress and not having any fever. Today home health nurse came to check on the patient and found that her heart rate was in the 140s 150s. Patient was sent her to the emergency room for evaluation. On arrival to the emergency room at [facility] she was found to be tachycardic and tachypneic. Patient has not been hypoxic or requiring O2 supplementation. CBC showed a WBC of 21,000 from 14,000 at the time of discharge. CTA of the chest was negative for PE but found to have persistent COVID-19 pneumonia. As per mother patient has not been coughing, being febrile, having diarrhea. Patient has been eating well. During her prior hospitalization patient was treated with remdesivir and completed a course of Decadron. Given persistent tachycardia after 1L iv fluids she admission was requested. Interval history / Subjective: Follow up sepsis. patient is tachycardiac. Previously Talked to mother in detail and updated her regarding patient condition. She told me that she was never told that her daughter has any cardiac  Issues. Patient prior echo reported EF of 30% in 2018.  Mother wants to consult cardiology and also wants to establish cardiology as outpatient She was very appreciative. Patient is on room air. Patient is persistently tachycardiac. Started on metoprolol low dose . Cardiology following. Assessment & Plan:  
 
Sepsis (POA) due to COVID 19 Pneumonia:   
Tachycardia: improved 
-CT compatible with COVID 19. Repeat 11/24 due to recurrent new fevers and tachycardia -on vancomycin, cefepime. Cultures NGTD  
-Daily vitamin C 
-completed remdesmivir and dexamethazone during previous hospitalization 
-s/p fluid bolus, caution with fluids due to pulmonary edema  
-continue supplemental oxygen 2 liters 
-patient with gradual decline since hospitalization, appreciate palliative care input and goals of care discussion with family . Currently full code #tachycardia, persistent BNP CXR lactic acid ordered Echo ordered, pending Cardiology consulted On IV fluids Will monitor Already on abx EKG Ordered, sinus tachycardia Dysphagia: appreciate speech therapy, okay for mechanical soft diet 
-nutrition consult to monitor appropriate intake DANY on CKD stage III: improved and stable Received 1 L of IV fluid in the ED. Monitor renal function. Encourage p.o. intake Most recent serum creatinine 1.12 On gentle hydration 
  
Hypothyroidism: Continue levothyroxine 
  
Down syndrome: Continue with Risperdal 
  
Hiatal hernia: Known finding. continue Protonix 40 mg p.o. daily. 
-Outpatient follow-up with GI. Oral labial herpes: abreva 
 
  
Code status: full DVT prophylaxis: lovenox Care Plan discussed with: Patient/Family Anticipated Disposition: Home w/Family Anticipated Discharge: Greater than 48 hours, pt/ot consult Hospital Problems  Never Reviewed Codes Class Noted [facility] ICD-10-CM: A41.9 ICD-9-CM: 038.9, 995.91  11/19/2020 Unknown Review of Systems:  
Negative unless stated above Vital Signs: Last 24hrs VS reviewed since prior progress note. Most recent are: 
Visit Vitals /67 (BP 1 Location: Right arm, BP Patient Position: At rest) Pulse (!) 117 Temp 99.1 °F (37.3 °C) Resp 27 Ht 5' 4\" (1.626 m) Wt 84 kg (185 lb 3 oz) SpO2 100% BMI 31.79 kg/m² Intake/Output Summary (Last 24 hours) at 11/28/2020 1506 Last data filed at 11/28/2020 1202 Gross per 24 hour Intake 868.33 ml Output 1600 ml Net -731.67 ml Physical Examination:  
 
I had a face to face encounter with this patient and independently examined them on 11/28/2020 as outlined below: 
 
     
Constitutional:  No acute distress, non verbal   
ENT:  right sided labial crusted lesions Resp:    No accessory muscle use, nom audible wheezing CV:  tachycardic, no murmurs, gallops, rubs GI:  Soft, non distended, non tender. normoactive bowel sounds, no hepatosplenomegaly Musculoskeletal:  No edema, warm, 2+ pulses throughout Neurologic:  Moves all extremities Data Review:  
 Review and/or order of clinical lab test 
Review and/or order of tests in the radiology section of CPT Review and/or order of tests in the medicine section of CPT Labs:  
 
Recent Labs  
  11/27/20 
1553 11/26/20 
0406 WBC 10.7 10.4 HGB 9.3* 8.6* HCT 27.5* 24.8*  
 201 Recent Labs  
  11/28/20 
0319 11/27/20 
0321 11/26/20 
0406  144 142  
K 3.8 3.6 3.4*  
* 115* 113* CO2 18* 21 22 BUN 18 17 16 CREA 0.91 1.19* 1.12* GLU 97 96 87  
CA 8.1* 8.7 8.5 MG  --   --  2.1 PHOS  --   --  3.1 No results for input(s): ALT, AP, TBIL, TBILI, TP, ALB, GLOB, GGT, AML, LPSE in the last 72 hours. No lab exists for component: SGOT, GPT, AMYP, HLPSE No results for input(s): INR, PTP, APTT, INREXT, INREXT in the last 72 hours. No results for input(s): FE, TIBC, PSAT, FERR in the last 72 hours.   
No results found for: FOL, RBCF  
 No results for input(s): PH, PCO2, PO2 in the last 72 hours. No results for input(s): CPK, CKNDX, TROIQ in the last 72 hours. No lab exists for component: CPKMB No results found for: CHOL, CHOLX, CHLST, CHOLV, HDL, HDLP, LDL, LDLC, DLDLP, TGLX, TRIGL, TRIGP, CHHD, CHHDX Lab Results Component Value Date/Time Glucose (POC) 122 (H) 11/24/2020 05:36 AM  
 Glucose (POC) 82 03/11/2018 02:32 PM  
 
Lab Results Component Value Date/Time Color YELLOW/STRAW 11/23/2020 10:39 AM  
 Appearance CLOUDY (A) 11/23/2020 10:39 AM  
 Specific gravity 1.021 11/23/2020 10:39 AM  
 Specific gravity 1.010 11/20/2020 01:57 AM  
 pH (UA) 6.0 11/23/2020 10:39 AM  
 Protein TRACE (A) 11/23/2020 10:39 AM  
 Glucose Negative 11/23/2020 10:39 AM  
 Ketone 40 (A) 11/23/2020 10:39 AM  
 Bilirubin Negative 11/23/2020 10:39 AM  
 Urobilinogen 1.0 11/23/2020 10:39 AM  
 Nitrites Negative 11/23/2020 10:39 AM  
 Leukocyte Esterase TRACE (A) 11/23/2020 10:39 AM  
 Epithelial cells FEW 11/23/2020 10:39 AM  
 Bacteria 1+ (A) 11/23/2020 10:39 AM  
 WBC 0-4 11/23/2020 10:39 AM  
 RBC 20-50 11/23/2020 10:39 AM  
 
 
 
Medications Reviewed:  
 
Current Facility-Administered Medications Medication Dose Route Frequency  metoprolol succinate (TOPROL-XL) XL tablet 25 mg  25 mg Oral DAILY  0.9% sodium chloride infusion  50 mL/hr IntraVENous CONTINUOUS  
 albuterol (PROVENTIL HFA, VENTOLIN HFA, PROAIR HFA) inhaler 2 Puff  2 Puff Inhalation Q4H PRN  
 cefepime (MAXIPIME) 2 g in 0.9% sodium chloride (MBP/ADV) 100 mL MBP  2 g IntraVENous Q8H  Vancomycin- Pharmacy Dosing   Other Rx Dosing/Monitoring  vancomycin (VANCOCIN) 1250 mg in  ml infusion  1,250 mg IntraVENous Q16H  
 pantoprazole (PROTONIX) 40 mg in 0.9% sodium chloride 10 mL injection  40 mg IntraVENous Q12H  levothyroxine (SYNTHROID) tablet 50 mcg  50 mcg Oral 6am  
 risperiDONE (RisperDAL) 1 mg/mL oral solution soln 1 mg  1 mg Oral QHS  docosanol (ABREVA) 10 % cream   Topical 5XD  acetaminophen (TYLENOL) tablet 650 mg  650 mg Oral Q6H PRN Or  
 acetaminophen (TYLENOL) suppository 650 mg  650 mg Rectal Q6H PRN  
 sodium chloride (NS) flush 5-40 mL  5-40 mL IntraVENous Q8H  
 sodium chloride (NS) flush 5-40 mL  5-40 mL IntraVENous PRN  polyethylene glycol (MIRALAX) packet 17 g  17 g Oral DAILY PRN  promethazine (PHENERGAN) tablet 12.5 mg  12.5 mg Oral Q6H PRN Or  
 ondansetron (ZOFRAN) injection 4 mg  4 mg IntraVENous Q6H PRN  
 ascorbic acid (vitamin C) (VITAMIN C) tablet 500 mg  500 mg Oral BID  enoxaparin (LOVENOX) injection 40 mg  40 mg SubCUTAneous Q12H  
 
______________________________________________________________________ EXPECTED LENGTH OF STAY: 3d 17h ACTUAL LENGTH OF STAY:          9 Garrett Marie MD

## 2020-11-28 NOTE — PROGRESS NOTES
Bedside and Verbal shift change report given to Av Levy (oncoming nurse) by Nico Friedman (offgoing nurse). Report included the following information SBAR, Kardex, ED Summary, Intake/Output, MAR, Accordion, Recent Results and Cardiac Rhythm NST. Problem: Falls - Risk of 
Goal: *Absence of Falls Description: Document Gerry Going Fall Risk and appropriate interventions in the flowsheet. Outcome: Progressing Towards Goal 
Note: Fall Risk Interventions: 
Mobility Interventions: Bed/chair exit alarm, Communicate number of staff needed for ambulation/transfer Mentation Interventions: Adequate sleep, hydration, pain control, Bed/chair exit alarm, Toileting rounds Medication Interventions: Evaluate medications/consider consulting pharmacy, Patient to call before getting OOB, Teach patient to arise slowly, Bed/chair exit alarm Elimination Interventions: Call light in reach, Patient to call for help with toileting needs, Toileting schedule/hourly rounds, Stay With Me (per policy) History of Falls Interventions: Bed/chair exit alarm, Evaluate medications/consider consulting pharmacy Problem: Heart Failure: Day 5 Goal: Nutrition/Diet Outcome: Progressing Towards Goal 
Goal: Medications Outcome: Progressing Towards Goal 
Note: Metoprolol XL 25 mg started today due to persistent tachycardia

## 2020-11-28 NOTE — CONSULTS
CARDIOLOGY Consultation Note Subjective:  
  
Tammi Mercado is a 47 y.o. patient who is seen for evaluation of sinus tachycardia by Dr Casimiro Al She is in [facility] isolation for presumable ongoing [place] infection since 11/2/2020. She was first tested at [facility] and referred to [facility] for care She has been in and out of hospital and the patient is non communicative so I can only review her chart and spoke to her mother Ms Jena Amezquita did not remember she has congenital heart disease and VSD, cardiomyopathy, dilated heart Dr Aisha Lawler had referred her to [facility] back in 2018 but her mother, Ms Jena Amezquita said she only recalled office visit there Ms Jena Amezquita said \"everything that can be done should be done for her daughter, the patient and this is the first time she is sick\" She had been given remdesivir, decadron and lovenox, zinc and vitamin C for COVID pneumonia She has severe Down's syndrome and known congenital heart disease with an echo done at [facility] with Dr Aisha Lawler back in 2018 She is nonverbal with significant skeletal deformity. She has a history of having a heart murmur and PVC Dr Aisha Lawler had noted her echo with concentric LVH with superimposed congestive cardiomyopathy. Possible small membranous VSD. Enlarged RV with decreased function. Moderate pulmonary hypertension. LV Ejection fraction was estimated to be 30 %. There was moderate diffuse hypokinesis. Mild TR and moderate CT  
  
Patient Active Problem List  
Diagnosis Code  Pneumonia due to COVID-19 virus U07.1, J12.89  Sepsis ([facility] A41.9 Current Facility-Administered Medications Medication Dose Route Frequency Provider Last Rate Last Dose  
 0.9% sodium chloride infusion  50 mL/hr IntraVENous CONTINUOUS Tremayne Garner MD 50 mL/hr at 11/27/20 1734 50 mL/hr at 11/27/20 1734  
 albuterol (PROVENTIL HFA, VENTOLIN HFA, PROAIR HFA) inhaler 2 Puff  2 Puff Inhalation Q4H PRN Gabino Wong NP      
 cefepime (MAXIPIME) 2 g in 0.9% sodium chloride (MBP/ADV) 100 mL MBP  2 g IntraVENous Q8H GLORY Gold M,  mL/hr at 11/27/20 1733 2 g at 11/27/20 1733  Vancomycin- Pharmacy Dosing   Other Rx Dosing/Monitoring Kaitlynn Georges M, DO      
 vancomycin (VANCOCIN) 1250 mg in  ml infusion  1,250 mg IntraVENous Q16H Guerda Ortiz,  mL/hr at 11/27/20 1258 1,250 mg at 11/27/20 1258  pantoprazole (PROTONIX) 40 mg in 0.9% sodium chloride 10 mL injection  40 mg IntraVENous Q12H Gabino Wong NP   40 mg at 11/27/20 4628  levothyroxine (SYNTHROID) tablet 50 mcg  50 mcg Oral Stacey Dean, DO   50 mcg at 11/27/20 3798  risperiDONE (RisperDAL) 1 mg/mL oral solution soln 1 mg  1 mg Oral QHS Stacey Gold, DO   1 mg at 11/26/20 2139  
 docosanol (ABREVA) 10 % cream   Topical 5XD Kaitlynn Georges M, DO      
 acetaminophen (TYLENOL) tablet 650 mg  650 mg Oral Q6H PRN Gage Camp MD   650 mg at 11/24/20 1752 Or  acetaminophen (TYLENOL) suppository 650 mg  650 mg Rectal Q6H PRN Gage Camp MD   650 mg at 11/22/20 2346  sodium chloride (NS) flush 5-40 mL  5-40 mL IntraVENous Q8H Gage Camp MD   10 mL at 11/27/20 1521  
 sodium chloride (NS) flush 5-40 mL  5-40 mL IntraVENous PRN Gage Camp MD      
 polyethylene glycol [facility]) packet 17 g  17 g Oral DAILY PRN Gage Camp MD      
 promethazine (PHENERGAN) tablet 12.5 mg  12.5 mg Oral Q6H PRN Gage Camp MD      
 Or  
 [facility] injection 4 mg  4 mg IntraVENous Q6H PRN Gage Camp MD      
 ascorbic acid (vitamin C) (VITAMIN C) tablet 500 mg  500 mg Oral BID Gage Camp MD   500 mg at 11/27/20 1733  enoxaparin (LOVENOX) injection 40 mg  40 mg SubCUTAneous Q12H Gage Camp MD   40 mg at 11/27/20 1105 No Known Allergies Past Medical History:  
Diagnosis Date  Endocrine disease  Hypothyroid 03/11/2018  Ill-defined condition Down's Syndrome No past surgical history on file. Her mother cannot recall family hx of congenital heart disease Social History Tobacco Use  Smoking status: Never Smoker  Smokeless tobacco: Never Used Substance Use Topics  Alcohol use: No  
  
 
Review of Systems: unable to obtain from her She is not communicative and has severe Down's Syndrome per her mother Objective:  
 
Visit Vitals /65 (BP 1 Location: Right arm, BP Patient Position: At rest) Pulse (!) 122 Temp 98.8 °F (37.1 °C) Resp 29 Ht 5' 4\" (1.626 m) Wt 181 lb 14.1 oz (82.5 kg) SpO2 99% BMI 31.22 kg/m² Physical Exam: unable to examine due to COVID pneumonia isolation She is not communicative and so for virtual visit, can only discuss with her mother Labs reviewed: WBC 10.4 Hb 8.6 D dimer 0.97 Sodium 144 
K 3.6 Creatinine 1.19 NT pro BNP 89 Troponin < 0.05 Chest CT No PE Esophagus: Diffusely dilated. A moderate hiatal hernia contains approximately a third of the stomach in the 
left pleural space. 
  
Chest: There is a variant of pectus excavatum deformity. This causes mild mass effect on the right heart. An accessory artery from the aortic arch, arising laterally between the left carotid and left subclavian arteries, bifurcates to the left inferior thyroidal artery and a small muscular branch superior to the left shoulder. Assessment/Plan:  
COVID 19 pneumonia with sinus tachycardia that have been going on since 11/2/2020. An echo has not been done again since 2018. She had LVEF 30% and RV enlargement, possible VSD and pulmonary regurgitation. ? care previously at [facility]. Her mother can recall [facility] clinic appointment but did not remember what was done and so we will need to contact [facility] congenital disease clinic for more information.    
Her NT pro BNP level does not suggest severe pulmonary edema at this time so may give IV fluid if her PO intake is poor Continue to support with infection treatment Check TSH again. She is on synthroid Redo echo when it is safe to do so. [facility] when possible at [facility] if she is able to be discharged or transferred there May start GDMT for low LVEF if BP allows and if she can take PO medications She has significant hiatal hernia and is at risk of aspiration per chest CT report Dr Lissy Fuller will continue to follow up tomorrow as needed Thank you for involving me in this patient's care and please call with further concerns or questions. Asuncion Macario M.D. Electrophysiology/Cardiology [facility] [place] [address], [address] [facility], [address] [place] [address]                               
[phone]

## 2020-11-28 NOTE — PROGRESS NOTES
1530: Pt sustaining 120s-130s bpm, increasing tachypnea. Dr Michelle marques, received orders for a lactic, CBC, and consult for cardiology. Problem: Nutrition Deficits Goal: Optimize nutrtional status Outcome: Progressing Towards Goal 
Note: Pt has good appetite - eats 99% of all meals. Problem: Pressure Injury - Risk of 
Goal: *Prevention of pressure injury Description: Document Jai Scale and appropriate interventions in the flowsheet. Outcome: Progressing Towards Goal 
Note: Pressure Injury Interventions: 
Sensory Interventions: Assess need for specialty bed, Avoid rigorous massage over bony prominences, Check visual cues for pain, Discuss PT/OT consult with provider, Keep linens dry and wrinkle-free, Maintain/enhance activity level, Minimize linen layers, Pressure redistribution bed/mattress (bed type) Moisture Interventions: Apply protective barrier, creams and emollients, Assess need for specialty bed, Check for incontinence Q2 hours and as needed, Absorbent underpads, Internal/External urinary devices, Limit adult briefs, Maintain skin hydration (lotion/cream), Minimize layers, Moisture barrier Activity Interventions: Assess need for specialty bed, Chair cushion, Increase time out of bed, Pressure redistribution bed/mattress(bed type), PT/OT evaluation Mobility Interventions: Assess need for specialty bed, Chair cushion, Float heels, HOB 30 degrees or less, Pressure redistribution bed/mattress (bed type) Nutrition Interventions: Document food/fluid/supplement intake, Discuss nutritional consult with provider, Offer support with meals,snacks and hydration Friction and Shear Interventions: Apply protective barrier, creams and emollients, HOB 30 degrees or less, Foam dressings/transparent film/skin sealants Verbal shift change report given to Trell Bautista (oncoming nurse) by Kenyatta Brown (offgoing nurse).  Report included the following information SBAR, Kardex, ED Summary, Procedure Summary, Intake/Output, MAR, Recent Results, and Cardiac Rhythm ST w PVCs .

## 2020-11-28 NOTE — PROGRESS NOTES
11/28/2020   Aidan Menezes MD  [facility] Collette Ruts Collette Ruts Collette Ruts Collette Ruts Bhavesh Miller is a 47 y.o. female Review of chart and follow-up with Dr. Linda Jasmine consult indicate patient is nonverbal and Covid positive. Note is done with discussion with nurse and review of chart. She is in [place] isolation for presumable ongoing [place] infection since 11/2/2020. She was first tested at [facility] and referred to [facility] for care She has been in and out of hospital and the patient is non communicative She had been given remdesivir, decadron and lovenox, zinc and vitamin C for COVID pneumonia She has severe Down's syndrome and known congenital heart disease with an echo done at [facility] with Dr Hattie Sanders back in 2018 She is nonverbal with significant skeletal deformity. She has a history of having a heart murmur and PVC Dr Hattie Sanders had noted her echo with concentric LVH with superimposed congestive cardiomyopathy. Possible small membranous VSD. Enlarged RV with decreased function. Moderate pulmonary hypertension. LV Ejection fraction was estimated to be 30 %. There was moderate diffuse hypokinesis. Mild TR and moderate [place]  
Discussion/Plans/Recs History of congenital heart disease with reduced ejection fraction now with COVID-19 pneumonia Objective testing indicates no evidence of acute CHF Very low proBNP and negative troponin and there is no PE on CT scan Echo at this time would not  and will be canceled it should be done at a later date when it will affect her long-term management and follow-up. Given her history of congenital heart disease she probably benefits from a tertiary center such as [facility]. At a later date when she is more stable May start GDMT for low LVEF if BP allows and if she can take PO medications We will see back as needed I have spoken to mother and she is aware of status and will call her PCP at [facility] to arrange fu at Cardiologist for fu congential heart disease Patient Vitals for the past 12 hrs: 
 Temp Pulse Resp BP SpO2  
11/28/20 1525 100.1 °F (37.8 °C) (!) 117 20 138/67 100 % 11/28/20 1123 99.1 °F (37.3 °C) (!) 117 27 125/67 100 % 11/28/20 0705 98.4 °F (36.9 °C) (!) 113 16 (!) 151/95 99 % Cardiac Studies/Hx: 
No specialty comments available. Past Medical History:  
Diagnosis Date  Endocrine disease  Hypothyroid 03/11/2018  Ill-defined condition Down's Syndrome ROS-pertinents  negative except as above  The pertinent portions of the medical history,physician and nursing notes, meds,vitals , labs and Ins/Outs,are reviewed in the electronic record. Results for orders placed or performed during the hospital encounter of 11/19/20 EKG, 12 LEAD, INITIAL Result Value Ref Range Ventricular Rate 123 BPM  
 Atrial Rate 123 BPM  
 P-R Interval 124 ms QRS Duration 62 ms Q-T Interval 304 ms QTC Calculation (Bezet) 435 ms Calculated P Axis 35 degrees Calculated R Axis 1 degrees Calculated T Axis 68 degrees Diagnosis Sinus tachycardia Nonspecific ST and T wave abnormality When compared with ECG of 27-NOV-2020 17:28, No significant change Confirmed by Sandra Burch MD, Scout Carter ([id number]) on 11/28/2020 12:30:20 AM 
  
  
   
 
 
Objective:  
 Physical Exam:  
/67 (BP 1 Location: Right arm, BP Patient Position: At rest)   Pulse (!) 117   Temp 100.1 °F (37.8 °C)   Resp 20   Ht 5' 4\" (1.626 m)   Wt 185 lb 3 oz (84 kg)   SpO2 100%   BMI 31.79 kg/m² Defer exam   
   
   
   
   
   
   
 
Patient Vitals for the past 12 hrs: 
 Temp Pulse Resp BP SpO2  
11/28/20 1525 100.1 °F (37.8 °C) (!) 117 20 138/67 100 % 11/28/20 1123 99.1 °F (37.3 °C) (!) 117 27 125/67 100 % 11/28/20 0705 98.4 °F (36.9 °C) (!) 113 16 (!) 151/95 99 % Lab Results Component Value Date/Time  WBC 10.7 11/27/2020 03:53 PM  
 HGB 9.3 (L) 11/27/2020 03:53 PM  
 HCT 27.5 (L) 11/27/2020 03:53 PM  
 PLATELET 568 39/74/0393 03:53 PM  
 MCV 80.4 11/27/2020 03:53 PM  
 
Lab Results Component Value Date/Time Sodium 142 11/28/2020 03:19 AM  
 Potassium 3.8 11/28/2020 03:19 AM  
 Chloride 116 (H) 11/28/2020 03:19 AM  
 CO2 18 (L) 11/28/2020 03:19 AM  
 Anion gap 8 11/28/2020 03:19 AM  
 Glucose 97 11/28/2020 03:19 AM  
 BUN 18 11/28/2020 03:19 AM  
 Creatinine 0.91 11/28/2020 03:19 AM  
 BUN/Creatinine ratio 20 11/28/2020 03:19 AM  
 GFR est AA >60 11/28/2020 03:19 AM  
 GFR est non-AA >60 11/28/2020 03:19 AM  
 Calcium 8.1 (L) 11/28/2020 03:19 AM  
 
Lab Results Component Value Date/Time  (H) 11/11/2020 01:00 AM  
 Troponin-I, Qt. <0.05 11/20/2020 01:57 AM  
 
Lab Results Component Value Date/Time NT pro-BNP 89 11/27/2020 03:53 PM  
 NT pro-BNP 52 11/20/2020 01:57 AM  
 NT pro-BNP 94 11/11/2020 01:00 AM  
  
 reports that she has never smoked. She has never used smokeless tobacco. She reports that she does not drink alcohol or use drugs. family history is not on file. Last 24hr Input/Output: 
 
Intake/Output Summary (Last 24 hours) at 11/28/2020 1641 Last data filed at 11/28/2020 8748 Gross per 24 hour Intake 918.33 ml Output 1350 ml Net -431.67 ml Data Review:  
Recent Results (from the past 24 hour(s)) EKG, 12 LEAD, INITIAL Collection Time: 11/27/20  5:31 PM  
Result Value Ref Range Ventricular Rate 123 BPM  
 Atrial Rate 123 BPM  
 P-R Interval 124 ms QRS Duration 62 ms Q-T Interval 304 ms QTC Calculation (Bezet) 435 ms Calculated P Axis 35 degrees Calculated R Axis 1 degrees Calculated T Axis 68 degrees Diagnosis Sinus tachycardia Nonspecific ST and T wave abnormality When compared with ECG of 27-NOV-2020 17:28, No significant change Confirmed by Marie Garcia MD, Angel Feliciano ([id number]) on 11/28/2020 87:73:03 AM 
  
METABOLIC PANEL, BASIC Collection Time: 11/28/20  3:19 AM  
Result Value Ref Range Sodium 142 136 - 145 mmol/L Potassium 3.8 3.5 - 5.1 mmol/L Chloride 116 (H) 97 - 108 mmol/L  
 CO2 18 (L) 21 - 32 mmol/L Anion gap 8 5 - 15 mmol/L Glucose 97 65 - 100 mg/dL BUN 18 6 - 20 MG/DL Creatinine 0.91 0.55 - 1.02 MG/DL  
 BUN/Creatinine ratio 20 12 - 20 GFR est AA >60 >60 ml/min/1.73m2 GFR est non-AA >60 >60 ml/min/1.73m2 Calcium 8.1 (L) 8.5 - 10.1 MG/DL  
SAMPLES BEING HELD Collection Time: 11/28/20  3:19 AM  
Result Value Ref Range SAMPLES BEING HELD 1lav COMMENT Add-on orders for these samples will be processed based on acceptable specimen integrity and analyte stability, which may vary by analyte. Social History Socioeconomic History  Marital status: SINGLE Spouse name: Not on file  Number of children: Not on file  Years of education: Not on file  Highest education level: Not on file Occupational History  Not on file Social Needs  Financial resource strain: Not on file  Food insecurity Worry: Not on file Inability: Not on file  Transportation needs Medical: Not on file Non-medical: Not on file Tobacco Use  Smoking status: Never Smoker  Smokeless tobacco: Never Used Substance and Sexual Activity  Alcohol use: No  
 Drug use: No  
 Sexual activity: Not on file Lifestyle  Physical activity Days per week: Not on file Minutes per session: Not on file  Stress: Not on file Relationships  Social connections Talks on phone: Not on file Gets together: Not on file Attends [employer] service: Not on file Active member of club or organization: Not on file Attends meetings of clubs or organizations: Not on file Relationship status: Not on file  Intimate partner violence Fear of current or ex partner: Not on file Emotionally abused: Not on file Physically abused: Not on file Forced sexual activity: Not on file Other Topics Concern  Not on file Social History Narrative  Not on file No family history on file. Nataly Chiang MD 11/28/2020

## 2020-11-28 NOTE — PROGRESS NOTES
Last 3 Recorded Weights in this Encounter 11/26/20 0134 11/27/20 0013 11/28/20 8258 Weight: 82.2 kg (181 lb 3.5 oz) 82.5 kg (181 lb 14.1 oz) 84 kg (185 lb 3 oz) Pt is getting maintenance IVF. Bedside shift change report given to Grzegorz Ty RN (oncoming nurse) by Tonja Vasqeus RN (offgoing nurse). Report included the following information SBAR, Kardex, Procedure Summary, Intake/Output, MAR, Recent Results, Med Rec Status and Cardiac Rhythm ST. Problem: Heart Failure: Day 5 Goal: Off Pathway (Use only if patient is Off Pathway) Outcome: Progressing Towards Goal 
Goal: Nutrition/Diet Outcome: Progressing Towards Goal 
Goal: Medications Outcome: Not Met 
Goal: Respiratory Outcome: Resolved/Met Goal: Treatments/Interventions/Procedures Outcome: Not Met Note: Pt  still needs echo Goal: Psychosocial 
Outcome: Progressing Towards Goal 
Problem: Falls - Risk of 
Goal: *Absence of Falls Description: Document [facility] Fall Risk and appropriate interventions in the flowsheet. Outcome: Progressing Towards Goal 
Note: Fall Risk Interventions: 
Mobility Interventions: Bed/chair exit alarm Mentation Interventions: Adequate sleep, hydration, pain control, Bed/chair exit alarm Medication Interventions: Bed/chair exit alarm, Evaluate medications/consider consulting pharmacy Elimination Interventions: Call light in reach, Patient to call for help with toileting needs History of Falls Interventions: Bed/chair exit alarm Problem: Patient Education: Go to Patient Education Activity Goal: Patient/Family Education Outcome: Not Progressing Towards Goal 
  
Problem: [address] Goal: *Vital signs within specified parameters Outcome: Progressing Towards Goal 
Goal: *Labs within defined limits Outcome: Progressing Towards Goal 
Goal: *Fluid volume balance Outcome: Not Progressing Towards Goal 
Goal: *Optimize nutritional status Outcome: Progressing Towards Goal 
 Goal: *Anxiety reduced or absent Outcome: Progressing Towards Goal 
Goal: *Progressive mobility and function (eg: ADL's) Outcome: Not Progressing Towards Goal 
  
Problem: [address] Goal: *Vital signs within specified parameters Outcome: Progressing Towards Goal 
Goal: *Labs within defined limits Outcome: Progressing Towards Goal 
Goal: *Fluid volume balance Outcome: Not Progressing Towards Goal 
Goal: *Optimize nutritional status Outcome: Progressing Towards Goal 
Goal: *Anxiety reduced or absent Outcome: Progressing Towards Goal 
Goal: *Progressive mobility and function (eg: ADL's) Outcome: Not Progressing Towards Goal 
  
Problem: Breathing Pattern - Ineffective Goal: Ability to achieve and maintain a regular respiratory rate Outcome: Progressing Towards Goal 
  
Problem: Nutrition Deficits Goal: Optimize nutrtional status Outcome: Progressing Towards Goal

## 2020-11-29 NOTE — CONSULTS
Infectious Disease Consult Donte Villalobos MD [facility] Date of Consultation:  November 29, 2020 Date of Admission: 11/19/2020 Referring Physician: Dr Rach Delgado Subjective:  
 
Patient is a 47 y.o. female who is being seen for-  recurrent fever, tachycardia. COVID. previously completed 
 remdesevir and convalascent plasma. IMPRESSION:  
· Persistent tachycardia · Recent history of Covid, SARS COV 2+ on 11/2 patient admitted and treated 11/8 to11/16. Patient treated with remdesivir, Decadron and convalescent plasma · Covid testing not done on this admission, viral respiratory panel, influenza testing negative · CXR, CTA show improvement · Blood culturesNG, UA+ for bacteria!+, UC-unremarkable · DANY on CKD 3 
· Down syndrome · Hypothyroidism PLAN:  
  
· Patient is not exhibiting symptoms of acute Covid disease at this time. If concerns are  present recommend re testing. If positive, patient could be experiencing prolonged viral shedding. · Symptomatic management per primary team 
· Adequate fluids · Echocardiogram-in light of persistent tachycardia · Dr. Suero Persons to cover from 11/30. Chris Umaña is a 47 y.o. female with  past medical history significant for hypothyroidism, Down syndrome and recently treated for COVID-19 infection ( admission 11/8- 11/16) was brought to the emergency room for further evaluation of elevated heart rate. Patient was admitted to [facility]  
on  11/16/2020 for acute respiratory failure secondary to COVID-19 infection. As per Ed report at the time of discharge patient was doing better in no  
acute respiratory distress and not having any fever. Home health nurse had come  to check on the patient and found that her heart rate was in the 140- 150s. Patient was sent to the emergency room for evaluation. On arrival to the emergency room at [facility] she was found to be tachycardic and tachypneic. Patient had not been hypoxic or requiring O2 supplementation. CBC showed a WBC of 21,000 from 14,000 at the time of discharge. CTA of the chest was  
negative for PE but found to have persistent COVID-19 pneumonia. Per ED note  mother had stated she had not been coughing, being febrile, having diarrhea. Patient has  
been eating well. During her prior hospitalization patient was treated with remdesivir and completed a course of Decadron. respiratory panel was negative, Covid testing 
not done on this admission. On evaluation of records patient is afebrile single temp of 100.5 on 11/28. She is persistently tachycardic, heart rate at 112, saturating over 98% on room air. D/w RN . Pt doing well. Eating lunch. No issues at this time. Pt unable to converse Patient Active Problem List  
Diagnosis Code  Pneumonia due to COVID-19 virus U07.1, J12.89  Sepsis ([facility] [address] A41.9 Past Medical History:  
Diagnosis Date  Endocrine disease  Hypothyroid 03/11/2018  Ill-defined condition Down's Syndrome No family history on file. Social History Tobacco Use  Smoking status: Never Smoker  Smokeless tobacco: Never Used Substance Use Topics  Alcohol use: No  
 
No past surgical history on file. Prior to Admission medications Medication Sig Start Date End Date Taking? Authorizing Provider  
lansoprazole (PREVACID) 3 mg/mL oral suspension Take 10 mL by mouth Daily (before breakfast) for 30 days. 11/17/20 12/17/20  Kilo Campos MD  
risperiDONE (RisperDAL) 1 mg tablet Take 1 mg by mouth nightly. Provider, Historical  
solifenacin (VESICARE) 10 mg tablet TAKE 1 TABLET BY MOUTH EVERY DAY 9/18/20   Kilo Cortez MD  
medroxyPROGESTERone (DEPO-PROVERA) 150 mg/mL injection 150 mg, IM, once, To be administered in clinic by nurse.  See order comments for schedule., # 1 vial, 4 Refills, Pharmacy: [facility]/pharmacy #[id number] 12/5/19   Kilo Cortez MD  
 albuterol (PROVENTIL HFA, VENTOLIN HFA, PROAIR HFA) 90 mcg/actuation inhaler Take 2 Puffs by inhalation every four (4) hours as needed for Wheezing. 20   Georgina Arambula MD  
zinc sulfate 220 mg tablet Take 1 Tab by mouth daily. 20   Georgina Arambula MD  
cholecalciferol (VITAMIN D3) 1,000 unit tablet Take 1,000 Units by mouth daily. OtherKilo MD  
levothyroxine (SYNTHROID) 50 mcg tablet Take 50 mcg by mouth Daily (before breakfast). Other, MD Kilo  
 
No Known Allergies Review of Systems:  Review of systems not obtained due to patient factors. 10 point review of systems obtained . All other systems negative Objective:  
Blood pressure 119/78, pulse (!) 108, temperature 98.5 °F (36.9 °C), resp. rate 22, height 5' 4\" (1.626 m), weight 190 lb 11.2 oz (86.5 kg), SpO2 100 %. Temp (24hrs), Av.1 °F (37.3 °C), Min:98.2 °F (36.8 °C), Max:100.5 °F (38.1 °C) Current Facility-Administered Medications Medication Dose Route Frequency  [START ON 2020] Vancomycin trough MON  just prior 0400 dose; thanks   Other ONCE  
 metoprolol succinate (TOPROL-XL) XL tablet 25 mg  25 mg Oral DAILY  0.9% sodium chloride infusion  50 mL/hr IntraVENous CONTINUOUS  
 albuterol (PROVENTIL HFA, VENTOLIN HFA, PROAIR HFA) inhaler 2 Puff  2 Puff Inhalation Q4H PRN  
 cefepime (MAXIPIME) 2 g in 0.9% sodium chloride (MBP/ADV) 100 mL MBP  2 g IntraVENous Q8H  Vancomycin- Pharmacy Dosing   Other Rx Dosing/Monitoring  vancomycin (VANCOCIN) 1250 mg in  ml infusion  1,250 mg IntraVENous Q16H  
 pantoprazole (PROTONIX) 40 mg in 0.9% sodium chloride 10 mL injection  40 mg IntraVENous Q12H  levothyroxine (SYNTHROID) tablet 50 mcg  50 mcg Oral 6am  
 risperiDONE (RisperDAL) 1 mg/mL oral solution soln 1 mg  1 mg Oral QHS  docosanol (ABREVA) 10 % cream   Topical 5XD  acetaminophen (TYLENOL) tablet 650 mg  650 mg Oral Q6H PRN  Or  
  acetaminophen (TYLENOL) suppository 650 mg  650 mg Rectal Q6H PRN  
 sodium chloride (NS) flush 5-40 mL  5-40 mL IntraVENous Q8H  
 sodium chloride (NS) flush 5-40 mL  5-40 mL IntraVENous PRN  polyethylene glycol (MIRALAX) packet 17 g  17 g Oral DAILY PRN  promethazine (PHENERGAN) tablet 12.5 mg  12.5 mg Oral Q6H PRN Or  
 ondansetron (ZOFRAN) injection 4 mg  4 mg IntraVENous Q6H PRN  
 ascorbic acid (vitamin C) (VITAMIN C) tablet 500 mg  500 mg Oral BID  enoxaparin (LOVENOX) injection 40 mg  40 mg SubCUTAneous Q12H Exam:   
11/29/20 0922    108Abnormal    119/78                     
11/29/20 0702  98.5 °F (36.9 °C)  107Abnormal    113/41Abnormal    65    At rest  22  100 %           
11/29/20 0310  98.9 °F (37.2 °C)  108Abnormal    122/75  91    At rest  16  99 %  Room air         
11/29/20 0110                  Room air      190 lb 11.2 oz (86.5 kg)   
11/29/20 0010                  Room air         
11/28/20 2310  98.5 °F (36.9 °C)  112Abnormal    126/50Abnormal    75    At rest  19  99 %           
11/28/20 2015                99 %  Room air         
11/28/20 1911  98.2 °F (36.8 °C)  114Abnormal    129/67  88    At rest  20  99 %           
11/28/20 1602  100.5 °F (38.1 °C)Abnormal                  Room air         
11/28/20 1525  100.1 °F (37.8 °C)  117Abnormal    138/67  91    At rest  20  100 %         Data Reviewed: CBC:  
Recent Labs  
  11/27/20 
1553 WBC 10.7 RBC 3.42* HGB 9.3* HCT 27.5*  
 GRANS 77* LYMPH 12 EOS 5  
 
CMP:  
Recent Labs  
  11/29/20 
0125 11/28/20 
0319 11/27/20 
0321 GLU 90 97 96  142 144  
K 3.9 3.8 3.6 * 116* 115* CO2 21 18* 21 BUN 18 18 17 CREA 1.15* 0.91 1.19* CA 7.9* 8.1* 8.7 AGAP 8 8 8 BUCR 16 20 14 Lab Results Component Value Date/Time  Culture result:  11/23/2020 06:16 PM  
 MRSA NOT PRESENT. Apparent Staphylococus aureus (not MRSA noted). Culture result:  11/23/2020 06:16 PM  
      Screening of patient nares for MRSA is for surveillance purposes and, if positive, to facilitate isolation considerations in high risk settings. It is not intended for automatic decolonization interventions per se as regimens are not sufficiently effective to warrant routine use. Culture result: NO GROWTH 5 DAYS 11/23/2020 10:39 AM  
 Culture result: No significant growth, <10,000 CFU/mL 11/19/2020 06:45 PM  
 Culture result: NO GROWTH 5 DAYS 11/19/2020 05:33 PM  
 Culture result: NO GROWTH 5 DAYS 11/19/2020 05:33 PM  
  
 
 
XR Results (most recent): 
Results from Hospital Encounter encounter on 11/19/20 XR CHEST PORT Narrative INDICATION: Previous abnormal chest radiograph COMPARISON: November 23, 2020 FINDINGS: AP portable imaging of the chest performed at 3:57 PM demonstrates a 
stable cardiomediastinal silhouette. Moderately large hiatal hernia is again 
seen. Diffuse bilateral interstitial and alveolar opacities are slightly 
improved. Lung volumes remain low. No significant osseous abnormalities are 
seen. Impression IMPRESSION: Slightly improved bilateral interstitial and alveolar opacities. ICD-10-CM ICD-9-CM 1. Pneumonia due to COVID-19 virus  U07.1 480.8   
 J12.89    
2. Goals of care, counseling/discussion  Z71.89 V65.49   
3. Tachypnea  R06.82 786.06   
4. Tachycardia  R00.0 785.0 5. Dilated cardiomyopathy (HCC)  I42.0 425.4 Antibiotic History Vancomycin, Cefepime I have discussed the diagnosis with the patient and the intended plan as seen in the above orders. I have discussed medication side effects and warnings with the patient as well. Reviewed test results at length with patient Signed By: Keshawn Perry MD FACP November 29, 2020

## 2020-11-29 NOTE — PROGRESS NOTES
Patient appears to show slight abdominal distention and appears to be uncomfortable with abdominal palpation. Zofran administered and appeared to have provided some relief. Will administer prn Miralax, as patient has not had a bowel movement since 11/24. Will continue to monitor.

## 2020-11-29 NOTE — PROGRESS NOTES
[address] [facility] Adult  Hospitalist Group Hospitalist Progress Note Jefferson Fox MD 
Answering service: [phone] from in house phone Date of Service:  2020 NAME:  Juany Quispe :  1980 MRN:  [id number] Admission Summary:  
47 y.o. female past medical history significant for hypothyroidism, Down syndrome and recently treated for COVID-19 infection was brought to the emergency room for further evaluation of elevated heart rate. Patient was admitted to [facility] on 2 2020 for acute respiratory failure secondary to COVID-19 infection. As per mother report at the time of discharge patient was doing better in no acute respiratory distress and not having any fever. Today home health nurse came to check on the patient and found that her heart rate was in the 140s 150s. Patient was sent her to the emergency room for evaluation. On arrival to the emergency room at [facility] she was found to be tachycardic and tachypneic. Patient has not been hypoxic or requiring O2 supplementation. CBC showed a WBC of 21,000 from 14,000 at the time of discharge. CTA of the chest was negative for PE but found to have persistent COVID-19 pneumonia. As per mother patient has not been coughing, being febrile, having diarrhea. Patient has been eating well. During her prior hospitalization patient was treated with remdesivir and completed a course of Decadron. Given persistent tachycardia after 1L iv fluids she admission was requested. Interval history / Subjective: Follow up sepsis. patient is tachycardiac. Previously Talked to mother in detail and updated her regarding patient condition. She told me that she was never told that her daughter has any cardiac  Issues. Patient prior echo reported EF of 30% in 2018. Cardiology consulted Patient is persistently tachycardiac. Started on metoprolol low dose . Cardiology following. ID also consulted today as patient developed a low grade fever. Assessment & Plan:  
 
Sepsis (POA) due to COVID 19 Pneumonia:   
Tachycardia: improved 
-CT compatible with COVID 19. Repeat 11/24 due to recurrent new fevers and tachycardia -on vancomycin, cefepime. Cultures NGTD , for now we will continue with abx unless recommended by ID to stop abx  
-Daily vitamin C 
-completed remdesmivir and dexamethazone during previous hospitalization 
-s/p fluid bolus, caution with fluids due to pulmonary edema  
-continue supplemental oxygen 2 liters 
-patient with gradual decline since hospitalization, appreciate palliative care input and goals of care discussion with family . Currently full code #tachycardia, persistent BNP CXR lactic acid ordered Echo ordered, pending Cardiology consulted, need to follow up with cardiology as outpatient as well. On IV fluids Will monitor Already on abx EKG Ordered, sinus tachycardia Dysphagia: appreciate speech therapy, okay for mechanical soft diet 
-nutrition consult to monitor appropriate intake DANY on CKD stage III: improved and stable Received 1 L of IV fluid in the ED. Monitor renal function. Encourage p.o. intake Most recent serum creatinine 1.12 On gentle hydration 
  
Hypothyroidism: Continue levothyroxine 
  
Down syndrome: Continue with Risperdal 
  
Hiatal hernia: Known finding. continue Protonix 40 mg p.o. daily. 
-Outpatient follow-up with GI. Oral labial herpes: abreva 
 
  
Code status: full DVT prophylaxis: lovenox Care Plan discussed with: Patient/Family Anticipated Disposition: Home w/Family Anticipated Discharge: Greater than 48 hours, pt/ot consult Hospital Problems  Never Reviewed Codes Class Noted POA Sepsis ([facility] [address] ICD-10-CM: A41.9 ICD-9-CM: 038.9, 995.91  11/19/2020 Unknown Review of Systems: Negative unless stated above Vital Signs:  
 Last 24hrs VS reviewed since prior progress note. Most recent are: 
Visit Vitals /66 (BP 1 Location: Right arm, BP Patient Position: At rest) Pulse (!) 121 Temp 99.1 °F (37.3 °C) Resp 24 Ht 5' 4\" (1.626 m) Wt 86.5 kg (190 lb 11.2 oz) SpO2 100% BMI 32.73 kg/m² Intake/Output Summary (Last 24 hours) at 11/29/2020 1441 Last data filed at 11/29/2020 1966 Gross per 24 hour Intake  Output 1025 ml Net -1025 ml Physical Examination:  
 
I had a face to face encounter with this patient and independently examined them on 11/29/2020 as outlined below: 
 
     
Constitutional:  No acute distress, non verbal   
ENT:  right sided labial crusted lesions Resp:    No accessory muscle use, nom audible wheezing CV:  tachycardic, no murmurs, gallops, rubs GI:  Soft, non distended, non tender. normoactive bowel sounds, no hepatosplenomegaly Musculoskeletal:  No edema, warm, 2+ pulses throughout Neurologic:  Moves all extremities Data Review:  
 Review and/or order of clinical lab test 
Review and/or order of tests in the radiology section of CPT Review and/or order of tests in the medicine section of CPT Labs:  
 
Recent Labs  
  11/27/20 
1553 WBC 10.7 HGB 9.3* HCT 27.5*  
 Recent Labs  
  11/29/20 
0125 11/28/20 
0319 11/27/20 
0321  142 144  
K 3.9 3.8 3.6 * 116* 115* CO2 21 18* 21 BUN 18 18 17 CREA 1.15* 0.91 1.19* GLU 90 97 96  
CA 7.9* 8.1* 8.7 No results for input(s): ALT, AP, TBIL, TBILI, TP, ALB, GLOB, GGT, AML, LPSE in the last 72 hours. No lab exists for component: SGOT, GPT, AMYP, HLPSE No results for input(s): INR, PTP, APTT, INREXT, INREXT in the last 72 hours. No results for input(s): FE, TIBC, PSAT, FERR in the last 72 hours. No results found for: FOL, RBCF No results for input(s): PH, PCO2, PO2 in the last 72 hours. No results for input(s): CPK, CKNDX, TROIQ in the last 72 hours. No lab exists for component: CPKMB No results found for: CHOL, CHOLX, CHLST, CHOLV, HDL, HDLP, LDL, LDLC, DLDLP, TGLX, TRIGL, TRIGP, CHHD, CHHDX Lab Results Component Value Date/Time Glucose (POC) 122 (H) 11/24/2020 05:36 AM  
 Glucose (POC) 82 03/11/2018 02:32 PM  
 
Lab Results Component Value Date/Time Color YELLOW/STRAW 11/23/2020 10:39 AM  
 Appearance CLOUDY (A) 11/23/2020 10:39 AM  
 Specific gravity 1.021 11/23/2020 10:39 AM  
 Specific gravity 1.010 11/20/2020 01:57 AM  
 pH (UA) 6.0 11/23/2020 10:39 AM  
 Protein TRACE (A) 11/23/2020 10:39 AM  
 Glucose Negative 11/23/2020 10:39 AM  
 Ketone 40 (A) 11/23/2020 10:39 AM  
 Bilirubin Negative 11/23/2020 10:39 AM  
 Urobilinogen 1.0 11/23/2020 10:39 AM  
 Nitrites Negative 11/23/2020 10:39 AM  
 Leukocyte Esterase TRACE (A) 11/23/2020 10:39 AM  
 Epithelial cells FEW 11/23/2020 10:39 AM  
 Bacteria 1+ (A) 11/23/2020 10:39 AM  
 WBC 0-4 11/23/2020 10:39 AM  
 RBC 20-50 11/23/2020 10:39 AM  
 
 
 
Medications Reviewed:  
 
Current Facility-Administered Medications Medication Dose Route Frequency  [START ON 11/30/2020] Vancomycin trough MON 11/30 just prior 0400 dose; thanks   Other ONCE  
 metoprolol succinate (TOPROL-XL) XL tablet 25 mg  25 mg Oral DAILY  0.9% sodium chloride infusion  50 mL/hr IntraVENous CONTINUOUS  
 albuterol (PROVENTIL HFA, VENTOLIN HFA, PROAIR HFA) inhaler 2 Puff  2 Puff Inhalation Q4H PRN  
 cefepime (MAXIPIME) 2 g in 0.9% sodium chloride (MBP/ADV) 100 mL MBP  2 g IntraVENous Q8H  Vancomycin- Pharmacy Dosing   Other Rx Dosing/Monitoring  vancomycin (VANCOCIN) 1250 mg in  ml infusion  1,250 mg IntraVENous Q16H  
 pantoprazole (PROTONIX) 40 mg in 0.9% sodium chloride 10 mL injection  40 mg IntraVENous Q12H  levothyroxine (SYNTHROID) tablet 50 mcg  50 mcg Oral 6am  
  risperiDONE (RisperDAL) 1 mg/mL oral solution soln 1 mg  1 mg Oral QHS  docosanol (ABREVA) 10 % cream   Topical 5XD  acetaminophen (TYLENOL) tablet 650 mg  650 mg Oral Q6H PRN Or  
 acetaminophen (TYLENOL) suppository 650 mg  650 mg Rectal Q6H PRN  
 sodium chloride (NS) flush 5-40 mL  5-40 mL IntraVENous Q8H  
 sodium chloride (NS) flush 5-40 mL  5-40 mL IntraVENous PRN  polyethylene glycol (MIRALAX) packet 17 g  17 g Oral DAILY PRN  promethazine (PHENERGAN) tablet 12.5 mg  12.5 mg Oral Q6H PRN Or  
 ondansetron (ZOFRAN) injection 4 mg  4 mg IntraVENous Q6H PRN  
 ascorbic acid (vitamin C) (VITAMIN C) tablet 500 mg  500 mg Oral BID  enoxaparin (LOVENOX) injection 40 mg  40 mg SubCUTAneous Q12H  
 
______________________________________________________________________ EXPECTED LENGTH OF STAY: 3d 17h ACTUAL LENGTH OF STAY:          Kina Senior MD

## 2020-11-29 NOTE — PROGRESS NOTES
Bedside shift change report given to Rachana Judge RN (oncoming nurse) by [employer] Brandee RN (offgoing nurse). Report included the following information SBAR, Kardex, Intake/Output, MAR, Recent Results, Med Rec Status, and Cardiac Rhythm NSR . Patient Vitals for the past 12 hrs: 
 Temp Pulse Resp BP SpO2  
11/29/20 0702 98.5 °F (36.9 °C) (!) 107 22 (!) 113/41 100 % 11/29/20 0310 98.9 °F (37.2 °C) (!) 108 16 122/75 99 % 11/28/20 2310 98.5 °F (36.9 °C) (!) 112 19 (!) 126/50 99 % 11/28/20 2015     99 % 11/28/20 1911 98.2 °F (36.8 °C) (!) 114 20 129/67 99 % Last 3 Recorded Weights in this Encounter 11/27/20 0013 11/28/20 0139 11/29/20 0110 Weight: 82.5 kg (181 lb 14.1 oz) 84 kg (185 lb 3 oz) 86.5 kg (190 lb 11.2 oz) Weight variance noted; reported to day shift RN. MD to follow up. Problem: General Medical Care Plan Goal: *Vital signs within specified parameters Outcome: Progressing Towards Goal 
Goal: *Absence of infection signs and symptoms Outcome: Progressing Towards Goal 
Goal: *Skin integrity maintained Outcome: Progressing Towards Goal 
  
Problem: Gas Exchange - Impaired Goal: Absence of hypoxia Outcome: Progressing Towards Goal 
  
Problem: Isolation Precautions - Risk of Spread of Infection Goal: Prevent transmission of infectious organism to others Outcome: Progressing Towards Goal 
  
Problem: Pressure Injury - Risk of 
Goal: *Prevention of pressure injury Description: Document Jai Scale and appropriate interventions in the flowsheet. Outcome: Progressing Towards Goal 
Note: Pressure Injury Interventions: 
Sensory Interventions: Check visual cues for pain, Float heels, Keep linens dry and wrinkle-free, Maintain/enhance activity level, Pad between skin to skin, Monitor skin under medical devices, Turn and reposition approx. every two hours (pillows and wedges if needed) Moisture Interventions: Absorbent underpads, Minimize layers, Moisture barrier, Apply protective barrier, creams and emollients Activity Interventions: Chair cushion, Pressure redistribution bed/mattress(bed type) Mobility Interventions: Float heels, Pressure redistribution bed/mattress (bed type), PT/OT evaluation Nutrition Interventions: Document food/fluid/supplement intake, Offer support with meals,snacks and hydration Friction and Shear Interventions: Apply protective barrier, creams and emollients, Minimize layers, Transfer aides:transfer board/Carina lift/ceiling lift, Transferring/repositioning devices

## 2020-11-29 NOTE — PROGRESS NOTES
Problem: Falls - Risk of 
Goal: *Absence of Falls Description: Document Rosa Gonzalez Fall Risk and appropriate interventions in the flowsheet. Outcome: Progressing Towards Goal 
Note: Fall Risk Interventions: 
Mobility Interventions: Communicate number of staff needed for ambulation/transfer, Patient to call before getting OOB, Utilize walker, cane, or other assistive device Mentation Interventions: Adequate sleep, hydration, pain control, Door open when patient unattended Medication Interventions: Evaluate medications/consider consulting pharmacy Elimination Interventions: Call light in reach History of Falls Interventions: Bed/chair exit alarm, Evaluate medications/consider consulting pharmacy Problem: Gas Exchange - Impaired Goal: Absence of hypoxia Outcome: Progressing Towards Goal 
 
Problem: Nutrition Deficit Goal: *Optimize nutritional status Outcome: Progressing Towards Goal 
  
Bedside and Verbal shift change report given to Amaya(oncoming nurse) by Esperanza King (offgoing nurse). Report included the following information SBAR, Kardex, and Quality Measures.

## 2020-11-30 NOTE — PROGRESS NOTES
[address] [facility] Adult  Hospitalist Group Hospitalist Progress Note Michael Trevizo MD 
Answering service: [phone] from in house phone Date of Service:  2020 NAME:  Sarah Beth Luevano :  1980 MRN:  [id number] Admission Summary:  
47 y.o. female past medical history significant for hypothyroidism, Down syndrome and recently treated for COVID-19 infection was brought to the emergency room for further evaluation of elevated heart rate. Patient was admitted to [facility] on 2 2020 for acute respiratory failure secondary to COVID-19 infection. As per mother report at the time of discharge patient was doing better in no acute respiratory distress and not having any fever. Today home health nurse came to check on the patient and found that her heart rate was in the 140s 150s. Patient was sent her to the emergency room for evaluation. On arrival to the emergency room at [facility] she was found to be tachycardic and tachypneic. Patient has not been hypoxic or requiring O2 supplementation. CBC showed a WBC of 21,000 from 14,000 at the time of discharge. CTA of the chest was negative for PE but found to have persistent COVID-19 pneumonia. As per mother patient has not been coughing, being febrile, having diarrhea. Patient has been eating well. During her prior hospitalization patient was treated with remdesivir and completed a course of Decadron. Given persistent tachycardia after 1L iv fluids she admission was requested. Interval history / Subjective: Follow up sepsis. patient is tachycardiac. Previously Talked to mother in detail and updated her regarding patient condition. She told me that she was never told that her daughter has any cardiac  Issues. Patient prior echo reported EF of 30% in 2018. Cardiology consulted Patient is persistently tachycardiac. Started on metoprolol low dose . Cardiology following. Today added ivabradine by cardiology. morning metoprol was held due to borderline BP. Talked to patient mother in detail and updated her regarding patient condition. Echo is pending Assessment & Plan:  
 
Sepsis (POA) due to COVID 19 Pneumonia:  resolved  
-CT compatible with COVID 19. Repeat 11/24 due to recurrent new fevers and tachycardia 
-s/p vancomycin, cefepime. Cultures NGTD , for now we will continue with abx unless recommended by ID to stop abx  
-Daily vitamin C 
-completed remdesmivir and dexamethazone during previous hospitalization 
-s/p fluid bolus, caution with fluids due to pulmonary edema  
-continue supplemental oxygen 2 liters 
-patient with gradual decline since hospitalization, appreciate palliative care input and goals of care discussion with family . Currently full code #tachycardia, persistent BNP CXR lactic acid ordered Echo ordered, pending Cardiology consulted, need to follow up with cardiology as outpatient as well. Will monitor Added corlanor Dysphagia: appreciate speech therapy, okay for mechanical soft diet 
-nutrition consult to monitor appropriate intake DANY on CKD stage III: improved and stable Received 1 L of IV fluid in the ED. Monitor renal function. Encourage p.o. intake Most recent serum creatinine 1.02 
  
Hypothyroidism: Continue levothyroxine 
  
Down syndrome: Continue with Risperdal 
  
Hiatal hernia: Known finding. continue Protonix 40 mg p.o. daily. 
-Outpatient follow-up with GI. Oral labial herpes: s/p abreva 
 
  
Code status: full DVT prophylaxis: lovenox Care Plan discussed with: Patient/Family Anticipated Disposition: Home w/Family Anticipated Discharge: Greater than 48 hours, pt/ot consult Hospital Problems  Never Reviewed Codes Class Noted POA Sepsis ([facility] [address] ICD-10-CM: A41.9 ICD-9-CM: 038.9, 995.91  11/19/2020 Unknown Review of Systems:  
Negative unless stated above Vital Signs:  
 Last 24hrs VS reviewed since prior progress note. Most recent are: 
Visit Vitals /65 (BP 1 Location: Right arm, BP Patient Position: At rest) Pulse (!) 108 Temp 98.2 °F (36.8 °C) Resp 25 Ht 5' 4\" (1.626 m) Wt 84.2 kg (185 lb 10 oz) SpO2 98% BMI 31.86 kg/m² Intake/Output Summary (Last 24 hours) at 11/30/2020 1625 Last data filed at 11/30/2020 1600 Gross per 24 hour Intake 1720 ml Output 400 ml Net 1320 ml Physical Examination:  
 
I had a face to face encounter with this patient and independently examined them on 11/30/2020 as outlined below: 
 
     
Constitutional:  No acute distress, non verbal   
ENT:  right sided labial crusted lesions Resp:    No accessory muscle use, nom audible wheezing CV:  tachycardic, no murmurs, gallops, rubs GI:  Soft, non distended, non tender. normoactive bowel sounds, no hepatosplenomegaly Musculoskeletal:  No edema, warm, 2+ pulses throughout Neurologic:  Moves all extremities Data Review:  
 Review and/or order of clinical lab test 
Review and/or order of tests in the radiology section of CPT Review and/or order of tests in the medicine section of CPT Labs:  
 
No results for input(s): WBC, HGB, HCT, PLT, HGBEXT, HCTEXT, PLTEXT, HGBEXT, HCTEXT, PLTEXT in the last 72 hours. Recent Labs 11/30/20 
8135 11/29/20 
0125 11/28/20 
0319  144 142  
K 4.4 3.9 3.8 * 115* 116* CO2 21 21 18* BUN 16 18 18 CREA 1.02 1.15* 0.91  
GLU 86 90 97 CA 7.9* 7.9* 8.1* No results for input(s): ALT, AP, TBIL, TBILI, TP, ALB, GLOB, GGT, AML, LPSE in the last 72 hours. No lab exists for component: SGOT, GPT, AMYP, HLPSE No results for input(s): INR, PTP, APTT, INREXT, INREXT in the last 72 hours. No results for input(s): FE, TIBC, PSAT, FERR in the last 72 hours. No results found for: FOL, RBCF No results for input(s): PH, PCO2, PO2 in the last 72 hours. No results for input(s): CPK, CKNDX, TROIQ in the last 72 hours. No lab exists for component: CPKMB No results found for: CHOL, CHOLX, CHLST, CHOLV, HDL, HDLP, LDL, LDLC, DLDLP, TGLX, TRIGL, TRIGP, CHHD, CHHDX Lab Results Component Value Date/Time Glucose (POC) 122 (H) 11/24/2020 05:36 AM  
 Glucose (POC) 82 03/11/2018 02:32 PM  
 
Lab Results Component Value Date/Time Color YELLOW/STRAW 11/23/2020 10:39 AM  
 Appearance CLOUDY (A) 11/23/2020 10:39 AM  
 Specific gravity 1.021 11/23/2020 10:39 AM  
 Specific gravity 1.010 11/20/2020 01:57 AM  
 pH (UA) 6.0 11/23/2020 10:39 AM  
 Protein TRACE (A) 11/23/2020 10:39 AM  
 Glucose Negative 11/23/2020 10:39 AM  
 Ketone 40 (A) 11/23/2020 10:39 AM  
 Bilirubin Negative 11/23/2020 10:39 AM  
 Urobilinogen 1.0 11/23/2020 10:39 AM  
 Nitrites Negative 11/23/2020 10:39 AM  
 Leukocyte Esterase TRACE (A) 11/23/2020 10:39 AM  
 Epithelial cells FEW 11/23/2020 10:39 AM  
 Bacteria 1+ (A) 11/23/2020 10:39 AM  
 WBC 0-4 11/23/2020 10:39 AM  
 RBC 20-50 11/23/2020 10:39 AM  
 
 
 
Medications Reviewed:  
 
Current Facility-Administered Medications Medication Dose Route Frequency  ivabradine (CORLANOR) tablet 5 mg  5 mg Oral BID WITH MEALS  
 [START ON 12/1/2020] lansoprazole (PREVACID) 3 mg/mL oral suspension 30 mg  30 mg Oral ACB  metoprolol succinate (TOPROL-XL) XL tablet 25 mg  25 mg Oral DAILY  0.9% sodium chloride infusion  50 mL/hr IntraVENous CONTINUOUS  
 albuterol (PROVENTIL HFA, VENTOLIN HFA, PROAIR HFA) inhaler 2 Puff  2 Puff Inhalation Q4H PRN  
 levothyroxine (SYNTHROID) tablet 50 mcg  50 mcg Oral 6am  
 risperiDONE (RisperDAL) 1 mg/mL oral solution soln 1 mg  1 mg Oral QHS  acetaminophen (TYLENOL) tablet 650 mg  650 mg Oral Q6H PRN  Or  
 acetaminophen (TYLENOL) suppository 650 mg  650 mg Rectal Q6H PRN  
  sodium chloride (NS) flush 5-40 mL  5-40 mL IntraVENous Q8H  
 sodium chloride (NS) flush 5-40 mL  5-40 mL IntraVENous PRN  polyethylene glycol (MIRALAX) packet 17 g  17 g Oral DAILY PRN  promethazine (PHENERGAN) tablet 12.5 mg  12.5 mg Oral Q6H PRN Or  
 ondansetron (ZOFRAN) injection 4 mg  4 mg IntraVENous Q6H PRN  
 ascorbic acid (vitamin C) (VITAMIN C) tablet 500 mg  500 mg Oral BID  enoxaparin (LOVENOX) injection 40 mg  40 mg SubCUTAneous Q12H  
 
______________________________________________________________________ EXPECTED LENGTH OF STAY: 4d 19h ACTUAL LENGTH OF STAY:          Franklin Herrera MD

## 2020-11-30 NOTE — PROGRESS NOTES
Zoom call performed with patient and Noel Mathews, her brother and his wife. Talked for approx 15-20 minutes with patient responding with moaning and frequent eye contact. Pt waved at end of call with more sounds. Rn answered questions as as they came up regarding plan of care and how patient is doing. Explained shift change times and good times to set up future calls, family v.u. and expressed interest in calling again at some point. Encouraged family to call  to set up calls with patient's daily/nightly nurse and they v.u. They expressed appreciation and gratitude with care and call. Noel Mathews # is [phone] Email: [email or web address]

## 2020-11-30 NOTE — PROGRESS NOTES
CARDIOLOGY Progress Note Subjective:  
  
Juan Flanagan is a 47 y.o. patient who is seen for evaluation of sinus tachycardia by Dr Tomás Srivastava 
toprol has been started and HR is still 120 bpm 
She is in [facility] isolation for presumable ongoing COVID infection since 11/2/2020. She was first tested at [facility] and referred to [facility] for care She has been in and out of hospital and the patient is non communicative so I can only review her chart and spoke to her mother Ms Candido Sanchez did not remember she has congenital heart disease and VSD, cardiomyopathy, dilated heart Dr Mikey Goldberg had referred her to [facility] back in 2018 but her mother, Ms Candido Sanchez said she only recalled office visit there Ms Candido Sanchez said \"everything that can be done should be done for her daughter, the patient and this is the first time she is sick\" She had been given remdesivir, decadron and lovenox, zinc and vitamin C for COVID pneumonia She has severe Down's syndrome and known congenital heart disease with an echo done at [facility] with Dr Mikey Goldberg back in 2018 She is nonverbal with significant skeletal deformity. She has a history of having a heart murmur and PVC Dr Mikey Goldberg had noted her echo with concentric LVH with superimposed congestive cardiomyopathy. Possible small membranous VSD. Enlarged RV with decreased function. Moderate pulmonary hypertension. LV Ejection fraction was estimated to be 30 %. There was moderate diffuse hypokinesis. Mild TR and moderate AR  
BNP was low Patient Active Problem List  
Diagnosis Code  Pneumonia due to COVID-19 virus U07.1, J12.89  Sepsis ([facility] [address] A41.9 Current Facility-Administered Medications Medication Dose Route Frequency Provider Last Rate Last Dose  ivabradine (CORLANOR) tablet 5 mg  5 mg Oral BID WITH MEALS Isatu Nava MD      
 metoprolol succinate (TOPROL-XL) XL tablet 25 mg  25 mg Oral DAILY Milan aClhoun MD   Stopped at 11/30/20 0442  0.9% sodium chloride infusion  50 mL/hr IntraVENous CONTINUOUS Juan Jose Menendez MD 50 mL/hr at 11/30/20 0202 50 mL/hr at 11/30/20 0202  albuterol (PROVENTIL HFA, VENTOLIN HFA, PROAIR HFA) inhaler 2 Puff  2 Puff Inhalation Q4H PRN Thuy Melena, NP      
 pantoprazole (PROTONIX) 40 mg in 0.9% sodium chloride 10 mL injection  40 mg IntraVENous Q12H Thuy Melena, NP   40 mg at 11/30/20 0831  levothyroxine (SYNTHROID) tablet 50 mcg  50 mcg Oral Stacey Beaver M, DO   50 mcg at 11/30/20 2794  
 risperiDONE (RisperDAL) 1 mg/mL oral solution soln 1 mg  1 mg Oral QHS Stacey Gold M, DO   1 mg at 11/29/20 2128  docosanol (ABREVA) 10 % cream   Topical 5XD Lovena Records M, DO      
 acetaminophen (TYLENOL) tablet 650 mg  650 mg Oral Q6H PRN Nicholas Salazar MD   650 mg at 11/28/20 1528 Or  acetaminophen (TYLENOL) suppository 650 mg  650 mg Rectal Q6H PRN Nicholas Salazar MD   650 mg at 11/22/20 2346  sodium chloride (NS) flush 5-40 mL  5-40 mL IntraVENous Q8H Nicholas Salazar MD   10 mL at 11/30/20 2269  sodium chloride (NS) flush 5-40 mL  5-40 mL IntraVENous PRN Nicholas Salazar MD   10 mL at 11/28/20 2032  polyethylene glycol (MIRALAX) packet 17 g  17 g Oral DAILY PRN Nicholas Salazar MD   17 g at 11/30/20 0831  
 promethazine (PHENERGAN) tablet 12.5 mg  12.5 mg Oral Q6H PRN Nicholas Salazar MD      
 Or  
 ondansetron [facility]) injection 4 mg  4 mg IntraVENous Q6H PRN Nicholas Salazar MD   4 mg at 11/30/20 2914  ascorbic acid (vitamin C) (VITAMIN C) tablet 500 mg  500 mg Oral BID Nicholas Salazar MD   500 mg at 11/30/20 5124  enoxaparin (LOVENOX) injection 40 mg  40 mg SubCUTAneous Q12H Nicholas Salazar MD   40 mg at 11/29/20 2140 No Known Allergies Past Medical History:  
Diagnosis Date  Endocrine disease  Hypothyroid 03/11/2018  Ill-defined condition Down's Syndrome No past surgical history on file. Her mother cannot recall family hx of congenital heart disease Social History Tobacco Use  Smoking status: Never Smoker  Smokeless tobacco: Never Used Substance Use Topics  Alcohol use: No  
  
 
Review of Systems: unable to obtain from her She is not communicative and has severe Down's Syndrome per her mother Objective:  
 
Visit Vitals /65 Pulse 93 Temp 98 °F (36.7 °C) Resp 19 Ht 5' 4\" (1.626 m) Wt 185 lb 10 oz (84.2 kg) SpO2 99% BMI 31.86 kg/m² Physical Exam: unable to examine due to COVID pneumonia isolation She is not communicative and so for virtual visit, can only discuss with her mother NT pro BNP 89 Troponin < 0.05 Chest CT No PE Esophagus: Diffusely dilated. A moderate hiatal hernia contains approximately a third of the stomach in the 
left pleural space. 
  
Chest: There is a variant of pectus excavatum deformity. This causes mild mass effect on the right heart. An accessory artery from the aortic arch, arising laterally between the left carotid and left subclavian arteries, bifurcates to the left inferior thyroidal artery and a small muscular branch superior to the left shoulder. Assessment/Plan:  
COVID 19 pneumonia with sinus tachycardia that have been going on since 11/2/2020. An echo has not been done again since 2018. She had LVEF 30% and RV enlargement, possible VSD and pulmonary regurgitation. ? care previously at [facility]. Her mother can recall [facility] clinic appointment but did not remember what was done and so we will need to contact [facility] congenital disease clinic for more information. Continue to support with infection treatment Reestablish [facility] when possible at [facility] if she is able to be discharged or transferred there BP is not high enough to increase more toprol or add entresto yet Will add corlanor 5 mg bid for sinus tachycardia She has significant hiatal hernia and is at risk of aspiration per chest CT report Discussed with Dr Michelle Fisher Thank you for involving me in this patient's care and please call with further concerns or questions. Radha Chan M.D. Electrophysiology/Cardiology [address] [place] [address] Vascular [place] [facility] [[email or web address]] [address], [address] [facility] [address]                               
[phone]

## 2020-11-30 NOTE — PROGRESS NOTES
Bedside shift change report given to HEMA Miller (oncoming nurse) by Haylee Toledo RN (offgoing nurse). Report included the following information SBAR, Kardex, MAR, Recent Results, Med Rec Status, and Cardiac Rhythm ST . Patient Vitals for the past 12 hrs: 
 Temp Pulse Resp BP SpO2  
11/30/20 0710 98 °F (36.7 °C) 93 19 (!) 111/47 99 % 11/30/20 0320 98.7 °F (37.1 °C) (!) 111 26 121/72 92 % 11/29/20 2310 98.6 °F (37 °C) (!) 103 30 131/61 98 % Last 3 Recorded Weights in this Encounter 11/28/20 4469 11/29/20 0110 11/30/20 0320 Weight: 84 kg (185 lb 3 oz) 86.5 kg (190 lb 11.2 oz) 84.2 kg (185 lb 10 oz) Problem: Gas Exchange - Impaired Goal: Absence of hypoxia Outcome: Resolved/Met Problem: Falls - Risk of 
Goal: *Absence of Falls Description: Document Gerry Going Fall Risk and appropriate interventions in the flowsheet. Outcome: Progressing Towards Goal 
Note: Fall Risk Interventions: 
Mobility Interventions: Communicate number of staff needed for ambulation/transfer, Mechanical lift Mentation Interventions: Adequate sleep, hydration, pain control, Bed/chair exit alarm, Increase mobility, More frequent rounding, Room close to nurse's station Medication Interventions: Evaluate medications/consider consulting pharmacy, Teach patient to arise slowly Elimination Interventions: Call light in reach, Toilet paper/wipes in reach History of Falls Interventions: Bed/chair exit alarm, Room close to nurse's station Problem: General Medical Care Plan Goal: *Vital signs within specified parameters Outcome: Progressing Towards Goal 
Goal: *Labs within defined limits Outcome: Progressing Towards Goal 
Goal: *Absence of infection signs and symptoms Outcome: Progressing Towards Goal 
Goal: *Skin integrity maintained Outcome: Progressing Towards Goal 
  
Problem: Gas Exchange - Impaired Goal: Promote optimal lung function Outcome: Progressing Towards Goal 
  
 Problem: Breathing Pattern - Ineffective Goal: Ability to achieve and maintain a regular respiratory rate Outcome: Progressing Towards Goal 
  
Problem: Body Temperature -  Risk of, Imbalanced Goal: Ability to maintain a body temperature within defined limits Outcome: Progressing Towards Goal 
  
Problem: Isolation Precautions - Risk of Spread of Infection Goal: Prevent transmission of infectious organism to others Outcome: Progressing Towards Goal

## 2020-11-30 NOTE — PROGRESS NOTES
Patient instructed to indicate if she has pain using her hands; while grunting, she motions to her stomach with right hand; Zofran administered per written order. Abdomen appears distended. Patient's last bowel movement was 11/24.   
 
0700  Patient appears to be more comfortable and is sleeping.

## 2020-11-30 NOTE — PROGRESS NOTES
Transition Plan of Care RUR 22%-Med 
Covid positive. Received Remdesivir and steroids. History of Down syndrome. Lives with mother Berry Barrios [phone]. At baseline and she is primary caregiver. Palliative care following. Family wishes Full Code at this time. Pending progress will likely discharge home with mother. Will evaluate for skilled needs at discharge. Richard Chávez RN CRM Ext [phone]

## 2020-11-30 NOTE — PROGRESS NOTES
Clinical Pharmacy Note: IV to PO Automatic Conversion Please note: Priyank Medrano medication- pantoprazole has been changed from IV to PO based on the following critiera: 
 
Patient is taking scheduled oral medications Patient is tolerating tube feeds at goal rate or a full liquid, soft or regular diet This IV to PO conversion is based on the P[employer] approved automatic conversion policy for eligible patients. Please call with questions.

## 2020-11-30 NOTE — PROGRESS NOTES
Problem: Falls - Risk of 
Goal: *Absence of Falls Description: Document Brittany Mueller Fall Risk and appropriate interventions in the flowsheet. Outcome: Progressing Towards Goal 
Note: Fall Risk Interventions: 
Mobility Interventions: Communicate number of staff needed for ambulation/transfer, Patient to call before getting OOB Mentation Interventions: Adequate sleep, hydration, pain control, More frequent rounding Medication Interventions: Evaluate medications/consider consulting pharmacy Elimination Interventions: Call light in reach History of Falls Interventions: Bed/chair exit alarm, Room close to nurse's station Problem: General Medical Care Plan Goal: *Vital signs within specified parameters Outcome: Progressing Towards Goal 
  
Problem: Airway Clearance - Ineffective Goal: Achieve or maintain patent airway Outcome: Progressing Towards Goal 
  
Bedside and Verbal shift change report given to Ivan (oncoming nurse) by [facility] (offgoing nurse). Report included the following information SBAR, Kardex, and Quality Measures.

## 2020-11-30 NOTE — PROGRESS NOTES
Pharmacist Note - Vancomycin Dosing Therapy day 8 Indication: bacterial pneumonia Current regimen: 1250mg q16h A Trough Level resulted at 19.7 mcg/mL which was obtained 17 hrs post-dose. The extrapolated \"true\" trough is approximately 20.7 mcg/mL based on the patient's known kinetics. Goal trough: 15 - 20 mcg/mL Plan: Change to 1250mg q18h . Pharmacy will continue to monitor this patient daily for changes in clinical status and renal function.

## 2020-11-30 NOTE — CONSULTS
Comprehensive Nutrition Assessment Type and Reason for Visit: Jose Pires Nutrition Recommendations/Plan: 1. Mechanical soft diet with ONS at all meals 2. Please use ONS with med pass to maximize intake 3. Consider increasing bowel regimen - no BM x 6 days Nutrition Assessment:    
47 y.o. female with PMHx of hypothyroidism, Down Syndrome, CKD III, CHF, large hiatal hernia, and recently treated for COVID-19 infection earlier this month (11/8-11/16) at [facility]. Admitted with sepsis 2° COVID-19 PNA. Noted: tachycardia - persistent, cardiology following; DANY on CKD - improved/stable; dysphagia - seen by SLP 11/23, now back on baseline mechanical soft diet (previously purees); and oral labial herpes - s/p abreva. Saturating well on room air. Remains on empiric abx. ID reconsulted for low-grade fever 11/29 - not currently exhibiting symptoms of acute COVID disease, ? retesting ordered. Noted pt is s/p remdesivir, decadron, and convalescent plasma. Per initial RD assessment, noted 5-6 lb wt loss since beginning of the month (2.7-3.2% BW) despite mother indicating pt has been eating well PTA. RD resulted today for nutritional supplements. Pt is already on 4 different ONS. Per RN, pt is doing much better. Able to feed herself, at least 50% of all meals. Taking much more of the puddings and Magic Cup vs drinks at this point. Wt back up to UBW, although bed weight vs admission wt was standing. Attempted to call mother today, no answer. Will continue baseline mechanical soft diet. Increase Boost Pudding to all meals. D/c Ensure Enlive. Decrease Ensure Clear to BID. Noted no BM x 6 days, would benefit from bowel regimen. RD will continue to follow closely. PO intake much improved. Malnutrition Assessment: 
Malnutrition Status:  Insufficient data Context:  Acute illness Nutritionally Significant Medications:  
NS@ 50 mL/hr, Vit C, Synthroid, PRN Zofran, PRN Miralax, Prevacid Estimated Daily Nutrient Needs: 
Energy (kcal): 5148-8623(MSJ x 1 AF x 1.2 SF) Protein (g): 75-90(20%) Fluid (ml/day): 1 mL/kcal 
 
Nutrition Related Findings:  
Edema: none Last BM: 11/24 - soft Wounds:   
None Current Nutrition Therapies: 
Diet: mechanical soft Supplements: Ensure Clear TID with meals, Boost Pudding @ breakfast, Magic Cup BID with lunch and dinner, Ensure Enlive @ dinner Meal intake:  
Patient Vitals for the past 168 hrs: 
 % Diet Eaten 11/29/20 2015 100 % 11/28/20 1301 50 % 11/28/20 0930 50 % 11/26/20 1600 100 % 11/26/20 1200 100 % 11/26/20 0800 25 % 11/25/20 0900 50 % Anthropometric Measures: 
· Height:  5' 4\" (162.6 cm) · Current Body Wt:  84.2 kg (185 lb 10 oz) · Admission Body Wt:  180 lb(81.647 kg - standing) · Usual Body Wt:  83.9 kg (185 lb) · Ideal Body Wt:  120 lbs:  150.1 % · BMI Category:  Obese class 1 (BMI 30.0-34. 9) Wt Readings from Last 20 Encounters:  
11/30/20 84.2 kg (185 lb 10 oz) - bed  
11/24/20 81.7 kg (180 lb 1.9 oz)  
11/19/20 81.6 kg (180 lb) - standing, admission 11/16/20 84.4 kg (186 lb) 11/06/20 84.4 kg (186 lb) 11/02/20 83.9 kg (185 lb)  
03/11/18 83.9 kg (185 lb) Nutrition Diagnosis:  
· Inadequate oral intake related to (acute illness) as evidenced by intake 51-75%(improving) Nutrition Interventions:  
Food and/or Nutrient Delivery: Continue current diet, Modify oral nutrition supplement Nutrition Education and Counseling: No recommendations at this time Coordination of Nutrition Care: Continue to monitor while inpatient, Feeding assistance/environmental change, Interdisciplinary rounds Goals: 
continued PO >50% of all meals with at least 1-2 ONS daily over next 5-7 days, stable wt Nutrition Monitoring and Evaluation:  
Behavioral-Environmental Outcomes: Knowledge or skill Food/Nutrient Intake Outcomes: Food and nutrient intake, Supplement intake Physical Signs/Symptoms Outcomes: Biochemical data, Chewing or swallowing, Meal time behavior, Weight Discharge Planning:   
Continue current diet, Continue oral nutrition supplement Recent Labs 11/30/20 
8297 11/29/20 
0125 11/28/20 
0319 GLU 86 90 97 BUN 16 18 18 CREA 1.02 1.15* 0.91  144 142  
K 4.4 3.9 3.8 * 115* 116* CO2 21 21 18* CA 7.9* 7.9* 8.1* No results for input(s): ALT, AP, TBIL, TBILI, TP, ALB, GLOB, GGT, AML, LPSE in the last 72 hours. No lab exists for component: SGOT, GPT, AMYP, HLPSE No results for input(s): LAC in the last 72 hours. No results for input(s): WBC, HGB, HCT, PLT, HGBEXT, HCTEXT, PLTEXT, HGBEXT, HCTEXT, PLTEXT, HGBEXT, HCTEXT, PLTEXT in the last 72 hours. No results for input(s): PREALB in the last 72 hours. No results for input(s): TRIGL in the last 72 hours. No results for input(s): GLUCPOC in the last 72 hours. No results found for: HBA1C, HGBE8, WIF5HERC, BVT0YHMD, OXQ4AKHG Iron Profile No results found for: IRON, TIBC, PSAT Ferritin Date Value Ref Range Status 11/20/2020 795 (H) 26 - 388 NG/ML Final  
11/16/2020 373 26 - 388 NG/ML Final  
11/15/2020 394 (H) 26 - 388 NG/ML Final  
 
 
Karis Stokes RD Available via [employer] Or Pager# [phone]

## 2020-12-01 NOTE — PROGRESS NOTES
[address] [facility] Adult  Hospitalist Group Hospitalist Progress Note Rosetta Batista MD 
Answering service: [phone] from in house phone Date of Service:  2020 NAME:  Yamel Rowe :  1980 MRN:  [id number] Admission Summary:  
47 y.o. female past medical history significant for hypothyroidism, Down syndrome and recently treated for COVID-19 infection was brought to the emergency room for further evaluation of elevated heart rate. Patient was admitted to [facility] on 2 2020 for acute respiratory failure secondary to COVID-19 infection. As per mother report at the time of discharge patient was doing better in no acute respiratory distress and not having any fever. Today home health nurse came to check on the patient and found that her heart rate was in the 140s 150s. Patient was sent her to the emergency room for evaluation. On arrival to the emergency room at [facility] she was found to be tachycardic and tachypneic. Patient has not been hypoxic or requiring O2 supplementation. CBC showed a WBC of 21,000 from 14,000 at the time of discharge. CTA of the chest was negative for PE but found to have persistent COVID-19 pneumonia. As per mother patient has not been coughing, being febrile, having diarrhea. Patient has been eating well. During her prior hospitalization patient was treated with remdesivir and completed a course of Decadron. Given persistent tachycardia after 1L iv fluids she admission was requested. Interval history / Subjective: Follow up sepsis. Previously Talked to mother in detail and updated her regarding patient condition. She told me that she was never told that her daughter has any cardiac  Issues. Patient prior echo reported EF of 30% in 2018. Cardiology consulted Tachycardia improved now HR less than 100. BP better. Currently on 3 medications, corlanor, entresto, and metoprolol Echo is pending Assessment & Plan:  
 
Sepsis (POA) due to COVID 19 Pneumonia:  resolved  
-CT compatible with COVID 19. Repeat 11/24 due to recurrent new fevers and tachycardia 
-s/p vancomycin, cefepime. Cultures NGTD , for now we will continue with abx unless recommended by ID to stop abx  
-Daily vitamin C 
-completed remdesmivir and dexamethazone during previous hospitalization 
-s/p fluid bolus, caution with fluids due to pulmonary edema  
-continue supplemental oxygen 2 liters 
-patient with gradual decline since hospitalization, appreciate palliative care input and goals of care discussion with family . Currently full code #tachycardia, improved Echo ordered, pending Cardiology consulted, need to follow up with cardiology as outpatient as well. On corlanor, metoprolol and entresto was added today Cardiology following. Dysphagia: appreciate speech therapy, okay for mechanical soft diet 
-nutrition consult to monitor appropriate intake DANY on CKD stage III: improved and stable Received 1 L of IV fluid in the ED. Monitor renal function. Encourage p.o. intake Most recent serum creatinine 1.19 
  
Hypothyroidism: Continue levothyroxine 
  
Down syndrome: Continue with Risperdal 
  
Hiatal hernia: Known finding. continue Protonix 40 mg p.o. daily. 
-Outpatient follow-up with GI. Oral labial herpes: s/p abreva 
 
  
Code status: full DVT prophylaxis: lovenox Care Plan discussed with: Patient/Family Anticipated Disposition: Home w/Family Anticipated Discharge: Greater than 48 hours, pt/ot consult Hospital Problems  Never Reviewed Codes Class Noted POA Sepsis ([facility] ICD-10-CM: A41.9 ICD-9-CM: 038.9, 995.91  11/19/2020 Unknown Review of Systems:  
Negative unless stated above Vital Signs: Last 24hrs VS reviewed since prior progress note. Most recent are: 
Visit Vitals BP (!) 135/58 Pulse 96 Temp 98.5 °F (36.9 °C) Resp 25 Ht 5' 4\" (1.626 m) Wt 87.5 kg (192 lb 14.4 oz) SpO2 100% BMI 33.11 kg/m² Intake/Output Summary (Last 24 hours) at 12/1/2020 1519 Last data filed at 12/1/2020 6818 Gross per 24 hour Intake 124.17 ml Output 800 ml Net -675.83 ml Physical Examination:  
 
I had a face to face encounter with this patient and independently examined them on 12/1/2020 as outlined below: 
 
     
Constitutional:  No acute distress, non verbal   
ENT:  right sided labial crusted lesions Resp:    No accessory muscle use, nom audible wheezing CV:  , no murmurs, gallops, rubs GI:  Soft, non distended, non tender. normoactive bowel sounds, no hepatosplenomegaly Musculoskeletal:  No edema, warm, 2+ pulses throughout Neurologic:  Moves all extremities Data Review:  
 Review and/or order of clinical lab test 
Review and/or order of tests in the radiology section of CPT Review and/or order of tests in the medicine section of CPT Labs:  
 
No results for input(s): WBC, HGB, HCT, PLT, HGBEXT, HCTEXT, PLTEXT, HGBEXT, HCTEXT, PLTEXT in the last 72 hours. Recent Labs 12/01/20 
9575 11/30/20 
0343 11/29/20 
0125  141 144  
K 4.3 4.4 3.9 * 115* 115* CO2 23 21 21 BUN 19 16 18 CREA 1.19* 1.02 1.15* GLU 86 86 90  
CA 8.7 7.9* 7.9* No results for input(s): ALT, AP, TBIL, TBILI, TP, ALB, GLOB, GGT, AML, LPSE in the last 72 hours. No lab exists for component: SGOT, GPT, AMYP, HLPSE No results for input(s): INR, PTP, APTT, INREXT, INREXT in the last 72 hours. No results for input(s): FE, TIBC, PSAT, FERR in the last 72 hours. No results found for: FOL, RBCF No results for input(s): PH, PCO2, PO2 in the last 72 hours. No results for input(s): CPK, CKNDX, TROIQ in the last 72 hours. No lab exists for component: CPKMB No results found for: CHOL, CHOLX, CHLST, CHOLV, HDL, HDLP, LDL, LDLC, DLDLP, TGLX, TRIGL, TRIGP, CHHD, CHHDX Lab Results Component Value Date/Time Glucose (POC) 122 (H) 11/24/2020 05:36 AM  
 Glucose (POC) 82 03/11/2018 02:32 PM  
 
Lab Results Component Value Date/Time Color YELLOW/STRAW 11/23/2020 10:39 AM  
 Appearance CLOUDY (A) 11/23/2020 10:39 AM  
 Specific gravity 1.021 11/23/2020 10:39 AM  
 Specific gravity 1.010 11/20/2020 01:57 AM  
 pH (UA) 6.0 11/23/2020 10:39 AM  
 Protein TRACE (A) 11/23/2020 10:39 AM  
 Glucose Negative 11/23/2020 10:39 AM  
 Ketone 40 (A) 11/23/2020 10:39 AM  
 Bilirubin Negative 11/23/2020 10:39 AM  
 Urobilinogen 1.0 11/23/2020 10:39 AM  
 Nitrites Negative 11/23/2020 10:39 AM  
 Leukocyte Esterase TRACE (A) 11/23/2020 10:39 AM  
 Epithelial cells FEW 11/23/2020 10:39 AM  
 Bacteria 1+ (A) 11/23/2020 10:39 AM  
 WBC 0-4 11/23/2020 10:39 AM  
 RBC 20-50 11/23/2020 10:39 AM  
 
 
 
Medications Reviewed:  
 
Current Facility-Administered Medications Medication Dose Route Frequency  sacubitriL-valsartan (ENTRESTO) 24-26 mg tablet 1 Tab  1 Tab Oral Q12H  
 senna (SENOKOT) tablet 8.6 mg  1 Tab Oral DAILY  polyethylene glycol (MIRALAX) packet 17 g  17 g Oral DAILY  bisacodyL (DULCOLAX) tablet 5 mg  5 mg Oral DAILY  ivabradine (CORLANOR) tablet 5 mg  5 mg Oral BID WITH MEALS  lansoprazole (PREVACID) 3 mg/mL oral suspension 30 mg  30 mg Oral ACB  metoprolol succinate (TOPROL-XL) XL tablet 25 mg  25 mg Oral DAILY  albuterol (PROVENTIL HFA, VENTOLIN HFA, PROAIR HFA) inhaler 2 Puff  2 Puff Inhalation Q4H PRN  
 levothyroxine (SYNTHROID) tablet 50 mcg  50 mcg Oral 6am  
 risperiDONE (RisperDAL) 1 mg/mL oral solution soln 1 mg  1 mg Oral QHS  acetaminophen (TYLENOL) tablet 650 mg  650 mg Oral Q6H PRN  Or  
 acetaminophen (TYLENOL) suppository 650 mg  650 mg Rectal Q6H PRN  
  sodium chloride (NS) flush 5-40 mL  5-40 mL IntraVENous Q8H  
 sodium chloride (NS) flush 5-40 mL  5-40 mL IntraVENous PRN  polyethylene glycol (MIRALAX) packet 17 g  17 g Oral DAILY PRN  promethazine (PHENERGAN) tablet 12.5 mg  12.5 mg Oral Q6H PRN Or  
 ondansetron (ZOFRAN) injection 4 mg  4 mg IntraVENous Q6H PRN  
 ascorbic acid (vitamin C) (VITAMIN C) tablet 500 mg  500 mg Oral BID  enoxaparin (LOVENOX) injection 40 mg  40 mg SubCUTAneous Q12H  
 
______________________________________________________________________ EXPECTED LENGTH OF STAY: 4d 19h ACTUAL LENGTH OF STAY:          Travon Martinez MD

## 2020-12-01 NOTE — PROGRESS NOTES
Problem: Mobility Impaired (Adult and Pediatric) Goal: *Acute Goals and Plan of Care (Insert Text) Description: FUNCTIONAL STATUS PRIOR TO ADMISSION: Patient required moderate assistance for basic and instrumental ADLs. HOME SUPPORT PRIOR TO ADMISSION: The patient lived with mother who assists with ADLs Physical Therapy Goals Initiated 11/27/2020 1. Patient will move from supine to sit and sit to supine , scoot up and down, and roll side to side in bed with supervision/set-up within 7 day(s). 2.  Patient will transfer from bed to chair and chair to bed with supervision/set-up using the least restrictive device within 7 day(s). 3.  Patient will perform sit to stand with supervision/set-up within 7 day(s). 4.  Patient will ambulate with supervision/set-up for 50 feet with the least restrictive device within 7 day(s). Outcome: Progressing Towards Goal 
 
PHYSICAL THERAPY TREATMENT Patient: Juan Score (37 y.o. female) Date: 12/1/2020 Diagnosis: Sepsis ([facility] [A41.9] <principal problem not specified> Precautions: Fall Chart, physical therapy assessment, plan of care and goals were reviewed. ASSESSMENT Patient continues with skilled PT services and is progressing towards goals. Pt was was able to initiate movement with facilitation. Pt sat up with removal of bed rail and covers. Pt stood with holding hands. Pt would quickly sit back down. Pt able to stand 3 trials. Attempt to encourage side stepping but unable to facilitate. Pt was able to return supine with SBA. Will continue to progress as able Current Level of Function Impacting Discharge (mobility/balance): min A Other factors to consider for discharge: non verbal  
    
 
PLAN : 
Patient continues to benefit from skilled intervention to address the above impairments. Continue treatment per established plan of care. to address goals.  
 
Recommendation for discharge: (in order for the patient to meet his/her long term goals) Physical therapy at least 2 days/week in the home AND ensure assist and/or supervision for safety with mobility This discharge recommendation: 
Has not yet been discussed the attending provider and/or case management IF patient discharges home will need the following DME: patient owns DME required for discharge SUBJECTIVE:  
Patient nonverbal . Grunting sounds OBJECTIVE DATA SUMMARY:  
Critical Behavior: 
Neurologic State: Alert, Eyes open spontaneously Orientation Level: Unable to verbalize(nonverbal at basline) Cognition: Follows commands Safety/Judgement: Awareness of environment, Fall prevention Functional Mobility Training: 
Bed Mobility: 
  
Supine to Sit: Minimum assistance(pt initiated ) Sit to Supine: Stand-by assistance Transfers: 
Sit to Stand: Minimum assistance Stand to Sit: Minimum assistance Balance: 
Sitting: Impaired Sitting - Static: Good (unsupported) Sitting - Dynamic: Fair (occasional) Standing: Impaired Standing - Static: Fair Standing - Dynamic : Fair Ambulation/Gait Training: 
  
  
  
  
  
  
  
  
  
  
  
  
  
  
  
  
  
  
Stairs: Therapeutic Exercises:  
 
Pain Rating: 
Non verbal did not express pain Activity Tolerance:  
Fair After treatment patient left in no apparent distress:  
Supine in bed, Heels elevated for pressure relief, and Call bell within reach COMMUNICATION/COLLABORATION:  
The patients plan of care was discussed with: Registered nurse. Peggy Pelaez PTA Time Calculation: 18 mins

## 2020-12-01 NOTE — PROGRESS NOTES
CARDIOLOGY Progress Note Subjective:  
  
Carlos A Roman is a 47 y.o. patient who is seen for evaluation of sinus tachycardia by Dr Hilario Martin 
toprol has been started and HR is still 120 bpm so I started corlanor PVC and sinus rate is better [facility] presumable ongoing [place] infection since 11/2/2020. She was first tested at [facility] and referred to [facility] for care She has been in and out of hospital and the patient is non communicative so I can only review her chart and spoke to her mother Ms Real Arzate did not remember she has congenital heart disease and VSD, cardiomyopathy, dilated heart Dr Spenser Cabello had referred her to [address] [place] [address] back in 2018 but her mother, Ms Real Arzate said she only recalled office visit there Ms Real Arzate said \"everything that can be done should be done for her daughter, the patient and this is the first time she is sick\" She had been given remdesivir, decadron and lovenox, zinc and vitamin C for COVID pneumonia She has severe Down's syndrome and known congenital heart disease with an echo done at [facility] with Dr Spenser Cabello back in 2018 She is nonverbal with significant skeletal deformity. She has a history of having a heart murmur and PVC Dr Spenser Cabello had noted her echo with concentric LVH with superimposed congestive cardiomyopathy. Possible small membranous VSD. Enlarged RV with decreased function. Moderate pulmonary hypertension. LV Ejection fraction was estimated to be 30 %. There was moderate diffuse hypokinesis. Mild TR and moderate MD  
BNP was low Patient Active Problem List  
Diagnosis Code  Pneumonia due to COVID-19 virus U07.1, J12.89  Sepsis ([facility] A41.9 Current Facility-Administered Medications Medication Dose Route Frequency Provider Last Rate Last Dose  ivabradine (CORLANOR) tablet 5 mg  5 mg Oral BID WITH MEALS Chandana Diaz MD   5 mg at 11/30/20 0046  lansoprazole (PREVACID) 3 mg/mL oral suspension 30 mg  30 mg Oral ACB Ton Ryan MD   30 mg at 12/01/20 0731  
 metoprolol succinate (TOPROL-XL) XL tablet 25 mg  25 mg Oral DAILY Anna Gaona MD   Stopped at 11/30/20 0837  
 albuterol (PROVENTIL HFA, VENTOLIN HFA, PROAIR HFA) inhaler 2 Puff  2 Puff Inhalation Q4H PRN Sandra Helms NP      
 levothyroxine (SYNTHROID) tablet 50 mcg  50 mcg Oral 6am Gold, Stacey M, DO   50 mcg at 12/01/20 0599  
 risperiDONE (RisperDAL) 1 mg/mL oral solution soln 1 mg  1 mg Oral QHS Gold, Stacey M, DO   1 mg at 11/30/20 2024  acetaminophen (TYLENOL) tablet 650 mg  650 mg Oral Q6H PRN Alexey Patel MD   650 mg at 11/30/20 2032 Or  acetaminophen (TYLENOL) suppository 650 mg  650 mg Rectal Q6H PRN Alexey Patel MD   650 mg at 11/22/20 2346  sodium chloride (NS) flush 5-40 mL  5-40 mL IntraVENous Q8H Alexey Patel MD   10 mL at 12/01/20 0721  
 sodium chloride (NS) flush 5-40 mL  5-40 mL IntraVENous PRN Alexey Patel MD   10 mL at 11/28/20 2032  polyethylene glycol (MIRALAX) packet 17 g  17 g Oral DAILY PRN Alexey Patel MD   17 g at 11/30/20 0831  
 promethazine (PHENERGAN) tablet 12.5 mg  12.5 mg Oral Q6H PRN Alexey Patel MD      
 Or  
 ondansetron [facility]) injection 4 mg  4 mg IntraVENous Q6H PRN Alexey Patel MD   4 mg at 11/30/20 1331  
 ascorbic acid (vitamin C) (VITAMIN C) tablet 500 mg  500 mg Oral BID Alexey Patel MD   500 mg at 11/30/20 5330  enoxaparin (LOVENOX) injection 40 mg  40 mg SubCUTAneous Q12H Alexey Patel MD   40 mg at 11/30/20 3637 No Known Allergies Past Medical History:  
Diagnosis Date  Endocrine disease  Hypothyroid 03/11/2018  Ill-defined condition Down's Syndrome No past surgical history on file. Her mother cannot recall family hx of congenital heart disease Social History Tobacco Use  Smoking status: Never Smoker  Smokeless tobacco: Never Used Substance Use Topics  Alcohol use: No  
  
 
Review of Systems: unable to obtain from her She is not communicative and has severe Down's Syndrome per her mother Objective:  
 
Visit Vitals BP (!) 124/91 Pulse 96 Temp 98.2 °F (36.8 °C) Resp 18 Ht 5' 4\" (1.626 m) Wt 192 lb 14.4 oz (87.5 kg) SpO2 98% BMI 33.11 kg/m² Physical Exam: unable to examine due to COVID pneumonia isolation She is not communicative and so for virtual visit, can only discuss with her mother NT pro BNP 89 Troponin < 0.05 Chest CT No PE Esophagus: Diffusely dilated. A moderate hiatal hernia contains approximately a third of the stomach in the 
left pleural space. 
  
Chest: There is a variant of pectus excavatum deformity. This causes mild mass effect on the right heart. An accessory artery from the aortic arch, arising laterally between the left carotid and left subclavian arteries, bifurcates to the left inferior thyroidal artery and a small muscular branch superior to the left shoulder. Assessment/Plan:  
COVID 19 pneumonia with sinus tachycardia that have been going on since 11/2/2020. An echo has not been done again since 2018. She had LVEF 30% and RV enlargement, possible VSD and pulmonary regurgitation. ? care previously at [facility]. Her mother can recall [facility] clinic appointment but did not remember what was done Continue to support with infection treatment Reestablish care when possible at [facility] congenital heart disease service if she is able to be discharged or transferred there Today I recommend to add entresto Continue with corlanor 5 mg bid for sinus tachycardia She has significant hiatal hernia and is at risk of aspiration per chest CT report Thank you for involving me in this patient's care and please call with further concerns or questions. Liv Belle M.D. Electrophysiology/Cardiology [facility] [place] [address] [place] [address], [address]                               
[phone]

## 2020-12-01 NOTE — PROGRESS NOTES
Problem: Falls - Risk of 
Goal: *Absence of Falls Description: Document Brad Carmona Fall Risk and appropriate interventions in the flowsheet. Outcome: Progressing Towards Goal 
Note: Fall Risk Interventions: 
Mobility Interventions: Communicate number of staff needed for ambulation/transfer, Patient to call before getting OOB, OT consult for ADLs, PT Consult for mobility concerns Mentation Interventions: Evaluate medications/consider consulting pharmacy Medication Interventions: Evaluate medications/consider consulting pharmacy Elimination Interventions: Call light in reach, Patient to call for help with toileting needs History of Falls Interventions: Bed/chair exit alarm, Room close to nurse's station Problem: Heart Failure: Discharge Outcomes Goal: *Demonstrates ability to perform prescribed activity without shortness of breath or discomfort Outcome: Progressing Towards Goal 
  
Bedside and Verbal shift change report given to Heather Rowan (oncoming nurse) by Cyndee Rabago (offgoing nurse). Report included the following information SBAR, Kardex, and Quality Measures.

## 2020-12-01 NOTE — PROGRESS NOTES
Bedside shift change report given to Juan Carlos Carty (oncoming nurse) by Naa Siddiqi (offgoing nurse). Report included the following information SBAR, Kardex, ED Summary, MAR, Recent Results and Cardiac Rhythm NSR/Sinus Tach. Last 3 Recorded Weights in this Encounter 11/29/20 0110 11/30/20 0320 12/01/20 9183 Weight: 86.5 kg (190 lb 11.2 oz) 84.2 kg (185 lb 10 oz) 87.5 kg (192 lb 14.4 oz) **weighed with one blanket, one sheet and one pillow** Problem: Falls - Risk of 
Goal: *Absence of Falls Description: Document Ewa Duke Fall Risk and appropriate interventions in the flowsheet. Outcome: Progressing Towards Goal 
Note: Fall Risk Interventions: 
Mobility Interventions: Communicate number of staff needed for ambulation/transfer, Patient to call before getting OOB Mentation Interventions: Adequate sleep, hydration, pain control, Evaluate medications/consider consulting pharmacy, Reorient patient, Room close to nurse's station, Update white board Medication Interventions: Evaluate medications/consider consulting pharmacy Elimination Interventions: Call light in reach, Toileting schedule/hourly rounds History of Falls Interventions: Bed/chair exit alarm, Room close to nurse's station Problem: Airway Clearance - Ineffective Goal: Achieve or maintain patent airway Outcome: Progressing Towards Goal 
Note: Patient is on room air and sating >95%. Patient remains stable. Problem: Body Temperature -  Risk of, Imbalanced Goal: Ability to maintain a body temperature within defined limits Outcome: Progressing Towards Goal 
Note: Patient had a fever and was given tylenol, blanket removed and room temperature turned down to help decrease the temperature. Problem: Isolation Precautions - Risk of Spread of Infection Goal: Prevent transmission of infectious organism to others Outcome: Progressing Towards Goal 
Note: Patient is on droplet plus precaution for COVID-19 Problem: Nutrition Deficits Goal: Optimize nutrtional status Outcome: Progressing Towards Goal 
Note: Patient is on a mechanical soft diet with supplements Problem: Pressure Injury - Risk of 
Goal: *Prevention of pressure injury Description: Document Jai Scale and appropriate interventions in the flowsheet. Outcome: Progressing Towards Goal 
Note: Pressure Injury Interventions: 
Sensory Interventions: Assess changes in LOC, Assess need for specialty bed, Check visual cues for pain, Float heels, Keep linens dry and wrinkle-free, Minimize linen layers, Pressure redistribution bed/mattress (bed type), Turn and reposition approx. every two hours (pillows and wedges if needed) Moisture Interventions: Absorbent underpads, Internal/External urinary devices, Assess need for specialty bed, Minimize layers, Moisture barrier, Maintain skin hydration (lotion/cream) Activity Interventions: Pressure redistribution bed/mattress(bed type), Increase time out of bed Mobility Interventions: Assess need for specialty bed, Pressure redistribution bed/mattress (bed type), Turn and reposition approx. every two hours(pillow and wedges) Nutrition Interventions: Document food/fluid/supplement intake Friction and Shear Interventions: Minimize layers, Transferring/repositioning devices, Foam dressings/transparent film/skin sealants

## 2020-12-01 NOTE — PROGRESS NOTES
Transition Plan of Care 
RUR 19%-Med 
Covid positive-completed remdesivir and steroids. History of Down Syndrome. She is followed by [facility]. Update given to Maritza Gee at [address] [place] [address]. She has been patient's CM for the last 15 years. Recommendations for home health noted. Pending progress will likely discharge home with family and home health. Huey Childress RN CRM Ext [phone]

## 2020-12-02 NOTE — WOUND CARE
WOCN Note    Attempted consult for right buttock abrasion. Jose Alberto +    Star Torres RN reports that she did not note any buttock skin breakdown upon assessment. Currently using pure wick for incontinence management. Recommendation:  Protect buttocks and sacral skin with Zinc cream.  Will sign off, reconsult if needed.     JOANNA ManjarrezN RN [facility]  Available on Perfect Serve  Pager [phone]  Office [phone]

## 2020-12-02 NOTE — PROGRESS NOTES
Physician Progress Note      Isma Levy  CSN #:                  [id number]  :                       1980  ADMIT DATE:       2020 9:31 PM  [address] [place] DATE:  RESPONDING  PROVIDER #:        PATRIC Rawls  MD          QUERY TEXT:    Pt admitted with Sepsis and has CHF documented. If possible, please document in progress notes and discharge summary further specificity regarding the type and acuity of CHF:    The medical record reflects the following:  Risk Factors:40 y.o. female past medical history significant Downs syndrome, congenital heart disease and VSD, cardiomyopathy, dilated heart, being evaluated by cardiology for tachycardia, noted to have CHF. Clinical Indicators:  20 Cardiology MD PN: History of congenital heart disease with reduced ejection fraction now with  COVID-19 pneumonia  Objective testing indicates no evidence of acute CHF  Echo at this time would not  and will be canceled it should be done at a later date when it will affect her long-term management and follow-up. 20 Cardiology MD PN: known congenital heart disease with an echo done at [facility] with Dr Andre Escobar back in 2018. \"Dr Andre Escobar had noted her echo(3/11/2018) with concentric LVH with superimposed congestive cardiomyopathy. Possible small membranous VSD. Enlarged RV with decreased function. Moderate pulmonary hypertension. LV Ejection fraction was estimated to be 30 %. There was moderate diffuse hypokinesis. Mild TR and moderate TX\".     Treatment: Admit to hospital, hospitalist consult, Cardiology consult.  suggested by cardiology-at [facility] to re-establish care with [facility], Corlanor 5mg bid added for tachycardia; Echo Canceled    Cheryle J Rilee RN,BSN  Clinical Documentation Integrity  [phone]  Options provided:  -- Chronic Systolic CHF/HFrEF  -- Acute Systolic CHF  -- Other - I will add my own diagnosis  -- Disagree - Not applicable / Not valid  -- Disagree - Clinically unable to determine / Unknown  -- Refer to Clinical Documentation Reviewer    PROVIDER RESPONSE TEXT:    This patient has chronic systolic CHF/HFrEF.     Query created by: Kel Ni on 12/2/2020 2:21 PM      Electronically signed by:  Sienna Friend MD 12/2/2020 6:13 PM

## 2020-12-02 NOTE — PROGRESS NOTES
CARDIOLOGY Progress Note     Subjective:      Nikko Scott is a 47 y.o. patient who is seen for evaluation of sinus tachycardia by Dr Mohini Ross  toprol has been started and HR is still 120 bpm so I started corlanor  PVC and today Dr Mohini Ross said he is discharging her  I had started her on entresto   I spoke to Dr Ronnie Kirby about her  I called her mother Ms Josephine Perry    She is in [facility] for presumable ongoing COVID infection since 11/2/2020. She was first tested at [facility] and referred to [facility] for care  She has been in and out of hospital and the patient is non communicative so I can only review her chart and spoke to her mother Ms Josephine Perry did not remember she has congenital heart disease and VSD, cardiomyopathy, dilated heart  Dr Levi Arias had referred her to [facility] back in 2018 but her mother, Ms Josephine Perry said she only recalled office visit there  Ms Josephine Perry said \"everything that can be done should be done for her daughter, the patient and this is the first time she is sick\"  She had been given remdesivir, decadron and lovenox, zinc and vitamin C for COVID pneumonia    She has severe Down's syndrome and known congenital heart disease with an echo done at [facility] with Dr Levi Arias back in 2018   She is nonverbal with significant skeletal deformity. She has a history of having a heart murmur and PVC   Dr Levi Arias had noted her echo with concentric LVH with superimposed congestive cardiomyopathy. Possible small membranous VSD. Enlarged RV with decreased function. Moderate pulmonary hypertension. LV Ejection fraction was estimated to be 30 %. There was moderate diffuse hypokinesis.      Mild TR and moderate MD   BNP was low   Patient Active Problem List   Diagnosis Code    Pneumonia due to COVID-19 virus U07.1, J12.89    Sepsis ([facility] [address] A41.9     Current Facility-Administered Medications   Medication Dose Route Frequency Provider Last Rate Last Dose    sacubitriL-valsartan (ENTRESTO) 24-26 mg tablet 1 Tab  1 Tab Oral Q12H Gilford Coil, MD   1 Tab at 12/02/20 0859    senna (SENOKOT) tablet 8.6 mg  1 Tab Oral DAILY Jamie Bright MD   8.6 mg at 12/02/20 0859    polyethylene glycol (MIRALAX) packet 17 g  17 g Oral DAILY Jamie Bright MD   Stopped at 12/01/20 1100    bisacodyL (DULCOLAX) tablet 5 mg  5 mg Oral DAILY Jamie Bright MD   5 mg at 12/02/20 0859    ivabradine (CORLANOR) tablet 5 mg  5 mg Oral BID WITH MEALS Gilford Coil, MD   5 mg at 12/02/20 0859    lansoprazole (PREVACID) 3 mg/mL oral suspension 30 mg  30 mg Oral ACB Jamie Bright MD   30 mg at 12/02/20 0630    metoprolol succinate (TOPROL-XL) XL tablet 25 mg  25 mg Oral DAILY Jamie Bright MD   25 mg at 12/02/20 0859    albuterol (PROVENTIL HFA, VENTOLIN HFA, PROAIR HFA) inhaler 2 Puff  2 Puff Inhalation Q4H PRN Triston Meth, NP        levothyroxine (SYNTHROID) tablet 50 mcg  50 mcg Oral 6am Stacey Gold M, DO   50 mcg at 12/02/20 0630    risperiDONE (RisperDAL) 1 mg/mL oral solution soln 1 mg  1 mg Oral QHS Gold, Stacey M, DO   1 mg at 12/01/20 2149    acetaminophen (TYLENOL) tablet 650 mg  650 mg Oral Q6H PRN Shaina Johnson MD   650 mg at 11/30/20 2032    Or    acetaminophen (TYLENOL) suppository 650 mg  650 mg Rectal Q6H PRN Shaina Johnson MD   650 mg at 11/22/20 2346    sodium chloride (NS) flush 5-40 mL  5-40 mL IntraVENous Q8H Shaina Johnson MD   10 mL at 12/02/20 0630    sodium chloride (NS) flush 5-40 mL  5-40 mL IntraVENous PRN Shaina Johnson MD   10 mL at 11/28/20 2032    polyethylene glycol (MIRALAX) packet 17 g  17 g Oral DAILY PRN Shaina Johnson MD   17 g at 11/30/20 0831    promethazine (PHENERGAN) tablet 12.5 mg  12.5 mg Oral Q6H PRN Shaina Johnson MD        Or    ondansetron [facility]) injection 4 mg  4 mg IntraVENous Q6H PRN Shaina Johnson MD   4 mg at 11/30/20 1331    ascorbic acid (vitamin C) (VITAMIN C) tablet 500 mg  500 mg Oral BID Shaina Johnson MD   500 mg at 12/02/20 3541    enoxaparin (LOVENOX) injection 40 mg  40 mg SubCUTAneous Q12H Aleksander Cosme MD   40 mg at 12/01/20 9922     No Known Allergies  Past Medical History:   Diagnosis Date    Endocrine disease     Hypothyroid 03/11/2018    Ill-defined condition     Down's Syndrome     No past surgical history on file. Her mother cannot recall family hx of congenital heart disease  Social History     Tobacco Use    Smoking status: Never Smoker    Smokeless tobacco: Never Used   Substance Use Topics    Alcohol use: No        Review of Systems: unable to obtain from her  She is not communicative and has severe Down's Syndrome per her mother     Objective:     Visit Vitals  BP (!) 118/54   Pulse 92   Temp 98.4 °F (36.9 °C)   Resp 17   Ht 5' 4\" (1.626 m)   Wt 191 lb 9.3 oz (86.9 kg)   SpO2 98%   BMI 32.88 kg/m²      Physical Exam: unable to examine due to COVID pneumonia isolation   She is not communicative and so for virtual visit, can only discuss with her mother      NT pro BNP 89  Troponin < 0.05    Chest CT   No PE  Esophagus: Diffusely dilated. A moderate hiatal hernia contains approximately a third of the stomach in the  left pleural space.     Chest: There is a variant of pectus excavatum deformity. This causes mild mass effect on the right heart. An accessory artery from the aortic arch, arising laterally between the left carotid and left subclavian arteries, bifurcates to the left inferior thyroidal artery and a small muscular branch superior to the left shoulder. Assessment/Plan:   COVID 19 pneumonia with sinus tachycardia that have been going on since 11/2/2020. An echo has not been done again since 2018. She had LVEF 30% and RV enlargement, possible VSD and pulmonary regurgitation. ? care previously at [address] [place] [address].     Her mother can recall [facility] clinic appointment but did not remember what was done    She did not want to reestablish care when possible at [facility]   I spoke to  Marsa Holter and he agrees to see her in clinic as her mother wants  I will make that appt for her. Due to COVID infection, she will have to wait 14 days at least   Continue with corlanor 5 mg bid for sinus tachycardia, rate is better now  Continue with toprol and entresto  She has significant hiatal hernia and is at risk of aspiration per chest CT report      Thank you for involving me in this patient's care and please call with further concerns or questions. Irma Myrick M.D.   Electrophysiology/Cardiology  [facility] [place]  [address] [place] [address]                                [phone]

## 2020-12-02 NOTE — PROGRESS NOTES
Bedside shift change report given to Vikram Sanders RN  by Ronita Sicard, RN . Report included the following information SBAR, Kardex, ED Summary, Intake/Output, MAR, and Recent Results. Pt nonverbal at baseline, Normal Sinus Rhythm , room air, Pain none. No acute events overnight. Last 3 Recorded Weights in this Encounter 11/30/20 0320 12/01/20 4831 12/02/20 9020 Weight: 84.2 kg (185 lb 10 oz) 87.5 kg (192 lb 14.4 oz) 86.9 kg (191 lb 9.3 oz) Problem: Pressure Injury - Risk of 
Goal: *Prevention of pressure injury Description: Document Jai Scale and appropriate interventions in the flowsheet. Outcome: Progressing Towards Goal 
Note: Pressure Injury Interventions: 
Sensory Interventions: Check visual cues for pain, Float heels, Keep linens dry and wrinkle-free Moisture Interventions: Absorbent underpads, Apply protective barrier, creams and emollients, Internal/External urinary devices Activity Interventions: Increase time out of bed, PT/OT evaluation Mobility Interventions: Float heels, HOB 30 degrees or less Nutrition Interventions: Document food/fluid/supplement intake Friction and Shear Interventions: HOB 30 degrees or less Problem: Heart Failure: Day 5 Goal: Medications Outcome: Progressing Towards Goal 
Note: Pt on metoprolol and entresto. BP maintaining and HR controlled

## 2020-12-02 NOTE — PROGRESS NOTES
Problem: Airway Clearance - Ineffective Goal: Achieve or maintain patent airway Outcome: Resolved/Met Problem: Gas Exchange - Impaired Goal: Promote optimal lung function Outcome: Progressing Towards Goal 
  
Problem: Breathing Pattern - Ineffective Goal: Ability to achieve and maintain a regular respiratory rate Outcome: Progressing Towards Goal 
  
Problem: Breathing Pattern - Ineffective Goal: *Absence of hypoxia Outcome: Resolved/Met Goal: *Use of effective breathing techniques Outcome: Progressing Towards Goal

## 2020-12-02 NOTE — PROGRESS NOTES
[address] [facility] Adult  Hospitalist Group                                                                                          Hospitalist Progress Note  Rock Enrique MD  Answering service: [phone] OR [phone] from in house phone        Date of Service:  2020  NAME:  Althea Price YOB: 1980  MRN:  [id number]      Admission Summary:   47 y.o. female past medical history significant for hypothyroidism, Down syndrome and recently treated for COVID-19 infection was brought to the emergency room for further evaluation of elevated heart rate. Patient was admitted to [facility] on 2 2020 for acute respiratory failure secondary to COVID-19 infection. As per mother report at the time of discharge patient was doing better in no acute respiratory distress and not having any fever. Today home health nurse came to check on the patient and found that her heart rate was in the 140s 150s. Patient was sent her to the emergency room for evaluation. On arrival to the emergency room at [facility] she was found to be tachycardic and tachypneic. Patient has not been hypoxic or requiring O2 supplementation. CBC showed a WBC of 21,000 from 14,000 at the time of discharge. CTA of the chest was negative for PE but found to have persistent COVID-19 pneumonia. As per mother patient has not been coughing, being febrile, having diarrhea. Patient has been eating well. During her prior hospitalization patient was treated with remdesivir and completed a course of Decadron. Given persistent tachycardia after 1L iv fluids she admission was requested. Interval history / Subjective: Follow up sepsis. Previously Talked to mother in detail and updated her regarding patient condition. She told me that she was never told that her daughter has any cardiac  Issues. Patient prior echo reported EF of 30% in 2018. Cardiology consulted  Improvement in tachycardia.  [facility]. D/w cardiology. They will make appointment. Patient to be discharged tomorrow. Today she was having intermittent tachycardia around 110 but better than yesterday. New meds as per cardiology      Assessment & Plan:     Sepsis (POA) due to COVID 19 Pneumonia:  resolved   -CT compatible with COVID 19. Repeat 11/24 due to recurrent new fevers and tachycardia  -s/p vancomycin, cefepime. Cultures NGTD , for now we will continue with abx unless recommended by ID to stop abx   -Daily vitamin C  -completed remdesmivir and dexamethazone during previous hospitalization  -s/p fluid bolus, caution with fluids due to pulmonary edema   -continue supplemental oxygen 2 liters  -patient with gradual decline since hospitalization, appreciate palliative care input and goals of care discussion with family . Currently full code     #tachycardia, improved    Echo ordered, pending   Cardiology consulted, need to follow up with cardiology as outpatient as well. On corlanor, metoprolol and entresto was added  Cardiology following. Need to folow up with cardiology as outpatient     Dysphagia: appreciate speech therapy, okay for mechanical soft diet  -nutrition consult to monitor appropriate intake    DANY on CKD stage III: improved and stable   Received 1 L of IV fluid in the ED. Monitor renal function. Encourage p.o. intake  Most recent serum creatinine 1.19     Hypothyroidism: Continue levothyroxine     Down syndrome: Continue with Risperdal     Hiatal hernia: Known finding. continue Protonix 40 mg p.o. daily.  -Outpatient follow-up with GI.     Oral labial herpes: s/p abreva       Code status: full   DVT prophylaxis: lovenox     Care Plan discussed with: Patient/Family  Anticipated Disposition: Home w/Family  Anticipated Discharge: Greater than 48 hours, pt/ot consult     Hospital Problems  Never Reviewed          Codes Class Noted POA    Sepsis ([facility] [address] ICD-10-CM: A41.9  ICD-9-CM: 038.9, 995.91 11/19/2020 Unknown                Review of Systems:   Negative unless stated above      Vital Signs:    Last 24hrs VS reviewed since prior progress note. Most recent are:  Visit Vitals  BP (!) 104/47   Pulse 92   Temp 99.9 °F (37.7 °C)   Resp 28   Ht 5' 4\" (1.626 m)   Wt 86.9 kg (191 lb 9.3 oz)   SpO2 100%   BMI 32.88 kg/m²         Intake/Output Summary (Last 24 hours) at 12/2/2020 1738  Last data filed at 12/2/2020 1600  Gross per 24 hour   Intake 0 ml   Output 300 ml   Net -300 ml        Physical Examination:     I had a face to face encounter with this patient and independently examined them on 12/2/2020 as outlined below:          Constitutional:  No acute distress, non verbal    ENT:  right sided labial crusted lesions     Resp:    No accessory muscle use, nom audible wheezing   CV:  , no murmurs, gallops, rubs    GI:  Soft, non distended, non tender. normoactive bowel sounds, no hepatosplenomegaly     Musculoskeletal:  No edema, warm, 2+ pulses throughout    Neurologic:  Moves all extremities            Data Review:    Review and/or order of clinical lab test  Review and/or order of tests in the radiology section of CPT  Review and/or order of tests in the medicine section of CPT      Labs:     No results for input(s): WBC, HGB, HCT, PLT, HGBEXT, HCTEXT, PLTEXT, HGBEXT, HCTEXT, PLTEXT in the last 72 hours. Recent Labs     12/01/20  0348 11/30/20  0343    141   K 4.3 4.4   * 115*   CO2 23 21   BUN 19 16   CREA 1.19* 1.02   GLU 86 86   CA 8.7 7.9*     No results for input(s): ALT, AP, TBIL, TBILI, TP, ALB, GLOB, GGT, AML, LPSE in the last 72 hours. No lab exists for component: SGOT, GPT, AMYP, HLPSE  No results for input(s): INR, PTP, APTT, INREXT, INREXT in the last 72 hours. No results for input(s): FE, TIBC, PSAT, FERR in the last 72 hours. No results found for: FOL, RBCF   No results for input(s): PH, PCO2, PO2 in the last 72 hours.   No results for input(s): CPK, CKNDX, TROIQ in the last 72 hours.     No lab exists for component: CPKMB  No results found for: CHOL, CHOLX, CHLST, CHOLV, HDL, HDLP, LDL, LDLC, DLDLP, TGLX, TRIGL, TRIGP, CHHD, CHHDX  Lab Results   Component Value Date/Time    Glucose (POC) 122 (H) 11/24/2020 05:36 AM    Glucose (POC) 82 03/11/2018 02:32 PM     Lab Results   Component Value Date/Time    Color YELLOW/STRAW 11/23/2020 10:39 AM    Appearance CLOUDY (A) 11/23/2020 10:39 AM    Specific gravity 1.021 11/23/2020 10:39 AM    Specific gravity 1.010 11/20/2020 01:57 AM    pH (UA) 6.0 11/23/2020 10:39 AM    Protein TRACE (A) 11/23/2020 10:39 AM    Glucose Negative 11/23/2020 10:39 AM    Ketone 40 (A) 11/23/2020 10:39 AM    Bilirubin Negative 11/23/2020 10:39 AM    Urobilinogen 1.0 11/23/2020 10:39 AM    Nitrites Negative 11/23/2020 10:39 AM    Leukocyte Esterase TRACE (A) 11/23/2020 10:39 AM    Epithelial cells FEW 11/23/2020 10:39 AM    Bacteria 1+ (A) 11/23/2020 10:39 AM    WBC 0-4 11/23/2020 10:39 AM    RBC 20-50 11/23/2020 10:39 AM         Medications Reviewed:     Current Facility-Administered Medications   Medication Dose Route Frequency    bisacodyL (DULCOLAX) tablet 5 mg  5 mg Oral DAILY PRN    senna (SENOKOT) tablet 8.6 mg  1 Tab Oral DAILY PRN    sacubitriL-valsartan (ENTRESTO) 24-26 mg tablet 1 Tab  1 Tab Oral Q12H    polyethylene glycol (MIRALAX) packet 17 g  17 g Oral DAILY    ivabradine (CORLANOR) tablet 5 mg  5 mg Oral BID WITH MEALS    lansoprazole (PREVACID) 3 mg/mL oral suspension 30 mg  30 mg Oral ACB    metoprolol succinate (TOPROL-XL) XL tablet 25 mg  25 mg Oral DAILY    albuterol (PROVENTIL HFA, VENTOLIN HFA, PROAIR HFA) inhaler 2 Puff  2 Puff Inhalation Q4H PRN    levothyroxine (SYNTHROID) tablet 50 mcg  50 mcg Oral 6am    risperiDONE (RisperDAL) 1 mg/mL oral solution soln 1 mg  1 mg Oral QHS    acetaminophen (TYLENOL) tablet 650 mg  650 mg Oral Q6H PRN    Or    acetaminophen (TYLENOL) suppository 650 mg  650 mg Rectal Q6H PRN    sodium chloride (NS) flush 5-40 mL  5-40 mL IntraVENous Q8H    sodium chloride (NS) flush 5-40 mL  5-40 mL IntraVENous PRN    polyethylene glycol (MIRALAX) packet 17 g  17 g Oral DAILY PRN    promethazine (PHENERGAN) tablet 12.5 mg  12.5 mg Oral Q6H PRN    Or    ondansetron (ZOFRAN) injection 4 mg  4 mg IntraVENous Q6H PRN    ascorbic acid (vitamin C) (VITAMIN C) tablet 500 mg  500 mg Oral BID    enoxaparin (LOVENOX) injection 40 mg  40 mg SubCUTAneous Q12H     ______________________________________________________________________  EXPECTED LENGTH OF STAY: 4d 19h  ACTUAL LENGTH OF STAY:          Sharin Rubinstein, MD

## 2020-12-02 NOTE — PROGRESS NOTES
Problem: Falls - Risk of  Goal: *Absence of Falls  Description: Document Gerry Going Fall Risk and appropriate interventions in the flowsheet. Outcome: Progressing Towards Goal  Note: Fall Risk Interventions:  Mobility Interventions: Communicate number of staff needed for ambulation/transfer, Patient to call before getting OOB    Mentation Interventions: Adequate sleep, hydration, pain control, Update white board, Room close to nurse's station, Reorient patient    Medication Interventions: Assess postural VS orthostatic hypotension, Patient to call before getting OOB, Teach patient to arise slowly    Elimination Interventions: Call light in reach, Patient to call for help with toileting needs    History of Falls Interventions: Vital signs minimum Q4HRs X 24 hrs (comment for end date)         Problem: General Medical Care Plan  Goal: *Skin integrity maintained  Outcome: Progressing Towards Goal  Note: No Skin breakdown noted discussed with Yael Chinchilla RN the wound care nurse. Problem: Gas Exchange - Impaired  Goal: Promote optimal lung function  Outcome: Progressing Towards Goal  Note: Pt on RA. Pt has nonproductive cough today. Low grade temp. Patient Vitals for the past 8 hrs:   Temp Pulse Resp BP SpO2   12/02/20 1500 99.9 °F (37.7 °C) 92 28 (!) 104/47    12/02/20 1120 99.2 °F (37.3 °C) 95 23 (!) 104/38 100 %         Bedside shift change report given to Anna Lozabai  (oncoming nurse) by Joey Urban (offgoing nurse). Report included the following information SBAR.

## 2020-12-03 NOTE — PROGRESS NOTES
Problem: Nutrition Deficit  Goal: *Optimize nutritional status  Outcome: Progressing Towards Goal  Pt able to feed self- consumed 100% of all meals. Problem: Heart Failure: Day 5  Goal: Medications  Outcome: Progressing Towards Goal      Patient Vitals for the past 12 hrs:   Temp Pulse Resp BP SpO2   12/03/20 1645 99.4 °F (37.4 °C) 94 15 (!) 91/41 100 %   12/03/20 1640  92 15 (!) 95/44 99 %   12/03/20 1556  98 14 (!) 99/35 100 %   12/03/20 1456  98 20 (!) 126/33 99 %   12/03/20 1436  100 17  100 %   12/03/20 1256  (!) 101  (!) 111/31    12/03/20 1252  (!) 135      12/03/20 1243  (!) 124  (!) 131/59    12/03/20 1236  98  (!) 105/44    12/03/20 1127  99      12/03/20 1100 98.6 °F (37 °C) (!) 102 24 96/70 (!) 89 %   12/03/20 1010  90  (!) 118/55    12/03/20 0700 98.4 °F (36.9 °C) 86 18 (!) 107/55 100 %       Pt blood pressures with high variation today. Discussed with cardiology- MD going to change timings of medications for 12/4. Bedside shift change report given to Omero Infante, RN (oncoming nurse) by Silvia Espana RN (offgoing nurse). Report included the following information SBAR, Kardex, ED Summary, Recent Results and Cardiac Rhythm NSR.

## 2020-12-03 NOTE — PROGRESS NOTES
Problem: Self Care Deficits Care Plan (Adult)  Goal: *Acute Goals and Plan of Care (Insert Text)  Description:   FUNCTIONAL STATUS PRIOR TO ADMISSION: Patient required up to minimal assistance for basic ADLs and total assistance for instrumental ADLs. HOME SUPPORT: The patient lived with mother who provides assistance PRN. Occupational Therapy Goals  Initiated 11/27/2020  1. Patient will perform grooming sitting unsupported with supervision/set-up within 7 day(s). 2.  Patient will perform anterior neck to thigh bathing with minimal assistance/contact guard assist within 7 day(s). 3.  Patient will perform toilet transfers with minimal assistance/contact guard assist within 7 day(s). 4.  Patient will perform all aspects of toileting with minimal assistance/contact guard assist within 7 day(s). 5.  Patient will participate in upper extremity therapeutic exercise/activities with supervision/set-up for 5 minutes within 7 day(s). 6.  Patient will utilize energy conservation techniques during functional activities with verbal cues within 7 day(s). Outcome: Progressing Towards Goal    OCCUPATIONAL THERAPY TREATMENT/WEEKLY RE-ASSESSMENT  Patient: Fletcher Saenz (80 y.o. female)  Date: 12/3/2020  Diagnosis: [facility]epsis ([facility] [A41.9]   <principal problem not specified>       Precautions: Fall  Chart, occupational therapy assessment, plan of care, and goals were reviewed. ASSESSMENT  Patient continues with skilled OT services and is limited by impaired cardiopulmonary tolerance, tachycardia, and impaired standing balance. Noted patient also with Down syndrome and nonverbal at baseline per chart. In OT session today, patient required gestural cues to participate and was impulsive with mobility (sitting and standing abruptly). Patient was tearful and moaning/ grunting throughout much of session, although there was no apparent cause for distress.   She achieved sit-stand and side-stepped with minimum assistance but further activity limited by tachycardia to 135. Recommend chair follow for any further mobility d/t impulsivity and difficulty redirecting patient. Patient appeared content/ distress resolved at conclusion on session, supine with HOB raised and self-feeding after minimum assistance for containers. Noted patient's mother is her caregiver at baseline. Recommend d/c home with x1 assist with all OOB mobility and ADLs and no follow-up OT needs. Orthostatic vitals taken, although unable to obtain standing BP. HR elevated to 135 in standing. Vitals:     12/03/20 1236 12/03/20 1243 12/03/20 1252 12/03/20 1256   BP: (!) 105/44 (!) 131/59 Comment: unable to obtain accurate reading d/t tensing UE (!) 111/31   BP 1 Location: Right arm Right arm   Right arm   BP Patient Position: At rest;Supine Sitting;During activity Standing;During activity Supine; Post activity   Pulse: 98 (!) 124 (!) 135 (!) 101       Current Level of Function Impacting Discharge (ADLs): Minimum assistance sit-stand (2 trials) and side-stepping along EOB. Self-fed with minimum assistance for containers. Infer up to maximum assistance ADLs        PLAN :  Goals have been updated based on progression since last assessment. Patient continues to benefit from skilled intervention to address the above impairments. Continue to follow patient 2 times a week to address goals. Recommendation for discharge: (in order for the patient to meet his/her long term goals)   Recommend d/c home with x1 assist with all OOB mobility and ADLs and no follow-up OT needs.     This discharge recommendation:  Has not yet been discussed the attending provider and/or case management         SUBJECTIVE:   Patient stated no verbalizations    OBJECTIVE DATA SUMMARY:   Cognitive/Behavioral Status:  Neurologic State: Alert  Orientation Level: Unable to verbalize  Cognition: Decreased command following(requiring gestural cues)             Functional Mobility and Transfers for ADLs:  Bed Mobility:  Supine to Sit: Stand-by assistance    Transfers:  Sit to Stand: Minimum assistance          Balance:  Sitting: Intact  Standing: Impaired  Standing - Static: Good  Standing - Dynamic : Fair    ADL Intervention:  Feeding  Feeding Assistance: Minimum assistance(for containers)       Pain:  Patient did not indicate pain    Activity Tolerance:   HR elevated to 135, see chart above    After treatment patient left in no apparent distress:   Supine in bed  Call bell left in reach  Bed alarm activated    COMMUNICATION/COLLABORATION:   The patients plan of care was discussed with: Physical therapist and Registered nurse.      Rozina Conway OT  Time Calculation: 44 mins

## 2020-12-03 NOTE — PROGRESS NOTES
[address] [facility]ist Group                                                                                          Hospitalist Progress Note  Mark Anthony Pelletier MD  Answering service: [phone] or [phone] from in house phone        Date of Service:  12/3/2020  NAME:  Silverio Rodriguez  :  1980  MRN:  [id number]      Admission Summary:   47 y.o. female past medical history significant for hypothyroidism, Down syndrome and recently treated for COVID-19 infection was brought to the emergency room for further evaluation of elevated heart rate. Patient was admitted to [facility] on 2 2020 for acute respiratory failure secondary to COVID-19 infection. As per mother report at the time of discharge patient was doing better in no acute respiratory distress and not having any fever. Today home health nurse came to check on the patient and found that her heart rate was in the 140s 150s. Patient was sent her to the emergency room for evaluation. On arrival to the emergency room at [facility] she was found to be tachycardic and tachypneic. Patient has not been hypoxic or requiring O2 supplementation. CBC showed a WBC of 21,000 from 14,000 at the time of discharge. CTA of the chest was negative for PE but found to have persistent COVID-19 pneumonia. As per mother patient has not been coughing, being febrile, having diarrhea. Patient has been eating well. During her prior hospitalization patient was treated with remdesivir and completed a course of Decadron. Given persistent tachycardia after 1L iv fluids she admission was requested. Interval history / Subjective: Follow up sepsis. Patient is seen and examined at bedside this AM. Not communicative to me but enthusiastic to eat her breakfast.   Discussed with nursing-  Low BP due to new cardiac medication regimen.      Spoke with mother Rebecca Sheffield on phone and updated patient's status - overall ok but issues with BP with three new cardiac meds, will monitor 1 more day. Assessment & Plan:     Sepsis (POA) due to COVID 19 Pneumonia:  resolved   -CT compatible with COVID 19.   -s/p vancomycin, cefepime.   -completed remdesmivir and dexamethazone during previous hospitalization  - saturating well on RA    Tachycardia, improved    - Echo not done due to covid positive status   - Cardiology on board. - started on corlanor, metoprolol and entresto  - BP and HR has been labile since starting above meds. Dysphagia:   - appreciate speech therapy  -  okay for mechanical soft diet    DANY on CKD stage III: improved and stable   Monitor renal function. Encourage p.o. intake     Hypothyroidism: Continue levothyroxine  Down syndrome: Continue with Risperdal     Hiatal hernia: Known finding. continue PPI  -Outpatient follow-up with GI. Oral labial herpes: s/p abreva    Appreciate palliative care input and goals of care discussion with family     Code status: full   DVT prophylaxis: lovenox     Care Plan discussed with: Patient/Family  Anticipated Disposition: Home w/Family  Anticipated Discharge: 24 hours to 48 hours     Hospital Problems  Never Reviewed          Codes Class Noted POA    Sepsis ([facility] ICD-10-CM: A41.9  ICD-9-CM: 038.9, 995.91  11/19/2020 Unknown                Review of Systems:   Negative unless stated above      Vital Signs:    Last 24hrs VS reviewed since prior progress note. Most recent are:  Visit Vitals  BP (!) 111/31 (BP 1 Location: Right arm, BP Patient Position: Supine; Post activity)   Pulse 100   Temp 98.6 °F (37 °C)   Resp 17   Ht 5' 4\" (1.626 m)   Wt 86.9 kg (191 lb 9.3 oz)   SpO2 100%   BMI 32.88 kg/m²         Intake/Output Summary (Last 24 hours) at 12/3/2020 1603  Last data filed at 12/3/2020 0800  Gross per 24 hour   Intake 200 ml   Output 400 ml   Net -200 ml        Physical Examination:     I had a face to face encounter with this patient and independently examined them on 12/3/2020 as outlined below:          Constitutional:  No acute distress, non verbal    ENT:  right sided labial crusted lesions     Resp:    No accessory muscle use, nom audible wheezing   CV:  sinus RRR, no murmurs, gallops, rubs    GI:  Soft, non distended, non tender. normoactive bowel sounds, no hepatosplenomegaly     Musculoskeletal:  No edema, warm, 2+ pulses throughout    Neurologic:  Moves all extremities            Data Review:    Review and/or order of clinical lab test  Review and/or order of tests in the radiology section of CPT  Review and/or order of tests in the medicine section of CPT      Labs:     No results for input(s): WBC, HGB, HCT, PLT, HGBEXT, HCTEXT, PLTEXT, HGBEXT, HCTEXT, PLTEXT in the last 72 hours. Recent Labs     12/01/20  0348      K 4.3   *   CO2 23   BUN 19   CREA 1.19*   GLU 86   CA 8.7     No results for input(s): ALT, AP, TBIL, TBILI, TP, ALB, GLOB, GGT, AML, LPSE in the last 72 hours. No lab exists for component: SGOT, GPT, AMYP, HLPSE  No results for input(s): INR, PTP, APTT, INREXT, INREXT in the last 72 hours. No results for input(s): FE, TIBC, PSAT, FERR in the last 72 hours. No results found for: FOL, RBCF   No results for input(s): PH, PCO2, PO2 in the last 72 hours. No results for input(s): CPK, CKNDX, TROIQ in the last 72 hours.     No lab exists for component: CPKMB  No results found for: CHOL, CHOLX, CHLST, CHOLV, HDL, HDLP, LDL, LDLC, DLDLP, TGLX, TRIGL, TRIGP, CHHD, CHHDX  Lab Results   Component Value Date/Time    Glucose (POC) 122 (H) 11/24/2020 05:36 AM    Glucose (POC) 82 03/11/2018 02:32 PM     Lab Results   Component Value Date/Time    Color YELLOW/STRAW 11/23/2020 10:39 AM    Appearance CLOUDY (A) 11/23/2020 10:39 AM    Specific gravity 1.021 11/23/2020 10:39 AM    Specific gravity 1.010 11/20/2020 01:57 AM    pH (UA) 6.0 11/23/2020 10:39 AM    Protein TRACE (A) 11/23/2020 10:39 AM    Glucose Negative 11/23/2020 10:39 AM    Ketone 40 (A) 11/23/2020 10:39 AM Bilirubin Negative 11/23/2020 10:39 AM    Urobilinogen 1.0 11/23/2020 10:39 AM    Nitrites Negative 11/23/2020 10:39 AM    Leukocyte Esterase TRACE (A) 11/23/2020 10:39 AM    Epithelial cells FEW 11/23/2020 10:39 AM    Bacteria 1+ (A) 11/23/2020 10:39 AM    WBC 0-4 11/23/2020 10:39 AM    RBC 20-50 11/23/2020 10:39 AM         Medications Reviewed:     Current Facility-Administered Medications   Medication Dose Route Frequency    metoprolol succinate (TOPROL-XL) XL tablet 25 mg  25 mg Oral DAILY    bisacodyL (DULCOLAX) tablet 5 mg  5 mg Oral DAILY PRN    senna (SENOKOT) tablet 8.6 mg  1 Tab Oral DAILY PRN    sacubitriL-valsartan (ENTRESTO) 24-26 mg tablet 1 Tab  1 Tab Oral Q12H    polyethylene glycol (MIRALAX) packet 17 g  17 g Oral DAILY    ivabradine (CORLANOR) tablet 5 mg  5 mg Oral BID WITH MEALS    lansoprazole (PREVACID) 3 mg/mL oral suspension 30 mg  30 mg Oral ACB    albuterol (PROVENTIL HFA, VENTOLIN HFA, PROAIR HFA) inhaler 2 Puff  2 Puff Inhalation Q4H PRN    levothyroxine (SYNTHROID) tablet 50 mcg  50 mcg Oral 6am    risperiDONE (RisperDAL) 1 mg/mL oral solution soln 1 mg  1 mg Oral QHS    acetaminophen (TYLENOL) tablet 650 mg  650 mg Oral Q6H PRN    Or    acetaminophen (TYLENOL) suppository 650 mg  650 mg Rectal Q6H PRN    sodium chloride (NS) flush 5-40 mL  5-40 mL IntraVENous Q8H    sodium chloride (NS) flush 5-40 mL  5-40 mL IntraVENous PRN    polyethylene glycol (MIRALAX) packet 17 g  17 g Oral DAILY PRN    promethazine (PHENERGAN) tablet 12.5 mg  12.5 mg Oral Q6H PRN    Or    ondansetron (ZOFRAN) injection 4 mg  4 mg IntraVENous Q6H PRN    ascorbic acid (vitamin C) (VITAMIN C) tablet 500 mg  500 mg Oral BID    enoxaparin (LOVENOX) injection 40 mg  40 mg SubCUTAneous Q12H     ______________________________________________________________________  EXPECTED LENGTH OF STAY: 4d 19h  ACTUAL LENGTH OF STAY:          Gopal Yeung 14., MD

## 2020-12-03 NOTE — PROGRESS NOTES
Bedside shift change report given to Khris Nicole RN by HEMA Hernandez . Report included the following information SBAR, Kardex, ED Summary, Intake/Output, MAR, and Recent Results. Pt Nonverbal at baseline, follows commands}, Normal Sinus Rhythm , room air, Pain none. No acute events overnight. Last 3 Recorded Weights in this Encounter    12/01/20 0338 12/02/20 0311 12/03/20 0309   Weight: 87.5 kg (192 lb 14.4 oz) 86.9 kg (191 lb 9.3 oz) 86.9 kg (191 lb 9.3 oz)         Problem: Falls - Risk of  Goal: *Absence of Falls  Description: Document Pam Fall Risk and appropriate interventions in the flowsheet. Outcome: Progressing Towards Goal  Note: Fall Risk Interventions:  Mobility Interventions: Communicate number of staff needed for ambulation/transfer, Patient to call before getting OOB    Mentation Interventions: Adequate sleep, hydration, pain control, Update white board    Medication Interventions: Evaluate medications/consider consulting pharmacy    Elimination Interventions: Call light in reach    History of Falls Interventions: Vital signs minimum Q4HRs X 24 hrs (comment for end date)         Problem: Pressure Injury - Risk of  Goal: *Prevention of pressure injury  Description: Document Jai Scale and appropriate interventions in the flowsheet.   Outcome: Progressing Towards Goal  Note: Pressure Injury Interventions:  Sensory Interventions: Check visual cues for pain, Float heels, Keep linens dry and wrinkle-free    Moisture Interventions: Absorbent underpads, Apply protective barrier, creams and emollients, Internal/External urinary devices    Activity Interventions: Increase time out of bed, PT/OT evaluation    Mobility Interventions: Float heels, HOB 30 degrees or less    Nutrition Interventions: Document food/fluid/supplement intake    Friction and Shear Interventions: Apply protective barrier, creams and emollients, HOB 30 degrees or less            Problem: Heart Failure: Day 5  Goal: Off Pathway (Use only if patient is Off Pathway)  Outcome: Progressing Towards Goal  Note: Pt hypotensive with new medication regiment. NP made aware.   Will continue to monitor

## 2020-12-03 NOTE — PROGRESS NOTES
Occupational Therapy    Occupational therapy session completed. Full documentation to follow. Orthostatic vitals taken, although unable to obtain standing BP. HR elevated to 135 in standing. Noted patient's mother is her caregiver at baseline. Recommend d/c home with x1 assist with all OOB mobility and ADLs and no follow-up OT needs. Vitals:    12/03/20 1236 12/03/20 1243 12/03/20 1252 12/03/20 1256   BP: (!) 105/44 (!) 131/59 Comment: unable to obtain accurate reading d/t tensing UE (!) 111/31   BP 1 Location: Right arm Right arm  Right arm   BP Patient Position: At rest;Supine Sitting;During activity Standing;During activity Supine; Post activity   Pulse: 98 (!) 124 (!) 135 (!) 101     Pat Cloud, OTR/L

## 2020-12-04 NOTE — PROGRESS NOTES
Bedside shift change report given to Marilee Bueno RN by Thuy Winkler RN . Report included the following information SBAR, Kardex, ED Summary, Intake/Output, MAR, and Recent Results. Pt nonverbal at baseline, Normal Sinus Rhythm , room air, Pain none. No acute events overnight. Last 3 Recorded Weights in this Encounter    12/02/20 0311 12/03/20 0309 12/04/20 0335   Weight: 86.9 kg (191 lb 9.3 oz) 86.9 kg (191 lb 9.3 oz) 86.8 kg (191 lb 5.8 oz)       Problem: Pressure Injury - Risk of  Goal: *Prevention of pressure injury  Description: Document Jai Scale and appropriate interventions in the flowsheet. Outcome: Progressing Towards Goal  Note: Pressure Injury Interventions:  Sensory Interventions: Check visual cues for pain, Float heels, Keep linens dry and wrinkle-free, Minimize linen layers    Moisture Interventions: Absorbent underpads, Internal/External urinary devices    Activity Interventions: Increase time out of bed    Mobility Interventions: Float heels, HOB 30 degrees or less    Nutrition Interventions: Document food/fluid/supplement intake    Friction and Shear Interventions: HOB 30 degrees or less         Problem: Heart Failure: Day 5  Goal: Off Pathway (Use only if patient is Off Pathway)  Outcome: Progressing Towards Goal  Note: Pt hypotensive on new cardiac meds.   Will continue to monitor

## 2020-12-04 NOTE — PROGRESS NOTES
Transition Plan of Care  RUR 26%-High  Possible discharge today or tomorrow. Was open to [facility]. Resumption orders sent via [facility] for SN/PT/OT.  Contact information added to AVS.  Kim Morocho RN CRM  Ext [phone]

## 2020-12-04 NOTE — PROGRESS NOTES
Problem: General Medical Care Plan  Goal: *Vital signs within specified parameters  Outcome: Progressing Towards Goal     Problem: Nutrition Deficits  Goal: Optimize nutrtional status  Outcome: Progressing Towards Goal     Problem: Heart Failure: Discharge Outcomes  Goal: *Demonstrates ability to perform prescribed activity without shortness of breath or discomfort  Outcome: Progressing Towards Goal   Working with PT/OT each day.

## 2020-12-04 NOTE — CONSULTS
[address] [facility].   [address]  [address] [id number]        GASTROENTEROLOGY CONSULTATION NOTE  Will Aylin Cedeno  [phone] office  [phone] NP/PA in-hospital cell phone M-F until 4:30PM  After 5PM or on weekends, please call  for physician on call        NAME:  Nikki Johns   :   1980   MRN:   [id number]       Referring Physician: Dr. Zelda Pressley Date: 2020 3:40 PM    Chief Complaint: unable to obtain     History of Present Illness:  Patient is a 47 y.o. who is seen in consultation at the request of Dr. Keila Becker for GI bleeding, FOBT positive, anemia. Patient has a past medical history significant for Down syndrome and hypothyroidism. She was admitted to the hospital on 20 for sepsis due to COVID-19. Patient is nonverbal at baseline. She is COVID-19 positive and was not physically seen. History was obtained through chart review and the patient's RN. I talked to the patient's RN. She reports that the patient had a dark, formed stool today. No bowel movement yesterday. No hematochezia. No vomiting. Patient has been on Lovenox SQ q12h (last dose  11AM). Lovenox is now on hold. No pressors. No record of EGD or colonoscopy. I have reviewed the emergency room note, hospital admission note, notes by all other clinicians who have seen the patient during this hospitalization to date. I have reviewed the problem list and the reason for this hospitalization. I have reviewed the allergies and the medications the patient was taking at home prior to this hospitalization. PMH:  Past Medical History:   Diagnosis Date    Endocrine disease     Hypothyroid 2018    Ill-defined condition     Down's Syndrome       PSH:  No past surgical history on file. Allergies:  No Known Allergies    Home Medications:  Prior to Admission Medications   Prescriptions Last Dose Informant Patient Reported? Taking?    albuterol (PROVENTIL HFA, VENTOLIN HFA, PROAIR HFA) 90 mcg/actuation inhaler  Family Member No No   Sig: Take 2 Puffs by inhalation every four (4) hours as needed for Wheezing. cholecalciferol (VITAMIN D3) 1,000 unit tablet  Family Member Yes No   Sig: Take 1,000 Units by mouth daily. lansoprazole (PREVACID) 3 mg/mL oral suspension   No No   Sig: Take 10 mL by mouth Daily (before breakfast) for 30 days. levothyroxine (SYNTHROID) 50 mcg tablet  Family Member Yes No   Sig: Take 50 mcg by mouth Daily (before breakfast). medroxyPROGESTERone (DEPO-PROVERA) 150 mg/mL injection  Family Member Yes No   Si mg, IM, once, To be administered in clinic by nurse. See order comments for schedule., # 1 vial, 4 Refills, Pharmacy: [facility]/pharmacy #[id number]   risperiDONE (RisperDAL) 1 mg tablet  Family Member Yes No   Sig: Take 1 mg by mouth nightly. solifenacin (VESICARE) 10 mg tablet  Family Member Yes No   Sig: TAKE 1 TABLET BY MOUTH EVERY DAY   zinc sulfate 220 mg tablet  Family Member No No   Sig: Take 1 Tab by mouth daily.       Facility-Administered Medications: None       Hospital Medications:  Current Facility-Administered Medications   Medication Dose Route Frequency    metoprolol succinate (TOPROL-XL) XL tablet 25 mg  25 mg Oral DAILY    bisacodyL (DULCOLAX) tablet 5 mg  5 mg Oral DAILY PRN    senna (SENOKOT) tablet 8.6 mg  1 Tab Oral DAILY PRN    [Held by provider] sacubitriL-valsartan (ENTRESTO) 24-26 mg tablet 1 Tab  1 Tab Oral Q12H    polyethylene glycol (MIRALAX) packet 17 g  17 g Oral DAILY    ivabradine (CORLANOR) tablet 5 mg  5 mg Oral BID WITH MEALS    lansoprazole (PREVACID) 3 mg/mL oral suspension 30 mg  30 mg Oral ACB    albuterol (PROVENTIL HFA, VENTOLIN HFA, PROAIR HFA) inhaler 2 Puff  2 Puff Inhalation Q4H PRN    levothyroxine (SYNTHROID) tablet 50 mcg  50 mcg Oral 6am    risperiDONE (RisperDAL) 1 mg/mL oral solution soln 1 mg  1 mg Oral QHS    acetaminophen (TYLENOL) tablet 650 mg  650 mg Oral Q6H PRN    Or    acetaminophen (TYLENOL) suppository 650 mg  650 mg Rectal Q6H PRN    sodium chloride (NS) flush 5-40 mL  5-40 mL IntraVENous Q8H    sodium chloride (NS) flush 5-40 mL  5-40 mL IntraVENous PRN    polyethylene glycol (MIRALAX) packet 17 g  17 g Oral DAILY PRN    promethazine (PHENERGAN) tablet 12.5 mg  12.5 mg Oral Q6H PRN    Or    ondansetron (ZOFRAN) injection 4 mg  4 mg IntraVENous Q6H PRN    ascorbic acid (vitamin C) (VITAMIN C) tablet 500 mg  500 mg Oral BID    [Held by provider] enoxaparin (LOVENOX) injection 40 mg  40 mg SubCUTAneous Q12H       Social History:  Social History     Tobacco Use    Smoking status: Never Smoker    Smokeless tobacco: Never Used   Substance Use Topics    Alcohol use: No       Family History:  No family history on file. Review of Systems:  Unable to obtain - patient is nonverbal      Objective:     Patient Vitals for the past 8 hrs:   BP Temp Pulse Resp SpO2 Height Weight   12/04/20 1500 (!) 112/58 98.3 °F (36.8 °C) 98 26 99 %     12/04/20 1100 (!) 106/46 99 °F (37.2 °C) 97 30 100 %     12/04/20 1000 102/76  (!) 101 22 99 %     12/04/20 0900 116/65     5' 4\" (1.626 m) 86.6 kg (191 lb)   12/04/20 0835 116/65  98         12/04 0701 - 12/04 1900  In: 200 [P.O.:200]  Out: 100 [Urine:100]  12/02 1901 - 12/04 0700  In: 200 [P.O.:200]  Out: 1850 [Urine:1850]    EXAM:    Patient is COVID positive and was not physically seen       Data Review     Recent Labs     12/04/20 0311   WBC 10.6   HGB 7.5*   HCT 22.3*   *     Recent Labs     12/04/20 0311      K 4.5      CO2 27   BUN 28*   CREA 1.06*   GLU 99   CA 8.3*     No results for input(s): AP, TBIL, TP, ALB, GLOB, GGT, AML, LPSE in the last 72 hours. No lab exists for component: SGOT, GPT, AMYP, HLPSE  No results for input(s): INR, PTP, APTT, INREXT in the last 72 hours. Assessment:   · Anemia and melena with hemoccult positive stool: Hgb 7.5 (9.3 on 11/27), platelets 509, BUN 28.  No transfusions. No documented history of EGD or colonoscopy. · COVID-19 pneumonia  · Acute kidney injury on chronic kidney disease     Patient Active Problem List   Diagnosis Code    Pneumonia due to COVID-19 virus U07.1, J12.89    Sepsis ([facility] [address] A41.9     Plan:   · PPI BID  · Trend CBC and transfuse as necessary  · COVID positive  · Patient was discussed with Dr. Jeison Vazquez  · Thank you for allowing me to participate in care of Lorie Query     Signed By: Deja Brandtma     12/4/2020  3:40 PM         GI Attending: Agree with plan as above. Patient not seen as she is in isolation for COVID-19 pneumonia. We have been consulted for anemia, melena, and occult positive stool. Would advise supportive measures and monitoring CBC with PRN transfusion. Hgb 8.5 now. Was on lovenox, which is held currently. We would not advise endoscopic evaluation at this time. Please call again as needed. We can consider endoscopy once she is COVID negative. Cristian Vazquez MD

## 2020-12-04 NOTE — PROGRESS NOTES
Problem: Mobility Impaired (Adult and Pediatric)  Goal: *Acute Goals and Plan of Care (Insert Text)  Description: FUNCTIONAL STATUS PRIOR TO ADMISSION: Patient required moderate assistance for basic and instrumental ADLs. HOME SUPPORT PRIOR TO ADMISSION: The patient lived with mother who assists with ADLs    Physical Therapy Goals  Initiated 11/27/2020; carryover 12/04/2020  1. Patient will move from supine to sit and sit to supine , scoot up and down, and roll side to side in bed with supervision/set-up within 7 day(s). 2.  Patient will transfer from bed to chair and chair to bed with supervision/set-up using the least restrictive device within 7 day(s). 3.  Patient will perform sit to stand with supervision/set-up within 7 day(s). 4.  Patient will ambulate with supervision/set-up for 50 feet with the least restrictive device within 7 day(s). Outcome: Progressing Towards Goal     PHYSICAL THERAPY TREATMENT: WEEKLY REASSESSMENT  Patient: Vineet Macedo (32 y.o. female)  Date: 12/4/2020  Primary Diagnosis: Sepsis ([facility] [address] [A41.9]        Precautions:   Fall      ASSESSMENT  Patient continues with skilled PT services and is slowly progressing towards goals - remains most limited by generalized weakness, impaired balance and ?fear of mobility/falling leading to increased falls risk and dependency from baseline level. Required less physical assist this date - able to complete bed mobility without physical assist and demos good sitting balance. Performed sit<>stands with SBA for safety and took several shuffled side steps along EOB with up to min A. Attempted further fwd gait progression however pt began to facial grimace and seem fearful, therefore ultimately aborted and assisted back to bed.  and max HR 118bpm with activity this date. Will follow. Continue to recommend home with family assist and HHPT once medically cleared.     For the weekend, recommend patient to complete as able in order to maintain strength, endurance and independence with nursing: OOB to chair 3x/day with assist x1. PT to follow-up with patient after the weekend. Patient's progression toward goals since last assessment: progressing towards all    Current Level of Function Impacting Discharge (mobility/balance): SBA/min A for transfers and side stepping    Other factors to consider for discharge: has good family support          PLAN :  Goals have been updated based on progression since last assessment. Patient continues to benefit from skilled intervention to address the above impairments. Recommendations and Planned Interventions: bed mobility training, transfer training, gait training, therapeutic exercises, patient and family training/education, and therapeutic activities      Frequency/Duration: Patient will be followed by physical therapy:  3 times a week to address goals. Recommendation for discharge: (in order for the patient to meet his/her long term goals)  Physical therapy at least 2 days/week in the home AND ensure assist and/or supervision for safety with ADLs/mobility     This discharge recommendation:  A follow-up discussion with the attending provider and/or case management is planned    IF patient discharges home will need the following DME: to be determined (TBD)         SUBJECTIVE:   Patient is non verbal.    OBJECTIVE DATA SUMMARY:   HISTORY:    Past Medical History:   Diagnosis Date    Endocrine disease     Hypothyroid 03/11/2018    Ill-defined condition     Down's Syndrome   No past surgical history on file.     Personal factors and/or comorbidities impacting plan of care: PMHx    Home Situation  Home Environment: Private residence  One/Two Story Residence: Other (Comment)  Living Alone: No  Support Systems: Parent  Patient Expects to be Discharged to[de-identified] Private residence  Current DME Used/Available at Home: None  Tub or Shower Type: (unable to provide background)    EXAMINATION/PRESENTATION/DECISION MAKING:   Critical Behavior:  Neurologic State: Alert  Orientation Level: Unable to verbalize  Cognition: Follows commands  Safety/Judgement: Awareness of environment, Fall prevention  Hearing: Auditory  Auditory Impairment: None    Functional Mobility:  Bed Mobility:     Supine to Sit: Supervision  Sit to Supine: Supervision     Transfers:  Sit to Stand: Stand-by assistance  Stand to Sit: Stand-by assistance       Balance:   Sitting: Intact  Standing: Impaired  Standing - Static: Good  Standing - Dynamic : Fair      Activity Tolerance:   Fair and desaturates with exertion and requires oxygen    After treatment patient left in no apparent distress:   Supine in bed, Call bell within reach, Bed / chair alarm activated, and Side rails x 3    COMMUNICATION/EDUCATION:   The patients plan of care was discussed with: Registered nurse. Fall prevention education was provided and the patient/caregiver indicated understanding. and Patient is unable to participate in goal setting and plan of care.     Thank you for this referral.  Kane Waterman, PT, DPT   Time Calculation: 20 mins

## 2020-12-04 NOTE — PROGRESS NOTES
[address] [facility] Adult  Hospitalist Group                                                                                          Hospitalist Progress Note  Gabe Barkley MD  Answering service: [phone] OR [phone] from in house phone        Date of Service:  2020  NAME:  Dewayne Tobin  :  1980  MRN:  [id number]      Admission Summary:   47 y.o. female past medical history significant for hypothyroidism, Down syndrome and recently treated for COVID-19 infection was brought to the emergency room for further evaluation of elevated heart rate. Patient was admitted to [facility] on 2 2020 for acute respiratory failure secondary to COVID-19 infection. As per mother report at the time of discharge patient was doing better in no acute respiratory distress and not having any fever. Today home health nurse came to check on the patient and found that her heart rate was in the 140s 150s. Patient was sent her to the emergency room for evaluation. On arrival to the emergency room at [facility] she was found to be tachycardic and tachypneic. Patient has not been hypoxic or requiring O2 supplementation. CBC showed a WBC of 21,000 from 14,000 at the time of discharge. CTA of the chest was negative for PE but found to have persistent COVID-19 pneumonia. As per mother patient has not been coughing, being febrile, having diarrhea. Patient has been eating well. During her prior hospitalization patient was treated with remdesivir and completed a course of Decadron. Given persistent tachycardia after 1L iv fluids she admission was requested. Interval history / Subjective: Follow up sepsis. Patient is seen and examined at bedside this AM. Non verbal at baseline.  alert  Discussed with nursing-  BP soft     Spoke with mother [facility] on phone and updated patient's status - soft BP - hold entresto per cards, low Hb- GI consulted      Assessment & Plan:     Sepsis (POA) due to COVID 19 Pneumonia:  resolved   -CT compatible with COVID 19.   -s/p vancomycin, cefepime.   -completed remdesmivir and dexamethazone during previous hospitalization  - saturating well on RA    Tachycardia, improved    - Echo done today , report pending   - appreciate Cardiology input   - c/w corlanor, metoprolol  - BP soft and not tolerating with three new meds. Discussed with cardiology Dr. Yue Birmingham: recommended to hold Entresto     Dysphagia:   - appreciate speech therapy  -  okay for mechanical soft diet    Anemia - hb low 7.5 today  - baseline hb around 9  - FOBT positive  - GI consult placed  - will monitor hb, transfuse <7    DANY on CKD stage III: improved and stable   Monitor renal function. Encourage p.o. intake     Hypothyroidism: levothyroxine  Down syndrome: Continue with Risperdal     Hiatal hernia: Known finding. continue PPI  -Outpatient follow-up with GI. Oral labial herpes: s/p abreva    Appreciate palliative care input and goals of care discussion with family     Code status: full   DVT prophylaxis: SCDs    Care Plan discussed with: Patient/Family  Anticipated Disposition: Home w/Family  Anticipated Discharge: 24 hours to 48 hours     Hospital Problems  Never Reviewed          Codes Class Noted POA    Sepsis ([facility] [address] ICD-10-CM: A41.9  ICD-9-CM: 038.9, 995.91  11/19/2020 Unknown                Review of Systems:   Negative unless stated above      Vital Signs:    Last 24hrs VS reviewed since prior progress note.  Most recent are:  Visit Vitals  BP (!) 106/46 (BP 1 Location: Right arm, BP Patient Position: At rest)   Pulse 97   Temp 99 °F (37.2 °C)   Resp 30   Ht 5' 4\" (1.626 m)   Wt 86.6 kg (191 lb)   SpO2 100%   BMI 32.79 kg/m²         Intake/Output Summary (Last 24 hours) at 12/4/2020 1432  Last data filed at 12/4/2020 0800  Gross per 24 hour   Intake 200 ml   Output 1550 ml   Net -1350 ml        Physical Examination:     I had a face to face encounter with this patient and independently examined them on 12/4/2020 as outlined below:          Constitutional:  No acute distress, non verbal    ENT:  right sided labial crusted lesions     Resp:    No accessory muscle use, nom audible wheezing   CV:  sinus RRR, no murmurs, gallops, rubs    GI:  Soft, non distended, non tender. normoactive bowel sounds, no hepatosplenomegaly     Musculoskeletal:  No edema, warm, 2+ pulses throughout    Neurologic:  Moves all extremities            Data Review:    Review and/or order of clinical lab test  Review and/or order of tests in the radiology section of CPT  Review and/or order of tests in the medicine section of CPT      Labs:     Recent Labs     12/04/20 0311   WBC 10.6   HGB 7.5*   HCT 22.3*   *     Recent Labs     12/04/20 0311      K 4.5      CO2 27   BUN 28*   CREA 1.06*   GLU 99   CA 8.3*     No results for input(s): ALT, AP, TBIL, TBILI, TP, ALB, GLOB, GGT, AML, LPSE in the last 72 hours. No lab exists for component: SGOT, GPT, AMYP, HLPSE  No results for input(s): INR, PTP, APTT, INREXT, INREXT in the last 72 hours. Recent Labs     12/04/20 0311   TIBC 279   PSAT 23      Lab Results   Component Value Date/Time    Folate 18.2 12/04/2020 03:11 AM      No results for input(s): PH, PCO2, PO2 in the last 72 hours. No results for input(s): CPK, CKNDX, TROIQ in the last 72 hours.     No lab exists for component: CPKMB  No results found for: CHOL, CHOLX, CHLST, CHOLV, HDL, HDLP, LDL, LDLC, DLDLP, TGLX, TRIGL, TRIGP, CHHD, CHHDX  Lab Results   Component Value Date/Time    Glucose (POC) 122 (H) 11/24/2020 05:36 AM    Glucose (POC) 82 03/11/2018 02:32 PM     Lab Results   Component Value Date/Time    Color YELLOW/STRAW 11/23/2020 10:39 AM    Appearance CLOUDY (A) 11/23/2020 10:39 AM    Specific gravity 1.021 11/23/2020 10:39 AM    Specific gravity 1.010 11/20/2020 01:57 AM    pH (UA) 6.0 11/23/2020 10:39 AM    Protein TRACE (A) 11/23/2020 10:39 AM    Glucose Negative 11/23/2020 10:39 AM    Ketone 40 (A) 11/23/2020 10:39 AM    Bilirubin Negative 11/23/2020 10:39 AM    Urobilinogen 1.0 11/23/2020 10:39 AM    Nitrites Negative 11/23/2020 10:39 AM    Leukocyte Esterase TRACE (A) 11/23/2020 10:39 AM    Epithelial cells FEW 11/23/2020 10:39 AM    Bacteria 1+ (A) 11/23/2020 10:39 AM    WBC 0-4 11/23/2020 10:39 AM    RBC 20-50 11/23/2020 10:39 AM         Medications Reviewed:     Current Facility-Administered Medications   Medication Dose Route Frequency    metoprolol succinate (TOPROL-XL) XL tablet 25 mg  25 mg Oral DAILY    bisacodyL (DULCOLAX) tablet 5 mg  5 mg Oral DAILY PRN    senna (SENOKOT) tablet 8.6 mg  1 Tab Oral DAILY PRN    [Held by provider] sacubitriL-valsartan (ENTRESTO) 24-26 mg tablet 1 Tab  1 Tab Oral Q12H    polyethylene glycol (MIRALAX) packet 17 g  17 g Oral DAILY    ivabradine (CORLANOR) tablet 5 mg  5 mg Oral BID WITH MEALS    lansoprazole (PREVACID) 3 mg/mL oral suspension 30 mg  30 mg Oral ACB    albuterol (PROVENTIL HFA, VENTOLIN HFA, PROAIR HFA) inhaler 2 Puff  2 Puff Inhalation Q4H PRN    levothyroxine (SYNTHROID) tablet 50 mcg  50 mcg Oral 6am    risperiDONE (RisperDAL) 1 mg/mL oral solution soln 1 mg  1 mg Oral QHS    acetaminophen (TYLENOL) tablet 650 mg  650 mg Oral Q6H PRN    Or    acetaminophen (TYLENOL) suppository 650 mg  650 mg Rectal Q6H PRN    sodium chloride (NS) flush 5-40 mL  5-40 mL IntraVENous Q8H    sodium chloride (NS) flush 5-40 mL  5-40 mL IntraVENous PRN    polyethylene glycol (MIRALAX) packet 17 g  17 g Oral DAILY PRN    promethazine (PHENERGAN) tablet 12.5 mg  12.5 mg Oral Q6H PRN    Or    ondansetron (ZOFRAN) injection 4 mg  4 mg IntraVENous Q6H PRN    ascorbic acid (vitamin C) (VITAMIN C) tablet 500 mg  500 mg Oral BID    [Held by provider] enoxaparin (LOVENOX) injection 40 mg  40 mg SubCUTAneous Q12H     ______________________________________________________________________  EXPECTED LENGTH OF STAY: 4d 19h  ACTUAL LENGTH OF STAY: [address]  MD OLESYA

## 2020-12-05 NOTE — PROGRESS NOTES
Problem: Falls - Risk of  Goal: *Absence of Falls  Description: Document Brittany Mueller Fall Risk and appropriate interventions in the flowsheet. Outcome: Progressing Towards Goal  Note: Fall Risk Interventions:  Mobility Interventions: Communicate number of staff needed for ambulation/transfer, Patient to call before getting OOB, PT Consult for mobility concerns, Utilize walker, cane, or other assistive device    Mentation Interventions: Adequate sleep, hydration, pain control, Evaluate medications/consider consulting pharmacy, Increase mobility, More frequent rounding, Reorient patient, Room close to nurse's station, Toileting rounds, Update white board    Medication Interventions: Evaluate medications/consider consulting pharmacy, Patient to call before getting OOB, Teach patient to arise slowly, Assess postural VS orthostatic hypotension    Elimination Interventions: Call light in reach, Patient to call for help with toileting needs, Stay With Me (per policy), Toileting schedule/hourly rounds    History of Falls Interventions: Evaluate medications/consider consulting pharmacy, Investigate reason for fall, Room close to nurse's station, Assess for delayed presentation/identification of injury for 48 hrs (comment for end date), Vital signs minimum Q4HRs X 24 hrs (comment for end date)         Problem: Breathing Pattern - Ineffective  Goal: Ability to achieve and maintain a regular respiratory rate  Outcome: Progressing Towards Goal     Problem: Body Temperature -  Risk of, Imbalanced  Goal: Complications related to the disease process, condition or treatment will be avoided or minimized  Outcome: Progressing Towards Goal  Note: Pt on RA. Monitor H&H. Possible D/c tomorrow. TRANSFER - OUT REPORT:    Verbal report given to Paresh Juan (name) on Amaryllis Pellet  being transferred to (unit) for routine progression of care       Report consisted of patients Situation, Background, Assessment and   Recommendations(SBAR). Information from the following report(s) SBAR, Kardex, MAR, and Cardiac Rhythm NSR  was reviewed with the receiving nurse. Lines:   Peripheral IV 11/28/20 Left;Posterior Hand (Active)   Site Assessment Clean, dry, & intact 12/05/20 1600   Phlebitis Assessment 0 12/05/20 1600   Infiltration Assessment 0 12/05/20 1600   Dressing Status Clean, dry, & intact 12/05/20 1600   Dressing Type Transparent;Tape 12/05/20 1600   Hub Color/Line Status Pink;Capped;Flushed 12/05/20 1600   Action Taken Open ports on tubing capped 12/05/20 1600   Alcohol Cap Used Yes 12/05/20 1600          Opportunity for questions and clarification was provided.       Patient transported with:   Housekeep

## 2020-12-05 NOTE — PROGRESS NOTES
[facility] Adult  Hospitalist Group                                                                                          Hospitalist Progress Note  Gabe Barkley MD  Answering service: [phone] OR [phone] from in house phone        Date of Service:  2020  NAME:  Dewayne Tobin  :  1980  MRN:  [id number]      Admission Summary:   47 y.o. female past medical history significant for hypothyroidism, Down syndrome and recently treated for COVID-19 infection was brought to the emergency room for further evaluation of elevated heart rate. Patient was admitted to [facility] on 2 2020 for acute respiratory failure secondary to COVID-19 infection. As per mother report at the time of discharge patient was doing better in no acute respiratory distress and not having any fever. Today home health nurse came to check on the patient and found that her heart rate was in the 140s 150s. Patient was sent her to the emergency room for evaluation. On arrival to the emergency room at [facility] she was found to be tachycardic and tachypneic. Patient has not been hypoxic or requiring O2 supplementation. CBC showed a WBC of 21,000 from 14,000 at the time of discharge. CTA of the chest was negative for PE but found to have persistent COVID-19 pneumonia. As per mother patient has not been coughing, being febrile, having diarrhea. Patient has been eating well. During her prior hospitalization patient was treated with remdesivir and completed a course of Decadron. Given persistent tachycardia after 1L iv fluids she admission was requested. Interval history / Subjective: Follow up sepsis. Patient is seen and examined at bedside this AM. Non verbal at baseline.  Alert, eating her breakfast   Discussed with nursing-  Melena last night     Spoke with mother Laisha Das on phone and updated patient's status - still low hb, melena last night, GI - conservative management, will monitor for now, may need blood transfusion     Assessment & Plan:     Sepsis (POA) due to COVID 19 Pneumonia:  resolved   -CT compatible with COVID 19.   -s/p vancomycin, cefepime.   -completed remdesmivir and dexamethazone during previous hospitalization  - saturating well on RA    Tachycardia, improved    - Echo done today , report pending   - appreciate Cardiology input   - Pt not tolerating all three new cardiac meds. Discussed with cardiology Dr. Edilma Blackman: recommended to hold Entresto   - c/w corlanor, metoprolol    Dysphagia:   - appreciate speech therapy  -  okay for mechanical soft diet    Anemia - hb low 7.4   - baseline hb around 9  - FOBT positive  - appreciate GI input \" would not advise endoscopic evaluation at this time. supportive measures. We can consider endoscopy once she is COVID negative\"   - will monitor hb, transfuse <7    DANY on CKD stage III: improved and stable   Monitor renal function. Encourage p.o. intake     Hypothyroidism: levothyroxine  Down syndrome: Continue with Risperdal     Hiatal hernia: Known finding. continue PPI  -Outpatient follow-up with GI. Oral labial herpes: s/p abreva    Appreciate palliative care input and goals of care discussion with family     Code status: full   DVT prophylaxis: SCDs    Care Plan discussed with: Patient/Family  Anticipated Disposition: Home w/Family  Anticipated Discharge: 24 hours to 48 hours     Hospital Problems  Never Reviewed          Codes Class Noted POA    Sepsis ([facility] [address] ICD-10-CM: A41.9  ICD-9-CM: 038.9, 995.91  11/19/2020 Unknown                Review of Systems:   Negative unless stated above      Vital Signs:    Last 24hrs VS reviewed since prior progress note.  Most recent are:  Visit Vitals  /87 (BP 1 Location: Right arm, BP Patient Position: At rest)   Pulse (!) 109   Temp 98.8 °F (37.1 °C)   Resp 23   Ht 5' 4\" (1.626 m)   Wt 86.6 kg (191 lb)   SpO2 98%   BMI 32.79 kg/m²         Intake/Output Summary (Last 24 hours) at 12/5/2020 Jordyn Palencia filed at 12/5/2020 9954  Gross per 24 hour   Intake 480 ml   Output 760 ml   Net -280 ml        Physical Examination:     I had a face to face encounter with this patient and independently examined them on 12/5/2020 as outlined below:          Constitutional:  No acute distress, non verbal    ENT:  right sided labial crusted lesions     Resp:    No accessory muscle use, nom audible wheezing   CV:  sinus RRR, no murmurs, gallops, rubs    GI:  Soft, non distended, non tender. normoactive bowel sounds, no hepatosplenomegaly     Musculoskeletal:  No edema, warm, 2+ pulses throughout    Neurologic:  Moves all extremities            Data Review:    Review and/or order of clinical lab test  Review and/or order of tests in the radiology section of CPT  Review and/or order of tests in the medicine section of CPT      Labs:     Recent Labs     12/05/20  0234 12/04/20  1528 12/04/20  0311   WBC 11.0  --  10.6   HGB 7.4* 8.5* 7.5*   HCT 21.9* 25.2* 22.3*   *  --  623*     Recent Labs     12/05/20  0234 12/04/20  0311    142   K 4.7 4.5    108   CO2 27 27   BUN 25* 28*   CREA 1.05* 1.06*   GLU 86 99   CA 8.6 8.3*     No results for input(s): ALT, AP, TBIL, TBILI, TP, ALB, GLOB, GGT, AML, LPSE in the last 72 hours. No lab exists for component: SGOT, GPT, AMYP, HLPSE  No results for input(s): INR, PTP, APTT, INREXT, INREXT in the last 72 hours. Recent Labs     12/04/20  0311   TIBC 279   PSAT 23      Lab Results   Component Value Date/Time    Folate 18.2 12/04/2020 03:11 AM      No results for input(s): PH, PCO2, PO2 in the last 72 hours. No results for input(s): CPK, CKNDX, TROIQ in the last 72 hours.     No lab exists for component: CPKMB  No results found for: CHOL, CHOLX, CHLST, CHOLV, HDL, HDLP, LDL, LDLC, DLDLP, TGLX, TRIGL, TRIGP, CHHD, CHHDX  Lab Results   Component Value Date/Time    Glucose (POC) 122 (H) 11/24/2020 05:36 AM    Glucose (POC) 82 03/11/2018 02:32 PM     Lab Results Component Value Date/Time    Color YELLOW/STRAW 11/23/2020 10:39 AM    Appearance CLOUDY (A) 11/23/2020 10:39 AM    Specific gravity 1.021 11/23/2020 10:39 AM    Specific gravity 1.010 11/20/2020 01:57 AM    pH (UA) 6.0 11/23/2020 10:39 AM    Protein TRACE (A) 11/23/2020 10:39 AM    Glucose Negative 11/23/2020 10:39 AM    Ketone 40 (A) 11/23/2020 10:39 AM    Bilirubin Negative 11/23/2020 10:39 AM    Urobilinogen 1.0 11/23/2020 10:39 AM    Nitrites Negative 11/23/2020 10:39 AM    Leukocyte Esterase TRACE (A) 11/23/2020 10:39 AM    Epithelial cells FEW 11/23/2020 10:39 AM    Bacteria 1+ (A) 11/23/2020 10:39 AM    WBC 0-4 11/23/2020 10:39 AM    RBC 20-50 11/23/2020 10:39 AM         Medications Reviewed:     Current Facility-Administered Medications   Medication Dose Route Frequency    pantoprazole (PROTONIX) 40 mg in 0.9% sodium chloride 10 mL injection  40 mg IntraVENous Q12H    metoprolol succinate (TOPROL-XL) XL tablet 25 mg  25 mg Oral DAILY    bisacodyL (DULCOLAX) tablet 5 mg  5 mg Oral DAILY PRN    senna (SENOKOT) tablet 8.6 mg  1 Tab Oral DAILY PRN    [Held by provider] sacubitriL-valsartan (ENTRESTO) 24-26 mg tablet 1 Tab  1 Tab Oral Q12H    polyethylene glycol (MIRALAX) packet 17 g  17 g Oral DAILY    ivabradine (CORLANOR) tablet 5 mg  5 mg Oral BID WITH MEALS    albuterol (PROVENTIL HFA, VENTOLIN HFA, PROAIR HFA) inhaler 2 Puff  2 Puff Inhalation Q4H PRN    levothyroxine (SYNTHROID) tablet 50 mcg  50 mcg Oral 6am    risperiDONE (RisperDAL) 1 mg/mL oral solution soln 1 mg  1 mg Oral QHS    acetaminophen (TYLENOL) tablet 650 mg  650 mg Oral Q6H PRN    Or    acetaminophen (TYLENOL) suppository 650 mg  650 mg Rectal Q6H PRN    sodium chloride (NS) flush 5-40 mL  5-40 mL IntraVENous Q8H    sodium chloride (NS) flush 5-40 mL  5-40 mL IntraVENous PRN    polyethylene glycol (MIRALAX) packet 17 g  17 g Oral DAILY PRN    promethazine (PHENERGAN) tablet 12.5 mg  12.5 mg Oral Q6H PRN    Or    ondansetron (ZOFRAN) injection 4 mg  4 mg IntraVENous Q6H PRN    ascorbic acid (vitamin C) (VITAMIN C) tablet 500 mg  500 mg Oral BID    [Held by provider] enoxaparin (LOVENOX) injection 40 mg  40 mg SubCUTAneous Q12H     ______________________________________________________________________  EXPECTED LENGTH OF STAY: 4d 19h  ACTUAL LENGTH OF STAY:          Sang Leonardo 80, MD

## 2020-12-05 NOTE — ROUTINE PROCESS
TRANSFER - IN REPORT:    Verbal report received from [address] RN(name) on Althea Price  being received from [facility]) for routine progression of care      Report consisted of patients Situation, Background, Assessment and   Recommendations(SBAR). Information from the following report(s) SBAR and Kardex was reviewed with the receiving nurse. Opportunity for questions and clarification was provided. Assessment completed upon patients arrival to unit and care assumed.

## 2020-12-05 NOTE — PROGRESS NOTES
Problem: Falls - Risk of  Goal: Absence of Falls  Outcome: Progressing Towards Goal  Call light in reach, Patient to call for help with toileting needs, Stay With Me (per policy), Toileting schedule/hourly rounds           Problem: Gas Exchange - Impaired  Goal: Promote optimal lung function  Outcome: Progressing Towards Goal  Pt on room air, O2 saturation remains greater than 90%, will continue to monitor           Problem: Breathing Pattern - Ineffective  Goal: Ability to achieve and maintain a regular respiratory rate  Outcome: Progressing Towards Goal  Pt's RR remains WNL, will continue to monitor           Problem: Body Temperature -  Risk of, Imbalanced  Goal: Ability to maintain a body temperature within defined limits  Outcome: Progressing Towards Goal  Pt temp remains WNL, will continue to monitor           Problem: Isolation Precautions - Risk of Spread of Infection  Goal: Prevent transmission of infectious organism to others  Outcome: Progressing Towards Goal  Pt on droplet plus precautions for COVID 19           Problem: Nutrition Deficits  Goal: Optimize nutrtional status  Outcome: Progressing Towards Goal  Pt on a mechanical soft diet, tolerating well           Problem: Risk for Fluid Volume Deficit  Goal: Maintain normal heart rhythm  Outcome: Progressing Towards Goal  Pt remains in NSR, will continue to monitor           Problem: Pressure Injury - Risk of  Goal: Prevention of pressure injury  Outcome: Progressing Towards Goal   Assess changes in LOC, Check visual cues for pain, Discuss PT/OT consult with provider, Float heels, Keep linens dry and wrinkle-free, Minimize linen layers, Pressure redistribution bed/mattress (bed type), Turn and reposition approx.  every two to three hours (pillows and wedges if needed)           Problem: Heart Failure: Day 5  Goal: Off Pathway (Use only if patient is Off Pathway)  Outcome: Progressing Towards Goal  Pt on metoprolol XL 25 mg daily        Hourly rounding done, pt in NSR, nonverbal, on room air, O2 saturation greater than 90%, on bedrest, had a large bowel movement, has pure wick, urine yellow and clear, no signs of pain throughout shift. 0730-Bedside shift change report given to [address] (oncoming nurse) by Bry Moore RN (offgoing nurse). Report included the following information SBAR, Kardex, ED Summary, Intake/Output, MAR, Recent Results, Med Rec Status and Cardiac Rhythm NSR.

## 2020-12-06 NOTE — DISCHARGE SUMMARY
Discharge Summary     Patient:  Nikki Johns       MRN: [id number]       YOB: 1980       Age: 47 y.o. Date of admission:  11/19/2020    Date of discharge:  12/6/2020   Primary care provider: Dr. Rosa Frey MD    Admitting provider:  Lisa Whittington MD    Discharging provider:  Bradley Dimas 91.: [phone]. If unavailable, call [phone] and ask the  to page the triage hospitalist.    Consultations  · [address]  · IP CONSULT TO INFECTIOUS DISEASES  · IP CONSULT TO GASTROENTEROLOGY  · IP CONSULT TO CARDIOLOGY    Procedures  · * No surgery found *    Discharge destination: home with [facility]. The patient is stable for discharge. Admission diagnosis  · Sepsis ([facility] [address] [A41.9]    Current Discharge Medication List      START taking these medications    Details   ivabradine (CORLANOR) 5 mg tablet Take 1 Tab by mouth two (2) times daily (with meals). Indications: chronic heart failure, inappropriate sinus tachycardia  Qty: 60 Tab, Refills: 0      metoprolol succinate (TOPROL-XL) 25 mg XL tablet Take 1 Tab by mouth daily. Qty: 30 Tab, Refills: 0         CONTINUE these medications which have NOT CHANGED    Details   lansoprazole (PREVACID) 3 mg/mL oral suspension Take 10 mL by mouth Daily (before breakfast) for 30 days. Qty: 300 mL, Refills: 0      risperiDONE (RisperDAL) 1 mg tablet Take 1 mg by mouth nightly. solifenacin (VESICARE) 10 mg tablet TAKE 1 TABLET BY MOUTH EVERY DAY      medroxyPROGESTERone (DEPO-PROVERA) 150 mg/mL injection 150 mg, IM, once, To be administered in clinic by nurse. See order comments for schedule., # 1 vial, 4 Refills, Pharmacy: [facility]/pharmacy #[id number]     albuterol (PROVENTIL HFA, VENTOLIN HFA, PROAIR HFA) 90 mcg/actuation inhaler Take 2 Puffs by inhalation every four (4) hours as needed for Wheezing.   Qty: 1 Inhaler, Refills: 0      zinc sulfate 220 mg tablet Take 1 Tab by mouth daily. Qty: 7 Tab, Refills: 0      cholecalciferol (VITAMIN D3) 1,000 unit tablet Take 1,000 Units by mouth daily. levothyroxine (SYNTHROID) 50 mcg tablet Take 50 mcg by mouth Daily (before breakfast). Follow-up Information     Follow up With Specialties Details Why Contact Info    [facility] skilled nursing PT/OT [address] MD Cardiology In 2 weeks  [facility]  [address] [address]  [facility]  [phone]            Final discharge diagnoses and brief hospital course  47 y. o. female past medical history significant for hypothyroidism, Down syndrome and recently treated for COVID-19 infection was brought to the emergency room for further evaluation of elevated heart rate.  Patient was admitted to [facility] on 2 11/16/2020 for acute respiratory failure secondary to COVID-19 infection.  As per mother report at the time of discharge patient was doing better in no acute respiratory distress and not having any fever.  Today home health nurse came to check on the patient and found that her heart rate was in the 140s 150s. Patient was sent her to the emergency room for evaluation. Elena Pester arrival to the emergency room at [facility] she was found to be tachycardic and tachypneic.  Patient has not been hypoxic or requiring O2 supplementation.  CBC showed a WBC of 21,000 from 14,000 at the time of discharge.  CTA of the chest was negative for PE but found to have persistent COVID-19 pneumonia. As per mother patient has not been coughing, being febrile, having diarrhea.  Patient has been eating well.  During her prior hospitalization patient was treated with remdesivir and completed a course of Decadron.   Given persistent tachycardia after 1L iv fluids she admission was requested.     Sepsis (POA) due to COVID 19 Pneumonia:  resolved   -CT compatible with COVID 19.   -s/p vancomycin, cefepime.   -completed remdesmivir and dexamethazone during previous hospitalization  - saturating well on RA     Tachycardia, improved    - Echo : EF 55%. Unable to assess diastolic function  - appreciate Cardiology input   - Pt not tolerating all three new cardiac meds. Discussed with cardiology Dr. David Young: recommended to hold Entresto   - c/w corlanor, metoprolol     Dysphagia:   - appreciate speech therapy  -  okay for mechanical soft diet     Anemia - hb low 7.4   - baseline hb around 9  - FOBT positive  - appreciate GI input \" would not advise endoscopic evaluation at this time. supportive measures. We can consider endoscopy once she is COVID negative\"   - will monitor hb, transfuse <7     DANY on CKD stage III: improved and stable   Monitor renal function.  Encourage p.o. intake     Hypothyroidism: levothyroxine  Down syndrome: Continue with Risperdal     Hiatal hernia: Known finding. continue PPI  -Outpatient follow-up with GI.     Oral labial herpes: s/p abreva    Discharge recommendations:  PCP f/u in 1 week  Cardiology f/u on 12/15  GI f/u  Cbc and bmp in 3-5 days     Physical examination at discharge  Visit Vitals  BP 94/65 (BP 1 Location: Left arm, BP Patient Position: At rest)   Pulse 83   Temp 98.4 °F (36.9 °C)   Resp 16   Ht 5' 4\" (1.626 m)   Wt 86.6 kg (191 lb)   SpO2 96%   BMI 32.79 kg/m²     Constitutional:  No acute distress, non verbal    ENT:  right sided labial crusted lesions     Resp:    No accessory muscle use, nom audible wheezing   CV:  sinus RRR, no murmurs, gallops, rubs    GI:  Soft, non distended, non tender.  normoactive bowel sounds, no hepatosplenomegaly     Musculoskeletal:  No edema, warm, 2+ pulses throughout    Neurologic:  Moves all extremities                               Pertinent imaging studies:    Per EMR     Recent Labs     12/06/20  0540 12/05/20  1545 12/05/20  0234  12/04/20  0311   WBC 10.1  -- 11.0  --  10.6   HGB 8.7* 9.2* 7.4*   < > 7.5*   HCT 26.4* 27.3* 21.9*   < > 22.3*   *  --  714*  --  623*    < > = values in this interval not displayed. Recent Labs     12/06/20  0540 12/05/20  0234 12/04/20  0311    142 142   K 5.0 4.7 4.5    108 108   CO2 27 27 27   BUN 22* 25* 28*   CREA 1.27* 1.05* 1.06*   GLU 77 86 99   CA 8.8 8.6 8.3*     No results for input(s): AP, TBIL, TP, ALB, GLOB, GGT, AML, LPSE in the last 72 hours. No lab exists for component: SGOT, GPT, AMYP, HLPSE  No results for input(s): INR, PTP, APTT, INREXT in the last 72 hours. Recent Labs     12/04/20 0311   TIBC 279   PSAT 23      No results for input(s): PH, PCO2, PO2 in the last 72 hours. No results for input(s): CPK, CKMB in the last 72 hours. No lab exists for component: TROPONINI  No components found for: D[place]ri Point    Chronic Diagnoses:    Problem List as of 12/6/2020 Never Reviewed          Codes Class Noted - Resolved    [facility] ICD-10-CM: A41.9  ICD-9-CM: 038.9, 995.91  11/19/2020 - Present        Pneumonia due to COVID-19 virus ICD-10-CM: U07.1, J12.89  ICD-9-CM: 480.8  11/6/2020 - Present              Time spent on discharge related activities today greater than 30 minutes. Signed:  Fuad Terrell MD                 Hospitalist, Internal Medicine      Cc:  Other, MD Kilo

## 2020-12-06 NOTE — PROGRESS NOTES
Problem: Falls - Risk of  Goal: *Absence of Falls  Description: Document Yosi Cross Fall Risk and appropriate interventions in the flowsheet. Outcome: Resolved/Met     Problem: Patient Education: Go to Patient Education Activity  Goal: Patient/Family Education  Outcome: Resolved/Met     Problem: General Medical Care Plan  Goal: *Vital signs within specified parameters  Outcome: Resolved/Met  Goal: *Labs within defined limits  Outcome: Resolved/Met  Goal: *Absence of infection signs and symptoms  Outcome: Resolved/Met  Goal: *Optimal pain control at patient's stated goal  Outcome: Resolved/Met  Goal: *Skin integrity maintained  Outcome: Resolved/Met  Goal: *Fluid volume balance  Outcome: Resolved/Met  Goal: *Optimize nutritional status  Outcome: Resolved/Met  Goal: *Anxiety reduced or absent  Outcome: Resolved/Met  Goal: *Progressive mobility and function (eg: ADL's)  Outcome: Resolved/Met     Problem: Patient Education: Go to Patient Education Activity  Goal: Patient/Family Education  Outcome: Resolved/Met     Problem: Gas Exchange - Impaired  Goal: Promote optimal lung function  Outcome: Resolved/Met     Problem: Breathing Pattern - Ineffective  Goal: Ability to achieve and maintain a regular respiratory rate  Outcome: Resolved/Met     Problem:  Body Temperature -  Risk of, Imbalanced  Goal: Ability to maintain a body temperature within defined limits  Outcome: Resolved/Met  Goal: Will regain or maintain usual level of consciousness  Outcome: Resolved/Met  Goal: Complications related to the disease process, condition or treatment will be avoided or minimized  Outcome: Resolved/Met     Problem: Isolation Precautions - Risk of Spread of Infection  Goal: Prevent transmission of infectious organism to others  Outcome: Resolved/Met     Problem: Nutrition Deficits  Goal: Optimize nutrtional status  Outcome: Resolved/Met     Problem: Risk for Fluid Volume Deficit  Goal: Maintain normal heart rhythm  Outcome: Resolved/Met  Goal: Maintain absence of muscle cramping  Outcome: Resolved/Met  Goal: Maintain normal serum potassium, sodium, calcium, phosphorus, and pH  Outcome: Resolved/Met     Problem: Loneliness or Risk for Loneliness  Goal: Demonstrate positive use of time alone when socialization is not possible  Outcome: Resolved/Met     Problem: Fatigue  Goal: Verbalize increase energy and improved vitality  Outcome: Resolved/Met     Problem: Patient Education: Go to Patient Education Activity  Goal: Patient/Family Education  Outcome: Resolved/Met     Problem: Pressure Injury - Risk of  Goal: *Prevention of pressure injury  Description: Document Jai Scale and appropriate interventions in the flowsheet.   Outcome: Resolved/Met     Problem: Patient Education: Go to Patient Education Activity  Goal: Patient/Family Education  Outcome: Resolved/Met     Problem: Nutrition Deficit  Goal: *Optimize nutritional status  Outcome: Resolved/Met     Problem: Patient Education: Go to Patient Education Activity  Goal: Patient/Family Education  Outcome: Resolved/Met     Problem: Patient Education: Go to Patient Education Activity  Goal: Patient/Family Education  Outcome: Resolved/Met     Problem: Heart Failure: Day 5  Goal: Off Pathway (Use only if patient is Off Pathway)  Outcome: Resolved/Met  Goal: Diagnostic Test/Procedures  Outcome: Resolved/Met  Goal: Nutrition/Diet  Outcome: Resolved/Met  Goal: Discharge Planning  Outcome: Resolved/Met  Goal: Medications  Outcome: Resolved/Met  Goal: Treatments/Interventions/Procedures  Outcome: Resolved/Met  Goal: Psychosocial  Outcome: Resolved/Met     Problem: Heart Failure: Discharge Outcomes  Goal: *Demonstrates ability to perform prescribed activity without shortness of breath or discomfort  Outcome: Resolved/Met  Goal: *Left ventricular function assessment completed prior to or during stay, or planned for post-discharge  Outcome: Resolved/Met  Goal: *ACEI prescribed if LVEF less than 40% and no contraindications or ARB prescribed  Outcome: Resolved/Met  Goal: *Verbalizes understanding and describes prescribed diet  Outcome: Resolved/Met  Goal: *Verbalizes understanding/describes prescribed medications  Outcome: Resolved/Met  Goal: *Describes available resources and support systems  Description: (eg: Home Health, Palliative Care, Advanced Medical Directive)  Outcome: Resolved/Met  Goal: *Describes smoking cessation resources  Outcome: Resolved/Met  Goal: *Understands and describes signs and symptoms to report to providers(Stroke Metric)  Outcome: Resolved/Met  Goal: *Describes/verbalizes understanding of follow-up/return appt  Description: (eg: to physicians, diabetes treatment coordinator, and other resources  Outcome: Resolved/Met  Goal: *Describes importance of continuing daily weights and changes to report to physician  Outcome: Resolved/Met     Problem: Patient Education: Go to Patient Education Activity  Goal: Patient/Family Education  Outcome: Resolved/Met     Problem: Breathing Pattern - Ineffective  Goal: *Use of effective breathing techniques  Outcome: Resolved/Met  Goal: *PALLIATIVE CARE:  Alleviation of Dyspnea  Outcome: Resolved/Met     Problem: Patient Education: Go to Patient Education Activity  Goal: Patient/Family Education  Outcome: Resolved/Met

## 2020-12-06 NOTE — PROGRESS NOTES
Bedside and Verbal shift change report given to Yuridia Ortega (oncoming nurse) by Belle Herbert (offgoing nurse). Report included the following information SBAR, Kardex and MAR.

## 2020-12-06 NOTE — PROGRESS NOTES
Verbal and Bedside shift change report given to Navid Peres RN (oncoming nurse) by Justyn Forbes (offgoing nurse). Report included the following information SBAR, Kardex and MAR.

## 2020-12-06 NOTE — PROGRESS NOTES
CM reviewed chart and was asked by nurse to call pt's family for transport home. CM called and spoke with pt's mother, who said that she was getting some more clothes together for patient to be able to change into it, and then she was waiting for her son to get back in order for them to come  patient.   Larissa Rao, BSW, ACM

## 2020-12-06 NOTE — PROGRESS NOTES
Problem: Falls - Risk of  Goal: *Absence of Falls  Description: Document Alvin Pena Fall Risk and appropriate interventions in the flowsheet.   Outcome: Progressing Towards Goal  Note: Fall Risk Interventions:  Mobility Interventions: Communicate number of staff needed for ambulation/transfer    Mentation Interventions: Adequate sleep, hydration, pain control    Medication Interventions: Evaluate medications/consider consulting pharmacy    Elimination Interventions: Call light in reach    History of Falls Interventions: Evaluate medications/consider consulting pharmacy, Investigate reason for fall, Room close to nurse's station, Assess for delayed presentation/identification of injury for 48 hrs (comment for end date), Vital signs minimum Q4HRs X 24 hrs (comment for end date)

## 2020-12-06 NOTE — DISCHARGE INSTRUCTIONS
Future Appointments   12/15/2020 10:20 AM   Lilibeth Monroy MD          [facility] AMB    Please bring this form with you to show your primary care provider at your follow-up appointment. Primary care provider:  Dr. Yamileth Smallwood MD    Discharging provider:  Meagan Valdes MD    You have been admitted to the hospital with the following diagnoses:  · Sepsis ([facility] [address] [A41.9]    FOLLOW-UP CARE RECOMMENDATIONS:    APPOINTMENTS:  · Follow-up with primary care provider, Dr. Yamileth Smallwood MD  -  Please call None shortly after discharge to set up an appointment to be seen in  1 week   · Cardiology on 12/15  · Gastroenterology     FOLLOW-UP TESTS recommended: cbc , bmp in 3-5 days     PENDING TEST RESULTS:  At the time of your discharge the following test results are still pending: none   Please make sure you review these results with your outpatient follow-up provider(s). SYMPTOMS to watch for: chest pain, shortness of breath, fever, chills, nausea, vomiting, diarrhea, change in mentation, falling, weakness, bleeding. DIET/what to eat:  Mechanical soft diet     ACTIVITY:  Activity as tolerated    What to do if new or unexpected symptoms occur? If you experience any of the above symptoms (or should other concerns or questions arise after discharge) please call your primary care physician. Return to the emergency room if you cannot get hold of your doctor. · It is very important that you keep your follow-up appointment(s). · Please bring discharge papers, medication list (and/or medication bottles) to your follow-up appointments for review by your outpatient provider(s). · Please check the list of medications and be sure it includes every medication (even non-prescription medications) that your provider wants you to take. · It is important that you take the medication exactly as they are prescribed.    · Keep your medication in the bottles provided by the pharmacist and keep a list of the medication names, dosages, and times to be taken in your wallet. · Do not take other medications without consulting your doctor. · If you have any questions about your medications or other instructions, please talk to your nurse or care provider before you leave the hospital.    I understand that if any problems occur once I am at home I am to contact my physician. These instructions were explained to me and I had the opportunity to ask questions.         Leaving the Hospital After Treatment for COVID-19: Care Instructions  Overview     You are being sent home from the hospital after being treated for COVID-19. Being in the hospital can be hard, especially if you've been in the intensive care unit (ICU). Even though you're going home, you probably don't feel well yet. Healing from COVID-19 takes time. You may feel very tired for weeks or months afterward, especially if you were on a ventilator. It will take time to get back to your old level of activity. Some people may have long-lasting health problems. But most people can look forward to feeling a little better every day. If you were on a ventilator, your throat may be sore and your voice hoarse or raspy for a while. After leaving the hospital, some people have feelings of anxiety and depression. They may have nightmares. Or in their mind they may relive events that happened in the hospital (flashbacks). You can always reach out to your doctor if you're having trouble with these symptoms. Your doctor will tell you if you need to isolate yourself at home, and when you can end isolation. Follow-up care is a key part of your treatment and safety. Be sure to make and go to all appointments, and call your doctor if you are having problems. It's also a good idea to know your test results and keep a list of the medicines you take. How can you care for yourself at home? · Get plenty of rest. It can help you feel better.   · Be kind to yourself if it's taking longer than you expected to feel better. You've been through a stressful time. · Get up and walk around every hour or two while you're resting. Slowly increase your activity as you start to feel better. · Eat healthy foods. · Drink plenty of fluids. If you have kidney, heart, or liver disease and have to limit fluids, talk with your doctor before you increase the amount of fluids you drink. · Take acetaminophen (such as Tylenol) to reduce a fever. It may also help with muscle aches. Read and follow all instructions on the label. If you are in isolation after you get home  · Wear a cloth face cover when you are around other people. It can help stop the spread of the virus when you cough or sneeze. · Limit contact with people in your home. If possible, stay in a separate bedroom and use a separate bathroom. · If you have to leave home, avoid crowds and try to stay at least 6 feet away from other people. · Avoid contact with pets and other animals. · Cover your mouth and nose with a tissue when you cough or sneeze. Then throw it in the trash right away. · Wash your hands often, especially after you cough or sneeze. Use soap and water, and scrub for at least 20 seconds. If soap and water aren't available, use an alcohol-based hand . · Don't share personal household items. These include bedding, towels, cups and glasses, and eating utensils. · [address] in the warmest water allowed for the fabric type, and dry it completely. It's okay to wash other people's laundry with yours. · Clean and disinfect your home every day. Use household  and disinfectant wipes or sprays. Take special care to clean things that you grab with your hands. These include doorknobs, remote controls, phones, and handles on your refrigerator and microwave. And don't forget countertops, tabletops, bathrooms, and computer keyboards. When should you call for help? Call 911 anytime you think you may need emergency care.  For example, call if you have life-threatening symptoms, such as:    · You have severe trouble breathing. (You can't talk at all.)     · You have constant chest pain or pressure.     · You are severely dizzy or lightheaded.     · You are confused or can't think clearly.     · Your face and lips have a blue color.     · You passed out (lost consciousness) or are very hard to wake up. Call your doctor now or seek immediate medical care if:    · You have moderate trouble breathing. (You can't speak a full sentence.)     · You are coughing up blood (more than about 1 teaspoon).     · You have signs of low blood pressure. These include feeling lightheaded; being too weak to stand; and having cold, pale, clammy skin. Watch closely for changes in your health, and be sure to contact your doctor if:    · Your symptoms get worse.     · You are not getting better as expected.     · You have new or worse symptoms of anxiety, depression, nightmares, or flashbacks. Call before you go to the doctor's office. Follow their instructions. And wear a cloth face cover. Current as of: July 10, 2020               Content Version: 12.6  © 2006-2020 [employer]. Care instructions adapted under license by [employer] (which disclaims liability or warranty for this information).  If you have questions about a medical condition or this instruction, always ask your healthcare professional. [address] any warranty or liability for your use of this information.

## 2020-12-07 NOTE — PROGRESS NOTES
Patient contacted regarding recent discharge and COVID-19 risk. Discussed COVID-19 related testing which was available at this time. Test results were positive. Patient informed of results, if available? no    Care Transition Nurse/ Ambulatory Care Manager/ LPN Care Coordinator contacted the family by telephone to perform post discharge assessment. Verified name and  with family as identifiers. Patient has following risk factors of: pneumonia and sepsis. CTN/ACM/LPN reviewed discharge instructions, medical action plan and red flags related to discharge diagnosis. Reviewed and educated them on any new and changed medications related to discharge diagnosis. Advised obtaining a 90-day supply of all daily and as-needed medications. Advance Care Planning:   Does patient have an Advance Directive: not on file    Education provided regarding infection prevention, and signs and symptoms of COVID-19 and when to seek medical attention with family who verbalized understanding. Discussed exposure protocols and quarantine from [address] you at higher risk for severe illness  and given an opportunity for questions and concerns. The family agrees to contact the COVID-19 hotline [phone] or PCP office for questions related to their healthcare. CTN/ACM/LPN provided contact information for future reference. From CDC: Are you at higher risk for severe illness?  Wash your hands often.  Avoid close contact (6 feet, which is about two arm lengths) with people who are sick.  Put distance between yourself and other people if COVID-19 is spreading in your community.  Clean and disinfect frequently touched surfaces.  Avoid all cruise travel and non-essential air travel.  Call your healthcare professional if you have concerns about COVID-19 and your underlying condition or if you are sick.     For more information on steps you can take to protect yourself, see CDC's How to Protect Yourself Patient/family/caregiver given information for [employer] and agrees to enroll no  Patient's preferred e-mail:  decline  Patient's preferred phone number: decline  Based on Loop alert triggers, patient will be contacted by nurse care manager for worsening symptoms. Plan for follow-up call in 7-14 days based on severity of symptoms and risk factors. Mother reports unable to afford $500 for [employer]. Has already contacted cardio office to inform and awating call back.  [facility] about care to start on 12/8/2020, cardio appointment on 12/15/2020, pcp SARAH

## 2020-12-07 NOTE — TELEPHONE ENCOUNTER
Patient mother calling stating the Corlanor medication that was given to she cost 500 dollars and the insurance company will NOT cover it    She would like something that's more affordable     She can be reached at [phone]    [employer]

## 2020-12-23 PROBLEM — U07.1 COVID-19: Status: ACTIVE | Noted: 2020-01-01

## 2020-12-23 PROBLEM — J96.90 RESPIRATORY FAILURE (HCC): Status: ACTIVE | Noted: 2020-01-01

## 2020-12-23 NOTE — ED TRIAGE NOTES
Pt discharged from floor 12/6 after being admitted for COVID pneumonia. Per caregiver, pt felt warm this morning. Pt in no acute distress.

## 2020-12-23 NOTE — PROGRESS NOTES
Reason for Readmission:     Sepsis RUR Score/Risk Level:     13 %. 3 ED IP visits and 2 IP admission. PCP:    First and Last name:  Dr. Penny Shaw 
            Name of Practice:  [facility] Are you a current patient: Yes/No: Yes Approximate date of last visit: physical 
            Can you participate in a virtual visit with your PCP:  No. Patient non-verbal  however mother a great support. Is a Care Conference indicated: no 
 
 
Did you attend your follow up appointment (s): If not, why not: Yes patient had a follow-up appointment at [address] [place] [address] On 12/9/2020 VIRTUAL. Resources/supports as identified by patient/family:   Mother Top Challenges facing patient (as identified by patient/family and CM): Finances/Medication cost?     Zero co-pay for medication Transportation      Patient mother Support system or lack thereof? Mother Kyaw Murphy Living arrangements? Lives with mother Self-care/ADLs/Cognition? Mother assist. 
    
Current Advanced Directive/Advance Care Plan:   No ACP docs. Patient has a decision maker Kyaw Murphy (mother). Patient mother listed as emergency contact. Patient is Down's syndrome nonverbal at baseline. Legal next of kin by law. Plan for utilizing home health:   Not at this time. Following for discharge planning. Transition of Care Plan:    Based on readmission, the patient's previous Plan of Care 
 has been evaluated and/or modified. The current Transition of Care Plan is:        
 
[employer] patient. CM opened case for assessment of D/C planning needs, CM reviewed chart. 
  
Pt presents with mother who is the decision maker and answers questions for pt. Patient is accompanied by Rio Leonardo who is her primary caregiver and provides most of the history.  Patient is Down's syndrome nonverbal at baseline.  
  
 Patient lives in a house with her mother no additional caretakers. No stairs in the home. No DME at home. Per mother patient non-verbal and normally points to things or \"mumbles\" to say what she wants. Patient PCP is at [facility] Dr. True Higgins family in agreement for follow-up.  
  
Discuss follow-up appointment and family in agreement. Discuss IM notification with mother and verbalize understand and consent. Copy place in patient chart. No additional question from mother. PCP follow-up 1/22/2020 @ 0815. CM to discuss dispatch health as option. Patient has cardiology appointment 1/5/2020. Patient is open to [facility]. Resumption orders will be sent via [facility] for SN/PT/OT on discharge.  
 
  
  
Plan of care for discharge 1. PCP follow-up with Dr. True Higgins 1/22/20 @ 5308 2. Mother is mode of transportation on discharge 3.  Resume orders for [place] SN/PT/OT  
 
  
[address] [place] [address] 
[phone]

## 2020-12-23 NOTE — H&P
Hospitalist Admission Note NAME: Rohith Ling :  1980 MRN:  [id number] Room Number: ER06/06  @ [facility]  
 
Date/Time:  2020 1:25 PM 
 
Patient PCP: Ford Cheng MD 
______________________________________________________________________ Given the patient's current clinical presentation, I have a high level of concern for decompensation if discharged from the emergency department. Complex decision making was performed, which includes reviewing the patient's available past medical records, laboratory results, and x-ray films. My assessment of this patient's clinical condition and my plan of care is as follows. Assessment / Plan: Active Problems: * No active hospital problems. * 
 
# Sepsis POA #Acute hypoxic respiratory failure secondary to COVID-19/pneumonia - WBC 19 ,  , RR 34 - Temp 98 - CXR vague pulmonary opacities in the lower lobes which are 
stable on the right and slightly improved on the left 
-Lactic acid 1.1 
-Pro-Atif pending 
-PCR COVID-19 pending 
-Last PCR COVID-19 positive was 2020 
-Complete remdesivir Decadron during previous hospitalization 
-Treat with broad-spectrum antibiotic given she might be having HCAP  
-Blood cultures urine culture sputum culture 
-Check influenza - Patient Vitals for the past 12 hrs: 
 Temp Pulse Resp BP SpO2  
20 1823  (!) 158 20  96 % 20 1822  (!) 160 20  96 % 20 1820  (!) 158 20  96 % 20 1816  (!) 161   96 % 20 1809  (!) 158 20 (!) 138/90 94 % 20 1802  (!) 152 19  94 % 20 1757  (!) 163 (!) 57  93 % 20 1756  (!) 162 (!) 41  93 % 20 1754  (!) 160 (!) 61  94 % 20 1752  (!) 160 (!) 55  93 % 20 1751  (!) 160 (!) 50  92 % 20 1750  (!) 159 (!) 44  93 % 20 1747  (!) 156 (!) 61  94 % 20 173  (!) 152 (!) 55  97 % 12/23/20 1726  (!) 138 (!) 36  94 % 12/23/20 1712  (!) 134 (!) 53  96 % 12/23/20 1701  (!) 126 27  98 % 12/23/20 1647 (!) 103.2 °F (39.6 °C) (!) 123     
12/23/20 1630  (!) 120 (!) 40 103/73 92 % 12/23/20 1605  (!) 114 (!) 34  96 % 12/23/20 1600  (!) 122 27 104/74 94 % 12/23/20 1556  (!) 126 21  90 % 12/23/20 1555  (!) 128 (!) 37    
12/23/20 1554  (!) 129 24  90 % 12/23/20 1553  (!) 129 21  (!) 88 % 12/23/20 1552  (!) 127 (!) 34  (!) 88 % 12/23/20 1551  (!) 129 (!) 32  (!) 86 % 12/23/20 1500  (!) 119 (!) 37 (!) 129/98   
12/23/20 1458  (!) 113 24  100 % 12/23/20 1442 (!) 101.9 °F (38.8 °C)      
12/23/20 1430  (!) 110 25 123/70 99 % 12/23/20 1400 99.8 °F (37.7 °C) (!) 116 29 104/75 100 % 12/23/20 1330  (!) 107 27 118/77 96 % 12/23/20 1300  (!) 118 (!) 34 125/77 97 % 12/23/20 1230  (!) 116 (!) 33 118/77 97 % 12/23/20 1211  (!) 123 (!) 35  98 % 12/23/20 1210    124/79   
12/23/20 1157  (!) 122 (!) 35 114/75 96 % 12/23/20 1114 98.1 °F (36.7 °C) (!) 130 16 109/71 96 %  
-Patient develop respiratory failure and ultimately underwent intubation per ED 
-Patient would require ICU level of care #Suspected UTI 
-UA with moderate blood large leukoesterase and WBC 
-Repeat with clean-catch #Hypothyroidism 
-On levothyroxine #DANY on CKD 2 
-SCr 1.44 baseline 1.27 #Dysphagia 
-He was seen by speech therapy and recommended mechanical soft diet #Anemia  
- Hgb 11.2  
-Fecal occult blood positive 
-Was seen by GI in the previous hospitalization and recommended endoscopy were negative for COVID-19 # Down syndrome on risperidone # HTN # Hx of HFrEF 25 % in 2018 improved to 55% in recent echo on toprol # Sinus tachycardia on Corlanor Body mass index is 33.83 kg/m². Code Status: Full Surrogate Decision Maker: DVT Prophylaxis: Lovenox GI Prophylaxis: not indicated Subjective: CHIEF COMPLAINT: Not feeling well patient is a nonverbal at baseline most of the history was provided by mother at bedside HISTORY OF PRESENT ILLNESS:    
Jordyn Dickerson is a 47 y.o.  female with PMH of above mention problesm who presents to ED with c/o not feeling well cough. Patient is nonverbal at baseline more the history was provided by mother at bedside which states that patient has not been eating well lost her appetite she is coughing a lot with a lot of sputum production. Mother states that patient was admitted 2 times in the past 2 3 months 1st for COVID-19 then for pneumonia and she is not been to her baseline. We were asked to admit for work up and evaluation of the above problems. Past Medical History:  
Diagnosis Date  Endocrine disease  Hypothyroid 03/11/2018  Ill-defined condition Down's Syndrome No past surgical history on file. Social History Tobacco Use  Smoking status: Never Smoker  Smokeless tobacco: Never Used Substance Use Topics  Alcohol use: No  
  
 
No family history on file. No Known Allergies Prior to Admission medications Medication Sig Start Date End Date Taking? Authorizing Provider  
ivabradine (CORLANOR) 5 mg tablet Take 1 Tab by mouth two (2) times daily (with meals). Indications: chronic heart failure, inappropriate sinus tachycardia 12/6/20   Nasir Pascal MD  
metoprolol succinate (TOPROL-XL) 25 mg XL tablet Take 1 Tab by mouth daily. 12/6/20   Nasir Pascal MD  
risperiDONE (RisperDAL) 1 mg tablet Take 1 mg by mouth nightly. Provider, Historical  
solifenacin (VESICARE) 10 mg tablet TAKE 1 TABLET BY MOUTH EVERY DAY 9/18/20   Kilo Cortez MD  
medroxyPROGESTERone (DEPO-PROVERA) 150 mg/mL injection 150 mg, IM, once, To be administered in clinic by nurse.  See order comments for schedule., # 1 vial, 4 Refills, Pharmacy: [facility]/pharmacy #[id number] 12/5/19   Kilo Cortez MD  
 albuterol (PROVENTIL HFA, VENTOLIN HFA, PROAIR HFA) 90 mcg/actuation inhaler Take 2 Puffs by inhalation every four (4) hours as needed for Wheezing. 11/2/20   Kirit Guillaume MD  
zinc sulfate 220 mg tablet Take 1 Tab by mouth daily. 11/2/20   Kirit Guillaume MD  
cholecalciferol (VITAMIN D3) 1,000 unit tablet Take 1,000 Units by mouth daily. Kilo Cortez MD  
levothyroxine (SYNTHROID) 50 mcg tablet Take 50 mcg by mouth Daily (before breakfast). Kilo Cortez MD  
 
 
REVIEW OF SYSTEMS:    
I am not able to complete the review of systems because: The patient is intubated and sedated The patient has altered mental status due to his acute medical problems The patient has baseline aphasia from prior stroke(s) The patient has baseline dementia and is not reliable historian The patient is in acute medical distress and unable to provide information  
x  Down syndrome nonverbal at baseline Total of 12 systems reviewed as follows:   
   POSITIVE= underlined text  Negative = text not underlined G 
 
Objective: VITALS:   
Visit Vitals /77 Pulse (!) 118 Temp 98.1 °F (36.7 °C) Resp (!) 34 Ht 5' 3\" (1.6 m) Wt 86.6 kg (191 lb) SpO2 97% BMI 33.83 kg/m² PHYSICAL EXAM: 
 
General:   Nonverbal at baseline sitting in bed coughing. HEENT: Atraumatic, anicteric sclerae, pink conjunctivae No oral ulcers, mucosa moist, throat clear, dentition fair Neck:  Supple, symmetrical,  thyroid: non tender Lungs:   Bilateral expiratory crackles 
chest wall:  No tenderness  No Accessory muscle use. Heart:   Regular  rhythm,  No  murmur   No edema Abdomen:   Soft, non-tender. Not distended. Bowel sounds normal 
Extremities: No cyanosis. No clubbing,   
  Skin turgor normal, Capillary refill normal, Radial dial pulse 2+ Skin:     Not pale. Not Jaundiced  No rashes Psych:  Nonverbal baseline unable to assess neurologic: EOMs intact. Nonverbal does not follow command seems to move all 4 extremity eyes open  
______________________________________________________________________ Care Plan discussed with:  Patient/Family Expected  Disposition:  Home w/Family 
________________________________________________________________________ TOTAL TIME:  55 Minutes Critical Care Provided     Minutes non procedure based Comments Reviewed previous records  
>50% of visit spent in counseling and coordination of care  Discussion with patient and/or family and questions answered 
  
 
________________________________________________________________________ Signed: Brayden Quintana MD 
 
Procedures: see electronic medical records for all procedures/Xrays and details which were not copied into this note but were reviewed prior to creation of Plan. LAB DATA REVIEWED:   
Recent Results (from the past 24 hour(s)) EKG, 12 LEAD, INITIAL Collection Time: 12/23/20 11:35 AM  
Result Value Ref Range Ventricular Rate 126 BPM  
 Atrial Rate 126 BPM  
 P-R Interval 120 ms QRS Duration 64 ms Q-T Interval 398 ms QTC Calculation (Bezet) 576 ms Calculated P Axis 41 degrees Calculated R Axis 66 degrees Calculated T Axis 57 degrees Diagnosis Sinus tachycardia T wave abnormality, consider lateral ischemia Abnormal ECG When compared with ECG of 27-NOV-2020 17:31, 
Questionable change in QRS axis Inverted T waves have replaced nonspecific T wave abnormality in Inferior  
leads T wave inversion now evident in Lateral leads CBC WITH AUTOMATED DIFF Collection Time: 12/23/20 11:49 AM  
Result Value Ref Range WBC 19.9 (H) 3.6 - 11.0 K/uL  
 RBC 4.15 3.80 - 5.20 M/uL  
 HGB 11.2 (L) 11.5 - 16.0 g/dL HCT 33.3 (L) 35.0 - 47.0 % MCV 80.2 80.0 - 99.0 FL  
 MCH 27.0 26.0 - 34.0 PG  
 MCHC 33.6 30.0 - 36.5 g/dL  
 RDW 16.9 (H) 11.5 - 14.5 % PLATELET 837 981 - 451 K/uL MPV 9.8 8.9 - 12.9 FL  
 NRBC 0.0 0  WBC ABSOLUTE NRBC 0.00 0.00 - 0.01 K/uL NEUTROPHILS 80 (H) 32 - 75 % LYMPHOCYTES 8 (L) 12 - 49 % MONOCYTES 10 5 - 13 % EOSINOPHILS 0 0 - 7 % BASOPHILS 1 0 - 1 % IMMATURE GRANULOCYTES 1 (H) 0.0 - 0.5 % ABS. NEUTROPHILS 15.9 (H) 1.8 - 8.0 K/UL  
 ABS. LYMPHOCYTES 1.6 0.8 - 3.5 K/UL  
 ABS. MONOCYTES 2.0 (H) 0.0 - 1.0 K/UL  
 ABS. EOSINOPHILS 0.0 0.0 - 0.4 K/UL  
 ABS. BASOPHILS 0.2 (H) 0.0 - 0.1 K/UL  
 ABS. IMM. GRANS. 0.2 (H) 0.00 - 0.04 K/UL  
 DF SMEAR SCANNED    
 RBC COMMENTS ANISOCYTOSIS 
1+ METABOLIC PANEL, BASIC Collection Time: 12/23/20 11:49 AM  
Result Value Ref Range Sodium 140 136 - 145 mmol/L Potassium 3.6 3.5 - 5.1 mmol/L Chloride 102 97 - 108 mmol/L  
 CO2 27 21 - 32 mmol/L Anion gap 11 5 - 15 mmol/L Glucose 112 (H) 65 - 100 mg/dL BUN 15 6 - 20 MG/DL Creatinine 1.44 (H) 0.55 - 1.02 MG/DL  
 BUN/Creatinine ratio 10 (L) 12 - 20 GFR est AA 49 (L) >60 ml/min/1.73m2 GFR est non-AA 41 (L) >60 ml/min/1.73m2 Calcium 8.7 8.5 - 10.1 MG/DL  
LACTIC ACID Collection Time: 12/23/20 11:49 AM  
Result Value Ref Range Lactic acid 1.1 0.4 - 2.0 MMOL/L  
TROPONIN I Collection Time: 12/23/20 11:49 AM  
Result Value Ref Range Troponin-I, Qt. <0.05 <0.05 ng/mL NT-PRO BNP Collection Time: 12/23/20 11:49 AM  
Result Value Ref Range NT pro- (H) 0 - 125 PG/ML  
URINALYSIS W/ REFLEX CULTURE Collection Time: 12/23/20 12:12 PM  
 Specimen: Miscellaneous sample; Urine Urine specimen Result Value Ref Range Color DARK YELLOW Appearance CLOUDY (A) CLEAR Specific gravity 1.025 1.003 - 1.030    
 pH (UA) 5.5 5.0 - 8.0 Protein 100 (A) NEG mg/dL Glucose Negative NEG mg/dL Ketone TRACE (A) NEG mg/dL Blood MODERATE (A) NEG Urobilinogen 1.0 0.2 - 1.0 EU/dL Nitrites Negative NEG  Leukocyte Esterase LARGE (A) NEG    
 WBC 20-50 0 - 4 /hpf  
 RBC 0-5 0 - 5 /hpf  
 Epithelial cells MODERATE (A) FEW /lpf Bacteria 3+ (A) NEG /hpf  
 UA:UC IF INDICATED URINE CULTURE ORDERED (A) CNI    
BILIRUBIN, CONFIRM Collection Time: 12/23/20 12:12 PM  
Result Value Ref Range Bilirubin UA, confirm Negative NEG    
SAMPLES BEING HELD Collection Time: 12/23/20 12:14 PM  
Result Value Ref Range SAMPLES BEING HELD RED,LISETTE,SST,GREEN   
 COMMENT Add-on orders for these samples will be processed based on acceptable specimen integrity and analyte stability, which may vary by analyte. HCG URINE, QL. - POC Collection Time: 12/23/20 12:16 PM  
Result Value Ref Range  Pregnancy test,urine (POC) Negative NEG

## 2020-12-23 NOTE — ED NOTES
Pt presents to ED ambulatory accompanied by mother (legal guardian) complaining of \"feeling warm\". Pt nonverbal. Pt nodded head left to right gesturing 'no' when asked if she has other complaints. On observation, pt tachypenic, using accessory muscles to breathe (labored), productive cough. Per pt mother, pt was discharged from [facility] 12/6 for covid and pnemonia. Pt is alert and oriented x 4, RR even, skin is warm and dry. Assessment completed and pt and pt mother updated on plan of care. Call bell in reach. Emergency Department Nursing Plan of Care The Nursing Plan of Care is developed from the Nursing assessment and Emergency Department Attending provider initial evaluation. The plan of care may be reviewed in the ED Provider note. The Plan of Care was developed with the following considerations:  
Patient / Family readiness to learn indicated by:verbalized understanding Persons(s) to be included in education: care giver Barriers to Learning/Limitations:No 
 
Signed William Duenas 12/23/2020   1:58 PM

## 2020-12-23 NOTE — ED NOTES
Pt.became tachypnea and oxygen saturation was dropping to 92%the patient. was put on 100%NRFM and her sats went up to 95-96%,HOSPITALIST DID NOT FEEL COMFORTABLE KEEPING PT IN [facility] so we transferred pt to [address] [place] [address]. Pt was accepted by  in the ED.

## 2020-12-23 NOTE — PROGRESS NOTES
[facility] Dosing Services: Antimicrobial Stewardship Progress Note 
 
Consult for antibiotic dosing of vancomycin by Dr. Grayson 
Pharmacist reviewed antibiotic appropriateness for 40 year old , female  for indication of HAP 
 
Day of Therapy 1 
 
Plan: 
Vancomycin therapy: 
Start Vancomycin therapy, with loading dose of 2000 mg IV X 1  
Follow with maintenance dose of 1250mg IV q16h @ 12/24/20 0700. Scr 1.44. 
Dose calculated to approximate a therapeutic trough of 15-20 mcg/mL.   
Pharmacy to follow daily and will make changes to dose and/or frequency based on clinical status. 
Date Dose & Interval Measured (mcg/mL) Extrapolated (mcg/mL)  
? ? ? ?  
 
Other Antimicrobial 
(not dosed by pharmacist) 
 Cefepime 2 g IV q12h  
Doxycycline 100mg PO 
Azithromycin 500mg IV X 1 in ED 
Ceftriaxone 1g X 1 in ED  
Cultures 
 
 12/23/20 Blood:pending  
Serum Creatinine 
 
 Lab Results  
Component Value Date/Time  
 Creatinine 1.44 (H) 12/23/2020 11:49 AM  
   
Creatinine Clearance Estimated Creatinine Clearance: 55.3 mL/min (A) (based on SCr of 1.44 mg/dL (H)). 
  
Procalcitonin   
Lab Results  
Component Value Date/Time  
 Procalcitonin 0.64 11/24/2020 01:42 AM  
 
  
Temp  
98.1 °F (36.7 °C)   
WBC  
Lab Results  
Component Value Date/Time  
 WBC 19.9 (H) 12/23/2020 11:49 AM  
   
H/H 
 Lab Results  
Component Value Date/Time  
 HGB 11.2 (L) 12/23/2020 11:49 AM  
  
Platelets Lab Results  
Component Value Date/Time  
 PLATELET 254 12/23/2020 11:49 AM  
 
  
 
 
Sally Broderick Contact information: [phone]

## 2020-12-23 NOTE — Clinical Note
Status[de-identified] INPATIENT [101]   Type of Bed: Stepdown [17]   Inpatient Hospitalization Certified Necessary for the Following Reasons: 3.  Patient receiving treatment that can only be provided in an inpatient setting (further clarification in H&P documentation)   Admitting Diagnosis: Sepsis [facility]) [[id number]]   Admitting Physician: Hemalatha Pisano [[id number]]   Attending Physician: Mauri Cabrera   Estimated Length of Stay: 3-4 Midnights   Discharge Plan[de-identified] Home with Office Follow-up

## 2020-12-23 NOTE — ED NOTES
TRANSFER - OUT REPORT: 
 
Verbal report given to Abida Bangura RN (name) on Lisha Fast  being transferred to [facility] ED (unit) for routine progression of care Report consisted of patients Situation, Background, Assessment and  
Recommendations(SBAR). Information from the following report(s) SBAR, Kardex, ED Summary, Intake/Output, MAR and Recent Results was reviewed with the receiving nurse. Lines:  
Peripheral IV 12/23/20 Right Antecubital (Active) Site Assessment Clean, dry, & intact 12/23/20 1154 Phlebitis Assessment 0 12/23/20 1154 Infiltration Assessment 0 12/23/20 1154 Dressing Status Clean, dry, & intact 12/23/20 1154 Dressing Type Transparent 12/23/20 1154 Hub Color/Line Status Pink 12/23/20 1154 Action Taken Blood drawn 12/23/20 1154 Opportunity for questions and clarification was provided. Patient transported with: 
 Monitor, EMTALA, 15 L non-rebreather Visit Vitals /73 Pulse (!) 120 Temp (!) 103.2 °F (39.6 °C) Resp (!) 40 Ht 5' 3\" (1.6 m) Wt 86.6 kg (191 lb) SpO2 92% BMI 33.83 kg/m²

## 2020-12-23 NOTE — ED NOTES
Erika Mendes MD, resp tech, 3 RN, 2 RAA at bedside. Intubation: 
1755: 30 etomidate, 100 succ 
1759: 60 codey 
1804: prop 5mcg/kg 1830: prop stopped 1830: 1000 mL bolus started 1920: bolus completed

## 2020-12-23 NOTE — PROGRESS NOTES
Patient Vitals for the past 12 hrs: 
 Temp Pulse Resp BP SpO2  
12/23/20 1630  (!) 120 (!) 40 103/73 92 % 12/23/20 1605  (!) 114 (!) 34  96 % 12/23/20 1600  (!) 122 27 104/74 94 % 12/23/20 1556  (!) 126 21  90 % 12/23/20 1555  (!) 128 (!) 37    
12/23/20 1554  (!) 129 24  90 % 12/23/20 1553  (!) 129 21  (!) 88 % 12/23/20 1552  (!) 127 (!) 34  (!) 88 % 12/23/20 1551  (!) 129 (!) 32  (!) 86 % 12/23/20 1500  (!) 119 (!) 37 (!) 129/98   
12/23/20 1458  (!) 113 24  100 % 12/23/20 1442 (!) 101.9 °F (38.8 °C)      
12/23/20 1430  (!) 110 25 123/70 99 % 12/23/20 1400 99.8 °F (37.7 °C) (!) 116 29 104/75 100 % 12/23/20 1330  (!) 107 27 118/77 96 % 12/23/20 1300  (!) 118 (!) 34 125/77 97 % 12/23/20 1230  (!) 116 (!) 33 118/77 97 % 12/23/20 1211  (!) 123 (!) 35  98 % 12/23/20 1210    124/79   
12/23/20 1157  (!) 122 (!) 35 114/75 96 % 12/23/20 1114 98.1 °F (36.7 °C) (!) 130 16 109/71 96 % Patient is unable to clear secretions Given elevated D-dimer CTA to rule out PE Patient received remdesivir and Decadron during previous hospitalization for her COVID-19 Suspect HCAP/aspiration pneumonia//?  Reinfection of COVID-19 
-Discussed with Dr. Bartolo Wang  patient needs higher level of care Reno Sullivan MD

## 2020-12-23 NOTE — ED PROVIDER NOTES
Fever since yesterday. pt.was admitted for covid-19 last month,has been doing well until yesterday.pt.is nonverbal and her mother is giving the information. Past Medical History:  
Diagnosis Date  Endocrine disease  Hypothyroid 03/11/2018  Ill-defined condition Down's Syndrome No past surgical history on file. No family history on file. Social History Socioeconomic History  Marital status: SINGLE Spouse name: Not on file  Number of children: Not on file  Years of education: Not on file  Highest education level: Not on file Occupational History  Not on file Social Needs  Financial resource strain: Not on file  Food insecurity Worry: Not on file Inability: Not on file  Transportation needs Medical: Not on file Non-medical: Not on file Tobacco Use  Smoking status: Never Smoker  Smokeless tobacco: Never Used Substance and Sexual Activity  Alcohol use: No  
 Drug use: No  
 Sexual activity: Not on file Lifestyle  Physical activity Days per week: Not on file Minutes per session: Not on file  Stress: Not on file Relationships  Social connections Talks on phone: Not on file Gets together: Not on file Attends [employer] service: Not on file Active member of club or organization: Not on file Attends meetings of clubs or organizations: Not on file Relationship status: Not on file  Intimate partner violence Fear of current or ex partner: Not on file Emotionally abused: Not on file Physically abused: Not on file Forced sexual activity: Not on file Other Topics Concern  Not on file Social History Narrative  Not on file ALLERGIES: Patient has no known allergies. Review of Systems Constitutional: Positive for chills and fever. Cardiovascular: Positive for palpitations. All other systems reviewed and are negative. There were no vitals filed for this visit. Physical Exam 
Constitutional:   
   Appearance: Normal appearance. HENT:  
   Head: Normocephalic and atraumatic. Right Ear: Tympanic membrane normal.  
   Left Ear: Tympanic membrane normal.  
   Nose: Nose normal. No congestion. Mouth/Throat:  
   Mouth: Mucous membranes are moist.  
   Pharynx: Oropharynx is clear. No oropharyngeal exudate. Eyes:  
   Extraocular Movements: Extraocular movements intact. Conjunctiva/sclera: Conjunctivae normal.  
   Pupils: Pupils are equal, round, and reactive to light. Neck: Musculoskeletal: Normal range of motion and neck supple. No neck rigidity. Cardiovascular:  
   Rate and Rhythm: Regular rhythm. Tachycardia present. Pulses: Normal pulses. Pulmonary:  
   Effort: Pulmonary effort is normal.  
Abdominal:  
   General: Abdomen is flat. Bowel sounds are normal.  
   Palpations: Abdomen is soft. Musculoskeletal: Normal range of motion. General: No swelling. Skin: 
   General: Skin is warm and dry. Capillary Refill: Capillary refill takes less than 2 seconds. Neurological:  
   General: No focal deficit present. Mental Status: She is alert and oriented to person, place, and time. Psychiatric:     
   Mood and Affect: Mood normal.     
   Behavior: Behavior normal.  
 
  
 
MDM Number of Diagnoses or Management Options Diagnosis management comments: Sepsis/pneumonia/covid 19/UTI Procedures Procedure Note - Orotracheal Intubation:  
4:48 PM 
Performed by: Luzma Cevallos MD  
Indication for procedure: respiratory failure and impending airway compromise RSI performed. The patient was sedated with 30 mg of etomidate and succinylcholine and orotracheally intubated with a 7.0 cuffed Chilean endotracheal tube using a curve blade with direct visualization. Tube was advanced to 21 cm at the lips. ETT location confirmed by bilateral, symmetric breath sounds, good end-tidal CO2 detector color change  and chest x-ray visualization. Number of attempts: 1 Complications: none RSI was used. The procedure took 1-15 minutes, and pt tolerated well. Written by Dat Toribio ED Scribe, as dictated by Dana George MD 
 
 
CRITICAL CARE NOTE : 
 
4:51 PM 
 
 
IMPENDING DETERIORATION -Airway, Respiratory, Cardiovascular and Metabolic ASSOCIATED RISK FACTORS - Shock, Hypoxia, Metabolic changes and Dehydration MANAGEMENT- Bedside Assessment, Supervision of Care and Transfer INTERPRETATION -  Xrays, Blood Gases, ECG and Blood Pressure INTERVENTIONS - hemodynamic mngmt, vent mngmt and Metobolic interventions CASE REVIEW - Hospitalist, Nursing and Family TREATMENT RESPONSE -Stable PERFORMED BY - Self NOTES   : 
 
 
I have spent 60 minutes of critical care time involved in lab review, consultations with specialist, family decision- making, bedside attention and documentation. During this entire length of time I was immediately available to the patient . Riddhi Bro MD 
 
 
PLAN FOR TRANSFER: 
4:53 PM 
The patient is being transferred to [facility] ED for admission. The results of their tests and reasons for their transfer have been discussed with the patient and/or available family. The patient/family has conveyed agreement and understanding for the need to be admitted and for their admission diagnosis. Consultation has been made ED attending who agrees to accept the transfer.   
Riddhi Bro MD 
 
 
 
 
 
 
 
 
 
 
 
 
 
 
 
 
 
 
 
 
 
 
.

## 2020-12-23 NOTE — ED NOTES
Pt began coughing. Pt experiencing difficulty managing oral secretions independently. This RN assisting pt via suction, NC, non-rebreather. MD Dena Shoemaker at bedside.

## 2020-12-24 PROBLEM — E03.9 ACQUIRED HYPOTHYROIDISM: Chronic | Status: ACTIVE | Noted: 2020-01-01

## 2020-12-24 PROBLEM — Q90.9 DOWN SYNDROME: Chronic | Status: ACTIVE | Noted: 2020-01-01

## 2020-12-24 PROBLEM — I50.20 HFREF (HEART FAILURE WITH REDUCED EJECTION FRACTION) (HCC): Chronic | Status: ACTIVE | Noted: 2020-01-01

## 2020-12-24 NOTE — PROCEDURES
[facility] Procedure Note - Arterial Access:  
Performed by Citlalli Pires NP . Immediately prior to the procedure, the patient was reevaluated and found suitable for the planned procedure and any planned medications. Immediately prior to the procedure a time out was called to verify the correct patient, procedure, equipment, staff, and marking as appropriate. Insertion Date: 12/24/2020 Time:0005 Procedure Location:  ED. Condition: Emergency. Consent:  NO.    
Method: Seldinger technique. Site Prep: ChloraPrep. Procedure: Arterial Catheter Insertion in Right, Radial Artery Catheter inserted into a new site. Number of Attempts:  2 Indication: Monitoring and Blood Drawing. Complication None. Performed By:performed the above procedure myself. The procedure was tolerated well.

## 2020-12-24 NOTE — PROGRESS NOTES
[id number] Patient admitted from ED, on Levophed at 66 lupe/min > now at 70 lupe/min, Vasopressin 0.03 lupe, Versed at 10 mg/hr, fentanyl 125 lupe/hr, K 5.3 > NP order 10 units IV Regular insulin once as Glucose up to 270, Lactic acid 3.3 > No order for now, AC PC 30, RR 22/27, Pts  ml, Peep 18, FiO2 drop to 80 from 90%, patient on prone position, Temp 102.5 balder > cooling blanket ordered, Bedside and Verbal shift change report given to Louise Osborne RN (oncoming nurse) by [facility] HEMA BHANDARI (offgoing nurse). Report included the following information SBAR, Kardex, Intake/Output, MAR, Accordion, Recent Results, Med Rec Status and Cardiac Rhythm Sinus Tachycardia 108. [facility] arrived      Maeve Coughlin RN  07/17/20 0884

## 2020-12-24 NOTE — ED PROVIDER NOTES
EMERGENCY DEPARTMENT HISTORY AND PHYSICAL EXAM 
 
 
Date: 12/23/2020 Patient Name: Garcia Guzmán History of Presenting Illness No chief complaint on file. HPI: Garcia Guzmán, 47 y.o. female presents to the ED with cc of transfer for respiratory failure. Patient is intubated and sedated, not able to provide history. This is obtained from EMS, her mother, and outside hospital physician. Over the last several months she has a history of 2 prior hospital admissions for respiratory distress, and for Covid pneumonia. She presented to outside emergency department with increasing shortness of breath and tachypnea, she was hypoxic, requiring 15 L nonrebreather, was escalated to positive pressure ventilation with continued respiratory distress, she was intubated prior to transfer. On arrival, the patient is intubated, sedated, oxygen saturation 80%, blood pressure 60s over 40s. There are no other complaints, changes, or physical findings at this time. PCP: Diego, MD Kilo 
 
Current Facility-Administered Medications on File Prior to Encounter Medication Dose Route Frequency Provider Last Rate Last Admin  [COMPLETED] sodium chloride 0.9 % bolus infusion 250 mL  250 mL IntraVENous ONCE Li Riley MD   Stopped at 12/23/20 1255  [COMPLETED] cefTRIAXone (ROCEPHIN) 1 g in sterile water (preservative free) 10 mL IV syringe  1 g IntraVENous NOW Li Riley MD   1 g at 12/23/20 1247  [COMPLETED] azithromycin (ZITHROMAX) 500 mg in 0.9% sodium chloride 250 mL (VIAL-MATE)  500 mg IntraVENous NOW Li Riley MD   500 mg at 12/23/20 1248  [COMPLETED] vancomycin (VANCOCIN) 2,000 mg in 0.9% sodium chloride 500 mL IVPB  2,000 mg IntraVENous ONCE Silva Villegas MD   Stopped at 12/23/20 3862  
 [COMPLETED] etomidate (AMIDATE) 2 mg/mL injection 30 mg  30 mg IntraVENous ONCE Rachel TANNER MD   30 mg at 12/23/20 6606  [COMPLETED] succinylcholine (ANECTINE) injection 100 mg  100 mg IntraVENous NOW Liliane Gill MD   100 mg at 12/23/20 1755  [COMPLETED] rocuronium injection 60 mg  60 mg IntraVENous NOW Liliane Gill MD   60 mg at 12/23/20 1759  [COMPLETED] sodium chloride 0.9 % bolus infusion 1,000 mL  1,000 mL IntraVENous ONCE Liliane Gill MD   Stopped at 12/23/20 1924  [DISCONTINUED] sodium chloride (NS) flush 5-40 mL  5-40 mL IntraVENous Q8H Crispin TANNER MD   10 mL at 12/23/20 1400  [DISCONTINUED] sodium chloride 0.9 % bolus infusion 1,000 mL  1,000 mL IntraVENous ONCE Crispin TANNER MD      
 [DISCONTINUED] sodium chloride (NS) flush 5-40 mL  5-40 mL IntraVENous Q8H Pop Young MD   Stopped at 12/23/20 1400  [DISCONTINUED] sodium chloride (NS) flush 5-40 mL  5-40 mL IntraVENous PRN Pop Young MD      
 [DISCONTINUED] acetaminophen (TYLENOL) tablet 650 mg  650 mg Oral Q6H PRN Pop Young MD   650 mg at 12/23/20 1444  [DISCONTINUED] acetaminophen (TYLENOL) suppository 650 mg  650 mg Rectal Q6H PRN Pop Young MD   650 mg at 12/23/20 1708  [DISCONTINUED] polyethylene glycol (MIRALAX) packet 17 g  17 g Oral DAILY PRN Pop Young MD      
 [DISCONTINUED] promethazine (PHENERGAN) tablet 12.5 mg  12.5 mg Oral Q6H PRN Pop Young MD      
 [DISCONTINUED] ondansetron [facility]) injection 4 mg  4 mg IntraVENous Q6H PRN Pop Young MD      
 [DISCONTINUED] enoxaparin (LOVENOX) injection 40 mg  40 mg SubCUTAneous DAILY Pop Young MD      
 [DISCONTINUED] cefepime (MAXIPIME) 1 g in 0.9% sodium chloride (MBP/ADV) 50 mL MBP  1 g IntraVENous Q12H Pop Young MD      
 [DISCONTINUED] azithromycin (ZITHROMAX) tablet 500 mg  500 mg Oral DAILY Pop Young MD      
 [DISCONTINUED] doxycycline (VIBRA-TABS) tablet 100 mg  100 mg Oral Q12H Pop Young MD   100 mg at 12/23/20 2024  [DISCONTINUED] benzonatate (TESSALON) capsule 100 mg  100 mg Oral TID PRN Saul Winn MD   100 mg at 12/23/20 1444  [DISCONTINUED] levothyroxine (SYNTHROID) tablet 50 mcg  50 mcg Oral 6am Saul Winn MD      
 [DISCONTINUED] cholecalciferol (VITAMIN D3) (1000 Units /25 mcg) tablet 1 Tab  1,000 Units Oral DAILY Saul Winn MD      
 [DISCONTINUED] albuterol (PROVENTIL HFA, VENTOLIN HFA, PROAIR HFA) inhaler 1 Puff ++KEEP AT BEDSIDE++  1 Puff Inhalation Q4H PRN Saul Winn MD      
 [DISCONTINUED] risperiDONE (RisperDAL) tablet 1 mg  1 mg Oral QHS Saul Winn MD      
 [DISCONTINUED] cefepime (MAXIPIME) 2 g in 0.9% sodium chloride (MBP/ADV) 100 mL MBP  2 g IntraVENous Q8H Jayson Grayson MD      
 [DISCONTINUED] cefepime (MAXIPIME) 2 g in 0.9% sodium chloride (MBP/ADV) 100 mL MBP  2 g IntraVENous Q12H Saul iWnn MD      
 [DISCONTINUED] cefepime (MAXIPIME) 2 g in 0.9% sodium chloride (MBP/ADV) 100 mL MBP  2 g IntraVENous Q12H Saul Winn MD   Stopped at 12/23/20 1520  [DISCONTINUED] vancomycin (VANCOCIN) 1,250 mg in 0.9% sodium chloride 250 mL IVPB  1,250 mg IntraVENous Q16H Saul Winn MD      
 [DISCONTINUED] guaiFENesin (ROBITUSSIN) 100 mg/5 mL oral liquid 200 mg  200 mg Oral NOW Nataliia Morin MD   Stopped at 12/23/20 2214  [DISCONTINUED] magnesium sulfate 2 g/50 ml IVPB (premix or compounded)  2 g IntraVENous ONCE Saul Winn MD   Stopped at 12/23/20 0279  [DISCONTINUED] enoxaparin (LOVENOX) injection 40 mg  40 mg SubCUTAneous Q12H Saul Winn MD      
 [DISCONTINUED] propofol (DIPRIVAN) 10 mg/mL infusion  5 mcg/kg/min IntraVENous CONTINUOUS Yaz Guzman MD      
 [DISCONTINUED] propofol (DIPRIVAN) 10 mg/mL infusion  0-50 mcg/kg/min IntraVENous TITRATE Nataliia Morin MD   Stopped at 12/23/20 3956 Current Outpatient Medications on File Prior to Encounter Medication Sig Dispense Refill  ivabradine (CORLANOR) 5 mg tablet Take 1 Tab by mouth two (2) times daily (with meals). Indications: chronic heart failure, inappropriate sinus tachycardia 60 Tab 0  
 metoprolol succinate (TOPROL-XL) 25 mg XL tablet Take 1 Tab by mouth daily. 30 Tab 0  
 risperiDONE (RisperDAL) 1 mg tablet Take 1 mg by mouth nightly.  solifenacin (VESICARE) 10 mg tablet TAKE 1 TABLET BY MOUTH EVERY DAY  medroxyPROGESTERone (DEPO-PROVERA) 150 mg/mL injection 150 mg, IM, once, To be administered in clinic by nurse. See order comments for schedule., # 1 vial, 4 Refills, Pharmacy: [facility]/pharmacy #[id number]  albuterol (PROVENTIL HFA, VENTOLIN HFA, PROAIR HFA) 90 mcg/actuation inhaler Take 2 Puffs by inhalation every four (4) hours as needed for Wheezing. 1 Inhaler 0  
 zinc sulfate 220 mg tablet Take 1 Tab by mouth daily. 7 Tab 0  cholecalciferol (VITAMIN D3) 1,000 unit tablet Take 1,000 Units by mouth daily.  levothyroxine (SYNTHROID) 50 mcg tablet Take 50 mcg by mouth Daily (before breakfast). Past History Past Medical History: 
Past Medical History:  
Diagnosis Date  Endocrine disease  Hypothyroid 03/11/2018  Ill-defined condition Down's Syndrome Past Surgical History: No past surgical history on file. Family History: No family history on file. Social History: 
Social History Tobacco Use  Smoking status: Never Smoker  Smokeless tobacco: Never Used Substance Use Topics  Alcohol use: No  
 Drug use: No  
 
 
Allergies: 
No Known Allergies Review of Systems Unable to obtain from patient Physical Exam  
Physical Exam 
Constitutional:   
   Comments: Sedated HENT:  
   Head: Normocephalic and atraumatic. Comments: ET tube in place, 7  tube, 20 at the lip Mouth/Throat:  
   Mouth: Mucous membranes are moist.  
Eyes:  
   Pupils: Pupils are equal, round, and reactive to light. Neck: Musculoskeletal: Neck supple. Cardiovascular: Rate and Rhythm: Regular rhythm. Tachycardia present. Comments: Radial pulses are not palpable, she has a thready femoral pulse, no significant lower extremity edema Pulmonary:  
   Comments: Diffuse coarse breath sounds with bagging, symmetric chest rise, ET tube in place Abdominal:  
   Palpations: Abdomen is soft. Musculoskeletal:     
   General: No swelling or deformity. Skin: 
   General: Skin is warm and dry. Neurological:  
   Comments: Patient is sedated, no spontaneous movements Diagnostic Study Results Labs - Recent Results (from the past 24 hour(s)) EKG, 12 LEAD, INITIAL Collection Time: 12/23/20 11:35 AM  
Result Value Ref Range Ventricular Rate 126 BPM  
 Atrial Rate 126 BPM  
 P-R Interval 120 ms QRS Duration 64 ms Q-T Interval 398 ms QTC Calculation (Bezet) 576 ms Calculated P Axis 41 degrees Calculated R Axis 66 degrees Calculated T Axis 57 degrees Diagnosis Sinus tachycardia T wave abnormality, consider lateral ischemia Abnormal ECG When compared with ECG of 27-NOV-2020 17:31, 
Questionable change in QRS axis Inverted T waves have replaced nonspecific T wave abnormality in Inferior  
leads T wave inversion now evident in Lateral leads CBC WITH AUTOMATED DIFF Collection Time: 12/23/20 11:49 AM  
Result Value Ref Range WBC 19.9 (H) 3.6 - 11.0 K/uL  
 RBC 4.15 3.80 - 5.20 M/uL  
 HGB 11.2 (L) 11.5 - 16.0 g/dL HCT 33.3 (L) 35.0 - 47.0 % MCV 80.2 80.0 - 99.0 FL  
 MCH 27.0 26.0 - 34.0 PG  
 MCHC 33.6 30.0 - 36.5 g/dL  
 RDW 16.9 (H) 11.5 - 14.5 % PLATELET 710 171 - 849 K/uL MPV 9.8 8.9 - 12.9 FL  
 NRBC 0.0 0  WBC ABSOLUTE NRBC 0.00 0.00 - 0.01 K/uL NEUTROPHILS 80 (H) 32 - 75 % LYMPHOCYTES 8 (L) 12 - 49 % MONOCYTES 10 5 - 13 % EOSINOPHILS 0 0 - 7 % BASOPHILS 1 0 - 1 % IMMATURE GRANULOCYTES 1 (H) 0.0 - 0.5 % ABS. NEUTROPHILS 15.9 (H) 1.8 - 8.0 K/UL ABS. LYMPHOCYTES 1.6 0.8 - 3.5 K/UL  
 ABS. MONOCYTES 2.0 (H) 0.0 - 1.0 K/UL  
 ABS. EOSINOPHILS 0.0 0.0 - 0.4 K/UL  
 ABS. BASOPHILS 0.2 (H) 0.0 - 0.1 K/UL  
 ABS. IMM. GRANS. 0.2 (H) 0.00 - 0.04 K/UL  
 DF SMEAR SCANNED    
 RBC COMMENTS ANISOCYTOSIS 
1+ METABOLIC PANEL, BASIC Collection Time: 12/23/20 11:49 AM  
Result Value Ref Range Sodium 140 136 - 145 mmol/L Potassium 3.6 3.5 - 5.1 mmol/L Chloride 102 97 - 108 mmol/L  
 CO2 27 21 - 32 mmol/L Anion gap 11 5 - 15 mmol/L Glucose 112 (H) 65 - 100 mg/dL BUN 15 6 - 20 MG/DL Creatinine 1.44 (H) 0.55 - 1.02 MG/DL  
 BUN/Creatinine ratio 10 (L) 12 - 20 GFR est AA 49 (L) >60 ml/min/1.73m2 GFR est non-AA 41 (L) >60 ml/min/1.73m2 Calcium 8.7 8.5 - 10.1 MG/DL  
LACTIC ACID Collection Time: 12/23/20 11:49 AM  
Result Value Ref Range Lactic acid 1.1 0.4 - 2.0 MMOL/L  
TROPONIN I Collection Time: 12/23/20 11:49 AM  
Result Value Ref Range Troponin-I, Qt. <0.05 <0.05 ng/mL NT-PRO BNP Collection Time: 12/23/20 11:49 AM  
Result Value Ref Range NT pro- (H) 0 - 125 PG/ML  
URINALYSIS W/ REFLEX CULTURE Collection Time: 12/23/20 12:12 PM  
 Specimen: Miscellaneous sample; Urine Urine specimen Result Value Ref Range Color DARK YELLOW Appearance CLOUDY (A) CLEAR Specific gravity 1.025 1.003 - 1.030    
 pH (UA) 5.5 5.0 - 8.0 Protein 100 (A) NEG mg/dL Glucose Negative NEG mg/dL Ketone TRACE (A) NEG mg/dL Blood MODERATE (A) NEG Urobilinogen 1.0 0.2 - 1.0 EU/dL Nitrites Negative NEG Leukocyte Esterase LARGE (A) NEG    
 WBC 20-50 0 - 4 /hpf  
 RBC 0-5 0 - 5 /hpf Epithelial cells MODERATE (A) FEW /lpf Bacteria 3+ (A) NEG /hpf  
 UA:UC IF INDICATED URINE CULTURE ORDERED (A) CNI    
BILIRUBIN, CONFIRM Collection Time: 12/23/20 12:12 PM  
Result Value Ref Range Bilirubin UA, confirm Negative NEG    
SAMPLES BEING HELD  Collection Time: 12/23/20 12:14 PM  
 Result Value Ref Range SAMPLES BEING HELD RED,LISETTE,SST,GREEN   
 COMMENT Add-on orders for these samples will be processed based on acceptable specimen integrity and analyte stability, which may vary by analyte. PROCALCITONIN Collection Time: 12/23/20 12:14 PM  
Result Value Ref Range Procalcitonin 0.55 ng/mL MAGNESIUM Collection Time: 12/23/20 12:14 PM  
Result Value Ref Range Magnesium 1.5 (L) 1.6 - 2.4 mg/dL LD Collection Time: 12/23/20 12:14 PM  
Result Value Ref Range  (H) 81 - 246 U/L  
D DIMER Collection Time: 12/23/20 12:14 PM  
Result Value Ref Range D-dimer 4.34 (H) 0.00 - 0.65 mg/L FEU  
C REACTIVE PROTEIN, QT Collection Time: 12/23/20 12:14 PM  
Result Value Ref Range C-Reactive protein 18.60 (H) 0.00 - 0.60 mg/dL HCG URINE, QL. - POC Collection Time: 12/23/20 12:16 PM  
Result Value Ref Range Pregnancy test,urine (POC) Negative NEG    
SARS-COV-2 Collection Time: 12/23/20 12:58 PM  
Result Value Ref Range Specimen source Nasopharyngeal    
 Specimen source Nasopharyngeal    
 COVID-19 rapid test Not detected NOTD Specimen type NP Swab Health status Symptomatic Testing BLOOD GAS, ARTERIAL Collection Time: 12/23/20  4:56 PM  
Result Value Ref Range pH 7.47 (H) 7.35 - 7.45    
 PCO2 29 (L) 35 - 45 mmHg PO2 59 (L) 80 - 100 mmHg O2 SAT 93 92 - 97 % BICARBONATE 20 (L) 22 - 26 mmol/L  
 BASE DEFICIT 2.1 mmol/L  
 O2 METHOD Non rebreather O2 FLOW RATE 15 L/min FIO2 100 % SPONTANEOUS RATE 32 Sample source ARTERIAL    
 SITE LEFT BRACHIAL    
BLOOD GAS, ARTERIAL Collection Time: 12/23/20  5:41 PM  
Result Value Ref Range pH 7.39 7.35 - 7.45    
 PCO2 36 35 - 45 mmHg PO2 67 (L) 80 - 100 mmHg O2 SAT 93 92 - 97 % BICARBONATE 21 (L) 22 - 26 mmol/L  
 BASE DEFICIT 3.3 mmol/L  
 O2 METHOD CPAP    
 FIO2 100 % SPONTANEOUS RATE 32    
 EPAP/CPAP/PEEP 10  Sample source ARTERIAL    
 SITE LEFT BRACHIAL    
POC EG7 Collection Time: 12/23/20  9:13 PM  
Result Value Ref Range Calcium, ionized (POC) 1.16 1.12 - 1.32 mmol/L  
 FIO2 (POC) 100 % pH (POC) 7.27 (L) 7.35 - 7.45    
 pCO2 (POC) 44.5 35.0 - 45.0 MMHG  
 pO2 (POC) 54 (L) 80 - 100 MMHG  
 HCO3 (POC) 20.3 (L) 22 - 26 MMOL/L Base deficit (POC) 7 mmol/L  
 sO2 (POC) 83 (L) 92 - 97 % Site RIGHT RADIAL Device: VENT Mode ASSIST CONTROL Set Rate 24 bpm  
 PEEP/CPAP (POC) 18 cmH2O  
 PIP (POC) 30 Allens test (POC) N/A Inspiratory Time 0.90 sec Specimen type (POC) ARTERIAL Total resp. rate 41 Radiologic Studies -  
XR CHEST PORT Final Result IMPRESSION:  
1. No complicating features post line placement. XR ABD (KUB)    (Results Pending) CT Results  (Last 48 hours) None CXR Results  (Last 48 hours) 12/23/20 2110  XR CHEST PORT Final result Impression:  IMPRESSION:  
1. No complicating features post line placement. Narrative:  INDICATION: Line placement EXAM: Portable chest 2100 hours . Comparison 12/23/2020. FINDINGS: A right neck central venous catheter has been placed. Tip overlies the  
superior vena cava. Gastric tube overlies the stomach. Endotracheal tube  
overlies the trachea at the level the clavicles. Diffuse bilateral airspace  
disease with slightly improved lung lines. No pneumothorax. No pleural effusion 12/23/20 1821  XR CHEST PORT Final result Impression:  IMPRESSION:  
1. Repositioned endotracheal tube. Diffuse bilateral airspace disease Narrative:  INDICATION:  intubate EXAM: Portable chest 1747 hours. [facility] or same day FINDINGS: Endotracheal tube is been pulled back now overlying the trachea 17 mm  
below the clavicles. Diffuse bilateral airspace disease without pneumothorax or  
effusion. 12/23/20 1821  XR CHEST PORT Final result Impression:  IMPRESSION:  
1. Post intubation as above. Endotracheal tube overlies the trachea 4.1 cm below  
the clavicles 2. Marked worsening of bilateral airspace disease. Question left pleural  
effusion Narrative:  INDICATION:  intubation EXAM: Portable chest 1744 hours. Comparison earlier same day FINDINGS: Patient is been intubated. Endotracheal tube overlies the trachea 4.1  
cm both clavicles. Diffuse interval opacification of the lungs compatible with  
airspace disease. There may be a left pleural effusion. No pneumothorax. 12/23/20 1128  XR CHEST PORT Final result Impression:  IMPRESSION: There are vague pulmonary opacities in the lower lobes which are  
stable on the right and slightly improved on the left Narrative:  EXAM:  XR CHEST PORT INDICATION:  chest pain COMPARISON:  11/27/2020 FINDINGS: A portable AP radiograph of the chest was obtained at 1059 hours. Depth of inspiration is shallow. .  There are vague pulmonary opacities in the  
lower lung zones stable on the right and slightly improved on the left. Heart  
size is stable. .  Bony structures are intact. Medical Decision Making I am the first provider for this patient. I reviewed the vital signs, available nursing notes, past medical history, past surgical history, family history and social history. Vital Signs-Reviewed the patient's vital signs. Patient Vitals for the past 24 hrs: 
 Temp Pulse Resp BP SpO2  
12/23/20 2141 99.8 °F (37.7 °C) (!) 128 (!) 47 (!) 85/49 (!) 84 % 12/23/20 2140 99.8 °F (37.7 °C) (!) 127 (!) 39 (!) 84/33 (!) 82 % 12/23/20 2135 99.8 °F (37.7 °C) (!) 127 (!) 43 (!) 81/63 (!) 83 % 12/23/20 2130 99.8 °F (37.7 °C) (!) 126 (!) 43 (!) 81/58 (!) 82 % 12/23/20 2125 100 °F (37.8 °C) (!) 126 (!) 44 (!) 88/51 (!) 82 % 12/23/20 2123 100 °F (37.8 °C) (!) 126 (!) 44 (!) 113/91 (!) 82 % 12/23/20 2122 100 °F (37.8 °C) (!) 126 (!) 42 91/67 (!) 82 % 12/23/20 2120 100 °F (37.8 °C) (!) 126 (!) 43 (!) 89/20 (!) 81 % 12/23/20 2115 100 °F (37.8 °C) (!) 125 (!) 41 (!) 92/55 (!) 81 % 12/23/20 2110 100 °F (37.8 °C) (!) 129 (!) 40 111/63 (!) 79 % 12/23/20 2105 100 °F (37.8 °C) (!) 126 (!) 35  (!) 80 % 12/23/20 2100 100 °F (37.8 °C) (!) 126 (!) 41 (!) 68/51 (!) 81 % 12/23/20 2055 100 °F (37.8 °C) (!) 124 (!) 34 (!) 59/25 (!) 88 % 12/23/20 2050 99.8 °F (37.7 °C) (!) 125 22  92 % 12/23/20 2045 99.8 °F (37.7 °C) (!) 135 (!) 43 (!) 82/54 (!) 81 % 12/23/20 2040 99.8 °F (37.7 °C) (!) 134 (!) 42 (!) 94/38 (!) 82 % 12/23/20 2035 99.9 °F (37.7 °C)      
12/23/20 2035 99.8 °F (37.7 °C) (!) 135 (!) 41 97/70 (!) 85 % 12/23/20 2030 99.6 °F (37.6 °C) (!) 133 (!) 37 (!) 85/46 (!) 87 % 12/23/20 2025 99.5 °F (37.5 °C) (!) 134 (!) 35  (!) 88 % 12/23/20 2022 99.5 °F (37.5 °C) (!) 135 (!) 67 (!) 110/56 (!) 89 % 12/23/20 2020 99.3 °F (37.4 °C) (!) 133 (!) 34  (!) 88 % 12/23/20 2015 99.1 °F (37.3 °C) (!) 129 26 (!) 110/56 91 % 12/23/20 2010  (!) 127 (!) 32  92 % 12/23/20 2005  (!) 125 30  93 % 12/23/20 2000  (!) 124 (!) 31 110/65 93 % 12/23/20 1955  (!) 125 29  92 % 12/23/20 1950  (!) 124 27  93 % 12/23/20 1945  (!) 127 26 (!) 92/57 92 % 12/23/20 1940  (!) 131 24  91 % 12/23/20 1935  (!) 134 18  95 % 12/23/20 1934  (!) 124 24 (!) 92/57 93 % Provider Notes (Medical Decision Making):  
 31-year-old female transferred from outside hospital for respiratory failure. On arrival she is intubated and sedated, hypotensive and hypoxic with thready pulse. Oxygen saturations of 80% on the ventilator. The patient is immediately initiated on Levophed drip. I have reviewed documentation from prior emergency department, as well as the laboratory work. Chest x-ray with diffuse patchy opacities. Will repeat chest x-ray here to assess for adequate placement of the ET tube. She did already receive 1 L normal saline bolus, given ARDS-like picture on her x-ray, will hold off on further fluid bolus. Ventilator settings will be adjusted, ABG will be obtained. She is currently on a PEEP of 12, this will be titrated up. This is titrated to 14, however she continues to be hypoxic in the 80s. Blood pressures have improved on Levophed. She is not on any sedation on arrival, received 5 mg of Versed just prior to arrival.  Fentanyl drip is initiated. ED Course:  
 
Initial assessment performed. The patients presenting problems have been discussed, and they are in agreement with the care plan formulated and outlined with them. I have encouraged them to ask questions as they arise throughout their visit. Procedure Note - Central Line Placement:  
9:55 PM 
Performed by: Myself Immediately prior to the procedure, the patient was reevaluated and found suitable for the planned procedure and any planned medications. Immediately prior to the procedure a time out was called to verify the correct patient, procedure, equipment, staff, and marking as appropriate. Area was cleansed with Betadine and Prepped and draped in sterile fashion. Landmarks identified. Needle with triple lumen catheter was inserted into pt's Right with ultrasound guidance. Line sutured in place; sterile dressing applied. Position: Trendelenburg Number of attempts: 1 Estimated blood loss: 2cc The procedure took 15 minutes, and pt tolerated well. I have reviewed her repeat chest x-ray, it continues to show bilateral patchy opacities bilaterally, ET tube in adequate place, is interpreted as diffuse pulmonary infiltrates. At this point, she continues to be hypotensive, titrate up the Levophed, given that she is on fentanyl, this will be transitioned to Precedex. Her ventilator settings are adjusted, she will be titrated up to a PEEP of 18. Dutta catheter and gastric tube are placed, G-tube confirmed on x-ray. She continues to saturate in the 80s. Patient will be placed in prone positioning. I have spoken with her mother, updated her at length. The patient will be admitted to the ICU. Blood gas is reviewed, it shows acidosis with pH of 7.27, CO2 is not elevated at 44.5, oxygen is low at 54. Therefore, her respiratory rate will not be adjusted. We will continue to attempt to maximize her oxygenation. Critical Care Time: CRITICAL CARE NOTE : 
 
9:58 PM 
 
IMPENDING DETERIORATION -Respiratory and Cardiovascular ASSOCIATED RISK FACTORS - Hypotension, Shock and Hypoxia MANAGEMENT- Bedside Assessment and Supervision of Care INTERPRETATION -  Xrays, Blood Gases and Blood Pressure INTERVENTIONS - hemodynamic mngmt, vent mngmt, gastric tube and vascular control CASE REVIEW - Nursing and Family TREATMENT RESPONSE -Improved PERFORMED BY - Self NOTES   : 
I have spent 35 minutes of critical care time involved in lab review, consultations with specialist, family decision- making, bedside attention and documentation. This time excludes time spent in any separate billed procedures. During this entire length of time I was immediately available to the patient . Evelia Oglesby MD 
 
 
Disposition: 
Admit to ICU PLAN: 
1. Current Discharge Medication List  
  
 
2. Follow-up Information None Return to ED if worse Diagnosis Clinical Impression: Acute hypoxic respiratory failure secondary to bilateral pneumonia, acute hypotension secondary to #1

## 2020-12-24 NOTE — PROGRESS NOTES
Care Management: 
 
ALESSANDRA: Goal to return home with her mom. Her mom is her caregiver. Follow up with PCP Dr Leobardo RAYMUNDO. According to chart she has  follow-up 1/22/2020 @ 0815. Admitted with resp failure, ? COVID. She has had multiple admissions over last few months with COVID. She is currently vented and on Levo gtt in ICU. She lives with her mom and is non verbal at baseline. She has downs syndrome. Her mom is her caregiver. She is followed by Nargis Simpson. Soham Herrera at [address] [place] [address] has been patient's CM for the last 15 years. She is currently open to [facility], PT and OT and will need resume orders at discharge. Rosendo Vance RN ACM [phone]

## 2020-12-24 NOTE — ED NOTES
TRANSFER - OUT REPORT: 
 
Verbal report given to Avelino RN(name) on Matthew Lies  being transferred to CCU(unit) for routine progression of care Report consisted of patients Situation, Background, Assessment and  
Recommendations(SBAR). Information from the following report(s) SBAR, Kardex, Procedure Summary, Intake/Output and MAR was reviewed with the receiving nurse. Lines:  
Triple Lumen 12/23/20 Anterior;Right Neck (Active) Peripheral IV 12/23/20 Anterior; Left Wrist (Active) Peripheral IV 12/24/20 Right Antecubital (Active) Opportunity for questions and clarification was provided. Patient transported with: 
Monitor and ventilator

## 2020-12-24 NOTE — PROGRESS NOTES
RAPID RESPONSE TEAM- Follow Up 
  
0020: Called to ER 14 to assist with arterial line insertion and setup. L radial arterial line inserted by CCU Intensivist NP. Labs drawn from line by this RN. Zeroed and transduced. Levo gtt titrated to 80mcg/min. Vaso gtt @ 0.03 unit/min. Goal to maintain MAP > 65. Please call with any questions/concerns or if need assistance in managing patient. Kylie Mckinnon RN 
RRT Shawna Davis

## 2020-12-24 NOTE — ED NOTES
Pt left ED via stretcher intubated @ 19.5 inches at the lip per MD accompanied by BETHANY. Pt mother driving behind.

## 2020-12-24 NOTE — PROGRESS NOTES
Pharmacy Automatic Renal Dosing Protocol - Antimicrobials Indication for Antimicrobials: HCAP Current Regimen of Each Antimicrobial:  
Cefepime 2g IV q12 ; started ; day 2 Vancomycin 1250 mg q16h; started ; day 2 Previous Antimicrobial Therapy: N/a 
 
Goal Level: VANCOMYCIN TROUGH GOAL RANGE Vancomycin Trough: 15 - 20 mcg/mL  (AUC: 400 - 600 mg/hr/Liter/day) Date Dose & Interval Measured (mcg/mL) Extrapolated (mcg/mL) Date & time of next level: before 2nd maintenance dose Significant Cultures:  
 blood pending  urine pending  sputum pending Paralysis, amputations, malnutrition: none noted Labs: 
Recent Labs  
  20 
0442 20 
2350 20 
1149 CREA 2.42* 2.20* 1.44* BUN 22* 19 15 WBC  --   --  19.9* Temp (24hrs), Av.1 °F (37.8 °C), Min:98.1 °F (36.7 °C), Max:103.4 °F (39.7 °C) Creatinine Clearance (mL/min) or Dialysis: 26.1 mL/min (IBW) Impression/Plan:  
Scr rising Change vancomycin to 1000 mg q24h Change cefepime to 2g IV q24h Antimicrobial stop date 7 days Pharmacy will follow daily and adjust medications as appropriate for renal function and/or serum levels. Thank you, RANJAN Ovalle

## 2020-12-24 NOTE — PROGRESS NOTES
[facility] Name: Garcia Guzmán : 1980 MRN: [id number] Date: 2020 Assessment:   
 
Active Problems: 
Pneumonia Acute Kidney Injury on CKD WBC > 12K Acute Hypoxic Respiratory Failure 1. Severe covid pneumonia 2. Down syndrome 3. HFrEF 4. HTN 
5. Hypothyroidism ICU Comprehensive Plan of Care:  
 
Plans for this Shift:  
 
1. Acute hypoxic respiratory failure due to Covid pneumonia 
a. PVent settings reviewed and adjusted 
b. Was treated for COVID twice with 2 hospital admissions in November, today tested negative for COVID 19 at OSH with rapid testing but treating as presumably positive with elevated inflammatory markers and history 
c. Decadron 6mg IV daily 
d. Fentanyl/versed for sedation 
e. Continue abx for possible HCAP (vanc, cefepime, azithromycin) pending cultures 
f. Strep pneumonia and legionella urine tests sent and pending 
g. Therapeutic Lovenox 2. DANY on CKD - Cr worsening 
a. Baseline appears to be around 1.2, currently 1.4 
b. Avoid nephrotoxic medications 
c. Monitor creatinine and electrolytes 3. HFrEF 
a. Last echo  with EF 55% 4. Hypothyroidism 
a. Continue home levothryoxine 5. Down syndrome 
a. At baseline prior to today with non verbal 
b. DC risperidone 6. COVID Treatment: 
a. Immunomodulator--Steroids -- Yes decadron 
b. COVID-19 Specific Anticoagulation -- Yes therapeutic lovenox 
c. Antibiotics -- Azithromycin 
d. Cefepime 
e. Vancomycin 7. SBP Goal of: > 90 mmHg 8. MAP Goal of: > 65 mmHg 9. Norepinephrine and Vasopressin - As needed to maintain above SBP/MAP goals 10. Transfusion Trigger (Hgb): <7 g/dL 11. Respiratory Goals: 
a. Chlorhexidine  
b. Optimize PEEP/Ventilation/Oxygenation 
c. Aim for lung protective ventilation 
d. Head of bed > 30 degrees 
e. side lying position 12. SpO2 Goal: > 85% 13. PT/OT: NI  
14. Goals of Care Discussion with family Yes - see below 15.  Plan of Care/Code Status: DNR 
 16. Discussed Care Plan with Bedside RN 
17. Documentation of Current Medications 18. Rest of Plan Below: 
 
F - Feeding:  No  - consider TFs once placed in supine position A - Analgesia: Fentanyl S - Sedation: Versed T - DVT Prophylaxis: Lovenox H - Head of Bed: > 30 Degrees U - Ulcer Prophylaxis: Pepcid (famotidine) S - Spontaneous Breathing Trial: No 
B - Bowel Regimen: MiraLax I - Indwelling Catheter: 
 Tubes: ETT and Orogastric Tube Lines: Peripheral IV and [employer] Drains: Dutta Catheter 12/24: I spoke with pt's mother and apprised her of pt's very severe critical illness. I conveyed that the prognosis for survival is poor. We discussed code status in the event of cardiac arrest and I made it clear that CPR/ACLS would not be of benefit in this situation as we would be \" trying to bring her back to a condition that we are unable to successfully treat in the first place\". She understands this and accepts it. She also understands that pt is not presently a candidate for hemodialysis and would have to show substantial improvement on all other fronts to be a candidate for such therapy HPI/Consult/Subjective: SUBJ:  
Proned. Synchronous with vent. Unresponsive Home Medications:  
 
Prior to Admission medications Medication Sig Start Date End Date Taking? Authorizing Provider  
ivabradine (CORLANOR) 5 mg tablet Take 1 Tab by mouth two (2) times daily (with meals). Indications: chronic heart failure, inappropriate sinus tachycardia 12/6/20   Laurita Espinoza MD  
metoprolol succinate (TOPROL-XL) 25 mg XL tablet Take 1 Tab by mouth daily. 12/6/20   Laurita Espinoza MD  
risperiDONE (RisperDAL) 1 mg tablet Take 1 mg by mouth nightly.     Provider, Historical  
solifenacin (VESICARE) 10 mg tablet TAKE 1 TABLET BY MOUTH EVERY DAY 9/18/20   Other, MD Kilo  
 medroxyPROGESTERone (DEPO-PROVERA) 150 mg/mL injection 150 mg, IM, once, To be administered in clinic by nurse. See order comments for schedule., # 1 vial, 4 Refills, Pharmacy: [facility]/pharmacy #[id number]   Other, MD Kilo  
albuterol (PROVENTIL HFA, VENTOLIN HFA, PROAIR HFA) 90 mcg/actuation inhaler Take 2 Puffs by inhalation every four (4) hours as needed for Wheezing. 20   Dara Almanza MD  
zinc sulfate 220 mg tablet Take 1 Tab by mouth daily. 20   Dara Almanza MD  
cholecalciferol (VITAMIN D3) 1,000 unit tablet Take 1,000 Units by mouth daily. Kilo Cotrez MD  
levothyroxine (SYNTHROID) 50 mcg tablet Take 50 mcg by mouth Daily (before breakfast). Kilo Cortez MD  
 
 
Allergies/Social/Family History: No Known Allergies Social History Tobacco Use  Smoking status: Never Smoker  Smokeless tobacco: Never Used Substance Use Topics  Alcohol use: No  
  
No family history on file. Objective:  
Vital Signs: 
Visit Vitals BP (!) 84/65 Pulse 92 Temp 100.4 °F (38 °C) Resp (!) 45 Ht 5' 3\" (1.6 m) Wt 86.6 kg (190 lb 14.7 oz) SpO2 98% BMI 33.82 kg/m² O2 Device: Endotracheal tube, Ventilator Temp (24hrs), Av.1 °F (37.8 °C), Min:99.1 °F (37.3 °C), Max:103.4 °F (39.7 °C) Intake/Output:  
 
Intake/Output Summary (Last 24 hours) at 2020 1634 Last data filed at 2020 1530 Gross per 24 hour Intake 2233.79 ml Output 177 ml Net 2056.79 ml Physical Exam: 
GEN: Proned, unresponsive, synchronous with ventilator HEENT: limited exam due to prone position NECK: right IJ CVL, JVP cannot be assessed CHEST: Coarse BS, no wheezes CARDIAC: Tachy, regular ABD: distended, soft EXT: cool, no edema NEURO: RASS -5 DERM: no lesions noted I have examined the patient on this day 2020 and the above documented exam is accurate including the components that have been copied forward LABS AND  DATA: Personally reviewed Recent Labs  
  12/23/20 
1149 WBC 19.9* HGB 11.2* HCT 33.3*  
 Recent Labs  
  12/24/20 
0442 12/23/20 
2350 12/23/20 
1214  139  --   
K 5.3* 4.4  --   
 111*  --   
CO2 21 19*  --   
BUN 22* 19  --   
CREA 2.42* 2.20*  --   
* 161*  --   
CA 7.1* 7.1*  --   
MG  --  1.2* 1.5* PHOS  --  5.6*  --   
 
Recent Labs  
  12/24/20 
0442 12/23/20 
2350 AP 48 47  
TP 5.9* 6.0* ALB 1.7* 1.7*  
GLOB 4.2* 4.3* No results for input(s): INR, PTP, APTT, INREXT, INREXT in the last 72 hours. Recent Labs  
  12/24/20 
0304 12/24/20 
0103 PHI 7.28* 7.27* PCO2I 37.4 35.4 PO2I 69* 68* FIO2I 100 100 Recent Labs  
  12/23/20 
2350 12/23/20 
1149 TROIQ <0.05 <0.05 Hemodynamics:  
PAP:   CO:    
Wedge:   CI:    
CVP:    SVR:    
  PVR:    
 
Ventilator Settings: 
Mode Rate Tidal Volume Pressure FiO2 PEEP Pressure control        50 % 10 cm H20 Peak airway pressure: 25 cm H2O Minute ventilation: 9.45 l/min MEDS: Reviewed Chest X-Ray: Severe bilateral infiltrates SPECIAL EQUIPMENT None DISPOSITION Stay in ICU CRITICAL CARE CONSULTANT NOTE I had a in-person encounter with Frandy Bullock, reviewed and interpreted patient data including events, labs, images, vital signs, I/O's, and examined patient. I have discussed the case and the plan and management of the patient's care with the consulting services, the bedside nurses and the respiratory therapist.   
 
NOTE OF PERSONAL INVOLVEMENT IN CARE This patient is at high risk for sudden and clinically significant deterioration, which requires the highest level of preparedness to intervene urgently. I participated in the decision-making and personally managed or directed the management of the following life and organ supporting interventions that required my frequent assessment to treat or prevent imminent deterioration. I personally spent 60 minutes of critical care time. This is time spent at patient's bedside actively involved in patient care as well as the coordination of care and discussions with the patient's family. This does not include any procedural time which has been billed separately. Laura Cruz, [address] 
[phone] 
12/24/2020

## 2020-12-24 NOTE — PROGRESS NOTES
0800  Received report at 0730. Assessment completed, Dr. Alberto Obrien in and vent changes made. Tolerating changes well with Sats stable, remains breathing quickly. Urine obtained and sent to lab followed by Sputum specimen. Remains on stretcher with gown on and absence of pillows under chest, pelvis, legs and feet. 0930  Hgb A 1C sent to lab. 65  Mother given update over phone, aware patient is Positive. Encouraged isolation. 1115  OK to give Tylenol per Dr. Alberto Obrien via NG, order noted for per Rectum given. Weaning Levophed upon every entrance to room to keep MAP 65. Urine output remains very low, Dr. Alberto Obrien aware. To leave in Prone position until MD tells us to adjust. 
1245  Head turned with help of other RN and RT securing ETT. Patient tolerated well. Gown under patient removed and leads placed on back. Head pillow removed, no chest or abdominal pillow noted. Knees and feet supported with pillow. Remains febrile and with cooling mattress on top of patient. See assessment at 1200. 
1530  Unable to turn head until RT is available to assist.  Right pupil brisk, 2?1 to light. Remains deeply sedated, RR 38 with set rate of 35. Will maintain MAP 65. 
1700  Unable to get hourly Cuff BP. No changes in wave form, will use Arterial BP. No other changes noted. Temp WNL, blanket set to monitor. 1800 Unable to wean Levophed further, will continue to monitor. 1920  Report given to RIMA Miles.

## 2020-12-24 NOTE — PROGRESS NOTES
Patient pc was decrease to 15, rr increase to 35, peep decrease to 12, and fio2 decrease to 60% per physician's

## 2020-12-24 NOTE — PROGRESS NOTES
Pharmacy Automatic Renal Dosing Protocol - Famotidine Labs: 
Recent Labs  
  20 
0442 20 
2350 20 
1149 CREA 2.42* 2.20* 1.44* BUN 22* 19 15 WBC  --   --  19.9* Temp (24hrs), Av.1 °F (37.8 °C), Min:98.1 °F (36.7 °C), Max:103.4 °F (39.7 °C) Creatinine Clearance (mL/min) or Dialysis: 26.1 mL/min (IBW) Impression/Plan:  
Famotidine has been adjusted from q12 to daily for CrCl <50 mL/min based on the renal dosing protocol. Pharmacy will follow daily and adjust medications as appropriate for renal function. Thank you, RANJAN Evans 
 
Renal Dosing 
[email or web address]/[place]/[facility]/Pharmacy/Clinical%20Companion/Renal%[id number]. pdf

## 2020-12-24 NOTE — H&P
[facility] Name: Matthew Gibbons : 1980 MRN: [id number] Date: 2020 Assessment:   
 
Active Problems: 
Pneumonia Acute Kidney Injury on CKD WBC > 12K Acute Hypoxic Respiratory Failure 1. Severe covid pneumonia 2. Down syndrome 3. HFrEF 4. HTN 
5. Hypothyroidism ICU Comprehensive Plan of Care:  
 
Plans for this Shift:  
 
1. Acute hypoxic respiratory failure due to Covid pneumonia 
a. Pt intubated at outside hospital with failure of HFNC and BiPAP 
b. Admit to ICU 
c. Was treated for COVID twice with 2 hospital admissions in November, today tested negative for COVID 19 at OSH with rapid testing but treating as presumably positive with elevated inflammatory markers and history 
d. Decadron 6mg IV daily 
e. Fentanyl/versed for sedation 
f. ABG, stat labs to include CBC, CMP, trop, Ferritin, LDH, d dimer, troponin 
g. Continue abx for possible HCAP (vanc, cefepime, azithromycin) pending cultures 
h. Strep pneumonia and legionella urine tests 
i. Sputum culture 
j. Blood cx pending from OSH 
k. Therapeutic Lovenox 2. DANY on CKD 
a. Baseline appears to be around 1.2, currently 1.4 
b. Avoid nephrotoxic medications 
c. Monitor creatinine and electrolytes 3. HFrEF 
a. Last echo 2018 with EF 55% 4. Hypothyroidism 
a. Continue home levothryoxine 5. Down syndrome 
a. At baseline prior to today with non verbal 
b. Continue home risperidone 6. COVID Treatment: 
a. Immunomodulator--Steroids -- Yes decadron 
b. COVID-19 Specific Anticoagulation -- Yes therapeutic lovenox 
c. Antibiotics -- Azithromycin 
d. Cefepime 
e. Vancomycin 7. SBP Goal of: > 90 mmHg 8. MAP Goal of: > 65 mmHg 9. Norepinephrine and Vasopressin - As needed to maintain above SBP/MAP goals 10. Transfusion Trigger (Hgb): <7 g/dL 11. Respiratory Goals: 
a. Chlorhexidine  
b. Optimize PEEP/Ventilation/Oxygenation 
c. Aim for lung protective ventilation 
d. Head of bed > 30 degrees e. side lying position 12. SpO2 Goal: > 85% 13. PT/OT: NI  
14. Goals of Care Discussion with family No  
15. Plan of Care/Code Status: Full Code 16. Discussed Care Plan with Bedside RN 
17. Documentation of Current Medications 18. Rest of Plan Below: 
 
F - Feeding:  No NPO A - Analgesia: Fentanyl S - Sedation: Versed T - DVT Prophylaxis: Lovenox H - Head of Bed: > 30 Degrees U - Ulcer Prophylaxis: Pepcid (famotidine) S - Spontaneous Breathing Trial: No 
B - Bowel Regimen: MiraLax I - Indwelling Catheter: 
 Tubes: ETT and Orogastric Tube Lines: Peripheral IV and [employer] Drains: Dutta Catheter Addendum:  2350: levophed up to 100 mcg/min, pt proned in ER on precedex and fentanyl drips. Noninvasive cuff MAP 40's. Vasopressin started at this time. Decision made by this NP to place arterial line and after line placement, MAP over 100 with immediate fast titration down of levophed. HPI/Consult/Subjective: HPI: 
Unable to obtain from patient as pt is sedated and intubated. Mother not in the department at this time. History obtained from chart review. Pt has been admitted 2-3 times in the last 3 months for complications of COVID 19 with pneumonia. Most recent admission was at [facility] from 11/19 to 12/6 and was discharged home to mother. Pt had been doing well until this afternoon when she presented to [facility] [place] with complaint of increasing SOB and tachypnea. She was initially admitted to the hospitalist service there where labs obtained showed elevated D dimer, ferritin, LDH, and CRP. Pt became more hypoxic and had rapidly increased oxygen requirements from NC to NRB to BiPAP and ultimately to intubation and was transferred to [facility] for ICU admission. Since arrival to ER, pt difficult to sedate, became hypotensive requiring central line placement and levophed gtt. SUBJ: unable to obtain, intubated and sedated Past Medical History: has a past medical history of Endocrine disease, Hypothyroid (03/11/2018), and Ill-defined condition. Past Surgical History:  
 
 has no past surgical history on file. Home Medications:  
 
Prior to Admission medications Medication Sig Start Date End Date Taking? Authorizing Provider  
ivabradine (CORLANOR) 5 mg tablet Take 1 Tab by mouth two (2) times daily (with meals). Indications: chronic heart failure, inappropriate sinus tachycardia 12/6/20   Mitzi Gutierrez MD  
metoprolol succinate (TOPROL-XL) 25 mg XL tablet Take 1 Tab by mouth daily. 12/6/20   Mitzi Gutierrez MD  
risperiDONE (RisperDAL) 1 mg tablet Take 1 mg by mouth nightly. Provider, Aicha  
solifenacin (VESICARE) 10 mg tablet TAKE 1 TABLET BY MOUTH EVERY DAY 9/18/20   OtherKilo MD  
medroxyPROGESTERone (DEPO-PROVERA) 150 mg/mL injection 150 mg, IM, once, To be administered in clinic by nurse. See order comments for schedule., # 1 vial, 4 Refills, Pharmacy: [facility]/pharmacy #[id number] 12/5/19   Other, MD Kilo  
albuterol (PROVENTIL HFA, VENTOLIN HFA, PROAIR HFA) 90 mcg/actuation inhaler Take 2 Puffs by inhalation every four (4) hours as needed for Wheezing. 11/2/20   Melania Ruby MD  
zinc sulfate 220 mg tablet Take 1 Tab by mouth daily. 11/2/20   Melania Ruby MD  
cholecalciferol (VITAMIN D3) 1,000 unit tablet Take 1,000 Units by mouth daily. Kilo Cortez MD  
levothyroxine (SYNTHROID) 50 mcg tablet Take 50 mcg by mouth Daily (before breakfast). Kilo Cortez MD  
 
 
Allergies/Social/Family History: No Known Allergies Social History Tobacco Use  Smoking status: Never Smoker  Smokeless tobacco: Never Used Substance Use Topics  Alcohol use: No  
  
No family history on file. Objective:  
Vital Signs: 
Visit Vitals BP (!) 85/49 Pulse (!) 128 Temp 99.8 °F (37.7 °C) Resp (!) 47 Ht 5' 3\" (1.6 m) Wt 86.6 kg (190 lb 14.7 oz) SpO2 (!) 84% BMI 33.82 kg/m² O2 Device: Ventilator Temp (24hrs), Av.1 °F (37.8 °C), Min:98.1 °F (36.7 °C), Max:103.4 °F (39.7 °C) Intake/Output:  
No intake or output data in the 24 hours ending 20 Physical Exam: 
GEN: in acute distress on ventilator, appears stated age, unresponsive HEENT: NCAT, sclerae white NECK: right IJ CVL, supple CHEST: tachypnea, abdominal breathing, lungs diminished, ventilator breath sounds CARDIAC: sinus tachycardia in the 110's, no murmur rubs or gallops ABD: distended, soft EXT: weak peripheral pulses, no edema, warm NEURO: sedated DERM:  
 
I have examined the patient on this day 2020 and the above documented exam is accurate including the components that have been copied forward LABS AND  DATA: Personally reviewed Recent Labs  
  20 
1149 WBC 19.9* HGB 11.2* HCT 33.3*  
 Recent Labs  
  20 
1214 20 
1149 NA  --  140 K  --  3.6 CL  --  102 CO2  --  27 BUN  --  15  
CREA  --  1.44* GLU  --  112* CA  --  8.7 MG 1.5*  -- No results for input(s): AP, TBIL, TP, ALB, GLOB, AML, LPSE in the last 72 hours. No lab exists for component: SGOT, GPT, AMYP No results for input(s): INR, PTP, APTT, INREXT in the last 72 hours. Recent Labs  
  20 PHI 7.27* PCO2I 44.5 PO2I 54* FIO2I 100 Recent Labs  
  20 
1149 TROIQ <0.05 Hemodynamics:  
PAP:   CO:    
Wedge:   CI:    
CVP:    SVR:    
  PVR:    
 
Ventilator Settings: 
Mode Rate Tidal Volume Pressure FiO2 PEEP  
         100 % Peak airway pressure:     
Minute ventilation:     
 
 
MEDS: Reviewed Chest X-Ray: CXR Results  (Last 48 hours) 20  XR CHEST PORT Final result Impression:  IMPRESSION:  
1. No complicating features post line placement. Narrative:  INDICATION: Line placement EXAM: Portable chest 2100 hours . Comparison 2020. FINDINGS: A right neck central venous catheter has been placed. Tip overlies the  
superior vena cava. Gastric tube overlies the stomach. Endotracheal tube  
overlies the trachea at the level the clavicles. Diffuse bilateral airspace  
disease with slightly improved lung lines. No pneumothorax. No pleural effusion 12/23/20 1821  XR CHEST PORT Final result Impression:  IMPRESSION:  
1. Repositioned endotracheal tube. Diffuse bilateral airspace disease Narrative:  INDICATION:  intubate EXAM: Portable chest 1747 hours. [facility] or same day FINDINGS: Endotracheal tube is been pulled back now overlying the trachea 17 mm  
below the clavicles. Diffuse bilateral airspace disease without pneumothorax or  
effusion. 12/23/20 1821  XR CHEST PORT Final result Impression:  IMPRESSION:  
1. Post intubation as above. Endotracheal tube overlies the trachea 4.1 cm below  
the clavicles 2. Marked worsening of bilateral airspace disease. Question left pleural  
effusion Narrative:  INDICATION:  intubation EXAM: Portable chest 1744 hours. Comparison earlier same day FINDINGS: Patient is been intubated. Endotracheal tube overlies the trachea 4.1  
cm both clavicles. Diffuse interval opacification of the lungs compatible with  
airspace disease. There may be a left pleural effusion. No pneumothorax. 12/23/20 1128  XR CHEST PORT Final result Impression:  IMPRESSION: There are vague pulmonary opacities in the lower lobes which are  
stable on the right and slightly improved on the left Narrative:  EXAM:  XR CHEST PORT INDICATION:  chest pain COMPARISON:  11/27/2020 FINDINGS: A portable AP radiograph of the chest was obtained at 1059 hours. Depth of inspiration is shallow. .  There are vague pulmonary opacities in the  
lower lung zones stable on the right and slightly improved on the left. Heart size is stable. .  Bony structures are intact. SPECIAL EQUIPMENT None DISPOSITION Stay in ICU CRITICAL CARE CONSULTANT NOTE I had a in-person encounter with Hilary Vega, reviewed and interpreted patient data including events, labs, images, vital signs, I/O's, and examined patient. I have discussed the case and the plan and management of the patient's care with the consulting services, the bedside nurses and the respiratory therapist.   
 
NOTE OF PERSONAL INVOLVEMENT IN CARE This patient is at high risk for sudden and clinically significant deterioration, which requires the highest level of preparedness to intervene urgently. I participated in the decision-making and personally managed or directed the management of the following life and organ supporting interventions that required my frequent assessment to treat or prevent imminent deterioration. I personally spent 100 minutes of critical care time. This is time spent at patient's bedside actively involved in patient care as well as the coordination of care and discussions with the patient's family. This does not include any procedural time which has been billed separately. Joel Orr [facility] Critical Care 
12/23/2020

## 2020-12-24 NOTE — ED TRIAGE NOTES
Pt arrived to ED via EMS transferred from [facility]. Pt arrives intubated. EMS reports that patient was given 5 mg every 10 minutes of Versed to sedate patient. Total of 10mg of Versed given per EMS.

## 2020-12-25 NOTE — PROGRESS NOTES
Pharmacy Automatic Renal Dosing Protocol - Antimicrobials Indication for Antimicrobials: HCAP Current Regimen of Each Antimicrobial:  
Cefepime 2g IV q24h ; started ; day 3 Vancomycin 1000 mg IV q24h; started ; day 3 Azithromycin 500 mg IV q24h; [id number]; day 3 Previous Antimicrobial Therapy: N/a 
 
Goal Level: VANCOMYCIN TROUGH GOAL RANGE Vancomycin Trough: 15 - 20 mcg/mL  (AUC: 400 - 600 mg/hr/Liter/day) Date Dose & Interval Measured (mcg/mL) Extrapolated (mcg/mL) Date & time of next level:  Significant Cultures:  
 blood NGTD, pending  urine NG, final 
 sputum NGTD, pending Paralysis, amputations, malnutrition: none noted Labs: 
Recent Labs  
  20 
0417 20 
0442 20 
2350 20 
1149 CREA 3.18* 2.42* 2.20* 1.44* BUN 36* 22* 19 15 WBC 40.7*  --   --  19.9* Temp (24hrs), Av.6 °F (37 °C), Min:97 °F (36.1 °C), Max:100.4 °F (38 °C) Creatinine Clearance (mL/min) or Dialysis: 19.8 mL/min (IBW) Impression/Plan:  
Scr rising Change to vancomycin dosing based on random levels Vancomycin random level tomorrow at 1200 Continue current dose of cefepime; appropriate for indication/renal function Antimicrobial stop date 7 days vanc/cefepime, 5 days azithro Pharmacy will follow daily and adjust medications as appropriate for renal function and/or serum levels. Thank you, RANJAN Forrester

## 2020-12-25 NOTE — PROGRESS NOTES
Trial to titrate Levophed Art line dim and NIBP not reading or read SBP 80s > Levophed up to 27 lupe/min > NIBP  mmHg, WBC 40.7, K 5.2, KF worsening, still prone position, Bedside and Verbal shift change report given to Kadi Castillo RN (oncoming nurse) by Delores Hutchinson RN (offgoing nurse). Report included the following information SBAR, Kardex, Intake/Output, MAR, Accordion, Recent Results, Med Rec Status and Cardiac Rhythm NSR.

## 2020-12-25 NOTE — PROGRESS NOTES
Received report from  Noah Loaiza RN. Lab orders noted. To d/c prone position today. 8029  See assessment. Will get RT and others to assist with return to supine position. 0945  Turned from prone, skin on chin has sloughed off, left upper shin skin abrasion also noted. Anterior bath completed. Weaning Levophed as able. To wean down to 5 mcs, then wean off of vasopressin per Dr. Liza Gee. 1200  No change in prior assessment. To wean off to a RASS of 2-3 with Versed. 1400  Tube feedings ordered. 1500  Asynchronous with Ventilator, Versed adjusted per Dr. Arias [place] request.  No longer weaning off of Versed. Venelex ordered for new wounds from Prone positioning. 1530  No change in assessment noted. Will continue to try to wean Levophed as MAP tolerates. 1910  Report given to RIMA Weber.

## 2020-12-25 NOTE — PROGRESS NOTES
[facility] Name: Raquel Gamez : 1980 MRN: [id number] Date: 2020 Assessment:  
BRIEF SUMMARY: 
47 F with severe Down's syndrome and autism. She was admitted twice in Nov (-, -) with COVID PNA. She again presented to [facility] ED  with fever and resp distress. It was felt that she required intubation for transport to [facility]. She was transported in prone position and admitted by CCM service to ICU. At the time of transport, she was requiring high dose norepinephrine and vasopressin LINES/TUBES: 
ETT  >>  
R IJ CVL  >>  
R radial A-line  >> MICRO: 
Blood  >> Urine  >> Resp  >>  
 
ANTIBIOTICS: 
Azithro  >> Vanc  >> Cefepime  >> Active Problems: 1. ARDS, acute hypoxemic respiratory failure due to COVID PNA 2. Possible superimposed bacterial PNA (possible aspiration in ED @ [facility]) 3. Septic shock 4. H/O HFpEF (LVEF 55% 20) 5. DANY, oliguric 6. Mild hyperkalemia 7. Severe hyperglycemia, improved  8. Severe leukocytosis 9. Severe sepsis 10. Chronic anemia without acute blood loss 11. Down's and Autism with severe baseline cognitive impairment 12. ICU/vent associated discomfort ICU Comprehensive Plan of Care:  
 
Plans for this Shift: 1. Ventilator settings reviewed and adjusted. Discussed with RT 
2. Cont vasopressors. Wean NE first. When NE < 10 mcg/min, wean  to off 3. MAP goal > 65 mmHg 4. Monitor renal panel frequently 5. Correct electrolytes as indicated 6. Remains poor candidate for HD, CRRT 
7. Initiate TF (Nepro) trophic feed  (10 cc/hr) 
a. Consider advancing to goal  8. Cont Lantus (initiated ) 9. Continue SSI 10. Goal glu 140-180 11. DC dexamethasone (she has received full course previously) 12. Continue empiric antibiotics as documented above 13. Follow micro results 14. Monitor CBC daily 15. Enoxaparin full dose - changed to daily 12/25 due to DANY 16. Continue fentanyl, midaz infusions 17. RASS goal -2, -3 18. Daily WUA 
 
 
HPI/Consult/Subjective:  
24 Hr Events 12/25/2020: No major overnight events. Weaning vasopressors. Remains on high amount of vent support. Placed in supine position this AM 
 
SUBJ:  
RASS -5. Synchronous with vent. Past Medical History:  
 
 has a past medical history of Endocrine disease, Hypothyroid (03/11/2018), and Ill-defined condition. Past Surgical History:  
 
 has no past surgical history on file. Home Medications:  
 
Prior to Admission medications Medication Sig Start Date End Date Taking? Authorizing Provider  
ivabradine (CORLANOR) 5 mg tablet Take 1 Tab by mouth two (2) times daily (with meals). Indications: chronic heart failure, inappropriate sinus tachycardia 12/6/20   Aviva Carrillo MD  
metoprolol succinate (TOPROL-XL) 25 mg XL tablet Take 1 Tab by mouth daily. 12/6/20   Aviva Carrillo MD  
risperiDONE (RisperDAL) 1 mg tablet Take 1 mg by mouth nightly. Provider, Aicha  
solifenacin (VESICARE) 10 mg tablet TAKE 1 TABLET BY MOUTH EVERY DAY 9/18/20   Kilo Cortez MD  
medroxyPROGESTERone (DEPO-PROVERA) 150 mg/mL injection 150 mg, IM, once, To be administered in clinic by nurse. See order comments for schedule., # 1 vial, 4 Refills, Pharmacy: [facility]/pharmacy #[id number] 12/5/19   Kilo Cortez MD  
albuterol (PROVENTIL HFA, VENTOLIN HFA, PROAIR HFA) 90 mcg/actuation inhaler Take 2 Puffs by inhalation every four (4) hours as needed for Wheezing. 11/2/20   Rachel Paiz MD  
zinc sulfate 220 mg tablet Take 1 Tab by mouth daily. 11/2/20   Rachel Paiz MD  
cholecalciferol (VITAMIN D3) 1,000 unit tablet Take 1,000 Units by mouth daily. Kilo Cortez MD  
levothyroxine (SYNTHROID) 50 mcg tablet Take 50 mcg by mouth Daily (before breakfast). Kilo Cortez MD  
 
 
Allergies/Social/Family History: No Known Allergies Social History Tobacco Use  Smoking status: Never Smoker  Smokeless tobacco: Never Used Substance Use Topics  Alcohol use: No  
  
No family history on file. Objective:  
Vital Signs: 
Visit Vitals BP (!) 82/58 Pulse 81 Temp 97.8 °F (36.6 °C) Resp (!) 32 Ht 5' 3\" (1.6 m) Wt 86.6 kg (190 lb 14.7 oz) SpO2 99% BMI 33.82 kg/m² O2 Device: Endotracheal tube, Ventilator Temp (24hrs), Av °F (37.2 °C), Min:97 °F (36.1 °C), Max:102.1 °F (38.9 °C) Intake/Output:  
 
Intake/Output Summary (Last 24 hours) at 2020 1110 Last data filed at 2020 1030 Gross per 24 hour Intake 1174.01 ml Output 359 ml Net 815.01 ml Physical Exam: 
GEN: RASS -5. Synchronous with vent HEENT: NCAT, sclerae white NECK: JVP not visualized CHEST: No wheezes. BS slightly coarse CARDIAC: Regular, no M 
ABD: Obese, soft, diminished BS 
EXT: Cool, no edema NEURO: No spont movement. No withdrawal. CN intact DERM: No lesions noted I have examined the patient on this day 2020 and the above documented exam is accurate including the components that have been copied forward LABS AND  DATA: Personally reviewed Recent Labs  
  20 
1149 WBC 40.7* 19.9* HGB 9.6* 11.2* HCT 28.5* 33.3*  
 254 Recent Labs  
  20 
04120 
0442 20 
2350  136 139  
K 5.2* 5.3* 4.4  
 107 111* CO2 21 21 19* BUN 36* 22* 19  
CREA 3.18* 2.42* 2.20* * 270* 161* CA 7.0* 7.1* 7.1*  
MG 3.2*  --  1.2*  
PHOS 5.3*  --  5.6* Recent Labs  
  20 
04120 
0283 AP 68 48  
TP 6.5 5.9* ALB 1.8* 1.7*  
GLOB 4.7* 4.2* No results for input(s): INR, PTP, APTT, INREXT in the last 72 hours. Recent Labs  
  20 
0304 20 
0103 PHI 7.28* 7.27* PCO2I 37.4 35.4 PO2I 69* 68* FIO2I 100 100 Recent Labs  
  20 
2350 20 
1149 TROIQ <0.05 <0.05 Hemodynamics: PAP:   CO:    
Wedge:   CI:    
CVP:    SVR:    
  PVR:    
 
Ventilator Settings: 
Mode Rate Tidal Volume Pressure FiO2 PEEP Pressure control        50 % 10 cm H20 Peak airway pressure: 25 cm H2O Minute ventilation: 8.6 l/min MEDS: Reviewed Chest X-Ray: no new film Multidisciplinary Rounds Completed:  N/A 
 
 
SPECIAL EQUIPMENT None DISPOSITION Stay in ICU CRITICAL CARE CONSULTANT NOTE I had a in-person encounter with Sanjeev Arias, reviewed and interpreted patient data including events, labs, images, vital signs, I/O's, and examined patient. I have discussed the case and the plan and management of the patient's care with the consulting services, the bedside nurses and the respiratory therapist.   
 
NOTE OF PERSONAL INVOLVEMENT IN CARE This patient is at high risk for sudden and clinically significant deterioration, which requires the highest level of preparedness to intervene urgently. I participated in the decision-making and personally managed or directed the management of the following life and organ supporting interventions that required my frequent assessment to treat or prevent imminent deterioration. I personally spent 45 minutes of critical care time. This is time spent at patient's bedside actively involved in patient care as well as the coordination of care and discussions with the patient's family. This does not include any procedural time which has been billed separately. Agata Powell MD 
Pulmonary/[facility] [phone] 
12/25/2020

## 2020-12-26 NOTE — PROGRESS NOTES
Pharmacy Automatic Renal Dosing Protocol - Antimicrobials Indication for Antimicrobials: HCAP in the setting of covid-19 Current Regimen of Each Antimicrobial:  
Vancomycin - dosed based on random levels; started ; day 4 Azithromycin 500 mg IV q24h; [id number]/23; day 4 Meropenem 500 mg IV q12h; started ; day 1 Previous Antimicrobial Therapy:  
Cefepime 2g IV q24h ; started - (Recent hospitalization with 7 day course of vanc/cefepime) Goal Level: VANCOMYCIN TROUGH GOAL RANGE Vancomycin Trough: 15 - 20 mcg/mL  (AUC: 400 - 600 mg/hr/Liter/day) Date Dose & Interval Measured (mcg/mL) Extrapolated (mcg/mL)  
 N/a 35.1 Significant Cultures:  
 blood NGTD, pending  urine NG, final 
 sputum NGTD, pending Paralysis, amputations, malnutrition: none noted Labs: 
Recent Labs  
  20 
0406 20 
0417 20 
0442 20 
1149 20 
1149 CREA 3.78* 3.18* 2.42*   < > 1.44* BUN 49* 36* 22*   < > 15 WBC 34.2* 40.7*  --   --  19.9*  
 < > = values in this interval not displayed. Temp (24hrs), Av.6 °F (36.4 °C), Min:97 °F (36.1 °C), Max:98.6 °F (37 °C) Creatinine Clearance (mL/min) or Dialysis: 16.7 mL/min (IBW) Impression/Plan:  
Scr rising Continue vancomycin dosing based on random levels Vancomycin random level above goal range, recheck 12/ AM 
Continue current dose of meropenem; appropriate for indication/renal function Antimicrobial stop date pending except azithro 5 days Pharmacy will follow daily and adjust medications as appropriate for renal function and/or serum levels. Thank you, Brooke Tinajero [place]

## 2020-12-26 NOTE — PROGRESS NOTES
0700: Report received from Terrance Murguia RN. Included: SBAR, KARDEX, MAR, LDA's and current patient condition. Discussed with him current continuous medications including recent results. Discussed with him that the BP cuff is not correlating with arterial BP line, but that we are treating the A-line at this time. 0730: Assessment complete, pt unarousable, sedated on versed. Right AC PIV infiltrated, redness/pink noted above IV site will discontinue. Pt does not open eyes to any stimulus, does not withdraw to pain and deep stimulation. Pulses palpable, NSR, a-line zeroed and arm board appropriate. Changed a-line dressing d/t bleeding/oozing at site. Waveforms and placement are accurate with new dressing. BS Diminished, Active bowel sounds noted. Tube feeds on, placement verified. No command following at this time 0800: Discussed with Ana Lilia Hanson NP goals for the day: to titrate versed in half, stop the vasopressin, and work to wean down levophed as appropriate 
 
0900: Vasopressin off 
 
1200: Versed at 5mg/hr, Levophed at [facility]. Reassessment--no changes 1400: BP cuff is being monitored but we are treating the A-LINE for BP and vasopressor titrations at this time. NP Jose Scruggs, aware that cuff pressure is very much lower than a-line (30/40 points lower) but that we are monitoring, but not treating it as a source for BP at this time. Discussed with her that MAPs on a-line are >65 which is the goal. On the BP cuff they are less than adequate. Will continue to monitor 1500: Versed at 4mg/hr, Levophed at [facility] 1510: Updated Anish Juan (Mother of the patient) verified 4 digit security access code. Mother was appropriate for situation, calm and cooperative. Expressed that her daughter loves music and would like us to play some of \"Today's Hits or 90s genre music\" for her to hear. Acknowledged this request and will turn some music on shortly. Discussed with her the goals for the day that I discussed with the NP Ana Lilia Hanson. 1600: Reassessment--no changes 1800: Current drips: Levo at 3mcg, Heparin at 10units, Fent at 125mcg, Versed at 4mg, and KVO at 5ml 
 
1900: PTT sent, will wait for results. End of Shift Note Bedside shift change report given to Pavan Schwartz (oncoming nurse) by Walt Melissa (offgoing nurse). Report included the following information SBAR, Kardex, Intake/Output, MAR, Recent Results, Med Rec Status, Cardiac Rhythm NSR, Alarm Parameters  and Quality Measures Shift worked:  Days Shift summary and any significant changes:  
  Reached goal to turn off vasopressin and titrate versed to 4mg Pt COVID R/O but PCR sent from 12/23 says NEGATIVE. Pt still on isolation? ? Concerns for physician to address:  Pt COVID R/O but PCR sent from 12/23 says NEGATIVE. Pt still on isolation? ? 
  
Zone phone for oncoming shift:   n/a Activity: 
Activity Level: Bed Rest 
Number times ambulated in hallways past shift: 0 Number of times OOB to chair past shift: 0 Cardiac:  
Cardiac Monitoring: Yes     
Cardiac Rhythm: Normal sinus rhythm Access:  
Current line(s): PIV and central line Genitourinary:  
Urinary status: mejia Respiratory:  
O2 Device: Endotracheal tube, Ventilator Chronic home O2 use?: N/A Incentive spirometer at bedside: N/A 
  
GI: 
Last Bowel Movement Date: (unknown) Current diet:  DIET TUBE FEEDING Passing flatus: YES Tolerating current diet: YES Pain Management:  
Patient states pain is manageable on current regimen: YES Skin: 
Jia Score: 10 Interventions: speciality bed, float heels and internal/external urinary devices Patient Safety: 
Fall Score: Total Score: 2 Interventions: bed/chair alarm and assistive device (walker, cane, etc) Length of Stay: 
Expected LOS: 2d 4h Actual LOS: 3 Walt Melissa

## 2020-12-26 NOTE — PROGRESS NOTES
[facility] 
 
ICU TEAM Progress Note Name: Zoie Croft : 1980 MRN: [id number] Date: 2020 Subjective:  
Progress Note: 2020 Reason for ICU Admission: 47 F with severe Down's syndrome and autism. She was admitted twice in Nov (-, -) with COVID PNA. She again presented to [facility] ED  with fever and resp distress. It was felt that she required intubation for transport to [facility]. She was transported in prone position and admitted by [facility] service to ICU. At the time of transport, she was requiring high dose norepinephrine and vasopressin. Overnight Events: BINDU 
 
24 hour events: 
- rising leukocytosis 
- broadened to [employer] - worsening renal function with marginal UO Active Problem List:  
 
Problem List  Never Reviewed Codes Class HFrEF (heart failure with reduced ejection fraction) (HCC) (Chronic) ICD-10-CM: I50.20 ICD-9-CM: 428.20 Down syndrome (Chronic) ICD-10-CM: Q90.9 ICD-9-CM: 758.0 Acquired hypothyroidism (Chronic) ICD-10-CM: E03.9 ICD-9-CM: 244.9 Respiratory failure ([facility] [address] ICD-10-CM: J96.90 ICD-9-CM: 518.81   
   
 * (Principal) COVID-19 ICD-10-CM: U07.1 ICD-9-CM: 079.89 Sepsis ([facility] [address] ICD-10-CM: A41.9 ICD-9-CM: 038.9, 995.91 Pneumonia due to COVID-19 virus ICD-10-CM: U07.1, J12.89 ICD-9-CM: 480.8 Past Medical History:  
 
 has a past medical history of Endocrine disease, Hypothyroid (2018), and Ill-defined condition. Past Surgical History:  
 
 has no past surgical history on file. Home Medications:  
 
Prior to Admission medications Medication Sig Start Date End Date Taking? Authorizing Provider  
ivabradine (CORLANOR) 5 mg tablet Take 1 Tab by mouth two (2) times daily (with meals).  Indications: chronic heart failure, inappropriate sinus tachycardia 20   Nasir Pascal MD  
 metoprolol succinate (TOPROL-XL) 25 mg XL tablet Take 1 Tab by mouth daily. 20   Sandra Toribio MD  
risperiDONE (RisperDAL) 1 mg tablet Take 1 mg by mouth nightly. Aicha Webster  
solifenacin (VESICARE) 10 mg tablet TAKE 1 TABLET BY MOUTH EVERY DAY 20   OtherKilo MD  
medroxyPROGESTERone (DEPO-PROVERA) 150 mg/mL injection 150 mg, IM, once, To be administered in clinic by nurse. See order comments for schedule., # 1 vial, 4 Refills, Pharmacy: [facility]/pharmacy #[id number]   OtherKilo MD  
albuterol (PROVENTIL HFA, VENTOLIN HFA, PROAIR HFA) 90 mcg/actuation inhaler Take 2 Puffs by inhalation every four (4) hours as needed for Wheezing. 20   Olena Morgan MD  
zinc sulfate 220 mg tablet Take 1 Tab by mouth daily. 20   Olena Morgan MD  
cholecalciferol (VITAMIN D3) 1,000 unit tablet Take 1,000 Units by mouth daily. Kilo Cortez MD  
levothyroxine (SYNTHROID) 50 mcg tablet Take 50 mcg by mouth Daily (before breakfast). Kilo Cortez MD  
 
 
Allergies/Social/Family History: No Known Allergies Social History Tobacco Use  Smoking status: Never Smoker  Smokeless tobacco: Never Used Substance Use Topics  Alcohol use: No  
  
No family history on file. Review of Systems:  
 
Unable to obtain. Pt is intubated and sedated Objective:  
Vital Signs: 
Visit Vitals /67 Pulse 89 Temp 97.7 °F (36.5 °C) Resp (!) 37 Ht 5' 3\" (1.6 m) Wt 84 kg (185 lb 3 oz) SpO2 100% BMI 32.80 kg/m² O2 Device: Endotracheal tube, Ventilator Temp (24hrs), Av.7 °F (36.5 °C), Min:97 °F (36.1 °C), Max:98.6 °F (37 °C) Intake/Output:  
 
Intake/Output Summary (Last 24 hours) at 2020 1418 Last data filed at 2020 1419 Gross per 24 hour Intake 1164.34 ml Output 387 ml Net 777.34 ml Physical Exam: 
 
Physical Exam  
Constitutional: She appears well-developed and well-nourished. No distress. HENT:  
Head: Normocephalic. Eyes: Pupils are equal, round, and reactive to light. Right eye exhibits no discharge. Left eye exhibits no discharge. Neck: Normal range of motion. Cardiovascular: Normal rate, regular rhythm, normal heart sounds and intact distal pulses. Exam reveals no gallop and no friction rub. No murmur heard. Pulmonary/Chest: No respiratory distress. She has no wheezes. Mechanical breath sounds Abdominal: Soft. Bowel sounds are normal. She exhibits no distension. There is no abdominal tenderness. There is no rebound. Musculoskeletal: Normal range of motion. Neurological: No cranial nerve deficit. Sedated Skin: Skin is warm and dry. No rash noted. She is not diaphoretic. No erythema. No pallor. Psychiatric:  
Unable to assess LABS AND  DATA: Personally reviewed Recent Labs  
  12/26/20 
1304 12/26/20 
0406 WBC 33.4* 34.2* HGB 8.6* 8.2* HCT 25.0* 24.3*  
 212 Recent Labs  
  12/26/20 
1304 12/26/20 
0406  136  
K 4.9 5.1 * 110* CO2 22 23 BUN 54* 49* CREA 3.72* 3.78* * 142* CA 7.6* 7.3*  
MG 3.2* 3.0*  
PHOS 5.7* 5.8* Recent Labs  
  12/26/20 
0406 12/25/20 
0417 AP 61 68 TP 6.2* 6.5 ALB 1.7* 1.8*  
GLOB 4.5* 4.7* Recent Labs  
  12/26/20 
1304 APTT 31.9* Recent Labs  
  12/24/20 
0304 12/24/20 
0103 PHI 7.28* 7.27* PCO2I 37.4 35.4 PO2I 69* 68* FIO2I 100 100 Recent Labs  
  12/23/20 
2350 TROIQ <0.05 Hemodynamics:  
PAP:   CO:    
Wedge:   CI:    
CVP:    SVR:    
  PVR:    
 
Ventilator Settings: 
Mode Rate Tidal Volume Pressure FiO2 PEEP Pressure control        40 % 8 cm H20 Peak airway pressure: 24 cm H2O Minute ventilation: 7.64 l/min MEDS: Reviewed Chest X-Ray: personally reviewed and report checked Assessment and Plan: Acute hypoxic respiratory failure: s/p intubation on 12/23. Previously diagnosed with COVID in November. Now with concern of superimposed bacterial infection given hypotension, hypoxia, and CXR findings. - continue mechanical ventilation 
- continue fent and versed, wean versed as tolerated - pulmonary hygiene 
- supportive care  
- continue azithromycin, sandip, and vanco 
- follow up culture data 
- continue heparin infusion for possible CAC Shock, distributive vs septic: concern for pna although seemingly became hypotensive after intubation. Blood cultures pending. Broadened to sandip this am  
- continue levo and vaso, wean as tolerated 
- continue broad spectrum abx 
- optimize fluid status DANY: likely prerenal. BUN 54 and Cr 3.72. UO marginal. CT findings without obstruction or hydro. URNC without growth despite suspicious UA 
- continue to trend  
- strict I/O 
- avoid nephrotic agents 
- renally dose vanco 
 
Hyperglycemia:  
- continue lantus and SSI Chronic anemia without acute blood loss: H/H stable 
- CTM Down's and Autism with severe baseline cognitive impairment: 
- optimize safety Hypothyroidism: 
- continue synthroid Moderate protein calorie deficit malnutrition: NST following 
- continue trickle TF Deconditioning: - TRP 
- offloading mattress 
- optimize nutrition ICU Comprehensive Plan of Care:  
Plans for this Shift:  
1. Aggressive supportive care 2. Wean sedation 3. Continue ABX therapy, broadened this am 
 
Multidisciplinary Rounds Completed: Yes ABCDEF Bundle/Checklist 
Pain Medications: Fentanyl Target RASS: -3 - Moderate Sedation - Movement or eye opening to voice (no eye contact) Sedation Medications: Versed CAM-ICU:  unable to assess Mobility: bedrest 
PT/OT: not a candidate presently Restraints: None needed at this time Discussed Plan of Care (goals of care): N/A Addressed Code Status: Do Not Resuscitate CARDIOVASCULAR 
 Cardiac Gtts: Norepinephrine and Vasopressin MAP Goal of: > 65 mmHg Transfusion Trigger (Hgb): <7 g/dL RESPIRATORY Vent Goals:  
Optimize PEEP/Ventilation/Oxygenation Aim for lung protective ventilation Aggressive bronchopulmonary hygiene DVT Prophylaxis (if no, list reason): Heparin SPO2 Goal: > 92% Pulmonary toilet: ETT maintainance GI/ Dutta Catheter Present: Yes GI Prophylaxis: Pepcid (famotidine) Nutrition: Yes trickle TF IVFs: NA Bowel Movement: Yes Bowel Regimen: None needed at this time Insulin: lantus + SSI ANTIBIOTICS Antibiotics: 
Azithromycin Meropenem Vancomycin T/L/D Tubes: ETT and Nasogastric Tube Lines: [employer] Drains: Dutta Catheter SPECIAL EQUIPMENT None DISPOSITION Stay in ICU CRITICAL CARE CONSULTANT NOTE I had a face to face encounter with the patient, reviewed and interpreted patient data including clinical events, labs, images, vital signs, I/O's, and examined patient. I have discussed the case and the plan and management of the patient's care with the consulting services, the bedside nurses and the respiratory therapist.   
 
NOTE OF PERSONAL INVOLVEMENT IN CARE This patient has a high probability of imminent, clinically significant deterioration, which requires the highest level of preparedness to intervene urgently. I participated in the decision-making and personally managed or directed the management of the following life and organ supporting interventions that required my frequent assessment to treat or prevent imminent deterioration. I personally spent 60 minutes of critical care time. This is time spent at this critically ill patient's bedside actively involved in patient care as well as the coordination of care and discussions with the patient's family. This does not include any procedural time which has been billed separately. Paula Rodríguez NP Staff Intensivist NP [facility] 
12/26/2020

## 2020-12-26 NOTE — PROGRESS NOTES
Comprehensive Nutrition Assessment Type and Reason for Visit: Initial(new TF) Nutrition Recommendations/Plan:  
TF recommendations: As levo comes down, recommend advancing TF rate 10mL q 8h as tolerated to Goal Rate of Nepro @ 40mL/h + Prosource BID + 50mL H2O flush q 6h (provides 1848kcals/108gPro/154gCHO/898mL) Nutrition Assessment:   Pt admitted with COVID19. PMH: Down's syndrome, autism, recently discharged with [address]. Chart reviewed as pt was started on TF yesterday. She is intubated and sedated with versed and fentanyl. Trophic TF with Nepro started, minimal residuals. Levo at 8 and being weaned, MAP's in the 66's. K 5.1 and phos 5.8, Nepro is appropriate. No RRT at this time. Provided Tf recommendations above which will meet 113% kcal and 100% protein needs. MST not filled out and unable to complete NPFE 2' COVID precautions. Malnutrition Assessment: 
Malnutrition Status:    UTD at this time. Estimated Daily Nutrient Needs: 
Energy (kcal): PSU 3256 (MSJ 5954); Weight Used for Energy Requirements: Current Protein (g): 84-126g (1-1.5gPro/kg); Weight Used for Protein Requirements: Current Fluid (ml/day): per MD; Method Used for Fluid Requirements: 1 ml/kcal 
 
 
Nutrition Related Findings:  Meds: zithromax, pepcid, lantus, humalog, merrem, synthroid; Drips: levo, versed, fentanyl. BM PTA Wounds:   
Skin tears Current Nutrition Therapies: 
Current Tube Feeding (TF) Orders: · Feeding Route: Nasogastric · Formula: Nepro · Schedule:Continuous · Regimen: 10mL/h · Additives/Modulars:   
· Water Flushes: 50mL q 6h · Current TF & Flush Orders Provides:   
· Goal TF & Flush Orders Provides: 1848kcals/108gPro/154gCHO/898mL Anthropometric Measures: 
· Height:  5' 3\" (160 cm) · Current Body Wt:  84 kg (185 lb 3 oz) · Ideal Body Wt:  115 lbs:  161 % · BMI Category:  Obese class 1 (BMI 30.0-34. 9) Nutrition Diagnosis: · Inadequate protein-energy intake related to impaired respiratory function as evidenced by other (specify)(trophic TF order) Nutrition Interventions:  
Food and/or Nutrient Delivery: Modify tube feeding Nutrition Education and Counseling: No recommendations at this time Coordination of Nutrition Care: Continue to monitor while inpatient Goals: Pt will tolerate TF advancement with residuals <500mL in 2-4 days. Nutrition Monitoring and Evaluation:  
Behavioral-Environmental Outcomes: None identified Food/Nutrient Intake Outcomes: Enteral nutrition intake/tolerance, IVF intake Physical Signs/Symptoms Outcomes: Biochemical data, Fluid status or edema, Nutrition focused physical findings, Weight, Hemodynamic status, GI status Discharge Planning: Too soon to determine Electronically signed by Chad Mittal RD, [address]  on 12/26/2020 at 1:20 PM 
 
Contact: [phone]

## 2020-12-26 NOTE — PROGRESS NOTES
End of Shift Note Bedside shift change report given to Nicci Haines (oncoming nurse) by Melanie Hermosillo (offgoing nurse). Report included the following information SBAR, Kardex, ED Summary, Intake/Output, MAR, Accordion, Recent Results, Med Rec Status and Cardiac Rhythm NSR Shift worked:  Sandra Cintron Shift summary and any significant changes:  
  unable to wean Levophed still at 8 lupe/min, Concerns for physician to address:  no changes Zone phone for oncoming shift:   none Activity: 
Activity Level: Bed Rest 
Number times ambulated in hallways past shift: 0 Number of times OOB to chair past shift: 0 Cardiac:  
Cardiac Monitoring: Yes     
Cardiac Rhythm: Normal sinus rhythm > on Levophed 8 lupe/min and Vasopressin at 0.03 lupe/kg/min, Access:  
Current line(s): PIV and central line, Right Art Line, ETT, OGT Genitourinary:  
Urinary status: mejia Respiratory:  
O2 Device: Endotracheal tube, Ventilator RR 35-38 Chronic home O2 use?: N/A Incentive spirometer at bedside: N/A 
  
GI: 
Last Bowel Movement Date: (unknown) Current diet:  DIET TUBE FEEDING Passing flatus: No 
Tolerating current diet: YES Pain Management:  
Patient states pain is manageable on current regimen: YES Skin: 
Jai Score: 8 Interventions: turn team, float heels, internal/external urinary devices and nutritional support Patient Safety: 
Fall Score: Total Score: 2 Interventions: bed/chair alarm and stay with me (per policy) Length of Stay: 
Expected LOS: 2d 4h Actual LOS: 3 Melanie Hermosillo

## 2020-12-27 NOTE — PROGRESS NOTES
[facility] 
 
ICU TEAM Progress Note Name: Getachew Ybarra : 1980 MRN: [id number] Date: 2020 Subjective:  
Progress Note: 2020 Reason for ICU Admission: 47 F with severe Down's syndrome and autism. She was admitted twice in Nov (-, -) with COVID PNA. She again presented to [facility] ED  with fever and resp distress. It was felt that she required intubation for transport to [facility]. She was transported in prone position and admitted by [facility] service to ICU. At the time of transport, she was requiring high dose norepinephrine and vasopressin. Overnight Events: BINDU 
 
24 hour events: 
-off pressors Active Problem List:  
 
Problem List  Never Reviewed Codes Class HFrEF (heart failure with reduced ejection fraction) (HCC) (Chronic) ICD-10-CM: I50.20 ICD-9-CM: 428.20 Down syndrome (Chronic) ICD-10-CM: Q90.9 ICD-9-CM: 758.0 Acquired hypothyroidism (Chronic) ICD-10-CM: E03.9 ICD-9-CM: 244.9 [facility] ICD-10-CM: J96.90 ICD-9-CM: 518.81   
   
 * (Principal) COVID-19 ICD-10-CM: U07.1 ICD-9-CM: 079.89 Sepsis ([facility] ICD-10-CM: A41.9 ICD-9-CM: 038.9, 995.91 Pneumonia due to COVID-19 virus ICD-10-CM: U07.1, J12.89 ICD-9-CM: 480.8 Past Medical History:  
 
 has a past medical history of Endocrine disease, Hypothyroid (2018), and Ill-defined condition. Past Surgical History:  
 
 has no past surgical history on file. Home Medications:  
 
Prior to Admission medications Medication Sig Start Date End Date Taking? Authorizing Provider  
ivabradine (CORLANOR) 5 mg tablet Take 1 Tab by mouth two (2) times daily (with meals). Indications: chronic heart failure, inappropriate sinus tachycardia 20   Sandra Toribio MD  
metoprolol succinate (TOPROL-XL) 25 mg XL tablet Take 1 Tab by mouth daily.  20   Sandra Toribio MD  
 risperiDONE (RisperDAL) 1 mg tablet Take 1 mg by mouth nightly. Provider, Aicha  
solifenacin (VESICARE) 10 mg tablet TAKE 1 TABLET BY MOUTH EVERY DAY 20   Kilo Cortez MD  
medroxyPROGESTERone (DEPO-PROVERA) 150 mg/mL injection 150 mg, IM, once, To be administered in clinic by nurse. See order comments for schedule., # 1 vial, 4 Refills, Pharmacy: [id number]/pharmacy #[id number] 19   Kilo Cortez MD  
albuterol (PROVENTIL HFA, VENTOLIN HFA, PROAIR HFA) 90 mcg/actuation inhaler Take 2 Puffs by inhalation every four (4) hours as needed for Wheezing. 20   Osvaldo Clayton MD  
zinc sulfate 220 mg tablet Take 1 Tab by mouth daily. 20   Osvaldo Claytno MD  
cholecalciferol (VITAMIN D3) 1,000 unit tablet Take 1,000 Units by mouth daily. Kilo Cortez MD  
levothyroxine (SYNTHROID) 50 mcg tablet Take 50 mcg by mouth Daily (before breakfast). Kilo Cortez MD  
 
 
Allergies/Social/Family History: No Known Allergies Social History Tobacco Use  Smoking status: Never Smoker  Smokeless tobacco: Never Used Substance Use Topics  Alcohol use: No  
  
No family history on file. Review of Systems:  
 
Unable to obtain. Pt is intubated and sedated Objective:  
Vital Signs: 
Visit Vitals /65 Pulse 77 Temp 97.7 °F (36.5 °C) Resp 29 Ht 5' 3\" (1.6 m) Wt 84 kg (185 lb 3 oz) SpO2 100% BMI 32.80 kg/m² O2 Device: Ventilator Temp (24hrs), Av.2 °F (36.2 °C), Min:95.9 °F (35.5 °C), Max:98 °F (36.7 °C) Intake/Output:  
 
Intake/Output Summary (Last 24 hours) at 2020 1119 Last data filed at 2020 1100 Gross per 24 hour Intake 768.25 ml Output 755 ml Net 13.25 ml Physical Exam: 
 
Physical Exam  
Constitutional: She appears well-developed and well-nourished. No distress. HENT:  
Head: Normocephalic. OGT and ETT in place Eyes: Right eye exhibits no discharge. Left eye exhibits no discharge. Neck: Neck supple. R IJ CVL in place Cardiovascular: Normal rate, regular rhythm, normal heart sounds and intact distal pulses. Exam reveals no gallop and no friction rub. No murmur heard. Pulmonary/Chest: No respiratory distress. She has no wheezes. Mechanical breath sounds Abdominal: Soft. Bowel sounds are normal. She exhibits no distension. There is no abdominal tenderness. There is no rebound. Genitourinary:    Genitourinary Comments: Dutta in place Neurological: No cranial nerve deficit. Sedated Skin: Skin is warm and dry. No rash noted. She is not diaphoretic. No erythema. No pallor. Psychiatric:  
Unable to assess LABS AND  DATA: Personally reviewed Recent Labs  
  12/27/20 
0157 12/26/20 
1304 WBC 31.9* 33.4* HGB 8.4* 8.6* HCT 25.1* 25.0*  
 229 Recent Labs  
  12/27/20 
0157 12/26/20 
1304 12/26/20 
0406 NA  --  139 136 K  --  4.9 5.1 CL  --  111* 110* CO2  --  22 23 BUN  --  54* 49* CREA  --  3.72* 3.78* GLU  --  164* 142* CA  --  7.6* 7.3*  
MG 3.1* 3.2* 3.0*  
PHOS 5.8* 5.7* 5.8* Recent Labs  
  12/26/20 
0406 12/25/20 
0417 AP 61 68 TP 6.2* 6.5 ALB 1.7* 1.8*  
GLOB 4.5* 4.7* Recent Labs  
  12/27/20 
1025 12/27/20 
0157 APTT 56.7* 68.0* No results for input(s): PHI, PCO2I, PO2I, FIO2I in the last 72 hours. No results for input(s): CPK, CKMB, TROIQ, BNPP in the last 72 hours. Hemodynamics:  
PAP:   CO:    
Wedge:   CI:    
CVP:    SVR:    
  PVR:    
 
Ventilator Settings: 
Mode Rate Tidal Volume Pressure FiO2 PEEP Pressure control        40 % 8 cm H20 Peak airway pressure: 23 cm H2O Minute ventilation: 7.49 l/min MEDS: Reviewed Chest X-Ray: personally reviewed and report checked Assessment and Plan:  
 
Acute hypoxic respiratory failure: s/p intubation on 12/23. Previously diagnosed with COVID in November. Now with concern of superimposed bacterial infection given hypotension, hypoxia, and CXR findings. - continue mechanical ventilation, aim for lung protective ventilation 
 -decrease PEEP to 6 and wean rate down to 32, obtain ABG this evening 
- continue fent and versed, wean versed as tolerated, down to 3 mg/hr - pulmonary hygiene 
- supportive care  
- continue azithromycin, sandip, and vanco (5 days course of azithromycin finishes 12/27) 
- follow up culture data, no growth as of today from urine, sputum, and blood 
- continue heparin infusion Shock, distributive vs septic: concern for pna although seemingly became hypotensive after intubation. Blood cultures pending.  
- continue levo and vaso, wean as tolerated 
- continue broad spectrum abx 
- optimize fluid status 
-WBC improving, down to 32 however procalcitonin up to 407 from 165 4 days ago DANY: likely prerenal. BUN 54 and Cr 3.72. UO marginal. CT findings without obstruction or hydro. URNC without growth despite suspicious UA 
- continue to trend  
- strict I/O 
- avoid nephrotic agents 
- renally dose vanco 
 
Hyperglycemia:  
- continue lantus and SSI Chronic anemia without acute blood loss: H/H stable 
- CTM Down's and Autism with severe baseline cognitive impairment: 
- optimize safety Hypothyroidism: 
- continue synthroid Moderate protein calorie deficit malnutrition: NST following 
- continue TF, increase slowly to goal 
 
Deconditioning:  
- offloading mattress 
- optimize nutrition ICU Comprehensive Plan of Care:  
Plans for this Shift:  
1. Aggressive supportive care 2. Wean sedation 3. Continue ABX therapy, broadened this am 
4. Wean ventilator ABCDEF Bundle/Checklist 
Pain Medications: Fentanyl Target RASS: -3 - Moderate Sedation - Movement or eye opening to voice (no eye contact) Sedation Medications: Versed CAM-ICU:  unable to assess Mobility: bedrest 
PT/OT: not a candidate presently Restraints: None needed at this time Discussed Plan of Care (goals of care): N/A 
 Addressed Code Status: Do Not Resuscitate CARDIOVASCULAR Cardiac Gtts: None MAP Goal of: > 65 mmHg Transfusion Trigger (Hgb): <7 g/dL RESPIRATORY Vent Goals:  
Optimize PEEP/Ventilation/Oxygenation Aim for lung protective ventilation Aggressive bronchopulmonary hygiene DVT Prophylaxis (if no, list reason): Heparin SPO2 Goal: > 92% Pulmonary toilet: ETT maintainance GI/ Dutta Catheter Present: Yes GI Prophylaxis: Pepcid (famotidine) Nutrition: Yes  Increase TF IVFs: NA Bowel Movement: Yes Bowel Regimen: None needed at this time Insulin: lantus + SSI ANTIBIOTICS Antibiotics: 
Azithromycin: finishes 5 day course today Meropenem Vancomycin T/L/D Tubes: ETT and Nasogastric Tube Lines: Arterial Line and [employer] Drains: Dutta Catheter SPECIAL EQUIPMENT None DISPOSITION Stay in ICU CRITICAL CARE CONSULTANT NOTE I had a face to face encounter with the patient, reviewed and interpreted patient data including clinical events, labs, images, vital signs, I/O's, and examined patient. I have discussed the case and the plan and management of the patient's care with the consulting services, the bedside nurses and the respiratory therapist.   
 
NOTE OF PERSONAL INVOLVEMENT IN CARE This patient has a high probability of imminent, clinically significant deterioration, which requires the highest level of preparedness to intervene urgently. I participated in the decision-making and personally managed or directed the management of the following life and organ supporting interventions that required my frequent assessment to treat or prevent imminent deterioration. I personally spent 35 minutes of critical care time. This is time spent at this critically ill patient's bedside actively involved in patient care as well as the coordination of care and discussions with the patient's family. This does not include any procedural time which has been billed separately. Joel Orr NP [facility] Critical Care 
12/27/2020

## 2020-12-27 NOTE — PROGRESS NOTES
0700: Report received from Quintin Lopez RN. Included: SBAR, KARDEX, MAR, LDA's and recent results. Discussed overnight events as minimal. Patient bathed overnight. 0730: Assessment complete, patient resting in bed. Neurologically, does not follow commands, does not move any extremities and does not withdraw to pain stimulus or deep stimulus. No eye opening noted. GCS 3. NSR, HR 70S-75. [place] for BP management is appropriate with MAPS greater than 65. A line re-zeroed and appropriate wave forms noted and leveled. BS Hypoactive, TF residual 40ml this AM. Skin: Chin hyperpigmentation noted, treated with venelex, shins have old scabs/abrasion scabs noted. Versed at 3mg, LEVO off from previous shift, KVO, Fentanyl at 125mcg, Heparin drip at 8units. 0900: Discussed with ZAK Garcia to advance feedings. TF at 20ml/hr 1045: Discussed with Francisca Shell that she has had 2 CONSECUTIVE therapeutic PTT draws, so we can switch to q24 hours at this time. (Noted ok to round 67.9 to 68--therapeutic) 1140: , Held humalog, Lantus 15 units given. 1141: Reassessment no changes. 1300: Discussed with ZAK Garcia to take oral temps opposed to axillary and then get a rectal temp to compare. 1600: Temp 97 oral, patient feels warm to cool to the touch. Reassessment--no changes 1730: Rectal probe placed, Temp 96, placed blankets/sheet, Duane hugger and then warm blankets over patient. 1900: End of Shift Note Bedside shift change report given to Wei Johnson RN (oncoming nurse) by Ronda Shah (offgoing nurse). Report included the following information SBAR, Kardex, Intake/Output, MAR, Recent Results, Cardiac Rhythm NSR, Alarm Parameters  and Quality Measures Shift worked:  Days Shift summary and any significant changes:  
  Heparin drip therapeutic, advanced tube feeds as tolerated with goal of 40/hr. Concerns for physician to address:  None at this time Zone phone for oncoming shift:   n/a 
  
 Activity: 
Activity Level: Bed Rest 
 
 
Cardiac:  
Cardiac Monitoring: Yes     
Cardiac Rhythm: Normal sinus rhythm Access:  
Current line(s): PIV and central line Genitourinary:  
Urinary status: mejia Respiratory:  
O2 Device: Ventilator GI: 
Last Bowel Movement Date: (unknown) Current diet:  DIET TUBE FEEDING Passing flatus: YES Tolerating current diet: YES Pain Management:  
Patient states pain is manageable on current regimen: N/A Skin: 
Jai Score: 10 Interventions: speciality bed, float heels and internal/external urinary devices Patient Safety: 
Fall Score: Total Score: 2 Interventions: bed/chair alarm and assistive device (walker, cane, etc) Length of Stay: 
Expected LOS: 2d 4h Actual LOS: 4 [facility]

## 2020-12-27 NOTE — PROGRESS NOTES
1900- [facility] completed, see charting for details. Mouth care and suction completed. Patient resting on the vent, not restrained. Sedated with fent and versed. Vitals within normal range on low dose levo. 1920- PTT therapeutic. No rate change to heparin gtt 2118- Versed down to 3 
 
2256- Levo down to 1  
 
0024- Levo stopped 
 
0250- PTT 68.  Heparin gtt titrated down to 8un/kg/hr 
 
0500- Duane charlton applied for lower temp.  
 
0700- Bedside report given to Bernie Isaac

## 2020-12-27 NOTE — PROGRESS NOTES
Pharmacy Automatic Renal Dosing Protocol - Antimicrobials Indication for Antimicrobials: HCAP in the setting of covid-19 Current Regimen of Each Antimicrobial:  
Vancomycin - dosed based on random levels; started ; day 5 Azithromycin 500 mg IV q24h; [id number]/23; day 5 Meropenem 500 mg IV q12h; started ; day 2 Previous Antimicrobial Therapy:  
Cefepime 2g IV q24h ; started - (Recent hospitalization with 7 day course of vanc/cefepime) Goal Level: VANCOMYCIN TROUGH GOAL RANGE Vancomycin Trough: 15 - 20 mcg/mL  (AUC: 400 - 600 mg/hr/Liter/day) Date Dose & Interval Measured (mcg/mL) Extrapolated (mcg/mL)  
 N/a 35.1 Significant Cultures:  
 blood NGTD, pending  urine NG, final 
 sputum normal constance, pending Paralysis, amputations, malnutrition: none noted Labs: 
Recent Labs  
  20 
0157 20 
1304 20 
0406 20 
2166 CREA  --  3.72* 3.78* 3.18* BUN  --  54* 49* 36* WBC 31.9* 33.4* 34.2* 40.7* Temp (24hrs), Av.1 °F (36.2 °C), Min:95.9 °F (35.5 °C), Max:98 °F (36.7 °C) Creatinine Clearance (mL/min) or Dialysis: 17.0 mL/min (IBW) Impression/Plan:  
Scr seems to have stabilized, but not checked this AM; BMP tomorrow Continue vancomycin dosing based on random levels Vancomycin random level above goal range, recheck  AM 
Continue current dose of meropenem; appropriate for indication/renal function Antimicrobial stop date pending except azithro 5 days Pharmacy will follow daily and adjust medications as appropriate for renal function and/or serum levels. Thank you, RANJAN Durham

## 2020-12-28 PROBLEM — Z71.89 GOALS OF CARE, COUNSELING/DISCUSSION: Status: ACTIVE | Noted: 2020-01-01

## 2020-12-28 NOTE — INTERDISCIPLINARY ROUNDS
Interdisciplinary team rounds were held 12/28/2020 with the following team members:Care Management, Diabetes Treatment Specialist, Nursing, Nutrition, Palliative Care, Pharmacy, Physical Therapy, Physician and Respiratory Therapy. Plan of care discussed. See clinical pathway and/or care plan for interventions and desired outcomes. normal...

## 2020-12-28 NOTE — CONSULTS
Palliative Medicine Consult [place]: [phone]) Patient Name: Carol Garcia YOB: 1980 Date of Initial Consult: 12/28/2020 Reason for Consult: care decisions Requesting Provider: Jake Grier MD  
Primary Care Physician: Diego, MD Kilo 
 
 SUMMARY:  
Carol Garcia is a 47 y.o. with a past history of  Down's syndrome, hypothyroidism, obesity (BMI 30), CKD3, CHF (ECHO 2018 mod diffuse hypokinesis, EF 30%)  large hiatal hernia,, who was admitted on 12/23/2020 from home with a diagnosis of COVID PNA. Current medical issues leading to Palliative Medicine involvement include: ongoing decline from prolonged COVID infection (recent admission 11/8 to 11/16/20 for covid pna and again 11/19 to 12/6/20 for sepsis due to covid) during which she was seen by Palliative Medicine in Nov 2020. 
 
12/28: remains intubated, 40%, PEEP 6. Levophed 8 mcg/min. Worsening renal failure. Psychosocial: lives with and is cared for by mother Kassie Ellisong [phone] PALLIATIVE DIAGNOSES:  
1. Acute respiratory failure due to COVID-19 pneumonia 2. Morbid obesity BMI 30 
3. Large hiatal hernia 4. Dysphagia, aspiration risk, on modified diet 5. Debility 6. High risk for dying from COVID due to underlying CHF, obesity 7. Hypothermic (12/27) 8. Renal failure (Cr 4.18) 9. Care decisions PLAN:  
1. Prior to visit, I completed an extensive review of patient's medical records, including medical documentation, vital signs, MARs, and results of various labs and other diagnostics.  I also spoke with patient's nurse, Danielle Walker and intensivist Dr. Mustapha Swanson.  
 2. Spoke with Natalie: she last saw Maria Dolores Gibranamaury on the day she was admitted, and she was intubated during the previous admissions and was able to be successfully extubated, so she remains hopeful. If we extubated pt and she did not do well, she would want us to reintubate, but confirmed her DNR status if her heart stops beating. She is aware of pt's renal failure. Patricia Zazueta will try to come up here tomorrow to see pt through the door and talk more about goals of care. 3. Initial consult note routed to primary continuity provider and/or primary health care team members 4. Communicated plan of care with: Palliative IDT, [address] Team 
 
 GOALS OF CARE / TREATMENT PREFERENCES:  
 
GOALS OF CARE: 
Patient/Health Care Proxy Stated Goals: Prolong life TREATMENT PREFERENCES:  
Code Status: DNR Advance Care Planning: 
[x] The [facility] Interdisciplinary Team has updated the ACP Navigator with [place] and Patient Capacity Primary Decision Maker (Active): [employer] - Rebekah Oneill - [phone] Secondary Decision Maker: Randolph Cap - [phone] Advance Care Planning 12/28/2020 Patient's Healthcare Decision Maker is: Legal Next of Kin Confirm Advance Directive None Patient Would Like to Complete Advance Directive Unable Medical Interventions: Full interventions Other: As far as possible, the palliative care team has discussed with patient / health care proxy about goals of care / treatment preferences for patient. HISTORY:  
 
History obtained from: deborah, Patricia Zazueta CHIEF COMPLAINT: SOB, fever HPI/SUBJECTIVE: The patient is:  
[] Verbal and participatory [x] Non-participatory due to: intubation 12/23: presented to [facility] ED for fever, c/o not feeling well and cough, with associated loss of appetite and very productive cough; this is her 3rd admission for  COVID PNA. While in the ED pt became tachypneic with sats dropping to 92% and placed on 100% NRB with sats 96-96%; her respiratory status continued to decline, requiring bipap, then intubation. hospitalist was not comfortable keeping pt at [facility] and had her transferred to [facility]. Clinical Pain Assessment (nonverbal scale for severity on nonverbal patients):  
Clinical Pain Assessment Severity: 0 Activity (Movement): Lying quietly, normal position Duration: for how long has pt been experiencing pain (e.g., 2 days, 1 month, years) Frequency: how often pain is an issue (e.g., several times per day, once every few days, constant) FUNCTIONAL ASSESSMENT:  
 
Palliative Performance Scale (PPS): PPS: 10 PSYCHOSOCIAL/SPIRITUAL SCREENING:  
 
Palliative IDT has assessed this patient for cultural preferences / practices and a referral made as appropriate to needs (Cultural Services, Patient Advocacy, Ethics, etc.) Any spiritual / Rastafarian concerns: 
[] Yes /  [x] No 
 
Caregiver Burnout: 
[] Yes /  [x] No /  [] No Caregiver Present Anticipatory grief assessment:  
[x] Normal  / [] Maladaptive ESAS Anxiety: Anxiety: 0 
 
ESAS Depression:   unable to assess due to pt factors REVIEW OF SYSTEMS:  
 
Positive and pertinent negative findings in ROS are noted above in HPI. The following systems were [x] reviewed / [] unable to be reviewed as noted in HPI Other findings are noted below. Systems: constitutional, ears/nose/mouth/throat, respiratory, gastrointestinal, genitourinary, musculoskeletal, integumentary, neurologic, psychiatric, endocrine. Positive findings noted below. Modified ESAS Completed by: provider Pain: 0 Anxiety: 0 Dyspnea: 0 PHYSICAL EXAM:  
 
From RN flowsheet: Wt Readings from Last 3 Encounters:  
12/25/20 185 lb 3 oz (84 kg) 12/23/20 191 lb (86.6 kg) 12/04/20 191 lb (86.6 kg) Blood pressure (!) 91/50, pulse (!) 110, temperature 97.5 °F (36.4 °C), resp. rate 20, height 5' 3\" (1.6 m), weight 185 lb 3 oz (84 kg), SpO2 97 %. Pain Scale 1: Behavioral Pain Scale (BPS) Pain Intensity 1: 3 Last bowel movement, if known:  
 
Constitutional: intubated In order to limit the exposure risk from COVID 19 and to preserve the hospital's limited supply of PPEs, seen through ICU window. Intubated. On levo 8. I spoke with her ICU nurse. HISTORY:  
 
Principal Problem: 
  COVID-19 (12/23/2020) Active Problems: 
  Pneumonia due to COVID-19 virus (11/6/2020) Respiratory failure ([facility] (12/23/2020) HFrEF (heart failure with reduced ejection fraction) ([facility] (12/24/2020) Down syndrome (12/24/2020) Acquired hypothyroidism (12/24/2020) Past Medical History:  
Diagnosis Date  Endocrine disease  Hypothyroid 03/11/2018  Ill-defined condition Down's Syndrome No past surgical history on file. No family history on file. History reviewed, no pertinent family history. Social History Tobacco Use  Smoking status: Never Smoker  Smokeless tobacco: Never Used Substance Use Topics  Alcohol use: No  
 
No Known Allergies Current Facility-Administered Medications Medication Dose Route Frequency  heparin (porcine) injection 5,000 Units  5,000 Units SubCUTAneous Q8H  
 NOREPINephrine (LEVOPHED) 8 mg in 5% dextrose 250mL (32 mcg/mL) infusion  0.5-30 mcg/min IntraVENous TITRATE  meropenem (MERREM) 500 mg in 0.9% sodium chloride (MBP/ADV) 50 mL MBP  500 mg IntraVENous Q12H  
 fentaNYL (PF) 1,500 mcg/30 mL (50 mcg/mL) infusion  0-200 mcg/hr IntraVENous TITRATE  midazolam (VERSED) 100 mg in 0.9% sodium chloride 100 mL infusion  0-10 mg/hr IntraVENous TITRATE  VANCOMYCIN INFORMATION NOTE   Other Rx Dosing/Monitoring  balsam peru-castor oiL (VENELEX) ointment   Topical BID  alcohol 62% (NOZIN) nasal  1 Ampule  1 Ampule Topical Q12H  
 dextrose (D50W) injection syrg 12.5-25 g  25-50 mL IntraVENous PRN  
 insulin lispro (HUMALOG) injection   SubCUTAneous Q6H  
 [START ON 12/30/2020] levothyroxine (SYNTHROID) injection 35 mcg  35 mcg IntraVENous Q24H  
 acetaminophen (TYLENOL) tablet 650 mg  650 mg Per G Tube Q6H PRN Or  
 acetaminophen (TYLENOL) suppository 650 mg  650 mg Rectal Q6H PRN  chlorhexidine (ORAL CARE KIT) 0.12 % mouthwash 15 mL  15 mL Oral Q12H  
 famotidine (PF) (PEPCID) 20 mg in 0.9% sodium chloride 10 mL injection  20 mg IntraVENous DAILY  heparin (porcine) pf 300 Units  300 Units InterCATHeter PRN  
 sodium chloride (NS) flush 5-40 mL  5-40 mL IntraVENous Q8H  
 sodium chloride (NS) flush 5-40 mL  5-40 mL IntraVENous PRN  
 ondansetron (ZOFRAN) injection 4 mg  4 mg IntraVENous Q6H PRN  
 
 
 
 LAB AND IMAGING FINDINGS:  
 
Lab Results Component Value Date/Time WBC 30.7 (H) 12/28/2020 04:51 AM  
 HGB 8.9 (L) 12/28/2020 04:51 AM  
 PLATELET 185 98/29/4667 04:51 AM  
 
Lab Results Component Value Date/Time Sodium 144 12/28/2020 04:51 AM  
 Potassium 4.6 12/28/2020 04:51 AM  
 Chloride 113 (H) 12/28/2020 04:51 AM  
 CO2 26 12/28/2020 04:51 AM  
 BUN 73 (H) 12/28/2020 04:51 AM  
 Creatinine 4.18 (H) 12/28/2020 04:51 AM  
 Calcium 8.6 12/28/2020 04:51 AM  
 Magnesium 3.2 (H) 12/27/2020 09:16 PM  
 Phosphorus 6.1 (H) 12/27/2020 09:16 PM  
  
Lab Results Component Value Date/Time Alk. phosphatase 74 12/28/2020 04:51 AM  
 Protein, total 6.0 (L) 12/28/2020 04:51 AM  
 Albumin 1.7 (L) 12/28/2020 04:51 AM  
 Globulin 4.3 (H) 12/28/2020 04:51 AM  
 
Lab Results Component Value Date/Time INR 1.1 11/20/2020 01:57 AM  
 Prothrombin time 11.8 (H) 11/20/2020 01:57 AM  
 aPTT 40.0 (H) 12/28/2020 04:51 AM  
  
Lab Results Component Value Date/Time Iron 65 12/04/2020 03:11 AM  
 TIBC 279 12/04/2020 03:11 AM  
 Iron % saturation 23 12/04/2020 03:11 AM  
 Ferritin 555 (H) 12/28/2020 04:51 AM  
  
Lab Results Component Value Date/Time pH 7.33 (L) 12/24/2020 05:50 AM  
 PCO2 38 12/24/2020 05:50 AM  
 PO2 80 12/24/2020 05:50 AM  
 
No components found for: [place] Lab Results Component Value Date/Time  (H) 11/11/2020 01:00 AM  
  
 
 
   
 
Total time:  
Counseling / coordination time, spent as noted above:  
> 50% counseling / coordination?:  
 
Prolonged service was provided for  []30 min   []75 min in face to face time in the presence of the patient, spent as noted above. Time Start:  
Time End:  
Note: this can only be billed with 39839 (initial) or 60248 (follow up). If multiple start / stop times, list each separately.

## 2020-12-28 NOTE — PROGRESS NOTES
Spiritual Care Assessment/Progress Note [address] 
 
 
NAME: Oma Mott      MRN: [id number] AGE: 47 y.o. SEX: female Lutheran Affiliation: Scientology  
Language: English  
 
12/28/2020 Spiritual Assessment begun in [facility] through conversation with: 
  
    []Patient        [] Family    [] Friend(s) Reason for Consult: Palliative Care, Initial/Spiritual Assessment Spiritual beliefs: (Please include comment if needed) 
   [] Identifies with a jr tradition:     
   [] Supported by a jr community:        
   [] Claims no spiritual orientation:       
   [] Seeking spiritual identity:            
   [] Adheres to an individual form of spirituality:       
   [x] Not able to assess:                   
 
    
Identified resources for coping:  
   [] Prayer                           
   [] Music                  [] Guided Imagery 
   [] Family/friends                 [] Pet visits [] Devotional reading                         [] Unknown 
   [] Other:                                         
 
 
Interventions offered during this visit: (See comments for more details) Plan of Care: 
 
 [] Support spiritual and/or cultural needs  
 [] Support AMD and/or advance care planning process    
 [] Support grieving process 
 [] Coordinate Rites and/or Rituals  
 [] Coordination with community clergy [] No spiritual needs identified at this time 
 [] Detailed Plan of Care below (See Comments)  [] Make referral to Music Therapy 
[] Make referral to Pet Therapy    
[] Make referral to Addiction services 
[] Make referral to [facility] 
[] Make referral to Spiritual Care Partner 
[] No future visits requested       
[] Follow up upon further referrals Comments: Chart documented that Ms Glenda Rich in [address] [place] [address] was on [facility] r/o;;  did not enter patient's room for Palliative Care spiritual assessment. Spoke with patient's nurse who gave update on patient's status and POC. : Rev. Shaggy Young. Radha Corea; [facility], to contact [phone] [address]lamas call: [phone]

## 2020-12-28 NOTE — PROGRESS NOTES
Physician Progress Note Jeff Wyatt 
CSN #:                  [id number] :                       1980 ADMIT DATE:       2020 1:51 PM 
[address] [place] DATE:        2020 7:23 PM 
RESPONDING 
PROVIDER #:        Louise Alexander MD 
 
 
 
 
QUERY TEXT: 
 
Dr Jelani Sue Patient admitted with SOB, AHRF . Noted initial testing negative for COVID-19 on 20. If possible, please document in the progress notes and discharge summary if COVID-19 was: The medical record reflects the following: 
Risk Factors: presents with SOB/work of breathing, fever Clinical Indicators: dc from [facility] on  for COVID and pneumonia, 103.2-103.4 ax temp; [id number], COVID test negative Treatment: intubation, transfer to [facility] Note: Possible (or similar verbiage reflecting an uncertain diagnosis) [id number] infection may not be reported as confirmed. However, a diagnosis may be considered as confirmed based on the clinical impression of the provider, regardless of the presence of a confirmatory test or availability of a test result. At present, a negative test result does not exclude the diagnosis of COVID-19 infection. Options provided: 
-- COVID-19 ruled out after study -- COVID-19 confirmed after study with initial testing suspected to be false negative 
-- Other - I will add my own diagnosis -- Disagree - Not applicable / Not valid -- Disagree - Clinically unable to determine / Unknown 
-- Refer to Clinical Documentation Reviewer PROVIDER RESPONSE TEXT: 
 
COVID-19 was ruled out after study. Query created by: Leslye Mera on 2020 11:59 AM 
 
 
QUERY TEXT: 
 
Pt admitted with Sepsis, AHRF. Pt noted on :  Suspect HCAP/aspiration pneumonia//?  Reinfection of COVID-19. If possible, please document in the progress notes and discharge summary if you are evaluating and/or treating any of the following: The medical record reflects the following: Risk Factors: presents with SOB/work of breathing, fever Clinical Indicators: noted dysphagia, dc from [address] [place] [address] on 12/6 for COVID and pneumonia, 103.2-103.4 ax temp; [id number], COVID test negative Treatment: intubation, transfer to [facility], vancomycin, Zithromax, cefepime. Options provided: 
-- Aspiration pneumonia 
-- COVID19  pneumonia 
-- Gram negative pneumonia 
-- Other - I will add my own diagnosis -- Disagree - Not applicable / Not valid -- Disagree - Clinically unable to determine / Unknown 
-- Refer to Clinical Documentation Reviewer PROVIDER RESPONSE TEXT: 
 
This patient has COVID19 pneumonia. Query created by: Christa Escalera on 12/28/2020 12:11 PM 
 
 
QUERY TEXT: 
 
Pt admitted with fever, AHRF, tachycardia and noted to have COVID 19 pending/negative results. If possible, please document in progress notes and discharge summary if you are evaluating and/or treating: The medical record reflects the following: The medical record reflects the following: 
Risk Factors: presents with SOB/work of breathing, fever Clinical Indicators: noted dysphagia, dc from [address] [place] [address] on 12/6 for COVID and pneumonia, 103.2-103.4 ax temp; [id number], COVID test negative Treatment: intubation, transfer to [facility], vancomycin, Zithromax, cefepime Options provided: 
-- Sepsis due to COVID-19, pneumonia, UTI  : This patient has sepsis due to COVID-19, pneumonia, UTI. -- Sepsis due to pneumonia/UTI 
-- COVID-19 without sepsis -- Sepsis ruled out 
-- Other - I will add my own diagnosis -- Disagree - Not applicable / Not valid -- Disagree - Clinically unable to determine / Unknown 
-- Refer to Clinical Documentation Reviewer PROVIDER RESPONSE TEXT: 
 
Sepsis due to COVID-19, pneumonia, UTI : This patient has sepsis due to COVID-19, pneumonia, UTI. Query created by:  Christa Escalera on 12/28/2020 12:18 PM 
 
 
Electronically signed by:  Urbano Chua MD 12/28/2020 12:28 PM

## 2020-12-28 NOTE — PROGRESS NOTES
Care Management: 
  
Care Management: 
 
ALESSANDRA: Goal to return home with her mom. Her mom is her caregiver. Follow up with PCP Dr Carina RAYMUNDO. According to chart she has  follow-up 1/22/2020 @ 0815. 
  
Admitted with resp failure,  COVID. She is now vented and on pressor. Condition is critical. She is now a DNR. Palliative Care consulted. She has had multiple admissions over last few months with COVID.  
  
She lives with her mom and is non verbal at baseline. She has downs syndrome. Her mom is her caregiver.  
  
She is followed by [facility]. Manasa Simms at [address] [place] [address] has been patient's CM for the last 15 years. 
  
She is currently open to [facility], PT and OT and will need resume orders at discharge.  
  
Tang Dahl RN [facility] [id number]

## 2020-12-28 NOTE — PROGRESS NOTES
End of Shift Note Bedside shift change report given to Karma Quintero RN (oncoming nurse) by Bimal Infante RN (offgoing nurse). Report included the following information SBAR, Kardex, Intake/Output, MAR, Recent Results, Cardiac Rhythm SR and Quality Measures Shift worked:  7a-7p Shift summary and any significant changes:  
  Levophed gtt decreased to 8 mcg. Versed decreased to 1, not stopped due to coughing. Concerns for physician to address:  Palliative consult Zone phone for oncoming shift:   N/A Activity: 
Activity Level: Bed Rest 
Number times ambulated in hallways past shift: 0 Number of times OOB to chair past shift: 0 Cardiac:  
Cardiac Monitoring: Yes     
Cardiac Rhythm: Normal sinus rhythm Access:  
Current line(s): central line Genitourinary:  
Urinary status: incontinent and mejia Respiratory:  
O2 Device: Ventilator Chronic home O2 use?: NO Incentive spirometer at bedside: N/A 
  
GI: 
Last Bowel Movement Date: (unknown) Current diet:  DIET TUBE FEEDING Passing flatus: NO Tolerating current diet: YES Pain Management:  
Patient states pain is manageable on current regimen: N/A Skin: 
Jai Score: 11 Interventions: speciality bed, float heels and internal/external urinary devices Patient Safety: 
Fall Score: Total Score: 2 Interventions: bed/chair alarm Length of Stay: 
Expected LOS: 12d 7h Actual LOS: 5 Bimal Infante RN

## 2020-12-28 NOTE — PROGRESS NOTES
Pharmacy Automatic Renal Dosing Protocol - Antimicrobials Indication for Antimicrobials: HCAP in the setting of covid-19 Current Regimen of Each Antimicrobial:  
Vancomycin - dosed based on random levels; started ; day 6 Meropenem 500 mg IV q12h; started ; day 3 Previous Antimicrobial Therapy:  
Azithromycin 500 mg IV q24h; [id number]/23; day 5 Cefepime 2g IV q24h ; started - (Recent hospitalization with 7 day course of vanc/cefepime) Goal Level: VANCOMYCIN TROUGH GOAL RANGE Vancomycin Trough: 15 - 20 mcg/mL  (AUC: 400 - 600 mg/hr/Liter/day) Date Dose & Interval Measured (mcg/mL) Extrapolated (mcg/mL)  
 N/a 35.1   
 holding 24.9 Significant Cultures:  
 blood NGTD, pending  urine NG, final 
 sputum normal constance, pending Paralysis, amputations, malnutrition: none noted Labs: 
Recent Labs  
  20 
0451 20 
2116 20 
0157 20 
1304 CREA 4.18* 4.16*  --  3.72* BUN 73* 70*  --  54* WBC 30.7*  --  31.9* 33.4* Temp (24hrs), Av.5 °F (36.4 °C), Min:96 °F (35.6 °C), Max:98.6 °F (37 °C) Creatinine Clearance (mL/min) or Dialysis: 15.1 mL/min (IBW) Impression/Plan:  
Scr still trending up Vancomycin level still > 20, recheck again tomorrow, dose still on hold Continue current dose of meropenem; appropriate for indication/renal function Antimicrobial stop date 7 days vanco (per consult), tbd for merrem, completed 5 d azithromycin Pharmacy will follow daily and adjust medications as appropriate for renal function and/or serum levels. Thank you, Gianni Saini, PHARMD

## 2020-12-28 NOTE — PROGRESS NOTES
[facility] 
 
ICU TEAM Progress Note Name: Getachew Ybarra : 1980 MRN: [id number] Date: 2020 I Subjective:  
Progress Note: 2020 Reason for ICU Admission: Respiratory failure due to COVID-19 infection and possible superimposed bacterial pneumonia Interval history: 
47 F with severe Down's syndrome and autism. She was admitted twice in Nov (-, -) with COVID PNA. She again presented to [facility] ED  with fever and resp distress. It was felt that she required intubation for transport to [facility]. She was transported in prone position and admitted by [facility] service to ICU. At the time of transport, she was requiring high dose norepinephrine and vasopressin. Overnight Events:  
No acute event, back on low-dose norepinephrine, remains on fentanyl and Versed, Active Problem List:  
 
Problem List  Never Reviewed Codes Class HFrEF (heart failure with reduced ejection fraction) (HCC) (Chronic) ICD-10-CM: I50.20 ICD-9-CM: 428.20 Down syndrome (Chronic) ICD-10-CM: Q90.9 ICD-9-CM: 758.0 Acquired hypothyroidism (Chronic) ICD-10-CM: E03.9 ICD-9-CM: 244.9 Res[facility] failure ([facility] ICD-10-CM: J96.90 ICD-9-CM: 518.81   
   
 * (Principal) COVID-19 ICD-10-CM: U07.1 ICD-9-CM: 079.89 Sepsis ([facility] 75.) ICD-10-CM: A41.9 ICD-9-CM: 038.9, 995.91 Pneumonia due to COVID-19 virus ICD-10-CM: U07.1, J12.89 ICD-9-CM: 480.8 Past Medical History:  
 
 has a past medical history of Endocrine disease, Hypothyroid (2018), and Ill-defined condition. Past Surgical History:  
 
 has no past surgical history on file. Home Medications:  
 
Prior to Admission medications Medication Sig Start Date End Date Taking? Authorizing Provider ivabradine (CORLANOR) 5 mg tablet Take 1 Tab by mouth two (2) times daily (with meals). Indications: chronic heart failure, inappropriate sinus tachycardia 20   Juan Spencer MD  
metoprolol succinate (TOPROL-XL) 25 mg XL tablet Take 1 Tab by mouth daily. 20   Juan Spencer MD  
risperiDONE (RisperDAL) 1 mg tablet Take 1 mg by mouth nightly. Provider, Aicha  
solifenacin (VESICARE) 10 mg tablet TAKE 1 TABLET BY MOUTH EVERY DAY 20   OtherKilo MD  
medroxyPROGESTERone (DEPO-PROVERA) 150 mg/mL injection 150 mg, IM, once, To be administered in clinic by nurse. See order comments for schedule., # 1 vial, 4 Refills, Pharmacy: [facility]/pharmacy #[id number] 19   OtherKilo MD  
albuterol (PROVENTIL HFA, VENTOLIN HFA, PROAIR HFA) 90 mcg/actuation inhaler Take 2 Puffs by inhalation every four (4) hours as needed for Wheezing. 20   Grecia Pedro MD  
zinc sulfate 220 mg tablet Take 1 Tab by mouth daily. 20   Grecia Pedro MD  
cholecalciferol (VITAMIN D3) 1,000 unit tablet Take 1,000 Units by mouth daily. Klio Cortez MD  
levothyroxine (SYNTHROID) 50 mcg tablet Take 50 mcg by mouth Daily (before breakfast). Kilo Cortez MD  
 
 
Allergies/Social/Family History: No Known Allergies Social History Tobacco Use  Smoking status: Never Smoker  Smokeless tobacco: Never Used Substance Use Topics  Alcohol use: No  
  
No family history on file. Review of Systems:  
 
Not able to obtain due to her medical condition Objective:  
Vital Signs: 
Visit Vitals BP (!) 101/51 (BP 1 Location: Left arm, BP Patient Position: At rest) Pulse (!) 103 Temp 97.5 °F (36.4 °C) Resp 9 Ht 5' 3\" (1.6 m) Wt 84 kg (185 lb 3 oz) SpO2 100% BMI 32.80 kg/m² O2 Device: Ventilator Temp (24hrs), Av.5 °F (36.4 °C), Min:96 °F (35.6 °C), Max:98.6 °F (37 °C) Intake/Output:  
 
Intake/Output Summary (Last 24 hours) at 2020 4938 Last data filed at 12/28/2020 1200 Gross per 24 hour Intake 1591.56 ml Output 555 ml Net 1036.56 ml Physical Exam: 
Constitutional: She appears well-developed and well-nourished. No distress. HENT:  
Head: Normocephalic. OGT and ETT in place Eyes: Right eye exhibits no discharge. Left eye exhibits no discharge. Neck: Neck supple. R IJ CVL in place Cardiovascular: Normal rate, regular rhythm, normal heart sounds and intact distal pulses. Exam reveals no gallop and no friction rub. No murmur heard. Pulmonary/Chest: No respiratory distress. She has no wheezes. Abdominal: Soft. Bowel sounds are normal. She exhibits no distension. There is no abdominal tenderness. There is no rebound. Genitourinary:    Genitourinary Comments: Dutta in place Neurological: Sedated Skin: Skin is warm and dry. No rash noted. She is not diaphoretic. No erythema. No pallor. Psychiatric:  
Unable to assess LABS AND  DATA: Personally reviewed Recent Labs  
  12/28/20 0451 12/27/20 
0157 WBC 30.7* 31.9* HGB 8.9* 8.4* HCT 26.6* 25.1*  
 225 Recent Labs  
  12/28/20 
0451 12/27/20 
2116 12/27/20 
0157  142  --   
K 4.6 4.9  --   
* 112*  --   
CO2 26 26  --   
BUN 73* 70*  --   
CREA 4.18* 4.16*  --   
* 126*  --   
CA 8.6 8.4*  --   
MG  --  3.2* 3.1*  
PHOS  --  6.1* 5.8* Recent Labs  
  12/28/20 
0451 12/26/20 
0406 AP 74 61  
TP 6.0* 6.2* ALB 1.7* 1.7*  
GLOB 4.3* 4.5* Recent Labs  
  12/28/20 
0451 12/27/20 
1025 APTT 40.0* 56.7* Recent Labs  
  12/27/20 
1252 PHI 7.32* PCO2I 47.7* PO2I 158* No results for input(s): CPK, CKMB, TROIQ, BNPP in the last 72 hours. Hemodynamics:  
PAP:   CO:    
Wedge:   CI:    
CVP:    SVR:    
  PVR:    
 
Ventilator Settings: 
Mode Rate Tidal Volume Pressure FiO2 PEEP Assist control, Pressure control        40 % 6 cm H20 Peak airway pressure: 21 cm H2O Minute ventilation: 7.71 l/min MEDS: Reviewed Chest X-Ray: CXR Results  (Last 48 hours) None Assessment and Plan:  
Acute hypoxic respiratory failure: Due to COVID-19 infection and possible superimposed bacterial pneumonia. Intubated on December 23. Change vent settings to volume control tidal volume 350 rate of 18 and PEEP of 5 with FiO2 40%. With that patient is getting minute ventilation of more than 6 L. Synchronous with the vent. Will repeat ABG in the morning. Continue current antibiotic regimen.,  Follow-up on culture and sensitivity Change heparin infusion to subcu prophylactic dose Septic shock: As above, wean norepinephrine as tolerated Acute kidney injury: Not a candidate for hemodialysis per discussion with her mother. Continue with oral hydration, avoid nephrotoxic agents. Hyperglycemia: Insulin sliding scale Nutrition support DVT and GI prophylaxis, ventilator bundle Patient is DNR We will consult palliative care to address changing goals of care to comfort measure given the patient poor baseline of health. DISPOSITION Stay in ICU CRITICAL CARE CONSULTANT NOTE I had a face to face encounter with the patient, reviewed and interpreted patient data including clinical events, labs, images, vital signs, I/O's, and examined patient. I have discussed the case and the plan and management of the patient's care with the consulting services, the bedside nurses and the respiratory therapist.   
 
NOTE OF PERSONAL INVOLVEMENT IN CARE This patient has a high probability of imminent, clinically significant deterioration, which requires the highest level of preparedness to intervene urgently. I participated in the decision-making and personally managed or directed the management of the following life and organ supporting interventions that required my frequent assessment to treat or prevent imminent deterioration. I personally spent 40 minutes of critical care time. This is time spent at this critically ill patient's bedside actively involved in patient care as well as the coordination of care and discussions with the patient's family. This does not include any procedural time which has been billed separately. Christine Mckinnon M.D. Staff Intensivist/Pulmonologist 
[facility] 
12/28/2020

## 2020-12-28 NOTE — PROGRESS NOTES
1900 - Bedside shift change report given to Olive Acuña RN (oncoming nurse) by Toshia Nunez RN (offgoing nurse). Report included the following information SBAR, Kardex, Intake/Output, MAR, Recent Results and Cardiac Rhythm NSR.  
 
2000 - Shift assessment complete, see flowsheets for details. Pt responds to painful stimuli, no extremity withdrawing; will grimace when mouth care completed and suctioned. NSR on monitor. Lung sounds diminished. ABD soft and intact, BS hypoactive. Mejia in place draining yellow hazy/cloudy urine. Pulses palpable in all extremities, weaker on the L side. Mouth care and mejia care completed. 2332 - Pt having consistent low BPs per A-line; MAPs 50-55. Order received for Levophed. Started at 0.5 mcg. See flowsheets for details. 2357 - Levo 10 mcg/min. MAP > 65 
 
0000 - Reassessment complete, no changes documented. 0400 - Reassessment complete, no changes documented. Labs drawn and sent. End of Shift Note Bedside shift change report given to Hayley Comer RN (oncoming nurse) by Jann Diamond (offgoing nurse). Report included the following information SBAR, Kardex, Intake/Output, MAR, Recent Results and Cardiac Rhythm NSR Shift worked:  [id number] Shift summary and any significant changes:  
  Levophed restarted at 10 mcg, goal of MAP > 65 Concerns for physician to address:  N/A Zone phone for oncoming shift:   N/A Activity: 
Activity Level: Bed Rest 
Number times ambulated in hallways past shift: 0 Number of times OOB to chair past shift: 0 Cardiac:  
Cardiac Monitoring: Yes     
Cardiac Rhythm: Normal sinus rhythm Access:  
Current line(s): central line Genitourinary:  
Urinary status: voiding and mejia Respiratory:  
O2 Device: Ventilator, Endotracheal tube Chronic home O2 use?: N/A Incentive spirometer at bedside: NO 
  
GI: 
Last Bowel Movement Date: (unknown) Current diet:  DIET TUBE FEEDING Passing flatus: YES Tolerating current diet: YES 
  
 Pain Management:  
Patient states pain is manageable on current regimen: N/A Skin: 
Jai Score: 11 Interventions: float heels and internal/external urinary devices Patient Safety: 
Fall Score: Total Score: 2 Interventions: bed/chair alarm Length of Stay: 
Expected LOS: 2d 4h Actual LOS: 5 Walker Gabriela

## 2020-12-28 NOTE — PROGRESS NOTES
Pharmacy Automatic Renal Dosing Protocol - Antimicrobials Indication for Antimicrobials: HCAP in the setting of covid-19 Current Regimen of Each Antimicrobial:  
Vancomycin - dosed based on random levels; started ; day 6 Meropenem 500 mg IV q12h; started ; day 3 Previous Antimicrobial Therapy:  
Azithromycin 500 mg IV q24h; [id number]/23; day 5 Cefepime 2g IV q24h ; started - (Recent hospitalization with 7 day course of vanc/cefepime) Goal Level: VANCOMYCIN TROUGH GOAL RANGE Vancomycin Trough: 15 - 20 mcg/mL  (AUC: 400 - 600 mg/hr/Liter/day) Date Dose & Interval Measured (mcg/mL) Extrapolated (mcg/mL)  
 N/a 35.1   
 holding 24.9 Significant Cultures:  
 blood NGTD, pending  urine NG, final 
 sputum normal constance, pending Paralysis, amputations, malnutrition: none noted Labs: 
Recent Labs  
  20 
0451 20 
2116 20 
0157 20 
1304 CREA 4.18* 4.16*  --  3.72* BUN 73* 70*  --  54* WBC 30.7*  --  31.9* 33.4* Temp (24hrs), Av.5 °F (36.4 °C), Min:96 °F (35.6 °C), Max:98.6 °F (37 °C) Creatinine Clearance (mL/min) or Dialysis: 15.1 mL/min (IBW) Impression/Plan:  
Scr still trending up Vancomycin level still > 20, recheck again tomorrow, dose still on hold Continue current dose of meropenem; appropriate for indication/renal function Antimicrobial stop date 7 days vanco (per consult), tbd for merrem, completed 5 d azithromycin Pharmacy will follow daily and adjust medications as appropriate for renal function and/or serum levels. Thank you, Svetlana Smyth, PHARMD

## 2020-12-28 NOTE — PROGRESS NOTES
[address] care of pt after report from Huey Naqvi, [address] [facility].

## 2020-12-29 PROBLEM — N17.0 ACUTE RENAL FAILURE WITH TUBULAR NECROSIS (HCC): Status: ACTIVE | Noted: 2020-01-01

## 2020-12-29 NOTE — PROGRESS NOTES
End of Shift Note Bedside shift change report given to Kadi Castillo (oncoming nurse) by Shayla Arias (offgoing nurse). Report included the following information SBAR, Kardex and [facility] Shift worked:  [id number] Shift summary and any significant changes:  
  0200: Assumed patient care. [facility] RN[facility] complete. Patient eyes open to pain, as well as withdraws from pain. Tube feed, Levophed, Versed and Fentanyl running.  
0300: AM labs sent. Decreased Levophed to 9mcg to maintain MAP greater than 65. Concerns for physician to address:   
  
Zone phone for oncoming shift:   [phone] Activity: 
Activity Level: Bed Rest 
Number times ambulated in hallways past shift: 0 Number of times OOB to chair past shift: 0 Cardiac:  
Cardiac Monitoring: Yes     
Cardiac Rhythm: Sinus tachycardia Access:  
Current line(s): central line Genitourinary:  
Urinary status: mejia Respiratory:  
O2 Device: Endotracheal tube Chronic home O2 use?: N/A Incentive spirometer at bedside: N/A 
  
GI: 
Last Bowel Movement Date: (PTA) Current diet:  DIET TUBE FEEDING Passing flatus: YES Tolerating current diet: YES Pain Management:  
Patient states pain is manageable on current regimen: N/A Skin: 
Jai Score: 14 Interventions: turn team 
 
Patient Safety: 
Fall Score: Total Score: 3 Interventions: bed/chair alarm High Fall Risk: Yes Length of Stay: 
Expected LOS: 12d 7h Actual LOS: 6 Shayla Arias

## 2020-12-29 NOTE — PROGRESS NOTES
Pharmacy Automatic Renal Dosing Protocol - Antimicrobials Indication for Antimicrobials: HCAP in the setting of covid-19 Current Regimen of Each Antimicrobial:  
Meropenem 500 mg IV q12h; started ; day 4 Previous Antimicrobial Therapy:  
Azithromycin 500 mg IV q24h; [id number]/23; day 5 Cefepime 2g IV q24h ; started - Vancomycin - dosed based on random levels; started - (Recent hospitalization with 7 day course of vanc/cefepime) Goal Level: VANCOMYCIN TROUGH GOAL RANGE Vancomycin Trough: 15 - 20 mcg/mL  (AUC: 400 - 600 mg/hr/Liter/day) Date Dose & Interval Measured (mcg/mL) Extrapolated (mcg/mL)  
 N/a 35.1   
 holding 24.9 Significant Cultures:  
 blood NG, final 
 urine NG, final 
 sputum normal constance, final 
 
Paralysis, amputations, malnutrition: none noted Labs: 
Recent Labs  
  20 
0310 20 
0451 20 
2116 20 
0157 CREA 4.09* 4.18* 4.16*  --   
BUN 83* 73* 70*  --   
WBC 27.7* 30.7*  --  31.9* Temp (24hrs), Av.3 °F (36.8 °C), Min:98.1 °F (36.7 °C), Max:98.7 °F (37.1 °C) Procalcitonin: (but renal failure) 0.55 --> 165 --> 408 Creatinine Clearance (mL/min) or Dialysis: 15.4 mL/min (IBW) Impression/Plan:  
Scr leveled off Vancomycin d/c (although will likely be therapeutic for days) Continue current dose of meropenem; appropriate for indication/renal function Antimicrobial stop date 7pending Pharmacy will follow daily and adjust medications as appropriate for renal function and/or serum levels. Thank you, RANJAN Wisdom

## 2020-12-29 NOTE — PROGRESS NOTES
Palliative Medicine Consult Fest[place]: [id number] ([phone]) Patient Name: Errol Garcia YOB: 1980 Date of Initial Consult: 12/28/2020 Reason for Consult: care decisions Requesting Provider: Saba Beltran MD  
Primary Care Physician: Kilo Cortez MD 
 
 SUMMARY:  
Errol Garcia is a 47 y.o. with a past history of  Down's syndrome, hypothyroidism, obesity (BMI 30), CKD3, CHF (ECHO 2018 mod diffuse hypokinesis, EF 30%)  large hiatal hernia,, who was admitted on 12/23/2020 from home with a diagnosis of COVID PNA. Current medical issues leading to Palliative Medicine involvement include: ongoing decline from prolonged COVID infection (recent admission 11/8 to 11/16/20 for covid pna and again 11/19 to 12/6/20 for sepsis due to covid) during which she was seen by Palliative Medicine in Nov 2020. 
 
12/28: remains intubated, 40%, PEEP 6. Levophed 8 mcg/min. Worsening renal failure. 12/29: remains intubated, 35%, PEEP 5. Levophed 2 mcg/min. Cr stable at 4.09. Psychosocial: lives with and is cared for by mother Martinez Gay [phone] PALLIATIVE DIAGNOSES:  
1. Acute respiratory failure due to COVID-19 pneumonia 2. Morbid obesity BMI 30 
3. Large hiatal hernia 4. Dysphagia, aspiration risk, on modified diet 5. Debility 6. High risk for dying from COVID due to underlying CHF, obesity 7. Hypothermic (12/27) 8. Renal failure (Cr 4.18>4.09) 9. Care decisions PLAN:  
1. Prior to visit, I completed an extensive review of patient's medical records, including medical documentation, vital signs, MARs, and results of various labs and other diagnostics. I also spoke with patient's nurse, Inge Swift and intensivist Dr. Kathleen Florez. 2. Met with mother Patricia Zazueta and brother Donna Young: provided time for them to see pt through the glass door, then met to discuss pt's current medical condition, explaining that the vent support is very slightly less, but more concerning is the renal failure; if it worsens or does not get better she will most likely die from the renal failure. Discussed patients with covid that are on the vent and require dialysis do not survive, and it is not recommended that we put her through another procedure. If she does not recover recommendation is to do compassionate withdrawal from artificial support, described what that would look like. Answered all family's questions. They agree that they would not want to put her through dialysis. Will see how she does over the next few days. 3. Initial consult note routed to primary continuity provider and/or primary health care team members 4. Communicated plan of care with: Alber Olivas 192 Team 
 
 GOALS OF CARE / TREATMENT PREFERENCES:  
 
GOALS OF CARE: 
Patient/Health Care Proxy Stated Goals: Prolong life TREATMENT PREFERENCES:  
Code Status: DNR Advance Care Planning: 
[x] [facility] Interdisciplinary Team has updated the ACP Navigator with [address] and Patient Capacity Primary Decision Maker (Active): [employer] - Rebekah Oneill - [phone] Secondary Decision Maker: Randolph Rivera - [phone] Advance Care Planning 12/28/2020 Patient's Healthcare Decision Maker is: Legal Next of Kin Confirm Advance Directive None Patient Would Like to Complete Advance Directive Unable Medical Interventions: Full interventions Other: As far as possible, the palliative care team has discussed with patient / health care proxy about goals of care / treatment preferences for patient. HISTORY:  
 
History obtained from: chart, Patricia Zazueta CHIEF COMPLAINT: SOB, fever HPI/SUBJECTIVE:   
 The patient is:  
[] Verbal and participatory 
[x] Non-participatory due to: intubation 
 
12/23: presented to [facility] ED for fever, c/o not feeling well and cough, with associated loss of appetite and very productive cough; this is her 3rd admission for  COVID PNA.  While in the ED pt became tachypneic with sats dropping to 92% and placed on 100% NRB with sats 96-96%; her respiratory status continued to decline, requiring bipap, then intubation.  hospitalist was not comfortable keeping pt at [facility] and had her transferred to [facility]. 
 
Clinical Pain Assessment (nonverbal scale for severity on nonverbal patients):  
Clinical Pain Assessment 
Severity: 0    
Activity (Movement): Lying quietly, normal position 
 
Duration: for how long has pt been experiencing pain (e.g., 2 days, 1 month, years) 
Frequency: how often pain is an issue (e.g., several times per day, once every few days, constant) 
 
 FUNCTIONAL ASSESSMENT:  
 
Palliative Performance Scale (PPS): 
PPS: 10 
 
 
 PSYCHOSOCIAL/SPIRITUAL SCREENING:  
 
Palliative IDT has assessed this patient for cultural preferences / practices and a referral made as appropriate to needs (Cultural Services, Patient Advocacy, Ethics, etc.) 
 
Any spiritual / Orthodoxy concerns: 
[] Yes /  [x] No 
 
Caregiver Burnout: 
[] Yes /  [x] No /  [] No Caregiver Present   
 
Anticipatory grief assessment:  
[x] Normal  / [] Maladaptive    
 
ESAS Anxiety: Anxiety: 0 
 
ESAS Depression:   unable to assess due to pt factors 
 
 
 REVIEW OF SYSTEMS:  
 
Positive and pertinent negative findings in ROS are noted above in HPI. 
The following systems were [x] reviewed / [] unable to be reviewed as noted in HPI 
Other findings are noted below. 
Systems: constitutional, ears/nose/mouth/throat, respiratory, gastrointestinal, genitourinary, musculoskeletal, integumentary, neurologic, psychiatric, endocrine. Positive findings noted below. 
Modified ESAS Completed by: provider  
     
  Pain: 0  
 Anxiety: 0 Dyspnea: 0 PHYSICAL EXAM:  
 
From RN flowsheet: 
Wt Readings from Last 3 Encounters:  
12/25/20 185 lb 3 oz (84 kg) 12/23/20 191 lb (86.6 kg) 12/04/20 191 lb (86.6 kg) Blood pressure (!) 101/53, pulse (!) 111, temperature 98 °F (36.7 °C), resp. rate 18, height 5' 3\" (1.6 m), weight 185 lb 3 oz (84 kg), SpO2 99 %. Pain Scale 1: Adult Nonverbal Pain Scale Pain Intensity 1: 3 Last bowel movement, if known:  
 
Constitutional: intubated In order to limit the exposure risk from COVID 19 and to preserve the hospital's limited supply of PPEs, seen through ICU window. Intubated. On levo 8. I spoke with her ICU nurse. HISTORY:  
 
Principal Problem: 
  COVID-19 (12/23/2020) Active Problems: 
  Pneumonia due to COVID-19 virus (11/6/2020) Respiratory failure ([facility] (12/23/2020) HFrEF (heart failure with reduced ejection fraction) ([facility] (12/24/2020) Down syndrome (12/24/2020) Acquired hypothyroidism (12/24/2020) Goals of care, counseling/discussion (12/28/2020) Past Medical History:  
Diagnosis Date  Endocrine disease  Hypothyroid 03/11/2018  Ill-defined condition Down's Syndrome No past surgical history on file. No family history on file. History reviewed, no pertinent family history. Social History Tobacco Use  Smoking status: Never Smoker  Smokeless tobacco: Never Used Substance Use Topics  Alcohol use: No  
 
No Known Allergies Current Facility-Administered Medications Medication Dose Route Frequency  polyethylene glycol (MIRALAX) packet 17 g  17 g Per G Tube DAILY  [START ON 12/30/2020] levothyroxine (SYNTHROID) tablet 50 mcg  50 mcg Per NG tube DAILY  [START ON 12/30/2020] levothyroxine tube feed reminder note  1 Each Other BID  heparin (porcine) injection 5,000 Units  5,000 Units SubCUTAneous Q8H  
  NOREPINephrine (LEVOPHED) 8 mg in 5% dextrose 250mL (32 mcg/mL) infusion  0.5-30 mcg/min IntraVENous TITRATE  meropenem (MERREM) 500 mg in 0.9% sodium chloride (MBP/ADV) 50 mL MBP  500 mg IntraVENous Q12H  
 fentaNYL (PF) 1,500 mcg/30 mL (50 mcg/mL) infusion  0-200 mcg/hr IntraVENous TITRATE  midazolam (VERSED) 100 mg in 0.9% sodium chloride 100 mL infusion  0-10 mg/hr IntraVENous TITRATE  balsam peru-castor oiL (VENELEX) ointment   Topical BID  alcohol 62% (NOZIN) nasal  1 Ampule  1 Ampule Topical Q12H  
 dextrose (D50W) injection syrg 12.5-25 g  25-50 mL IntraVENous PRN  
 insulin lispro (HUMALOG) injection   SubCUTAneous Q6H  
 acetaminophen (TYLENOL) tablet 650 mg  650 mg Per G Tube Q6H PRN Or  
 acetaminophen (TYLENOL) suppository 650 mg  650 mg Rectal Q6H PRN  chlorhexidine (ORAL CARE KIT) 0.12 % mouthwash 15 mL  15 mL Oral Q12H  
 famotidine (PF) (PEPCID) 20 mg in 0.9% sodium chloride 10 mL injection  20 mg IntraVENous DAILY  heparin (porcine) pf 300 Units  300 Units InterCATHeter PRN  
 sodium chloride (NS) flush 5-40 mL  5-40 mL IntraVENous Q8H  
 sodium chloride (NS) flush 5-40 mL  5-40 mL IntraVENous PRN  
 ondansetron (ZOFRAN) injection 4 mg  4 mg IntraVENous Q6H PRN  
 
 
 
 LAB AND IMAGING FINDINGS:  
 
Lab Results Component Value Date/Time WBC 27.7 (H) 12/29/2020 03:10 AM  
 HGB 8.7 (L) 12/29/2020 03:10 AM  
 PLATELET 722 33/17/0451 03:10 AM  
 
Lab Results Component Value Date/Time Sodium 142 12/29/2020 03:10 AM  
 Potassium 4.6 12/29/2020 03:10 AM  
 Chloride 113 (H) 12/29/2020 03:10 AM  
 CO2 26 12/29/2020 03:10 AM  
 BUN 83 (H) 12/29/2020 03:10 AM  
 Creatinine 4.09 (H) 12/29/2020 03:10 AM  
 Calcium 9.4 12/29/2020 03:10 AM  
 Magnesium 3.2 (H) 12/27/2020 09:16 PM  
 Phosphorus 6.1 (H) 12/27/2020 09:16 PM  
  
Lab Results Component Value Date/Time Alk.  phosphatase 78 12/29/2020 03:10 AM  
 Protein, total 6.1 (L) 12/29/2020 03:10 AM  
 Albumin 1.7 (L) 12/29/2020 03:10 AM  
 Globulin 4.4 (H) 12/29/2020 03:10 AM  
 
Lab Results Component Value Date/Time INR 1.1 11/20/2020 01:57 AM  
 Prothrombin time 11.8 (H) 11/20/2020 01:57 AM  
 aPTT 40.0 (H) 12/28/2020 04:51 AM  
  
Lab Results Component Value Date/Time Iron 65 12/04/2020 03:11 AM  
 TIBC 279 12/04/2020 03:11 AM  
 Iron % saturation 23 12/04/2020 03:11 AM  
 Ferritin 555 (H) 12/28/2020 04:51 AM  
  
Lab Results Component Value Date/Time pH 7.33 (L) 12/24/2020 05:50 AM  
 PCO2 38 12/24/2020 05:50 AM  
 PO2 80 12/24/2020 05:50 AM  
 
No components found for: D[place]ri Point Lab Results Component Value Date/Time  (H) 11/11/2020 01:00 AM  
  
 
 
   
 
Total time:  
Counseling / coordination time, spent as noted above:  
> 50% counseling / coordination?:  
 
Prolonged service was provided for  []30 min   []75 min in face to face time in the presence of the patient, spent as noted above. Time Start:  
Time End:  
Note: this can only be billed with 72972 (initial) or 50982 (follow up). If multiple start / stop times, list each separately.

## 2020-12-29 NOTE — PROGRESS NOTES
12/29/20 8887 ABCDEF Bundle SBT Safety Screen Passed No  
SBT Screen Reason for Failure Vasopressor use

## 2020-12-29 NOTE — PROGRESS NOTES
[facility] 
 
ICU TEAM Progress Note Name: Kirill Graham : 1980 MRN: [id number] Date: 2020 I Subjective:  
Progress Note: 2020 Reason for ICU Admission: Respiratory failure due to COVID-19 infection and possible superimposed bacterial pneumonia Interval history: 
47 F with severe Down's syndrome and autism. She was admitted twice in Nov (-, -) with COVID PNA. She again presented to [facility] ED  with fever and resp distress. It was felt that she required intubation for transport to [facility]. She was transported in prone position and admitted by [facility] service to ICU. At the time of transport, she was requiring high dose norepinephrine and vasopressin. Overnight Events:  
No acute event Active Problem List:  
 
Problem List  Never Reviewed Codes Class Goals of care, counseling/discussion ICD-10-CM: Z71.89 ICD-9-CM: V65.49 HFrEF (heart failure with reduced ejection fraction) (HCC) (Chronic) ICD-10-CM: I50.20 ICD-9-CM: 428.20 Down syndrome (Chronic) ICD-10-CM: Q90.9 ICD-9-CM: 758.0 Acquired hypothyroidism (Chronic) ICD-10-CM: E03.9 ICD-9-CM: 244.9 Res[facility] failure ([facility] ICD-10-CM: J96.90 ICD-9-CM: 518.81   
   
 * (Principal) COVID-19 ICD-10-CM: U07.1 ICD-9-CM: 079.89 Sepsis ([facility] ICD-10-CM: A41.9 ICD-9-CM: 038.9, 995.91 Pneumonia due to COVID-19 virus ICD-10-CM: U07.1, J12.89 ICD-9-CM: 480.8 Past Medical History:  
 
 has a past medical history of Endocrine disease, Hypothyroid (2018), and Ill-defined condition. Past Surgical History:  
 
 has no past surgical history on file. Home Medications:  
 
Prior to Admission medications Medication Sig Start Date End Date Taking? Authorizing Provider ivabradine (CORLANOR) 5 mg tablet Take 1 Tab by mouth two (2) times daily (with meals). Indications: chronic heart failure, inappropriate sinus tachycardia 20   Cristóbal Middleton MD  
metoprolol succinate (TOPROL-XL) 25 mg XL tablet Take 1 Tab by mouth daily. 20   Cristóbal Middleton MD  
risperiDONE (RisperDAL) 1 mg tablet Take 1 mg by mouth nightly. Provider, Aicha  
solifenacin (VESICARE) 10 mg tablet TAKE 1 TABLET BY MOUTH EVERY DAY 20   OtherKilo MD  
medroxyPROGESTERone (DEPO-PROVERA) 150 mg/mL injection 150 mg, IM, once, To be administered in clinic by nurse. See order comments for schedule., # 1 vial, 4 Refills, Pharmacy: [facility]/pharmacy #[id number] 19   Kilo Cortez MD  
albuterol (PROVENTIL HFA, VENTOLIN HFA, PROAIR HFA) 90 mcg/actuation inhaler Take 2 Puffs by inhalation every four (4) hours as needed for Wheezing. 20   Supriya Almeida MD  
zinc sulfate 220 mg tablet Take 1 Tab by mouth daily. 20   Supriya Almeida MD  
cholecalciferol (VITAMIN D3) 1,000 unit tablet Take 1,000 Units by mouth daily. Kilo Cortez MD  
levothyroxine (SYNTHROID) 50 mcg tablet Take 50 mcg by mouth Daily (before breakfast). Kilo Cortez MD  
 
 
Allergies/Social/Family History: No Known Allergies Social History Tobacco Use  Smoking status: Never Smoker  Smokeless tobacco: Never Used Substance Use Topics  Alcohol use: No  
  
No family history on file. Review of Systems:  
 
Not able to obtain due to patient medical condition Objective:  
Vital Signs: 
Visit Vitals BP (!) 94/56 (BP 1 Location: Left arm) Pulse (!) 111 Temp 98.7 °F (37.1 °C) Resp 17 Ht 5' 3\" (1.6 m) Wt 84 kg (185 lb 3 oz) SpO2 99% BMI 32.80 kg/m² O2 Device: Endotracheal tube Temp (24hrs), Av.2 °F (36.8 °C), Min:97.5 °F (36.4 °C), Max:98.7 °F (37.1 °C) Intake/Output:  
 
Intake/Output Summary (Last 24 hours) at 2020 1856 Last data filed at 2020 0800 Gross per 24 hour Intake 1592.17 ml Output 1195 ml Net 397.17 ml Physical Exam: 
 
Constitutional: She appears well-developed and well-nourished. No distress. HENT:  
Head: Normocephalic.  
OGT and ETT in place  
Eyes: Right eye exhibits no discharge. Left eye exhibits no discharge. Neck: Neck supple.  
R IJ CVL in place  
Cardiovascular: Normal rate, regular rhythm, normal heart sounds and intact distal pulses. Exam reveals no gallop and no friction rub.  
No murmur heard. Pulmonary/Chest: No respiratory distress. She has no wheezes.    
Abdominal: Soft. Bowel sounds are normal. She exhibits no distension. There is no abdominal tenderness. There is no rebound. Genitourinary:    Genitourinary Comments: Dutta in place  
Neurological: Sedated   
Skin: Skin is warm and dry. No rash noted. She is not diaphoretic. No erythema. No pallor. Psychiatric:  
Unable to assess   
 
LABS AND  DATA: Personally reviewed Recent Labs  
  12/29/20 0310 12/28/20 0451 WBC 27.7* 30.7* HGB 8.7* 8.9* HCT 26.1* 26.6*  
 307 Recent Labs  
  12/29/20 0310 12/28/20 0451 12/27/20 
2116 12/27/20 
0157  144 142  --   
K 4.6 4.6 4.9  --   
* 113* 112*  --   
CO2 26 26 26  --   
BUN 83* 73* 70*  --   
CREA 4.09* 4.18* 4.16*  --   
* 120* 126*  --   
CA 9.4 8.6 8.4*  --   
MG  --   --  3.2* 3.1*  
PHOS  --   --  6.1* 5.8* Recent Labs  
  12/29/20 0310 12/28/20 0451 AP 78 74  
TP 6.1* 6.0* ALB 1.7* 1.7*  
GLOB 4.4* 4.3* Recent Labs  
  12/28/20 0451 12/27/20 
1025 APTT 40.0* 56.7* Recent Labs  
  12/27/20 
1252 PHI 7.32* PCO2I 47.7* PO2I 158* No results for input(s): CPK, CKMB, TROIQ, BNPP in the last 72 hours. Hemodynamics:  
PAP:   CO:    
Wedge:   CI:    
CVP:    SVR:    
  PVR:    
 
Ventilator Settings: 
Mode Rate Tidal Volume Pressure FiO2 PEEP Assist control   350 ml    35 % 5 cm H20 Peak airway pressure: 34 cm H2O   
 Minute ventilation: 6.43 l/min MEDS: Reviewed Chest X-Ray: CXR Results  (Last 48 hours) None Assessment and Plan:  
Acute hypoxic respiratory failure: Due to COVID-19 infection and possible superimposed bacterial pneumonia. Intubated on December 23. 
vent settings was changed on December 28 2 to volume control tidal volume 350 rate of 18 and PEEP of 5 with FiO2 40%. With that patient is getting minute ventilation of more than 6 L. Synchronous with the vent. Pending ABG in the morning. Wean sedation as tolerated Continue current antibiotic regimen.,  Follow-up on culture and sensitivity Change heparin infusion to subcu prophylactic dose Septic shock: As above, wean norepinephrine as tolerated Acute kidney injury: Nonoliguric no indication for emergent hemodialysis and I believe she is not a candidate for hemodialysis per discussion with her mother, however if the need arises I will consult nephrology and seek their opinion. Continue with oral hydration, avoid nephrotoxic agents. Hyperglycemia: Insulin sliding scale Nutrition support DVT and GI prophylaxis, ventilator bundle Patient is DNR I appreciate palliative care input regarding addressing goals of care. Mother stated that her daughter has survived intubation multiple time and she would like her to be reintubated if needed. We will continue with weaning effort. DISPOSITION Stay in ICU CRITICAL CARE CONSULTANT NOTE I had a face to face encounter with the patient, reviewed and interpreted patient data including clinical events, labs, images, vital signs, I/O's, and examined patient.   I have discussed the case and the plan and management of the patient's care with the consulting services, the bedside nurses and the respiratory therapist.   
 
NOTE OF PERSONAL INVOLVEMENT IN CARE  
 This patient has a high probability of imminent, clinically significant deterioration, which requires the highest level of preparedness to intervene urgently. I participated in the decision-making and personally managed or directed the management of the following life and organ supporting interventions that required my frequent assessment to treat or prevent imminent deterioration. I personally spent 40 minutes of critical care time. This is time spent at this critically ill patient's bedside actively involved in patient care as well as the coordination of care and discussions with the patient's family. This does not include any procedural time which has been billed separately. Ricardo Jara M.D. Staff Intensivist/Pulmonologist 
[facility] 
12/29/2020

## 2020-12-29 NOTE — PROGRESS NOTES
Physician Progress Note Ese Manning 
CSN #:                  [id number] :                       1980 ADMIT DATE:       2020 1:51 PM 
[address] [place] DATE:        2020 7:23 PM 
RESPONDING 
PROVIDER #:        Tereza Li MD 
 
 
 
 
QUERY TEXT: 
 
Dr Lana Carcamo Patient admitted with COVID pneumonia; and sepsis due to COVID PNA, UTI(response to other clarifications) . Noted initial testing negative for COVID-19 on 20. If possible, please document in the progress notes and discharge summary if COVID-19 was: The medical record reflects the following: 
Risk Factors: presents with SOB/work of breathing, fever Clinical Indicators: dc from [facility] on  for COVID and pneumonia, 103.2-103.4 ax temp; [id number], COVID test negative Treatment: intubation, transfer to [facility] Note: Possible (or similar verbiage reflecting an uncertain diagnosis) RFAYR-02 infection may not be reported as confirmed. However, a diagnosis may be considered as confirmed based on the clinical impression of the provider, regardless of the presence of a confirmatory test or availability of a test result. At present, a negative test result does not exclude the diagnosis of COVID-19 infection. Options provided: 
-- COVID-19 ruled out after study -- COVID-19 confirmed after study with initial testing suspected to be false negative 
-- Other - I will add my own diagnosis -- Disagree - Not applicable / Not valid -- Disagree - Clinically unable to determine / Unknown 
-- Refer to Clinical Documentation Reviewer PROVIDER RESPONSE TEXT: 
 
COVID-19 was ruled out after study. Query created by:  Justyn Fink on 2020 12:23 PM 
 
 
Electronically signed by:  Tereza Li MD 2020 1:44 PM

## 2020-12-29 NOTE — PROGRESS NOTES
Interdisciplinary team rounds were held 12/29/2020 with the following team members:Care Management, Diabetes Treatment Specialist, Nursing, Nutrition, Pharmacy, Physical Therapy, Physician and Clinical Coordinator. Plan of care discussed. Goal: Adjust medications, continue to monitor and support. See MD orders and progress notes for further  interventions and desired outcomes.

## 2020-12-29 NOTE — PROGRESS NOTES
Report received from Bib Mensah RN. Lab values noted. Remains in isolation. 0820  Assessment completed, turned  and oral care given with limited am care. To have an ABG completed. 65  Mother and son came to room and looked at patient through door. Met with Palliative care. 1230  No change in assessment. 1400  While coughing, had green emesis. Tube feedings stopped, allowing patient to rest.  Will check residual at 1600. Tolerating turning well otherwise. 1600  No residual noted, tube feedings resumed. Tolerated movement well. No other changes in assessment. .  Fentanyl cut in half per request of Dr. Nani Brian. 1800  Levophed weaned off. Versed drip wasted, no green paper available for Pharmacy. See note with Lane iHnes RN. 
4210  Report given to TRACI Lowry RN.

## 2020-12-30 NOTE — CONSULTS
Nephrology Consult Note Edmundo Hill  
 
www. [facility]              Phone - [phone] Patient: Erasmo Christensen YOB: 1980 Date- 12/30/2020 MRN: [id number] REASON FOR CONSULTATION: DANY in COVID-19 CONSULTING PHYSICIAN: Dr Earl Spear ADMIT DATE:12/23/2020 PATIENT PCP:Diego, MD Kilo  
 
IMPRESSION:  
# DANY likely COVID-19 related + Vanc toxicity # Respiratory failure due to COVID-19 infection and possible superimposed bacterial pneumonia # Hypoalbuminemia PLAN:  
 - please stop Vanc( levels at 35 on 12/26 and continue to be >20). If MRSA coverage is necessary, recommend a non-nephrotoxic Abx. If only Vanc is possible, please await until random level is <15 
- Alb< 1.5, will give a trial of IV Albumin  in hope to improve renal perfusion 
- check renal US, urine electrolytes 
 - adjust all meds to GFR<15 as current GFR is unknown 
- strict I/O's, monitor UOP Thank you for allowing us to participate in the care this patient. We will follow patient with you. · Principal Problem: 
·   COVID-19 (12/23/2020) ·  
· Active Problems: ·   Pneumonia due to COVID-19 virus (11/6/2020) ·  
·   Respiratory failure ([address] (12/23/2020) ·  
·   HFrEF (heart failure with reduced ejection fraction) ([address] (12/24/2020) ·  
·   Down syndrome (12/24/2020) ·  
·   Acquired hypothyroidism (12/24/2020) ·  
·   Goals of care, counseling/discussion (12/28/2020) ·  
·   Acute renal failure with tubular necrosis ([address] (12/29/2020) ·  
· [x] High complexity decision making was performed 
[x] Patient is at high-risk of decompensation with multiple organ involvement Subjective: HPI: Erasmo Christensen is a 47 y.o. female with severe Down's syndrome and autism. ,HF,  admitted twice in Nov (11/06-11/08, 11/08-11/16) with COVID PNA. She again presented to [facility] ED 12/23 with fever and resp distress, was intubated and transferred to [facility] on 12/23/2020. Course complicated by septic shock and DANY Review of Systems:  
 Not performed. Pt on airborne isolation for COVID-19 Past Medical History:  
Diagnosis Date  Endocrine disease  Hypothyroid 03/11/2018  Ill-defined condition Down's Syndrome No past surgical history on file. Prior to Admission medications Medication Sig Start Date End Date Taking? Authorizing Provider  
ivabradine (CORLANOR) 5 mg tablet Take 1 Tab by mouth two (2) times daily (with meals). Indications: chronic heart failure, inappropriate sinus tachycardia 12/6/20   Nikkie Ba MD  
metoprolol succinate (TOPROL-XL) 25 mg XL tablet Take 1 Tab by mouth daily. 12/6/20   Nikkie Ba MD  
risperiDONE (RisperDAL) 1 mg tablet Take 1 mg by mouth nightly. Provider, Aicha  
solifenacin (VESICARE) 10 mg tablet TAKE 1 TABLET BY MOUTH EVERY DAY 9/18/20   Kilo Cortez MD  
medroxyPROGESTERone (DEPO-PROVERA) 150 mg/mL injection 150 mg, IM, once, To be administered in clinic by nurse. See order comments for schedule., # 1 vial, 4 Refills, Pharmacy: [facility]/pharmacy #[id number] 12/5/19   OtherKilo MD  
albuterol (PROVENTIL HFA, VENTOLIN HFA, PROAIR HFA) 90 mcg/actuation inhaler Take 2 Puffs by inhalation every four (4) hours as needed for Wheezing. 11/2/20   Jurgen Oleary MD  
zinc sulfate 220 mg tablet Take 1 Tab by mouth daily. 11/2/20   Jurgen Oleary MD  
cholecalciferol (VITAMIN D3) 1,000 unit tablet Take 1,000 Units by mouth daily. Kilo Cortez MD  
levothyroxine (SYNTHROID) 50 mcg tablet Take 50 mcg by mouth Daily (before breakfast). Kilo Cortez MD  
 
No Known Allergies Social History Tobacco Use  Smoking status: Never Smoker  Smokeless tobacco: Never Used Substance Use Topics  Alcohol use: No  
  
No family history on file. Objective:   
 
Patient Vitals for the past 24 hrs: 
 Temp Pulse Resp BP SpO2  
12/30/20 1300  97 18 (!) 101/54 100 % 12/30/20 1200  (!) 108 19 112/64 98 % 12/30/20 1145 98.9 °F (37.2 °C) (!) 112 18  99 % 12/30/20 1115  96 18  100 % 12/30/20 1100  93 18 (!) 104/58 100 % 12/30/20 1000  98 19 (!) 105/58 100 % 12/30/20 0900  (!) 107 18 (!) 104/46 98 % 12/30/20 0800 98.6 °F (37 °C) (!) 108 18 (!) 100/50 98 % 12/30/20 0730  (!) 105 19  100 % 12/30/20 0716  (!) 101 18  (!) 101 % 12/30/20 0700  (!) 104 19 (!) 106/57 100 % 12/30/20 0600  (!) 106 19 (!) 111/45 98 % 12/30/20 0500  (!) 106 17 (!) 106/52 100 % 12/30/20 0400 97.9 °F (36.6 °C) (!) 114 18 (!) 106/50 99 % 12/30/20 0300  (!) 116 18 (!) 97/43 100 % 12/30/20 0200  (!) 109 18 (!) 102/55 100 % 12/30/20 0149  (!) 110 19  100 % 12/30/20 0100  (!) 107 19 (!) 98/45 100 % 12/30/20 0000 97.7 °F (36.5 °C) (!) 116 18 (!) 98/54 99 % 12/29/20 2325  (!) 117 18  99 % 12/29/20 2300  (!) 105 18 (!) 104/54 100 % 12/29/20 2200  (!) 103 18 (!) 104/58 100 % 12/29/20 2100  (!) 104 18 (!) 97/57 98 % 12/29/20 2000 98 °F (36.7 °C) (!) 104 18 (!) 97/53 100 % 12/29/20 1929  (!) 104 18  100 % 12/29/20 1900  (!) 109 18 (!) 97/55 98 % 12/29/20 1830  (!) 111 18  99 % 12/29/20 1800  (!) 116 23 (!) 101/53 98 % 12/29/20 1730  (!) 113 17  99 % 12/29/20 1700  (!) 106 16 102/64 99 % 12/29/20 1600 98 °F (36.7 °C) (!) 109 13 95/61 100 % 12/29/20 1508  (!) 112 18  98 % 12/29/20 1500  (!) 114 18 (!) 102/51 98 % 12/29/20 1400  (!) 111 25 (!) 119/58 97 % 12/30 0701 - 12/30 1900 In: 346.8 [I.V.:76.8] Out: 325 [Urine:325] Physical Exam: 
Seen from outside the room General: critically ill, intubated, sedated in NAD HEENT: AT/NC, ETT in place Lungs:on MV 
CV:RRR 
 
CODE STATUS: DNR Chart reviewed. 
 
y Reviewed previous records Lab Data Personally Reviewed: (see below) Recent Labs 12/30/20 
0404 12/29/20 
0310 12/28/20 
0451 12/27/20 
2116 WBC 22.7* 27.7* 30.7*  --   
HGB 7.9* 8.7* 8.9*  --   
 324 307  --   
 ANEU 15.9* 18.6* 25.8*  --   
APTT  --   --  40.0*  --   
 142 144 142  
K 4.9 4.6 4.6 4.9 * 108* 120* 126* BUN 91* 83* 73* 70* CREA 3.75* 4.09* 4.18* 4.16* ALT 15 14 15  --   
TBILI 0.8 0.2 0.4  --   
AP 68 78 74  --   
CA 10.1 9.4 8.6 8.4* MG 3.0*  --   --  3.2*  
PHOS  --   --   --  6.1* Lab Results Component Value Date/Time Color DARK YELLOW 12/24/2020 08:20 AM  
 Appearance TURBID (A) 12/24/2020 08:20 AM  
 Specific gravity 1.026 12/24/2020 08:20 AM  
 Specific gravity 1.010 11/20/2020 01:57 AM  
 pH (UA) 5.0 12/24/2020 08:20 AM  
 Protein 100 (A) 12/24/2020 08:20 AM  
 Glucose 100 (A) 12/24/2020 08:20 AM  
 Ketone Negative 12/24/2020 08:20 AM  
 Bilirubin Negative 11/23/2020 10:39 AM  
 Urobilinogen 1.0 12/24/2020 08:20 AM  
 Nitrites Negative 12/24/2020 08:20 AM  
 Leukocyte Esterase MODERATE (A) 12/24/2020 08:20 AM  
 Epithelial cells FEW 12/24/2020 08:20 AM  
 Bacteria 1+ (A) 12/24/2020 08:20 AM  
 WBC >100 (H) 12/24/2020 08:20 AM  
 RBC 5-10 12/24/2020 08:20 AM  
 
 
Lab Results Component Value Date/Time Iron 65 12/04/2020 03:11 AM  
 TIBC 279 12/04/2020 03:11 AM  
 Iron % saturation 23 12/04/2020 03:11 AM  
 Ferritin 555 (H) 12/28/2020 04:51 AM  
 
Lab Results Component Value Date/Time Culture result: SCANT NORMAL RESPIRATORY CHAD 12/24/2020 08:30 AM  
 Culture result: No growth (<1,000 CFU/ML) 12/24/2020 08:20 AM  
 Culture result: NO GROWTH 5 DAYS 12/23/2020 11:49 AM  
 
Prior to Admission Medications Prescriptions Last Dose Informant Patient Reported? Taking? albuterol (PROVENTIL HFA, VENTOLIN HFA, PROAIR HFA) 90 mcg/actuation inhaler  Family Member No No  
Sig: Take 2 Puffs by inhalation every four (4) hours as needed for Wheezing. cholecalciferol (VITAMIN D3) 1,000 unit tablet  Family Member Yes No  
Sig: Take 1,000 Units by mouth daily.   
ivabradine (CORLANOR) 5 mg tablet   No No  
 Sig: Take 1 Tab by mouth two (2) times daily (with meals). Indications: chronic heart failure, inappropriate sinus tachycardia  
levothyroxine (SYNTHROID) 50 mcg tablet  Family Member Yes No  
Sig: Take 50 mcg by mouth Daily (before breakfast). medroxyPROGESTERone (DEPO-PROVERA) 150 mg/mL injection  Family Member Yes No  
Si mg, IM, once, To be administered in clinic by nurse. See order comments for schedule., # 1 vial, 4 Refills, Pharmacy: [facility]/pharmacy #[id number]  
metoprolol succinate (TOPROL-XL) 25 mg XL tablet   No No  
Sig: Take 1 Tab by mouth daily. risperiDONE (RisperDAL) 1 mg tablet  Family Member Yes No  
Sig: Take 1 mg by mouth nightly. solifenacin (VESICARE) 10 mg tablet  Family Member Yes No  
Sig: TAKE 1 TABLET BY MOUTH EVERY DAY  
zinc sulfate 220 mg tablet  Family Member No No  
Sig: Take 1 Tab by mouth daily. Facility-Administered Medications: None Imaging: 
 
Medications list Personally Reviewed   [x]      Yes     []               No   
 
Signed By: Ulysses Jungling, [address] [facility] [employer] [facility] HeatherCritical access hospitaled[facility] [address], [address] [place], [address] [facility] Phone - [phone] Fax - [phone] [place] [address], [address] [facility] Phone - [phone] Fax - [phone]    
www. [email or web address]

## 2020-12-30 NOTE — PROGRESS NOTES
1900- Verbal shift report received from [address]- shift assessment complete- see flow sheet 
0000- reassessment complete- no change 
0400-reassessment complete- no change 
0600- pt in and out of [facility], NP notified, add on mag ordered, SAT failed due to agitation and rhythm change 
0700- Verbal shift report given to [facility]

## 2020-12-30 NOTE — PROGRESS NOTES
Interdisciplinary team rounds were held 12/30/2020  with the following team members:Care Management, Diabetes Treatment Specialist, Nursing, Nutrition, Palliative Care, Pharmacy, Physical Therapy, Physician, Respiratory Therapy and Clinical Coordinator. Plan of care discussed. Goal: Adjust medications, continue to monitor and support. See MD orders and progress notes for further  interventions and desired outcomes.

## 2020-12-30 NOTE — PROGRESS NOTES
Comprehensive Nutrition Assessment Type and Reason for Visit: Jean Paul Crawford Nutrition Recommendations/Plan:  
Continue TF as ordered, add Prosource BID Continue miralax, increase bowel regimen if no BM by tomorrow Nutrition Assessment:   Chart reviewed, case discussed during CCU rounds. Pt remains intubated and sedated with fentanyl, needs re-estimated. TF at goal rate, but has no protein modulars (will add). K 4.9, no new phos checked in a few days. Creat slightly better than yesterday. TF at goal will meet >100% kcal and 100% protein needs (will need to be held for synthroid dose, taken into account when calculating goal rate). No BM yet, miralax added yesterday. Estimated Daily Nutrient Needs: 
Energy (kcal): PSU 1609 (MSJ 6683); Weight Used for Energy Requirements: Current Protein (g): 84-126g (1-1.5gPro/kg); Weight Used for Protein Requirements: Current Fluid (ml/day): per MD; Method Used for Fluid Requirements: 1 ml/kcal 
 
 
Nutrition Related Findings:  Meds: pepcid, humalog, synthroid, merrem, miralax; Drips: fentanyl. Edema: +1-generalized. BM PTA Wounds:   
Skin tears Current Nutrition Therapies: 
Current Tube Feeding (TF) Orders: · Feeding Route: Nasogastric · Formula: Nepro · Schedule:Continuous · Regimen: 40mL/h · Additives/Modulars:   
· Water Flushes: 50mL q 6h · Current TF & Flush Orders Provides: 1728kcals/78gPro/154gCHO/898mL · Goal TF & Flush Orders Provides: 1848kcals/108gPro/154gCHO/898mL Anthropometric Measures: 
· Height:  5' 3\" (160 cm) · Current Body Wt:  84 kg (185 lb 3 oz) · Ideal Body Wt:  115 lbs:  161 % · BMI Category:  Obese class 1 (BMI 30.0-34. 9) Nutrition Diagnosis:  
· Inadequate protein-energy intake related to impaired respiratory function as evidenced by other (specify)(trophic TF order) Previous dx resolving. Nutrition Interventions:  
Food and/or Nutrient Delivery: Modify tube feeding Nutrition Education and Counseling: No recommendations at this time Coordination of Nutrition Care: Continue to monitor while inpatient Goals: Pt will tolerate TF @ goal rate with residuals <500mL and a BM in 2-4 days. Nutrition Monitoring and Evaluation:  
Behavioral-Environmental Outcomes: None identified Food/Nutrient Intake Outcomes: Enteral nutrition intake/tolerance, IVF intake Physical Signs/Symptoms Outcomes: Biochemical data, Fluid status or edema, Nutrition focused physical findings, Weight, Hemodynamic status, GI status Discharge Planning: Too soon to determine Electronically signed by Yoselin Monge RD, [address]  on 12/30/2020 at 12:08 PM 
 
Contact: [phone]

## 2020-12-30 NOTE — CONSULTS
Edmundo Hill  
 
 
 
NAME:Aundrea Rojas  BWJ:[id number]   Fredy Khaner # DANY in COVID-19 +/- Vanc toxicity - please stop Vanc( levels at 35 on 12/26) and continue to be >20. If MRSA coverage is necessary, recommend a non-nephrotoxic Abx. If only Vanc is possible, please await until random level is <15 
- Alb< 1.5, will give a trial of IV Albumin  in hope to improve renal perfusion 
- check renal US, urine electrolytes Will follow closely Thank you for allowing us to participate in this patient's care. Full note to follow Jocelyn Marroquin, [address] [facility] [employer] [facility] [address], [address] [place], [address] Phone - [phone] Fax - [phone] [facility] [place] [address], [address] [facility] Phone - [phone] Fax - [phone]    
[email or web address]

## 2020-12-30 NOTE — PROGRESS NOTES
Pharmacy Automatic Renal Dosing Protocol - Antimicrobials Indication for Antimicrobials: HCAP in the setting of covid-19 Current Regimen of Each Antimicrobial:  
Meropenem 500 mg IV q12h; started ; day 5 Previous Antimicrobial Therapy:  
Azithromycin 500 mg IV q24h; [id number]/23; day 5 Cefepime 2g IV q24h ; started - Vancomycin - dosed based on random levels; started - (Recent hospitalization with 7 day course of vanc/cefepime) Goal Level: VANCOMYCIN TROUGH GOAL RANGE Vancomycin Trough: 15 - 20 mcg/mL  (AUC: 400 - 600 mg/hr/Liter/day) Date Dose & Interval Measured (mcg/mL) Extrapolated (mcg/mL)  
 N/a 35.1   
 holding 24.9 Significant Cultures:  
 blood NG, final 
 urine NG, final 
 sputum normal constance, final 
 
Paralysis, amputations, malnutrition: none noted Labs: 
Recent Labs 20 
0404 20 
0310 20 
0451 CREA 3.75* 4.09* 4.18* BUN 91* 83* 73* WBC 22.7* 27.7* 30.7* Temp (24hrs), Av.1 °F (36.7 °C), Min:97.7 °F (36.5 °C), Max:98.6 °F (37 °C) Procalcitonin: (but renal failure) 0.55 --> 165 --> 408 Creatinine Clearance (mL/min) or Dialysis: 16.8 mL/min (IBW) Impression/Plan:  
Scr leveled off/slightly improved Vancomycin d/c  (although will likely be therapeutic for days) Continue current dose of meropenem; appropriate for indication/renal function Antimicrobial stop date pending Pharmacy will follow daily and adjust medications as appropriate for renal function and/or serum levels. Thank you, RANJAN Reaves

## 2020-12-30 NOTE — PROGRESS NOTES
[facility] 
 
ICU TEAM Progress Note Name: Princess Currie : 1980 MRN: [id number] Date: 2020 I Subjective:  
Progress Note: 2020 Reason for ICU Admission: Respiratory failure due to COVID-19 infection and possible superimposed bacterial pneumonia Interval history: 
47 F with severe Down's syndrome and autism. She was admitted twice in Nov (-, -) with COVID PNA. She again presented to [facility] ED  with fever and resp distress. It was felt that she required intubation for transport to [facility]. She was transported in prone position and admitted by [facility] service to ICU. At the time of transport, she was requiring high dose norepinephrine and vasopressin. Overnight Events:  
No acute event Active Problem List:  
 
Problem List  Never Reviewed Codes Class Acute renal failure with tubular necrosis (HCC) ICD-10-CM: N17.0 ICD-9-CM: 584.5 Goals of care, counseling/discussion ICD-10-CM: Z71.89 ICD-9-CM: V65.49 HFrEF (heart failure with reduced ejection fraction) (HCC) (Chronic) ICD-10-CM: I50.20 ICD-9-CM: 428.20 Down syndrome (Chronic) ICD-10-CM: Q90.9 ICD-9-CM: 758.0 Acquired hypothyroidism (Chronic) ICD-10-CM: E03.9 ICD-9-CM: 244.9 [facility] ICD-10-CM: J96.90 ICD-9-CM: 518.81   
   
 * (Principal) COVID-19 ICD-10-CM: U07.1 ICD-9-CM: 079.89 Sepsis ([facility] ICD-10-CM: A41.9 ICD-9-CM: 038.9, 995.91 Pneumonia due to COVID-19 virus ICD-10-CM: U07.1, J12.89 ICD-9-CM: 480.8 Past Medical History:  
 
 has a past medical history of Endocrine disease, Hypothyroid (2018), and Ill-defined condition. Past Surgical History:  
 
 has no past surgical history on file. Home Medications:  
 
Prior to Admission medications Medication Sig Start Date End Date Taking? Authorizing Provider ivabradine (CORLANOR) 5 mg tablet Take 1 Tab by mouth two (2) times daily (with meals). Indications: chronic heart failure, inappropriate sinus tachycardia 20   Rivera Reed MD  
metoprolol succinate (TOPROL-XL) 25 mg XL tablet Take 1 Tab by mouth daily. 20   Rivera Reed MD  
risperiDONE (RisperDAL) 1 mg tablet Take 1 mg by mouth nightly. Provider, Aicha  
solifenacin (VESICARE) 10 mg tablet TAKE 1 TABLET BY MOUTH EVERY DAY 20   OtherKilo MD  
medroxyPROGESTERone (DEPO-PROVERA) 150 mg/mL injection 150 mg, IM, once, To be administered in clinic by nurse. See order comments for schedule., # 1 vial, 4 Refills, Pharmacy: [facility]/pharmacy #[id number]   Kilo Cortez MD  
albuterol (PROVENTIL HFA, VENTOLIN HFA, PROAIR HFA) 90 mcg/actuation inhaler Take 2 Puffs by inhalation every four (4) hours as needed for Wheezing. 20   Delaney Rice MD  
zinc sulfate 220 mg tablet Take 1 Tab by mouth daily. 20   Delaney Rice MD  
cholecalciferol (VITAMIN D3) 1,000 unit tablet Take 1,000 Units by mouth daily. Kilo Cortez MD  
levothyroxine (SYNTHROID) 50 mcg tablet Take 50 mcg by mouth Daily (before breakfast). Kilo Cortez MD  
 
 
Allergies/Social/Family History: No Known Allergies Social History Tobacco Use  Smoking status: Never Smoker  Smokeless tobacco: Never Used Substance Use Topics  Alcohol use: No  
  
No family history on file. Review of Systems:  
 
Not able to obtain due to patient medical condition Objective:  
Vital Signs: 
Visit Vitals BP (!) 104/46 Pulse 96 Temp 98.6 °F (37 °C) Resp 18 Ht 5' 3\" (1.6 m) Wt 84 kg (185 lb 3 oz) SpO2 100% BMI 32.80 kg/m² O2 Device: Ventilator Temp (24hrs), Av.1 °F (36.7 °C), Min:97.7 °F (36.5 °C), Max:98.6 °F (37 °C) Intake/Output:  
 
Intake/Output Summary (Last 24 hours) at 2020 1139 Last data filed at 2020 0930 Gross per 24 hour Intake 1536.37 ml  
 Output 1220 ml Net 316.37 ml Physical Exam: 
 
Constitutional: She appears well-developed and well-nourished. No distress. HENT:  
Head: Normocephalic.  
OGT and ETT in place  
R IJ CVL in place  
Cardiovascular: Normal rate, regular rhythm Pulmonary/Chest: On mechanical vent support. Neurological: Sedated   
Psychiatric:  
Unable to assess   
 
LABS AND  DATA: Personally reviewed Recent Labs 12/30/20 0404 12/29/20 0310 WBC 22.7* 27.7* HGB 7.9* 8.7* HCT 23.4* 26.1*  
 324 Recent Labs 12/30/20 0404 12/29/20 0310 12/27/20 2116 12/27/20 2116  142   < > 142 K 4.9 4.6   < > 4.9 * 113*   < > 112* CO2 27 26   < > 26 BUN 91* 83*   < > 70* CREA 3.75* 4.09*   < > 4.16* * 108*   < > 126* CA 10.1 9.4   < > 8.4* MG 3.0*  --   --  3.2*  
PHOS  --   --   --  6.1*  
 < > = values in this interval not displayed. Recent Labs 12/30/20 0404 12/29/20 0310 AP 68 78  
TP 5.8* 6.1* ALB 1.6* 1.7*  
GLOB 4.2* 4.4* Recent Labs  
  12/28/20 
0451 APTT 40.0* Recent Labs  
  12/27/20 
1252 PHI 7.32* PCO2I 47.7* PO2I 158* No results for input(s): CPK, CKMB, TROIQ, BNPP in the last 72 hours. Hemodynamics:  
PAP:   CO:    
Wedge:   CI:    
CVP:    SVR:    
  PVR:    
 
Ventilator Settings: 
Mode Rate Tidal Volume Pressure FiO2 PEEP Assist control   350 ml    30 % 5 cm H20 Peak airway pressure: 30 cm H2O Minute ventilation: 6.39 l/min MEDS: Reviewed Chest X-Ray: CXR Results  (Last 48 hours) None Assessment and Plan:  
Acute hypoxic respiratory failure: Due to COVID-19 infection and possible superimposed bacterial pneumonia. Intubated on December 23. vent settings was changed on December 28 2 to volume control tidal volume 350 rate of 18 and PEEP of 5 with FiO2 40%. With that patient is getting minute ventilation of more than 6 L. Synchronous with the vent. Pending ABG in the morning. Wean sedation as tolerated Continue current antibiotic regimen. Follow-up on culture and sensitivity Change heparin infusion to subcu prophylactic dose Septic shock: As above, wean norepinephrine as tolerated Acute kidney injury: Nonoliguric no indication for emergent hemodialysis. Will consult nephrology for evaluation. Hyperglycemia: Insulin sliding scale Nutrition support DVT and GI prophylaxis, ventilator bundle Patient is DNR I appreciate palliative care input regarding addressing goals of care. Mother stated that her daughter has survived intubation multiple time and she would like her to be reintubated if needed. We will continue with weaning effort. DISPOSITION Stay in ICU CRITICAL CARE CONSULTANT NOTE I had a face to face encounter with the patient, reviewed and interpreted patient data including clinical events, labs, images, vital signs, I/O's, and examined patient. I have discussed the case and the plan and management of the patient's care with the consulting services, the bedside nurses and the respiratory therapist.   
 
NOTE OF PERSONAL INVOLVEMENT IN CARE This patient has a high probability of imminent, clinically significant deterioration, which requires the highest level of preparedness to intervene urgently. I participated in the decision-making and personally managed or directed the management of the following life and organ supporting interventions that required my frequent assessment to treat or prevent imminent deterioration. I personally spent 40 minutes of critical care time. This is time spent at this critically ill patient's bedside actively involved in patient care as well as the coordination of care and discussions with the patient's family. This does not include any procedural time which has been billed separately. Lisette Romero M.D. Staff Intensivist/Pulmonologist 
[facility] 
12/30/2020

## 2020-12-31 NOTE — INTERDISCIPLINARY ROUNDS
Interdisciplinary team rounds were held 12/31/2020 with the following team members:Care Management, Nursing, Nutrition, Pharmacy, Physical Therapy and Physician. Plan of care discussed. See clinical pathway and/or care plan for interventions and desired outcomes.

## 2020-12-31 NOTE — PROGRESS NOTES
Pharmacy Automatic Renal Dosing Protocol - Antimicrobials Indication for Antimicrobials: HCAP in the setting of covid-19 Current Regimen of Each Antimicrobial:  
Meropenem 500 mg IV q12h; started ; day 6 Previous Antimicrobial Therapy:  
Azithromycin 500 mg IV q24h; [id number]/23; day 5 Cefepime 2g IV q24h ; started - Vancomycin - dosed based on random levels; started - (Recent hospitalization with 7 day course of vanc/cefepime) Goal Level: VANCOMYCIN TROUGH GOAL RANGE Vancomycin Trough: 15 - 20 mcg/mL  (AUC: 400 - 600 mg/hr/Liter/day) Date Dose & Interval Measured (mcg/mL) Extrapolated (mcg/mL)  
 N/a 35.1   
 holding 24.9 Significant Cultures:  
 blood NG, final 
 urine NG, final 
 sputum normal constance, final 
 
Paralysis, amputations, malnutrition: none noted Labs: 
Recent Labs 20 
0404 20 
0310 20 
0451 CREA 3.75* 4.09* 4.18* BUN 91* 83* 73* WBC 22.7* 27.7* 30.7* Temp (24hrs), Av.1 °F (36.7 °C), Min:97.7 °F (36.5 °C), Max:98.6 °F (37 °C) Procalcitonin: (but renal failure) 0.55 --> 165 --> 408 Creatinine Clearance (mL/min) or Dialysis: 16.8 mL/min (IBW) Impression/Plan:  
Scr leveled off/slightly improved Continue current dose of meropenem; appropriate for indication/renal function Antimicrobial stop date 7 days Pharmacy will follow daily and adjust medications as appropriate for renal function and/or serum levels. Thank you, RANJAN Cheng

## 2020-12-31 NOTE — PROGRESS NOTES
12/31/20 1139 Weaning Parameters Spontaneous Breathing Trial Complete Yes Resp Rate Observed 14 Ve 6.7  RSBI 52

## 2020-12-31 NOTE — PROGRESS NOTES
0730: Verbal shift change report given to Jason Dubois RN (oncoming nurse) by Alisha Serna RN (offgoing nurse). Report included the following information SBAR, Kardex, ED Summary, Intake/Output, MAR, Recent Results and Cardiac Rhythm NSR, bigimeny rhythm with vomiting.  
 
0752: Assessment completed; see flowsheets. 0830: RN spoke with provider Cari Bundy and updated of last night's events, pt had episodes of emesis over ETT, requiring zofran prn. RN gave zofran prn prophylactically before morning meds today; OGT currently clamped for med absorption, MD ok with this. MD stated plan for today is to try SBT again this morning, to get good idea of how to ask family how they would like to proceed with care. RN called and updated RT staff Talia of plan. 1250: RN alerted MD of Hbg 6.7, down from 7.0 this am. Orders received to transfuse 1 unit PRBCs. 1315: Type and screen drawn. 1600: COVID swab sent per orders. Reassessment completed, no changes from previous assessment. 1704: Blood transfusion started. CBC to be drawn 2 hrs post-transfusion. 1749: RN paged provider regarding KUB results; provider aware of \"moderate to large amount of stool in ascending colon and rectum; likely ileus/constipation\". Per MD, no new orders received at this time. Pt sister called, updates given via telephone. 1900: End of Shift Note Bedside shift change report given to Alisha Serna RN (oncoming nurse) by Alonso Doan (offgoing nurse). Report included the following information SBAR, Kardex, Intake/Output, MAR, Recent Results and Cardiac Rhythm NSR, SB with PVCs bigeminy Shift worked:  7a-7p Shift summary and any significant changes:  
  see progress note Concerns for physician to address:  see progress note Zone phone for oncoming shift:   n/a Activity: 
Activity Level: Bed Rest 
Number times ambulated in hallways past shift: 0 Number of times OOB to chair past shift: 0 Cardiac: Cardiac Monitoring: Yes     
Cardiac Rhythm: Normal sinus rhythm Access:  
Current line(s): central line Genitourinary:  
Urinary status: mejia Respiratory:  
O2 Device: Ventilator Chronic home O2 use?: NO Incentive spirometer at bedside: N/A 
  
GI: 
Last Bowel Movement Date: (PTA) Current diet:  DIET TUBE FEEDING Passing flatus: YES Tolerating current diet: NO 
  
 
Pain Management:  
Patient states pain is manageable on current regimen: N/A Skin: 
Jai Score: 12 Interventions: speciality bed, float heels and internal/external urinary devices Patient Safety: 
Fall Score: Total Score: 3 Interventions: bed/chair alarm High Fall Risk: Yes Length of Stay: 
Expected LOS: 12d 7h Actual LOS: 8 Thiago Zamarripa

## 2020-12-31 NOTE — PROGRESS NOTES
[facility] 
 
ICU TEAM Progress Note Name: Mónica Cooley : 1980 MRN: [id number] Date: 2020 I Subjective:  
Progress Note: 2020 Reason for ICU Admission: Respiratory failure due to COVID-19 infection and possible superimposed bacterial pneumonia Interval history: 
47 F with severe Down's syndrome and autism. She was admitted twice in Nov (-, -) with COVID PNA. She again presented to [facility] ED  with fever and resp distress. It was felt that she required intubation for transport to [facility]. She was transported in prone position and admitted by CCM service to ICU. At the time of transport, she was requiring high dose norepinephrine and vasopressin. Overnight Events:  
No acute event Active Problem List:  
 
Problem List  Never Reviewed Codes Class Acute renal failure with tubular necrosis (HCC) ICD-10-CM: N17.0 ICD-9-CM: 584.5 Goals of care, counseling/discussion ICD-10-CM: Z71.89 ICD-9-CM: V65.49 HFrEF (heart failure with reduced ejection fraction) (HCC) (Chronic) ICD-10-CM: I50.20 ICD-9-CM: 428.20 Down syndrome (Chronic) ICD-10-CM: Q90.9 ICD-9-CM: 758.0 Acquired hypothyroidism (Chronic) ICD-10-CM: E03.9 ICD-9-CM: 244.9 Respiratory failure ([facility] ICD-10-CM: J96.90 ICD-9-CM: 518.81   
   
 * (Principal) COVID-19 ICD-10-CM: U07.1 ICD-9-CM: 079.89 Sepsis ([address] ICD-10-CM: A41.9 ICD-9-CM: 038.9, 995.91 Pneumonia due to COVID-19 virus ICD-10-CM: U07.1, J12.89 ICD-9-CM: 480.8 Past Medical History:  
 
 has a past medical history of Endocrine disease, Hypothyroid (2018), and Ill-defined condition. Past Surgical History:  
 
 has no past surgical history on file. Home Medications:  
 
Prior to Admission medications Medication Sig Start Date End Date Taking? Authorizing Provider ivabradine (CORLANOR) 5 mg tablet Take 1 Tab by mouth two (2) times daily (with meals). Indications: chronic heart failure, inappropriate sinus tachycardia 20   Juan Miguel Kwon MD  
metoprolol succinate (TOPROL-XL) 25 mg XL tablet Take 1 Tab by mouth daily. 20   Juan Miguel Kwon MD  
risperiDONE (RisperDAL) 1 mg tablet Take 1 mg by mouth nightly. Provider, Aicha  
solifenacin (VESICARE) 10 mg tablet TAKE 1 TABLET BY MOUTH EVERY DAY 20   OtherKilo MD  
medroxyPROGESTERone (DEPO-PROVERA) 150 mg/mL injection 150 mg, IM, once, To be administered in clinic by nurse. See order comments for schedule., # 1 vial, 4 Refills, Pharmacy: [facility]/pharmacy #[id number]   OtherKilo MD  
albuterol (PROVENTIL HFA, VENTOLIN HFA, PROAIR HFA) 90 mcg/actuation inhaler Take 2 Puffs by inhalation every four (4) hours as needed for Wheezing. 20   Cookie Gaffney MD  
zinc sulfate 220 mg tablet Take 1 Tab by mouth daily. 20   Cookie Gaffney MD  
cholecalciferol (VITAMIN D3) 1,000 unit tablet Take 1,000 Units by mouth daily. Kilo Cortez MD  
levothyroxine (SYNTHROID) 50 mcg tablet Take 50 mcg by mouth Daily (before breakfast). Kilo Cortez MD  
 
 
Allergies/Social/Family History: No Known Allergies Social History Tobacco Use  Smoking status: Never Smoker  Smokeless tobacco: Never Used Substance Use Topics  Alcohol use: No  
  
No family history on file. Review of Systems:  
 
Not able to obtain due to patient medical condition Objective:  
Vital Signs: 
Visit Vitals /65 (BP 1 Location: Left arm, BP Patient Position: At rest) Pulse 81 Temp 97.6 °F (36.4 °C) Resp 18 Ht 5' 3\" (1.6 m) Wt 84 kg (185 lb 3 oz) SpO2 100% BMI 32.80 kg/m² O2 Device: Endotracheal tube, Ventilator Temp (24hrs), Av.5 °F (36.9 °C), Min:97.6 °F (36.4 °C), Max:98.9 °F (37.2 °C) Intake/Output:  
 
Intake/Output Summary (Last 24 hours) at 2020 5051 Last data filed at 12/31/2020 6871 Gross per 24 hour Intake 676.09 ml Output 1285 ml Net -608.91 ml Physical Exam: 
 
Constitutional: She appears well-developed and well-nourished. No distress. HENT:  
Head: Normocephalic.  
OGT and ETT in place  
R IJ CVL in place  
Cardiovascular: Normal rate, regular rhythm Pulmonary/Chest: On mechanical vent support. Neurological: Sedated   
Psychiatric:  
Unable to assess   
 
LABS AND  DATA: Personally reviewed Recent Labs 12/31/20 0401 12/30/20 0404 WBC 19.1* 22.7* HGB 7.0* 7.9*  
HCT 21.0* 23.4*  
 351 Recent Labs 12/31/20 0401 12/30/20 0404 * 142  
K 4.4 4.9 * 113* CO2 31 27 BUN 97* 91* CREA 3.69* 3.75* GLU 99 105* CA 10.6*  10.9* 10.1 MG  --  3.0*  
PHOS 5.4*  --   
 
Recent Labs 12/31/20 0401 12/30/20 0404 AP 47 68  
TP 6.6 5.8* ALB 2.8* 1.6*  
GLOB 3.8 4.2* No results for input(s): INR, PTP, APTT, INREXT, INREXT in the last 72 hours. No results for input(s): PHI, PCO2I, PO2I, FIO2I in the last 72 hours. No results for input(s): CPK, CKMB, TROIQ, BNPP in the last 72 hours. Hemodynamics:  
PAP:   CO:    
Wedge:   CI:    
CVP:    SVR:    
  PVR:    
 
Ventilator Settings: 
Mode Rate Tidal Volume Pressure FiO2 PEEP Assist control   350 ml    30 % 5 cm H20 Peak airway pressure: 35 cm H2O Minute ventilation: 6.5 l/min MEDS: Reviewed Chest X-Ray: CXR Results  (Last 48 hours) None Assessment and Plan:  
Acute hypoxic respiratory failure: Due to COVID-19 infection and possible superimposed bacterial pneumonia. continue AC vent support, now down to 30% Fio2, failed SBT yesterday due to extreme agitation. Started on precedex gtt. Will do another SBT today. I think once she gets extubated we should no reintubate due to poor prognosis, but if family wants to continue aggressive care then trach and PEG can be done. Wean sedation as tolerated Continue current antibiotic regimen. Follow-up on culture and sensitivity Septic shock:  
-Sec to pneumonia. 
-Now off pressors. 
-Continue merem, Cx have been neg so far. Acute renal failure: 
- Nonoliguric no indication for emergent hemodialysis. - Nephrology following, continue albumin. Hyperglycemia: 
-Insulin sliding scale Nutrition support 
 
DVT. [address] GI prophylaxis. Continue pepcid. Patient is DNR I appreciate palliative care input regarding addressing goals of care. Mother stated that her daughter has survived intubation multiple time and she would like her to be reintubated if needed. We will continue with weaning effort. DISPOSITION Stay in ICU CRITICAL CARE CONSULTANT NOTE I had a face to face encounter with the patient, reviewed and interpreted patient data including clinical events, labs, images, vital signs, I/O's, and examined patient. I have discussed the case and the plan and management of the patient's care with the consulting services, the bedside nurses and the respiratory therapist.   
 
NOTE OF PERSONAL INVOLVEMENT IN CARE This patient has a high probability of imminent, clinically significant deterioration, which requires the highest level of preparedness to intervene urgently. I participated in the decision-making and personally managed or directed the management of the following life and organ supporting interventions that required my frequent assessment to treat or prevent imminent deterioration. I personally spent 40 minutes of critical care time. This is time spent at this critically ill patient's bedside actively involved in patient care as well as the coordination of care and discussions with the patient's family. This does not include any procedural time which has been billed separately. Vannessa Aburto M.D. Staff Intensivist/Pulmonologist 
[facility] 
12/31/2020

## 2020-12-31 NOTE — PROGRESS NOTES
1930: Report from HEMA Resendez 
 
2000: Assessment completed. Pt not following any commands, Will open eyes to voice and stimuli.  
 
 Fentanyl gtt at 50 mcg/hr 
Precedex at 0.2 mcg/kg/hr 
 
2300: Pt vomiting large amount of thick, chunky green emesis with tube feeding. Residuals have been minimal from previous shift and when checked at 2000. Tube feeds put on hold. Notified NP Carmelita of update.  
 
0145: Pt had another episode of emesis. Pt cleaned and repositioned. 
 
0230: Pt had another episode of emesis after coughing fit. Zofran given per order. Precedex titrated up. Pt having increased PVCs and bigeminy during these episodes. 
 
0600: Pt had another episode of emesis while turned for cleaning. Precedex titrated up. Emesis becoming more brown in color. 
 
0730: Report to HEMA Burroughs

## 2020-12-31 NOTE — CONSULTS
Nephrology Progress Note [facility]  
 
[email or web address]              Phone - [phone] Patient: Carol Garcia YOB: 1980 Date- 2020 MRN: [id number] F/u  DANY  
CONSULTING PHYSICIAN: Dr Isaiah De Leon ADMIT DATE:2020 PATIENT PCP:Other, MD Kilo  
 
IMPRESSION:  
# Nonoliguric DANY likely COVID-19 related + Vanc toxicity # Anemia # Respiratory failure due to COVID-19 infection and possible superimposed bacterial pneumonia # Hypoalbuminemia( Alb 1.5) PLAN:  
Cr slightly better today w/ IV Albumin Hb droppin today from 8.7 (2 days ago), check FOBT 
- continue IV Albumin  in hope to improve renal perfusion w/ volume expansion 
- pending renal US,  urine electrolytes 
 - adjust all meds to GFR<15 as current GFR is unknown 
- strict I/O's, monitor UOP Thank you for allowing us to participate in the care this patient. We will follow patient with you. Principal Problem: 
  COVID-19 (2020) Active Problems: 
  Pneumonia due to COVID-19 virus (2020) Respiratory failure ([address] (2020) HFrEF (heart failure with reduced ejection fraction) ([address] (2020) Down syndrome (2020) Acquired hypothyroidism (2020) Goals of care, counseling/discussion (2020) Acute renal failure with tubular necrosis ([address] (2020) [x] High complexity decision making was performed 
[x] Patient is at high-risk of decompensation with multiple organ involvement Subjective:  
Cr slightly better today w/ IV Albumin, improving UOP Hb droppin today from 8.7 (2 days ago) Review of Systems:  
 Not performed. Pt on airborne isolation for COVID-19 Past Medical History:  
Diagnosis Date  Endocrine disease  Hypothyroid 2018  Ill-defined condition Down's Syndrome No past surgical history on file. Prior to Admission medications Medication Sig Start Date End Date Taking? Authorizing Provider  
ivabradine (CORLANOR) 5 mg tablet Take 1 Tab by mouth two (2) times daily (with meals). Indications: chronic heart failure, inappropriate sinus tachycardia 12/6/20   Mitzi Gutierrez MD  
metoprolol succinate (TOPROL-XL) 25 mg XL tablet Take 1 Tab by mouth daily. 12/6/20   Mitzi Gutierrez MD  
risperiDONE (RisperDAL) 1 mg tablet Take 1 mg by mouth nightly. Provider, Aicha  
solifenacin (VESICARE) 10 mg tablet TAKE 1 TABLET BY MOUTH EVERY DAY 9/18/20   Other, MD Kilo  
medroxyPROGESTERone (DEPO-PROVERA) 150 mg/mL injection 150 mg, IM, once, To be administered in clinic by nurse. See order comments for schedule., # 1 vial, 4 Refills, Pharmacy: [facility]/pharmacy #[id number] 12/5/19   Other, MD Kilo  
albuterol (PROVENTIL HFA, VENTOLIN HFA, PROAIR HFA) 90 mcg/actuation inhaler Take 2 Puffs by inhalation every four (4) hours as needed for Wheezing. 11/2/20   Melania Ruby MD  
zinc sulfate 220 mg tablet Take 1 Tab by mouth daily. 11/2/20   Melaina Ruby MD  
cholecalciferol (VITAMIN D3) 1,000 unit tablet Take 1,000 Units by mouth daily. Other, MD Kilo  
levothyroxine (SYNTHROID) 50 mcg tablet Take 50 mcg by mouth Daily (before breakfast). Other, MD Kilo  
 
No Known Allergies Social History Tobacco Use  Smoking status: Never Smoker  Smokeless tobacco: Never Used Substance Use Topics  Alcohol use: No  
  
No family history on file. Objective:   
 
Patient Vitals for the past 24 hrs: 
 Temp Pulse Resp BP SpO2  
12/31/20 0930  70 18  100 % 12/31/20 0900  69 18 117/62 100 % 12/31/20 0830  (!) 54 18  100 % 12/31/20 0808  81 18  100 % 12/31/20 0800  68 18 (!) 111/51 100 % 12/31/20 0752 97.6 °F (36.4 °C) 80 19 112/65 99 % 12/31/20 0730  69 18  99 % 12/31/20 0700  88 19 115/60 98 % 12/31/20 0600  66 20  100 % 12/31/20 0500  85 24  100 % 12/31/20 0427  90 18  100 % 12/31/20 0401  99 20  97 % 12/31/20 0400 98.6 °F (37 °C) (!) 0   97 % 12/31/20 0300  96 18  97 % 12/31/20 0200  91 18  98 % 12/31/20 0000 98.7 °F (37.1 °C) 90 18  97 % 12/30/20 2329  95 25  98 % 12/30/20 2300  79 18 112/62 100 % 12/30/20 2200  88 16  100 % 12/30/20 2100  83 18 (!) 104/51 99 % 12/30/20 2000 98.4 °F (36.9 °C) 83 18 (!) 97/46 100 % 12/30/20 1925  84 18  100 % 12/30/20 1900  87 18 (!) 93/45 100 % 12/30/20 1800  77 18 (!) 92/46 100 % 12/30/20 1700  84 18 (!) 88/46 99 % 12/30/20 1600 98.8 °F (37.1 °C) 85 18 (!) 90/40 100 % 12/30/20 1509  87 18  100 % 12/30/20 1400  95 19 (!) 92/52 100 % 12/30/20 1300  97 18 (!) 101/54 100 % 12/30/20 1200  (!) 108 19 112/64 98 % 12/30/20 1145 98.9 °F (37.2 °C) (!) 112 18  99 % 12/30/20 1115  96 18  100 % 12/30/20 1100  93 18 (!) 104/58 100 % No intake/output data recorded. Physical Exam: 
Seen from outside the room General: critically ill, intubated, sedated in NAD HEENT: AT/NC, ETT in place Lungs:on MV 
CV:RRR 
 
CODE STATUS: DNR Chart reviewed. 
 
y Reviewed previous records Lab Data Personally Reviewed: (see below) Recent Labs 12/31/20 
0401 12/30/20 
0404 12/29/20 
0310 WBC 19.1* 22.7* 27.7* HGB 7.0* 7.9* 8.7*  351 324 ANEU 14.3* 15.9* 18.6* * 142 142  
K 4.4 4.9 4.6 GLU 99 105* 108* BUN 97* 91* 83* CREA 3.69* 3.75* 4.09* ALT 20 15 14 TBILI 0.4 0.8 0.2 AP 47 68 78 CA 10.6*  10.9* 10.1 9.4 MG  --  3.0*  --   
PHOS 5.4*  --   --   
 
Lab Results Component Value Date/Time  Color YELLOW/STRAW 12/30/2020 05:37 PM  
 Appearance TURBID (A) 12/30/2020 05:37 PM  
 Specific gravity 1.012 12/30/2020 05:37 PM  
 Specific gravity 1.010 11/20/2020 01:57 AM  
 pH (UA) 5.0 12/30/2020 05:37 PM  
 Protein 30 (A) 12/30/2020 05:37 PM  
 Glucose Negative 12/30/2020 05:37 PM  
 Ketone Negative 12/30/2020 05:37 PM  
 Bilirubin Negative 2020 05:37 PM  
 Urobilinogen 0.2 2020 05:37 PM  
 Nitrites Negative 2020 05:37 PM  
 Leukocyte Esterase SMALL (A) 2020 05:37 PM  
 Epithelial cells FEW 2020 05:37 PM  
 Bacteria Negative 2020 05:37 PM  
 WBC 20-50 2020 05:37 PM  
 RBC 5-10 2020 05:37 PM  
 
 
Lab Results Component Value Date/Time Iron 65 2020 03:11 AM  
 TIBC 279 2020 03:11 AM  
 Iron % saturation 23 2020 03:11 AM  
 Ferritin 555 (H) 2020 04:51 AM  
 
Lab Results Component Value Date/Time Culture result: SCANT NORMAL RESPIRATORY CHAD 2020 08:30 AM  
 Culture result: No growth (<1,000 CFU/ML) 2020 08:20 AM  
 Culture result: NO GROWTH 5 DAYS 2020 11:49 AM  
 
Prior to Admission Medications Prescriptions Last Dose Informant Patient Reported? Taking? albuterol (PROVENTIL HFA, VENTOLIN HFA, PROAIR HFA) 90 mcg/actuation inhaler  Family Member No No  
Sig: Take 2 Puffs by inhalation every four (4) hours as needed for Wheezing. cholecalciferol (VITAMIN D3) 1,000 unit tablet  Family Member Yes No  
Sig: Take 1,000 Units by mouth daily. ivabradine (CORLANOR) 5 mg tablet   No No  
Sig: Take 1 Tab by mouth two (2) times daily (with meals). Indications: chronic heart failure, inappropriate sinus tachycardia  
levothyroxine (SYNTHROID) 50 mcg tablet  Family Member Yes No  
Sig: Take 50 mcg by mouth Daily (before breakfast). medroxyPROGESTERone (DEPO-PROVERA) 150 mg/mL injection  Family Member Yes No  
Si mg, IM, once, To be administered in clinic by nurse. See order comments for schedule., # 1 vial, 4 Refills, Pharmacy: [facility]/pharmacy #[id number]  
metoprolol succinate (TOPROL-XL) 25 mg XL tablet   No No  
Sig: Take 1 Tab by mouth daily. risperiDONE (RisperDAL) 1 mg tablet  Family Member Yes No  
Sig: Take 1 mg by mouth nightly.   
solifenacin (VESICARE) 10 mg tablet  Family Member Yes No  
Sig: TAKE 1 TABLET BY MOUTH EVERY DAY  
 zinc sulfate 220 mg tablet  Family Member No No  
Sig: Take 1 Tab by mouth daily. Facility-Administered Medications: None Imaging: 
 
Medications list Personally Reviewed   [x]      Yes     []               No   
 
Signed By: Deisi Hull [address] [facility] [employer] [address] [place], [address] Phone - [phone] Fax - [phone] [place] [address], [address] [facility] Phone - [phone] Fax - [phone]    
[email or web address]

## 2021-01-01 ENCOUNTER — APPOINTMENT (OUTPATIENT)
Dept: GENERAL RADIOLOGY | Age: 41
DRG: 004 | End: 2021-01-01
Attending: INTERNAL MEDICINE
Payer: MEDICARE

## 2021-01-01 ENCOUNTER — APPOINTMENT (OUTPATIENT)
Dept: GENERAL RADIOLOGY | Age: 41
DRG: 004 | End: 2021-01-01
Attending: NURSE PRACTITIONER
Payer: MEDICARE

## 2021-01-01 ENCOUNTER — ANESTHESIA EVENT (OUTPATIENT)
Dept: SURGERY | Age: 41
DRG: 004 | End: 2021-01-01
Payer: MEDICARE

## 2021-01-01 ENCOUNTER — APPOINTMENT (OUTPATIENT)
Dept: CT IMAGING | Age: 41
DRG: 004 | End: 2021-01-01
Attending: INTERNAL MEDICINE
Payer: MEDICARE

## 2021-01-01 ENCOUNTER — ANESTHESIA (OUTPATIENT)
Dept: ENDOSCOPY | Age: 41
DRG: 004 | End: 2021-01-01
Payer: MEDICARE

## 2021-01-01 ENCOUNTER — ANESTHESIA EVENT (OUTPATIENT)
Dept: ENDOSCOPY | Age: 41
DRG: 004 | End: 2021-01-01
Payer: MEDICARE

## 2021-01-01 ENCOUNTER — APPOINTMENT (OUTPATIENT)
Dept: ULTRASOUND IMAGING | Age: 41
DRG: 004 | End: 2021-01-01
Attending: INTERNAL MEDICINE
Payer: MEDICARE

## 2021-01-01 ENCOUNTER — ANESTHESIA (OUTPATIENT)
Dept: SURGERY | Age: 41
DRG: 004 | End: 2021-01-01
Payer: MEDICARE

## 2021-01-01 ENCOUNTER — APPOINTMENT (OUTPATIENT)
Dept: GENERAL RADIOLOGY | Age: 41
DRG: 004 | End: 2021-01-01
Attending: HOSPITALIST
Payer: MEDICARE

## 2021-01-01 VITALS
HEIGHT: 63 IN | SYSTOLIC BLOOD PRESSURE: 78 MMHG | RESPIRATION RATE: 18 BRPM | BODY MASS INDEX: 35.47 KG/M2 | TEMPERATURE: 96.5 F | HEART RATE: 59 BPM | DIASTOLIC BLOOD PRESSURE: 25 MMHG | OXYGEN SATURATION: 67 % | WEIGHT: 200.18 LBS

## 2021-01-01 VITALS
SYSTOLIC BLOOD PRESSURE: 100 MMHG | RESPIRATION RATE: 16 BRPM | HEART RATE: 58 BPM | OXYGEN SATURATION: 99 % | DIASTOLIC BLOOD PRESSURE: 53 MMHG

## 2021-01-01 PROBLEM — E87.20 LACTIC ACIDOSIS: Status: ACTIVE | Noted: 2020-01-01

## 2021-01-01 PROBLEM — K44.9 LARGE HIATAL HERNIA: Chronic | Status: ACTIVE | Noted: 2020-01-01

## 2021-01-01 PROBLEM — J12.82 PNEUMONIA DUE TO COVID-19 VIRUS: Status: RESOLVED | Noted: 2020-01-01 | Resolved: 2020-01-01

## 2021-01-01 PROBLEM — N18.4 KIDNEY DISEASE, CHRONIC, STAGE IV (SEVERE, EGFR 15-29 ML/MIN) (HCC): Status: ACTIVE | Noted: 2020-01-01

## 2021-01-01 PROBLEM — A41.50 GRAM NEGATIVE SEPTIC SHOCK (HCC): Status: ACTIVE | Noted: 2020-01-01

## 2021-01-01 PROBLEM — Q90.9 DOWN SYNDROME: Chronic | Status: ACTIVE | Noted: 2020-01-01

## 2021-01-01 PROBLEM — R65.20 SEVERE SEPSIS WITH ACUTE ORGAN DYSFUNCTION (HCC): Status: ACTIVE | Noted: 2020-01-01

## 2021-01-01 PROBLEM — R65.21 GRAM NEGATIVE SEPTIC SHOCK (HCC): Status: ACTIVE | Noted: 2020-01-01

## 2021-01-01 PROBLEM — U07.1 COVID-19: Status: RESOLVED | Noted: 2020-01-01 | Resolved: 2020-01-01

## 2021-01-01 PROBLEM — G93.31 POST VIRAL SYNDROME: Status: ACTIVE | Noted: 2020-01-01

## 2021-01-01 PROBLEM — J96.01 ACUTE HYPOXEMIC RESPIRATORY FAILURE (HCC): Status: ACTIVE | Noted: 2020-01-01

## 2021-01-01 PROBLEM — N17.1 ACUTE RENAL FAILURE WITH ACUTE RENAL CORTICAL NECROSIS SUPERIMPOSED ON STAGE 4 CHRONIC KIDNEY DISEASE (HCC): Chronic | Status: ACTIVE | Noted: 2020-01-01

## 2021-01-01 PROBLEM — K59.03 CONSTIPATION DUE TO PAIN MEDICATION THERAPY: Status: ACTIVE | Noted: 2020-01-01

## 2021-01-01 PROBLEM — J69.0 ASPIRATION PNEUMONIA OF BOTH LUNGS DUE TO VOMIT (HCC): Status: ACTIVE | Noted: 2020-01-01

## 2021-01-01 PROBLEM — N18.4 ACUTE RENAL FAILURE WITH ACUTE RENAL CORTICAL NECROSIS SUPERIMPOSED ON STAGE 4 CHRONIC KIDNEY DISEASE (HCC): Status: ACTIVE | Noted: 2020-01-01

## 2021-01-01 PROBLEM — U07.1 PNEUMONIA DUE TO COVID-19 VIRUS: Status: RESOLVED | Noted: 2020-01-01 | Resolved: 2020-01-01

## 2021-01-01 PROBLEM — A41.9 SEVERE SEPSIS WITH ACUTE ORGAN DYSFUNCTION (HCC): Status: ACTIVE | Noted: 2020-01-01

## 2021-01-01 PROBLEM — K56.41 FECAL IMPACTION OF COLON (HCC): Status: ACTIVE | Noted: 2020-01-01

## 2021-01-01 PROBLEM — N18.4 ACUTE RENAL FAILURE WITH ACUTE RENAL CORTICAL NECROSIS SUPERIMPOSED ON STAGE 4 CHRONIC KIDNEY DISEASE (HCC): Chronic | Status: ACTIVE | Noted: 2020-01-01

## 2021-01-01 PROBLEM — E87.0 DEHYDRATION WITH HYPERNATREMIA: Chronic | Status: ACTIVE | Noted: 2020-01-01

## 2021-01-01 PROBLEM — E86.0 DEHYDRATION WITH HYPERNATREMIA: Chronic | Status: ACTIVE | Noted: 2020-01-01

## 2021-01-01 PROBLEM — N17.1 ACUTE RENAL FAILURE WITH ACUTE RENAL CORTICAL NECROSIS SUPERIMPOSED ON STAGE 4 CHRONIC KIDNEY DISEASE (HCC): Status: ACTIVE | Noted: 2020-01-01

## 2021-01-01 LAB
ABO + RH BLD: NORMAL
ABO + RH BLD: NORMAL
ALBUMIN SERPL-MCNC: 1.9 G/DL (ref 3.5–5)
ALBUMIN SERPL-MCNC: 2.2 G/DL (ref 3.5–5)
ALBUMIN SERPL-MCNC: 2.3 G/DL (ref 3.5–5)
ALBUMIN SERPL-MCNC: 2.4 G/DL (ref 3.5–5)
ALBUMIN SERPL-MCNC: 2.5 G/DL (ref 3.5–5)
ALBUMIN SERPL-MCNC: 2.7 G/DL (ref 3.5–5)
ALBUMIN SERPL-MCNC: 2.9 G/DL (ref 3.5–5)
ALBUMIN SERPL-MCNC: 3 G/DL (ref 3.5–5)
ALBUMIN SERPL-MCNC: 3.5 G/DL (ref 3.5–5)
ALBUMIN SERPL-MCNC: 3.5 G/DL (ref 3.5–5)
ALBUMIN SERPL-MCNC: 3.9 G/DL (ref 3.5–5)
ALBUMIN/GLOB SERPL: 0.7 {RATIO} (ref 1.1–2.2)
ALBUMIN/GLOB SERPL: 1 {RATIO} (ref 1.1–2.2)
ALBUMIN/GLOB SERPL: 1.2 {RATIO} (ref 1.1–2.2)
ALBUMIN/GLOB SERPL: 1.4 {RATIO} (ref 1.1–2.2)
ALP SERPL-CCNC: 30 U/L (ref 45–117)
ALP SERPL-CCNC: 31 U/L (ref 45–117)
ALP SERPL-CCNC: 38 U/L (ref 45–117)
ALP SERPL-CCNC: 45 U/L (ref 45–117)
ALT SERPL-CCNC: 17 U/L (ref 12–78)
ALT SERPL-CCNC: 17 U/L (ref 12–78)
ALT SERPL-CCNC: 19 U/L (ref 12–78)
ALT SERPL-CCNC: 24 U/L (ref 12–78)
ANION GAP SERPL CALC-SCNC: 10 MMOL/L (ref 5–15)
ANION GAP SERPL CALC-SCNC: 10 MMOL/L (ref 5–15)
ANION GAP SERPL CALC-SCNC: 2 MMOL/L (ref 5–15)
ANION GAP SERPL CALC-SCNC: 2 MMOL/L (ref 5–15)
ANION GAP SERPL CALC-SCNC: 4 MMOL/L (ref 5–15)
ANION GAP SERPL CALC-SCNC: 5 MMOL/L (ref 5–15)
ANION GAP SERPL CALC-SCNC: 6 MMOL/L (ref 5–15)
ANION GAP SERPL CALC-SCNC: 7 MMOL/L (ref 5–15)
ANION GAP SERPL CALC-SCNC: 7 MMOL/L (ref 5–15)
ANION GAP SERPL CALC-SCNC: 8 MMOL/L (ref 5–15)
APPEARANCE UR: ABNORMAL
AST SERPL-CCNC: 16 U/L (ref 15–37)
AST SERPL-CCNC: 17 U/L (ref 15–37)
AST SERPL-CCNC: 19 U/L (ref 15–37)
AST SERPL-CCNC: 25 U/L (ref 15–37)
ATRIAL RATE: 118 BPM
BACTERIA SPEC CULT: ABNORMAL
BACTERIA SPEC CULT: NORMAL
BACTERIA URNS QL MICRO: ABNORMAL /HPF
BASOPHILS # BLD: 0 K/UL (ref 0–0.1)
BASOPHILS # BLD: 0.1 K/UL (ref 0–0.1)
BASOPHILS NFR BLD: 0 % (ref 0–1)
BILIRUB SERPL-MCNC: 0.6 MG/DL (ref 0.2–1)
BILIRUB SERPL-MCNC: 0.6 MG/DL (ref 0.2–1)
BILIRUB SERPL-MCNC: 0.7 MG/DL (ref 0.2–1)
BILIRUB SERPL-MCNC: 0.8 MG/DL (ref 0.2–1)
BILIRUB UR QL: NEGATIVE
BLD PROD TYP BPU: NORMAL
BLD PROD TYP BPU: NORMAL
BLOOD GROUP ANTIBODIES SERPL: NORMAL
BLOOD GROUP ANTIBODIES SERPL: NORMAL
BPU ID: NORMAL
BPU ID: NORMAL
BUN SERPL-MCNC: 13 MG/DL (ref 6–20)
BUN SERPL-MCNC: 14 MG/DL (ref 6–20)
BUN SERPL-MCNC: 15 MG/DL (ref 6–20)
BUN SERPL-MCNC: 16 MG/DL (ref 6–20)
BUN SERPL-MCNC: 18 MG/DL (ref 6–20)
BUN SERPL-MCNC: 19 MG/DL (ref 6–20)
BUN SERPL-MCNC: 19 MG/DL (ref 6–20)
BUN SERPL-MCNC: 20 MG/DL (ref 6–20)
BUN SERPL-MCNC: 22 MG/DL (ref 6–20)
BUN SERPL-MCNC: 22 MG/DL (ref 6–20)
BUN SERPL-MCNC: 24 MG/DL (ref 6–20)
BUN SERPL-MCNC: 25 MG/DL (ref 6–20)
BUN SERPL-MCNC: 31 MG/DL (ref 6–20)
BUN SERPL-MCNC: 39 MG/DL (ref 6–20)
BUN SERPL-MCNC: 46 MG/DL (ref 6–20)
BUN SERPL-MCNC: 50 MG/DL (ref 6–20)
BUN SERPL-MCNC: 61 MG/DL (ref 6–20)
BUN SERPL-MCNC: 80 MG/DL (ref 6–20)
BUN SERPL-MCNC: 91 MG/DL (ref 6–20)
BUN SERPL-MCNC: 93 MG/DL (ref 6–20)
BUN/CREAT SERPL: 10 (ref 12–20)
BUN/CREAT SERPL: 11 (ref 12–20)
BUN/CREAT SERPL: 11 (ref 12–20)
BUN/CREAT SERPL: 12 (ref 12–20)
BUN/CREAT SERPL: 13 (ref 12–20)
BUN/CREAT SERPL: 13 (ref 12–20)
BUN/CREAT SERPL: 14 (ref 12–20)
BUN/CREAT SERPL: 14 (ref 12–20)
BUN/CREAT SERPL: 15 (ref 12–20)
BUN/CREAT SERPL: 18 (ref 12–20)
BUN/CREAT SERPL: 19 (ref 12–20)
BUN/CREAT SERPL: 22 (ref 12–20)
BUN/CREAT SERPL: 22 (ref 12–20)
BUN/CREAT SERPL: 23 (ref 12–20)
BUN/CREAT SERPL: 24 (ref 12–20)
BUN/CREAT SERPL: 24 (ref 12–20)
BUN/CREAT SERPL: 25 (ref 12–20)
BUN/CREAT SERPL: 25 (ref 12–20)
CALCIUM SERPL-MCNC: 10 MG/DL (ref 8.5–10.1)
CALCIUM SERPL-MCNC: 10.3 MG/DL (ref 8.5–10.1)
CALCIUM SERPL-MCNC: 10.5 MG/DL (ref 8.5–10.1)
CALCIUM SERPL-MCNC: 7.3 MG/DL (ref 8.5–10.1)
CALCIUM SERPL-MCNC: 7.9 MG/DL (ref 8.5–10.1)
CALCIUM SERPL-MCNC: 8.2 MG/DL (ref 8.5–10.1)
CALCIUM SERPL-MCNC: 8.3 MG/DL (ref 8.5–10.1)
CALCIUM SERPL-MCNC: 8.4 MG/DL (ref 8.5–10.1)
CALCIUM SERPL-MCNC: 8.6 MG/DL (ref 8.5–10.1)
CALCIUM SERPL-MCNC: 8.6 MG/DL (ref 8.5–10.1)
CALCIUM SERPL-MCNC: 8.7 MG/DL (ref 8.5–10.1)
CALCIUM SERPL-MCNC: 8.8 MG/DL (ref 8.5–10.1)
CALCIUM SERPL-MCNC: 8.8 MG/DL (ref 8.5–10.1)
CALCIUM SERPL-MCNC: 8.9 MG/DL (ref 8.5–10.1)
CALCIUM SERPL-MCNC: 8.9 MG/DL (ref 8.5–10.1)
CALCIUM SERPL-MCNC: 9 MG/DL (ref 8.5–10.1)
CALCIUM SERPL-MCNC: 9.1 MG/DL (ref 8.5–10.1)
CALCIUM SERPL-MCNC: 9.1 MG/DL (ref 8.5–10.1)
CALCIUM SERPL-MCNC: 9.2 MG/DL (ref 8.5–10.1)
CALCIUM SERPL-MCNC: 9.4 MG/DL (ref 8.5–10.1)
CALCULATED P AXIS, ECG09: 46 DEGREES
CALCULATED R AXIS, ECG10: 29 DEGREES
CALCULATED T AXIS, ECG11: 31 DEGREES
CC UR VC: ABNORMAL
CHLORIDE SERPL-SCNC: 105 MMOL/L (ref 97–108)
CHLORIDE SERPL-SCNC: 106 MMOL/L (ref 97–108)
CHLORIDE SERPL-SCNC: 109 MMOL/L (ref 97–108)
CHLORIDE SERPL-SCNC: 111 MMOL/L (ref 97–108)
CHLORIDE SERPL-SCNC: 111 MMOL/L (ref 97–108)
CHLORIDE SERPL-SCNC: 112 MMOL/L (ref 97–108)
CHLORIDE SERPL-SCNC: 113 MMOL/L (ref 97–108)
CHLORIDE SERPL-SCNC: 114 MMOL/L (ref 97–108)
CHLORIDE SERPL-SCNC: 114 MMOL/L (ref 97–108)
CHLORIDE SERPL-SCNC: 115 MMOL/L (ref 97–108)
CHLORIDE SERPL-SCNC: 116 MMOL/L (ref 97–108)
CHLORIDE SERPL-SCNC: 117 MMOL/L (ref 97–108)
CO2 SERPL-SCNC: 19 MMOL/L (ref 21–32)
CO2 SERPL-SCNC: 20 MMOL/L (ref 21–32)
CO2 SERPL-SCNC: 22 MMOL/L (ref 21–32)
CO2 SERPL-SCNC: 23 MMOL/L (ref 21–32)
CO2 SERPL-SCNC: 24 MMOL/L (ref 21–32)
CO2 SERPL-SCNC: 24 MMOL/L (ref 21–32)
CO2 SERPL-SCNC: 25 MMOL/L (ref 21–32)
CO2 SERPL-SCNC: 26 MMOL/L (ref 21–32)
CO2 SERPL-SCNC: 26 MMOL/L (ref 21–32)
CO2 SERPL-SCNC: 27 MMOL/L (ref 21–32)
CO2 SERPL-SCNC: 28 MMOL/L (ref 21–32)
CO2 SERPL-SCNC: 29 MMOL/L (ref 21–32)
CO2 SERPL-SCNC: 31 MMOL/L (ref 21–32)
CO2 SERPL-SCNC: 31 MMOL/L (ref 21–32)
CO2 SERPL-SCNC: 34 MMOL/L (ref 21–32)
COLOR UR: ABNORMAL
COMMENT, HOLDF: NORMAL
CREAT SERPL-MCNC: 1.04 MG/DL (ref 0.55–1.02)
CREAT SERPL-MCNC: 1.15 MG/DL (ref 0.55–1.02)
CREAT SERPL-MCNC: 1.16 MG/DL (ref 0.55–1.02)
CREAT SERPL-MCNC: 1.21 MG/DL (ref 0.55–1.02)
CREAT SERPL-MCNC: 1.25 MG/DL (ref 0.55–1.02)
CREAT SERPL-MCNC: 1.26 MG/DL (ref 0.55–1.02)
CREAT SERPL-MCNC: 1.28 MG/DL (ref 0.55–1.02)
CREAT SERPL-MCNC: 1.29 MG/DL (ref 0.55–1.02)
CREAT SERPL-MCNC: 1.45 MG/DL (ref 0.55–1.02)
CREAT SERPL-MCNC: 1.68 MG/DL (ref 0.55–1.02)
CREAT SERPL-MCNC: 1.68 MG/DL (ref 0.55–1.02)
CREAT SERPL-MCNC: 1.71 MG/DL (ref 0.55–1.02)
CREAT SERPL-MCNC: 1.77 MG/DL (ref 0.55–1.02)
CREAT SERPL-MCNC: 1.77 MG/DL (ref 0.55–1.02)
CREAT SERPL-MCNC: 1.86 MG/DL (ref 0.55–1.02)
CREAT SERPL-MCNC: 2.25 MG/DL (ref 0.55–1.02)
CREAT SERPL-MCNC: 2.68 MG/DL (ref 0.55–1.02)
CREAT SERPL-MCNC: 3.32 MG/DL (ref 0.55–1.02)
CREAT SERPL-MCNC: 3.69 MG/DL (ref 0.55–1.02)
CREAT SERPL-MCNC: 3.78 MG/DL (ref 0.55–1.02)
CROSSMATCH RESULT,%XM: NORMAL
CROSSMATCH RESULT,%XM: NORMAL
DATE LAST DOSE: ABNORMAL
DATE LAST DOSE: ABNORMAL
DIAGNOSIS, 93000: NORMAL
DIFFERENTIAL METHOD BLD: ABNORMAL
EOSINOPHIL # BLD: 0 K/UL (ref 0–0.4)
EOSINOPHIL # BLD: 0.1 K/UL (ref 0–0.4)
EOSINOPHIL # BLD: 0.3 K/UL (ref 0–0.4)
EOSINOPHIL # BLD: 0.4 K/UL (ref 0–0.4)
EOSINOPHIL # BLD: 0.5 K/UL (ref 0–0.4)
EOSINOPHIL # BLD: 0.5 K/UL (ref 0–0.4)
EOSINOPHIL # BLD: 0.6 K/UL (ref 0–0.4)
EOSINOPHIL # BLD: 0.8 K/UL (ref 0–0.4)
EOSINOPHIL # BLD: 1.6 K/UL (ref 0–0.4)
EOSINOPHIL # BLD: 1.7 K/UL (ref 0–0.4)
EOSINOPHIL # BLD: 1.8 K/UL (ref 0–0.4)
EOSINOPHIL # BLD: 2.3 K/UL (ref 0–0.4)
EOSINOPHIL NFR BLD: 0 % (ref 0–7)
EOSINOPHIL NFR BLD: 0 % (ref 0–7)
EOSINOPHIL NFR BLD: 10 % (ref 0–7)
EOSINOPHIL NFR BLD: 11 % (ref 0–7)
EOSINOPHIL NFR BLD: 11 % (ref 0–7)
EOSINOPHIL NFR BLD: 12 % (ref 0–7)
EOSINOPHIL NFR BLD: 2 % (ref 0–7)
EOSINOPHIL NFR BLD: 3 % (ref 0–7)
EOSINOPHIL NFR BLD: 4 % (ref 0–7)
EOSINOPHIL NFR BLD: 5 % (ref 0–7)
EOSINOPHIL NFR BLD: 6 % (ref 0–7)
EOSINOPHIL NFR BLD: 6 % (ref 0–7)
EPITH CASTS URNS QL MICRO: ABNORMAL /LPF
ERYTHROCYTE [DISTWIDTH] IN BLOOD BY AUTOMATED COUNT: 15.9 % (ref 11.5–14.5)
ERYTHROCYTE [DISTWIDTH] IN BLOOD BY AUTOMATED COUNT: 15.9 % (ref 11.5–14.5)
ERYTHROCYTE [DISTWIDTH] IN BLOOD BY AUTOMATED COUNT: 16 % (ref 11.5–14.5)
ERYTHROCYTE [DISTWIDTH] IN BLOOD BY AUTOMATED COUNT: 16 % (ref 11.5–14.5)
ERYTHROCYTE [DISTWIDTH] IN BLOOD BY AUTOMATED COUNT: 16.1 % (ref 11.5–14.5)
ERYTHROCYTE [DISTWIDTH] IN BLOOD BY AUTOMATED COUNT: 16.2 % (ref 11.5–14.5)
ERYTHROCYTE [DISTWIDTH] IN BLOOD BY AUTOMATED COUNT: 16.3 % (ref 11.5–14.5)
ERYTHROCYTE [DISTWIDTH] IN BLOOD BY AUTOMATED COUNT: 16.4 % (ref 11.5–14.5)
ERYTHROCYTE [DISTWIDTH] IN BLOOD BY AUTOMATED COUNT: 16.5 % (ref 11.5–14.5)
ERYTHROCYTE [DISTWIDTH] IN BLOOD BY AUTOMATED COUNT: 16.6 % (ref 11.5–14.5)
ERYTHROCYTE [DISTWIDTH] IN BLOOD BY AUTOMATED COUNT: 16.9 % (ref 11.5–14.5)
ERYTHROCYTE [DISTWIDTH] IN BLOOD BY AUTOMATED COUNT: 17 % (ref 11.5–14.5)
ERYTHROCYTE [DISTWIDTH] IN BLOOD BY AUTOMATED COUNT: 17.1 % (ref 11.5–14.5)
ERYTHROCYTE [DISTWIDTH] IN BLOOD BY AUTOMATED COUNT: 17.3 % (ref 11.5–14.5)
ERYTHROCYTE [DISTWIDTH] IN BLOOD BY AUTOMATED COUNT: 17.7 % (ref 11.5–14.5)
ERYTHROCYTE [DISTWIDTH] IN BLOOD BY AUTOMATED COUNT: 18.2 % (ref 11.5–14.5)
FERRITIN SERPL-MCNC: 745 NG/ML (ref 8–252)
GLOBULIN SER CALC-MCNC: 2.8 G/DL (ref 2–4)
GLOBULIN SER CALC-MCNC: 3 G/DL (ref 2–4)
GLOBULIN SER CALC-MCNC: 3.4 G/DL (ref 2–4)
GLOBULIN SER CALC-MCNC: 4 G/DL (ref 2–4)
GLUCOSE BLD STRIP.AUTO-MCNC: 100 MG/DL (ref 65–100)
GLUCOSE BLD STRIP.AUTO-MCNC: 102 MG/DL (ref 65–100)
GLUCOSE BLD STRIP.AUTO-MCNC: 103 MG/DL (ref 65–100)
GLUCOSE BLD STRIP.AUTO-MCNC: 104 MG/DL (ref 65–100)
GLUCOSE BLD STRIP.AUTO-MCNC: 105 MG/DL (ref 65–100)
GLUCOSE BLD STRIP.AUTO-MCNC: 107 MG/DL (ref 65–100)
GLUCOSE BLD STRIP.AUTO-MCNC: 107 MG/DL (ref 65–100)
GLUCOSE BLD STRIP.AUTO-MCNC: 108 MG/DL (ref 65–100)
GLUCOSE BLD STRIP.AUTO-MCNC: 108 MG/DL (ref 65–100)
GLUCOSE BLD STRIP.AUTO-MCNC: 109 MG/DL (ref 65–100)
GLUCOSE BLD STRIP.AUTO-MCNC: 110 MG/DL (ref 65–100)
GLUCOSE BLD STRIP.AUTO-MCNC: 111 MG/DL (ref 65–100)
GLUCOSE BLD STRIP.AUTO-MCNC: 112 MG/DL (ref 65–100)
GLUCOSE BLD STRIP.AUTO-MCNC: 113 MG/DL (ref 65–100)
GLUCOSE BLD STRIP.AUTO-MCNC: 115 MG/DL (ref 65–100)
GLUCOSE BLD STRIP.AUTO-MCNC: 117 MG/DL (ref 65–100)
GLUCOSE BLD STRIP.AUTO-MCNC: 120 MG/DL (ref 65–100)
GLUCOSE BLD STRIP.AUTO-MCNC: 130 MG/DL (ref 65–100)
GLUCOSE BLD STRIP.AUTO-MCNC: 152 MG/DL (ref 65–100)
GLUCOSE BLD STRIP.AUTO-MCNC: 157 MG/DL (ref 65–100)
GLUCOSE BLD STRIP.AUTO-MCNC: 82 MG/DL (ref 65–100)
GLUCOSE BLD STRIP.AUTO-MCNC: 83 MG/DL (ref 65–100)
GLUCOSE BLD STRIP.AUTO-MCNC: 85 MG/DL (ref 65–100)
GLUCOSE BLD STRIP.AUTO-MCNC: 86 MG/DL (ref 65–100)
GLUCOSE BLD STRIP.AUTO-MCNC: 86 MG/DL (ref 65–100)
GLUCOSE BLD STRIP.AUTO-MCNC: 87 MG/DL (ref 65–100)
GLUCOSE BLD STRIP.AUTO-MCNC: 88 MG/DL (ref 65–100)
GLUCOSE BLD STRIP.AUTO-MCNC: 89 MG/DL (ref 65–100)
GLUCOSE BLD STRIP.AUTO-MCNC: 90 MG/DL (ref 65–100)
GLUCOSE BLD STRIP.AUTO-MCNC: 91 MG/DL (ref 65–100)
GLUCOSE BLD STRIP.AUTO-MCNC: 92 MG/DL (ref 65–100)
GLUCOSE BLD STRIP.AUTO-MCNC: 92 MG/DL (ref 65–100)
GLUCOSE BLD STRIP.AUTO-MCNC: 93 MG/DL (ref 65–100)
GLUCOSE BLD STRIP.AUTO-MCNC: 93 MG/DL (ref 65–100)
GLUCOSE BLD STRIP.AUTO-MCNC: 94 MG/DL (ref 65–100)
GLUCOSE BLD STRIP.AUTO-MCNC: 95 MG/DL (ref 65–100)
GLUCOSE BLD STRIP.AUTO-MCNC: 95 MG/DL (ref 65–100)
GLUCOSE BLD STRIP.AUTO-MCNC: 96 MG/DL (ref 65–100)
GLUCOSE BLD STRIP.AUTO-MCNC: 97 MG/DL (ref 65–100)
GLUCOSE BLD STRIP.AUTO-MCNC: 97 MG/DL (ref 65–100)
GLUCOSE BLD STRIP.AUTO-MCNC: 98 MG/DL (ref 65–100)
GLUCOSE BLD STRIP.AUTO-MCNC: 98 MG/DL (ref 65–100)
GLUCOSE BLD STRIP.AUTO-MCNC: 99 MG/DL (ref 65–100)
GLUCOSE SERPL-MCNC: 106 MG/DL (ref 65–100)
GLUCOSE SERPL-MCNC: 110 MG/DL (ref 65–100)
GLUCOSE SERPL-MCNC: 110 MG/DL (ref 65–100)
GLUCOSE SERPL-MCNC: 121 MG/DL (ref 65–100)
GLUCOSE SERPL-MCNC: 135 MG/DL (ref 65–100)
GLUCOSE SERPL-MCNC: 74 MG/DL (ref 65–100)
GLUCOSE SERPL-MCNC: 80 MG/DL (ref 65–100)
GLUCOSE SERPL-MCNC: 82 MG/DL (ref 65–100)
GLUCOSE SERPL-MCNC: 83 MG/DL (ref 65–100)
GLUCOSE SERPL-MCNC: 90 MG/DL (ref 65–100)
GLUCOSE SERPL-MCNC: 90 MG/DL (ref 65–100)
GLUCOSE SERPL-MCNC: 92 MG/DL (ref 65–100)
GLUCOSE SERPL-MCNC: 93 MG/DL (ref 65–100)
GLUCOSE SERPL-MCNC: 94 MG/DL (ref 65–100)
GLUCOSE SERPL-MCNC: 95 MG/DL (ref 65–100)
GLUCOSE SERPL-MCNC: 96 MG/DL (ref 65–100)
GLUCOSE SERPL-MCNC: 96 MG/DL (ref 65–100)
GLUCOSE SERPL-MCNC: 99 MG/DL (ref 65–100)
GLUCOSE UR STRIP.AUTO-MCNC: NEGATIVE MG/DL
GRAM STN SPEC: NORMAL
HCT VFR BLD AUTO: 20.8 % (ref 35–47)
HCT VFR BLD AUTO: 21.3 % (ref 35–47)
HCT VFR BLD AUTO: 21.9 % (ref 35–47)
HCT VFR BLD AUTO: 22.3 % (ref 35–47)
HCT VFR BLD AUTO: 22.8 % (ref 35–47)
HCT VFR BLD AUTO: 22.9 % (ref 35–47)
HCT VFR BLD AUTO: 23 % (ref 35–47)
HCT VFR BLD AUTO: 23 % (ref 35–47)
HCT VFR BLD AUTO: 23.1 % (ref 35–47)
HCT VFR BLD AUTO: 23.5 % (ref 35–47)
HCT VFR BLD AUTO: 23.7 % (ref 35–47)
HCT VFR BLD AUTO: 23.7 % (ref 35–47)
HCT VFR BLD AUTO: 24 % (ref 35–47)
HCT VFR BLD AUTO: 24.1 % (ref 35–47)
HCT VFR BLD AUTO: 24.3 % (ref 35–47)
HCT VFR BLD AUTO: 24.4 % (ref 35–47)
HCT VFR BLD AUTO: 24.5 % (ref 35–47)
HCT VFR BLD AUTO: 26.4 % (ref 35–47)
HEALTH STATUS, XMCV2T: NORMAL
HGB BLD-MCNC: 6.9 G/DL (ref 11.5–16)
HGB BLD-MCNC: 7 G/DL (ref 11.5–16)
HGB BLD-MCNC: 7.2 G/DL (ref 11.5–16)
HGB BLD-MCNC: 7.2 G/DL (ref 11.5–16)
HGB BLD-MCNC: 7.5 G/DL (ref 11.5–16)
HGB BLD-MCNC: 7.6 G/DL (ref 11.5–16)
HGB BLD-MCNC: 7.7 G/DL (ref 11.5–16)
HGB BLD-MCNC: 8 G/DL (ref 11.5–16)
HGB BLD-MCNC: 8 G/DL (ref 11.5–16)
HGB BLD-MCNC: 8.1 G/DL (ref 11.5–16)
HGB BLD-MCNC: 8.1 G/DL (ref 11.5–16)
HGB BLD-MCNC: 8.2 G/DL (ref 11.5–16)
HGB BLD-MCNC: 8.6 G/DL (ref 11.5–16)
HGB UR QL STRIP: ABNORMAL
HISTORY CHECKED?,CKHIST: NORMAL
IMM GRANULOCYTES # BLD AUTO: 0 K/UL (ref 0–0.04)
IMM GRANULOCYTES # BLD AUTO: 0.1 K/UL (ref 0–0.04)
IMM GRANULOCYTES # BLD AUTO: 0.2 K/UL (ref 0–0.04)
IMM GRANULOCYTES # BLD AUTO: 0.3 K/UL (ref 0–0.04)
IMM GRANULOCYTES # BLD AUTO: 0.3 K/UL (ref 0–0.04)
IMM GRANULOCYTES NFR BLD AUTO: 0 % (ref 0–0.5)
IMM GRANULOCYTES NFR BLD AUTO: 1 % (ref 0–0.5)
IMM GRANULOCYTES NFR BLD AUTO: 2 % (ref 0–0.5)
IMM GRANULOCYTES NFR BLD AUTO: 2 % (ref 0–0.5)
IRON SATN MFR SERPL: 13 % (ref 20–50)
IRON SERPL-MCNC: 15 UG/DL (ref 35–150)
KETONES UR QL STRIP.AUTO: NEGATIVE MG/DL
LEUKOCYTE ESTERASE UR QL STRIP.AUTO: ABNORMAL
LYMPHOCYTES # BLD: 0.8 K/UL (ref 0.8–3.5)
LYMPHOCYTES # BLD: 0.8 K/UL (ref 0.8–3.5)
LYMPHOCYTES # BLD: 1 K/UL (ref 0.8–3.5)
LYMPHOCYTES # BLD: 1.2 K/UL (ref 0.8–3.5)
LYMPHOCYTES # BLD: 1.3 K/UL (ref 0.8–3.5)
LYMPHOCYTES # BLD: 1.3 K/UL (ref 0.8–3.5)
LYMPHOCYTES # BLD: 1.4 K/UL (ref 0.8–3.5)
LYMPHOCYTES # BLD: 1.4 K/UL (ref 0.8–3.5)
LYMPHOCYTES # BLD: 1.5 K/UL (ref 0.8–3.5)
LYMPHOCYTES # BLD: 1.5 K/UL (ref 0.8–3.5)
LYMPHOCYTES # BLD: 1.7 K/UL (ref 0.8–3.5)
LYMPHOCYTES # BLD: 1.9 K/UL (ref 0.8–3.5)
LYMPHOCYTES # BLD: 1.9 K/UL (ref 0.8–3.5)
LYMPHOCYTES # BLD: 2 K/UL (ref 0.8–3.5)
LYMPHOCYTES # BLD: 2.1 K/UL (ref 0.8–3.5)
LYMPHOCYTES # BLD: 2.2 K/UL (ref 0.8–3.5)
LYMPHOCYTES NFR BLD: 10 % (ref 12–49)
LYMPHOCYTES NFR BLD: 11 % (ref 12–49)
LYMPHOCYTES NFR BLD: 11 % (ref 12–49)
LYMPHOCYTES NFR BLD: 12 % (ref 12–49)
LYMPHOCYTES NFR BLD: 12 % (ref 12–49)
LYMPHOCYTES NFR BLD: 13 % (ref 12–49)
LYMPHOCYTES NFR BLD: 14 % (ref 12–49)
LYMPHOCYTES NFR BLD: 14 % (ref 12–49)
LYMPHOCYTES NFR BLD: 15 % (ref 12–49)
LYMPHOCYTES NFR BLD: 4 % (ref 12–49)
LYMPHOCYTES NFR BLD: 6 % (ref 12–49)
LYMPHOCYTES NFR BLD: 7 % (ref 12–49)
LYMPHOCYTES NFR BLD: 7 % (ref 12–49)
LYMPHOCYTES NFR BLD: 8 % (ref 12–49)
LYMPHOCYTES NFR BLD: 9 % (ref 12–49)
LYMPHOCYTES NFR BLD: 9 % (ref 12–49)
MAGNESIUM SERPL-MCNC: 1.4 MG/DL (ref 1.6–2.4)
MAGNESIUM SERPL-MCNC: 1.4 MG/DL (ref 1.6–2.4)
MAGNESIUM SERPL-MCNC: 1.5 MG/DL (ref 1.6–2.4)
MAGNESIUM SERPL-MCNC: 1.6 MG/DL (ref 1.6–2.4)
MAGNESIUM SERPL-MCNC: 1.7 MG/DL (ref 1.6–2.4)
MAGNESIUM SERPL-MCNC: 1.8 MG/DL (ref 1.6–2.4)
MAGNESIUM SERPL-MCNC: 1.8 MG/DL (ref 1.6–2.4)
MAGNESIUM SERPL-MCNC: 1.9 MG/DL (ref 1.6–2.4)
MAGNESIUM SERPL-MCNC: 1.9 MG/DL (ref 1.6–2.4)
MAGNESIUM SERPL-MCNC: 2 MG/DL (ref 1.6–2.4)
MAGNESIUM SERPL-MCNC: 2.2 MG/DL (ref 1.6–2.4)
MAGNESIUM SERPL-MCNC: 2.3 MG/DL (ref 1.6–2.4)
MAGNESIUM SERPL-MCNC: 2.8 MG/DL (ref 1.6–2.4)
MAGNESIUM SERPL-MCNC: 2.8 MG/DL (ref 1.6–2.4)
MCH RBC QN AUTO: 26.4 PG (ref 26–34)
MCH RBC QN AUTO: 26.5 PG (ref 26–34)
MCH RBC QN AUTO: 26.5 PG (ref 26–34)
MCH RBC QN AUTO: 26.7 PG (ref 26–34)
MCH RBC QN AUTO: 26.7 PG (ref 26–34)
MCH RBC QN AUTO: 26.8 PG (ref 26–34)
MCH RBC QN AUTO: 26.9 PG (ref 26–34)
MCH RBC QN AUTO: 27 PG (ref 26–34)
MCH RBC QN AUTO: 27.1 PG (ref 26–34)
MCH RBC QN AUTO: 27.2 PG (ref 26–34)
MCH RBC QN AUTO: 27.3 PG (ref 26–34)
MCH RBC QN AUTO: 27.4 PG (ref 26–34)
MCHC RBC AUTO-ENTMCNC: 32.1 G/DL (ref 30–36.5)
MCHC RBC AUTO-ENTMCNC: 32.1 G/DL (ref 30–36.5)
MCHC RBC AUTO-ENTMCNC: 32.3 G/DL (ref 30–36.5)
MCHC RBC AUTO-ENTMCNC: 32.5 G/DL (ref 30–36.5)
MCHC RBC AUTO-ENTMCNC: 32.6 G/DL (ref 30–36.5)
MCHC RBC AUTO-ENTMCNC: 32.6 G/DL (ref 30–36.5)
MCHC RBC AUTO-ENTMCNC: 32.8 G/DL (ref 30–36.5)
MCHC RBC AUTO-ENTMCNC: 32.8 G/DL (ref 30–36.5)
MCHC RBC AUTO-ENTMCNC: 32.9 G/DL (ref 30–36.5)
MCHC RBC AUTO-ENTMCNC: 33 G/DL (ref 30–36.5)
MCHC RBC AUTO-ENTMCNC: 33.2 G/DL (ref 30–36.5)
MCHC RBC AUTO-ENTMCNC: 33.3 G/DL (ref 30–36.5)
MCHC RBC AUTO-ENTMCNC: 33.5 G/DL (ref 30–36.5)
MCHC RBC AUTO-ENTMCNC: 33.8 G/DL (ref 30–36.5)
MCV RBC AUTO: 79 FL (ref 80–99)
MCV RBC AUTO: 79.7 FL (ref 80–99)
MCV RBC AUTO: 80.2 FL (ref 80–99)
MCV RBC AUTO: 80.4 FL (ref 80–99)
MCV RBC AUTO: 80.6 FL (ref 80–99)
MCV RBC AUTO: 81.1 FL (ref 80–99)
MCV RBC AUTO: 81.6 FL (ref 80–99)
MCV RBC AUTO: 81.6 FL (ref 80–99)
MCV RBC AUTO: 81.7 FL (ref 80–99)
MCV RBC AUTO: 82 FL (ref 80–99)
MCV RBC AUTO: 82.1 FL (ref 80–99)
MCV RBC AUTO: 82.4 FL (ref 80–99)
MCV RBC AUTO: 82.5 FL (ref 80–99)
MCV RBC AUTO: 82.7 FL (ref 80–99)
MCV RBC AUTO: 83.2 FL (ref 80–99)
MCV RBC AUTO: 83.2 FL (ref 80–99)
MCV RBC AUTO: 84 FL (ref 80–99)
MCV RBC AUTO: 84.3 FL (ref 80–99)
MONOCYTES # BLD: 0.6 K/UL (ref 0–1)
MONOCYTES # BLD: 1 K/UL (ref 0–1)
MONOCYTES # BLD: 1.3 K/UL (ref 0–1)
MONOCYTES # BLD: 1.4 K/UL (ref 0–1)
MONOCYTES # BLD: 1.5 K/UL (ref 0–1)
MONOCYTES # BLD: 1.6 K/UL (ref 0–1)
MONOCYTES # BLD: 1.7 K/UL (ref 0–1)
MONOCYTES # BLD: 1.7 K/UL (ref 0–1)
MONOCYTES # BLD: 1.8 K/UL (ref 0–1)
MONOCYTES # BLD: 1.8 K/UL (ref 0–1)
MONOCYTES # BLD: 1.9 K/UL (ref 0–1)
MONOCYTES # BLD: 2 K/UL (ref 0–1)
MONOCYTES NFR BLD: 10 % (ref 5–13)
MONOCYTES NFR BLD: 11 % (ref 5–13)
MONOCYTES NFR BLD: 12 % (ref 5–13)
MONOCYTES NFR BLD: 12 % (ref 5–13)
MONOCYTES NFR BLD: 13 % (ref 5–13)
MONOCYTES NFR BLD: 13 % (ref 5–13)
MONOCYTES NFR BLD: 5 % (ref 5–13)
MONOCYTES NFR BLD: 5 % (ref 5–13)
MONOCYTES NFR BLD: 8 % (ref 5–13)
MONOCYTES NFR BLD: 9 % (ref 5–13)
MONOCYTES NFR BLD: 9 % (ref 5–13)
NEUTS SEG # BLD: 10.5 K/UL (ref 1.8–8)
NEUTS SEG # BLD: 10.6 K/UL (ref 1.8–8)
NEUTS SEG # BLD: 10.9 K/UL (ref 1.8–8)
NEUTS SEG # BLD: 11.2 K/UL (ref 1.8–8)
NEUTS SEG # BLD: 11.3 K/UL (ref 1.8–8)
NEUTS SEG # BLD: 11.4 K/UL (ref 1.8–8)
NEUTS SEG # BLD: 11.8 K/UL (ref 1.8–8)
NEUTS SEG # BLD: 12.5 K/UL (ref 1.8–8)
NEUTS SEG # BLD: 13.1 K/UL (ref 1.8–8)
NEUTS SEG # BLD: 13.3 K/UL (ref 1.8–8)
NEUTS SEG # BLD: 14.3 K/UL (ref 1.8–8)
NEUTS SEG # BLD: 14.3 K/UL (ref 1.8–8)
NEUTS SEG # BLD: 16 K/UL (ref 1.8–8)
NEUTS SEG # BLD: 16.5 K/UL (ref 1.8–8)
NEUTS SEG # BLD: 7.8 K/UL (ref 1.8–8)
NEUTS SEG # BLD: 8.5 K/UL (ref 1.8–8)
NEUTS SEG # BLD: 8.6 K/UL (ref 1.8–8)
NEUTS SEG # BLD: 9.4 K/UL (ref 1.8–8)
NEUTS SEG NFR BLD: 60 % (ref 32–75)
NEUTS SEG NFR BLD: 64 % (ref 32–75)
NEUTS SEG NFR BLD: 67 % (ref 32–75)
NEUTS SEG NFR BLD: 69 % (ref 32–75)
NEUTS SEG NFR BLD: 70 % (ref 32–75)
NEUTS SEG NFR BLD: 70 % (ref 32–75)
NEUTS SEG NFR BLD: 73 % (ref 32–75)
NEUTS SEG NFR BLD: 73 % (ref 32–75)
NEUTS SEG NFR BLD: 75 % (ref 32–75)
NEUTS SEG NFR BLD: 77 % (ref 32–75)
NEUTS SEG NFR BLD: 78 % (ref 32–75)
NEUTS SEG NFR BLD: 79 % (ref 32–75)
NEUTS SEG NFR BLD: 80 % (ref 32–75)
NEUTS SEG NFR BLD: 82 % (ref 32–75)
NEUTS SEG NFR BLD: 83 % (ref 32–75)
NEUTS SEG NFR BLD: 87 % (ref 32–75)
NITRITE UR QL STRIP.AUTO: NEGATIVE
NRBC # BLD: 0 K/UL (ref 0–0.01)
NRBC # BLD: 0.02 K/UL (ref 0–0.01)
NRBC # BLD: 0.03 K/UL (ref 0–0.01)
NRBC # BLD: 0.05 K/UL (ref 0–0.01)
NRBC # BLD: 0.11 K/UL (ref 0–0.01)
NRBC # BLD: 0.14 K/UL (ref 0–0.01)
NRBC # BLD: 0.19 K/UL (ref 0–0.01)
NRBC BLD-RTO: 0 PER 100 WBC
NRBC BLD-RTO: 0.1 PER 100 WBC
NRBC BLD-RTO: 0.2 PER 100 WBC
NRBC BLD-RTO: 0.3 PER 100 WBC
NRBC BLD-RTO: 0.8 PER 100 WBC
NRBC BLD-RTO: 0.9 PER 100 WBC
NRBC BLD-RTO: 0.9 PER 100 WBC
P-R INTERVAL, ECG05: 86 MS
PH UR STRIP: 5.5 [PH] (ref 5–8)
PHOSPHATE SERPL-MCNC: 1.8 MG/DL (ref 2.6–4.7)
PHOSPHATE SERPL-MCNC: 2 MG/DL (ref 2.6–4.7)
PHOSPHATE SERPL-MCNC: 2.1 MG/DL (ref 2.6–4.7)
PHOSPHATE SERPL-MCNC: 2.1 MG/DL (ref 2.6–4.7)
PHOSPHATE SERPL-MCNC: 2.4 MG/DL (ref 2.6–4.7)
PHOSPHATE SERPL-MCNC: 2.5 MG/DL (ref 2.6–4.7)
PHOSPHATE SERPL-MCNC: 3.1 MG/DL (ref 2.6–4.7)
PHOSPHATE SERPL-MCNC: 3.1 MG/DL (ref 2.6–4.7)
PHOSPHATE SERPL-MCNC: 3.5 MG/DL (ref 2.6–4.7)
PHOSPHATE SERPL-MCNC: 3.7 MG/DL (ref 2.6–4.7)
PHOSPHATE SERPL-MCNC: 3.8 MG/DL (ref 2.6–4.7)
PHOSPHATE SERPL-MCNC: 4.9 MG/DL (ref 2.6–4.7)
PLATELET # BLD AUTO: 177 K/UL (ref 150–400)
PLATELET # BLD AUTO: 191 K/UL (ref 150–400)
PLATELET # BLD AUTO: 208 K/UL (ref 150–400)
PLATELET # BLD AUTO: 216 K/UL (ref 150–400)
PLATELET # BLD AUTO: 235 K/UL (ref 150–400)
PLATELET # BLD AUTO: 240 K/UL (ref 150–400)
PLATELET # BLD AUTO: 241 K/UL (ref 150–400)
PLATELET # BLD AUTO: 272 K/UL (ref 150–400)
PLATELET # BLD AUTO: 277 K/UL (ref 150–400)
PLATELET # BLD AUTO: 289 K/UL (ref 150–400)
PLATELET # BLD AUTO: 289 K/UL (ref 150–400)
PLATELET # BLD AUTO: 291 K/UL (ref 150–400)
PLATELET # BLD AUTO: 300 K/UL (ref 150–400)
PLATELET # BLD AUTO: 300 K/UL (ref 150–400)
PLATELET # BLD AUTO: 316 K/UL (ref 150–400)
PLATELET # BLD AUTO: 344 K/UL (ref 150–400)
PLATELET # BLD AUTO: 408 K/UL (ref 150–400)
PLATELET # BLD AUTO: 433 K/UL (ref 150–400)
PMV BLD AUTO: 10.4 FL (ref 8.9–12.9)
PMV BLD AUTO: 10.4 FL (ref 8.9–12.9)
PMV BLD AUTO: 10.7 FL (ref 8.9–12.9)
PMV BLD AUTO: 10.9 FL (ref 8.9–12.9)
PMV BLD AUTO: 10.9 FL (ref 8.9–12.9)
PMV BLD AUTO: 11.2 FL (ref 8.9–12.9)
PMV BLD AUTO: 11.3 FL (ref 8.9–12.9)
PMV BLD AUTO: 11.3 FL (ref 8.9–12.9)
PMV BLD AUTO: 11.4 FL (ref 8.9–12.9)
PMV BLD AUTO: 11.5 FL (ref 8.9–12.9)
PMV BLD AUTO: 11.5 FL (ref 8.9–12.9)
PMV BLD AUTO: 11.6 FL (ref 8.9–12.9)
PMV BLD AUTO: 11.6 FL (ref 8.9–12.9)
PMV BLD AUTO: 11.7 FL (ref 8.9–12.9)
PMV BLD AUTO: 11.8 FL (ref 8.9–12.9)
PMV BLD AUTO: 12.2 FL (ref 8.9–12.9)
PMV BLD AUTO: 12.2 FL (ref 8.9–12.9)
PMV BLD AUTO: 12.4 FL (ref 8.9–12.9)
POTASSIUM SERPL-SCNC: 3.5 MMOL/L (ref 3.5–5.1)
POTASSIUM SERPL-SCNC: 3.7 MMOL/L (ref 3.5–5.1)
POTASSIUM SERPL-SCNC: 3.8 MMOL/L (ref 3.5–5.1)
POTASSIUM SERPL-SCNC: 3.9 MMOL/L (ref 3.5–5.1)
POTASSIUM SERPL-SCNC: 4 MMOL/L (ref 3.5–5.1)
POTASSIUM SERPL-SCNC: 4.1 MMOL/L (ref 3.5–5.1)
POTASSIUM SERPL-SCNC: 4.1 MMOL/L (ref 3.5–5.1)
POTASSIUM SERPL-SCNC: 4.2 MMOL/L (ref 3.5–5.1)
POTASSIUM SERPL-SCNC: 4.2 MMOL/L (ref 3.5–5.1)
POTASSIUM SERPL-SCNC: 4.3 MMOL/L (ref 3.5–5.1)
POTASSIUM SERPL-SCNC: 4.4 MMOL/L (ref 3.5–5.1)
POTASSIUM SERPL-SCNC: 4.4 MMOL/L (ref 3.5–5.1)
POTASSIUM SERPL-SCNC: 4.6 MMOL/L (ref 3.5–5.1)
POTASSIUM SERPL-SCNC: 4.7 MMOL/L (ref 3.5–5.1)
PROCALCITONIN SERPL-MCNC: 1.89 NG/ML
PROCALCITONIN SERPL-MCNC: 2.03 NG/ML
PROCALCITONIN SERPL-MCNC: 2.51 NG/ML
PROCALCITONIN SERPL-MCNC: 6.52 NG/ML
PROT SERPL-MCNC: 6.5 G/DL (ref 6.4–8.2)
PROT SERPL-MCNC: 6.7 G/DL (ref 6.4–8.2)
PROT SERPL-MCNC: 6.9 G/DL (ref 6.4–8.2)
PROT SERPL-MCNC: 6.9 G/DL (ref 6.4–8.2)
PROT UR STRIP-MCNC: 100 MG/DL
Q-T INTERVAL, ECG07: 274 MS
QRS DURATION, ECG06: 60 MS
QTC CALCULATION (BEZET), ECG08: 384 MS
RBC # BLD AUTO: 2.56 M/UL (ref 3.8–5.2)
RBC # BLD AUTO: 2.61 M/UL (ref 3.8–5.2)
RBC # BLD AUTO: 2.68 M/UL (ref 3.8–5.2)
RBC # BLD AUTO: 2.68 M/UL (ref 3.8–5.2)
RBC # BLD AUTO: 2.78 M/UL (ref 3.8–5.2)
RBC # BLD AUTO: 2.79 M/UL (ref 3.8–5.2)
RBC # BLD AUTO: 2.8 M/UL (ref 3.8–5.2)
RBC # BLD AUTO: 2.81 M/UL (ref 3.8–5.2)
RBC # BLD AUTO: 2.82 M/UL (ref 3.8–5.2)
RBC # BLD AUTO: 2.83 M/UL (ref 3.8–5.2)
RBC # BLD AUTO: 2.84 M/UL (ref 3.8–5.2)
RBC # BLD AUTO: 2.85 M/UL (ref 3.8–5.2)
RBC # BLD AUTO: 2.96 M/UL (ref 3.8–5.2)
RBC # BLD AUTO: 2.99 M/UL (ref 3.8–5.2)
RBC # BLD AUTO: 2.99 M/UL (ref 3.8–5.2)
RBC # BLD AUTO: 3.03 M/UL (ref 3.8–5.2)
RBC # BLD AUTO: 3.1 M/UL (ref 3.8–5.2)
RBC # BLD AUTO: 3.2 M/UL (ref 3.8–5.2)
RBC #/AREA URNS HPF: >100 /HPF (ref 0–5)
RBC MORPH BLD: ABNORMAL
REPORTED DOSE,DOSE: ABNORMAL UNITS
REPORTED DOSE,DOSE: ABNORMAL UNITS
REPORTED DOSE/TIME,TMG: ABNORMAL
REPORTED DOSE/TIME,TMG: ABNORMAL
SAMPLES BEING HELD,HOLD: NORMAL
SARS-COV-2, COV2: NOT DETECTED
SERVICE CMNT-IMP: ABNORMAL
SERVICE CMNT-IMP: NORMAL
SODIUM SERPL-SCNC: 139 MMOL/L (ref 136–145)
SODIUM SERPL-SCNC: 139 MMOL/L (ref 136–145)
SODIUM SERPL-SCNC: 140 MMOL/L (ref 136–145)
SODIUM SERPL-SCNC: 141 MMOL/L (ref 136–145)
SODIUM SERPL-SCNC: 141 MMOL/L (ref 136–145)
SODIUM SERPL-SCNC: 142 MMOL/L (ref 136–145)
SODIUM SERPL-SCNC: 143 MMOL/L (ref 136–145)
SODIUM SERPL-SCNC: 144 MMOL/L (ref 136–145)
SODIUM SERPL-SCNC: 145 MMOL/L (ref 136–145)
SODIUM SERPL-SCNC: 145 MMOL/L (ref 136–145)
SODIUM SERPL-SCNC: 146 MMOL/L (ref 136–145)
SODIUM SERPL-SCNC: 150 MMOL/L (ref 136–145)
SODIUM SERPL-SCNC: 150 MMOL/L (ref 136–145)
SOURCE, COVRS: NORMAL
SP GR UR REFRACTOMETRY: 1.02 (ref 1–1.03)
SPECIMEN EXP DATE BLD: NORMAL
SPECIMEN EXP DATE BLD: NORMAL
SPECIMEN SOURCE, FCOV2M: NORMAL
SPECIMEN TYPE, XMCV1T: NORMAL
STATUS OF UNIT,%ST: NORMAL
STATUS OF UNIT,%ST: NORMAL
TIBC SERPL-MCNC: 120 UG/DL (ref 250–450)
UA: UC IF INDICATED,UAUC: ABNORMAL
UNIT DIVISION, %UDIV: 0
UNIT DIVISION, %UDIV: 0
UROBILINOGEN UR QL STRIP.AUTO: 1 EU/DL (ref 0.2–1)
VANCOMYCIN TROUGH SERPL-MCNC: 13.4 UG/ML (ref 5–10)
VANCOMYCIN TROUGH SERPL-MCNC: 23.4 UG/ML (ref 5–10)
VENTRICULAR RATE, ECG03: 118 BPM
WBC # BLD AUTO: 11.3 K/UL (ref 3.6–11)
WBC # BLD AUTO: 11.6 K/UL (ref 3.6–11)
WBC # BLD AUTO: 12.1 K/UL (ref 3.6–11)
WBC # BLD AUTO: 13.9 K/UL (ref 3.6–11)
WBC # BLD AUTO: 14.2 K/UL (ref 3.6–11)
WBC # BLD AUTO: 14.8 K/UL (ref 3.6–11)
WBC # BLD AUTO: 15 K/UL (ref 3.6–11)
WBC # BLD AUTO: 15.4 K/UL (ref 3.6–11)
WBC # BLD AUTO: 16.2 K/UL (ref 3.6–11)
WBC # BLD AUTO: 16.2 K/UL (ref 3.6–11)
WBC # BLD AUTO: 16.3 K/UL (ref 3.6–11)
WBC # BLD AUTO: 16.4 K/UL (ref 3.6–11)
WBC # BLD AUTO: 16.8 K/UL (ref 3.6–11)
WBC # BLD AUTO: 16.8 K/UL (ref 3.6–11)
WBC # BLD AUTO: 17.5 K/UL (ref 3.6–11)
WBC # BLD AUTO: 18.5 K/UL (ref 3.6–11)
WBC # BLD AUTO: 19.4 K/UL (ref 3.6–11)
WBC # BLD AUTO: 20.6 K/UL (ref 3.6–11)
WBC URNS QL MICRO: ABNORMAL /HPF (ref 0–4)

## 2021-01-01 PROCEDURE — 77030006998

## 2021-01-01 PROCEDURE — 0B110F4 BYPASS TRACHEA TO CUTANEOUS WITH TRACHEOSTOMY DEVICE, OPEN APPROACH: ICD-10-PCS | Performed by: SURGERY

## 2021-01-01 PROCEDURE — 80048 BASIC METABOLIC PNL TOTAL CA: CPT

## 2021-01-01 PROCEDURE — 36415 COLL VENOUS BLD VENIPUNCTURE: CPT

## 2021-01-01 PROCEDURE — 77030038269 HC DRN EXT URIN PURWCK BARD -A

## 2021-01-01 PROCEDURE — 74011250637 HC RX REV CODE- 250/637: Performed by: INTERNAL MEDICINE

## 2021-01-01 PROCEDURE — C9113 INJ PANTOPRAZOLE SODIUM, VIA: HCPCS | Performed by: INTERNAL MEDICINE

## 2021-01-01 PROCEDURE — 2709999900 HC NON-CHARGEABLE SUPPLY

## 2021-01-01 PROCEDURE — 74011250636 HC RX REV CODE- 250/636: Performed by: INTERNAL MEDICINE

## 2021-01-01 PROCEDURE — 74011000250 HC RX REV CODE- 250: Performed by: INTERNAL MEDICINE

## 2021-01-01 PROCEDURE — 77030002986 HC SUT PROL J&J -A: Performed by: SURGERY

## 2021-01-01 PROCEDURE — 74011250636 HC RX REV CODE- 250/636: Performed by: NURSE ANESTHETIST, CERTIFIED REGISTERED

## 2021-01-01 PROCEDURE — 74011250637 HC RX REV CODE- 250/637: Performed by: SURGERY

## 2021-01-01 PROCEDURE — 94003 VENT MGMT INPAT SUBQ DAY: CPT

## 2021-01-01 PROCEDURE — 74011000258 HC RX REV CODE- 258: Performed by: HOSPITALIST

## 2021-01-01 PROCEDURE — 74011000258 HC RX REV CODE- 258: Performed by: INTERNAL MEDICINE

## 2021-01-01 PROCEDURE — 80053 COMPREHEN METABOLIC PANEL: CPT

## 2021-01-01 PROCEDURE — 74011250637 HC RX REV CODE- 250/637: Performed by: NURSE PRACTITIONER

## 2021-01-01 PROCEDURE — 85025 COMPLETE CBC W/AUTO DIFF WBC: CPT

## 2021-01-01 PROCEDURE — 74011250636 HC RX REV CODE- 250/636: Performed by: NURSE PRACTITIONER

## 2021-01-01 PROCEDURE — 74011000258 HC RX REV CODE- 258: Performed by: NURSE PRACTITIONER

## 2021-01-01 PROCEDURE — 77030018798 HC PMP KT ENTRL FED COVD -A

## 2021-01-01 PROCEDURE — 74011000250 HC RX REV CODE- 250: Performed by: HOSPITALIST

## 2021-01-01 PROCEDURE — 82962 GLUCOSE BLOOD TEST: CPT

## 2021-01-01 PROCEDURE — 77030002916 HC SUT ETHLN J&J -A: Performed by: SURGERY

## 2021-01-01 PROCEDURE — 84145 PROCALCITONIN (PCT): CPT

## 2021-01-01 PROCEDURE — 74011250637 HC RX REV CODE- 250/637: Performed by: HOSPITALIST

## 2021-01-01 PROCEDURE — 65610000006 HC RM INTENSIVE CARE

## 2021-01-01 PROCEDURE — 99233 SBSQ HOSP IP/OBS HIGH 50: CPT | Performed by: NURSE PRACTITIONER

## 2021-01-01 PROCEDURE — 77030021668 HC NEB PREFIL KT VYRM -A

## 2021-01-01 PROCEDURE — 74011250636 HC RX REV CODE- 250/636: Performed by: SURGERY

## 2021-01-01 PROCEDURE — 74011250636 HC RX REV CODE- 250/636: Performed by: HOSPITALIST

## 2021-01-01 PROCEDURE — 77030010516 HC APPL HEMA CLP TELE -B: Performed by: SURGERY

## 2021-01-01 PROCEDURE — 31502 CHANGE OF WINDPIPE AIRWAY: CPT

## 2021-01-01 PROCEDURE — 74011000250 HC RX REV CODE- 250: Performed by: NURSE PRACTITIONER

## 2021-01-01 PROCEDURE — 80069 RENAL FUNCTION PANEL: CPT

## 2021-01-01 PROCEDURE — 83735 ASSAY OF MAGNESIUM: CPT

## 2021-01-01 PROCEDURE — 74018 RADEX ABDOMEN 1 VIEW: CPT

## 2021-01-01 PROCEDURE — 36430 TRANSFUSION BLD/BLD COMPNT: CPT

## 2021-01-01 PROCEDURE — 99231 SBSQ HOSP IP/OBS SF/LOW 25: CPT | Performed by: NURSE PRACTITIONER

## 2021-01-01 PROCEDURE — 86901 BLOOD TYPING SEROLOGIC RH(D): CPT

## 2021-01-01 PROCEDURE — 3E0G76Z INTRODUCTION OF NUTRITIONAL SUBSTANCE INTO UPPER GI, VIA NATURAL OR ARTIFICIAL OPENING: ICD-10-PCS | Performed by: INTERNAL MEDICINE

## 2021-01-01 PROCEDURE — 77030018836 HC SOL IRR NACL ICUM -A: Performed by: SURGERY

## 2021-01-01 PROCEDURE — P9016 RBC LEUKOCYTES REDUCED: HCPCS

## 2021-01-01 PROCEDURE — 2709999900 HC NON-CHARGEABLE SUPPLY: Performed by: INTERNAL MEDICINE

## 2021-01-01 PROCEDURE — 77030014650 HC SEAL MTRX FLOSEL BAXT -C: Performed by: SURGERY

## 2021-01-01 PROCEDURE — 71045 X-RAY EXAM CHEST 1 VIEW: CPT

## 2021-01-01 PROCEDURE — 99024 POSTOP FOLLOW-UP VISIT: CPT | Performed by: SURGERY

## 2021-01-01 PROCEDURE — 80202 ASSAY OF VANCOMYCIN: CPT

## 2021-01-01 PROCEDURE — 99233 SBSQ HOSP IP/OBS HIGH 50: CPT | Performed by: SURGERY

## 2021-01-01 PROCEDURE — 87040 BLOOD CULTURE FOR BACTERIA: CPT

## 2021-01-01 PROCEDURE — 77030027744 HC PWDR HEMSTAT ARISTA ABSRB 5GM BARD -D: Performed by: SURGERY

## 2021-01-01 PROCEDURE — 84100 ASSAY OF PHOSPHORUS: CPT

## 2021-01-01 PROCEDURE — 81001 URINALYSIS AUTO W/SCOPE: CPT

## 2021-01-01 PROCEDURE — 86923 COMPATIBILITY TEST ELECTRIC: CPT

## 2021-01-01 PROCEDURE — 0B21XFZ CHANGE TRACHEOSTOMY DEVICE IN TRACHEA, EXTERNAL APPROACH: ICD-10-PCS | Performed by: SURGERY

## 2021-01-01 PROCEDURE — 2709999900 HC NON-CHARGEABLE SUPPLY: Performed by: SURGERY

## 2021-01-01 PROCEDURE — 74176 CT ABD & PELVIS W/O CONTRAST: CPT

## 2021-01-01 PROCEDURE — 74011636637 HC RX REV CODE- 636/637: Performed by: NURSE PRACTITIONER

## 2021-01-01 PROCEDURE — 83540 ASSAY OF IRON: CPT

## 2021-01-01 PROCEDURE — P9047 ALBUMIN (HUMAN), 25%, 50ML: HCPCS | Performed by: INTERNAL MEDICINE

## 2021-01-01 PROCEDURE — 99222 1ST HOSP IP/OBS MODERATE 55: CPT | Performed by: SURGERY

## 2021-01-01 PROCEDURE — 74011000255 HC RX REV CODE- 255: Performed by: SURGERY

## 2021-01-01 PROCEDURE — 77030018390 HC SPNG HEMSTAT2 J&J -B: Performed by: SURGERY

## 2021-01-01 PROCEDURE — 74011000250 HC RX REV CODE- 250

## 2021-01-01 PROCEDURE — 77030002996 HC SUT SLK J&J -A: Performed by: SURGERY

## 2021-01-01 PROCEDURE — 02HV33Z INSERTION OF INFUSION DEVICE INTO SUPERIOR VENA CAVA, PERCUTANEOUS APPROACH: ICD-10-PCS | Performed by: INTERNAL MEDICINE

## 2021-01-01 PROCEDURE — 77030008793 HC TU TRACH CUF COVD -B: Performed by: SURGERY

## 2021-01-01 PROCEDURE — 82728 ASSAY OF FERRITIN: CPT

## 2021-01-01 PROCEDURE — 76770 US EXAM ABDO BACK WALL COMP: CPT

## 2021-01-01 PROCEDURE — 0DH63UZ INSERTION OF FEEDING DEVICE INTO STOMACH, PERCUTANEOUS APPROACH: ICD-10-PCS | Performed by: INTERNAL MEDICINE

## 2021-01-01 PROCEDURE — 76060000031 HC ANESTHESIA FIRST 0.5 HR: Performed by: INTERNAL MEDICINE

## 2021-01-01 PROCEDURE — 77030040361 HC SLV COMPR DVT MDII -B: Performed by: SURGERY

## 2021-01-01 PROCEDURE — 77030005122 HC CATH GASTMY PEG BSC -B: Performed by: INTERNAL MEDICINE

## 2021-01-01 PROCEDURE — 76060000032 HC ANESTHESIA 0.5 TO 1 HR: Performed by: SURGERY

## 2021-01-01 PROCEDURE — 77030008793 HC TU TRACH CUF COVD -B

## 2021-01-01 PROCEDURE — 87086 URINE CULTURE/COLONY COUNT: CPT

## 2021-01-01 PROCEDURE — P9045 ALBUMIN (HUMAN), 5%, 250 ML: HCPCS | Performed by: SURGERY

## 2021-01-01 PROCEDURE — 31600 PLANNED TRACHEOSTOMY: CPT | Performed by: SURGERY

## 2021-01-01 PROCEDURE — 93005 ELECTROCARDIOGRAM TRACING: CPT

## 2021-01-01 PROCEDURE — 87070 CULTURE OTHR SPECIMN AEROBIC: CPT

## 2021-01-01 PROCEDURE — 77030018861

## 2021-01-01 PROCEDURE — 76040000019: Performed by: INTERNAL MEDICINE

## 2021-01-01 PROCEDURE — 77030010896 HC CIRC VENT TRNSPT TRAN -B

## 2021-01-01 PROCEDURE — 76010000138 HC OR TIME 0.5 TO 1 HR: Performed by: SURGERY

## 2021-01-01 RX ORDER — SODIUM CHLORIDE 9 MG/ML
INJECTION, SOLUTION INTRAVENOUS
Status: SHIPPED | OUTPATIENT
Start: 2021-01-01

## 2021-01-01 RX ORDER — WATER FOR INJECTION,STERILE
VIAL (ML) INJECTION
Status: COMPLETED
Start: 2021-01-01 | End: 2021-01-01

## 2021-01-01 RX ORDER — FENTANYL CITRATE 50 UG/ML
INJECTION, SOLUTION INTRAMUSCULAR; INTRAVENOUS AS NEEDED
Status: DISCONTINUED | OUTPATIENT
Start: 2021-01-01 | End: 2021-01-01

## 2021-01-01 RX ORDER — ROCURONIUM BROMIDE 10 MG/ML
INJECTION, SOLUTION INTRAVENOUS AS NEEDED
Status: DISCONTINUED | OUTPATIENT
Start: 2021-01-01 | End: 2021-01-01 | Stop reason: ALTCHOICE

## 2021-01-01 RX ORDER — DEXAMETHASONE SODIUM PHOSPHATE 4 MG/ML
INJECTION, SOLUTION INTRA-ARTICULAR; INTRALESIONAL; INTRAMUSCULAR; INTRAVENOUS; SOFT TISSUE AS NEEDED
Status: SHIPPED | OUTPATIENT
Start: 2021-01-01

## 2021-01-01 RX ORDER — VALPROIC ACID 250 MG/5ML
250 SOLUTION ORAL EVERY 8 HOURS
Status: DISCONTINUED | OUTPATIENT
Start: 2021-01-01 | End: 2021-01-01

## 2021-01-01 RX ORDER — FENTANYL 25 UG/1
1 PATCH TRANSDERMAL
Status: DISCONTINUED | OUTPATIENT
Start: 2021-01-01 | End: 2021-01-01

## 2021-01-01 RX ORDER — ALBUMIN HUMAN 50 G/1000ML
50 SOLUTION INTRAVENOUS ONCE
Status: COMPLETED | OUTPATIENT
Start: 2021-01-01 | End: 2021-01-01

## 2021-01-01 RX ORDER — BALSAM PERU/CASTOR OIL
OINTMENT (GRAM) TOPICAL 3 TIMES DAILY
Status: DISCONTINUED | OUTPATIENT
Start: 2021-01-01 | End: 2021-01-01 | Stop reason: HOSPADM

## 2021-01-01 RX ORDER — POLYETHYLENE GLYCOL 3350 17 G/17G
17 POWDER, FOR SOLUTION ORAL 2 TIMES DAILY
Status: DISCONTINUED | OUTPATIENT
Start: 2021-01-01 | End: 2021-01-01

## 2021-01-01 RX ORDER — MIDAZOLAM HYDROCHLORIDE 1 MG/ML
INJECTION, SOLUTION INTRAMUSCULAR; INTRAVENOUS AS NEEDED
Status: DISCONTINUED | OUTPATIENT
Start: 2021-01-01 | End: 2021-01-01 | Stop reason: SDUPTHER

## 2021-01-01 RX ORDER — SODIUM CHLORIDE 450 MG/100ML
75 INJECTION, SOLUTION INTRAVENOUS CONTINUOUS
Status: DISCONTINUED | OUTPATIENT
Start: 2021-01-01 | End: 2021-01-01

## 2021-01-01 RX ORDER — ATROPINE SULFATE 0.1 MG/ML
0.5 INJECTION INTRAVENOUS
Status: ACTIVE | OUTPATIENT
Start: 2021-01-01 | End: 2021-01-01

## 2021-01-01 RX ORDER — SODIUM CHLORIDE 9 MG/ML
250 INJECTION, SOLUTION INTRAVENOUS AS NEEDED
Status: DISCONTINUED | OUTPATIENT
Start: 2021-01-01 | End: 2021-01-01 | Stop reason: ALTCHOICE

## 2021-01-01 RX ORDER — VECURONIUM BROMIDE FOR INJECTION 1 MG/ML
8 INJECTION, POWDER, LYOPHILIZED, FOR SOLUTION INTRAVENOUS ONCE
Status: COMPLETED | OUTPATIENT
Start: 2021-01-01 | End: 2021-01-01

## 2021-01-01 RX ORDER — FENTANYL CITRATE 50 UG/ML
100 INJECTION, SOLUTION INTRAMUSCULAR; INTRAVENOUS
Status: DISCONTINUED | OUTPATIENT
Start: 2021-01-01 | End: 2021-01-01 | Stop reason: HOSPADM

## 2021-01-01 RX ORDER — MIDAZOLAM HYDROCHLORIDE 1 MG/ML
2 INJECTION, SOLUTION INTRAMUSCULAR; INTRAVENOUS
Status: DISCONTINUED | OUTPATIENT
Start: 2021-01-01 | End: 2021-01-01

## 2021-01-01 RX ORDER — CEFAZOLIN SODIUM 1 G/3ML
INJECTION, POWDER, FOR SOLUTION INTRAMUSCULAR; INTRAVENOUS AS NEEDED
Status: SHIPPED | OUTPATIENT
Start: 2021-01-01

## 2021-01-01 RX ORDER — METOCLOPRAMIDE HYDROCHLORIDE 5 MG/ML
10 INJECTION INTRAMUSCULAR; INTRAVENOUS EVERY 6 HOURS
Status: DISCONTINUED | OUTPATIENT
Start: 2021-01-01 | End: 2021-01-01

## 2021-01-01 RX ORDER — FENTANYL CITRATE 50 UG/ML
100 INJECTION, SOLUTION INTRAMUSCULAR; INTRAVENOUS ONCE
Status: COMPLETED | OUTPATIENT
Start: 2021-01-01 | End: 2021-01-01

## 2021-01-01 RX ORDER — FENTANYL CITRATE 50 UG/ML
50 INJECTION, SOLUTION INTRAMUSCULAR; INTRAVENOUS
Status: DISCONTINUED | OUTPATIENT
Start: 2021-01-01 | End: 2021-01-01

## 2021-01-01 RX ORDER — SODIUM CHLORIDE 0.9 % (FLUSH) 0.9 %
5-40 SYRINGE (ML) INJECTION EVERY 8 HOURS
Status: DISCONTINUED | OUTPATIENT
Start: 2021-01-01 | End: 2021-01-01 | Stop reason: SDUPTHER

## 2021-01-01 RX ORDER — POTASSIUM CHLORIDE 29.8 MG/ML
20 INJECTION INTRAVENOUS ONCE
Status: COMPLETED | OUTPATIENT
Start: 2021-01-01 | End: 2021-01-01

## 2021-01-01 RX ORDER — MIDAZOLAM HYDROCHLORIDE 1 MG/ML
INJECTION, SOLUTION INTRAMUSCULAR; INTRAVENOUS AS NEEDED
Status: DISCONTINUED | OUTPATIENT
Start: 2021-01-01 | End: 2021-01-01 | Stop reason: HOSPADM

## 2021-01-01 RX ORDER — EPINEPHRINE 0.1 MG/ML
1 INJECTION INTRACARDIAC; INTRAVENOUS
Status: ACTIVE | OUTPATIENT
Start: 2021-01-01 | End: 2021-01-01

## 2021-01-01 RX ORDER — LANOLIN ALCOHOL/MO/W.PET/CERES
3 CREAM (GRAM) TOPICAL
Status: DISCONTINUED | OUTPATIENT
Start: 2021-01-01 | End: 2021-01-01

## 2021-01-01 RX ORDER — BUMETANIDE 0.25 MG/ML
1 INJECTION INTRAMUSCULAR; INTRAVENOUS ONCE
Status: COMPLETED | OUTPATIENT
Start: 2021-01-01 | End: 2021-01-01

## 2021-01-01 RX ORDER — NALOXONE HYDROCHLORIDE 0.4 MG/ML
0.4 INJECTION, SOLUTION INTRAMUSCULAR; INTRAVENOUS; SUBCUTANEOUS
Status: ACTIVE | OUTPATIENT
Start: 2021-01-01 | End: 2021-01-01

## 2021-01-01 RX ORDER — PROPOFOL 10 MG/ML
INJECTION, EMULSION INTRAVENOUS AS NEEDED
Status: DISCONTINUED | OUTPATIENT
Start: 2021-01-01 | End: 2021-01-01 | Stop reason: HOSPADM

## 2021-01-01 RX ORDER — MIDAZOLAM HYDROCHLORIDE 1 MG/ML
INJECTION, SOLUTION INTRAMUSCULAR; INTRAVENOUS
Status: DISPENSED
Start: 2021-01-01 | End: 2021-01-01

## 2021-01-01 RX ORDER — ONDANSETRON 2 MG/ML
INJECTION INTRAMUSCULAR; INTRAVENOUS AS NEEDED
Status: DISCONTINUED | OUTPATIENT
Start: 2021-01-01 | End: 2021-01-01 | Stop reason: SDUPTHER

## 2021-01-01 RX ORDER — ALBUTEROL SULFATE 0.83 MG/ML
5 SOLUTION RESPIRATORY (INHALATION)
Status: DISCONTINUED | OUTPATIENT
Start: 2021-01-01 | End: 2021-01-01

## 2021-01-01 RX ORDER — SODIUM CHLORIDE 9 MG/ML
75 INJECTION, SOLUTION INTRAVENOUS CONTINUOUS
Status: DISCONTINUED | OUTPATIENT
Start: 2021-01-01 | End: 2021-01-01

## 2021-01-01 RX ORDER — PROPOFOL 10 MG/ML
INJECTION, EMULSION INTRAVENOUS
Status: DISPENSED
Start: 2021-01-01 | End: 2021-01-01

## 2021-01-01 RX ORDER — MIDAZOLAM HYDROCHLORIDE 1 MG/ML
4 INJECTION, SOLUTION INTRAMUSCULAR; INTRAVENOUS
Status: DISCONTINUED | OUTPATIENT
Start: 2021-01-01 | End: 2021-01-01 | Stop reason: HOSPADM

## 2021-01-01 RX ORDER — FACIAL-BODY WIPES
10 EACH TOPICAL DAILY
Status: DISCONTINUED | OUTPATIENT
Start: 2021-01-01 | End: 2021-01-01

## 2021-01-01 RX ORDER — BARIUM SULFATE 20 MG/ML
900 SUSPENSION ORAL
Status: COMPLETED | OUTPATIENT
Start: 2021-01-01 | End: 2021-01-01

## 2021-01-01 RX ORDER — MAGNESIUM CITRATE
296 SOLUTION, ORAL ORAL
Status: COMPLETED | OUTPATIENT
Start: 2021-01-01 | End: 2021-01-01

## 2021-01-01 RX ORDER — FLUMAZENIL 0.1 MG/ML
0.2 INJECTION INTRAVENOUS
Status: ACTIVE | OUTPATIENT
Start: 2021-01-01 | End: 2021-01-01

## 2021-01-01 RX ORDER — QUETIAPINE FUMARATE 25 MG/1
50 TABLET, FILM COATED ORAL
Status: DISCONTINUED | OUTPATIENT
Start: 2021-01-01 | End: 2021-01-01

## 2021-01-01 RX ORDER — MAGNESIUM SULFATE HEPTAHYDRATE 40 MG/ML
2 INJECTION, SOLUTION INTRAVENOUS ONCE
Status: COMPLETED | OUTPATIENT
Start: 2021-01-01 | End: 2021-01-01

## 2021-01-01 RX ORDER — RISPERIDONE 1 MG/1
2 TABLET, FILM COATED ORAL
Status: DISCONTINUED | OUTPATIENT
Start: 2021-01-01 | End: 2021-01-01

## 2021-01-01 RX ORDER — RISPERIDONE 1 MG/1
1 TABLET, FILM COATED ORAL ONCE
Status: COMPLETED | OUTPATIENT
Start: 2021-01-01 | End: 2021-01-01

## 2021-01-01 RX ORDER — DEXTROMETHORPHAN/PSEUDOEPHED 2.5-7.5/.8
1.2 DROPS ORAL
Status: DISCONTINUED | OUTPATIENT
Start: 2021-01-01 | End: 2021-01-01

## 2021-01-01 RX ORDER — DEXTROSE MONOHYDRATE 50 MG/ML
75 INJECTION, SOLUTION INTRAVENOUS CONTINUOUS
Status: DISCONTINUED | OUTPATIENT
Start: 2021-01-01 | End: 2021-01-01

## 2021-01-01 RX ORDER — VANCOMYCIN 1.75 GRAM/500 ML IN 0.9 % SODIUM CHLORIDE INTRAVENOUS
1750 ONCE
Status: COMPLETED | OUTPATIENT
Start: 2021-01-01 | End: 2021-01-01

## 2021-01-01 RX ORDER — METOCLOPRAMIDE HYDROCHLORIDE 5 MG/ML
5 INJECTION INTRAMUSCULAR; INTRAVENOUS EVERY 8 HOURS
Status: DISCONTINUED | OUTPATIENT
Start: 2021-01-01 | End: 2021-01-01

## 2021-01-01 RX ORDER — SODIUM CHLORIDE 0.9 % (FLUSH) 0.9 %
5-40 SYRINGE (ML) INJECTION AS NEEDED
Status: DISCONTINUED | OUTPATIENT
Start: 2021-01-01 | End: 2021-01-01 | Stop reason: SDUPTHER

## 2021-01-01 RX ORDER — NOREPINEPHRINE BITARTRATE/D5W 8 MG/250ML
2-30 PLASTIC BAG, INJECTION (ML) INTRAVENOUS
Status: DISCONTINUED | OUTPATIENT
Start: 2021-01-01 | End: 2021-01-01

## 2021-01-01 RX ADMIN — DEXMEDETOMIDINE 1.5 MCG/KG/HR: 100 INJECTION, SOLUTION, CONCENTRATE INTRAVENOUS at 22:11

## 2021-01-01 RX ADMIN — ACETAMINOPHEN 650 MG: 325 TABLET ORAL at 11:30

## 2021-01-01 RX ADMIN — DEXTROSE MONOHYDRATE 100 ML/HR: 50 INJECTION, SOLUTION INTRAVENOUS at 10:19

## 2021-01-01 RX ADMIN — LACTULOSE 15 ML: 20 SOLUTION ORAL at 21:00

## 2021-01-01 RX ADMIN — FENTANYL CITRATE 50 MCG: 50 INJECTION, SOLUTION INTRAMUSCULAR; INTRAVENOUS at 15:21

## 2021-01-01 RX ADMIN — CASTOR OIL AND BALSAM, PERU: 788; 87 OINTMENT TOPICAL at 21:20

## 2021-01-01 RX ADMIN — LACTULOSE 15 ML: 20 SOLUTION ORAL at 16:21

## 2021-01-01 RX ADMIN — CHLORHEXIDINE GLUCONATE 15 ML: 0.12 RINSE ORAL at 21:44

## 2021-01-01 RX ADMIN — PHENYLEPHRINE HYDROCHLORIDE 100 MCG/MIN: 10 INJECTION INTRAVENOUS at 23:25

## 2021-01-01 RX ADMIN — SODIUM CHLORIDE 75 ML/HR: 450 INJECTION, SOLUTION INTRAVENOUS at 00:16

## 2021-01-01 RX ADMIN — NOREPINEPHRINE BITARTRATE 4 MCG/MIN: 1 INJECTION, SOLUTION, CONCENTRATE INTRAVENOUS at 01:06

## 2021-01-01 RX ADMIN — NOREPINEPHRINE BITARTRATE 18 MCG/MIN: 1 INJECTION, SOLUTION, CONCENTRATE INTRAVENOUS at 06:46

## 2021-01-01 RX ADMIN — FENTANYL CITRATE 50 MCG: 50 INJECTION, SOLUTION INTRAMUSCULAR; INTRAVENOUS at 07:30

## 2021-01-01 RX ADMIN — CHLORHEXIDINE GLUCONATE 15 ML: 0.12 RINSE ORAL at 09:24

## 2021-01-01 RX ADMIN — HEPARIN SODIUM 5000 UNITS: 5000 INJECTION INTRAVENOUS; SUBCUTANEOUS at 21:07

## 2021-01-01 RX ADMIN — CASTOR OIL AND BALSAM, PERU: 788; 87 OINTMENT TOPICAL at 18:50

## 2021-01-01 RX ADMIN — VECURONIUM BROMIDE 8 MG: 1 INJECTION, POWDER, LYOPHILIZED, FOR SOLUTION INTRAVENOUS at 08:03

## 2021-01-01 RX ADMIN — SODIUM CHLORIDE 10 ML: 9 INJECTION, SOLUTION INTRAMUSCULAR; INTRAVENOUS; SUBCUTANEOUS at 06:42

## 2021-01-01 RX ADMIN — SODIUM CHLORIDE 10 ML: 9 INJECTION, SOLUTION INTRAMUSCULAR; INTRAVENOUS; SUBCUTANEOUS at 15:21

## 2021-01-01 RX ADMIN — SODIUM CHLORIDE 10 ML: 9 INJECTION, SOLUTION INTRAMUSCULAR; INTRAVENOUS; SUBCUTANEOUS at 21:46

## 2021-01-01 RX ADMIN — HEPARIN SODIUM 5000 UNITS: 5000 INJECTION INTRAVENOUS; SUBCUTANEOUS at 19:53

## 2021-01-01 RX ADMIN — Medication 1 AMPULE: at 21:45

## 2021-01-01 RX ADMIN — DEXMEDETOMIDINE 1.3 MCG/KG/HR: 100 INJECTION, SOLUTION, CONCENTRATE INTRAVENOUS at 14:26

## 2021-01-01 RX ADMIN — SODIUM CHLORIDE 10 ML: 9 INJECTION, SOLUTION INTRAMUSCULAR; INTRAVENOUS; SUBCUTANEOUS at 05:21

## 2021-01-01 RX ADMIN — CHLORHEXIDINE GLUCONATE 15 ML: 0.12 RINSE ORAL at 20:56

## 2021-01-01 RX ADMIN — MIDAZOLAM 2 MG: 1 INJECTION INTRAMUSCULAR; INTRAVENOUS at 07:41

## 2021-01-01 RX ADMIN — HEPARIN SODIUM 5000 UNITS: 5000 INJECTION INTRAVENOUS; SUBCUTANEOUS at 11:03

## 2021-01-01 RX ADMIN — LEVOTHYROXINE SODIUM 50 MCG: 0.05 TABLET ORAL at 08:37

## 2021-01-01 RX ADMIN — SODIUM CHLORIDE 10 ML: 9 INJECTION, SOLUTION INTRAMUSCULAR; INTRAVENOUS; SUBCUTANEOUS at 06:14

## 2021-01-01 RX ADMIN — SODIUM CHLORIDE 40 MG: 9 INJECTION, SOLUTION INTRAMUSCULAR; INTRAVENOUS; SUBCUTANEOUS at 11:04

## 2021-01-01 RX ADMIN — SODIUM CHLORIDE: 234 INJECTION INTRAMUSCULAR; INTRAVENOUS; SUBCUTANEOUS at 19:36

## 2021-01-01 RX ADMIN — DEXMEDETOMIDINE 1.3 MCG/KG/HR: 100 INJECTION, SOLUTION, CONCENTRATE INTRAVENOUS at 16:46

## 2021-01-01 RX ADMIN — CHLORHEXIDINE GLUCONATE 15 ML: 0.12 RINSE ORAL at 08:59

## 2021-01-01 RX ADMIN — SODIUM CHLORIDE 10 ML: 9 INJECTION, SOLUTION INTRAMUSCULAR; INTRAVENOUS; SUBCUTANEOUS at 21:45

## 2021-01-01 RX ADMIN — SODIUM CHLORIDE 10 ML: 9 INJECTION, SOLUTION INTRAMUSCULAR; INTRAVENOUS; SUBCUTANEOUS at 14:00

## 2021-01-01 RX ADMIN — ALBUMIN (HUMAN) 25 G: 0.25 INJECTION, SOLUTION INTRAVENOUS at 05:17

## 2021-01-01 RX ADMIN — CHLORHEXIDINE GLUCONATE 15 ML: 0.12 RINSE ORAL at 21:09

## 2021-01-01 RX ADMIN — CEFEPIME HYDROCHLORIDE 1 G: 1 INJECTION, POWDER, FOR SOLUTION INTRAMUSCULAR; INTRAVENOUS at 00:48

## 2021-01-01 RX ADMIN — MELATONIN 3 MG: at 21:30

## 2021-01-01 RX ADMIN — CHLORHEXIDINE GLUCONATE 15 ML: 0.12 RINSE ORAL at 08:00

## 2021-01-01 RX ADMIN — DEXMEDETOMIDINE 1.5 MCG/KG/HR: 100 INJECTION, SOLUTION, CONCENTRATE INTRAVENOUS at 11:50

## 2021-01-01 RX ADMIN — CHLORHEXIDINE GLUCONATE 15 ML: 0.12 RINSE ORAL at 21:46

## 2021-01-01 RX ADMIN — CASTOR OIL AND BALSAM, PERU: 788; 87 OINTMENT TOPICAL at 09:29

## 2021-01-01 RX ADMIN — POLYETHYLENE GLYCOL 3350 17 G: 17 POWDER, FOR SOLUTION ORAL at 17:51

## 2021-01-01 RX ADMIN — POTASSIUM BICARBONATE 20 MEQ: 782 TABLET, EFFERVESCENT ORAL at 09:19

## 2021-01-01 RX ADMIN — CASTOR OIL AND BALSAM, PERU: 788; 87 OINTMENT TOPICAL at 09:08

## 2021-01-01 RX ADMIN — CHLORHEXIDINE GLUCONATE 15 ML: 0.12 RINSE ORAL at 21:08

## 2021-01-01 RX ADMIN — LACTULOSE 15 ML: 20 SOLUTION ORAL at 16:31

## 2021-01-01 RX ADMIN — HEPARIN SODIUM 5000 UNITS: 5000 INJECTION INTRAVENOUS; SUBCUTANEOUS at 21:00

## 2021-01-01 RX ADMIN — SODIUM CHLORIDE 10 ML: 9 INJECTION, SOLUTION INTRAMUSCULAR; INTRAVENOUS; SUBCUTANEOUS at 21:33

## 2021-01-01 RX ADMIN — CHLORHEXIDINE GLUCONATE 15 ML: 0.12 RINSE ORAL at 20:59

## 2021-01-01 RX ADMIN — SODIUM CHLORIDE 40 MG: 9 INJECTION, SOLUTION INTRAMUSCULAR; INTRAVENOUS; SUBCUTANEOUS at 09:06

## 2021-01-01 RX ADMIN — MIDAZOLAM 4 MG: 1 INJECTION INTRAMUSCULAR; INTRAVENOUS at 15:52

## 2021-01-01 RX ADMIN — Medication 100 MCG/HR: at 13:52

## 2021-01-01 RX ADMIN — LEVOTHYROXINE SODIUM 50 MCG: 0.05 TABLET ORAL at 09:18

## 2021-01-01 RX ADMIN — MIDAZOLAM 2 MG: 1 INJECTION INTRAMUSCULAR; INTRAVENOUS at 20:39

## 2021-01-01 RX ADMIN — LEVOTHYROXINE SODIUM 50 MCG: 0.05 TABLET ORAL at 07:47

## 2021-01-01 RX ADMIN — RISPERIDONE 1 MG: 0.25 TABLET ORAL at 21:22

## 2021-01-01 RX ADMIN — POLYETHYLENE GLYCOL 3350 17 G: 17 POWDER, FOR SOLUTION ORAL at 09:50

## 2021-01-01 RX ADMIN — CASTOR OIL AND BALSAM, PERU: 788; 87 OINTMENT TOPICAL at 21:07

## 2021-01-01 RX ADMIN — MIDAZOLAM 2 MG: 1 INJECTION INTRAMUSCULAR; INTRAVENOUS at 09:46

## 2021-01-01 RX ADMIN — ACETAMINOPHEN 650 MG: 325 TABLET ORAL at 07:58

## 2021-01-01 RX ADMIN — LACTULOSE 15 ML: 20 SOLUTION ORAL at 21:24

## 2021-01-01 RX ADMIN — NOREPINEPHRINE BITARTRATE 20 MCG/MIN: 1 INJECTION, SOLUTION, CONCENTRATE INTRAVENOUS at 05:59

## 2021-01-01 RX ADMIN — HEPARIN SODIUM 5000 UNITS: 5000 INJECTION INTRAVENOUS; SUBCUTANEOUS at 11:28

## 2021-01-01 RX ADMIN — DEXMEDETOMIDINE 1.3 MCG/KG/HR: 100 INJECTION, SOLUTION, CONCENTRATE INTRAVENOUS at 04:58

## 2021-01-01 RX ADMIN — CHLORHEXIDINE GLUCONATE 15 ML: 0.12 RINSE ORAL at 20:46

## 2021-01-01 RX ADMIN — MIDAZOLAM 2 MG: 1 INJECTION INTRAMUSCULAR; INTRAVENOUS at 15:33

## 2021-01-01 RX ADMIN — SODIUM CHLORIDE: 234 INJECTION INTRAMUSCULAR; INTRAVENOUS; SUBCUTANEOUS at 07:27

## 2021-01-01 RX ADMIN — SODIUM CHLORIDE 10 ML: 9 INJECTION, SOLUTION INTRAMUSCULAR; INTRAVENOUS; SUBCUTANEOUS at 22:16

## 2021-01-01 RX ADMIN — SODIUM CHLORIDE 10 ML: 9 INJECTION, SOLUTION INTRAMUSCULAR; INTRAVENOUS; SUBCUTANEOUS at 21:48

## 2021-01-01 RX ADMIN — HEPARIN SODIUM 5000 UNITS: 5000 INJECTION INTRAVENOUS; SUBCUTANEOUS at 04:10

## 2021-01-01 RX ADMIN — SODIUM CHLORIDE 10 ML: 9 INJECTION, SOLUTION INTRAMUSCULAR; INTRAVENOUS; SUBCUTANEOUS at 21:26

## 2021-01-01 RX ADMIN — SODIUM CHLORIDE, POTASSIUM CHLORIDE, SODIUM LACTATE AND CALCIUM CHLORIDE 1000 ML: 600; 310; 30; 20 INJECTION, SOLUTION INTRAVENOUS at 22:38

## 2021-01-01 RX ADMIN — LEVOTHYROXINE SODIUM 50 MCG: 0.05 TABLET ORAL at 09:22

## 2021-01-01 RX ADMIN — SODIUM CHLORIDE 10 ML: 9 INJECTION, SOLUTION INTRAMUSCULAR; INTRAVENOUS; SUBCUTANEOUS at 17:13

## 2021-01-01 RX ADMIN — ACETAMINOPHEN 650 MG: 325 TABLET ORAL at 15:17

## 2021-01-01 RX ADMIN — Medication 1 AMPULE: at 08:12

## 2021-01-01 RX ADMIN — VANCOMYCIN HYDROCHLORIDE 1750 MG: 10 INJECTION, POWDER, LYOPHILIZED, FOR SOLUTION INTRAVENOUS at 23:54

## 2021-01-01 RX ADMIN — MIDAZOLAM HYDROCHLORIDE 2 MG: 1 INJECTION, SOLUTION INTRAMUSCULAR; INTRAVENOUS at 17:01

## 2021-01-01 RX ADMIN — VANCOMYCIN HYDROCHLORIDE 750 MG: 750 INJECTION, POWDER, LYOPHILIZED, FOR SOLUTION INTRAVENOUS at 11:02

## 2021-01-01 RX ADMIN — CEFEPIME HYDROCHLORIDE 1 G: 1 INJECTION, POWDER, FOR SOLUTION INTRAMUSCULAR; INTRAVENOUS at 15:21

## 2021-01-01 RX ADMIN — MIDAZOLAM 2 MG: 1 INJECTION INTRAMUSCULAR; INTRAVENOUS at 17:22

## 2021-01-01 RX ADMIN — CHLORHEXIDINE GLUCONATE 15 ML: 0.12 RINSE ORAL at 08:23

## 2021-01-01 RX ADMIN — CHLORHEXIDINE GLUCONATE 15 ML: 0.12 RINSE ORAL at 09:20

## 2021-01-01 RX ADMIN — ONDANSETRON 4 MG: 2 INJECTION INTRAMUSCULAR; INTRAVENOUS at 06:01

## 2021-01-01 RX ADMIN — DEXMEDETOMIDINE 1 MCG/KG/HR: 100 INJECTION, SOLUTION, CONCENTRATE INTRAVENOUS at 15:47

## 2021-01-01 RX ADMIN — LACTULOSE 15 ML: 20 SOLUTION ORAL at 00:57

## 2021-01-01 RX ADMIN — Medication 3 MG: at 21:08

## 2021-01-01 RX ADMIN — CASTOR OIL AND BALSAM, PERU: 788; 87 OINTMENT TOPICAL at 08:35

## 2021-01-01 RX ADMIN — SODIUM CHLORIDE 75 ML/HR: 450 INJECTION, SOLUTION INTRAVENOUS at 13:04

## 2021-01-01 RX ADMIN — CASTOR OIL AND BALSAM, PERU: 788; 87 OINTMENT TOPICAL at 08:31

## 2021-01-01 RX ADMIN — CHLORHEXIDINE GLUCONATE 15 ML: 0.12 RINSE ORAL at 08:31

## 2021-01-01 RX ADMIN — SODIUM CHLORIDE 40 MG: 9 INJECTION, SOLUTION INTRAMUSCULAR; INTRAVENOUS; SUBCUTANEOUS at 08:22

## 2021-01-01 RX ADMIN — DEXMEDETOMIDINE 0.4 MCG/KG/HR: 100 INJECTION, SOLUTION, CONCENTRATE INTRAVENOUS at 14:10

## 2021-01-01 RX ADMIN — MIDAZOLAM 2 MG: 1 INJECTION INTRAMUSCULAR; INTRAVENOUS at 15:21

## 2021-01-01 RX ADMIN — IRON SUCROSE 200 MG: 20 INJECTION, SOLUTION INTRAVENOUS at 11:04

## 2021-01-01 RX ADMIN — BARIUM SULFATE 900 ML: 20 SUSPENSION ORAL at 13:06

## 2021-01-01 RX ADMIN — VANCOMYCIN HYDROCHLORIDE 750 MG: 750 INJECTION, POWDER, LYOPHILIZED, FOR SOLUTION INTRAVENOUS at 05:30

## 2021-01-01 RX ADMIN — RISPERIDONE 2 MG: 1 TABLET ORAL at 21:31

## 2021-01-01 RX ADMIN — POLYETHYLENE GLYCOL 3350 17 G: 17 POWDER, FOR SOLUTION ORAL at 18:00

## 2021-01-01 RX ADMIN — POLYETHYLENE GLYCOL 3350 17 G: 17 POWDER, FOR SOLUTION ORAL at 08:22

## 2021-01-01 RX ADMIN — CASTOR OIL AND BALSAM, PERU: 788; 87 OINTMENT TOPICAL at 09:50

## 2021-01-01 RX ADMIN — CHLORHEXIDINE GLUCONATE 15 ML: 0.12 RINSE ORAL at 20:52

## 2021-01-01 RX ADMIN — LACTULOSE 15 ML: 20 SOLUTION ORAL at 21:23

## 2021-01-01 RX ADMIN — CASTOR OIL AND BALSAM, PERU: 788; 87 OINTMENT TOPICAL at 21:45

## 2021-01-01 RX ADMIN — VALPROIC ACID 250 MG: 250 SOLUTION ORAL at 17:31

## 2021-01-01 RX ADMIN — SODIUM CHLORIDE 10 ML: 9 INJECTION, SOLUTION INTRAMUSCULAR; INTRAVENOUS; SUBCUTANEOUS at 05:29

## 2021-01-01 RX ADMIN — CASTOR OIL AND BALSAM, PERU: 788; 87 OINTMENT TOPICAL at 09:25

## 2021-01-01 RX ADMIN — CHLORHEXIDINE GLUCONATE 15 ML: 0.12 RINSE ORAL at 07:42

## 2021-01-01 RX ADMIN — SODIUM CHLORIDE 10 ML: 9 INJECTION, SOLUTION INTRAMUSCULAR; INTRAVENOUS; SUBCUTANEOUS at 14:55

## 2021-01-01 RX ADMIN — CHLORHEXIDINE GLUCONATE 15 ML: 0.12 RINSE ORAL at 08:55

## 2021-01-01 RX ADMIN — LACTULOSE 15 ML: 20 SOLUTION ORAL at 21:06

## 2021-01-01 RX ADMIN — Medication 1 AMPULE: at 09:00

## 2021-01-01 RX ADMIN — SODIUM CHLORIDE 75 ML/HR: 450 INJECTION, SOLUTION INTRAVENOUS at 11:29

## 2021-01-01 RX ADMIN — CEFEPIME HYDROCHLORIDE 1 G: 1 INJECTION, POWDER, FOR SOLUTION INTRAMUSCULAR; INTRAVENOUS at 06:38

## 2021-01-01 RX ADMIN — FENTANYL CITRATE 50 MCG: 50 INJECTION, SOLUTION INTRAMUSCULAR; INTRAVENOUS at 18:05

## 2021-01-01 RX ADMIN — CEFEPIME HYDROCHLORIDE 1 G: 1 INJECTION, POWDER, FOR SOLUTION INTRAMUSCULAR; INTRAVENOUS at 00:57

## 2021-01-01 RX ADMIN — HEPARIN SODIUM 5000 UNITS: 5000 INJECTION INTRAVENOUS; SUBCUTANEOUS at 21:09

## 2021-01-01 RX ADMIN — LEVOTHYROXINE SODIUM 50 MCG: 0.05 TABLET ORAL at 09:49

## 2021-01-01 RX ADMIN — DEXMEDETOMIDINE 1.3 MCG/KG/HR: 100 INJECTION, SOLUTION, CONCENTRATE INTRAVENOUS at 09:07

## 2021-01-01 RX ADMIN — VANCOMYCIN HYDROCHLORIDE 750 MG: 750 INJECTION, POWDER, LYOPHILIZED, FOR SOLUTION INTRAVENOUS at 17:02

## 2021-01-01 RX ADMIN — SODIUM CHLORIDE 10 ML: 9 INJECTION, SOLUTION INTRAMUSCULAR; INTRAVENOUS; SUBCUTANEOUS at 13:54

## 2021-01-01 RX ADMIN — FENTANYL CITRATE 50 MCG: 50 INJECTION, SOLUTION INTRAMUSCULAR; INTRAVENOUS at 12:20

## 2021-01-01 RX ADMIN — ACETAMINOPHEN 650 MG: 325 TABLET ORAL at 16:21

## 2021-01-01 RX ADMIN — SODIUM CHLORIDE 10 ML: 9 INJECTION, SOLUTION INTRAMUSCULAR; INTRAVENOUS; SUBCUTANEOUS at 13:08

## 2021-01-01 RX ADMIN — SODIUM CHLORIDE 10 ML: 9 INJECTION, SOLUTION INTRAMUSCULAR; INTRAVENOUS; SUBCUTANEOUS at 05:59

## 2021-01-01 RX ADMIN — CEFEPIME HYDROCHLORIDE 1 G: 1 INJECTION, POWDER, FOR SOLUTION INTRAMUSCULAR; INTRAVENOUS at 07:42

## 2021-01-01 RX ADMIN — LACTULOSE 15 ML: 20 SOLUTION ORAL at 22:14

## 2021-01-01 RX ADMIN — DEXMEDETOMIDINE 1.5 MCG/KG/HR: 100 INJECTION, SOLUTION, CONCENTRATE INTRAVENOUS at 23:25

## 2021-01-01 RX ADMIN — DEXMEDETOMIDINE 1.5 MCG/KG/HR: 100 INJECTION, SOLUTION, CONCENTRATE INTRAVENOUS at 22:26

## 2021-01-01 RX ADMIN — DEXMEDETOMIDINE 1.5 MCG/KG/HR: 100 INJECTION, SOLUTION, CONCENTRATE INTRAVENOUS at 00:55

## 2021-01-01 RX ADMIN — CASTOR OIL AND BALSAM, PERU: 788; 87 OINTMENT TOPICAL at 16:31

## 2021-01-01 RX ADMIN — CASTOR OIL AND BALSAM, PERU: 788; 87 OINTMENT TOPICAL at 17:52

## 2021-01-01 RX ADMIN — LANSOPRAZOLE 30 MG: KIT at 07:48

## 2021-01-01 RX ADMIN — BUMETANIDE 1 MG: 0.25 INJECTION, SOLUTION INTRAMUSCULAR; INTRAVENOUS at 21:20

## 2021-01-01 RX ADMIN — SODIUM CHLORIDE 10 ML: 9 INJECTION, SOLUTION INTRAMUSCULAR; INTRAVENOUS; SUBCUTANEOUS at 06:28

## 2021-01-01 RX ADMIN — Medication 1 AMPULE: at 20:54

## 2021-01-01 RX ADMIN — PROPOFOL 50 MG: 10 INJECTION, EMULSION INTRAVENOUS at 10:23

## 2021-01-01 RX ADMIN — SODIUM CHLORIDE 10 ML: 9 INJECTION, SOLUTION INTRAMUSCULAR; INTRAVENOUS; SUBCUTANEOUS at 13:05

## 2021-01-01 RX ADMIN — DEXMEDETOMIDINE 0.7 MCG/KG/HR: 100 INJECTION, SOLUTION, CONCENTRATE INTRAVENOUS at 06:07

## 2021-01-01 RX ADMIN — DEXMEDETOMIDINE 1.5 MCG/KG/HR: 100 INJECTION, SOLUTION, CONCENTRATE INTRAVENOUS at 19:51

## 2021-01-01 RX ADMIN — ACETAMINOPHEN 650 MG: 325 TABLET ORAL at 09:46

## 2021-01-01 RX ADMIN — IRON SUCROSE 200 MG: 20 INJECTION, SOLUTION INTRAVENOUS at 10:02

## 2021-01-01 RX ADMIN — CASTOR OIL AND BALSAM, PERU: 788; 87 OINTMENT TOPICAL at 09:38

## 2021-01-01 RX ADMIN — VECURONIUM BROMIDE 8 MG: 1 INJECTION, POWDER, LYOPHILIZED, FOR SOLUTION INTRAVENOUS at 11:33

## 2021-01-01 RX ADMIN — DEXMEDETOMIDINE 1 MCG/KG/HR: 100 INJECTION, SOLUTION, CONCENTRATE INTRAVENOUS at 14:06

## 2021-01-01 RX ADMIN — BUMETANIDE 1 MG: 0.25 INJECTION, SOLUTION INTRAMUSCULAR; INTRAVENOUS at 05:54

## 2021-01-01 RX ADMIN — LACTULOSE 15 ML: 20 SOLUTION ORAL at 21:20

## 2021-01-01 RX ADMIN — POLYETHYLENE GLYCOL 3350 17 G: 17 POWDER, FOR SOLUTION ORAL at 09:06

## 2021-01-01 RX ADMIN — CASTOR OIL AND BALSAM, PERU: 788; 87 OINTMENT TOPICAL at 18:11

## 2021-01-01 RX ADMIN — DEXMEDETOMIDINE 0.6 MCG/KG/HR: 100 INJECTION, SOLUTION, CONCENTRATE INTRAVENOUS at 10:31

## 2021-01-01 RX ADMIN — RISPERIDONE 1 MG: 0.25 TABLET ORAL at 21:10

## 2021-01-01 RX ADMIN — LACTULOSE 15 ML: 20 SOLUTION ORAL at 09:06

## 2021-01-01 RX ADMIN — ACETAMINOPHEN 650 MG: 325 TABLET ORAL at 04:34

## 2021-01-01 RX ADMIN — RISPERIDONE 1 MG: 0.25 TABLET ORAL at 22:16

## 2021-01-01 RX ADMIN — SODIUM CHLORIDE 75 ML/HR: 9 INJECTION, SOLUTION INTRAVENOUS at 02:00

## 2021-01-01 RX ADMIN — HEPARIN SODIUM 5000 UNITS: 5000 INJECTION INTRAVENOUS; SUBCUTANEOUS at 11:57

## 2021-01-01 RX ADMIN — LEVOTHYROXINE SODIUM 50 MCG: 0.05 TABLET ORAL at 09:08

## 2021-01-01 RX ADMIN — CEFEPIME HYDROCHLORIDE 1 G: 1 INJECTION, POWDER, FOR SOLUTION INTRAMUSCULAR; INTRAVENOUS at 06:33

## 2021-01-01 RX ADMIN — SODIUM CHLORIDE 10 ML: 9 INJECTION, SOLUTION INTRAMUSCULAR; INTRAVENOUS; SUBCUTANEOUS at 05:25

## 2021-01-01 RX ADMIN — DEXMEDETOMIDINE 0.8 MCG/KG/HR: 100 INJECTION, SOLUTION, CONCENTRATE INTRAVENOUS at 15:20

## 2021-01-01 RX ADMIN — POLYETHYLENE GLYCOL 3350 17 G: 17 POWDER, FOR SOLUTION ORAL at 08:31

## 2021-01-01 RX ADMIN — LEVOTHYROXINE SODIUM 50 MCG: 0.05 TABLET ORAL at 09:35

## 2021-01-01 RX ADMIN — CEFEPIME HYDROCHLORIDE 1 G: 1 INJECTION, POWDER, FOR SOLUTION INTRAMUSCULAR; INTRAVENOUS at 14:40

## 2021-01-01 RX ADMIN — MIDAZOLAM 4 MG: 1 INJECTION INTRAMUSCULAR; INTRAVENOUS at 15:06

## 2021-01-01 RX ADMIN — SODIUM CHLORIDE 40 ML: 9 INJECTION, SOLUTION INTRAMUSCULAR; INTRAVENOUS; SUBCUTANEOUS at 14:00

## 2021-01-01 RX ADMIN — DEXMEDETOMIDINE 1 MCG/KG/HR: 100 INJECTION, SOLUTION, CONCENTRATE INTRAVENOUS at 08:04

## 2021-01-01 RX ADMIN — ROCURONIUM BROMIDE 20 MG: 10 INJECTION, SOLUTION INTRAVENOUS at 16:38

## 2021-01-01 RX ADMIN — CEFEPIME HYDROCHLORIDE 1 G: 1 INJECTION, POWDER, FOR SOLUTION INTRAMUSCULAR; INTRAVENOUS at 14:12

## 2021-01-01 RX ADMIN — FENTANYL CITRATE 100 MCG: 50 INJECTION, SOLUTION INTRAMUSCULAR; INTRAVENOUS at 15:37

## 2021-01-01 RX ADMIN — CEFEPIME HYDROCHLORIDE 1 G: 1 INJECTION, POWDER, FOR SOLUTION INTRAMUSCULAR; INTRAVENOUS at 06:00

## 2021-01-01 RX ADMIN — FENTANYL CITRATE 50 MCG: 50 INJECTION, SOLUTION INTRAMUSCULAR; INTRAVENOUS at 01:31

## 2021-01-01 RX ADMIN — HEPARIN SODIUM 5000 UNITS: 5000 INJECTION INTRAVENOUS; SUBCUTANEOUS at 03:57

## 2021-01-01 RX ADMIN — CASTOR OIL AND BALSAM, PERU: 788; 87 OINTMENT TOPICAL at 15:25

## 2021-01-01 RX ADMIN — Medication 1 AMPULE: at 09:10

## 2021-01-01 RX ADMIN — HEPARIN SODIUM 5000 UNITS: 5000 INJECTION INTRAVENOUS; SUBCUTANEOUS at 11:19

## 2021-01-01 RX ADMIN — PROPOFOL 20 MG: 10 INJECTION, EMULSION INTRAVENOUS at 10:20

## 2021-01-01 RX ADMIN — IVABRADINE 5 MG: 5 TABLET, FILM COATED ORAL at 07:47

## 2021-01-01 RX ADMIN — CASTOR OIL AND BALSAM, PERU: 788; 87 OINTMENT TOPICAL at 08:23

## 2021-01-01 RX ADMIN — Medication 1 AMPULE: at 21:32

## 2021-01-01 RX ADMIN — CHLORHEXIDINE GLUCONATE 15 ML: 0.12 RINSE ORAL at 21:30

## 2021-01-01 RX ADMIN — CHLORHEXIDINE GLUCONATE 15 ML: 0.12 RINSE ORAL at 21:24

## 2021-01-01 RX ADMIN — NOREPINEPHRINE BITARTRATE 19 MCG/MIN: 1 INJECTION, SOLUTION, CONCENTRATE INTRAVENOUS at 10:18

## 2021-01-01 RX ADMIN — DEXMEDETOMIDINE 1.5 MCG/KG/HR: 100 INJECTION, SOLUTION, CONCENTRATE INTRAVENOUS at 09:26

## 2021-01-01 RX ADMIN — Medication 1 AMPULE: at 09:45

## 2021-01-01 RX ADMIN — SODIUM CHLORIDE 10 ML: 9 INJECTION, SOLUTION INTRAMUSCULAR; INTRAVENOUS; SUBCUTANEOUS at 21:10

## 2021-01-01 RX ADMIN — SODIUM CHLORIDE 10 ML: 9 INJECTION, SOLUTION INTRAMUSCULAR; INTRAVENOUS; SUBCUTANEOUS at 06:04

## 2021-01-01 RX ADMIN — DEXMEDETOMIDINE 0.8 MCG/KG/HR: 100 INJECTION, SOLUTION, CONCENTRATE INTRAVENOUS at 00:15

## 2021-01-01 RX ADMIN — MIDAZOLAM 4 MG: 1 INJECTION INTRAMUSCULAR; INTRAVENOUS at 15:37

## 2021-01-01 RX ADMIN — WATER 10 ML: 1 INJECTION INTRAMUSCULAR; INTRAVENOUS; SUBCUTANEOUS at 11:33

## 2021-01-01 RX ADMIN — ROCURONIUM BROMIDE 30 MG: 10 INJECTION, SOLUTION INTRAVENOUS at 16:21

## 2021-01-01 RX ADMIN — Medication 1 AMPULE: at 09:25

## 2021-01-01 RX ADMIN — SODIUM CHLORIDE 75 ML/HR: 9 INJECTION, SOLUTION INTRAVENOUS at 11:30

## 2021-01-01 RX ADMIN — Medication 1 AMPULE: at 09:50

## 2021-01-01 RX ADMIN — CEFEPIME HYDROCHLORIDE 1 G: 1 INJECTION, POWDER, FOR SOLUTION INTRAMUSCULAR; INTRAVENOUS at 06:05

## 2021-01-01 RX ADMIN — RISPERIDONE 1 MG: 0.25 TABLET ORAL at 21:08

## 2021-01-01 RX ADMIN — LACTULOSE 15 ML: 20 SOLUTION ORAL at 08:21

## 2021-01-01 RX ADMIN — FENTANYL CITRATE 50 MCG: 50 INJECTION, SOLUTION INTRAMUSCULAR; INTRAVENOUS at 12:40

## 2021-01-01 RX ADMIN — FENTANYL CITRATE 50 MCG: 50 INJECTION, SOLUTION INTRAMUSCULAR; INTRAVENOUS at 12:28

## 2021-01-01 RX ADMIN — SODIUM CHLORIDE 10 ML: 9 INJECTION, SOLUTION INTRAMUSCULAR; INTRAVENOUS; SUBCUTANEOUS at 06:39

## 2021-01-01 RX ADMIN — MIDAZOLAM 2 MG: 1 INJECTION INTRAMUSCULAR; INTRAVENOUS at 01:38

## 2021-01-01 RX ADMIN — SODIUM CHLORIDE 10 ML: 9 INJECTION, SOLUTION INTRAMUSCULAR; INTRAVENOUS; SUBCUTANEOUS at 13:14

## 2021-01-01 RX ADMIN — IRON SUCROSE 200 MG: 20 INJECTION, SOLUTION INTRAVENOUS at 08:33

## 2021-01-01 RX ADMIN — QUETIAPINE FUMARATE 50 MG: 25 TABLET ORAL at 21:08

## 2021-01-01 RX ADMIN — MAGNESIUM CITRATE 296 ML: 1.75 LIQUID ORAL at 15:30

## 2021-01-01 RX ADMIN — LEVOTHYROXINE SODIUM 50 MCG: 0.05 TABLET ORAL at 08:24

## 2021-01-01 RX ADMIN — Medication 1 AMPULE: at 20:40

## 2021-01-01 RX ADMIN — LACTULOSE 15 ML: 20 SOLUTION ORAL at 18:22

## 2021-01-01 RX ADMIN — Medication 1 AMPULE: at 08:56

## 2021-01-01 RX ADMIN — SODIUM CHLORIDE, POTASSIUM CHLORIDE, SODIUM LACTATE AND CALCIUM CHLORIDE 500 ML: 600; 310; 30; 20 INJECTION, SOLUTION INTRAVENOUS at 09:32

## 2021-01-01 RX ADMIN — ACETAMINOPHEN 650 MG: 325 TABLET ORAL at 02:25

## 2021-01-01 RX ADMIN — SODIUM CHLORIDE 10 ML: 9 INJECTION, SOLUTION INTRAMUSCULAR; INTRAVENOUS; SUBCUTANEOUS at 14:12

## 2021-01-01 RX ADMIN — HEPARIN SODIUM 5000 UNITS: 5000 INJECTION INTRAVENOUS; SUBCUTANEOUS at 11:49

## 2021-01-01 RX ADMIN — DEXTROSE MONOHYDRATE 100 ML/HR: 50 INJECTION, SOLUTION INTRAVENOUS at 10:30

## 2021-01-01 RX ADMIN — SODIUM CHLORIDE 10 ML: 9 INJECTION, SOLUTION INTRAMUSCULAR; INTRAVENOUS; SUBCUTANEOUS at 06:00

## 2021-01-01 RX ADMIN — FENTANYL CITRATE 50 MCG: 50 INJECTION, SOLUTION INTRAMUSCULAR; INTRAVENOUS at 09:49

## 2021-01-01 RX ADMIN — Medication 10 ML: at 13:03

## 2021-01-01 RX ADMIN — HEPARIN SODIUM 5000 UNITS: 5000 INJECTION INTRAVENOUS; SUBCUTANEOUS at 12:58

## 2021-01-01 RX ADMIN — POLYETHYLENE GLYCOL 3350 17 G: 17 POWDER, FOR SOLUTION ORAL at 17:24

## 2021-01-01 RX ADMIN — METOCLOPRAMIDE 5 MG: 5 INJECTION, SOLUTION INTRAMUSCULAR; INTRAVENOUS at 13:00

## 2021-01-01 RX ADMIN — SODIUM CHLORIDE 40 MG: 9 INJECTION, SOLUTION INTRAMUSCULAR; INTRAVENOUS; SUBCUTANEOUS at 08:33

## 2021-01-01 RX ADMIN — CEFEPIME HYDROCHLORIDE 1 G: 1 INJECTION, POWDER, FOR SOLUTION INTRAMUSCULAR; INTRAVENOUS at 22:49

## 2021-01-01 RX ADMIN — CASTOR OIL AND BALSAM, PERU: 788; 87 OINTMENT TOPICAL at 21:29

## 2021-01-01 RX ADMIN — SODIUM CHLORIDE 1000 ML: 9 INJECTION, SOLUTION INTRAVENOUS at 16:12

## 2021-01-01 RX ADMIN — CASTOR OIL AND BALSAM, PERU: 788; 87 OINTMENT TOPICAL at 10:49

## 2021-01-01 RX ADMIN — LACTULOSE 15 ML: 20 SOLUTION ORAL at 17:46

## 2021-01-01 RX ADMIN — MIDAZOLAM 2 MG: 1 INJECTION INTRAMUSCULAR; INTRAVENOUS at 10:13

## 2021-01-01 RX ADMIN — SODIUM CHLORIDE 10 ML: 9 INJECTION, SOLUTION INTRAMUSCULAR; INTRAVENOUS; SUBCUTANEOUS at 21:21

## 2021-01-01 RX ADMIN — CHLORHEXIDINE GLUCONATE 15 ML: 0.12 RINSE ORAL at 09:06

## 2021-01-01 RX ADMIN — DEXMEDETOMIDINE 0.8 MCG/KG/HR: 100 INJECTION, SOLUTION, CONCENTRATE INTRAVENOUS at 07:37

## 2021-01-01 RX ADMIN — RISPERIDONE 1 MG: 0.25 TABLET ORAL at 21:07

## 2021-01-01 RX ADMIN — EPOETIN ALFA-EPBX 40000 UNITS: 40000 INJECTION, SOLUTION INTRAVENOUS; SUBCUTANEOUS at 20:47

## 2021-01-01 RX ADMIN — MIDAZOLAM 2 MG: 1 INJECTION INTRAMUSCULAR; INTRAVENOUS at 12:41

## 2021-01-01 RX ADMIN — LEVOTHYROXINE SODIUM 50 MCG: 0.05 TABLET ORAL at 09:17

## 2021-01-01 RX ADMIN — DEXMEDETOMIDINE 0.3 MCG/KG/HR: 100 INJECTION, SOLUTION, CONCENTRATE INTRAVENOUS at 04:49

## 2021-01-01 RX ADMIN — WATER 2 G: 1 INJECTION INTRAMUSCULAR; INTRAVENOUS; SUBCUTANEOUS at 10:19

## 2021-01-01 RX ADMIN — HEPARIN SODIUM 5000 UNITS: 5000 INJECTION INTRAVENOUS; SUBCUTANEOUS at 04:33

## 2021-01-01 RX ADMIN — Medication 1 AMPULE: at 20:59

## 2021-01-01 RX ADMIN — VANCOMYCIN HYDROCHLORIDE 750 MG: 750 INJECTION, POWDER, LYOPHILIZED, FOR SOLUTION INTRAVENOUS at 23:45

## 2021-01-01 RX ADMIN — RISPERIDONE 2 MG: 1 TABLET ORAL at 21:20

## 2021-01-01 RX ADMIN — HEPARIN SODIUM 5000 UNITS: 5000 INJECTION INTRAVENOUS; SUBCUTANEOUS at 13:00

## 2021-01-01 RX ADMIN — FENTANYL CITRATE 50 MCG: 50 INJECTION, SOLUTION INTRAMUSCULAR; INTRAVENOUS at 16:42

## 2021-01-01 RX ADMIN — IVABRADINE 5 MG: 5 TABLET, FILM COATED ORAL at 17:09

## 2021-01-01 RX ADMIN — RISPERIDONE 1 MG: 0.25 TABLET ORAL at 21:25

## 2021-01-01 RX ADMIN — VANCOMYCIN HYDROCHLORIDE 750 MG: 750 INJECTION, POWDER, LYOPHILIZED, FOR SOLUTION INTRAVENOUS at 11:50

## 2021-01-01 RX ADMIN — CASTOR OIL AND BALSAM, PERU: 788; 87 OINTMENT TOPICAL at 07:41

## 2021-01-01 RX ADMIN — LEVOTHYROXINE SODIUM 50 MCG: 0.05 TABLET ORAL at 09:48

## 2021-01-01 RX ADMIN — DEXMEDETOMIDINE 0.7 MCG/KG/HR: 100 INJECTION, SOLUTION, CONCENTRATE INTRAVENOUS at 20:41

## 2021-01-01 RX ADMIN — MIDAZOLAM 10 MG/HR: 5 INJECTION INTRAMUSCULAR; INTRAVENOUS at 13:09

## 2021-01-01 RX ADMIN — SODIUM CHLORIDE 75 ML/HR: 450 INJECTION, SOLUTION INTRAVENOUS at 17:39

## 2021-01-01 RX ADMIN — LACTULOSE 15 ML: 20 SOLUTION ORAL at 21:36

## 2021-01-01 RX ADMIN — IRON SUCROSE 200 MG: 20 INJECTION, SOLUTION INTRAVENOUS at 08:37

## 2021-01-01 RX ADMIN — FENTANYL CITRATE 50 MCG: 50 INJECTION, SOLUTION INTRAMUSCULAR; INTRAVENOUS at 15:24

## 2021-01-01 RX ADMIN — CHLORHEXIDINE GLUCONATE 15 ML: 0.12 RINSE ORAL at 20:40

## 2021-01-01 RX ADMIN — LANSOPRAZOLE 30 MG: KIT at 09:22

## 2021-01-01 RX ADMIN — FAMOTIDINE 20 MG: 10 INJECTION, SOLUTION INTRAVENOUS at 08:11

## 2021-01-01 RX ADMIN — DEXMEDETOMIDINE 1.3 MCG/KG/HR: 100 INJECTION, SOLUTION, CONCENTRATE INTRAVENOUS at 18:27

## 2021-01-01 RX ADMIN — CEFEPIME HYDROCHLORIDE 1 G: 1 INJECTION, POWDER, FOR SOLUTION INTRAMUSCULAR; INTRAVENOUS at 22:28

## 2021-01-01 RX ADMIN — Medication 1 AMPULE: at 21:07

## 2021-01-01 RX ADMIN — CASTOR OIL AND BALSAM, PERU: 788; 87 OINTMENT TOPICAL at 21:05

## 2021-01-01 RX ADMIN — RISPERIDONE 1 MG: 0.25 TABLET ORAL at 22:49

## 2021-01-01 RX ADMIN — RISPERIDONE 1 MG: 0.25 TABLET ORAL at 21:44

## 2021-01-01 RX ADMIN — SODIUM CHLORIDE 10 ML: 9 INJECTION, SOLUTION INTRAMUSCULAR; INTRAVENOUS; SUBCUTANEOUS at 21:00

## 2021-01-01 RX ADMIN — LACTULOSE 15 ML: 20 SOLUTION ORAL at 17:13

## 2021-01-01 RX ADMIN — BISACODYL 10 MG: 10 SUPPOSITORY RECTAL at 12:40

## 2021-01-01 RX ADMIN — FENTANYL CITRATE 300 MCG/HR: 50 INJECTION, SOLUTION INTRAMUSCULAR; INTRAVENOUS at 13:42

## 2021-01-01 RX ADMIN — VALPROIC ACID 250 MG: 250 SOLUTION ORAL at 09:35

## 2021-01-01 RX ADMIN — SODIUM CHLORIDE 10 ML: 9 INJECTION, SOLUTION INTRAMUSCULAR; INTRAVENOUS; SUBCUTANEOUS at 13:03

## 2021-01-01 RX ADMIN — SODIUM CHLORIDE 10 ML: 9 INJECTION, SOLUTION INTRAMUSCULAR; INTRAVENOUS; SUBCUTANEOUS at 21:08

## 2021-01-01 RX ADMIN — Medication 1 AMPULE: at 08:46

## 2021-01-01 RX ADMIN — Medication 1 AMPULE: at 09:32

## 2021-01-01 RX ADMIN — Medication 3 MG: at 21:42

## 2021-01-01 RX ADMIN — FENTANYL CITRATE 50 MCG: 50 INJECTION, SOLUTION INTRAMUSCULAR; INTRAVENOUS at 09:36

## 2021-01-01 RX ADMIN — HEPARIN SODIUM 5000 UNITS: 5000 INJECTION INTRAVENOUS; SUBCUTANEOUS at 12:49

## 2021-01-01 RX ADMIN — MAGNESIUM SULFATE HEPTAHYDRATE 3 G: 500 INJECTION, SOLUTION INTRAMUSCULAR; INTRAVENOUS at 13:02

## 2021-01-01 RX ADMIN — MIDAZOLAM 2 MG: 1 INJECTION INTRAMUSCULAR; INTRAVENOUS at 12:29

## 2021-01-01 RX ADMIN — HEPARIN SODIUM 5000 UNITS: 5000 INJECTION INTRAVENOUS; SUBCUTANEOUS at 19:45

## 2021-01-01 RX ADMIN — SODIUM CHLORIDE 10 ML: 9 INJECTION, SOLUTION INTRAMUSCULAR; INTRAVENOUS; SUBCUTANEOUS at 22:14

## 2021-01-01 RX ADMIN — DEXMEDETOMIDINE 1.5 MCG/KG/HR: 100 INJECTION, SOLUTION, CONCENTRATE INTRAVENOUS at 12:59

## 2021-01-01 RX ADMIN — ACETAMINOPHEN 650 MG: 650 SUPPOSITORY RECTAL at 17:45

## 2021-01-01 RX ADMIN — SODIUM CHLORIDE 10 ML: 9 INJECTION, SOLUTION INTRAMUSCULAR; INTRAVENOUS; SUBCUTANEOUS at 05:18

## 2021-01-01 RX ADMIN — MIDAZOLAM 2 MG: 1 INJECTION INTRAMUSCULAR; INTRAVENOUS at 08:07

## 2021-01-01 RX ADMIN — SODIUM CHLORIDE 10 ML: 9 INJECTION, SOLUTION INTRAMUSCULAR; INTRAVENOUS; SUBCUTANEOUS at 21:19

## 2021-01-01 RX ADMIN — CHLORHEXIDINE GLUCONATE 15 ML: 0.12 RINSE ORAL at 08:36

## 2021-01-01 RX ADMIN — MIDAZOLAM 4 MG: 1 INJECTION INTRAMUSCULAR; INTRAVENOUS at 16:08

## 2021-01-01 RX ADMIN — RISPERIDONE 1 MG: 0.25 TABLET ORAL at 22:14

## 2021-01-01 RX ADMIN — CHLORHEXIDINE GLUCONATE 15 ML: 0.12 RINSE ORAL at 09:00

## 2021-01-01 RX ADMIN — HEPARIN SODIUM 5000 UNITS: 5000 INJECTION INTRAVENOUS; SUBCUTANEOUS at 11:22

## 2021-01-01 RX ADMIN — HEPARIN SODIUM 5000 UNITS: 5000 INJECTION INTRAVENOUS; SUBCUTANEOUS at 11:29

## 2021-01-01 RX ADMIN — LACTULOSE 15 ML: 20 SOLUTION ORAL at 09:50

## 2021-01-01 RX ADMIN — LACTULOSE 15 ML: 20 SOLUTION ORAL at 21:44

## 2021-01-01 RX ADMIN — SODIUM CHLORIDE 40 MG: 9 INJECTION, SOLUTION INTRAMUSCULAR; INTRAVENOUS; SUBCUTANEOUS at 21:06

## 2021-01-01 RX ADMIN — Medication 10 ML: at 21:33

## 2021-01-01 RX ADMIN — CHLORHEXIDINE GLUCONATE 15 ML: 0.12 RINSE ORAL at 08:01

## 2021-01-01 RX ADMIN — CEFEPIME HYDROCHLORIDE 1 G: 1 INJECTION, POWDER, FOR SOLUTION INTRAMUSCULAR; INTRAVENOUS at 14:56

## 2021-01-01 RX ADMIN — DEXMEDETOMIDINE 1.3 MCG/KG/HR: 100 INJECTION, SOLUTION, CONCENTRATE INTRAVENOUS at 10:15

## 2021-01-01 RX ADMIN — FAMOTIDINE 20 MG: 20 TABLET, FILM COATED ORAL at 08:31

## 2021-01-01 RX ADMIN — CHLORHEXIDINE GLUCONATE 15 ML: 0.12 RINSE ORAL at 20:24

## 2021-01-01 RX ADMIN — Medication 10 ML: at 06:50

## 2021-01-01 RX ADMIN — LACTULOSE 15 ML: 20 SOLUTION ORAL at 08:36

## 2021-01-01 RX ADMIN — FENTANYL CITRATE 50 MCG: 50 INJECTION, SOLUTION INTRAMUSCULAR; INTRAVENOUS at 16:20

## 2021-01-01 RX ADMIN — FAMOTIDINE 20 MG: 20 TABLET, FILM COATED ORAL at 09:07

## 2021-01-01 RX ADMIN — HEPARIN SODIUM 5000 UNITS: 5000 INJECTION INTRAVENOUS; SUBCUTANEOUS at 04:07

## 2021-01-01 RX ADMIN — SODIUM CHLORIDE 40 MG: 9 INJECTION, SOLUTION INTRAMUSCULAR; INTRAVENOUS; SUBCUTANEOUS at 21:32

## 2021-01-01 RX ADMIN — DEXMEDETOMIDINE 1.5 MCG/KG/HR: 100 INJECTION, SOLUTION, CONCENTRATE INTRAVENOUS at 09:28

## 2021-01-01 RX ADMIN — SODIUM CHLORIDE 10 ML: 9 INJECTION, SOLUTION INTRAMUSCULAR; INTRAVENOUS; SUBCUTANEOUS at 14:45

## 2021-01-01 RX ADMIN — HEPARIN SODIUM 5000 UNITS: 5000 INJECTION INTRAVENOUS; SUBCUTANEOUS at 04:49

## 2021-01-01 RX ADMIN — CHLORHEXIDINE GLUCONATE 15 ML: 0.12 RINSE ORAL at 20:30

## 2021-01-01 RX ADMIN — CEFEPIME HYDROCHLORIDE 1 G: 1 INJECTION, POWDER, FOR SOLUTION INTRAMUSCULAR; INTRAVENOUS at 17:13

## 2021-01-01 RX ADMIN — LACTULOSE 15 ML: 20 SOLUTION ORAL at 22:49

## 2021-01-01 RX ADMIN — HEPARIN SODIUM 5000 UNITS: 5000 INJECTION INTRAVENOUS; SUBCUTANEOUS at 21:18

## 2021-01-01 RX ADMIN — LEVOTHYROXINE SODIUM 50 MCG: 0.05 TABLET ORAL at 10:48

## 2021-01-01 RX ADMIN — HEPARIN SODIUM 5000 UNITS: 5000 INJECTION INTRAVENOUS; SUBCUTANEOUS at 12:29

## 2021-01-01 RX ADMIN — DEXMEDETOMIDINE 0.4 MCG/KG/HR: 100 INJECTION, SOLUTION, CONCENTRATE INTRAVENOUS at 01:58

## 2021-01-01 RX ADMIN — MIDAZOLAM 2 MG: 1 INJECTION INTRAMUSCULAR; INTRAVENOUS at 12:21

## 2021-01-01 RX ADMIN — HEPARIN SODIUM 5000 UNITS: 5000 INJECTION INTRAVENOUS; SUBCUTANEOUS at 04:36

## 2021-01-01 RX ADMIN — ONDANSETRON 4 MG: 2 INJECTION INTRAMUSCULAR; INTRAVENOUS at 16:42

## 2021-01-01 RX ADMIN — CEFEPIME HYDROCHLORIDE 1 G: 1 INJECTION, POWDER, FOR SOLUTION INTRAMUSCULAR; INTRAVENOUS at 07:38

## 2021-01-01 RX ADMIN — MIDAZOLAM 2 MG: 1 INJECTION INTRAMUSCULAR; INTRAVENOUS at 11:57

## 2021-01-01 RX ADMIN — HEPARIN SODIUM 5000 UNITS: 5000 INJECTION INTRAVENOUS; SUBCUTANEOUS at 05:18

## 2021-01-01 RX ADMIN — RISPERIDONE 1 MG: 0.25 TABLET ORAL at 21:00

## 2021-01-01 RX ADMIN — FENTANYL CITRATE 50 MCG: 50 INJECTION, SOLUTION INTRAMUSCULAR; INTRAVENOUS at 15:16

## 2021-01-01 RX ADMIN — DEXMEDETOMIDINE 1.5 MCG/KG/HR: 100 INJECTION, SOLUTION, CONCENTRATE INTRAVENOUS at 05:30

## 2021-01-01 RX ADMIN — FENTANYL CITRATE 50 MCG: 50 INJECTION, SOLUTION INTRAMUSCULAR; INTRAVENOUS at 09:46

## 2021-01-01 RX ADMIN — MIDAZOLAM 2 MG: 1 INJECTION INTRAMUSCULAR; INTRAVENOUS at 09:17

## 2021-01-01 RX ADMIN — SODIUM CHLORIDE 10 ML: 9 INJECTION, SOLUTION INTRAMUSCULAR; INTRAVENOUS; SUBCUTANEOUS at 05:38

## 2021-01-01 RX ADMIN — FENTANYL CITRATE 50 MCG: 50 INJECTION, SOLUTION INTRAMUSCULAR; INTRAVENOUS at 10:20

## 2021-01-01 RX ADMIN — POLYETHYLENE GLYCOL 3350 17 G: 17 POWDER, FOR SOLUTION ORAL at 18:21

## 2021-01-01 RX ADMIN — FENTANYL CITRATE 50 MCG: 50 INJECTION, SOLUTION INTRAMUSCULAR; INTRAVENOUS at 20:40

## 2021-01-01 RX ADMIN — LACTULOSE 15 ML: 20 SOLUTION ORAL at 16:15

## 2021-01-01 RX ADMIN — SODIUM CHLORIDE 40 MG: 9 INJECTION, SOLUTION INTRAMUSCULAR; INTRAVENOUS; SUBCUTANEOUS at 21:17

## 2021-01-01 RX ADMIN — HEPARIN SODIUM 5000 UNITS: 5000 INJECTION INTRAVENOUS; SUBCUTANEOUS at 06:09

## 2021-01-01 RX ADMIN — Medication 1 AMPULE: at 20:57

## 2021-01-01 RX ADMIN — FENTANYL CITRATE 50 MCG: 50 INJECTION, SOLUTION INTRAMUSCULAR; INTRAVENOUS at 11:18

## 2021-01-01 RX ADMIN — CEFEPIME HYDROCHLORIDE 1 G: 1 INJECTION, POWDER, FOR SOLUTION INTRAMUSCULAR; INTRAVENOUS at 22:56

## 2021-01-01 RX ADMIN — DEXTROSE MONOHYDRATE 125 ML/HR: 50 INJECTION, SOLUTION INTRAVENOUS at 18:25

## 2021-01-01 RX ADMIN — RISPERIDONE 1 MG: 0.25 TABLET ORAL at 21:37

## 2021-01-01 RX ADMIN — LACTULOSE 15 ML: 20 SOLUTION ORAL at 09:18

## 2021-01-01 RX ADMIN — Medication 10 ML: at 11:26

## 2021-01-01 RX ADMIN — DEXMEDETOMIDINE 1.3 MCG/KG/HR: 100 INJECTION, SOLUTION, CONCENTRATE INTRAVENOUS at 20:54

## 2021-01-01 RX ADMIN — SODIUM CHLORIDE 10 ML: 9 INJECTION, SOLUTION INTRAMUSCULAR; INTRAVENOUS; SUBCUTANEOUS at 14:02

## 2021-01-01 RX ADMIN — HEPARIN SODIUM 5000 UNITS: 5000 INJECTION INTRAVENOUS; SUBCUTANEOUS at 19:57

## 2021-01-01 RX ADMIN — SODIUM CHLORIDE 10 ML: 9 INJECTION, SOLUTION INTRAMUSCULAR; INTRAVENOUS; SUBCUTANEOUS at 05:33

## 2021-01-01 RX ADMIN — ACETAMINOPHEN 650 MG: 325 TABLET ORAL at 00:29

## 2021-01-01 RX ADMIN — SODIUM CHLORIDE 10 ML: 9 INJECTION, SOLUTION INTRAMUSCULAR; INTRAVENOUS; SUBCUTANEOUS at 21:05

## 2021-01-01 RX ADMIN — LACTULOSE 15 ML: 20 SOLUTION ORAL at 09:24

## 2021-01-01 RX ADMIN — Medication 100 MCG/HR: at 05:30

## 2021-01-01 RX ADMIN — DEXMEDETOMIDINE 0.8 MCG/KG/HR: 100 INJECTION, SOLUTION, CONCENTRATE INTRAVENOUS at 21:52

## 2021-01-01 RX ADMIN — HEPARIN SODIUM 5000 UNITS: 5000 INJECTION INTRAVENOUS; SUBCUTANEOUS at 20:30

## 2021-01-01 RX ADMIN — SODIUM CHLORIDE 10 ML: 9 INJECTION, SOLUTION INTRAMUSCULAR; INTRAVENOUS; SUBCUTANEOUS at 13:48

## 2021-01-01 RX ADMIN — SODIUM CHLORIDE 10 ML: 9 INJECTION, SOLUTION INTRAMUSCULAR; INTRAVENOUS; SUBCUTANEOUS at 18:22

## 2021-01-01 RX ADMIN — HEPARIN SODIUM 5000 UNITS: 5000 INJECTION INTRAVENOUS; SUBCUTANEOUS at 19:34

## 2021-01-01 RX ADMIN — CASTOR OIL AND BALSAM, PERU: 788; 87 OINTMENT TOPICAL at 10:24

## 2021-01-01 RX ADMIN — HEPARIN SODIUM 5000 UNITS: 5000 INJECTION INTRAVENOUS; SUBCUTANEOUS at 04:39

## 2021-01-01 RX ADMIN — FENTANYL CITRATE 50 MCG: 50 INJECTION, SOLUTION INTRAMUSCULAR; INTRAVENOUS at 17:56

## 2021-01-01 RX ADMIN — DEXMEDETOMIDINE 0.4 MCG/KG/HR: 100 INJECTION, SOLUTION, CONCENTRATE INTRAVENOUS at 19:34

## 2021-01-01 RX ADMIN — Medication 1 AMPULE: at 08:22

## 2021-01-01 RX ADMIN — VALPROIC ACID 250 MG: 250 SOLUTION ORAL at 02:30

## 2021-01-01 RX ADMIN — FENTANYL CITRATE 100 MCG: 50 INJECTION, SOLUTION INTRAMUSCULAR; INTRAVENOUS at 15:52

## 2021-01-01 RX ADMIN — CASTOR OIL AND BALSAM, PERU: 788; 87 OINTMENT TOPICAL at 08:47

## 2021-01-01 RX ADMIN — ACETAMINOPHEN 650 MG: 325 TABLET ORAL at 12:08

## 2021-01-01 RX ADMIN — HEPARIN SODIUM 5000 UNITS: 5000 INJECTION INTRAVENOUS; SUBCUTANEOUS at 04:50

## 2021-01-01 RX ADMIN — Medication 1 AMPULE: at 08:31

## 2021-01-01 RX ADMIN — LACTULOSE 15 ML: 20 SOLUTION ORAL at 09:52

## 2021-01-01 RX ADMIN — FENTANYL CITRATE 50 MCG: 50 INJECTION, SOLUTION INTRAMUSCULAR; INTRAVENOUS at 04:34

## 2021-01-01 RX ADMIN — CASTOR OIL AND BALSAM, PERU: 788; 87 OINTMENT TOPICAL at 17:44

## 2021-01-01 RX ADMIN — QUETIAPINE FUMARATE 50 MG: 25 TABLET ORAL at 21:42

## 2021-01-01 RX ADMIN — FENTANYL CITRATE 50 MCG: 50 INJECTION, SOLUTION INTRAMUSCULAR; INTRAVENOUS at 16:10

## 2021-01-01 RX ADMIN — DEXAMETHASONE SODIUM PHOSPHATE 4 MG: 4 INJECTION, SOLUTION INTRA-ARTICULAR; INTRALESIONAL; INTRAMUSCULAR; INTRAVENOUS; SOFT TISSUE at 16:42

## 2021-01-01 RX ADMIN — DEXMEDETOMIDINE 1.5 MCG/KG/HR: 100 INJECTION, SOLUTION, CONCENTRATE INTRAVENOUS at 05:36

## 2021-01-01 RX ADMIN — CEFEPIME HYDROCHLORIDE 1 G: 1 INJECTION, POWDER, FOR SOLUTION INTRAMUSCULAR; INTRAVENOUS at 23:09

## 2021-01-01 RX ADMIN — LACTULOSE 15 ML: 20 SOLUTION ORAL at 21:09

## 2021-01-01 RX ADMIN — CASTOR OIL AND BALSAM, PERU: 788; 87 OINTMENT TOPICAL at 16:03

## 2021-01-01 RX ADMIN — FENTANYL CITRATE 100 MCG: 50 INJECTION, SOLUTION INTRAMUSCULAR; INTRAVENOUS at 15:06

## 2021-01-01 RX ADMIN — Medication 10 ML: at 18:22

## 2021-01-01 RX ADMIN — FENTANYL CITRATE 50 MCG: 50 INJECTION, SOLUTION INTRAMUSCULAR; INTRAVENOUS at 12:44

## 2021-01-01 RX ADMIN — CASTOR OIL AND BALSAM, PERU: 788; 87 OINTMENT TOPICAL at 08:57

## 2021-01-01 RX ADMIN — Medication 1 AMPULE: at 09:19

## 2021-01-01 RX ADMIN — CEFAZOLIN SODIUM 2 G: 1 INJECTION, POWDER, FOR SOLUTION INTRAMUSCULAR; INTRAVENOUS at 16:30

## 2021-01-01 RX ADMIN — FENTANYL CITRATE 50 MCG: 50 INJECTION, SOLUTION INTRAMUSCULAR; INTRAVENOUS at 10:13

## 2021-01-01 RX ADMIN — Medication 1 AMPULE: at 08:34

## 2021-01-01 RX ADMIN — SODIUM CHLORIDE 10 ML: 9 INJECTION, SOLUTION INTRAMUSCULAR; INTRAVENOUS; SUBCUTANEOUS at 21:31

## 2021-01-01 RX ADMIN — Medication 1 AMPULE: at 21:00

## 2021-01-01 RX ADMIN — POLYETHYLENE GLYCOL 3350 17 G: 17 POWDER, FOR SOLUTION ORAL at 17:46

## 2021-01-01 RX ADMIN — HEPARIN SODIUM 5000 UNITS: 5000 INJECTION INTRAVENOUS; SUBCUTANEOUS at 04:40

## 2021-01-01 RX ADMIN — SODIUM CHLORIDE: 234 INJECTION INTRAMUSCULAR; INTRAVENOUS; SUBCUTANEOUS at 18:49

## 2021-01-01 RX ADMIN — ONDANSETRON 4 MG: 2 INJECTION INTRAMUSCULAR; INTRAVENOUS at 07:23

## 2021-01-01 RX ADMIN — HEPARIN SODIUM 5000 UNITS: 5000 INJECTION INTRAVENOUS; SUBCUTANEOUS at 21:32

## 2021-01-01 RX ADMIN — SODIUM CHLORIDE 40 MG: 9 INJECTION, SOLUTION INTRAMUSCULAR; INTRAVENOUS; SUBCUTANEOUS at 10:02

## 2021-01-01 RX ADMIN — Medication 1 AMPULE: at 08:01

## 2021-01-01 RX ADMIN — SODIUM CHLORIDE: 234 INJECTION INTRAMUSCULAR; INTRAVENOUS; SUBCUTANEOUS at 22:04

## 2021-01-01 RX ADMIN — PHENYLEPHRINE HYDROCHLORIDE 20 MCG/MIN: 10 INJECTION INTRAVENOUS at 17:30

## 2021-01-01 RX ADMIN — FENTANYL CITRATE 100 MCG: 50 INJECTION, SOLUTION INTRAMUSCULAR; INTRAVENOUS at 16:08

## 2021-01-01 RX ADMIN — RISPERIDONE 1 MG: 1 TABLET ORAL at 10:02

## 2021-01-01 RX ADMIN — METOCLOPRAMIDE 10 MG: 5 INJECTION, SOLUTION INTRAMUSCULAR; INTRAVENOUS at 12:49

## 2021-01-01 RX ADMIN — DEXMEDETOMIDINE 0.3 MCG/KG/HR: 100 INJECTION, SOLUTION, CONCENTRATE INTRAVENOUS at 16:53

## 2021-01-01 RX ADMIN — Medication 1 AMPULE: at 21:29

## 2021-01-01 RX ADMIN — POLYETHYLENE GLYCOL 3350 17 G: 17 POWDER, FOR SOLUTION ORAL at 09:07

## 2021-01-01 RX ADMIN — HEPARIN SODIUM 5000 UNITS: 5000 INJECTION INTRAVENOUS; SUBCUTANEOUS at 19:39

## 2021-01-01 RX ADMIN — DEXMEDETOMIDINE 1.1 MCG/KG/HR: 100 INJECTION, SOLUTION, CONCENTRATE INTRAVENOUS at 06:40

## 2021-01-01 RX ADMIN — Medication 1 AMPULE: at 21:44

## 2021-01-01 RX ADMIN — POLYETHYLENE GLYCOL 3350 17 G: 17 POWDER, FOR SOLUTION ORAL at 09:00

## 2021-01-01 RX ADMIN — MEROPENEM 500 MG: 500 INJECTION, POWDER, FOR SOLUTION INTRAVENOUS at 20:30

## 2021-01-01 RX ADMIN — IRON SUCROSE 200 MG: 20 INJECTION, SOLUTION INTRAVENOUS at 09:46

## 2021-01-01 RX ADMIN — CHLORHEXIDINE GLUCONATE 15 ML: 0.12 RINSE ORAL at 08:11

## 2021-01-01 RX ADMIN — LEVOTHYROXINE SODIUM 50 MCG: 0.05 TABLET ORAL at 08:31

## 2021-01-01 RX ADMIN — CASTOR OIL AND BALSAM, PERU: 788; 87 OINTMENT TOPICAL at 17:15

## 2021-01-01 RX ADMIN — LEVOTHYROXINE SODIUM 50 MCG: 0.05 TABLET ORAL at 09:25

## 2021-01-01 RX ADMIN — DEXTROSE MONOHYDRATE 100 ML/HR: 50 INJECTION, SOLUTION INTRAVENOUS at 20:54

## 2021-01-01 RX ADMIN — HEPARIN SODIUM 5000 UNITS: 5000 INJECTION INTRAVENOUS; SUBCUTANEOUS at 12:41

## 2021-01-01 RX ADMIN — MIDAZOLAM 2 MG: 1 INJECTION INTRAMUSCULAR; INTRAVENOUS at 05:16

## 2021-01-01 RX ADMIN — SODIUM CHLORIDE 10 ML: 9 INJECTION, SOLUTION INTRAMUSCULAR; INTRAVENOUS; SUBCUTANEOUS at 16:21

## 2021-01-01 RX ADMIN — FENTANYL CITRATE 50 MCG: 50 INJECTION, SOLUTION INTRAMUSCULAR; INTRAVENOUS at 09:17

## 2021-01-01 RX ADMIN — MEROPENEM 500 MG: 500 INJECTION, POWDER, FOR SOLUTION INTRAVENOUS at 08:27

## 2021-01-01 RX ADMIN — SODIUM CHLORIDE 40 MG: 9 INJECTION, SOLUTION INTRAMUSCULAR; INTRAVENOUS; SUBCUTANEOUS at 20:47

## 2021-01-01 RX ADMIN — CASTOR OIL AND BALSAM, PERU: 788; 87 OINTMENT TOPICAL at 15:21

## 2021-01-01 RX ADMIN — ACETAMINOPHEN 650 MG: 325 TABLET ORAL at 16:15

## 2021-01-01 RX ADMIN — HEPARIN SODIUM 5000 UNITS: 5000 INJECTION INTRAVENOUS; SUBCUTANEOUS at 12:08

## 2021-01-01 RX ADMIN — HEPARIN SODIUM 5000 UNITS: 5000 INJECTION INTRAVENOUS; SUBCUTANEOUS at 21:37

## 2021-01-01 RX ADMIN — DEXMEDETOMIDINE 1.5 MCG/KG/HR: 100 INJECTION, SOLUTION, CONCENTRATE INTRAVENOUS at 06:05

## 2021-01-01 RX ADMIN — SODIUM CHLORIDE 10 ML: 9 INJECTION, SOLUTION INTRAMUSCULAR; INTRAVENOUS; SUBCUTANEOUS at 06:50

## 2021-01-01 RX ADMIN — LANSOPRAZOLE 30 MG: KIT at 07:44

## 2021-01-01 RX ADMIN — SODIUM CHLORIDE 10 ML: 9 INJECTION, SOLUTION INTRAMUSCULAR; INTRAVENOUS; SUBCUTANEOUS at 05:51

## 2021-01-01 RX ADMIN — DEXMEDETOMIDINE 0.4 MCG/KG/HR: 100 INJECTION, SOLUTION, CONCENTRATE INTRAVENOUS at 12:53

## 2021-01-01 RX ADMIN — RISPERIDONE 2 MG: 1 TABLET ORAL at 21:45

## 2021-01-01 RX ADMIN — LANSOPRAZOLE 30 MG: KIT at 08:37

## 2021-01-01 RX ADMIN — DEXMEDETOMIDINE 0.6 MCG/KG/HR: 100 INJECTION, SOLUTION, CONCENTRATE INTRAVENOUS at 00:06

## 2021-01-01 RX ADMIN — HEPARIN SODIUM 5000 UNITS: 5000 INJECTION INTRAVENOUS; SUBCUTANEOUS at 11:42

## 2021-01-01 RX ADMIN — Medication 1 AMPULE: at 20:24

## 2021-01-01 RX ADMIN — DEXMEDETOMIDINE 1.1 MCG/KG/HR: 100 INJECTION, SOLUTION, CONCENTRATE INTRAVENOUS at 13:06

## 2021-01-01 RX ADMIN — MIDAZOLAM 2 MG: 1 INJECTION INTRAMUSCULAR; INTRAVENOUS at 19:40

## 2021-01-01 RX ADMIN — Medication 1 AMPULE: at 08:00

## 2021-01-01 RX ADMIN — CHLORHEXIDINE GLUCONATE 15 ML: 0.12 RINSE ORAL at 09:27

## 2021-01-01 RX ADMIN — LACTULOSE 15 ML: 20 SOLUTION ORAL at 21:45

## 2021-01-01 RX ADMIN — SODIUM CHLORIDE 75 ML/HR: 450 INJECTION, SOLUTION INTRAVENOUS at 00:55

## 2021-01-01 RX ADMIN — MIDAZOLAM 5 MG: 1 INJECTION INTRAMUSCULAR; INTRAVENOUS at 10:10

## 2021-01-01 RX ADMIN — LACTULOSE 15 ML: 20 SOLUTION ORAL at 15:33

## 2021-01-01 RX ADMIN — NOREPINEPHRINE BITARTRATE 4 MCG/MIN: 1 INJECTION, SOLUTION, CONCENTRATE INTRAVENOUS at 01:00

## 2021-01-01 RX ADMIN — FAMOTIDINE 20 MG: 10 INJECTION, SOLUTION INTRAVENOUS at 09:24

## 2021-01-01 RX ADMIN — SODIUM CHLORIDE 10 ML: 9 INJECTION, SOLUTION INTRAMUSCULAR; INTRAVENOUS; SUBCUTANEOUS at 05:54

## 2021-01-01 RX ADMIN — DEXMEDETOMIDINE 1.5 MCG/KG/HR: 100 INJECTION, SOLUTION, CONCENTRATE INTRAVENOUS at 12:39

## 2021-01-01 RX ADMIN — CASTOR OIL AND BALSAM, PERU: 788; 87 OINTMENT TOPICAL at 09:33

## 2021-01-01 RX ADMIN — POTASSIUM CHLORIDE 20 MEQ: 400 INJECTION, SOLUTION INTRAVENOUS at 06:52

## 2021-01-01 RX ADMIN — DEXMEDETOMIDINE 0.4 MCG/KG/HR: 100 INJECTION, SOLUTION, CONCENTRATE INTRAVENOUS at 01:48

## 2021-01-01 RX ADMIN — LEVOTHYROXINE SODIUM 50 MCG: 0.05 TABLET ORAL at 11:19

## 2021-01-01 RX ADMIN — SODIUM CHLORIDE 40 MG: 9 INJECTION, SOLUTION INTRAMUSCULAR; INTRAVENOUS; SUBCUTANEOUS at 21:19

## 2021-01-01 RX ADMIN — SODIUM CHLORIDE 75 ML/HR: 450 INJECTION, SOLUTION INTRAVENOUS at 06:07

## 2021-01-01 RX ADMIN — MAGNESIUM CITRATE 296 ML: 1.75 LIQUID ORAL at 15:23

## 2021-01-01 RX ADMIN — DEXMEDETOMIDINE 0.3 MCG/KG/HR: 100 INJECTION, SOLUTION, CONCENTRATE INTRAVENOUS at 22:04

## 2021-01-01 RX ADMIN — ALBUMIN (HUMAN) 50 G: 12.5 INJECTION, SOLUTION INTRAVENOUS at 16:40

## 2021-01-01 RX ADMIN — FENTANYL CITRATE 100 MCG: 50 INJECTION, SOLUTION INTRAMUSCULAR; INTRAVENOUS at 23:49

## 2021-01-01 RX ADMIN — HEPARIN SODIUM 5000 UNITS: 5000 INJECTION INTRAVENOUS; SUBCUTANEOUS at 03:40

## 2021-01-01 RX ADMIN — ACETAMINOPHEN 650 MG: 325 TABLET ORAL at 09:18

## 2021-01-01 RX ADMIN — LEVOTHYROXINE SODIUM 50 MCG: 0.05 TABLET ORAL at 09:05

## 2021-01-01 RX ADMIN — SODIUM CHLORIDE 75 ML/HR: 9 INJECTION, SOLUTION INTRAVENOUS at 12:28

## 2021-01-01 RX ADMIN — LACTULOSE 15 ML: 20 SOLUTION ORAL at 16:22

## 2021-01-01 RX ADMIN — CASTOR OIL AND BALSAM, PERU: 788; 87 OINTMENT TOPICAL at 17:24

## 2021-01-01 RX ADMIN — ACETAMINOPHEN 650 MG: 650 SUPPOSITORY RECTAL at 09:07

## 2021-01-01 RX ADMIN — DEXMEDETOMIDINE 0.4 MCG/KG/HR: 100 INJECTION, SOLUTION, CONCENTRATE INTRAVENOUS at 13:47

## 2021-01-01 RX ADMIN — CASTOR OIL AND BALSAM, PERU: 788; 87 OINTMENT TOPICAL at 21:47

## 2021-01-01 RX ADMIN — CHLORHEXIDINE GLUCONATE 15 ML: 0.12 RINSE ORAL at 21:18

## 2021-01-01 RX ADMIN — MIDAZOLAM 2 MG: 1 INJECTION INTRAMUSCULAR; INTRAVENOUS at 07:23

## 2021-01-01 RX ADMIN — ALBUMIN (HUMAN) 50 G: 12.5 INJECTION, SOLUTION INTRAVENOUS at 00:42

## 2021-01-01 RX ADMIN — DEXMEDETOMIDINE 1.5 MCG/KG/HR: 100 INJECTION, SOLUTION, CONCENTRATE INTRAVENOUS at 16:04

## 2021-01-01 RX ADMIN — FENTANYL CITRATE 50 MCG: 50 INJECTION, SOLUTION INTRAMUSCULAR; INTRAVENOUS at 11:57

## 2021-01-01 RX ADMIN — MIDAZOLAM 2 MG: 1 INJECTION INTRAMUSCULAR; INTRAVENOUS at 20:05

## 2021-01-01 RX ADMIN — SODIUM CHLORIDE: 9 INJECTION, SOLUTION INTRAVENOUS at 16:05

## 2021-01-01 RX ADMIN — FENTANYL CITRATE 50 MCG: 50 INJECTION, SOLUTION INTRAMUSCULAR; INTRAVENOUS at 03:11

## 2021-01-01 RX ADMIN — LACTULOSE 15 ML: 20 SOLUTION ORAL at 21:32

## 2021-01-01 RX ADMIN — LACTULOSE 15 ML: 20 SOLUTION ORAL at 16:03

## 2021-01-01 RX ADMIN — DEXMEDETOMIDINE 1.5 MCG/KG/HR: 100 INJECTION, SOLUTION, CONCENTRATE INTRAVENOUS at 08:41

## 2021-01-01 RX ADMIN — WATER 10 ML: 1 INJECTION INTRAMUSCULAR; INTRAVENOUS; SUBCUTANEOUS at 08:01

## 2021-01-01 RX ADMIN — HEPARIN SODIUM 5000 UNITS: 5000 INJECTION INTRAVENOUS; SUBCUTANEOUS at 19:38

## 2021-01-01 RX ADMIN — FAMOTIDINE 20 MG: 20 TABLET, FILM COATED ORAL at 10:51

## 2021-01-01 RX ADMIN — MIDAZOLAM 2 MG: 1 INJECTION INTRAMUSCULAR; INTRAVENOUS at 03:11

## 2021-01-01 RX ADMIN — HEPARIN SODIUM 5000 UNITS: 5000 INJECTION INTRAVENOUS; SUBCUTANEOUS at 04:46

## 2021-01-01 RX ADMIN — HEPARIN SODIUM 5000 UNITS: 5000 INJECTION INTRAVENOUS; SUBCUTANEOUS at 11:26

## 2021-01-01 RX ADMIN — VALPROIC ACID 250 MG: 250 SOLUTION ORAL at 10:02

## 2021-01-01 RX ADMIN — CHLORHEXIDINE GLUCONATE 15 ML: 0.12 RINSE ORAL at 08:46

## 2021-01-01 RX ADMIN — Medication 1 AMPULE: at 09:33

## 2021-01-01 RX ADMIN — SODIUM CHLORIDE 40 MG: 9 INJECTION, SOLUTION INTRAMUSCULAR; INTRAVENOUS; SUBCUTANEOUS at 20:52

## 2021-01-01 RX ADMIN — CASTOR OIL AND BALSAM, PERU: 788; 87 OINTMENT TOPICAL at 18:22

## 2021-01-01 RX ADMIN — CHLORHEXIDINE GLUCONATE 15 ML: 0.12 RINSE ORAL at 21:06

## 2021-01-01 RX ADMIN — Medication 1 AMPULE: at 21:09

## 2021-01-01 RX ADMIN — HEPARIN SODIUM 5000 UNITS: 5000 INJECTION INTRAVENOUS; SUBCUTANEOUS at 04:52

## 2021-01-01 RX ADMIN — Medication 100 MCG/HR: at 22:02

## 2021-01-01 RX ADMIN — FENTANYL CITRATE 50 MCG: 50 INJECTION, SOLUTION INTRAMUSCULAR; INTRAVENOUS at 19:50

## 2021-01-01 RX ADMIN — Medication 1 AMPULE: at 21:06

## 2021-01-01 RX ADMIN — FENTANYL CITRATE 50 MCG: 50 INJECTION, SOLUTION INTRAMUSCULAR; INTRAVENOUS at 22:59

## 2021-01-01 RX ADMIN — HEPARIN SODIUM 5000 UNITS: 5000 INJECTION INTRAVENOUS; SUBCUTANEOUS at 20:52

## 2021-01-01 RX ADMIN — MAGNESIUM SULFATE HEPTAHYDRATE 2 G: 40 INJECTION, SOLUTION INTRAVENOUS at 08:09

## 2021-01-01 RX ADMIN — LACTULOSE 15 ML: 20 SOLUTION ORAL at 08:22

## 2021-01-01 RX ADMIN — POLYETHYLENE GLYCOL 3350 17 G: 17 POWDER, FOR SOLUTION ORAL at 10:48

## 2021-01-01 RX ADMIN — Medication 1 AMPULE: at 21:18

## 2021-01-01 RX ADMIN — Medication 1 AMPULE: at 20:30

## 2021-01-01 RX ADMIN — INSULIN LISPRO 2 UNITS: 100 INJECTION, SOLUTION INTRAVENOUS; SUBCUTANEOUS at 11:38

## 2021-01-01 RX ADMIN — RISPERIDONE 1 MG: 0.25 TABLET ORAL at 21:42

## 2021-01-01 RX ADMIN — Medication 1 AMPULE: at 21:24

## 2021-01-01 RX ADMIN — HEPARIN SODIUM 5000 UNITS: 5000 INJECTION INTRAVENOUS; SUBCUTANEOUS at 20:08

## 2021-01-01 RX ADMIN — CASTOR OIL AND BALSAM, PERU: 788; 87 OINTMENT TOPICAL at 09:30

## 2021-01-01 RX ADMIN — SODIUM CHLORIDE: 234 INJECTION INTRAMUSCULAR; INTRAVENOUS; SUBCUTANEOUS at 09:02

## 2021-01-01 RX ADMIN — VANCOMYCIN HYDROCHLORIDE 750 MG: 750 INJECTION, POWDER, LYOPHILIZED, FOR SOLUTION INTRAVENOUS at 13:40

## 2021-01-01 RX ADMIN — MIDAZOLAM 2 MG: 1 INJECTION INTRAMUSCULAR; INTRAVENOUS at 09:36

## 2021-01-01 RX ADMIN — Medication 10 ML: at 06:13

## 2021-01-01 RX ADMIN — SODIUM CHLORIDE 10 ML: 9 INJECTION, SOLUTION INTRAMUSCULAR; INTRAVENOUS; SUBCUTANEOUS at 21:49

## 2021-01-01 RX ADMIN — Medication 1 AMPULE: at 07:40

## 2021-01-01 RX ADMIN — BISACODYL 10 MG: 10 SUPPOSITORY RECTAL at 09:25

## 2021-01-01 RX ADMIN — Medication 1 AMPULE: at 08:55

## 2021-01-01 RX ADMIN — FENTANYL CITRATE 25 MCG/HR: 50 INJECTION, SOLUTION INTRAMUSCULAR; INTRAVENOUS at 00:00

## 2021-01-01 RX ADMIN — VANCOMYCIN HYDROCHLORIDE 750 MG: 750 INJECTION, POWDER, LYOPHILIZED, FOR SOLUTION INTRAVENOUS at 21:59

## 2021-01-01 RX ADMIN — HEPARIN SODIUM 5000 UNITS: 5000 INJECTION INTRAVENOUS; SUBCUTANEOUS at 12:52

## 2021-01-01 RX ADMIN — DEXMEDETOMIDINE 0.4 MCG/KG/HR: 100 INJECTION, SOLUTION, CONCENTRATE INTRAVENOUS at 01:44

## 2021-01-01 RX ADMIN — SODIUM CHLORIDE 40 MG: 9 INJECTION, SOLUTION INTRAMUSCULAR; INTRAVENOUS; SUBCUTANEOUS at 20:56

## 2021-01-01 RX ADMIN — DEXMEDETOMIDINE 0.8 MCG/KG/HR: 100 INJECTION, SOLUTION, CONCENTRATE INTRAVENOUS at 06:46

## 2021-01-01 RX ADMIN — CHLORHEXIDINE GLUCONATE 15 ML: 0.12 RINSE ORAL at 09:54

## 2021-01-01 RX ADMIN — SODIUM CHLORIDE 40 MG: 9 INJECTION, SOLUTION INTRAMUSCULAR; INTRAVENOUS; SUBCUTANEOUS at 09:50

## 2021-01-01 RX ADMIN — CASTOR OIL AND BALSAM, PERU: 788; 87 OINTMENT TOPICAL at 08:54

## 2021-01-01 RX ADMIN — HEPARIN SODIUM 5000 UNITS: 5000 INJECTION INTRAVENOUS; SUBCUTANEOUS at 20:20

## 2021-01-01 RX ADMIN — CASTOR OIL AND BALSAM, PERU: 788; 87 OINTMENT TOPICAL at 17:46

## 2021-01-01 RX ADMIN — CHLORHEXIDINE GLUCONATE 15 ML: 0.12 RINSE ORAL at 09:47

## 2021-01-01 RX ADMIN — CHLORHEXIDINE GLUCONATE 15 ML: 0.12 RINSE ORAL at 21:33

## 2021-01-01 RX ADMIN — SODIUM CHLORIDE 10 ML: 9 INJECTION, SOLUTION INTRAMUSCULAR; INTRAVENOUS; SUBCUTANEOUS at 21:44

## 2021-01-01 RX ADMIN — CHLORHEXIDINE GLUCONATE 15 ML: 0.12 RINSE ORAL at 09:33

## 2021-01-01 RX ADMIN — MIDAZOLAM 2 MG: 1 INJECTION INTRAMUSCULAR; INTRAVENOUS at 19:50

## 2021-01-01 RX ADMIN — MIDAZOLAM 2 MG: 1 INJECTION INTRAMUSCULAR; INTRAVENOUS at 06:13

## 2021-01-01 RX ADMIN — LACTULOSE 15 ML: 20 SOLUTION ORAL at 21:08

## 2021-01-01 RX ADMIN — POLYETHYLENE GLYCOL 3350 17 G: 17 POWDER, FOR SOLUTION ORAL at 09:24

## 2021-01-01 RX ADMIN — CASTOR OIL AND BALSAM, PERU: 788; 87 OINTMENT TOPICAL at 08:18

## 2021-01-01 RX ADMIN — FENTANYL CITRATE 50 MCG: 50 INJECTION, SOLUTION INTRAMUSCULAR; INTRAVENOUS at 17:21

## 2021-01-01 RX ADMIN — SODIUM CHLORIDE 10 ML: 9 INJECTION, SOLUTION INTRAMUSCULAR; INTRAVENOUS; SUBCUTANEOUS at 05:05

## 2021-01-01 RX ADMIN — DEXMEDETOMIDINE 1.3 MCG/KG/HR: 100 INJECTION, SOLUTION, CONCENTRATE INTRAVENOUS at 12:35

## 2021-01-01 RX ADMIN — DEXMEDETOMIDINE 1.5 MCG/KG/HR: 100 INJECTION, SOLUTION, CONCENTRATE INTRAVENOUS at 02:05

## 2021-01-01 RX ADMIN — CASTOR OIL AND BALSAM, PERU: 788; 87 OINTMENT TOPICAL at 18:37

## 2021-01-01 RX ADMIN — CASTOR OIL AND BALSAM, PERU: 788; 87 OINTMENT TOPICAL at 08:12

## 2021-01-01 RX ADMIN — Medication 10 ML: at 22:14

## 2021-01-01 RX ADMIN — SODIUM CHLORIDE 10 ML: 9 INJECTION, SOLUTION INTRAMUSCULAR; INTRAVENOUS; SUBCUTANEOUS at 13:41

## 2021-01-01 RX ADMIN — SODIUM CHLORIDE 40 MG: 9 INJECTION, SOLUTION INTRAMUSCULAR; INTRAVENOUS; SUBCUTANEOUS at 09:46

## 2021-01-01 RX ADMIN — CASTOR OIL AND BALSAM, PERU: 788; 87 OINTMENT TOPICAL at 17:30

## 2021-01-01 NOTE — PROGRESS NOTES
0700: Verbal shift change report given to Zara Powell RN (oncoming nurse) by Zaira Quijano RN (offgoing nurse). Report included the following information SBAR, Kardex, Intake/Output, MAR, Recent Results and Cardiac Rhythm NSR.  
 
0800: Assessment completed; see flowsheets. Pt responds to verbal, pain stimuli. Limited movement of extremities. OGT connected to int. Suction, +BS verified with OGT. 1000: RN spoke with provider Lis Neely regarding labs this am, Na level of 150, K level of 4.6 and Mg level of 2.8. Orders received for D5 at 100cc/hr. 1015: Pt given full CHG bath, clean gown and linens provided. While pt turned fully on right side, pt back in normal sinus rhythm, however with pt turned and repositioned back and turned with pillows/blocks, pt back in bigeminy. Pt more alert, beginning to fight again vent. RT staff Talia notified, placed back on rate. 1200: Reassessment completed; see flowsheets. 1355: Provider notified of negative COVID swab. Isolation orders d/c. Provider placed orders for bowel prep regimen; also switched pt to CPAP mode on vent. RT notified. 1530: Pt given laxative per order, pt immediately had large bright green emesis, approx 250cc. Provider aware, new orders for soap suds enemas placed. Pt given CHG bath and mouth care completed. 1600: Reassessment completed; see flowsheets. 1725: Attempt of digital rectal exam by provider and first soap suds enema by RN. Minimal stool present in rectum per MD; pt unable to follow commands, hold rectal muscles- most of soap suds enema came out directly after administration. Ana care provided. 1800: ETT lele changed d/t vomit episodes. Pt noted to have bilateral cheek DTI from ETT lele. Venelex and drsg applied, new ETT lele applied. 1900: End of Shift Note Bedside shift change report given to Supriya Delvalle RN (oncoming nurse) by Doc Ball (offgoing nurse). Report included the following information SBAR, Kardex, Intake/Output, MAR, Recent Results and Cardiac Rhythm NSR, PVCs bigeminy Shift worked:  7a-7p Shift summary and any significant changes:  
  see progress note Concerns for physician to address:  see progress note Zone phone for oncoming shift:   na Activity: 
Activity Level: Bed Rest 
Number times ambulated in hallways past shift: 0 Number of times OOB to chair past shift: 0 Cardiac:  
Cardiac Monitoring: Yes     
Cardiac Rhythm: Normal sinus rhythm Access:  
Current line(s): central line Genitourinary:  
Urinary status: mejia Respiratory:  
O2 Device: Ventilator Chronic home O2 use?: NO Incentive spirometer at bedside: N/A 
  
GI: 
Last Bowel Movement Date: (PTA) Current diet:  DIET TUBE FEEDING Passing flatus: YES Tolerating current diet: NO 
  
 
Pain Management:  
Patient states pain is manageable on current regimen: N/A Skin: 
Jai Score: 12 Interventions: turn team, speciality bed and float heels Patient Safety: 
Fall Score: Total Score: 3 Interventions: bed/chair alarm High Fall Risk: Yes Length of Stay: 
Expected LOS: 12d 7h Actual LOS: 9 Doc Ball

## 2021-01-01 NOTE — PROGRESS NOTES
Nephrology Progress Note Edmundo Hill  
 
[email or web address]              Phone - [phone] Patient: Rohith Ling YOB: 1980 Date- 1/1/2021 MRN: [id number] F/u  DANY  
CONSULTING PHYSICIAN: Dr Dany Serrano ADMIT DATE:12/23/2020 PATIENT PCP:Other, MD Kilo  
 
IMPRESSION:  
# Nonoliguric DANY likely COVID-19 related + Vanc toxicity # Hypernatremia(150) # Anemia # Respiratory failure due to COVID-19 infection and possible superimposed bacterial pneumonia # Hypoalbuminemia( Alb 1.5) PLAN:  
- Cr slightly better after stopping Vanc and few doses of  IV Albumin from 4 to 3.6 
- Na at 150 today, recommend D5W infusion 
- pending renal US to eval for hydronephrosis( previous was unable to r/o hydronephrosis),  urine electrolytes 
- good UOP 2L/day now, previously at 600cc/day 
- no need for RRT at this time 
 - adjust all meds to GFR<15 as current GFR is unknown Thank you for allowing us to participate in the care this patient. We will follow patient with you. Principal Problem: 
  COVID-19 (12/23/2020) Active Problems: 
  Pneumonia due to COVID-19 virus (11/6/2020) Respiratory failure ([address] (12/23/2020) HFrEF (heart failure with reduced ejection fraction) ([address] (12/24/2020) Down syndrome (12/24/2020) Acquired hypothyroidism (12/24/2020) Goals of care, counseling/discussion (12/28/2020) Acute renal failure with tubular necrosis ([address] (12/29/2020) [x] High complexity decision making was performed 
[x] Patient is at high-risk of decompensation with multiple organ involvement Subjective:  
Cr stable today, improving UOP Review of Systems:  
 Not performed. Pt on airborne isolation for COVID-19 Past Medical History:  
Diagnosis Date  Endocrine disease  Hypothyroid 03/11/2018  Ill-defined condition Down's Syndrome No past surgical history on file. Prior to Admission medications Medication Sig Start Date End Date Taking? Authorizing Provider  
ivabradine (CORLANOR) 5 mg tablet Take 1 Tab by mouth two (2) times daily (with meals). Indications: chronic heart failure, inappropriate sinus tachycardia 12/6/20   Tammi Lewis MD  
metoprolol succinate (TOPROL-XL) 25 mg XL tablet Take 1 Tab by mouth daily. 12/6/20   Tammi Lewis MD  
risperiDONE (RisperDAL) 1 mg tablet Take 1 mg by mouth nightly. Provider, Aicha  
solifenacin (VESICARE) 10 mg tablet TAKE 1 TABLET BY MOUTH EVERY DAY 9/18/20   OtherKilo MD  
medroxyPROGESTERone (DEPO-PROVERA) 150 mg/mL injection 150 mg, IM, once, To be administered in clinic by nurse. See order comments for schedule., # 1 vial, 4 Refills, Pharmacy: [facility] #[id number] 12/5/19   Other, MD Kilo  
albuterol (PROVENTIL HFA, VENTOLIN HFA, PROAIR HFA) 90 mcg/actuation inhaler Take 2 Puffs by inhalation every four (4) hours as needed for Wheezing. 11/2/20   Omar Hunter MD  
zinc sulfate 220 mg tablet Take 1 Tab by mouth daily. 11/2/20   Omar Hunter MD  
cholecalciferol (VITAMIN D3) 1,000 unit tablet Take 1,000 Units by mouth daily. Kilo Cortez MD  
levothyroxine (SYNTHROID) 50 mcg tablet Take 50 mcg by mouth Daily (before breakfast). OtherKilo MD  
 
No Known Allergies Social History Tobacco Use  Smoking status: Never Smoker  Smokeless tobacco: Never Used Substance Use Topics  Alcohol use: No  
  
No family history on file. Objective:   
 
Patient Vitals for the past 24 hrs: 
 Temp Pulse Resp BP SpO2  
01/01/21 0830  84 21  98 % 01/01/21 0815  77 19  99 % 01/01/21 0800 98.2 °F (36.8 °C) 73 18 (!) 105/53 98 % 01/01/21 0753  86 19  100 % 01/01/21 0745  71 19  98 % 01/01/21 0730  70 18  99 % 01/01/21 0715  69 20  100 % 01/01/21 0700  77 17 (!) 108/47 100 % 01/01/21 0623  84   98 % 01/01/21 0600  (!) 48 18 (!) 104/55 98 % 01/01/21 0500  (!) 54 18 (!) 87/64 98 % 01/01/21 0400 98.4 °F (36.9 °C) 66 18 (!) 109/50 100 % 01/01/21 0341  68 18  100 % 01/01/21 0300  87 21 106/60 100 % 01/01/21 0200  80 18 (!) 109/49 100 % 01/01/21 0100  92 20 114/61 100 % 01/01/21 0022  83 19  99 % 01/01/21 0000 98 °F (36.7 °C) 78 22 (!) 117/54 99 % 12/31/20 2300  94 22 114/60 96 % 12/31/20 2211  83 21  93 % 12/31/20 2200  95 23 (!) 110/59 95 % 12/31/20 2100  (!) 55 18 (!) 106/51 99 % 12/31/20 2000 97.5 °F (36.4 °C) (!) 46 18 110/75 100 % 12/31/20 1900 97.5 °F (36.4 °C) (!) 47 18 (!) 109/58 99 % 12/31/20 1845  (!) 47 18 (!) 105/58 99 % 12/31/20 1830 98 °F (36.7 °C) 67 18 (!) 110/56 98 % 12/31/20 1815  (!) 46 20 (!) 105/59 99 % 12/31/20 1800 97.5 °F (36.4 °C) (!) 46 16 (!) 111/55 99 % 12/31/20 1745  (!) 47 18 (!) 107/56 99 % 12/31/20 1730 97.6 °F (36.4 °C) (!) 50 23 (!) 107/56 99 % 12/31/20 1715 97.5 °F (36.4 °C) (!) 52 19 (!) 98/50 98 % 12/31/20 1704 97.8 °F (36.6 °C) (!) 51 24 (!) 96/50 98 % 12/31/20 1700  (!) 51 18 (!) 96/48 97 % 12/31/20 1645  78 19  99 % 12/31/20 1630  (!) 54 15  99 % 12/31/20 1602  75 18  99 % 12/31/20 1600 98 °F (36.7 °C) 64 18 (!) 107/51 99 % 12/31/20 1530  64 18  100 % 12/31/20 1500  (!) 51 18 (!) 102/49 100 % 12/31/20 1430  (!) 57 18  99 % 12/31/20 1400  66 18 (!) 109/43 100 % 12/31/20 1330  66 18  100 % 12/31/20 1300  65 18 (!) 112/46 100 % 12/31/20 1230  65 18  100 % 12/31/20 1200 97.5 °F (36.4 °C) 68 18 (!) 113/39 97 % 12/31/20 1140  88 29  97 % 12/31/20 1130  73 22  96 % 12/31/20 1100  67 24 (!) 116/53 100 % 12/31/20 1030  (!) 52 18  100 % 01/01 0701 - 01/01 1900 In: -  
Out: 225 [Urine:75] Physical Exam: 
Seen from outside the room General: critically ill, intubated, sedated in NAD HEENT: AT/NC, ETT in place Lungs:on MV 
CV:RRR 
 
CODE STATUS: DNR Chart reviewed. 
 
y Reviewed previous records Lab Data Personally Reviewed: (see below) Recent Labs 01/01/21 
6416 12/31/20 
2103 12/31/20 
1110 12/31/20 
0401 12/30/20 
0404 WBC 16.2* 16.3*  --  19.1* 22.7* HGB 8.0* 8.1* 6.7* 7.0* 7.9*  
 316  --  356 351 ANEU 11.8*  --   --  14.3* 15.9* *  --   --  147* 142  
K 4.6  --   --  4.4 4.9 GLU 80  --   --  99 105* BUN 93*  --   --  97* 91* CREA 3.69*  --   --  3.69* 3.75* ALT 19  --   --  20 15 TBILI 0.6  --   --  0.4 0.8 AP 38*  --   --  47 68 CA 10.5*  --   --  10.6*  10.9* 10.1 MG 2.8*  --   --   --  3.0*  
PHOS  --   --   --  5.4*  --   
 
Lab Results Component Value Date/Time Color YELLOW/STRAW 12/30/2020 05:37 PM  
 Appearance TURBID (A) 12/30/2020 05:37 PM  
 Specific gravity 1.012 12/30/2020 05:37 PM  
 Specific gravity 1.010 11/20/2020 01:57 AM  
 pH (UA) 5.0 12/30/2020 05:37 PM  
 Protein 30 (A) 12/30/2020 05:37 PM  
 Glucose Negative 12/30/2020 05:37 PM  
 Ketone Negative 12/30/2020 05:37 PM  
 Bilirubin Negative 12/30/2020 05:37 PM  
 Urobilinogen 0.2 12/30/2020 05:37 PM  
 Nitrites Negative 12/30/2020 05:37 PM  
 Leukocyte Esterase SMALL (A) 12/30/2020 05:37 PM  
 Epithelial cells FEW 12/30/2020 05:37 PM  
 Bacteria Negative 12/30/2020 05:37 PM  
 WBC 20-50 12/30/2020 05:37 PM  
 RBC 5-10 12/30/2020 05:37 PM  
 
 
Lab Results Component Value Date/Time Iron 65 12/04/2020 03:11 AM  
 TIBC 279 12/04/2020 03:11 AM  
 Iron % saturation 23 12/04/2020 03:11 AM  
 Ferritin 555 (H) 12/28/2020 04:51 AM  
 
Lab Results Component Value Date/Time Culture result: SCANT NORMAL RESPIRATORY CHAD 12/24/2020 08:30 AM  
 Culture result: No growth (<1,000 CFU/ML) 12/24/2020 08:20 AM  
 Culture result: NO GROWTH 5 DAYS 12/23/2020 11:49 AM  
 
  
  
  
 
Imaging: 
 
Medications list Personally Reviewed   [x]      Yes     []               No   
 
Signed By: Danie Doan, [address] [facility] SYSTM FRANCISCAN [facility] RAVINDER [address] 
 [place], [address] [facility] Phone - [phone] Fax - [phone] [address], [address] [facility] Phone - [phone] Fax - [phone]    
[email or web address]

## 2021-01-01 NOTE — PROGRESS NOTES
1900: Report from [facility] [place], [address] [facility]: Assessment complete. Precedex titrated down d/t bradycardia in 40s. 2230: Pt had large green episode of emesis around ETT and over gown/bedding. Pt cleaned and Zofran given per order. Risperdal held d/t emesis. 0530: Pt had moderate amount of green episode of emesis around ETT and over gown/bedding. Pt cleaned and Zofran given per order.   
 
0700: Report to Suzan Lopez

## 2021-01-01 NOTE — PROGRESS NOTES
Comprehensive Nutrition Assessment Type and Reason for Visit: Elsie Gilbert Nutrition Recommendations/Plan:  
Recommend increasing bowel regimen (consider lactulose, pericolace, etc) Resume TF as able (order already in) Nutrition Assessment:   Chart reviewed, case discussed with RN. Pt remains intubated and sedated with fentanyl and precedex, needs re-estimated. TF on hold since Wednesday evening 2' vomiting episodes which continue into this morning. Imaging shows moderate stool burden vs ileus. Given poor renal function would not add reglan add this time. She has not had high residuals at any point, suspect this is more so related to no BM since admission. Miralax was added Tuesday but with no results. RN reports pt is passing flatus. Would increase bowel regimen and continue to hold TF at this time until she goes. Pt started on dextrose IVF's for Na 150. Estimated Daily Nutrient Needs: 
Energy (kcal): PSU 1601 (MSJ 9670); Weight Used for Energy Requirements: Current Protein (g): 84-126g (1-1.5gPro/kg); Weight Used for Protein Requirements: Current Fluid (ml/day): per MD; Method Used for Fluid Requirements: 1 ml/kcal 
 
 
Nutrition Related Findings:  Meds: pepcid, humalog, synthroid, miralax, [email or web address]; Drips: fentanyl, precedex. Edema: +2 nonpitting-generalized. BM PTA Wounds:   
Skin tears Current Nutrition Therapies: 
Current Tube Feeding (TF) Orders: · Feeding Route: Nasogastric · Formula: Nepro · Schedule:Continuous · Regimen: on hold · Current TF & Flush Orders Provides: 0 
· Goal TF & Flush Orders Provides: 1848kcals/108gPro/154gCHO/898mL Anthropometric Measures: 
· Height:  5' 3\" (160 cm) · Current Body Wt:  86.2 kg (190 lb 0.6 oz) · Ideal Body Wt:  115 lbs:  161 % · BMI Category:  Obese class 1 (BMI 30.0-34. 9) Nutrition Diagnosis: · Inadequate protein-energy intake related to impaired respiratory function as evidenced by other (specify)(trophic TF order) Previous dx continues, TF on hold. Nutrition Interventions:  
Food and/or Nutrient Delivery: Start tube feeding Nutrition Education and Counseling: No recommendations at this time Coordination of Nutrition Care: Continue to monitor while inpatient, Interdisciplinary rounds Goals: Pt will tolerate TF resumptions with residuals <500mL, no further vomiting and a BM in 2-4 days. Nutrition Monitoring and Evaluation:  
Behavioral-Environmental Outcomes: None identified Food/Nutrient Intake Outcomes: Enteral nutrition intake/tolerance, IVF intake Physical Signs/Symptoms Outcomes: Biochemical data, Fluid status or edema, Nutrition focused physical findings, Weight, Hemodynamic status, GI status Discharge Planning: Too soon to determine Electronically signed by Lily Rodriguez RD, [address]  on 1/1/2021 at 11:05 AM 
 
Contact: [phone]

## 2021-01-01 NOTE — PROGRESS NOTES
01/01/21 6790 01/01/21 5024 01/01/21 0111 ABCDEF Bundle SBT Safety Screen Passed Yes  --   --   
SBT Trial Passed  --   --   -- Weaning Parameters Spontaneous Breathing Trial Complete  --   --   -- Resp Rate Observed 19 21 16 Ve 7.1 6.5 6.3  351 377 RSBI 51 44 43  
 
 01/01/21 0631 ABCDEF Bundle SBT Safety Screen Passed  --   
SBT Trial Passed Yes Weaning Parameters Spontaneous Breathing Trial Complete Yes Resp Rate Observed 17 Ve 6.4  RSBI 41

## 2021-01-01 NOTE — PROGRESS NOTES
[facility] 
 
ICU Intensivist- Critical Care Progress Note Name: Lisha Thorne : 1980 MRN: [id number] Admit: 2020  7:26 PM   
 
Diagnosis:  
 
Principal Problem: 
  Aspiration pneumonia of both lungs due to vomit Problem List: 
  Lactic acidosis Large hiatal hernia Post viral syndrome Fecal impaction of colon Gram negative septic shock Dehydration with hypernatremia Acute hypoxemic respiratory failure Severe sepsis with acute organ dysfunction Constipation due to pain medication therapy Aspiration pneumonia of both lungs due to vomit Pneumonia due to COVID-19 virus, resolved () Acute renal failure with acute renal cortical necrosis superimposed on stage 4 chronic kidney disease Kidney disease, chronic, stage IV (severe, EGFR 15-29 ml/min) HFrEF (heart failure with reduced ejection fraction) Goals of care, counseling/discussion Acquired hypothyroidism Down syndrome ICU Comprehensive Plan of Care:  
 
Plans for this Shift: 1. Sepsis with acute organ dysfunction due to aspiration bilateral bacterial pneumonia- continue empiric IV antibiotics (meropenem) aggressive pulmonary toilet. Covid pneumonia resolved, with post viral bilateral bacterial pneumonia. 2. DANY superimposed on CKD with severe hypernatremic dehydration and nonoliguric ATN- aggressive vascular volume resuscitation in progress. Monitor renal indices as well as intake and output. 3. Fecal impaction of rectum and ascending colon- oral laxatives mechanical bowel prep ineffective, daily high colonic enemas in progress. Enteral feedings will resume after resolution. Subjective:  
 
Progress Note:  
2021  
11:00 AM  
 
Reason for ICU Admission:  
Respiratory failure due to COVID-19 infection and possible superimposed bacterial pneumonia 
  
Interval history: Catarino Rajan is a 47year-old B/F with severe Down's syndrome, autism, CKD (Stage III), large hiatal hernia, and hypothyroidism. She was recently admitted twice in Nov (11/06-11/08, 11/08-11/16) for severe sepsis with acute organ dysfunction due to COVID pneumonia. Emergently admitted (12/23) with septic shock, lactic acidosis and acute hypoxemic respiratory failure due to bilateral aspiration pneumonia; related to large rectal fecal retention with bowel obstipation and large hiatal hernia. Hospital course remarkable for initial multiple high dose vasopressor requirements, DANY superimposed upon CKD, severe hypernatremic dehydration with ATN, and COVID-19 testing negative (12/23 & 12/31). Lactic acidosis promptly cleared, however inflammatory markers (Procalitonin & CRP) remain elevated as post-viral bacterial bilateral pneumonia has necessitated prolonged empiric IV antibiotics (meropenem). Spontaneous breathing trials have been successful;  
while volume resuscitation for non-oliguric ATN continues with a modicum of improvement in renal function. Overnight Events:  
Enteral feeds have not been tolerated due to persistent obstipation. Past Medical History:  
 
 has a past medical history of Endocrine disease, Hypothyroid (03/11/2018), and Ill-defined condition. Past Surgical History:  
 
 has no past surgical history on file. Current Medications:  
 
Current Facility-Administered Medications Medication Dose Route Frequency  dextrose 5% infusion  100 mL/hr IntraVENous CONTINUOUS  
 albumin human 5% (BUMINATE) solution 50 g  50 g IntraVENous ONCE  
 0.9% sodium chloride infusion 250 mL  250 mL IntraVENous PRN  
 dexmedeTOMidine (PRECEDEX) 400 mcg in 0.9% sodium chloride 100 mL infusion  0.1-1.5 mcg/kg/hr IntraVENous TITRATE  famotidine (PEPCID) tablet 20 mg  20 mg Per NG tube DAILY  risperiDONE (RisperDAL) tablet 1 mg  1 mg Per NG tube QHS  polyethylene glycol (MIRALAX) packet 17 g  17 g Per G Tube DAILY  levothyroxine (SYNTHROID) tablet 50 mcg  50 mcg Per NG tube DAILY  levothyroxine tube feed reminder note  1 Each Other BID  heparin (porcine) injection 5,000 Units  5,000 Units SubCUTAneous Q8H  
 meropenem (MERREM) 500 mg in 0.9% sodium chloride (MBP/ADV) 50 mL MBP  500 mg IntraVENous Q12H  
 fentaNYL (PF) 1,500 mcg/30 mL (50 mcg/mL) infusion  0-200 mcg/hr IntraVENous TITRATE  balsam peru-castor oiL (VENELEX) ointment   Topical BID  alcohol 62% (NOZIN) nasal  1 Ampule  1 Ampule Topical Q12H  
 dextrose (D50W) injection syrg 12.5-25 g  25-50 mL IntraVENous PRN  
 insulin lispro (HUMALOG) injection   SubCUTAneous Q6H  
 acetaminophen (TYLENOL) tablet 650 mg  650 mg Per G Tube Q6H PRN Or  
 acetaminophen (TYLENOL) suppository 650 mg  650 mg Rectal Q6H PRN  chlorhexidine (ORAL CARE KIT) 0.12 % mouthwash 15 mL  15 mL Oral Q12H  
 heparin (porcine) pf 300 Units  300 Units InterCATHeter PRN  
 sodium chloride (NS) flush 5-40 mL  5-40 mL IntraVENous Q8H  
 sodium chloride (NS) flush 5-40 mL  5-40 mL IntraVENous PRN  
 ondansetron (ZOFRAN) injection 4 mg  4 mg IntraVENous Q6H PRN Allergies/Social/Family History: No Known Allergies Social History Tobacco Use  Smoking status: Never Smoker  Smokeless tobacco: Never Used Substance Use Topics  Alcohol use: No  
  
No family history on file. Review of Systems:  
 
Review of systems not obtained due to patient factors. Objective:  
Vital Signs: 
Visit Vitals /67 Pulse 65 Temp 98.2 °F (36.8 °C) Resp 18 Ht 5' 3\" (1.6 m) Wt 86.2 kg (190 lb 0.6 oz) SpO2 98% BMI 33.66 kg/m² O2 Device: Ventilator Temp (24hrs), Av.8 °F (36.6 °C), Min:97.5 °F (36.4 °C), Max:98.4 °F (36.9 °C) Intake/Output:  
 
Intake/Output Summary (Last 24 hours) at 2021 1100 Last data filed at 2021 0800 Gross per 24 hour Intake 1004.42 ml Output 1325 ml Net -320.58 ml Physical Exam: 
General:   Awake, intubated and sedated. Cooperative, no distress, appears stated age. Eyes:   Conjunctivae/corneas clear. PERRL, EOMs intact. Ears:   Normal external ear canals bilaterally. Nose:   No drainage or sinus tenderness. Mouth/Throat:  Moist mucous membranes. Dentition normal.   
Neck:  Symmetrical, trachea midline, no JVD or carotid bruit. Back:    No CVA tenderness to percussion. Lungs:    Clear to auscultation bilaterally. Heart:   Regular rate and rhythm. S1, S2 normal, without murmur, click, rub or gallop. Abdomen:    Hypoactive bowel sounds. Soft distention, non-tender, no masses or organomegaly. Extremities:  Atraumatic, no cyanosis or edema. Vascular:  Pulses 2+ and symmetric UE/LE bilat Skin:  Normal color, non-diaphoretic, brisk capillary refill (less than 2 seconds). No rashes or lesions. Lymph nodes:  No Lymphadenopathy. Neurologic:  Down's syndrome and autism, grossly normal.   
 
 
LABS AND  DATA: Personally reviewed Recent Labs 01/01/21 
0521 12/31/20 
2103 WBC 16.2* 16.3* HGB 8.0* 8.1* HCT 24.1* 24.1*  
 316 Recent Labs 01/01/21 
0244 12/31/20 
0401 12/30/20 
0404 * 147* 142  
K 4.6 4.4 4.9 * 112* 113* CO2 31 31 27 BUN 93* 97* 91* CREA 3.69* 3.69* 3.75* GLU 80 99 105* CA 10.5* 10.6*  10.9* 10.1 MG 2.8*  --  3.0*  
PHOS  --  5.4*  --   
 
Recent Labs 01/01/21 
1693 12/31/20 
0401 AP 38* 47  
TP 6.9 6.6 ALB 3.5 2.8*  
GLOB 3.4 3.8 No results for input(s): INR, PTP, APTT, INREXT in the last 72 hours. No results for input(s): PHI, PCO2I, PO2I, FIO2I in the last 72 hours. No results for input(s): CPK, CKMB, TROIQ, BNPP in the last 72 hours. Ventilator Settings: 
Mode Rate Tidal Volume Pressure FiO2 PEEP Assist control   350 ml  6 cm H2O 30 % 5 cm H20 Peak airway pressure: 34 cm H2O   
 Minute ventilation: 6.47 l/min MEDS: Reviewed RADIOLOGY: 
No results found. Assessment:  
 
Hospital Problems  Never Reviewed Codes Class Noted POA Acute renal failure with tubular necrosis (HCC) ICD-10-CM: N17.0 ICD-9-CM: 584.5  12/29/2020 No  
   
 Goals of care, counseling/discussion ICD-10-CM: Z71.89 ICD-9-CM: V65.49  12/28/2020 Unknown HFrEF (heart failure with reduced ejection fraction) (HCC) (Chronic) ICD-10-CM: I50.20 ICD-9-CM: 428.20  12/24/2020 Yes Down syndrome (Chronic) ICD-10-CM: Q90.9 ICD-9-CM: 758.0  12/24/2020 Unknown Acquired hypothyroidism (Chronic) ICD-10-CM: E03.9 ICD-9-CM: 244.9  12/24/2020 Unknown Respiratory failure ([address] ICD-10-CM: J96.90 ICD-9-CM: 518.81  12/23/2020 Yes * (Principal) COVID-19 ICD-10-CM: U07.1 ICD-9-CM: 079.89  12/23/2020 Yes Pneumonia due to COVID-19 virus ICD-10-CM: U07.1, J12.82 ICD-9-CM: 480.8  11/6/2020 Yes Multidisciplinary Rounds Completed: Yes ABCDEF Bundle/Checklist 
Pain Medications: Fentanyl Target RASS: 0 - Alert & Calm - Spontaneously pays attention to caregiver Sedation Medications: None CAM-ICU:  Negative Discussed Plan of Care (goals of care): Yes Addressed Code Status: Do Not Resuscitate CARDIOVASCULAR Cardiac Gtts: None SBP Goal of: > 90 mmHg MAP Goal of: > 65 mmHg Transfusion Trigger (Hgb): <7 g/dL RESPIRATORY Vent Goals:  
Head of bed > 30 degrees Aggressive bronchopulmonary hygiene DVT Prophylaxis (if no, list reason): SCD's or Sequential Compression Device and Lovenox SPO2 Goal: > 92% GI/ Dutta Catheter Present: Yes GI Prophylaxis: Pepcid (famotidine) Nutrition: No NPO Bowel Movement: Yes Bowel Regimen: MiraLax, Milk of magnesia and Enema ANTIBIOTICS Antibiotics: 
Meropenem T/L/D Tubes: ETT and Nasogastric Tube Lines: Peripheral IV and None Drains: Dutta Catheter SPECIAL EQUIPMENT None DISPOSITION Stay in ICU 
 
 CRITICAL CARE CONSULTANT NOTE I provided a face-to-face bedside physician/patient encounter, greater than the usual and customary amount normally needed, due to the high complexity of medical decision-making required. I reviewed and interpreted patient data including clinical events, labs, images, vital signs, I/O's, and examined patient. I have actively participated in multi-disciplinary discussions (Respiratory Therapy, Radiology, CCU nursing staff) regarding the case in formulating an optimal therapeutic plan, and effecting a management strategy for this patient. NOTE OF PERSONAL INVOLVEMENT IN CARE This patient has a high probability of imminent, clinically significant deterioration, which requires the highest level of preparedness to intervene urgently. I participated in the decision-making and personally managed, or directed the management of, a myriad of life and organ supporting interventions which required my frequent-interval clinical reassessments, in order to treat or prevent imminent deterioration. I personally spent 95 minutes of critical care time. This is time spent at this critically ill patient's bedside actively involved in patient care as well as the coordination of care and discussions with the patient's family. This does not include any procedural time which has been billed separately. Jenniffer Nava MD, FACS Staff [address] 1/1/2021

## 2021-01-02 NOTE — PROGRESS NOTES
Dr. Lieutenant King in to see patient. 1250 ultrasound tech in to do ultrasound. 1420 Patient placed on left side and soap suds enema done at this time. Patient with small amount of brown stool then clear fluid. Patient tolerated well. End of Shift Note Bedside shift change report given to Rick Danielson RN by Kevon Jacobson RN (offgoing nurse). Report included the following information SBAR, Kardex, Intake/Output, Accordion and Recent Results 7am to 1930 Shift summary and any significant changes:  
  continuing bowel regime and soap suds enemas Concerns for physician to address:  none Zone phone for oncoming shift:   n/a Activity: 
Activity Level: Bed Rest 
Number times ambulated in hallways past shift: 0 Number of times OOB to chair past shift: 0 Cardiac:  
Cardiac Monitoring: Yes     
Cardiac Rhythm: Normal sinus rhythm Access:  
Current line(s): central line Genitourinary:  
Urinary status: mejia Respiratory:  
O2 Device: Endotracheal tube, Ventilator Chronic home O2 use?: NO Incentive spirometer at bedside: NO 
  
GI: 
Last Bowel Movement Date: (PTA) Current diet:  DIET TUBE FEEDING Passing flatus: NO Tolerating current diet: NO 
  
 
Pain Management:  
Patient states pain is manageable on current regimen: N/A Skin: 
Jai Score: 10 Interventions: turn team and float heels Patient Safety: 
Fall Score: Total Score: 3 Interventions: bed/chair alarm High Fall Risk: Yes Length of Stay: 
Expected LOS: 12d 7h Actual LOS: [address] RN

## 2021-01-02 NOTE — PROGRESS NOTES
[facility] 
 
ICU Intensivist- Critical Care Progress Note Name: Piter Robbins : 1980 MRN: [id number] Admit: 2020  7:26 PM   
 
Diagnosis:  
 
Principal Problem: 
  Lactic acidosis Large hiatal hernia Post viral syndrome Fecal impaction of colon Gram negative septic shock Dehydration with hypernatremia Acute hypoxemic respiratory failure Severe sepsis with acute organ dysfunction Constipation due to pain medication therapy Aspiration pneumonia of both lungs due to vomit Pneumonia due to COVID-19 virus, resolved () Acute renal failure with acute renal cortical necrosis superimposed on stage 4 chronic kidney disease Kidney disease, chronic, stage IV (severe, EGFR 15-29 ml/min) HFrEF (heart failure with reduced ejection fraction) Goals of care, counseling/discussion Acquired hypothyroidism Down syndrome ICU Comprehensive Plan of Care:  
 
Plans for this Shift: 1. Sepsis with acute organ dysfunction due to aspiration bilateral bacterial pneumonia- continue empiric IV antibiotics (meropenem) aggressive pulmonary toilet. Covid pneumonia resolved, with post viral bilateral bacterial pneumonia. 
  
2. DANY superimposed on CKD with severe hypernatremic dehydration and nonoliguric ATN- aggressive vascular volume resuscitation in progress. Monitor renal indices as well as intake and output. 
  
3. Fecal impaction of rectum and ascending colon- oral laxatives mechanical bowel prep ineffective, daily high colonic enemas in progress. Enteral feedings will resume after resolution. Subjective:  
 
Progress Note:  
2021  
1:30 PM  
 
Reason for ICU Admission:  
Aspiration pneumonia of both lungs due to vomit ([address] Overnight Events:  
Non-oliguric ATN slowly resolving. Obstipation persisting. Past Medical History: has a past medical history of Endocrine disease, Gram negative septic shock ([facility] (1/23/2020), Hypothyroid (03/11/2018), Ill-defined condition, and Lactic acidosis (12/23/2020). Past Surgical History:  
 
 has no past surgical history on file. Current Medications:  
 
Current Facility-Administered Medications Medication Dose Route Frequency  dextrose 5% infusion  125 mL/hr IntraVENous CONTINUOUS  
 0.9% sodium chloride infusion 250 mL  250 mL IntraVENous PRN  
 dexmedeTOMidine (PRECEDEX) 400 mcg in 0.9% sodium chloride 100 mL infusion  0.1-1.5 mcg/kg/hr IntraVENous TITRATE  famotidine (PEPCID) tablet 20 mg  20 mg Per NG tube DAILY  risperiDONE (RisperDAL) tablet 1 mg  1 mg Per NG tube QHS  polyethylene glycol (MIRALAX) packet 17 g  17 g Per G Tube DAILY  levothyroxine (SYNTHROID) tablet 50 mcg  50 mcg Per NG tube DAILY  levothyroxine tube feed reminder note  1 Each Other BID  heparin (porcine) injection 5,000 Units  5,000 Units SubCUTAneous Q8H  
 fentaNYL (PF) 1,500 mcg/30 mL (50 mcg/mL) infusion  0-200 mcg/hr IntraVENous TITRATE  balsam peru-castor oiL (VENELEX) ointment   Topical BID  alcohol 62% (NOZIN) nasal  1 Ampule  1 Ampule Topical Q12H  
 dextrose (D50W) injection syrg 12.5-25 g  25-50 mL IntraVENous PRN  
 insulin lispro (HUMALOG) injection   SubCUTAneous Q6H  
 acetaminophen (TYLENOL) tablet 650 mg  650 mg Per G Tube Q6H PRN Or  
 acetaminophen (TYLENOL) suppository 650 mg  650 mg Rectal Q6H PRN  chlorhexidine (ORAL CARE KIT) 0.12 % mouthwash 15 mL  15 mL Oral Q12H  
 heparin (porcine) pf 300 Units  300 Units InterCATHeter PRN  
 sodium chloride (NS) flush 5-40 mL  5-40 mL IntraVENous Q8H  
 sodium chloride (NS) flush 5-40 mL  5-40 mL IntraVENous PRN  
 ondansetron (ZOFRAN) injection 4 mg  4 mg IntraVENous Q6H PRN Allergies/Social/Family History: No Known Allergies Social History Tobacco Use  Smoking status: Never Smoker  Smokeless tobacco: Never Used Substance Use Topics  Alcohol use: No  
  
No family history on file. Review of Systems:  
 
Review of systems not obtained due to patient factors. Objective:  
Vital Signs: 
Visit Vitals BP 97/62 (BP 1 Location: Left arm, BP Patient Position: At rest) Pulse 61 Temp 97.8 °F (36.6 °C) Resp 14 Ht 5' 3\" (1.6 m) Wt 84.5 kg (186 lb 4.6 oz) SpO2 98% BMI 33.00 kg/m² O2 Device: Endotracheal tube, Ventilator Temp (24hrs), Av.5 °F (36.9 °C), Min:97.8 °F (36.6 °C), Max:98.9 °F (37.2 °C) Intake/Output:  
 
Intake/Output Summary (Last 24 hours) at 2021 1330 Last data filed at 2021 1200 Gross per 24 hour Intake 2662.65 ml Output 2768 ml Net -105.35 ml Physical Exam: 
General:   Awake, intubated and sedated. Cooperative, no distress, appears stated age. Eyes:   Conjunctivae/corneas clear. PERRL, EOMs intact. Ears:   Normal external ear canals bilaterally. Nose:   No drainage or sinus tenderness. Mouth/Throat:  Moist mucous membranes. Dentition normal.   
Neck:  Symmetrical, trachea midline, no JVD or carotid bruit. Back:    No CVA tenderness to percussion. Lungs:    Clear to auscultation bilaterally. Heart:   Regular rate and rhythm. S1, S2 normal, without murmur, click, rub or gallop. Abdomen:    Hypoactive bowel sounds. Soft distention, non-tender, no masses or organomegaly. Extremities:  Atraumatic, no cyanosis or edema. Vascular:  Pulses 2+ and symmetric UE/LE bilat Skin:  Normal color, non-diaphoretic, brisk capillary refill (less than 2 seconds). No rashes or lesions. Lymph nodes:  No Lymphadenopathy. Neurologic:  Down's syndrome and autism, grossly normal.   
 
 
 
LABS AND  DATA: Personally reviewed Recent Labs 21 
0405 21 
4929 WBC 13.9* 16.2* HGB 7.2* 8.0*  
HCT 21.9* 24.1*  
 316 Recent Labs 21 
0405 21 
1344 21 
0521 20 
0401 * 150* 150* 147* K 4.0 4.4 4.6 4.4  
* 114* 115* 112* CO2 31 34* 31 31 BUN 80* 91* 93* 97* CREA 3.32* 3.78* 3.69* 3.69* * 95 80 99 CA 10.0 10.3* 10.5* 10.6*  10.9* MG  --  2.8* 2.8*  --   
PHOS  --   --   --  5.4* Recent Labs 01/02/21 
0405 01/01/21 
2822 AP 30* 38*  
TP 6.7 6.9 ALB 3.9 3.5 GLOB 2.8 3.4 No results for input(s): INR, PTP, APTT, INREXT in the last 72 hours. No results for input(s): PHI, PCO2I, PO2I, FIO2I in the last 72 hours. No results for input(s): CPK, CKMB, TROIQ, BNPP in the last 72 hours. Ventilator Settings: 
Mode Rate Tidal Volume Pressure FiO2 PEEP Spontaneous   350 ml  10 cm H2O 25 % 5 cm H20 Peak airway pressure: 15 cm H2O Minute ventilation: 5.36 l/min MEDS: Reviewed RADIOLOGY: 
No results found. Assessment:  
 
Hospital Problems  Never Reviewed Codes Class Noted POA Severe sepsis with acute organ dysfunction (HCC) ICD-10-CM: A41.9, R65.20 ICD-9-CM: 038.9, 995.92 Acute 12/23/2020 Yes * (Principal) Aspiration pneumonia of both lungs due to vomit ([address] ICD-10-CM: J69.0 ICD-9-CM: 507.0 Acute 12/23/2020 Yes Post viral syndrome ICD-10-CM: G93.3 ICD-9-CM: 780.79 Acute 12/25/2020 No  
   
 Acute hypoxemic respiratory failure ([address] ICD-10-CM: J96.01 
ICD-9-CM: 518.81 Acute 12/24/2020 Yes Acute renal failure with acute renal cortical necrosis superimposed on stage 4 chronic kidney disease (HCC) (Chronic) ICD-10-CM: N17.1, N18.4 ICD-9-CM: 584.6, 585.4 Acute 12/23/2020 Yes Kidney disease, chronic, stage IV (severe, EGFR 15-29 ml/min) (HCC) ICD-10-CM: N18.4 ICD-9-CM: 726. 4 Acute 12/23/2020 Yes Lactic acidosis ICD-10-CM: E87.2 ICD-9-CM: 276.2 Acute 12/23/2020 Yes Fecal impaction of colon ([facility] ICD-10-CM: K56.41 
ICD-9-CM: 560.32 Acute 12/23/2020 Yes Dehydration with hypernatremia (Chronic) ICD-10-CM: E87.0 ICD-9-CM: 276.0 Acute 12/16/2020 Yes Gram negative septic shock (HCC) ICD-10-CM: A41.50, R65.21 ICD-9-CM: 038.40, 995.92, 785.52 Acute 1/23/2020 Yes Down syndrome (Chronic) ICD-10-CM: Q90.9 ICD-9-CM: 758.0 End Stage 12/23/2020 Yes Constipation due to pain medication therapy ICD-10-CM: K59.03 
ICD-9-CM: 564.09, E947.9 Acute 11/26/2020 Yes Large hiatal hernia (Chronic) ICD-10-CM: K44.9 ICD-9-CM: 085. 3 Chronic 11/2/2020 Yes Overview Signed 1/1/2021  4:08 PM by Mason Gloria MD  
  ([id number])- CT Abdomen: 
Distended fluid-filled esophagus and large hiatus hernia. Goals of care, counseling/discussion ICD-10-CM: Z71.89 ICD-9-CM: V65.49  12/28/2020 Yes HFrEF (heart failure with reduced ejection fraction) (HCC) (Chronic) ICD-10-CM: I50.20 ICD-9-CM: 428.20  12/24/2020 Yes Acquired hypothyroidism (Chronic) ICD-10-CM: E03.9 ICD-9-CM: 244.9  12/24/2020 Yes Respiratory failure ([address] ICD-10-CM: J96.90 ICD-9-CM: 518.81  12/23/2020 Yes Multidisciplinary Rounds Completed: Yes 
  
ABCDEF Bundle/Checklist 
Pain Medications: Fentanyl Target RASS: 0 - Alert & Calm - Spontaneously pays attention to caregiver Sedation Medications: None CAM-ICU:  Negative Discussed Plan of Care (goals of care): Yes Addressed Code Status: Do Not Resuscitate 
  
CARDIOVASCULAR Cardiac Gtts: None SBP Goal of: > 90 mmHg MAP Goal of: > 65 mmHg Transfusion Trigger (Hgb): <7 g/dL 
  
RESPIRATORY Vent Goals:  
Head of bed > 30 degrees Aggressive bronchopulmonary hygiene DVT Prophylaxis (if no, list reason): SCD's or Sequential Compression Device and Lovenox SPO2 Goal: > 92% 
  
GI/ Dutta Catheter Present: Yes GI Prophylaxis: Pepcid (famotidine) Nutrition: No NPO Bowel Movement: Yes Bowel Regimen: MiraLax, Milk of magnesia and Enema 
  
ANTIBIOTICS Antibiotics: 
Meropenem 
  
T/L/D Tubes: ETT and Nasogastric Tube Lines: Peripheral IV and None Drains: Dutta Catheter 
  
SPECIAL EQUIPMENT None 
  
DISPOSITION Stay in ICU 
  
CRITICAL CARE CONSULTANT NOTE I provided a face-to-face bedside physician/patient encounter, greater than the usual and customary amount normally needed, due to the high complexity of medical decision-making required. 
  
I reviewed and interpreted patient data including clinical events, labs, images, vital signs, I/O's, and examined patient.   
  
I have actively participated in multi-disciplinary discussions (Respiratory Therapy, Clinical Pharmacist, CCU nursing staff) regarding the case in formulating an optimal therapeutic plan, and effecting a management strategy for this patient. 
  
NOTE OF PERSONAL INVOLVEMENT IN CARE This patient has a high probability of imminent, clinically significant deterioration, which requires the highest level of preparedness to intervene urgently. I participated in the decision-making and personally managed, or directed the management of, a myriad of life and organ supporting interventions which required my frequent-interval clinical reassessments, in order to treat or prevent imminent deterioration. 
  
I personally spent 45 minutes of critical care time. This is time spent at this critically ill patient's bedside actively involved in patient care as well as the coordination of care and discussions with the patient's family. This does not include any procedural time which has been billed separately. Mikayla Goode MD, FACS Staff LAN[facility]/ [address] 
1/2/2021

## 2021-01-02 NOTE — PROGRESS NOTES
1900: Report from Nick Rinaldi, [address]: Assessment as noted. Pt alert and responding to verbal and painful stimuli. No command following. OGT to low intermittent suction with green output. Currently on SBT and tolerating well. 
 
2200: OGT clamped for Risperdal med absorption. 2300: Pt more tachypneic, coughing and stacking breaths. Precedex titrated up to help calm pt. RT notified to place pt back on rate to rest for night. 0430: Soap allison enema given, however pt unable to follow commands, hold rectal muscles- most of soap suds enema came out directly after administration. Ana care provided, chux pads changed.  
 
0600: Pt placed back on CPAP mode 10/5, tolerating well 
 
0700: Report to Nava Peña

## 2021-01-02 NOTE — PROGRESS NOTES
Nephrology Progress Note [facility]  
 
[email or web address] [employer]              Phone - [phone] Patient: Carol Garcia YOB: 1980 Date- 1/2/2021 MRN: [id number] F/u  DANY  
CONSULTING PHYSICIAN: Dr Isaiah De Leon ADMIT DATE:12/23/2020 PATIENT PCP:Diego, MD Kilo  
 
IMPRESSION:  
# Nonoliguric DANY likely pre-renal + Vanc toxicity # Hypernatremia # Anemia # Respiratory failure due to COVID-19 infection(now resolved--PCR negative) +  superimposed bacterial pneumonia # Hypoalbuminemia( Alb 1.5) PLAN:  
- Cr and hypernatremia improving slowly with D5W infusion at 100cc/h. Will increase to 125cc/h as pt has high free water deficit 
- pending renal US to eval for hydronephrosis( previous was unable to r/o hydronephrosis) 
 - adjust all meds to GFR 
- monitor UOP Thank you for allowing us to participate in the care this patient. We will follow patient with you. Principal Problem: 
  Aspiration pneumonia of both lungs due to vomit ([address] (12/23/2020) Active Problems: 
  Acute hypoxemic respiratory failure ([address] (12/24/2020) Post viral syndrome (12/25/2020) Dehydration with hypernatremia (12/16/2020) Kidney disease, chronic, stage IV (severe, EGFR 15-29 ml/min) (HCC) (12/23/2020) Gram negative septic shock ([address] (1/23/2020) Lactic acidosis (12/23/2020) Fecal impaction of colon ([address] (12/23/2020) Large hiatal hernia (11/2/2020) Overview: ([id number])- CT Abdomen: 
    Distended fluid-filled esophagus and large hiatus hernia. Constipation due to pain medication therapy (11/26/2020) Severe sepsis with acute organ dysfunction ([address] (12/23/2020) Respiratory failure ([address] (12/23/2020) HFrEF (heart failure with reduced ejection fraction) ([address] (12/24/2020) Down syndrome (12/23/2020) Acquired hypothyroidism (12/24/2020) Goals of care, counseling/discussion (12/28/2020) Acute renal failure with acute renal cortical necrosis superimposed on stage 4 chronic kidney disease ([facility] (12/23/2020) [x] High complexity decision making was performed 
[x] Patient is at high-risk of decompensation with multiple organ involvement Subjective:  
Cr continues to improve, improving UOP Review of Systems:  
Unable to perform, Intubated, sedated Past Medical History:  
Diagnosis Date  Endocrine disease  Gram negative septic shock ([facility] [address] 1/23/2020  Hypothyroid 03/11/2018  Ill-defined condition Down's Syndrome  Lactic acidosis 12/23/2020 No past surgical history on file. Prior to Admission medications Medication Sig Start Date End Date Taking? Authorizing Provider  
ivabradine (CORLANOR) 5 mg tablet Take 1 Tab by mouth two (2) times daily (with meals). Indications: chronic heart failure, inappropriate sinus tachycardia 12/6/20   Miguel Ángel Bruno MD  
metoprolol succinate (TOPROL-XL) 25 mg XL tablet Take 1 Tab by mouth daily. 12/6/20   Miguel Ángel Bruno MD  
risperiDONE (RisperDAL) 1 mg tablet Take 1 mg by mouth nightly. Provider, Aicha  
solifenacin (VESICARE) 10 mg tablet TAKE 1 TABLET BY MOUTH EVERY DAY 9/18/20   Kilo Cortez MD  
medroxyPROGESTERone (DEPO-PROVERA) 150 mg/mL injection 150 mg, IM, once, To be administered in clinic by nurse. See order comments for schedule., # 1 vial, 4 Refills, Pharmacy: [facility]/pharmacy #[id number] 12/5/19   Kilo Cortez MD  
albuterol (PROVENTIL HFA, VENTOLIN HFA, PROAIR HFA) 90 mcg/actuation inhaler Take 2 Puffs by inhalation every four (4) hours as needed for Wheezing. 11/2/20   Moe Varela MD  
zinc sulfate 220 mg tablet Take 1 Tab by mouth daily. 11/2/20   Moe Varela MD  
cholecalciferol (VITAMIN D3) 1,000 unit tablet Take 1,000 Units by mouth daily. Kilo Cortez MD  
levothyroxine (SYNTHROID) 50 mcg tablet Take 50 mcg by mouth Daily (before breakfast). Kilo Cortez MD  
 
No Known Allergies Social History Tobacco Use  Smoking status: Never Smoker  Smokeless tobacco: Never Used Substance Use Topics  Alcohol use: No  
  
No family history on file. Objective:   
 
Patient Vitals for the past 24 hrs: 
 Temp Pulse Resp BP SpO2  
01/02/21 1000  73 19 111/68 98 % 01/02/21 0900  73 15 108/61 99 % 01/02/21 0800 98.9 °F (37.2 °C) 74 18 (!) 103/53 99 % 01/02/21 0759  73 17  100 % 01/02/21 0700  (!) 56 15 (!) 96/54 99 % 01/02/21 0612  60 15  96 % 01/02/21 0600  (!) 48 18 (!) 94/52 99 % 01/02/21 0500  (!) 50 18 (!) 104/54 96 % 01/02/21 0400 98.2 °F (36.8 °C) (!) 50 18 (!) 100/59 98 % 01/02/21 0343  64 18  97 % 01/02/21 0300  (!) 47 18 (!) 102/59 99 % 01/02/21 0200  65 18 (!) 106/55 98 % 01/02/21 0100  78 20 (!) 107/57 97 % 01/02/21 0000 98.7 °F (37.1 °C) 73 15 102/64 98 % 01/01/21 2315  77 (!) 35  100 % 01/01/21 2300  81 19 (!) 103/50 97 % 01/01/21 2200  64 18 96/69 99 % 01/01/21 2100  64 18 (!) 104/53 99 % 01/01/21 2000 98.6 °F (37 °C) 76 19 (!) 104/57 99 % 01/01/21 1943  70 18  98 % 01/01/21 1900  73 20 (!) 98/50 98 % 01/01/21 1830  63 17  99 % 01/01/21 1800  72 19 (!) 100/49 99 % 01/01/21 1730  73 18  97 % 01/01/21 1700  79 19 (!) 100/56 96 % 01/01/21 1630  81 19  98 % 01/01/21 1600 98.9 °F (37.2 °C) 85 18 99/61 99 % 01/01/21 1550  86 22  99 % 01/01/21 1530  (!) 104 20  93 % 01/01/21 1524  (!) 52 18  98 % 01/01/21 1500  (!) 44 21  99 % 01/01/21 1430  (!) 56 17  98 % 01/01/21 1400  (!) 48 16 (!) 103/55 98 % 01/01/21 1330  65 23  99 % 01/01/21 1300  (!) 44 18 (!) 104/55 100 % 01/01/21 1230  (!) 59 29  99 % 01/01/21 1200 98.8 °F (37.1 °C) (!) 42 18 112/60 100 % 01/01/21 1145  (!) 42 18  100 % 01/01/21 1130  60 20  99 % 01/01/21 1115  62 22  99 % 01/01/21 1100  (!) 57 25 (!) 108/51 100 % 01/02 0701 - 01/02 1900 In: 461.2 [I.V.:331.2] Out: 318 [Urine:228] Physical Exam: 
General: critically ill, intubated, sedated in NAD HEENT: AT/NC, ETT in place Lungs:on MV 
CV:RRR Ext: no edema CODE STATUS: DNR Chart reviewed. 
 
y Reviewed previous records  
y Discussed w/ RN Lab Data Personally Reviewed: (see below) Recent Labs 01/02/21 
0405 01/01/21 
1344 01/01/21 
0521 12/31/20 
2103 12/31/20 
1110 12/31/20 
0401 WBC 13.9*  --  16.2* 16.3*  --  19.1* HGB 7.2*  --  8.0* 8.1* 6.7* 7.0*  
  --  316 316  --  356 ANEU 9.4*  --  11.8*  --   --  14.3* * 150* 150*  --   --  147* K 4.0 4.4 4.6  --   --  4.4 * 95 80  --   --  99 BUN 80* 91* 93*  --   --  97* CREA 3.32* 3.78* 3.69*  --   --  3.69* ALT 17  --  19  --   --  20  
TBILI 0.6  --  0.6  --   --  0.4 AP 30*  --  38*  --   --  47  
CA 10.0 10.3* 10.5*  --   --  10.6*  10.9* MG  --  2.8* 2.8*  --   --   --   
PHOS  --   --   --   --   --  5.4* Lab Results Component Value Date/Time Color YELLOW/STRAW 12/30/2020 05:37 PM  
 Appearance TURBID (A) 12/30/2020 05:37 PM  
 Specific gravity 1.012 12/30/2020 05:37 PM  
 Specific gravity 1.010 11/20/2020 01:57 AM  
 pH (UA) 5.0 12/30/2020 05:37 PM  
 Protein 30 (A) 12/30/2020 05:37 PM  
 Glucose Negative 12/30/2020 05:37 PM  
 Ketone Negative 12/30/2020 05:37 PM  
 Bilirubin Negative 12/30/2020 05:37 PM  
 Urobilinogen 0.2 12/30/2020 05:37 PM  
 Nitrites Negative 12/30/2020 05:37 PM  
 Leukocyte Esterase SMALL (A) 12/30/2020 05:37 PM  
 Epithelial cells FEW 12/30/2020 05:37 PM  
 Bacteria Negative 12/30/2020 05:37 PM  
 WBC 20-50 12/30/2020 05:37 PM  
 RBC 5-10 12/30/2020 05:37 PM  
 
 
Lab Results Component Value Date/Time Iron 65 12/04/2020 03:11 AM  
 TIBC 279 12/04/2020 03:11 AM  
 Iron % saturation 23 12/04/2020 03:11 AM  
 Ferritin 555 (H) 12/28/2020 04:51 AM  
 
Lab Results Component Value Date/Time  Culture result: SCANT NORMAL RESPIRATORY CHAD 12/24/2020 08:30 AM  
 Culture result: No growth (<1,000 CFU/ML) 12/24/2020 08:20 AM  
 Culture result: NO GROWTH 5 DAYS 12/23/2020 11:49 AM  
 
  
  
  
 
Imaging: 
 
Medications list Personally Reviewed   [x]      Yes     []               No   
 
Signed By: Garen Gilford, [place] [email or web address] [address] [place], [address] [facility] Phone - [phone] Fax - [phone] [address] [place], [place] [address], [address] [facility] Phone - [phone] Fax - [phone]    
[email or web address]

## 2021-01-03 NOTE — PROGRESS NOTES
0700 Bedside and Verbal shift change report given to Erik Muller (oncoming nurse) by Ana Pollock (offgoing nurse). Report included the following information SBAR, Kardex, Procedure Summary, Intake/Output, MAR, Recent Results and Cardiac Rhythm NSR.  
0800 pt assessed per flowsheet, verified infusions, repositioned for comfort 
1200 pt reassessed per flowsheet. Tap water enema adm. No difficulty noted in passing enema. 1300 Fentanyl infusion stopped per order. Pt with large, watery/grainy stool 1430 pt with large soft, loose stool. Lactulose adm via NGT per order 1600 pt reassessed per flowsheet. Loose stool, muddy texture, large amount of flatus 1800 bath complete again after another large, muddy BM 
1900 End of Shift Note Bedside shift change report given to Alvin Delgado (oncoming nurse) by Adam Jones RN (offgoing nurse). Report included the following information SBAR, Kardex, Procedure Summary, Intake/Output, MAR, Recent Results and Cardiac Rhythm NSR Shift worked:  7a-7p Shift summary and any significant changes:  
  bowels are moving!!! Concerns for physician to address: To start [facility] phone for oncoming shift:  [phone] Activity: 
Activity Level: Bed Rest 
Number times ambulated in hallways past shift: 0 Number of times OOB to chair past shift: 0 Cardiac:  
Cardiac Monitoring: Yes     
Cardiac Rhythm: (P) Normal sinus rhythm Access:  
Current line(s): central line Genitourinary:  
Urinary status: mejia Respiratory:  
O2 Device: Ventilator Chronic home O2 use?: NO Incentive spirometer at bedside: NO 
  
GI: 
Last Bowel Movement Date: (P) 01/03/21 Current diet:  DIET TUBE FEEDING Passing flatus: YES Tolerating current diet: YES Pain Management:  
Patient states pain is manageable on current regimen: YES Skin: 
Jai Score: 11 Interventions: turn team, speciality bed and float heels Patient Safety: 
Fall Score: Total Score: 3 Interventions: bed/chair alarm High Fall Risk: Yes Length of Stay: 
Expected LOS: 12d 7h Actual LOS: 11 Kareen Shaffer RN

## 2021-01-03 NOTE — PROGRESS NOTES
1900 Bedside and Verbal shift change report given to HEMA Woods (oncoming nurse) by Carlos Rodarte RN (offgoing nurse). Report included the following information SBAR, Kardex, ED Summary, Procedure Summary, Intake/Output, MAR, Recent Results and Cardiac Rhythm SB/NSR with freq PVCs. 2000 Shift assessment complete - patient drowsy but eyes open spontaneously; does not follow commands but tracks with eyes; breath sounds clear on ventilator with ETT 21 @lip - mouth care complete; abdomen soft/obese with hypoactive bowel sounds; pulses palpable in all extremities; mejia in place draining clear/yellow urine - patricia care complete; 2+ facial edema, 1+ edema, BUE, trace edema BLE; R IJ & R art line in place - sites CDI  
 
2310  - no insulin needed 
 
0000 Reassessment - no changes noted to previous assessment  
 
0042 50g of albumin 5% given per one time order 0327 Labs drawn & sent  
 
0400 Reassessment - no changes noted to previous assessment 0530  - no insulin needed 
 
0700 Bedside and Verbal shift change report given to Carlos Rodarte RN (oncoming nurse) by HEMA Woods (offgoing nurse). Report included the following information SBAR, Kardex, ED Summary, Procedure Summary, Intake/Output, MAR, Recent Results and Cardiac Rhythm SB/NSR with freq PVCs.

## 2021-01-03 NOTE — PROGRESS NOTES
Nephrology Progress Note Edmundo Hill  
 
www. [facility]              Phone - [phone] Patient: Dave Davis YOB: 1980 Date- 1/3/2021 MRN: [id number] F/u  DANY  
CONSULTING PHYSICIAN: Dr Александр Mayfield ADMIT DATE:12/23/2020 PATIENT PCP:Dieog, MD Kilo  
 
IMPRESSION:  
# Nonoliguric DANY likely pre-renal + Vanc toxicity--improving # Hypernatremia--improved # Renal US( 1/2/21) : Atrophic right kidney with parenchymal scarring. Elongated left kidney which may be due to compensatory hypertrophy. # Anemia # Respiratory failure on MV # Sepsis from pneumonia # S/p COVID-19 infection(PCR negative) # Hypoalbuminemia # Down syndrome w/ autism PLAN:  
- Cr continues to improve. Hypernatremia resolved - Increased free water replacement in TF and changed D5W to 1/2 NS 
 - adjust all meds to GFR 
- monitor UOP 
- avoid nephrotoxics Thank you for allowing us to participate in the care this patient. We will follow patient with you. Principal Problem: 
  Aspiration pneumonia of both lungs due to vomit ([address] (12/23/2020) Active Problems: 
  Acute hypoxemic respiratory failure ([address] (12/24/2020) Post viral syndrome (12/25/2020) Dehydration with hypernatremia (12/16/2020) Kidney disease, chronic, stage IV (severe, EGFR 15-29 ml/min) ([facility]) (12/23/2020) Gram negative septic shock ([address] (1/23/2020) Lactic acidosis (12/23/2020) Fecal impaction of colon ([address] (12/23/2020) Large hiatal hernia (11/2/2020) Overview: ([id number])- CT Abdomen: 
    Distended fluid-filled esophagus and large hiatus hernia. Constipation due to pain medication therapy (11/26/2020) Severe sepsis with acute organ dysfunction ([address] (12/23/2020) Respiratory failure ([address] (12/23/2020) HFrEF (heart failure with reduced ejection fraction) ([address] (12/24/2020) Down syndrome (12/23/2020) Acquired hypothyroidism (12/24/2020) Goals of care, counseling/discussion (12/28/2020) Acute renal failure with acute renal cortical necrosis superimposed on stage 4 chronic kidney disease ([facility] (12/23/2020) [x] High complexity decision making was performed 
[x] Patient is at high-risk of decompensation with multiple organ involvement Subjective:  
Cr continues to improve, improving UOP Review of Systems:  
Unable to perform, Intubated, sedated Past Medical History:  
Diagnosis Date  Endocrine disease  Gram negative septic shock ([facility] 1/23/2020  Hypothyroid 03/11/2018  Ill-defined condition Down's Syndrome  Lactic acidosis 12/23/2020 No past surgical history on file. Prior to Admission medications Medication Sig Start Date End Date Taking? Authorizing Provider  
ivabradine (CORLANOR) 5 mg tablet Take 1 Tab by mouth two (2) times daily (with meals). Indications: chronic heart failure, inappropriate sinus tachycardia 12/6/20   Maryam Shoemaker MD  
metoprolol succinate (TOPROL-XL) 25 mg XL tablet Take 1 Tab by mouth daily. 12/6/20   Maryam Shoemaker MD  
risperiDONE (RisperDAL) 1 mg tablet Take 1 mg by mouth nightly. Provider, Historical  
solifenacin (VESICARE) 10 mg tablet TAKE 1 TABLET BY MOUTH EVERY DAY 9/18/20   OtherKilo MD  
medroxyPROGESTERone (DEPO-PROVERA) 150 mg/mL injection 150 mg, IM, once, To be administered in clinic by nurse. See order comments for schedule., # 1 vial, 4 Refills, Pharmacy: [facility]/pharmacy #[id number] 12/5/19   Other, MD Kilo  
albuterol (PROVENTIL HFA, VENTOLIN HFA, PROAIR HFA) 90 mcg/actuation inhaler Take 2 Puffs by inhalation every four (4) hours as needed for Wheezing. 11/2/20   Anna Carrillo MD  
zinc sulfate 220 mg tablet Take 1 Tab by mouth daily. 11/2/20   Anna Carrillo MD  
cholecalciferol (VITAMIN D3) 1,000 unit tablet Take 1,000 Units by mouth daily.     Kilo Cortez MD  
 levothyroxine (SYNTHROID) 50 mcg tablet Take 50 mcg by mouth Daily (before breakfast). Other, MD Kilo  
 
No Known Allergies Social History Tobacco Use  Smoking status: Never Smoker  Smokeless tobacco: Never Used Substance Use Topics  Alcohol use: No  
  
No family history on file. Objective:   
 
Patient Vitals for the past 24 hrs: 
 Temp Pulse Resp BP SpO2  
01/03/21 0811  67 16  98 % 01/03/21 0600  68 15 106/69 93 % 01/03/21 0500  (!) 59 11 (!) 100/50 98 % 01/03/21 0400 99 °F (37.2 °C) 61 16 (!) 98/58 99 % 01/03/21 0341  74 21  100 % 01/03/21 0300  (!) 53 12 (!) 95/56 99 % 01/03/21 0245  (!) 53 15  99 % 01/03/21 0230  60 16  99 % 01/03/21 0215  73 23  97 % 01/03/21 0200  (!) 55 9 (!) 94/57 98 % 01/03/21 0145  (!) 56 13  97 % 01/03/21 0100  65 13 97/64 98 % 01/03/21 0001  (!) 59 13 (!) 96/57 99 % 01/03/21 0000 99.3 °F (37.4 °C) (!) 58 13  98 % 01/02/21 2308  62 13  98 % 01/02/21 2300  62 13 (!) 94/55 98 % 01/02/21 2200  61 13 (!) 95/46 98 % 01/02/21 2100  64 17 (!) 96/58 99 % 01/02/21 2014  72 17  100 % 01/02/21 2000 99.1 °F (37.3 °C) 67 16 93/60 99 % 01/02/21 1900  83 14 107/65 100 % 01/02/21 1800  64 19 101/65 100 % 01/02/21 1700  74 13 (!) 90/56 100 % 01/02/21 1600 97.7 °F (36.5 °C) 75 17 (!) 99/48 100 % 01/02/21 1524  70 21  100 % 01/02/21 1500  73 19 (!) 100/53 100 % 01/02/21 1400  (!) 55 12  98 % 01/02/21 1300  68 19 105/69 99 % 01/02/21 1200 97.8 °F (36.6 °C) 61 14 97/62 98 % No intake/output data recorded. Physical Exam: 
General: critically ill, intubated, sedated in NAD HEENT: AT/NC, ETT in place Lungs:on MV 
CV:RRR Ext: no edema CODE STATUS: DNR Chart reviewed. 
 
y Reviewed previous records  
y Discussed w/ RN Lab Data Personally Reviewed: (see below) Recent Labs 01/03/21 
0327 01/02/21 
0405 01/01/21 
1344 01/01/21 
0521 12/31/20 
2103 WBC 15.4* 13.9*  --  16.2* 16.3* HGB 7.6* 7.2*  --  8.0* 8.1*  
 289  --  316 316 ANEU 10.9* 9.4*  --  11.8*  --   
 146* 150* 150*  --   
K 3.9 4.0 4.4 4.6  --   
GLU 99 106* 95 80  --   
BUN 61* 80* 91* 93*  --   
CREA 2.68* 3.32* 3.78* 3.69*  --   
ALT 24 17  --  19  --   
TBILI 0.7 0.6  --  0.6  --   
AP 31* 30*  --  38*  --   
CA 9.1 10.0 10.3* 10.5*  --   
MG  --   --  2.8* 2.8*  --   
 
Lab Results Component Value Date/Time Color YELLOW/STRAW 12/30/2020 05:37 PM  
 Appearance TURBID (A) 12/30/2020 05:37 PM  
 Specific gravity 1.012 12/30/2020 05:37 PM  
 Specific gravity 1.010 11/20/2020 01:57 AM  
 pH (UA) 5.0 12/30/2020 05:37 PM  
 Protein 30 (A) 12/30/2020 05:37 PM  
 Glucose Negative 12/30/2020 05:37 PM  
 Ketone Negative 12/30/2020 05:37 PM  
 Bilirubin Negative 12/30/2020 05:37 PM  
 Urobilinogen 0.2 12/30/2020 05:37 PM  
 Nitrites Negative 12/30/2020 05:37 PM  
 Leukocyte Esterase SMALL (A) 12/30/2020 05:37 PM  
 Epithelial cells FEW 12/30/2020 05:37 PM  
 Bacteria Negative 12/30/2020 05:37 PM  
 WBC 20-50 12/30/2020 05:37 PM  
 RBC 5-10 12/30/2020 05:37 PM  
 
 
Lab Results Component Value Date/Time Iron 65 12/04/2020 03:11 AM  
 TIBC 279 12/04/2020 03:11 AM  
 Iron % saturation 23 12/04/2020 03:11 AM  
 Ferritin 555 (H) 12/28/2020 04:51 AM  
 
Lab Results Component Value Date/Time Culture result: SCANT NORMAL RESPIRATORY CHAD 12/24/2020 08:30 AM  
 Culture result: No growth (<1,000 CFU/ML) 12/24/2020 08:20 AM  
 Culture result: NO GROWTH 5 DAYS 12/23/2020 11:49 AM  
 
  
  
  
 
Imaging: 
 
Medications list Personally Reviewed   [x]      Yes     []               No   
 
Signed By: Aron Forde [facility] [employer] [address] [place], [address] [facility] Phone - [phone] Fax - [phone] [address], [address] [facility] Phone - [phone] Fax - [phone]    
[email or web address]

## 2021-01-03 NOTE — PROGRESS NOTES
Progress Note [facility] Patient: Zoie Croft MRN: [id number]  SSN: [id number] YOB: 1980  Age: 47 y.o. Sex: female Admit Date: 12/23/2020 LOS: 11 days Subjective:  
 
Principal Problem: 
  Lactic acidosis 
  Large hiatal hernia 
  Post viral syndrome 
  Fecal impaction of colon 
  Gram negative septic shock 
  Dehydration with hypernatremia 
  Acute hypoxemic respiratory failure 
  Severe sepsis with acute organ dysfunction 
  Constipation due to pain medication therapy 
  Aspiration pneumonia of both lungs due to vomit 
  Pneumonia due to COVID-19 virus, resolved (Nov/2020) 
  Acute renal failure with acute renal cortical necrosis superimposed on stage 4 chronic kidney disease 
  Kidney disease, chronic, stage IV (severe, EGFR 15-29 ml/min) 
  HFrEF (heart failure with reduced ejection fraction) 
  Goals of care, counseling/discussion 
  Acquired hypothyroidism 
  Down syndrome Acute events over past 24 hours: no acute events overnight Objective:  
 
Vitals:  
 01/03/21 0600 01/03/21 0811 01/03/21 1118 01/03/21 1252 BP: 106/69 Pulse: 68 67 (!) 58 Resp: 15 16 18 Temp:      
SpO2: 93% 98% 100% Weight:    88.4 kg (194 lb 14.2 oz) Height:      
  
 
Intake and Output: 
Current Shift: No intake/output data recorded. Last three shifts: 01/01 1901 - 01/03 0700 In: 6099.9 [I.V.:5969.9] Out: 8417 [Urine:2578] Physical Exam:  
GENERAL: uncooperative, no distress, slowed mentation, appears stated age, appears older than stated age, intubated and sedated THROAT & NECK: normal and neck supple and symmetrical.  intubated ETT in place LUNG: clear to auscultation bilaterally, diminished breath sounds R base, L base HEART: regular rate and rhythm ABDOMEN: soft, non-tender. Bowel sounds normal. No masses,  no organomegaly EXTREMITIES:  extremities normal, atraumatic, no cyanosis or edema, no edema, redness or tenderness in the calves or thighs PSYCHIATRIC: non responsive (baseline cognition poor per report) Lab/Data Review: All lab results for the last 24 hours reviewed. Assessment:  
 
Principal Problem: 
  Aspiration pneumonia of both lungs due to vomit ([address] (12/23/2020) Active Problems: 
  Acute hypoxemic respiratory failure ([address] (12/24/2020) Post viral syndrome (12/25/2020) Dehydration with hypernatremia (12/16/2020) Kidney disease, chronic, stage IV (severe, EGFR 15-29 ml/min) ([facility]) (12/23/2020) Gram negative septic shock ([address] (1/23/2020) Lactic acidosis (12/23/2020) Fecal impaction of colon ([address] (12/23/2020) Large hiatal hernia (11/2/2020) Overview: ([id number])- CT Abdomen: 
    Distended fluid-filled esophagus and large hiatus hernia. Constipation due to pain medication therapy (11/26/2020) Severe sepsis with acute organ dysfunction ([address] (12/23/2020) Respiratory failure ([address] (12/23/2020) HFrEF (heart failure with reduced ejection fraction) ([address] (12/24/2020) Down syndrome (12/23/2020) Acquired hypothyroidism (12/24/2020) Goals of care, counseling/discussion (12/28/2020) Acute renal failure with acute renal cortical necrosis superimposed on stage 4 chronic kidney disease ([address] (12/23/2020) ICU Comprehensive Plan of Care:  
 
Plans for this Shift:  
1. Sepsis with acute organ dysfunction due to aspiration bilateral bacterial pneumonia- continue empiric IV antibiotics (meropenem) aggressive pulmonary toilet.  Covid pneumonia resolved, with post viral bilateral bacterial pneumonia. 
  
2. DANY superimposed on CKD with severe hypernatremic dehydration and nonoliguric ATN- monitor daily labs, continue volume resuscitation 
  
3. Fecal impaction of rectum and ascending colon- hold enteral feeds ,attempt continued enemas with oral lactulose from above ABCDEF Bundle/Checklist 
Pain Medications: Fentanyl Target RASS: 0 - Alert & Calm - Spontaneously pays attention to caregiver Sedation Medications: None CAM-ICU:  Negative Discussed Plan of Care (goals of care): Yes Addressed Code Status: Do Not Resuscitate 
  
CARDIOVASCULAR Cardiac Gtts: None SBP Goal of: > 90 mmHg MAP Goal of: > 65 mmHg Transfusion Trigger (Hgb): <7 g/dL 
  
RESPIRATORY Vent Goals:  
Head of bed > 30 degrees Aggressive bronchopulmonary hygiene DVT Prophylaxis (if no, list reason): SCD's or Sequential Compression Device and Lovenox  
SPO2 Goal: > 92% 
  
GI/ Dutta Catheter Present: Yes GI Prophylaxis: Pepcid (famotidine)  
Nutrition: No NPO Bowel Movement: Yes Bowel Regimen: MiraLax, Milk of magnesia and Enema 
  
ANTIBIOTICS Antibiotics: 
Meropenem 
  
T/L/D Tubes: ETT and Nasogastric Tube Lines: Peripheral IV and None Drains: Dutta Catheter 
  
SPECIAL EQUIPMENT None 
  
DISPOSITION Stay in ICU 
  
CRITICAL CARE CONSULTANT NOTE I provided a face-to-face bedside physician/patient encounter. 
  
I reviewed and interpreted patient data including clinical events, labs, images, vital signs, I/O's, and examined patient.   
  
 
  
NOTE OF PERSONAL INVOLVEMENT IN CARE  
This patient has a high probability of imminent, clinically significant deterioration, which requires the highest level of preparedness to intervene urgently. I participated in the decision-making and personally managed, or directed the management of, a myriad of life and organ supporting interventions which required my frequent-interval clinical reassessments, in order to treat or prevent imminent deterioration. 
  
I personally spent 35 minutes of critical care time.  This is time spent at this critically ill patient's bedside actively involved in patient care as well as the coordination of care.  This does not include any procedural time which has been billed separately. Johnna Bloch, MD 
Intensivist 
[facility] Signed By: Maria D Evangelista MD   
 January 3, 2021

## 2021-01-04 NOTE — PROGRESS NOTES
Nephrology Progress Note Edmundo Hill  
 
www. [facility]  Phone - [phone] Patient: Andi Grajeda    YOB: 1980     Admit Date: 12/23/2020 Date- 1/4/2021 CC: Follow up for acute kidney injury, hypernatremia IMPRESSION & PLAN:  
? Acute kidney injury-likely pre-renal + Vanc toxicity--improving ? Sepsis-gram-negative shock ? Hypernatremia ? Anemia ? Lactic acidosis ? Bacterial pneumonia ? Fecal impaction ? Respiratory failure-on vent ? History of heart failure ? Atrophic right kidney ? Down syndrome with autism PLAN- 
? Continue half-normal saline ? Avoid hypotension ? Follow BMP Subjective: Interval History:  
-Sodium normal 
Creatinine continue to improve Urine output 292 5 mL Patient on vent Objective:  
Vitals:  
 01/04/21 0500 01/04/21 0600 01/04/21 0636 01/04/21 0700 BP: (!) 104/50 (!) 99/54  (!) 108/52 Pulse: 70 (!) 51 (!) 50 69 Resp: 22 18 18 20 Temp:      
SpO2: 97% 98% 98% 97% Weight:      
Height:      
  
01/03 0701 - 01/04 0700 In: 1041.1 [I.V.:1041.1] Out: 2869 [Urine:2925] Last 3 Recorded Weights in this Encounter 12/31/20 1727 01/01/21 1750 01/03/21 1252 Weight: 86.2 kg (190 lb 0.6 oz) 84.5 kg (186 lb 4.6 oz) 88.4 kg (194 lb 14.2 oz) Physical exam:  
GEN: intubated on vent NECK-no mass, ET TUBE 
RESP: Clear b/l, no wheezing CVS: RRR,S1,S2 ABDO: soft , non tender, NEURO:Can't access due to patient's current condition  : Dutta + Chart reviewed. Pertinent Notes reviewed. Data Review : 
Recent Labs 01/04/21 
8748 01/03/21 
0327 01/02/21 
0405 01/01/21 
1344  140 146* 150*  
K 4.2 3.9 4.0 4.4  105 113* 114* CO2 26 29 31 34* BUN 50* 61* 80* 91* CREA 2.25* 2.68* 3.32* 3.78* GLU 83 99 106* 95  
CA 9.1 9.1 10.0 10.3* MG  --   --   --  2.8* Recent Labs 01/04/21 
5786 01/03/21 
0327 01/02/21 
0405 WBC 16.2* 15.4* 13.9*  
 HGB 8.0* 7.6* 7.2* HCT 24.4* 22.9* 21.9*  
 289 289 No results for input(s): FE, TIBC, PSAT, FERR in the last 72 hours. Medication list  reviewed Current Facility-Administered Medications Medication Dose Route Frequency  0.45% sodium chloride infusion  75 mL/hr IntraVENous CONTINUOUS  
 lactulose (CHRONULAC) 10 gram/15 mL solution 15 mL  10 g Oral TID  
 0.9% sodium chloride infusion 250 mL  250 mL IntraVENous PRN  
 dexmedeTOMidine (PRECEDEX) 400 mcg in 0.9% sodium chloride 100 mL infusion  0.1-1.5 mcg/kg/hr IntraVENous TITRATE  famotidine (PEPCID) tablet 20 mg  20 mg Per NG tube DAILY  risperiDONE (RisperDAL) tablet 1 mg  1 mg Per NG tube QHS  polyethylene glycol (MIRALAX) packet 17 g  17 g Per G Tube DAILY  levothyroxine (SYNTHROID) tablet 50 mcg  50 mcg Per NG tube DAILY  levothyroxine tube feed reminder note  1 Each Other BID  heparin (porcine) injection 5,000 Units  5,000 Units SubCUTAneous Q8H  
 balsam peru-castor oiL (VENELEX) ointment   Topical BID  alcohol 62% (NOZIN) nasal  1 Ampule  1 Ampule Topical Q12H  
 dextrose (D50W) injection syrg 12.5-25 g  25-50 mL IntraVENous PRN  
 insulin lispro (HUMALOG) injection   SubCUTAneous Q6H  
 acetaminophen (TYLENOL) tablet 650 mg  650 mg Per G Tube Q6H PRN Or  
 acetaminophen (TYLENOL) suppository 650 mg  650 mg Rectal Q6H PRN  chlorhexidine (ORAL CARE KIT) 0.12 % mouthwash 15 mL  15 mL Oral Q12H  
 heparin (porcine) pf 300 Units  300 Units InterCATHeter PRN  
 sodium chloride (NS) flush 5-40 mL  5-40 mL IntraVENous Q8H  
 sodium chloride (NS) flush 5-40 mL  5-40 mL IntraVENous PRN  
 ondansetron (ZOFRAN) injection 4 mg  4 mg IntraVENous Q6H PRN Arvilla Levers, [address] [employer]  [facility] [employer] [address] [place], [address] Phone - [phone]               Fax - [phone]

## 2021-01-04 NOTE — PROGRESS NOTES
Progress Note [facility] Patient: Oma Mott MRN: [id number]  SSN: [id number] YOB: 1980  Age: 47 y.o. Sex: female Admit Date: 12/23/2020 LOS: 12 days Subjective:  
 
Principal Problem: 
  Lactic acidosis 
  Large hiatal hernia 
  Post viral syndrome 
  Fecal impaction of colon 
  Gram negative septic shock 
  Dehydration with hypernatremia 
  Acute hypoxemic respiratory failure 
  Severe sepsis with acute organ dysfunction 
  Constipation due to pain medication therapy 
  Aspiration pneumonia of both lungs due to vomit 
  Pneumonia due to COVID-19 virus, resolved (Nov/2020) 
  Acute renal failure with acute renal cortical necrosis superimposed on stage 4 chronic kidney disease 
  Kidney disease, chronic, stage IV (severe, EGFR 15-29 ml/min) 
  HFrEF (heart failure with reduced ejection fraction) 
  Goals of care, counseling/discussion 
  Acquired hypothyroidism 
  Down syndrome Acute events over past 24 hours: no acute events overnight Objective:  
 
Vitals:  
 01/04/21 1100 01/04/21 1133 01/04/21 1200 01/04/21 1300 BP: (!) 104/51  113/63 (!) 107/52 Pulse: 61 61 (!) 48 60 Resp: 18 19 9 18 Temp:      
SpO2: 97% 97% 99% 98% Weight:      
Height:      
  
 
Intake and Output: 
Current Shift: 01/04 0701 - 01/04 1900 In: 837.6 [I.V.:837.6] Out: 950 [Urine:800] Last three shifts: 01/02 1901 - 01/04 0700 In: 3530.5 [I.V.:3530.5] Out: Hailey Rodriguez [CRDJT:5304] Physical Exam:  
GENERAL: uncooperative, no distress, slowed mentation, appears stated age, appears older than stated age, intubated and sedated THROAT & NECK: normal and neck supple and symmetrical.  intubated ETT in place LUNG: clear to auscultation bilaterally, diminished breath sounds R base, L base HEART: regular rate and rhythm ABDOMEN: soft, non-tender. Bowel sounds normal. No masses,  no organomegaly EXTREMITIES:  extremities normal, atraumatic, no cyanosis or edema, no edema, redness or tenderness in the calves or thighs PSYCHIATRIC: non responsive (baseline cognition poor per report) Lab/Data Review: All lab results for the last 24 hours reviewed. Assessment:  
 
Principal Problem: 
  Aspiration pneumonia of both lungs due to vomit ([address] (12/23/2020) Active Problems: 
  Acute hypoxemic respiratory failure ([address] (12/24/2020) Post viral syndrome (12/25/2020) Dehydration with hypernatremia (12/16/2020) Kidney disease, chronic, stage IV (severe, EGFR 15-29 ml/min) ([facility]) (12/23/2020) Gram negative septic shock ([address] (1/23/2020) Lactic acidosis (12/23/2020) Fecal impaction of colon ([address] (12/23/2020) Large hiatal hernia (11/2/2020) Overview: ([id number])- CT Abdomen: 
    Distended fluid-filled esophagus and large hiatus hernia. Constipation due to pain medication therapy (11/26/2020) Severe sepsis with acute organ dysfunction ([address] (12/23/2020) Respiratory failure ([address] (12/23/2020) HFrEF (heart failure with reduced ejection fraction) ([address] (12/24/2020) Down syndrome (12/23/2020) Acquired hypothyroidism (12/24/2020) Goals of care, counseling/discussion (12/28/2020) Acute renal failure with acute renal cortical necrosis superimposed on stage 4 chronic kidney disease ([address] (12/23/2020) ICU Comprehensive Plan of Care:  
 
Plans for this Shift:  
1. Sepsis with acute organ dysfunction due to aspiration bilateral bacterial pneumonia- continue empiric IV antibiotics (meropenem) aggressive pulmonary toilet.  Covid pneumonia resolved, with post viral bilateral bacterial pneumonia. 
  
2.  DANY superimposed on CKD with severe hypernatremic dehydration and nonoliguric ATN- monitor daily labs, continue volume resuscitation 
  
 Fecal impaction of rectum and ascending colon- holding enteral feeds. Finally had large BM yesterday, will recheck KUB today. May start entral feeding. Start reglan 5mg TID. ABCDEF Bundle/Checklist 
Pain Medications: Fentanyl Target RASS: 0 - Alert & Calm - Spontaneously pays attention to caregiver Sedation Medications: None CAM-ICU:  Negative Discussed Plan of Care (goals of care): Yes Addressed Code Status: Do Not Resuscitate 
  
CARDIOVASCULAR Cardiac Gtts: None SBP Goal of: > 90 mmHg MAP Goal of: > 65 mmHg Transfusion Trigger (Hgb): <7 g/dL 
  
RESPIRATORY Vent Goals:  
Head of bed > 30 degrees Aggressive bronchopulmonary hygiene DVT Prophylaxis (if no, list reason): SCD's or Sequential Compression Device and Lovenox  
SPO2 Goal: > 92% 
  
GI/ Dutta Catheter Present: Yes GI Prophylaxis: Pepcid (famotidine)  
Nutrition: No NPO Bowel Movement: Yes Bowel Regimen: MiraLax, Milk of magnesia and Enema 
  
ANTIBIOTICS Antibiotics: 
Meropenem 
  
T/L/D Tubes: ETT and Nasogastric Tube Lines: Peripheral IV and None Drains: Dutta Catheter 
  
SPECIAL EQUIPMENT None 
  
DISPOSITION Stay in ICU 
  
CRITICAL CARE CONSULTANT NOTE I provided a face-to-face bedside physician/patient encounter. 
  
I reviewed and interpreted patient data including clinical events, labs, images, vital signs, I/O's, and examined patient.   
  
 
  
NOTE OF PERSONAL INVOLVEMENT IN CARE  
This patient has a high probability of imminent, clinically significant deterioration, which requires the highest level of preparedness to intervene urgently.  I participated in the decision-making and personally managed, or directed the management of, a myriad of life and organ supporting interventions which required my frequent-interval clinical reassessments, in order to treat or prevent imminent deterioration. 
  
 I personally spent 35 minutes of critical care time.  This is time spent at this critically ill patient's bedside actively involved in patient care as well as the coordination of care.  This does not include any procedural time which has been billed separately. Signed By: Jo Ferrell MD   
 January 4, 2021

## 2021-01-04 NOTE — PROGRESS NOTES
Palliative Medicine Consult [place]: [phone] ([phone]) Patient Name: Minda Hahn YOB: 1980 Date of Initial Consult: 12/28/2020 Reason for Consult: care decisions Requesting Provider: Carmel Bautista MD  
Primary Care Physician: Diego, MD Kilo 
 
 SUMMARY:  
Minda Hahn is a 47 y.o. with a past history of  Down's syndrome, hypothyroidism, obesity (BMI 30), CKD3, CHF (ECHO 2018 mod diffuse hypokinesis, EF 30%)  large hiatal hernia,, who was admitted on 12/23/2020 from home with a diagnosis of COVID PNA. Current medical issues leading to Palliative Medicine involvement include: ongoing decline from prolonged COVID infection (recent admission 11/8 to 11/16/20 for covid pna and again 11/19 to 12/6/20 for sepsis due to covid) during which she was seen by Palliative Medicine in Nov 2020. 
 
12/28: remains intubated, 40%, PEEP 6. Levophed 8 mcg/min. Worsening renal failure. 12/29: remains intubated, 35%, PEEP 5. Levophed 2 mcg/min. Cr stable at 4.09.  
1/4:     remains intubated, 40%, PEEP 5. Levophed 2 mcg/min. Cr down to 2.25. Psychosocial: lives with and is cared for by mother Richa Pearce [phone]. PALLIATIVE DIAGNOSES:  
1. Acute respiratory failure due to COVID-19 pneumonia 2. Morbid obesity BMI 30 
3. Large hiatal hernia 4. Dysphagia, aspiration risk, on modified diet 5. Debility 6. High risk for dying from COVID due to underlying CHF, obesity 7. Hypothermic (12/27) 8. Renal failure (Cr 4.18>4.09) 9. Care decisions PLAN:  
1. Prior to visit, I completed a review of patient's medical records, including medical documentation, vital signs, MARs, and results of various labs and other diagnostics. I also spoke with patient's nurse, Ian Moody and intensivist Dr. Andrew Peralta. 2. Spoke with mother: discussed plan to extubate in next few days, just waiting to ensure bowel issues have resolved to improve successful extubation as plan is for one-way extubation. Also discussed improving renal function. Answered all mom's questions. 3. Initial consult note routed to primary continuity provider and/or primary health care team members 4. Communicated plan of care with: Palliative IDTAlber 192 Team 
 
 GOALS OF CARE / TREATMENT PREFERENCES:  
 
GOALS OF CARE: 
Patient/Health Care Proxy Stated Goals: Prolong life TREATMENT PREFERENCES:  
Code Status: DNR Advance Care Planning: 
[x] The [facility] Interdisciplinary Team has updated the ACP Navigator with [place] and Patient Capacity Primary Decision Maker (Active): FanninAXON [employer] - Jacinta Oneill - [phone] Secondary Decision Maker: Francie Cueva - [phone] Advance Care Planning 12/28/2020 Patient's Healthcare Decision Maker is: Legal Next of Kin Confirm Advance Directive None Patient Would Like to Complete Advance Directive Unable Medical Interventions: Full interventions Other: As far as possible, the palliative care team has discussed with patient / health care proxy about goals of care / treatment preferences for patient. HISTORY:  
 
History obtained from: chart, Redell Hoit CHIEF COMPLAINT: SOB, fever HPI/SUBJECTIVE: The patient is:  
[] Verbal and participatory [x] Non-participatory due to: intubation 12/23: presented to [facility] ED for fever, c/o not feeling well and cough, with associated loss of appetite and very productive cough; this is her 3rd admission for  COVID PNA. While in the ED pt became tachypneic with sats dropping to 92% and placed on 100% NRB with sats 96-96%; her respiratory status continued to decline, requiring bipap, then intubation. hospitalist was not comfortable keeping pt at [facility] and had her transferred to [facility]. Clinical Pain Assessment (nonverbal scale for severity on nonverbal patients):  
Clinical Pain Assessment Severity: 0 Activity (Movement): Lying quietly, normal position Duration: for how long has pt been experiencing pain (e.g., 2 days, 1 month, years) Frequency: how often pain is an issue (e.g., several times per day, once every few days, constant) FUNCTIONAL ASSESSMENT:  
 
Palliative Performance Scale (PPS): PPS: 10 PSYCHOSOCIAL/SPIRITUAL SCREENING:  
 
Palliative IDT has assessed this patient for cultural preferences / practices and a referral made as appropriate to needs (Cultural Services, Patient Advocacy, Ethics, etc.) Any spiritual / Jainism concerns: 
[] Yes /  [x] No 
 
Caregiver Burnout: 
[] Yes /  [x] No /  [] No Caregiver Present Anticipatory grief assessment:  
[x] Normal  / [] Maladaptive ESAS Anxiety: Anxiety: 0 
 
ESAS Depression:   unable to assess due to pt factors REVIEW OF SYSTEMS:  
 
Positive and pertinent negative findings in ROS are noted above in HPI. The following systems were [x] reviewed / [] unable to be reviewed as noted in HPI Other findings are noted below. Systems: constitutional, ears/nose/mouth/throat, respiratory, gastrointestinal, genitourinary, musculoskeletal, integumentary, neurologic, psychiatric, endocrine. Positive findings noted below. Modified ESAS Completed by: provider Pain: 0 Anxiety: 0 Dyspnea: 0 PHYSICAL EXAM:  
 
From RN flowsheet: 
Wt Readings from Last 3 Encounters:  
01/03/21 194 lb 14.2 oz (88.4 kg) 12/23/20 191 lb (86.6 kg) 12/04/20 191 lb (86.6 kg) Blood pressure (!) 104/54, pulse (!) 44, temperature 97.7 °F (36.5 °C), resp. rate 20, height 5' 3\" (1.6 m), weight 194 lb 14.2 oz (88.4 kg), SpO2 99 %. Pain Scale 1: Behavioral Pain Scale (BPS) Pain Intensity 1: 3 Last bowel movement, if known:  
 
Constitutional: intubated In order to limit the exposure risk from COVID 19 and to preserve the hospital's limited supply of PPEs, seen through ICU window. Intubated. On levo 8. I spoke with her ICU nurse. HISTORY:  
 
Principal Problem: 
  Aspiration pneumonia of both lungs due to vomit ([address] (12/23/2020) Active Problems: 
  Acute hypoxemic respiratory failure ([address] (12/24/2020) Post viral syndrome (12/25/2020) Dehydration with hypernatremia (12/16/2020) Kidney disease, chronic, stage IV (severe, EGFR 15-29 ml/min) ([facility]) (12/23/2020) Gram negative septic shock ([address] (1/23/2020) Lactic acidosis (12/23/2020) Fecal impaction of colon ([address] (12/23/2020) Large hiatal hernia (11/2/2020) Overview: ([id number])- CT Abdomen: 
    Distended fluid-filled esophagus and large hiatus hernia. Constipation due to pain medication therapy (11/26/2020) Severe sepsis with acute organ dysfunction ([address] (12/23/2020) Respiratory failure ([address] (12/23/2020) HFrEF (heart failure with reduced ejection fraction) ([address] (12/24/2020) Down syndrome (12/23/2020) Acquired hypothyroidism (12/24/2020) Goals of care, counseling/discussion (12/28/2020) Acute renal failure with acute renal cortical necrosis superimposed on stage 4 chronic kidney disease ([address] (12/23/2020) Past Medical History:  
Diagnosis Date  Endocrine disease  Gram negative septic shock ([address] 1/23/2020  Hypothyroid 03/11/2018  Ill-defined condition Down's Syndrome  Lactic acidosis 12/23/2020 No past surgical history on file. No family history on file. History reviewed, no pertinent family history. Social History Tobacco Use  Smoking status: Never Smoker  Smokeless tobacco: Never Used Substance Use Topics  Alcohol use: No  
 
No Known Allergies Current Facility-Administered Medications Medication Dose Route Frequency  metoclopramide HCl (REGLAN) injection 5 mg  5 mg IntraVENous Q8H  
 0.45% sodium chloride infusion  75 mL/hr IntraVENous CONTINUOUS  
 lactulose (CHRONULAC) 10 gram/15 mL solution 15 mL  10 g Oral TID  
 0.9% sodium chloride infusion 250 mL  250 mL IntraVENous PRN  
 dexmedeTOMidine (PRECEDEX) 400 mcg in 0.9% sodium chloride 100 mL infusion  0.1-1.5 mcg/kg/hr IntraVENous TITRATE  famotidine (PEPCID) tablet 20 mg  20 mg Per NG tube DAILY  risperiDONE (RisperDAL) tablet 1 mg  1 mg Per NG tube QHS  polyethylene glycol (MIRALAX) packet 17 g  17 g Per G Tube DAILY  levothyroxine (SYNTHROID) tablet 50 mcg  50 mcg Per NG tube DAILY  levothyroxine tube feed reminder note  1 Each Other BID  heparin (porcine) injection 5,000 Units  5,000 Units SubCUTAneous Q8H  
 balsam peru-castor oiL (VENELEX) ointment   Topical BID  alcohol 62% (NOZIN) nasal  1 Ampule  1 Ampule Topical Q12H  
 dextrose (D50W) injection syrg 12.5-25 g  25-50 mL IntraVENous PRN  
 insulin lispro (HUMALOG) injection   SubCUTAneous Q6H  
 acetaminophen (TYLENOL) tablet 650 mg  650 mg Per G Tube Q6H PRN Or  
 acetaminophen (TYLENOL) suppository 650 mg  650 mg Rectal Q6H PRN  chlorhexidine (ORAL CARE KIT) 0.12 % mouthwash 15 mL  15 mL Oral Q12H  
 heparin (porcine) pf 300 Units  300 Units InterCATHeter PRN  
 sodium chloride (NS) flush 5-40 mL  5-40 mL IntraVENous Q8H  
 sodium chloride (NS) flush 5-40 mL  5-40 mL IntraVENous PRN  
 ondansetron (ZOFRAN) injection 4 mg  4 mg IntraVENous Q6H PRN  
 
 
 
 LAB AND IMAGING FINDINGS:  
 
Lab Results Component Value Date/Time WBC 16.2 (H) 01/04/2021 04:33 AM  
 HGB 8.0 (L) 01/04/2021 04:33 AM  
 PLATELET 795 63/62/9279 04:33 AM  
 
Lab Results Component Value Date/Time  Sodium 139 01/04/2021 04:33 AM  
 Potassium 4.2 01/04/2021 04:33 AM  
 Chloride 106 01/04/2021 04:33 AM  
 CO2 26 01/04/2021 04:33 AM  
 BUN 50 (H) 01/04/2021 04:33 AM  
 Creatinine 2.25 (H) 01/04/2021 04:33 AM  
 Calcium 9.1 01/04/2021 04:33 AM  
 Magnesium 2.8 (H) 01/01/2021 01:44 PM  
 Phosphorus 5.4 (H) 12/31/2020 04:01 AM  
  
Lab Results Component Value Date/Time Alk. phosphatase 31 (L) 01/03/2021 03:27 AM  
 Protein, total 6.5 01/03/2021 03:27 AM  
 Albumin 3.5 01/03/2021 03:27 AM  
 Globulin 3.0 01/03/2021 03:27 AM  
 
Lab Results Component Value Date/Time INR 1.1 11/20/2020 01:57 AM  
 Prothrombin time 11.8 (H) 11/20/2020 01:57 AM  
 aPTT 40.0 (H) 12/28/2020 04:51 AM  
  
Lab Results Component Value Date/Time Iron 65 12/04/2020 03:11 AM  
 TIBC 279 12/04/2020 03:11 AM  
 Iron % saturation 23 12/04/2020 03:11 AM  
 Ferritin 555 (H) 12/28/2020 04:51 AM  
  
Lab Results Component Value Date/Time pH 7.33 (L) 12/24/2020 05:50 AM  
 PCO2 38 12/24/2020 05:50 AM  
 PO2 80 12/24/2020 05:50 AM  
 
No components found for: [place] Lab Results Component Value Date/Time  (H) 11/11/2020 01:00 AM  
  
 
 
   
 
Total time:  
Counseling / coordination time, spent as noted above:  
> 50% counseling / coordination?:  
 
Prolonged service was provided for  []30 min   []75 min in face to face time in the presence of the patient, spent as noted above. Time Start:  
Time End:  
Note: this can only be billed with 37539 (initial) or 99843 (follow up). If multiple start / stop times, list each separately.

## 2021-01-04 NOTE — PROGRESS NOTES
0700: Bedside shift change report received from Dena Epps, [address] [place] [address] (offgoing nurse). Report included the following information SBAR, Kardex, ED Summary, Procedure Summary, Intake/Output, MAR and Recent Results. 0800: Shift assessment completed, see flowsheets 0830: Precedex titrated to maintain appropriate sedation, see MAR for titrations 2178: All PO medications held d/t increased OGT output 1030: IDR SUMMARY: MD notified of increased OGT output, new orders received for KUB and clamping trials. 1200: Reassessment completed, see flowsheets 1505: KUB completed at bedside 
 
1600: Reassessment completed, see flowsheets 1630: Dr. Kenia Dawkins notified of KUB results, verbal orders received for general surgery consult, hold all oral intake and maintain continuous suction -- orders placed  
 
1900: Bedside shift change report given to Dena Epps RN (oncoming nurse) by Chantal Vo RN (offgoing nurse). Report included the following information SBAR, Kardex, ED Summary, Procedure Summary, Intake/Output, MAR and Recent Results.

## 2021-01-05 NOTE — PROGRESS NOTES
Comprehensive Nutrition Assessment Type and Reason for Visit: Jena Zamarripa Nutrition Recommendations/Plan:  
Determine nutrition support plan in 48-72h if SBO/ileus persists Nutrition Assessment:   Chart reviewed, case discussed during CCU rounds. Pt remains intubated and sedated with precedex. TF has been on hold for several days (since at least the weekend) 2' fecal impacting. Imaging now points towards more likely SBO, surgeon is following. Will likely need to determine nutritional plan of care in the next few days as TF has been on hold since late last week. K 4.7 today and phos 4.9. Estimated Daily Nutrient Needs: 
Energy (kcal): PSU 2980 (MSJ 7761); Weight Used for Energy Requirements: Current Protein (g): 84-126g (1-1.5gPro/kg); Weight Used for Protein Requirements: Current Fluid (ml/day): per MD; Method Used for Fluid Requirements: 1 ml/kcal 
 
 
Nutrition Related Findings:  Meds: dulcolax, pepcid, humalog, lactulose, reglan, synthroid, miralax, [email or web address]; Drips: precedex. Edema: +2-generalized, +2 pitting-all extremities. BM 1/3 Wounds:   
Deep tissue injury, Skin tears (DTI-cheeks) Current Nutrition Therapies: 
No diet orders on file Anthropometric Measures: 
· Height:  5' 3\" (160 cm) · Current Body Wt:  86.2 kg (190 lb 0.6 oz) · Ideal Body Wt:  115 lbs:  161 % · BMI Category:  Obese class 1 (BMI 30.0-34. 9) Nutrition Diagnosis:  
· Inadequate protein-energy intake related to impaired respiratory function as evidenced by other (specify)(trophic TF order) Previous dx continues. Nutrition Interventions:  
Food and/or Nutrient Delivery: Start tube feeding, Start parenteral nutrition Nutrition Education and Counseling: No recommendations at this time Coordination of Nutrition Care: Continue to monitor while inpatient, Interdisciplinary rounds Goals: 
Plan of care will be determined re: nutrition in 2-3 days. Nutrition Monitoring and Evaluation: Behavioral-Environmental Outcomes: None identified Food/Nutrient Intake Outcomes: IVF intake Physical Signs/Symptoms Outcomes: Biochemical data, Fluid status or edema, Nutrition focused physical findings, Weight, Hemodynamic status, GI status Discharge Planning: Too soon to determine Electronically signed by Josselyn Powers RD, [address]  on 1/5/2021 at 12:28 PM 
 
Contact: [phone]

## 2021-01-05 NOTE — PROGRESS NOTES
0:  SBAR from [address]. 
0800:  A miscommunication with Dr. Saul Ahuja happened, when he responded to a page for dental surgery, will page the on call from his office about the SBO consult. 1020:  GI surgeons visited the patient. An abdominal CT has been ordered. 1055: Interdisciplinary rounds conducted. 1124:  Velora Mail - the patients mother updated. 1927: End of Shift Note Bedside shift change report given to Rosalia Yuan RN (oncoming nurse) by Dani Dubois RN (offgoing nurse). Report included the following information SBAR, Kardex, Intake/Output, MAR, Recent Results and Cardiac Rhythm SB Shift worked:  7a-7p Shift summary and any significant changes:  
 None Concerns for physician to address: None Zone phone for oncoming shift:  None Activity: 
Activity Level: Bed Rest 
Number times ambulated in hallways past shift: 0 Number of times OOB to chair past shift: 0 Cardiac:  
Cardiac Monitoring: Yes     
Cardiac Rhythm: Sinus bradycardia Access:  
Current line(s): central line Genitourinary:  
Urinary status: mejia Respiratory:  
O2 Device: Endotracheal tube, Ventilator Chronic home O2 use?: NO Incentive spirometer at bedside: N/A 
  
GI: 
Last Bowel Movement Date: 01/03/21 Current diet:  No diet orders on file Passing flatus: NO Tolerating current diet: YES Pain Management:  
Patient states pain is manageable on current regimen: N/A Skin: 
Jai Score: 14 Interventions: turn team, speciality bed, float heels, limit briefs, internal/external urinary devices and nutritional support Patient Safety: 
Fall Score: Total Score: 3 Interventions: bed/chair alarm High Fall Risk: Yes Length of Stay: 
Expected LOS: 12d 7h Actual LOS: 13 Dani Dubois RN

## 2021-01-05 NOTE — PROGRESS NOTES
Patient transported to CT scan on transport vent. Returned to CCU without incident. Returned to 840 vent on previous settings.

## 2021-01-05 NOTE — PROGRESS NOTES
SURGERY ADDENDUM 
 
CT scan completed. Images reviewed. Colon is mildly dilated and air-filled. Contrast has made it to the rectum. Findings consistent with a mild ileus secondary to prolonged bedrest.  
 
Recommend continuing bowel regimen. No evidence of SBO Thank you this consult. I will sign off.

## 2021-01-05 NOTE — PROGRESS NOTES
Interdisciplinary team rounds were held 1/5/2021 with the following team members:Care Management, Diabetes Treatment Specialist, Nursing, Nutrition, Palliative Care, Pharmacy, Physical Therapy, Respiratory therapy and Physician. Plan of care discussed. Goal: See MD orders and progress notes for further  interventions and desired outcomes.

## 2021-01-05 NOTE — PROGRESS NOTES
Nephrology Progress Note Edm[facility]  
 
[facility]. [email or web address]  Phone - [phone] Patient: Kirill Graham    YOB: 1980     Admit Date: 12/23/2020 Date- 1/5/2021 CC: Follow up for DANY IMPRESSION & PLAN:  
? dany -likely pre-renal + Vanc toxicity--improving ? Sepsis-gram-negative shock ? Hypernatremia ? Anemia ? Lactic acidosis ? Protein malnutrition ? Bacterial pneumonia ? Fecal impaction ? Respiratory failure-on vent ? History of heart failure ? Atrophic right kidney ? Down syndrome with autism PLAN- 
? Continue current ivf half-normal saline ? Avoid hypotension ? Follow BMP 
? Continue tube feed Subjective: Interval History:  
-on vent Creatinine continue to improve- cr 1.8 <---2.2 Urine output 3150 mL 
bp on low side Objective:  
Vitals:  
 01/05/21 0400 01/05/21 0500 01/05/21 0600 01/05/21 0700 BP: (!) 103/49  (!) 99/52 (!) 83/66 Pulse: 62 63 60 68 Resp: 19 20 19 21 Temp:      
SpO2: 98% 98% 97% 96% Weight:      
Height:      
  
01/04 0701 - 01/05 0700 In: 3123.1 [I.V.:3123.1] Out: 3300 [XEYDT:7135] Last 3 Recorded Weights in this Encounter 01/01/21 1750 01/03/21 1252 01/04/21 2213 Weight: 84.5 kg (186 lb 4.6 oz) 88.4 kg (194 lb 14.2 oz) 86.3 kg (190 lb 4.1 oz) Physical exam:  
GEN: intubated on vent NECK-no mass, ET TUBE 
RESP:clear b/l, no wheezing CVS: RRR,S1,S2 ABDO: soft , non tender, NEURO:Can't access due to patient's current condition  : mejia + Chart reviewed. Pertinent Notes reviewed. Data Review : 
Recent Labs 01/05/21 
6839 01/04/21 
9338 01/03/21 
0327  139 140  
K 4.7 4.2 3.9 * 106 105 CO2 24 26 29 BUN 46* 50* 61* CREA 1.86* 2.25* 2.68* GLU 82 83 99  
CA 9.2 9.1 9.1 PHOS 4.9*  --   --   
 
Recent Labs 01/05/21 
5679 01/04/21 
1798 01/03/21 
0327 WBC 17.5* 16.2* 15.4* HGB 8.1* 8.0* 7.6* HCT 24.3* 24.4* 22.9*  
  291 289 No results for input(s): FE, TIBC, PSAT, FERR in the last 72 hours. Medication list  reviewed Current Facility-Administered Medications Medication Dose Route Frequency  0.45% sodium chloride infusion  75 mL/hr IntraVENous CONTINUOUS  
 lactulose (CHRONULAC) 10 gram/15 mL solution 15 mL  10 g Oral TID  
 0.9% sodium chloride infusion 250 mL  250 mL IntraVENous PRN  
 dexmedeTOMidine (PRECEDEX) 400 mcg in 0.9% sodium chloride 100 mL infusion  0.1-1.5 mcg/kg/hr IntraVENous TITRATE  famotidine (PEPCID) tablet 20 mg  20 mg Per NG tube DAILY  risperiDONE (RisperDAL) tablet 1 mg  1 mg Per NG tube QHS  polyethylene glycol (MIRALAX) packet 17 g  17 g Per G Tube DAILY  levothyroxine (SYNTHROID) tablet 50 mcg  50 mcg Per NG tube DAILY  levothyroxine tube feed reminder note  1 Each Other BID  heparin (porcine) injection 5,000 Units  5,000 Units SubCUTAneous Q8H  
 balsam peru-castor oiL (VENELEX) ointment   Topical BID  alcohol 62% (NOZIN) nasal  1 Ampule  1 Ampule Topical Q12H  
 dextrose (D50W) injection syrg 12.5-25 g  25-50 mL IntraVENous PRN  
 insulin lispro (HUMALOG) injection   SubCUTAneous Q6H  
 acetaminophen (TYLENOL) tablet 650 mg  650 mg Per G Tube Q6H PRN Or  
 acetaminophen (TYLENOL) suppository 650 mg  650 mg Rectal Q6H PRN  chlorhexidine (ORAL CARE KIT) 0.12 % mouthwash 15 mL  15 mL Oral Q12H  
 heparin (porcine) pf 300 Units  300 Units InterCATHeter PRN  
 sodium chloride (NS) flush 5-40 mL  5-40 mL IntraVENous Q8H  
 sodium chloride (NS) flush 5-40 mL  5-40 mL IntraVENous PRN  
 ondansetron (ZOFRAN) injection 4 mg  4 mg IntraVENous Q6H PRN Ayleen White, [address]  [facility] [employer] [address] [place], [address] Phone - [phone]               Fax - [phone]

## 2021-01-05 NOTE — PROGRESS NOTES
[address] [facility] Adult  Hospitalist Group Critical Care Progress Note Sreekanth Webster MD 
Answering service: [phone] OR [phone] from in house phone Date of Service:  2021 NAME:  Ángela Cornelius :  1980 MRN:  [id number] Interval history / Subjective: On the vent, fio2 at 40%, no vasopressors, on half NS. Will need CT abdomen rule out obstruction. Assessment & Plan:  
 
Principal Problem: 
  Aspiration pneumonia of both lungs due to vomit ([address] (2020) 
  Active Problems: 
  Acute hypoxemic respiratory failure ([address] (2020) 
  Post viral syndrome (2020) 
  Dehydration with hypernatremia (2020) 
  
  Kidney disease, chronic, stage IV (severe, EGFR 15-29 ml/min) ([facility]) (2020) 
  
  Gram negative septic shock ([address] (2020) 
  
  Lactic acidosis (2020) 
  Fecal impaction of colon ([address] (2020) 
  
  Large hiatal hernia (2020) Overview: ([id number])- CT Abdomen: 
    Distended fluid-filled esophagus and large hiatus hernia. 
  
  Constipation due to pain medication therapy (2020) 
  Severe sepsis with acute organ dysfunction ([address] (2020) 
  Respiratory failure ([address] (2020) 
  
  HFrEF (heart failure with reduced ejection fraction) ([address] (2020)   
  Down syndrome (2020) 
  Acquired hypothyroidism (2020) 
  
  Goals of care, counseling/discussion (2020) 
  Acute renal failure with acute renal cortical necrosis superimposed on stage 4 chronic kidney disease ([address] (2020)   
  
  
ICU Comprehensive Plan of Care:  
  
Plans for this Shift:  
1. Sepsis with acute organ dysfunction due to aspiration bilateral bacterial pneumonia- continue empiric IV antibiotics (meropenem) aggressive pulmonary toilet.  Covid pneumonia resolved, with post viral bilateral bacterial pneumonia.   
2. DANY superimposed on CKD with severe hypernatremic dehydration and nonoliguric ATN- monitor daily labs, continue volume resuscitation 
  
Fecal impaction of rectum and ascending colon- holding enteral feeds. Concern for SBO vs ileus. Abdomen overall benign. Will get a CT abdomen with PO contrast.  DW Gen 
Surg.  
  
ABCDEF Bundle/Checklist 
Pain Medications: Fentanyl Target RASS: 0 - Alert & Calm - Spontaneously pays attention to caregiver Sedation Medications: None CAM-ICU:  Negative Discussed Plan of Care (goals of care): Yes Addressed Code Status: Do Not Resuscitate 
  
CARDIOVASCULAR Cardiac Gtts: None SBP Goal of: > 90 mmHg MAP Goal of: > 65 mmHg Transfusion Trigger (Hgb): <7 g/dL 
  
RESPIRATORY Vent Goals:  
Head of bed > 30 degrees Aggressive bronchopulmonary hygiene DVT Prophylaxis (if no, list reason): SCD's or Sequential Compression Device and Lovenox  
SPO2 Goal: > 92% 
  
GI/ Dutta Catheter Present: Yes GI Prophylaxis: Pepcid (famotidine)  
Nutrition: No NPO Bowel Movement: Yes Bowel Regimen: MiraLax, Milk of magnesia and Enema 
  
ANTIBIOTICS Antibiotics: 
Meropenem 
  
T/L/D Tubes: ETT and Nasogastric Tube Lines: Peripheral IV and None Drains: Dutta Catheter 
  
SPECIAL EQUIPMENT None 
  
DISPOSITION Stay in ICU I personally spent  45    minutes of critical care time. This is time spent at this critically ill patient's bedside actively involved in patient care as well as the coordination of care and discussions with the patient's family. This does not include any procedural time which has been billed separately. Hospital Problems  Never Reviewed Codes Class Noted POA * (Principal) Aspiration pneumonia of both lungs due to vomit ([facility] ICD-10-CM: J69.0 ICD-9-CM: 507.0 Acute 12/23/2020 Yes Post viral syndrome ICD-10-CM: G93.3 ICD-9-CM: 780.79 Acute 12/25/2020 No  
   
 Acute hypoxemic respiratory failure (HCC) ICD-10-CM: J96.01 
 ICD-9-CM: 518.81 Acute 12/24/2020 Yes Kidney disease, chronic, stage IV (severe, EGFR 15-29 ml/min) (HCC) ICD-10-CM: N18.4 ICD-9-CM: 470. 4 Acute 12/23/2020 Yes Lactic acidosis ICD-10-CM: E87.2 ICD-9-CM: 276.2 Acute 12/23/2020 Yes Fecal impaction of colon ([facility] ICD-10-CM: K56.41 
ICD-9-CM: 560.32 Acute 12/23/2020 Yes Dehydration with hypernatremia (Chronic) ICD-10-CM: E87.0 ICD-9-CM: 276.0 Acute 12/16/2020 Yes Gram negative septic shock (HCC) ICD-10-CM: A41.50, R65.21 ICD-9-CM: 038.40, 995.92, 785.52 Acute 1/23/2020 Yes Constipation due to pain medication therapy ICD-10-CM: K59.03 
ICD-9-CM: 564.09, E947.9 Acute 11/26/2020 Yes Large hiatal hernia (Chronic) ICD-10-CM: K44.9 ICD-9-CM: 629. 3 Chronic 11/2/2020 Yes Overview Signed 1/1/2021  4:08 PM by Patricia Stuart MD  
  ([id number])- CT Abdomen: 
Distended fluid-filled esophagus and large hiatus hernia. Goals of care, counseling/discussion ICD-10-CM: Z71.89 ICD-9-CM: V65.49  12/28/2020 Yes HFrEF (heart failure with reduced ejection fraction) (HCC) (Chronic) ICD-10-CM: I50.20 ICD-9-CM: 428.20  12/24/2020 Yes Acquired hypothyroidism (Chronic) ICD-10-CM: E03.9 ICD-9-CM: 244.9  12/24/2020 Yes Severe sepsis with acute organ dysfunction (HCC) ICD-10-CM: A41.9, R65.20 ICD-9-CM: 038.9, 995.92 Acute 12/23/2020 Yes Respiratory failure ([facility] ICD-10-CM: J96.90 ICD-9-CM: 518.81  12/23/2020 Yes Down syndrome (Chronic) ICD-10-CM: Q90.9 ICD-9-CM: 758.0 End Stage 12/23/2020 Yes Acute renal failure with acute renal cortical necrosis superimposed on stage 4 chronic kidney disease (HCC) (Chronic) ICD-10-CM: N17.1, N18.4 ICD-9-CM: 584.6, 585.4 Acute 12/23/2020 Yes Review of Systems:  
Review of systems not obtained due to patient factors. Vital Signs:  
 Last 24hrs VS reviewed since prior progress note. Most recent are: 
Visit Vitals BP (!) 101/49 Pulse 65 Temp 97.6 °F (36.4 °C) Resp 16 Ht 5' 3\" (1.6 m) Wt 86.3 kg (190 lb 4.1 oz) SpO2 99% BMI 33.70 kg/m² Intake/Output Summary (Last 24 hours) at 1/5/2021 6931 Last data filed at 1/5/2021 9734 Gross per 24 hour Intake 2285.57 ml Output 2975 ml Net -689.43 ml Physical Examination:  
 
 
     
Constitutional:  sedated ENT:  Oral mucous moist, oropharynx benign. Resp:  CTA bilaterally, intubated. No wheezing/rhonchi/rales. No accessory muscle use CV:  Regular rhythm, normal rate, no murmurs, gallops, rubs GI:  Soft, non distended, non tender. normoactive bowel sounds, no hepatosplenomegaly Musculoskeletal:  No edema, warm, 2+ pulses throughout Neurologic:  Moves all extremities. AAOx3, CN II-XII reviewed Psych:  sedated Skin:  Good turgor, no rashes or ulcers Hematologic/Lymphatic/Immunlogic:  No jaundice nor lymph node swelling :  deferred Eyes:  PERRL, Sedated Data Review:  
 Review and/or order of clinical lab test 
 
 
Labs:  
 
Recent Labs 01/05/21 
8111 01/04/21 2036 WBC 17.5* 16.2* HGB 8.1* 8.0*  
HCT 24.3* 24.4*  
 291 Recent Labs 01/05/21 
4967 01/04/21 
5963 01/03/21 
0327  139 140  
K 4.7 4.2 3.9 * 106 105 CO2 24 26 29 BUN 46* 50* 61* CREA 1.86* 2.25* 2.68* GLU 82 83 99  
CA 9.2 9.1 9.1 PHOS 4.9*  --   --   
 
Recent Labs 01/05/21 
4732 01/03/21 
0327 ALT  --  24  
AP  --  31* TBILI  --  0.7 TP  --  6.5 ALB 3.0* 3.5 GLOB  --  3.0 No results for input(s): INR, PTP, APTT, INREXT in the last 72 hours. No results for input(s): FE, TIBC, PSAT, FERR in the last 72 hours. Lab Results Component Value Date/Time Folate 18.2 12/04/2020 03:11 AM  
  
No results for input(s): PH, PCO2, PO2 in the last 72 hours. No results for input(s): CPK, CKNDX, TROIQ in the last 72 hours. No lab exists for component: CPKMB No results found for: CHOL, CHOLX, CHLST, CHOLV, HDL, HDLP, LDL, LDLC, DLDLP, TGLX, TRIGL, TRIGP, CHHD, CHHDX Lab Results Component Value Date/Time Glucose (POC) 87 01/05/2021 12:11 AM  
 Glucose (POC) 90 01/04/2021 05:29 PM  
 Glucose (POC) 104 (H) 01/04/2021 12:59 PM  
 Glucose (POC) 99 01/04/2021 12:52 AM  
 Glucose (POC) 85 01/03/2021 01:00 PM  
 
Lab Results Component Value Date/Time Color YELLOW/STRAW 12/30/2020 05:37 PM  
 Appearance TURBID (A) 12/30/2020 05:37 PM  
 Specific gravity 1.012 12/30/2020 05:37 PM  
 Specific gravity 1.010 11/20/2020 01:57 AM  
 pH (UA) 5.0 12/30/2020 05:37 PM  
 Protein 30 (A) 12/30/2020 05:37 PM  
 Glucose Negative 12/30/2020 05:37 PM  
 Ketone Negative 12/30/2020 05:37 PM  
 Bilirubin Negative 12/30/2020 05:37 PM  
 Urobilinogen 0.2 12/30/2020 05:37 PM  
 Nitrites Negative 12/30/2020 05:37 PM  
 Leukocyte Esterase SMALL (A) 12/30/2020 05:37 PM  
 Epithelial cells FEW 12/30/2020 05:37 PM  
 Bacteria Negative 12/30/2020 05:37 PM  
 WBC 20-50 12/30/2020 05:37 PM  
 RBC 5-10 12/30/2020 05:37 PM  
 
 
 
Medications Reviewed:  
 
Current Facility-Administered Medications Medication Dose Route Frequency  0.45% sodium chloride infusion  75 mL/hr IntraVENous CONTINUOUS  
 lactulose (CHRONULAC) 10 gram/15 mL solution 15 mL  10 g Oral TID  
 0.9% sodium chloride infusion 250 mL  250 mL IntraVENous PRN  
 dexmedeTOMidine (PRECEDEX) 400 mcg in 0.9% sodium chloride 100 mL infusion  0.1-1.5 mcg/kg/hr IntraVENous TITRATE  famotidine (PEPCID) tablet 20 mg  20 mg Per NG tube DAILY  risperiDONE (RisperDAL) tablet 1 mg  1 mg Per NG tube QHS  polyethylene glycol (MIRALAX) packet 17 g  17 g Per G Tube DAILY  levothyroxine (SYNTHROID) tablet 50 mcg  50 mcg Per NG tube DAILY  levothyroxine tube feed reminder note  1 Each Other BID  heparin (porcine) injection 5,000 Units  5,000 Units SubCUTAneous Q8H  
 balsam peru-castor oiL (VENELEX) ointment   Topical BID  
  alcohol 62% (NOZIN) nasal  1 Ampule  1 Ampule Topical Q12H  
 dextrose (D50W) injection syrg 12.5-25 g  25-50 mL IntraVENous PRN  
 insulin lispro (HUMALOG) injection   SubCUTAneous Q6H  
 acetaminophen (TYLENOL) tablet 650 mg  650 mg Per G Tube Q6H PRN Or  
 acetaminophen (TYLENOL) suppository 650 mg  650 mg Rectal Q6H PRN  chlorhexidine (ORAL CARE KIT) 0.12 % mouthwash 15 mL  15 mL Oral Q12H  
 heparin (porcine) pf 300 Units  300 Units InterCATHeter PRN  
 sodium chloride (NS) flush 5-40 mL  5-40 mL IntraVENous Q8H  
 sodium chloride (NS) flush 5-40 mL  5-40 mL IntraVENous PRN  
 ondansetron (ZOFRAN) injection 4 mg  4 mg IntraVENous Q6H PRN  
 
______________________________________________________________________ EXPECTED LENGTH OF STAY: 12d 7h 
ACTUAL LENGTH OF STAY:          315 Josh Caldwell MD

## 2021-01-05 NOTE — CONSULTS
Consult Date: 1/5/2021 IP CONSULT TO GENERAL SURGERY Consult performed by: Vincent Glez MD 
Consult ordered by: Мария Kidd MD 
Reason for consult: possible SBO Assessment/Recommendations:  
 
Pt constipated until yesterday 
 
abd benign today other that some distention Pt is at risk for ileus. Will check a CT scan to r/o obstruction. Cont bowel regimen. Discussed with Dr Zackery Mehta Subjective Toshia Stoner is a 47 y.o. with a past history of Down's syndrome, hypothyroidism, obesity (BMI 30), CKD3, CHF (ECHO 2018 mod diffuse hypokinesis, EF 30%)  large hiatal hernia,, who was admitted on 12/23/2020 from home with a diagnosis of COVID PNA. Current medical issues include: ongoing decline from prolonged COVID infection (recent admission 11/8 to 11/16/20 for covid pna and again 11/19 to 12/6/20 for sepsis due to covid). She went several days without a BM. Finally had a BM yesterday after enemas. I have been consulted to evaluated for a possible SBO. Past Medical History:  
Diagnosis Date  Endocrine disease  Gram negative septic shock ([facility] [address] 1/23/2020  Hypothyroid 03/11/2018  Ill-defined condition Down's Syndrome  Lactic acidosis 12/23/2020 No past surgical history on file. No family history on file. Social History Tobacco Use  Smoking status: Never Smoker  Smokeless tobacco: Never Used Substance Use Topics  Alcohol use: No  
   
Current Facility-Administered Medications Medication Dose Route Frequency Provider Last Rate Last Admin  
 0.45% sodium chloride infusion  75 mL/hr IntraVENous CONTINUOUS Gurdeep Warner MD 75 mL/hr at 01/04/21 1304 75 mL/hr at 01/04/21 1304  lactulose (CHRONULAC) 10 gram/15 mL solution 15 mL  10 g Oral TID Minh Whitney MD   15 mL at 01/04/21 2109  
 0.9% sodium chloride infusion 250 mL  250 mL IntraVENous PRN Мария Kidd MD      
  dexmedeTOMidine (PRECEDEX) 400 mcg in 0.9% sodium chloride 100 mL infusion  0.1-1.5 mcg/kg/hr IntraVENous TITRATE Bruce Hoffmann MD 31.5 mL/hr at 01/05/21 0928 1.5 mcg/kg/hr at 01/05/21 4955  famotidine (PEPCID) tablet 20 mg  20 mg Per NG tube DAILY Bruce Hoffmann MD   Stopped at 01/04/21 0900  
 risperiDONE (RisperDAL) tablet 1 mg  1 mg Per NG tube QHS Bruce Hoffmann MD   1 mg at 01/04/21 2108  polyethylene glycol (MIRALAX) packet 17 g  17 g Per G Tube DAILY Chava Carr MD   Stopped at 01/04/21 0900  levothyroxine (SYNTHROID) tablet 50 mcg  50 mcg Per NG tube DAILY Chava Carr MD   Stopped at 01/04/21 0900  levothyroxine tube feed reminder note  1 Each Other BID Chava Carr MD   1 Each at 01/04/21 1000  heparin (porcine) injection 5,000 Units  5,000 Units SubCUTAneous Q8H Ceci Menezes MD   5,000 Units at 01/05/21 8021  balsam peru-castor oiL (VENELEX) ointment   Topical BID Ceci Menezes MD   Given at 01/04/21 1730  
 alcohol 62% (NOZIN) nasal  1 Ampule  1 Ampule Topical Q12H Ceci Menezes MD   1 Ampule at 01/04/21 2109  
 dextrose (D50W) injection syrg 12.5-25 g  25-50 mL IntraVENous PRN Pop Peters NP      
 insulin lispro (HUMALOG) injection   SubCUTAneous Q6H Pop Peters NP   Stopped at 12/28/20 0600  acetaminophen (TYLENOL) tablet 650 mg  650 mg Per G Tube Q6H PRN Ceci Menezes MD      
 Or  
Ishan Segovia acetaminophen (TYLENOL) suppository 650 mg  650 mg Rectal Q6H PRN Ceci Menezes MD   650 mg at 12/24/20 1120  chlorhexidine (ORAL CARE KIT) 0.12 % mouthwash 15 mL  15 mL Oral Q12H Ceci Menezes MD   15 mL at 01/04/21 2109  heparin (porcine) pf 300 Units  300 Units InterCATHeter PRN Ceci Menezes MD   300 Units at 12/24/20 1546  sodium chloride (NS) flush 5-40 mL  5-40 mL IntraVENous Q8H Pop Peters NP   10 mL at 01/05/21 1092  sodium chloride (NS) flush 5-40 mL  5-40 mL IntraVENous PRN Janell Menendez NP   10 mL at 12/29/20 1751  ondansetron (ZOFRAN) injection 4 mg  4 mg IntraVENous Q6H PRN Janell Menendez NP   4 mg at 01/01/21 7807 Review of Systems Unable to perform ROS: Intubated Objective Vital signs for last 24 hours: 
Visit Vitals BP (!) 101/49 Pulse 65 Temp 97.6 °F (36.4 °C) Resp 16 Ht 5' 3\" (1.6 m) Wt 86.3 kg (190 lb 4.1 oz) SpO2 99% BMI 33.70 kg/m² Intake/Output this shift: 
Current Shift: 01/05 0701 - 01/05 1900 In: -  
Out: 225 [Urine:225] Last 3 Shifts: 01/03 1901 - 01/05 0700 In: 3833 [I.V.:3477] Out: 4000 [SSTAS:3791] Data Review:  
Recent Results (from the past 24 hour(s)) GLUCOSE, POC Collection Time: 01/04/21 12:59 PM  
Result Value Ref Range Glucose (POC) 104 (H) 65 - 100 mg/dL Performed by [employer] the [employer] [place] GLUCOSE, POC Collection Time: 01/04/21  5:29 PM  
Result Value Ref Range Glucose (POC) 90 65 - 100 mg/dL Performed by [employer] [place] GLUCOSE, POC Collection Time: 01/05/21 12:11 AM  
Result Value Ref Range Glucose (POC) 87 65 - 100 mg/dL Performed by Veronica Ahuja RN   
CBC WITH AUTOMATED DIFF Collection Time: 01/05/21  4:33 AM  
Result Value Ref Range WBC 17.5 (H) 3.6 - 11.0 K/uL  
 RBC 3.03 (L) 3.80 - 5.20 M/uL HGB 8.1 (L) 11.5 - 16.0 g/dL HCT 24.3 (L) 35.0 - 47.0 % MCV 80.2 80.0 - 99.0 FL  
 MCH 26.7 26.0 - 34.0 PG  
 MCHC 33.3 30.0 - 36.5 g/dL  
 RDW 15.9 (H) 11.5 - 14.5 % PLATELET 750 796 - 381 K/uL MPV 11.4 8.9 - 12.9 FL  
 NRBC 0.0 0  WBC ABSOLUTE NRBC 0.00 0.00 - 0.01 K/uL NEUTROPHILS 82 (H) 32 - 75 % LYMPHOCYTES 6 (L) 12 - 49 % MONOCYTES 9 5 - 13 % EOSINOPHILS 2 0 - 7 % BASOPHILS 0 0 - 1 % IMMATURE GRANULOCYTES 1 (H) 0.0 - 0.5 % ABS. NEUTROPHILS 14.3 (H) 1.8 - 8.0 K/UL  
 ABS. LYMPHOCYTES 1.0 0.8 - 3.5 K/UL  
 ABS. MONOCYTES 1.6 (H) 0.0 - 1.0 K/UL ABS. EOSINOPHILS 0.4 0.0 - 0.4 K/UL  
 ABS. BASOPHILS 0.1 0.0 - 0.1 K/UL  
 ABS. IMM. GRANS. 0.1 (H) 0.00 - 0.04 K/UL  
 DF AUTOMATED RENAL FUNCTION PANEL Collection Time: 01/05/21  4:33 AM  
Result Value Ref Range Sodium 141 136 - 145 mmol/L Potassium 4.7 3.5 - 5.1 mmol/L Chloride 109 (H) 97 - 108 mmol/L  
 CO2 24 21 - 32 mmol/L Anion gap 8 5 - 15 mmol/L Glucose 82 65 - 100 mg/dL BUN 46 (H) 6 - 20 MG/DL Creatinine 1.86 (H) 0.55 - 1.02 MG/DL  
 BUN/Creatinine ratio 25 (H) 12 - 20 GFR est AA 37 (L) >60 ml/min/1.73m2 GFR est non-AA 30 (L) >60 ml/min/1.73m2 Calcium 9.2 8.5 - 10.1 MG/DL Phosphorus 4.9 (H) 2.6 - 4.7 MG/DL Albumin 3.0 (L) 3.5 - 5.0 g/dL Physical Exam 
Constitutional:   
   General: She is not in acute distress. Appearance: She is well-developed. She is not diaphoretic. Interventions: She is sedated and intubated. Comments: Responds to touch HENT:  
   Head: Normocephalic and atraumatic. Mouth/Throat:  
   Pharynx: No oropharyngeal exudate. Eyes:  
   Pupils: Pupils are equal, round, and reactive to light. Neck: Musculoskeletal: Normal range of motion. Trachea: No tracheal deviation. Cardiovascular:  
   Rate and Rhythm: Normal rate and regular rhythm. Heart sounds: Normal heart sounds. No murmur. Pulmonary:  
   Effort: Pulmonary effort is normal. No respiratory distress. She is intubated. Breath sounds: Normal breath sounds. No wheezing. Abdominal:  
   General: Bowel sounds are normal. There is distension (mild). Palpations: Abdomen is soft. There is no mass. Tenderness: There is no abdominal tenderness. There is no guarding or rebound. Musculoskeletal: Normal range of motion. General: No tenderness. Lymphadenopathy:  
   Cervical: No cervical adenopathy. Skin: 
   General: Skin is warm. Findings: No erythema or rash.   
Psychiatric:     
   Behavior: Behavior normal.

## 2021-01-05 NOTE — PROGRESS NOTES
Palliative Medicine Consult [place]: [phone]) Patient Name: Erasmo Christensen YOB: 1980 Date of Initial Consult: 12/28/2020 Reason for Consult: care decisions Requesting Provider: Jodie Chapin MD  
Primary Care Physician: Kilo Cortez MD 
 
 SUMMARY:  
Erasmo Christensen is a 47 y.o. with a past history of  Down's syndrome, hypothyroidism, obesity (BMI 30), CKD3, CHF (ECHO 2018 mod diffuse hypokinesis, EF 30%)  large hiatal hernia,, who was admitted on 12/23/2020 from home with a diagnosis of COVID PNA. Current medical issues leading to Palliative Medicine involvement include: ongoing decline from prolonged COVID infection (recent admission 11/8 to 11/16/20 for covid pna and again 11/19 to 12/6/20 for sepsis due to covid) during which she was seen by Palliative Medicine in Nov 2020. 
 
12/28: remains intubated, 40%, PEEP 6. Levophed 8 mcg/min. Worsening renal failure. 12/29: remains intubated, 35%, PEEP 5. Levophed 2 mcg/min. Cr stable at 4.09.  
1/4:     remains intubated, 40%, PEEP 5. Levophed 2 mcg/min. Cr down to 2.25.  
1/5:     remains intubated, 40%, PEEP 5. Cr down to 1.86. Plan for abd CT scan today to evaluate for SBO/ileus. Psychosocial: lives with and is cared for by mother Anastacio Horne [phone]. PALLIATIVE DIAGNOSES:  
1. Acute respiratory failure due to COVID-19 pneumonia 2. Morbid obesity BMI 30 
3. Large hiatal hernia 4. Dysphagia, aspiration risk, on modified diet 5. Debility 6. High risk for dying from COVID due to underlying CHF, obesity 7. Hypothermic (12/27) 8. Renal failure (Cr 4.18>4.09) 9. Care decisions PLAN:  
1. Prior to visit, I completed a review of patient's medical records, including medical documentation, vital signs, MARs, and results of various labs and other diagnostics. I also spoke with patient's nurse, Tigist Navarro and intensivist Dr. Stephanie Garcia. 2. Spoke with mother: discussed plan for abd CT today to evaluate bowel issues to ensure bowel issues have resolved to improve successful extubation as plan is for one-way extubation. Also discussed improving renal function, negative COVID test, visitation is now one visitor/day. Answered all mom's questions. 3. Initial consult note routed to primary continuity provider and/or primary health care team members 4. Communicated plan of care with: Palliative IDTAlber 192 Team 
 
 GOALS OF CARE / TREATMENT PREFERENCES:  
 
GOALS OF CARE: 
Patient/Health Care Proxy Stated Goals: Prolong life TREATMENT PREFERENCES:  
Code Status: DNR Advance Care Planning: 
[x] The [facility] Interdisciplinary Team has updated the ACP Navigator with [address] and Patient Capacity Primary Decision Maker (Active): [employer] - Kemi Oneillr - [phone] Secondary Decision Maker: Rian Lei - [phone] Advance Care Planning 12/28/2020 Patient's Healthcare Decision Maker is: Legal Next of Kin Confirm Advance Directive None Patient Would Like to Complete Advance Directive Unable Medical Interventions: Full interventions Other: As far as possible, the palliative care team has discussed with patient / health care proxy about goals of care / treatment preferences for patient. HISTORY:  
 
History obtained from: chart, Claudio Gar CHIEF COMPLAINT: SOB, fever HPI/SUBJECTIVE: The patient is:  
[] Verbal and participatory [x] Non-participatory due to: intubation 12/23: presented to [facility] ED for fever, c/o not feeling well and cough, with associated loss of appetite and very productive cough; this is her 3rd admission for  COVID PNA. While in the ED pt became tachypneic with sats dropping to 92% and placed on 100% NRB with sats 96-96%; her respiratory status continued to decline, requiring bipap, then intubation. hospitalist was not comfortable keeping pt at [address] and had her transferred to [facility]. Clinical Pain Assessment (nonverbal scale for severity on nonverbal patients):  
Clinical Pain Assessment Severity: 0 Activity (Movement): Lying quietly, normal position Duration: for how long has pt been experiencing pain (e.g., 2 days, 1 month, years) Frequency: how often pain is an issue (e.g., several times per day, once every few days, constant) FUNCTIONAL ASSESSMENT:  
 
Palliative Performance Scale (PPS): PPS: 10 PSYCHOSOCIAL/SPIRITUAL SCREENING:  
 
Palliative IDT has assessed this patient for cultural preferences / practices and a referral made as appropriate to needs (Cultural Services, Patient Advocacy, Ethics, etc.) Any spiritual / Mandaen concerns: 
[] Yes /  [x] No 
 
Caregiver Burnout: 
[] Yes /  [x] No /  [] No Caregiver Present Anticipatory grief assessment:  
[x] Normal  / [] Maladaptive ESAS Anxiety: Anxiety: 0 
 
ESAS Depression:   unable to assess due to pt factors REVIEW OF SYSTEMS:  
 
Positive and pertinent negative findings in ROS are noted above in HPI. The following systems were [x] reviewed / [] unable to be reviewed as noted in HPI Other findings are noted below. Systems: constitutional, ears/nose/mouth/throat, respiratory, gastrointestinal, genitourinary, musculoskeletal, integumentary, neurologic, psychiatric, endocrine. Positive findings noted below. Modified ESAS Completed by: provider Pain: 0 Anxiety: 0 Dyspnea: 0 PHYSICAL EXAM:  
 
From RN flowsheet: Wt Readings from Last 3 Encounters:  
01/04/21 190 lb 4.1 oz (86.3 kg) 12/23/20 191 lb (86.6 kg) 12/04/20 191 lb (86.6 kg) Blood pressure (!) 101/49, pulse 67, temperature 97.6 °F (36.4 °C), resp. rate 18, height 5' 3\" (1.6 m), weight 190 lb 4.1 oz (86.3 kg), SpO2 97 %. Pain Scale 1: Behavioral Pain Scale (BPS) Pain Intensity 1: 3 Last bowel movement, if known:  
 
Constitutional: intubated, sedated Eyes: closed HENT: mm moist, ETT in place Cardiac: RRR Lungs: CTAB 
MS: no deformities Neuro: wakes to verbal stim, makes eye contact Psycho: unable to assess due to pt factors HISTORY:  
 
Principal Problem: 
  Aspiration pneumonia of both lungs due to vomit ([address] (12/23/2020) Active Problems: 
  Acute hypoxemic respiratory failure ([address] (12/24/2020) Post viral syndrome (12/25/2020) Dehydration with hypernatremia (12/16/2020) Kidney disease, chronic, stage IV (severe, EGFR 15-29 ml/min) ([facility]) (12/23/2020) Gram negative septic shock ([address] (1/23/2020) Lactic acidosis (12/23/2020) Fecal impaction of colon ([address] (12/23/2020) Large hiatal hernia (11/2/2020) Overview: ([id number])- CT Abdomen: 
    Distended fluid-filled esophagus and large hiatus hernia. Constipation due to pain medication therapy (11/26/2020) Severe sepsis with acute organ dysfunction ([address] (12/23/2020) Respiratory failure ([address] (12/23/2020) HFrEF (heart failure with reduced ejection fraction) ([address] (12/24/2020) Down syndrome (12/23/2020) Acquired hypothyroidism (12/24/2020) Goals of care, counseling/discussion (12/28/2020) Acute renal failure with acute renal cortical necrosis superimposed on stage 4 chronic kidney disease ([address] (12/23/2020) Past Medical History:  
Diagnosis Date  Endocrine disease  Gram negative septic shock ([facility] 1/23/2020  Hypothyroid 03/11/2018  Ill-defined condition Down's Syndrome  Lactic acidosis 12/23/2020 No past surgical history on file. No family history on file. History reviewed, no pertinent family history. Social History Tobacco Use  Smoking status: Never Smoker  Smokeless tobacco: Never Used Substance Use Topics  Alcohol use: No  
 
No Known Allergies Current Facility-Administered Medications Medication Dose Route Frequency  barium sulfate (READICAT) 2.1 % (w/v), 2.0 % (w/w) oral suspension 900 mL  900 mL Oral RAD ONCE  polyethylene glycol (MIRALAX) packet 17 g  17 g Per G Tube BID  metoclopramide HCl (REGLAN) injection 10 mg  10 mg IntraVENous Q6H  
 bisacodyL (DULCOLAX) suppository 10 mg  10 mg Rectal DAILY  [START ON 1/6/2021] famotidine (PF) (PEPCID) 20 mg in 0.9% sodium chloride 10 mL injection  20 mg IntraVENous DAILY  0.45% sodium chloride infusion  75 mL/hr IntraVENous CONTINUOUS  
 lactulose (CHRONULAC) 10 gram/15 mL solution 15 mL  10 g Oral TID  
 0.9% sodium chloride infusion 250 mL  250 mL IntraVENous PRN  
 dexmedeTOMidine (PRECEDEX) 400 mcg in 0.9% sodium chloride 100 mL infusion  0.1-1.5 mcg/kg/hr IntraVENous TITRATE  risperiDONE (RisperDAL) tablet 1 mg  1 mg Per NG tube QHS  levothyroxine (SYNTHROID) tablet 50 mcg  50 mcg Per NG tube DAILY  levothyroxine tube feed reminder note  1 Each Other BID  heparin (porcine) injection 5,000 Units  5,000 Units SubCUTAneous Q8H  
 balsam peru-castor oiL (VENELEX) ointment   Topical BID  alcohol 62% (NOZIN) nasal  1 Ampule  1 Ampule Topical Q12H  
 dextrose (D50W) injection syrg 12.5-25 g  25-50 mL IntraVENous PRN  
 insulin lispro (HUMALOG) injection   SubCUTAneous Q6H  
 acetaminophen (TYLENOL) tablet 650 mg  650 mg Per G Tube Q6H PRN Or  
 acetaminophen (TYLENOL) suppository 650 mg  650 mg Rectal Q6H PRN  chlorhexidine (ORAL CARE KIT) 0.12 % mouthwash 15 mL  15 mL Oral Q12H  
 heparin (porcine) pf 300 Units  300 Units InterCATHeter PRN  
  sodium chloride (NS) flush 5-40 mL  5-40 mL IntraVENous Q8H  
 sodium chloride (NS) flush 5-40 mL  5-40 mL IntraVENous PRN  
 ondansetron (ZOFRAN) injection 4 mg  4 mg IntraVENous Q6H PRN  
 
 
 
 LAB AND IMAGING FINDINGS:  
 
Lab Results Component Value Date/Time WBC 17.5 (H) 01/05/2021 04:33 AM  
 HGB 8.1 (L) 01/05/2021 04:33 AM  
 PLATELET 485 92/26/9065 04:33 AM  
 
Lab Results Component Value Date/Time Sodium 141 01/05/2021 04:33 AM  
 Potassium 4.7 01/05/2021 04:33 AM  
 Chloride 109 (H) 01/05/2021 04:33 AM  
 CO2 24 01/05/2021 04:33 AM  
 BUN 46 (H) 01/05/2021 04:33 AM  
 Creatinine 1.86 (H) 01/05/2021 04:33 AM  
 Calcium 9.2 01/05/2021 04:33 AM  
 Magnesium 2.8 (H) 01/01/2021 01:44 PM  
 Phosphorus 4.9 (H) 01/05/2021 04:33 AM  
  
Lab Results Component Value Date/Time Alk. phosphatase 31 (L) 01/03/2021 03:27 AM  
 Protein, total 6.5 01/03/2021 03:27 AM  
 Albumin 3.0 (L) 01/05/2021 04:33 AM  
 Globulin 3.0 01/03/2021 03:27 AM  
 
Lab Results Component Value Date/Time INR 1.1 11/20/2020 01:57 AM  
 Prothrombin time 11.8 (H) 11/20/2020 01:57 AM  
 aPTT 40.0 (H) 12/28/2020 04:51 AM  
  
Lab Results Component Value Date/Time Iron 65 12/04/2020 03:11 AM  
 TIBC 279 12/04/2020 03:11 AM  
 Iron % saturation 23 12/04/2020 03:11 AM  
 Ferritin 555 (H) 12/28/2020 04:51 AM  
  
Lab Results Component Value Date/Time pH 7.33 (L) 12/24/2020 05:50 AM  
 PCO2 38 12/24/2020 05:50 AM  
 PO2 80 12/24/2020 05:50 AM  
 
No components found for: [place] Lab Results Component Value Date/Time  (H) 11/11/2020 01:00 AM  
  
 
 
   
 
Total time:  
Counseling / coordination time, spent as noted above:  
> 50% counseling / coordination?:  
 
Prolonged service was provided for  []30 min   []75 min in face to face time in the presence of the patient, spent as noted above. Time Start:  
Time End:  
Note: this can only be billed with 28588 (initial) or 14039 (follow up). If multiple start / stop times, list each separately.

## 2021-01-06 NOTE — PROGRESS NOTES
[facility]  Hospitalist Group Critical Care Progress Note Lino Morrissey MD 
Answering service: [phone] OR [phone] from in house phone Date of Service:  2021 NAME:  Dave Davis :  1980 MRN:  [id number] Interval history / Subjective:  
  Failing sbt, she is not gonna wean at all. Talked to Earnie Felling her mother and she has verbally consented To me for PEG and Trach. Assessment & Plan:  
 
Principal Problem: 
  Aspiration pneumonia of both lungs due to vomit ([address] (2020) 
  Active Problems: 
  Acute hypoxemic respiratory failure ([address] (2020)   
  Post viral syndrome (2020)   
  Dehydration with hypernatremia (2020)   
  Kidney disease, chronic, stage IV (severe, EGFR 15-29 ml/min) ([facility]) (2020)   
  Gram negative septic shock ([address] (2020)   
  Lactic acidosis (2020)   
  Fecal impaction of colon ([address] (2020)   
  Large hiatal hernia (2020) 
    Overview: ([id number])- CT Abdomen: 
    Distended fluid-filled esophagus and large hiatus hernia. 
  
  Constipation due to pain medication therapy (2020)   
  Severe sepsis with acute organ dysfunction ([address] (2020)   
  Respiratory failure ([address] (2020)   
  HFrEF (heart failure with reduced ejection fraction) ([address] (2020)   
  Down syndrome (2020)   
  Acquired hypothyroidism (2020)   
  Goals of care, counseling/discussion (2020)   
  Acute renal failure with acute renal cortical necrosis superimposed on stage 4 chronic kidney disease ([address] (2020)   
  
  
ICU Comprehensive Plan of Care:  
  
Plans for this Shift:  
 1. Sepsis with acute organ dysfunction due to aspiration bilateral bacterial pneumonia- continue empiric IV antibiotics (meropenem) aggressive pulmonary toilet.  Covid pneumonia resolved, with post viral bilateral bacterial pneumonia. 
  
2. DANY superimposed on CKD with severe hypernatremic dehydration and nonoliguric ATN- monitor daily labs, continue volume resuscitation 
  
Fecal impaction of rectum and ascending colon- holding enteral feeds. Concern for 
SBO vs ileus has resolved with the normal CT abdomen.  Gen Surgery has signed off.  
 
Acute on Chronic Respiratory Failure 
Unable to wean from mechanical ventilation from a combination of massive aspiration event, 
Difficult to control pulmonary edema and given her body habitus she likely has undiagnosed 
Obstructive sleep apnea. She is unlikely to be able to take po for a long time safely especially 
Now with the added weakness from being down for 2 weeks.  As discussed with her mother 
She would benefit for a Trach and PEG combo.  She is in agreement. Will consult Dr. Soriano to do the Trach and will get GI to do the PEG.  
  
ABCDEF Bundle/Checklist 
Pain Medications: Fentanyl 
Target RASS: 0 - Alert & Calm - Spontaneously pays attention to caregiver 
Sedation Medications: None 
CAM-ICU:  Negative 
Discussed Plan of Care (goals of care): Yes 
Addressed Code Status: Do Not Resuscitate 
  
CARDIOVASCULAR 
Cardiac Gtts: None 
SBP Goal of: > 90 mmHg 
MAP Goal of: > 65 mmHg 
Transfusion Trigger (Hgb): <7 g/dL 
  
RESPIRATORY 
Vent Goals:  
Head of bed > 30 degrees 
Aggressive bronchopulmonary hygiene 
DVT Prophylaxis (if no, list reason): SCD's or Sequential Compression Device and Lovenox  
SPO2 Goal: > 92% 
  
GI/ 
Dutta Catheter Present: Yes 
GI Prophylaxis: Pepcid (famotidine)  
Nutrition: No NPO 
Bowel Movement: Yes 
Bowel Regimen: MiraLax, Milk of magnesia and Enema 
  
ANTIBIOTICS 
Antibiotics: 
Meropenem 
  
T/L/D 
Tubes: ETT and Nasogastric Tube 
 Lines: Peripheral IV and None Drains: Dutta Catheter 
  
SPECIAL EQUIPMENT None 
  
DISPOSITION Stay in ICU 
 
DNR status 
  
I personally spent  50    minutes of critical care time.  This is time spent at this critically ill patient's bedside actively involved in patient care as well as the coordination of care and discussions with the patient's family.  This does not include any procedural time which has been billed separately. Hospital Problems  Never Reviewed Codes Class Noted POA * (Principal) Aspiration pneumonia of both lungs due to vomit ([facility] ICD-10-CM: J69.0 ICD-9-CM: 507.0 Acute 12/23/2020 Yes Post viral syndrome ICD-10-CM: G93.3 ICD-9-CM: 780.79 Acute 12/25/2020 No  
   
 Acute hypoxemic respiratory failure ([facility] ICD-10-CM: J96.01 
ICD-9-CM: 518.81 Acute 12/24/2020 Yes Kidney disease, chronic, stage IV (severe, EGFR 15-29 ml/min) ([facility]) ICD-10-CM: N18.4 ICD-9-CM: 124. 4 Acute 12/23/2020 Yes Lactic acidosis ICD-10-CM: E87.2 ICD-9-CM: 276.2 Acute 12/23/2020 Yes Fecal impaction of colon ([facility] ICD-10-CM: K56.41 
ICD-9-CM: 560.32 Acute 12/23/2020 Yes Dehydration with hypernatremia (Chronic) ICD-10-CM: E87.0 ICD-9-CM: 276.0 Acute 12/16/2020 Yes Gram negative septic shock (HCC) ICD-10-CM: A41.50, R65.21 ICD-9-CM: 038.40, 995.92, 785.52 Acute 1/23/2020 Yes Constipation due to pain medication therapy ICD-10-CM: K59.03 
ICD-9-CM: 564.09, E947.9 Acute 11/26/2020 Yes Large hiatal hernia (Chronic) ICD-10-CM: K44.9 ICD-9-CM: 215. 3 Chronic 11/2/2020 Yes Overview Signed 1/1/2021  4:08 PM by Ryan Lancaster MD  
  ([id number])- CT Abdomen: 
Distended fluid-filled esophagus and large hiatus hernia. Goals of care, counseling/discussion ICD-10-CM: Z71.89 ICD-9-CM: V65.49  12/28/2020 Yes HFrEF (heart failure with reduced ejection fraction) (HCC) (Chronic) ICD-10-CM: I50.20 ICD-9-CM: 428.20  12/24/2020 Yes Acquired hypothyroidism (Chronic) ICD-10-CM: E03.9 ICD-9-CM: 244.9  12/24/2020 Yes Severe sepsis with acute organ dysfunction (HCC) ICD-10-CM: A41.9, R65.20 ICD-9-CM: 038.9, 995.92 Acute 12/23/2020 Yes Respiratory failure ([address] ICD-10-CM: J96.90 ICD-9-CM: 518.81  12/23/2020 Yes Down syndrome (Chronic) ICD-10-CM: Q90.9 ICD-9-CM: 758.0 End Stage 12/23/2020 Yes Acute renal failure with acute renal cortical necrosis superimposed on stage 4 chronic kidney disease (HCC) (Chronic) ICD-10-CM: N17.1, N18.4 ICD-9-CM: 584.6, 585.4 Acute 12/23/2020 Yes Review of Systems:  
Review of systems not obtained due to patient factors. Vital Signs:  
 Last 24hrs VS reviewed since prior progress note. Most recent are: 
Visit Vitals /67 (BP 1 Location: Right arm, BP Patient Position: At rest) Pulse 67 Temp 98.6 °F (37 °C) Resp 18 Ht 5' 3\" (1.6 m) Wt 86.3 kg (190 lb 4.1 oz) SpO2 100% BMI 33.70 kg/m² Intake/Output Summary (Last 24 hours) at 1/6/2021 1412 Last data filed at 1/6/2021 1300 Gross per 24 hour Intake 3225 ml Output 2450 ml Net 775 ml Physical Examination:  
 
 
     
Constitutional:  No acute distress, cooperative, pleasant ENT:  Oral mucous moist, oropharynx benign. Resp:  CTA bilaterally. No wheezing/rhonchi/rales. No accessory muscle use CV:  Regular rhythm, normal rate, no murmurs, gallops, rubs GI:  Soft, non distended, non tender. normoactive bowel sounds, no hepatosplenomegaly Musculoskeletal:  No edema, warm, 2+ pulses throughout Neurologic:  Moves all extremities. AAOx3, CN II-XII reviewed Psych:  sedated Skin:  Good turgor, no rashes or ulcers Hematologic/Lymphatic/Immunlogic:  No jaundice nor lymph node swelling :  deferred Eyes:  EOMI. Anicteric sclerae, PERRL. Data Review:  
 Review and/or order of clinical lab test 
 
 
Labs:  
 
Recent Labs 01/06/21 
0410 01/05/21 
6800 WBC 14.8* 17.5* HGB 8.2* 8.1* HCT 24.5* 24.3*  
 277 Recent Labs 01/06/21 
0410 01/05/21 
2954 01/04/21 
8100  141 139  
K 3.8 4.7 4.2 * 109* 106 CO2 23 24 26 BUN 39* 46* 50* CREA 1.77* 1.86* 2.25* GLU 74 82 83  
CA 9.4 9.2 9.1 PHOS  --  4.9*  --   
 
Recent Labs 01/06/21 
0410 01/05/21 
2140 ALT 17  --   
AP 45  --   
TBILI 0.8  --   
TP 6.9  --   
ALB 2.9* 3.0*  
GLOB 4.0  -- No results for input(s): INR, PTP, APTT, INREXT in the last 72 hours. No results for input(s): FE, TIBC, PSAT, FERR in the last 72 hours. Lab Results Component Value Date/Time Folate 18.2 12/04/2020 03:11 AM  
  
No results for input(s): PH, PCO2, PO2 in the last 72 hours. No results for input(s): CPK, CKNDX, TROIQ in the last 72 hours. No lab exists for component: CPKMB No results found for: CHOL, CHOLX, CHLST, CHOLV, HDL, HDLP, LDL, LDLC, DLDLP, TGLX, TRIGL, TRIGP, CHHD, CHHDX Lab Results Component Value Date/Time Glucose (POC) 87 01/06/2021 11:41 AM  
 Glucose (POC) 96 01/06/2021 06:14 AM  
 Glucose (POC) 90 01/05/2021 05:43 PM  
 Glucose (POC) 94 01/05/2021 12:53 PM  
 Glucose (POC) 87 01/05/2021 12:11 AM  
 
Lab Results Component Value Date/Time Color YELLOW/STRAW 12/30/2020 05:37 PM  
 Appearance TURBID (A) 12/30/2020 05:37 PM  
 Specific gravity 1.012 12/30/2020 05:37 PM  
 Specific gravity 1.010 11/20/2020 01:57 AM  
 pH (UA) 5.0 12/30/2020 05:37 PM  
 Protein 30 (A) 12/30/2020 05:37 PM  
 Glucose Negative 12/30/2020 05:37 PM  
 Ketone Negative 12/30/2020 05:37 PM  
 Bilirubin Negative 12/30/2020 05:37 PM  
 Urobilinogen 0.2 12/30/2020 05:37 PM  
 Nitrites Negative 12/30/2020 05:37 PM  
 Leukocyte Esterase SMALL (A) 12/30/2020 05:37 PM  
 Epithelial cells FEW 12/30/2020 05:37 PM  
 Bacteria Negative 12/30/2020 05:37 PM  
 WBC 20-50 12/30/2020 05:37 PM  
 RBC 5-10 12/30/2020 05:37 PM  
 
 
 
Medications Reviewed: Current Facility-Administered Medications Medication Dose Route Frequency  midazolam (VERSED) injection 2 mg  2 mg IntraVENous Q2H PRN  
 fentaNYL citrate (PF) injection 50 mcg  50 mcg IntraVENous Q4H PRN  polyethylene glycol (MIRALAX) packet 17 g  17 g Per G Tube BID  
 bisacodyL (DULCOLAX) suppository 10 mg  10 mg Rectal DAILY  famotidine (PF) (PEPCID) 20 mg in 0.9% sodium chloride 10 mL injection  20 mg IntraVENous DAILY  0.45% sodium chloride infusion  75 mL/hr IntraVENous CONTINUOUS  
 lactulose (CHRONULAC) 10 gram/15 mL solution 15 mL  10 g Oral TID  
 0.9% sodium chloride infusion 250 mL  250 mL IntraVENous PRN  
 dexmedeTOMidine (PRECEDEX) 400 mcg in 0.9% sodium chloride 100 mL infusion  0.1-1.5 mcg/kg/hr IntraVENous TITRATE  risperiDONE (RisperDAL) tablet 1 mg  1 mg Per NG tube QHS  levothyroxine (SYNTHROID) tablet 50 mcg  50 mcg Per NG tube DAILY  levothyroxine tube feed reminder note  1 Each Other BID  heparin (porcine) injection 5,000 Units  5,000 Units SubCUTAneous Q8H  
 balsam peru-castor oiL (VENELEX) ointment   Topical BID  alcohol 62% (NOZIN) nasal  1 Ampule  1 Ampule Topical Q12H  
 dextrose (D50W) injection syrg 12.5-25 g  25-50 mL IntraVENous PRN  
 insulin lispro (HUMALOG) injection   SubCUTAneous Q6H  
 acetaminophen (TYLENOL) tablet 650 mg  650 mg Per G Tube Q6H PRN Or  
 acetaminophen (TYLENOL) suppository 650 mg  650 mg Rectal Q6H PRN  chlorhexidine (ORAL CARE KIT) 0.12 % mouthwash 15 mL  15 mL Oral Q12H  
 heparin (porcine) pf 300 Units  300 Units InterCATHeter PRN  
 sodium chloride (NS) flush 5-40 mL  5-40 mL IntraVENous Q8H  
 sodium chloride (NS) flush 5-40 mL  5-40 mL IntraVENous PRN  
 ondansetron (ZOFRAN) injection 4 mg  4 mg IntraVENous Q6H PRN  
 
______________________________________________________________________ EXPECTED LENGTH OF STAY: 12d 7h 
ACTUAL LENGTH OF STAY:          825 Miri Santizo MD

## 2021-01-06 NOTE — PROGRESS NOTES
ALESSANDRA: Goal to return home with her mom if possible. Her mom Eligio Darnell is her caregiver. Admitted with resp failure,  COVID. She is now vented and on pressors. Condition is critical. She is now a DNR. She has had multiple admissions over last few months with COVID. Failing SBT's. Nas Alberto is being recommended and mother consented today. PEG to be placed on 1/7; trach to be placed on 1/8. 
   
She lives with her mom and is non verbal at baseline. She has downs syndrome. Her mom is her caregiver.  
Brisa Eugene is followed by Mitra Figueroa at [address] [place] [address] has been patient's CM for the last 15 years. 
  
She is currently open to [facility], PT and OT and will need resume orders at discharge. CM will follow and assist w/ dc planning - pt may need LTAC stay at dc due to trach. Follow up with PCP Dr Sekou RAYMUNDO.   
According to chart she has  follow-up 1/22/2020 @ [phone]. MCKINLEY Atkinson

## 2021-01-06 NOTE — PROGRESS NOTES
0730: Verbal shift change report given to Charisse Nguyen RN (oncoming nurse) by Jackson Jimenes RN (offgoing nurse). Report included the following information SBAR, Kardex, Intake/Output, MAR, Recent Results and Cardiac Rhythm NSR.  
 
0915: Assessment completed; see flowsheets. RN spoke with provider Mary Kimble regarding CT scan results. Per MD, now that pt having BMs and CT resutls negative for SBO, ok to restart TF at trickle (20cc/hr) and to give meds via OGT. 1045: ID rounds held with CCU staff and provider Mary Kimble. Orders received. Per nephro note, continue 1/2NS at current rate of 75cc/hr. 1130: Pt given full CHG bath, clean gown and linens provided. 1200: Reassessment completed; no changes from previous assessment. 1445: TF started at 20cc/hr. RN to advance in 24 hrs if pt tolerating per dietary note. 1500: Provider Mary Kimble spoke with pt mother; agreeable for trach and PEG. Consults placed to gen surg and GI for the above. 1515: RN spoke with GI nurse practitioner; possible for PEG placement tomorrow. 1600: Reassessment completed; no changes from previous assessment. 1900: End of Shift Note Bedside shift change report given to Joshua Biswas RN (oncoming nurse) by Deena Boyd (offgoing nurse). Report included the following information SBAR, Kardex, ED Summary, Intake/Output, MAR, Recent Results and Cardiac Rhythm NSR, shirley Shift worked:  7a-7p Shift summary and any significant changes:  
  Pt mom approval for trach and peg this week. Concerns for physician to address:  N/A Zone phone for oncoming shift:   N/A Activity: 
Activity Level: Bed Rest 
Number times ambulated in hallways past shift: 0 Number of times OOB to chair past shift: 0 Cardiac:  
Cardiac Monitoring: Yes     
Cardiac Rhythm: Normal sinus rhythm Access:  
Current line(s): central line Genitourinary:  
Urinary status: mejia Respiratory:  
O2 Device: Ventilator Chronic home O2 use?: NO 
 Incentive spirometer at bedside: N/A 
  
GI: 
Last Bowel Movement Date: 01/06/21 Current diet:  DIET TUBE FEEDING 
DIET NPO Passing flatus: YES Tolerating current diet: YES Pain Management:  
Patient states pain is manageable on current regimen: N/A Skin: 
Jai Score: 13 Interventions: speciality bed and float heels Patient Safety: 
Fall Score: Total Score: 3 Interventions: bed/chair alarm High Fall Risk: Yes Length of Stay: 
Expected LOS: 12d 7h Actual LOS: 14 Javier Jeronimo

## 2021-01-06 NOTE — PROGRESS NOTES
Nephrology Progress Note Edm[facility]  
 
[email or web address]. [email or web address]  Phone - [phone] Patient: Ángela Cornelius    YOB: 1980     Admit Date: 12/23/2020 Date- 1/6/2021 CC: Follow up for acute kidney injury IMPRESSION & PLAN:  
? Acute kidney injury-likely pre-renal + Vanc toxicity--improving ? Sepsis-gram-negative shock ? Hypernatremia ? Anemia ? Lactic acidosis ? Protein malnutrition ? Bacterial pneumonia ? Fecal impaction ? Respiratory failure-on vent ? History of heart failure ? Atrophic right kidney ? Down syndrome with autism PLAN- 
? Continue current IV fluids ? Avoid hypotension ? Follow BMP 
? Continue tube feed ? Vent management per ICU team 
  
Subjective: Interval History: She remains on ventilation Creatinine continue to improve-  
Urine output 2100 mL Objective:  
Vitals:  
 01/06/21 0538 01/06/21 0600 01/06/21 0700 01/06/21 0750 BP:  (!) 118/54 (!) 112/59 Pulse: 62 66 67 61 Resp: (!) 38 19 20 (!) 37 Temp: 98.6 °F (37 °C) SpO2: 100% 98% 99% 98% Weight:      
Height:      
  
01/05 0701 - 01/06 0700 In: 2449.5 [I.V.:2449.5] Out: 2100 [Urine:2100] Last 3 Recorded Weights in this Encounter 01/01/21 1750 01/03/21 1252 01/04/21 2213 Weight: 84.5 kg (186 lb 4.6 oz) 88.4 kg (194 lb 14.2 oz) 86.3 kg (190 lb 4.1 oz) Physical exam:  
GEN: intubated on vent NECK-no mass, ET TUBE RESP- CLEAR b/l, no wheezing CVS: RRR,S1,S2 ABDO: soft , nt NEURO:Can't access due to patient's current condition  : mejia + Chart reviewed. Pertinent Notes reviewed. Data Review : 
Recent Labs 01/06/21 
0410 01/05/21 
1115 01/04/21 
1798  141 139  
K 3.8 4.7 4.2 * 109* 106 CO2 23 24 26 BUN 39* 46* 50* CREA 1.77* 1.86* 2.25* GLU 74 82 83  
CA 9.4 9.2 9.1 PHOS  --  4.9*  --   
 
Recent Labs 01/06/21 
0410 01/05/21 
8848 01/04/21 
4882 WBC 14.8* 17.5* 16.2*  
 HGB 8.2* 8.1* 8.0*  
HCT 24.5* 24.3* 24.4*  
 277 291 No results for input(s): FE, TIBC, PSAT, FERR in the last 72 hours. Medication list  reviewed Current Facility-Administered Medications Medication Dose Route Frequency  midazolam (VERSED) injection 2 mg  2 mg IntraVENous Q2H PRN  polyethylene glycol (MIRALAX) packet 17 g  17 g Per G Tube BID  
 bisacodyL (DULCOLAX) suppository 10 mg  10 mg Rectal DAILY  famotidine (PF) (PEPCID) 20 mg in 0.9% sodium chloride 10 mL injection  20 mg IntraVENous DAILY  0.45% sodium chloride infusion  75 mL/hr IntraVENous CONTINUOUS  
 lactulose (CHRONULAC) 10 gram/15 mL solution 15 mL  10 g Oral TID  
 0.9% sodium chloride infusion 250 mL  250 mL IntraVENous PRN  
 dexmedeTOMidine (PRECEDEX) 400 mcg in 0.9% sodium chloride 100 mL infusion  0.1-1.5 mcg/kg/hr IntraVENous TITRATE  risperiDONE (RisperDAL) tablet 1 mg  1 mg Per NG tube QHS  levothyroxine (SYNTHROID) tablet 50 mcg  50 mcg Per NG tube DAILY  levothyroxine tube feed reminder note  1 Each Other BID  heparin (porcine) injection 5,000 Units  5,000 Units SubCUTAneous Q8H  
 balsam peru-castor oiL (VENELEX) ointment   Topical BID  alcohol 62% (NOZIN) nasal  1 Ampule  1 Ampule Topical Q12H  
 dextrose (D50W) injection syrg 12.5-25 g  25-50 mL IntraVENous PRN  
 insulin lispro (HUMALOG) injection   SubCUTAneous Q6H  
 acetaminophen (TYLENOL) tablet 650 mg  650 mg Per G Tube Q6H PRN Or  
 acetaminophen (TYLENOL) suppository 650 mg  650 mg Rectal Q6H PRN  chlorhexidine (ORAL CARE KIT) 0.12 % mouthwash 15 mL  15 mL Oral Q12H  
 heparin (porcine) pf 300 Units  300 Units InterCATHeter PRN  
 sodium chloride (NS) flush 5-40 mL  5-40 mL IntraVENous Q8H  
 sodium chloride (NS) flush 5-40 mL  5-40 mL IntraVENous PRN  
 ondansetron (ZOFRAN) injection 4 mg  4 mg IntraVENous Q6H PRN Carletha Flatten, [address]  [facility] / [facility] Patricia [facility]At[facility] [address] [place], [address] Phone - [phone]               Fax - [phone]

## 2021-01-06 NOTE — PROGRESS NOTES
Nutrition Note Chart reviewed, case discussed during CCU rounds. Pt remains intubated, ileus seems to be resolving and SBO ruled out. Per discussion during rounds okay to start trophic TF. Start TwoCal HN @ 20mL/h + 50mL H2O flush q 4h If pt tolerates trophics x 24h, advance to Goal Rate of 35mL/h + 50mL H2O flush q 4h (provides 1680kcals/70gPro/183gCHO/888mL) Will continue to monitor pt's case closely with you, thanks! Electronically signed by Akin Kemp RD, [address]  on 1/6/2021 at 12:26 PM 
 
Contact: [phone]

## 2021-01-06 NOTE — CONSULTS
Saw patient yesterday. Discussed with Dr Gil Leroy. Unable to ween from vent Proceed with Trach likely on Friday.

## 2021-01-06 NOTE — PROGRESS NOTES
Interdisciplinary team rounds were held 1/6/2021 with the following team members:Care Management, Diabetes Treatment Specialist, Nursing, Nutrition, Palliative Care, Pastoral Care, Pharmacy, Physical Therapy, Physician, Respiratory Therapy and Wound Care. Plan of care discussed. Goal: See MD orders and progress notes for further  interventions and desired outcomes.

## 2021-01-06 NOTE — CONSULTS
GI CONSULTATION NOTE Azeb Gaytan, NP 
[phone] NP in-hospital cell phone M-F until 4:30 After 5pm or on weekends, please call  for physician on call NAME: Mark Ag :  1980 MRN:  [id number] Attending: Dr. aPrish Medeiros Primary GI: Dr. Victor M Woodall Date/Time:  2021 2:51 PM 
Assessment:  
-Dysphagia, failure to thrive · Consult for PEG placement · On TF with OG 
-Constipation · Had KUB concerning for SBO · Had bowel movement yesterday, CT scan today showing no SBO, gas in large bowel with minimal amount of enteric contents. · On lactulose and miralax  
-CKD stage IV 
-Sepsis, on empiric IV antibiotics per ICU team 
-Respiratory failure · Thought to be related to massive aspiration event and difficult to control pulmonary edema · Ventilated, to get Trach Friday 
-Down's Syndrome, DM II, hypothyroid Plan:  
-Plan for PEG placement tomorrow am with Dr. Austin Jeffrey 
-NPO at midnight 
-Please hold Heparin, tomorrow am dose (2021) -Supportive measure per primary team 
-Continue bowel regimen as needed 
-Will continue to follow. Please call GI with further questions or concerns. Thank you! Plan discussed with Dr. Austin Jeffrey Subjective:  
 
HISTORY OF PRESENT ILLNESS:    
Mark Ag is an 47 y.o.  [place] female who we are asked to see for complaint of PEG eval for placement. Patient is intubated and unable to communicate at this time. Has h/o downs syndrome, CKD, CHF. All information gleamed from chart and nursing staff. Patient last week had projectile vomiting of green bile like fluid. Had KUB that showed possible SBO. Was started on bowel regimen with good response. Had large bowel movement. Had CT scan this am that was normal and showed little stool in colon and gas seen in colon. No hematochezia or melena. Past Medical History:  
Diagnosis Date  Endocrine disease  Gram negative septic shock ([facility] [address] 2020  Hypothyroid 2018  Ill-defined condition Down's Syndrome  Lactic acidosis 12/23/2020 No past surgical history on file. Social History Tobacco Use  Smoking status: Never Smoker  Smokeless tobacco: Never Used Substance Use Topics  Alcohol use: No  
  
No family history on file. No Known Allergies Prior to Admission medications Medication Sig Start Date End Date Taking? Authorizing Provider  
ivabradine (CORLANOR) 5 mg tablet Take 1 Tab by mouth two (2) times daily (with meals). Indications: chronic heart failure, inappropriate sinus tachycardia 12/6/20   Bruce George MD  
metoprolol succinate (TOPROL-XL) 25 mg XL tablet Take 1 Tab by mouth daily. 12/6/20   Bruce George MD  
risperiDONE (RisperDAL) 1 mg tablet Take 1 mg by mouth nightly. Provider, Aicha  
solifenacin (VESICARE) 10 mg tablet TAKE 1 TABLET BY MOUTH EVERY DAY 9/18/20   Kilo Cortez MD  
medroxyPROGESTERone (DEPO-PROVERA) 150 mg/mL injection 150 mg, IM, once, To be administered in clinic by nurse. See order comments for schedule., # 1 vial, 4 Refills, Pharmacy: [facility]/pharmacy #[id number] 12/5/19   Kilo Cortez MD  
albuterol (PROVENTIL HFA, VENTOLIN HFA, PROAIR HFA) 90 mcg/actuation inhaler Take 2 Puffs by inhalation every four (4) hours as needed for Wheezing. 11/2/20   Onelia Morris MD  
zinc sulfate 220 mg tablet Take 1 Tab by mouth daily. 11/2/20   Onelia Morris MD  
cholecalciferol (VITAMIN D3) 1,000 unit tablet Take 1,000 Units by mouth daily. Kilo Cortez MD  
levothyroxine (SYNTHROID) 50 mcg tablet Take 50 mcg by mouth Daily (before breakfast). Kilo Cortez MD  
 
 
Patient Active Problem List  
Diagnosis Code  Severe sepsis with acute organ dysfunction ([facility]) A41.9, R65.20  Respiratory failure (HCC) J96.90  
 HFrEF (heart failure with reduced ejection fraction) ([facility]) I50.20  Down syndrome Q90.9  Acquired hypothyroidism E03.9  Goals of care, counseling/discussion Z71.89  
 • Acute renal failure with acute renal cortical necrosis superimposed on stage 4 chronic kidney disease ([facility]) N17.1, N18.4  
• Acute hypoxemic respiratory failure ([facility]) J96.01  
• Post viral syndrome G93.3  
• Large hiatal hernia K44.9  
• Dehydration with hypernatremia E87.0  
• Kidney disease, chronic, stage IV (severe, EGFR 15-29 ml/min) ([facility]) N18.4  
• Aspiration pneumonia of both lungs due to vomit ([facility]) J69.0  
• Gram negative septic shock ([facility]) A41.50, R65.21  
• Lactic acidosis E87.2  
• Constipation due to pain medication therapy K59.03  
• Fecal impaction of colon ([facility]) K56.41  
 
 
REVIEW OF SYSTEMS:   
Constitutional: negative fever, negative chills, negative weight loss 
Eyes:   negative visual changes 
ENT:   negative sore throat, tongue or lip swelling  
Respiratory:  negative cough, negative dyspnea 
Cards:  negative for chest pain, palpitations, lower extremity edema 
GI:   See HPI 
:  negative for frequency, dysuria 
Integument:  negative for rash and pruritus 
Heme:  negative for easy bruising and gum/nose bleeding 
Musculoskel: negative for myalgias,  back pain and muscle weakness 
Neuro: negative for headaches, dizziness, vertigo 
Psych: negative for feelings of anxiety, depression  
 
**Patient was not able to provide review of systems due to mental status change/acute illness 
 
Pertinent Positives: 
 
 
Objective:  
VITALS:   
Visit Vitals 
BP (!) 101/50  
Pulse 64  
Temp 98.6 °F (37 °C)  
Resp 29  
Ht 5' 3\" (1.6 m)  
Wt 90.8 kg (200 lb 2.8 oz)  
SpO2 99%  
BMI 35.46 kg/m²  
 
 
PHYSICAL EXAM:  
General:          Alert, WD, WN, cooperative, no distress, appears stated age.   
Head:               Normocephalic, without obvious abnormality, atraumatic. 
Eyes:               Conjunctivae clear and pale, anicteric sclerae.  Pupils are equal 
Nose:               Nares normal. No drainage or sinus tenderness. 
Throat:             Lips, mucosa, and tongue normal.  No Thrush 
 Neck:               Supple, symmetrical,  no adenopathy, thyroid: non tender Back:               Symmetric. Lungs:             CTA bilaterally. No wheezing/rhonchi/rales. Chest wall:      No Accessory muscle use. Heart:              Regular rate and rhythm,  no murmur, rub or gallop. Abdomen:        Soft, non-tender. Distended r/t body habitus. Bowel sounds normal. No masses. OG tube with tube feeds two harjit at 20ml/hr. Dutta in place. Extremities:     Atraumatic, No cyanosis. No edema. No clubbing Skin:                Texture, turgor normal. No rashes/lesions/jaundice Lymph:            Cervical, supraclavicular normal. 
Psych:              Not anxious or agitated. Neurologic:       No facial asymmetry. No aphasia or slurred speech. Eyes are open spontaneously. LAB DATA REVIEWED:   
Recent Results (from the past 24 hour(s)) GLUCOSE, POC Collection Time: 01/05/21  5:43 PM  
Result Value Ref Range Glucose (POC) 90 65 - 100 mg/dL Performed by Vladimir Levy CBC WITH AUTOMATED DIFF Collection Time: 01/06/21  4:10 AM  
Result Value Ref Range WBC 14.8 (H) 3.6 - 11.0 K/uL  
 RBC 3.10 (L) 3.80 - 5.20 M/uL HGB 8.2 (L) 11.5 - 16.0 g/dL HCT 24.5 (L) 35.0 - 47.0 % MCV 79.0 (L) 80.0 - 99.0 FL  
 MCH 26.5 26.0 - 34.0 PG  
 MCHC 33.5 30.0 - 36.5 g/dL  
 RDW 15.9 (H) 11.5 - 14.5 % PLATELET 040 320 - 985 K/uL MPV 11.3 8.9 - 12.9 FL  
 NRBC 0.0 0  WBC ABSOLUTE NRBC 0.00 0.00 - 0.01 K/uL NEUTROPHILS 77 (H) 32 - 75 % LYMPHOCYTES 8 (L) 12 - 49 % MONOCYTES 11 5 - 13 % EOSINOPHILS 3 0 - 7 % BASOPHILS 0 0 - 1 % IMMATURE GRANULOCYTES 1 (H) 0.0 - 0.5 % ABS. NEUTROPHILS 11.4 (H) 1.8 - 8.0 K/UL  
 ABS. LYMPHOCYTES 1.2 0.8 - 3.5 K/UL  
 ABS. MONOCYTES 1.6 (H) 0.0 - 1.0 K/UL  
 ABS. EOSINOPHILS 0.4 0.0 - 0.4 K/UL  
 ABS. BASOPHILS 0.1 0.0 - 0.1 K/UL  
 ABS. IMM. GRANS. 0.1 (H) 0.00 - 0.04 K/UL  
 DF AUTOMATED METABOLIC PANEL, COMPREHENSIVE  
 Collection Time: 01/06/21  4:10 AM  
Result Value Ref Range Sodium 142 136 - 145 mmol/L Potassium 3.8 3.5 - 5.1 mmol/L Chloride 109 (H) 97 - 108 mmol/L  
 CO2 23 21 - 32 mmol/L Anion gap 10 5 - 15 mmol/L Glucose 74 65 - 100 mg/dL BUN 39 (H) 6 - 20 MG/DL Creatinine 1.77 (H) 0.55 - 1.02 MG/DL  
 BUN/Creatinine ratio 22 (H) 12 - 20 GFR est AA 39 (L) >60 ml/min/1.73m2 GFR est non-AA 32 (L) >60 ml/min/1.73m2 Calcium 9.4 8.5 - 10.1 MG/DL Bilirubin, total 0.8 0.2 - 1.0 MG/DL  
 ALT (SGPT) 17 12 - 78 U/L  
 AST (SGOT) 17 15 - 37 U/L Alk. phosphatase 45 45 - 117 U/L Protein, total 6.9 6.4 - 8.2 g/dL Albumin 2.9 (L) 3.5 - 5.0 g/dL Globulin 4.0 2.0 - 4.0 g/dL A-G Ratio 0.7 (L) 1.1 - 2.2 GLUCOSE, POC Collection Time: 01/06/21  6:14 AM  
Result Value Ref Range Glucose (POC) 96 65 - 100 mg/dL Performed by Brandon Delgado RN   
GLUCOSE, POC Collection Time: 01/06/21 11:41 AM  
Result Value Ref Range Glucose (POC) 87 65 - 100 mg/dL Performed by Shaggy Veloz IMAGING RESULTS: 
I have personally reviewed the imaging reports Total time spent with patient: 60 minutes ________________________________________________________________________ Care Plan discussed with: 
Patient Y Family  Yusman GARRIDO Cally Level Consultant:    
 
CT  1/6/2021: 
________________________________________________________________________ 
 
___________________________________________________ Consulting Provider: Yonathan Drew NP  
   1/6/2021  2:51 PM

## 2021-01-06 NOTE — PROGRESS NOTES
01/06/21 8179 ABCDEF Bundle SBT Safety Screen Passed Yes SBT Screen Reason for Failure Agitation SBT Trial Passed No  
SBT Trial Reason for Failure Respiratory rate > 35 Weaning Parameters Resp Rate Observed 38 Ve 13.1

## 2021-01-06 NOTE — PROGRESS NOTES
Palliative Medicine Consult Mortensen: [phone]) Patient Name: Garcia Guzmán YOB: 1980 Date of Initial Consult: 12/28/2020 Reason for Consult: care decisions Requesting Provider: Laura Cruz MD  
Primary Care Physician: Diego, MD Kilo 
 
 SUMMARY:  
Garcia Guzmán is a 47 y.o. with a past history of  Down's syndrome, hypothyroidism, obesity (BMI 30), CKD3, CHF (ECHO 2018 mod diffuse hypokinesis, EF 30%)  large hiatal hernia,, who was admitted on 12/23/2020 from home with a diagnosis of COVID PNA. Current medical issues leading to Palliative Medicine involvement include: ongoing decline from prolonged COVID infection (recent admission 11/8 to 11/16/20 for covid pna and again 11/19 to 12/6/20 for sepsis due to covid) during which she was seen by Palliative Medicine in Nov 2020. 
 
12/28: remains intubated, 40%, PEEP 6. Levophed 8 mcg/min. Worsening renal failure. 12/29: remains intubated, 35%, PEEP 5. Levophed 2 mcg/min. Cr stable at 4.09.  
1/4:     remains intubated, 40%, PEEP 5. Levophed 2 mcg/min. Cr down to 2.25.  
1/5:     remains intubated, 40%, PEEP 5. Cr down to 1.86. Plan for abd CT scan today to evaluate for SBO/ileus. 1/6:     remains intubated, 40%, PEEP 5. Failed SBT due to RR 40s and agitation. Cr down to 1.77. CT abd/pelv yesterday revealed no evidence of SBO. Incidentally lungs show bibasilar airspace disease with bilat pleural effusions. Psychosocial: lives with and is cared for by mother Tulio Marie [phone]. PALLIATIVE DIAGNOSES:  
1. Acute respiratory failure due to COVID-19 pneumonia 2. Morbid obesity BMI 30 
3. Large hiatal hernia 4. Dysphagia, aspiration risk, on modified diet 5. Debility 6. High risk for dying from COVID due to underlying CHF, obesity 7. Hypothermic (12/27) 8. Renal failure (Cr 4.18>4.09) 9. Care decisions  PLAN:  
 1. Prior to visit, I completed a review of patient's medical records, including medical documentation, vital signs, MARs, and results of various labs and other diagnostics. I also spoke with patient's nurse, Luci Jo and intensivist Dr. Rach Main. 2. Spoke with mother: per discussions in IDT, due to pulmonary infiltrates, pulmonary effusions, swelling of tongue, tracheostomy is recommended to improve successful extubation as plan is for one-way extubation, explained what a tracheostomy is, risks vs benefits. Answered all mom's questions. Mom wants to talk with family before making any decisions. Offered a family meeting if needed to help with decision-making. 3. Initial consult note routed to primary continuity provider and/or primary health care team members 4. Communicated plan of care with: Palliative IDT, Alber [address] Team 
 
 GOALS OF CARE / TREATMENT PREFERENCES:  
 
GOALS OF CARE: 
Patient/Health Care Proxy Stated Goals: Prolong life TREATMENT PREFERENCES:  
Code Status: DNR Advance Care Planning: 
[x] The [facility] Interdisciplinary Team has updated the ACP Navigator with [address] and Patient Capacity Primary Decision Maker (Active): Carsabi - Aaron Oneill - [phone] Secondary Decision Maker: Karla Wick - [phone] Advance Care Planning 12/28/2020 Patient's Healthcare Decision Maker is: Legal Next of Kin Confirm Advance Directive None Patient Would Like to Complete Advance Directive Unable Medical Interventions: Full interventions Other: As far as possible, the palliative care team has discussed with patient / health care proxy about goals of care / treatment preferences for patient. HISTORY:  
 
History obtained from: deborah, Regan Hull CHIEF COMPLAINT: SOB, fever HPI/SUBJECTIVE: The patient is:  
[] Verbal and participatory [x] Non-participatory due to: intubation 12/23: presented to [facility] ED for fever, c/o not feeling well and cough, with associated loss of appetite and very productive cough; this is her 3rd admission for  COVID PNA. While in the ED pt became tachypneic with sats dropping to 92% and placed on 100% NRB with sats 96-96%; her respiratory status continued to decline, requiring bipap, then intubation. hospitalist was not comfortable keeping pt at [address] and had her transferred to [facility]. Clinical Pain Assessment (nonverbal scale for severity on nonverbal patients):  
Clinical Pain Assessment Severity: 0 Activity (Movement): Lying quietly, normal position Duration: for how long has pt been experiencing pain (e.g., 2 days, 1 month, years) Frequency: how often pain is an issue (e.g., several times per day, once every few days, constant) FUNCTIONAL ASSESSMENT:  
 
Palliative Performance Scale (PPS): PPS: 20 
 
 
 PSYCHOSOCIAL/SPIRITUAL SCREENING:  
 
Palliative IDT has assessed this patient for cultural preferences / practices and a referral made as appropriate to needs (Cultural Services, Patient Advocacy, Ethics, etc.) Any spiritual / Alevism concerns: 
[] Yes /  [x] No 
 
Caregiver Burnout: 
[] Yes /  [x] No /  [] No Caregiver Present Anticipatory grief assessment:  
[x] Normal  / [] Maladaptive ESAS Anxiety: Anxiety: 0 
 
ESAS Depression:   unable to assess due to pt factors REVIEW OF SYSTEMS:  
 
Positive and pertinent negative findings in ROS are noted above in HPI. The following systems were [x] reviewed / [] unable to be reviewed as noted in HPI Other findings are noted below. Systems: constitutional, ears/nose/mouth/throat, respiratory, gastrointestinal, genitourinary, musculoskeletal, integumentary, neurologic, psychiatric, endocrine. Positive findings noted below. Modified ESAS Completed by: provider Pain: 0 Anxiety: 0 Dyspnea: 0 Stool Occurrence(s): 1  PHYSICAL EXAM:  
 
 From RN flowsheet: 
Wt Readings from Last 3 Encounters:  
01/04/21 190 lb 4.1 oz (86.3 kg) 12/23/20 191 lb (86.6 kg) 12/04/20 191 lb (86.6 kg) Blood pressure (!) 118/49, pulse 68, temperature 98.9 °F (37.2 °C), resp. rate 10, height 5' 3\" (1.6 m), weight 190 lb 4.1 oz (86.3 kg), SpO2 100 %. Pain Scale 1: Behavioral Pain Scale (BPS) Pain Intensity 1: 3 Last bowel movement, if known:  
 
Constitutional: intubated, sedated Eyes: opened, anicteric, tracking some HENT: mm moist, ETT in place Cardiac: RRR Lungs: CTAB 
MS: no deformities Neuro: wakes to verbal stim, makes eye contact Psycho: unable to assess due to pt factors HISTORY:  
 
Principal Problem: 
  Aspiration pneumonia of both lungs due to vomit ([address] (12/23/2020) Active Problems: 
  Acute hypoxemic respiratory failure ([address] (12/24/2020) Post viral syndrome (12/25/2020) Dehydration with hypernatremia (12/16/2020) Kidney disease, chronic, stage IV (severe, EGFR 15-29 ml/min) ([facility]) (12/23/2020) Gram negative septic shock ([address] (1/23/2020) Lactic acidosis (12/23/2020) Fecal impaction of colon ([address] (12/23/2020) Large hiatal hernia (11/2/2020) Overview: ([id number])- CT Abdomen: 
    Distended fluid-filled esophagus and large hiatus hernia. Constipation due to pain medication therapy (11/26/2020) Severe sepsis with acute organ dysfunction ([address] (12/23/2020) Respiratory failure ([address] (12/23/2020) HFrEF (heart failure with reduced ejection fraction) ([address] (12/24/2020) Down syndrome (12/23/2020) Acquired hypothyroidism (12/24/2020) Goals of care, counseling/discussion (12/28/2020) Acute renal failure with acute renal cortical necrosis superimposed on stage 4 chronic kidney disease ([facility] (12/23/2020) Past Medical History:  
Diagnosis Date  Endocrine disease  Gram negative septic shock ([facility] 1/23/2020  Hypothyroid 03/11/2018  Ill-defined condition Down's Syndrome  Lactic acidosis 12/23/2020 No past surgical history on file. No family history on file. History reviewed, no pertinent family history. Social History Tobacco Use  Smoking status: Never Smoker  Smokeless tobacco: Never Used Substance Use Topics  Alcohol use: No  
 
No Known Allergies Current Facility-Administered Medications Medication Dose Route Frequency  midazolam (VERSED) injection 2 mg  2 mg IntraVENous Q2H PRN  
 fentaNYL citrate (PF) injection 50 mcg  50 mcg IntraVENous Q4H PRN  polyethylene glycol (MIRALAX) packet 17 g  17 g Per G Tube BID  
 bisacodyL (DULCOLAX) suppository 10 mg  10 mg Rectal DAILY  famotidine (PF) (PEPCID) 20 mg in 0.9% sodium chloride 10 mL injection  20 mg IntraVENous DAILY  0.45% sodium chloride infusion  75 mL/hr IntraVENous CONTINUOUS  
 lactulose (CHRONULAC) 10 gram/15 mL solution 15 mL  10 g Oral TID  
 0.9% sodium chloride infusion 250 mL  250 mL IntraVENous PRN  
 dexmedeTOMidine (PRECEDEX) 400 mcg in 0.9% sodium chloride 100 mL infusion  0.1-1.5 mcg/kg/hr IntraVENous TITRATE  risperiDONE (RisperDAL) tablet 1 mg  1 mg Per NG tube QHS  levothyroxine (SYNTHROID) tablet 50 mcg  50 mcg Per NG tube DAILY  levothyroxine tube feed reminder note  1 Each Other BID  heparin (porcine) injection 5,000 Units  5,000 Units SubCUTAneous Q8H  
 balsam peru-castor oiL (VENELEX) ointment   Topical BID  alcohol 62% (NOZIN) nasal  1 Ampule  1 Ampule Topical Q12H  
 dextrose (D50W) injection syrg 12.5-25 g  25-50 mL IntraVENous PRN  
 insulin lispro (HUMALOG) injection   SubCUTAneous Q6H  
 acetaminophen (TYLENOL) tablet 650 mg  650 mg Per G Tube Q6H PRN Or  
 acetaminophen (TYLENOL) suppository 650 mg  650 mg Rectal Q6H PRN  chlorhexidine (ORAL CARE KIT) 0.12 % mouthwash 15 mL  15 mL Oral Q12H  
 heparin (porcine) pf 300 Units  300 Units InterCATHeter PRN  
  sodium chloride (NS) flush 5-40 mL  5-40 mL IntraVENous Q8H  
 sodium chloride (NS) flush 5-40 mL  5-40 mL IntraVENous PRN  
 ondansetron (ZOFRAN) injection 4 mg  4 mg IntraVENous Q6H PRN  
 
 
 
 LAB AND IMAGING FINDINGS:  
 
Lab Results Component Value Date/Time WBC 14.8 (H) 01/06/2021 04:10 AM  
 HGB 8.2 (L) 01/06/2021 04:10 AM  
 PLATELET 521 63/77/7252 04:10 AM  
 
Lab Results Component Value Date/Time Sodium 142 01/06/2021 04:10 AM  
 Potassium 3.8 01/06/2021 04:10 AM  
 Chloride 109 (H) 01/06/2021 04:10 AM  
 CO2 23 01/06/2021 04:10 AM  
 BUN 39 (H) 01/06/2021 04:10 AM  
 Creatinine 1.77 (H) 01/06/2021 04:10 AM  
 Calcium 9.4 01/06/2021 04:10 AM  
 Magnesium 2.8 (H) 01/01/2021 01:44 PM  
 Phosphorus 4.9 (H) 01/05/2021 04:33 AM  
  
Lab Results Component Value Date/Time Alk. phosphatase 45 01/06/2021 04:10 AM  
 Protein, total 6.9 01/06/2021 04:10 AM  
 Albumin 2.9 (L) 01/06/2021 04:10 AM  
 Globulin 4.0 01/06/2021 04:10 AM  
 
Lab Results Component Value Date/Time INR 1.1 11/20/2020 01:57 AM  
 Prothrombin time 11.8 (H) 11/20/2020 01:57 AM  
 aPTT 40.0 (H) 12/28/2020 04:51 AM  
  
Lab Results Component Value Date/Time Iron 65 12/04/2020 03:11 AM  
 TIBC 279 12/04/2020 03:11 AM  
 Iron % saturation 23 12/04/2020 03:11 AM  
 Ferritin 555 (H) 12/28/2020 04:51 AM  
  
Lab Results Component Value Date/Time pH 7.33 (L) 12/24/2020 05:50 AM  
 PCO2 38 12/24/2020 05:50 AM  
 PO2 80 12/24/2020 05:50 AM  
 
No components found for: Dimitri Point Lab Results Component Value Date/Time  (H) 11/11/2020 01:00 AM  
  
 
 
   
 
Total time:  
Counseling / coordination time, spent as noted above:  
> 50% counseling / coordination?:  
 
Prolonged service was provided for  []30 min   []75 min in face to face time in the presence of the patient, spent as noted above. Time Start:  
Time End:  
Note: this can only be billed with 75513 (initial) or 28537 (follow up). If multiple start / stop times, list each separately.

## 2021-01-07 NOTE — PROGRESS NOTES
01/07/21 0543  
ABCDEF Bundle  
SBT Safety Screen Passed Yes  
SBT Trial Passed No  
SBT Trial Reason for Failure Respiratory rate > 35;Low tidal volume;RSBI>105  
Weaning Parameters  
Spontaneous Breathing Trial Complete Yes  
Resp Rate Observed 37  
Ve 9.2  
  
RSBI 160

## 2021-01-07 NOTE — PROGRESS NOTES
Nephrology Progress Note [facility]  
 
[email or web address]  Phone - [phone] Patient: Lisha Thorne    YOB: 1980     Admit Date: 12/23/2020 Date- 1/7/2021 CC: Follow up for DANY IMPRESSION & PLAN:  
? DANY likely pre-renal + Vanc toxicity--improving ? Sepsis-gram-negative shock ? HYPOKALEMIA ? Hypernatremia ? Anemia ? Lactic acidosis ? Protein malnutrition ? Bacterial pneumonia ? Fecal impaction ? Respiratory failure-on vent ? History of heart failure ? Atrophic right kidney ? Down syndrome with autism PLAN- 
? Continue current IV fluids ? kcl 20 meq iv ? Plan for PEG noted ? Avoid hypotension ? Agree with PRBC tx 
? Follow BMP ? TRACH on friday ? Vent management per ICU team 
  
Subjective: Interval History: She remains on ventilation Creatinine continue to improve-  
Urine output 1310 mo 
k low cr stable Objective:  
Vitals:  
 01/07/21 0600 01/07/21 0700 01/07/21 0800 01/07/21 0835 BP: (!) 101/57 (!) 97/40 (!) 91/41 Pulse: (!) 57 64 63 (!) 56 Resp: 20 18 20 22 Temp:   98.5 °F (36.9 °C) SpO2: 100% 100% 99% 100% Weight:      
Height:      
  
01/06 0701 - 01/07 0700 In: 7271 [I.V.:1278] Out: 1955 [HHHAD:9756; Drains:50] Last 3 Recorded Weights in this Encounter 01/03/21 1252 01/04/21 2213 01/06/21 1450 Weight: 88.4 kg (194 lb 14.2 oz) 86.3 kg (190 lb 4.1 oz) 90.8 kg (200 lb 2.8 oz) Physical exam:  
GEN: intubated on vent NECK-no mass, ET TUBE RESP- clear  b/l, no wheezing CVS: RRR,S1,S2 ABDO: soft, NT 
NEURO: Can't access due to patient's current condition  : mejia + Chart reviewed. Pertinent Notes reviewed. Data Review : 
Recent Labs 01/07/21 2055 01/06/21 
0410 01/05/21 
9716  142 141  
K 3.5 3.8 4.7 * 109* 109* CO2 26 23 24 BUN 31* 39* 46* CREA 1.68* 1.77* 1.86* GLU 93 74 82 CA 8.8 9.4 9.2 MG 1.9  --   --   
PHOS 3.7  --  4.9*  
 Recent Labs 01/07/21 
9363 01/06/21 
0410 01/05/21 
4646 WBC 11.3* 14.8* 17.5* HGB 6.9* 8.2* 8.1* HCT 20.8* 24.5* 24.3*  
 300 277 No results for input(s): FE, TIBC, PSAT, FERR in the last 72 hours. Medication list  reviewed Current Facility-Administered Medications Medication Dose Route Frequency  potassium chloride 20 mEq in 50 ml IVPB  20 mEq IntraVENous ONCE  
 0.9% sodium chloride infusion 250 mL  250 mL IntraVENous PRN  
 midazolam (VERSED) injection 2 mg  2 mg IntraVENous Q2H PRN  
 fentaNYL citrate (PF) injection 50 mcg  50 mcg IntraVENous Q4H PRN  polyethylene glycol (MIRALAX) packet 17 g  17 g Per G Tube BID  
 bisacodyL (DULCOLAX) suppository 10 mg  10 mg Rectal DAILY  famotidine (PF) (PEPCID) 20 mg in 0.9% sodium chloride 10 mL injection  20 mg IntraVENous DAILY  0.45% sodium chloride infusion  75 mL/hr IntraVENous CONTINUOUS  
 lactulose (CHRONULAC) 10 gram/15 mL solution 15 mL  10 g Oral TID  dexmedeTOMidine (PRECEDEX) 400 mcg in 0.9% sodium chloride 100 mL infusion  0.1-1.5 mcg/kg/hr IntraVENous TITRATE  risperiDONE (RisperDAL) tablet 1 mg  1 mg Per NG tube QHS  levothyroxine (SYNTHROID) tablet 50 mcg  50 mcg Per NG tube DAILY  levothyroxine tube feed reminder note  1 Each Other BID  heparin (porcine) injection 5,000 Units  5,000 Units SubCUTAneous Q8H  
 balsam peru-castor oiL (VENELEX) ointment   Topical BID  alcohol 62% (NOZIN) nasal  1 Ampule  1 Ampule Topical Q12H  
 dextrose (D50W) injection syrg 12.5-25 g  25-50 mL IntraVENous PRN  
 insulin lispro (HUMALOG) injection   SubCUTAneous Q6H  
 acetaminophen (TYLENOL) tablet 650 mg  650 mg Per G Tube Q6H PRN Or  
 acetaminophen (TYLENOL) suppository 650 mg  650 mg Rectal Q6H PRN  chlorhexidine (ORAL CARE KIT) 0.12 % mouthwash 15 mL  15 mL Oral Q12H  
 heparin (porcine) pf 300 Units  300 Units InterCATHeter PRN  
  sodium chloride (NS) flush 5-40 mL  5-40 mL IntraVENous Q8H  
 sodium chloride (NS) flush 5-40 mL  5-40 mL IntraVENous PRN  
 ondansetron (ZOFRAN) injection 4 mg  4 mg IntraVENous Q6H PRN Lemon Labrum, [address] [employer] [address] [place], [address] [facility] Phone - [phone]               Fax - [phone]

## 2021-01-07 NOTE — PROGRESS NOTES
Nutrition Note Chart reviewed, case discussed during CCU rounds. Pt receiving PEG placement at time of visit. Per GI okay to start trophic TF at 4pm this afternoon. Pt will be NPO after midnight for trach tomorrow. Recommend resuming TwoCal HN @ 20mL/h + 50mL H2O flush q 4h at 4pm 
 
After procedure tomorrow, recommend advancing towards Goal Rate of 35ml/h + 50mL H2O flush q 4h (provides 1680kcals/70gPro/188gCHO/888mL) Electronically signed by Zachery Bailey RD, [address] on 1/7/2021 at 3:23 PM 
 
Contact: [phone]

## 2021-01-07 NOTE — PROGRESS NOTES
[address] [facility] Adult  Hospitalist Group Critical Care Progress Note Jhonatan Randall MD 
Answering service: [phone] OR [phone] from in house phone Date of Service:  2021 NAME:  Frandy Bullock YOB: 1980 MRN:  [id number] Interval history / Subjective:  
 Sp PEG today, did well, hemodynamically stable. Will go for trach tomorrow. Cr better on The IV fluids. Assessment & Plan:  
 
Principal Problem: 
  Aspiration pneumonia of both lungs due to vomit ([address] (2020) 
  Active Problems: 
  Acute hypoxemic respiratory failure ([address] (2020)   
  Post viral syndrome (2020)   
  Dehydration with hypernatremia (2020)   
  Kidney disease, chronic, stage IV (severe, EGFR 15-29 ml/min) ([facility]) (2020)   
  Gram negative septic shock ([address] (2020)   
  Lactic acidosis (2020)   
  Fecal impaction of colon ([address] (2020)   
  Large hiatal hernia (2020) 
    Overview: ([id number])- CT Abdomen: 
    Distended fluid-filled esophagus and large hiatus hernia. 
  
  Constipation due to pain medication therapy (2020)   
  Severe sepsis with acute organ dysfunction ([address] (2020)   
  Respiratory failure ([address] (2020)   
  HFrEF (heart failure with reduced ejection fraction) ([address] (2020)   
  Down syndrome (2020)   
  Acquired hypothyroidism (2020)   
  Goals of care, counseling/discussion (2020)   
  Acute renal failure with acute renal cortical necrosis superimposed on stage 4 chronic kidney disease ([address] (2020)   
  
  
ICU Comprehensive Plan of Care:  
  
Plans for this Shift:  
 1. Sepsis with acute organ dysfunction due to aspiration bilateral bacterial pneumonia- continue empiric IV antibiotics (meropenem) aggressive pulmonary toilet.  Covid pneumonia resolved, with post viral bilateral bacterial pneumonia. 
  
2. DANY superimposed on CKD with severe hypernatremic dehydration and nonoliguric ATN- monitor daily labs, continue IV fluids with 1/2 NS. Cr better.  
  
Fecal impaction of rectum and ascending colon- holding enteral feeds. Concern for SBO vs ileus has resolved with the normal CT abdomen.  
  
Acute on Chronic Respiratory Failure Unable to wean from mechanical ventilation from a combination of massive aspiration event, Difficult to control pulmonary edema and given her body habitus she likely has undiagnosed Obstructive sleep apnea. She is unlikely to be able to take po for a long time safely especially Now with the added weakness from being down for 2 weeks. As discussed with her mother She would benefit for a Trach and PEG combo. PEG done today by GI and then trach Booked for tomorrow by gen surg. 
  
ABCDEF Bundle/Checklist 
Pain Medications: Fentanyl Target RASS: 0 - Alert & Calm - Spontaneously pays attention to caregiver Sedation Medications: None CAM-ICU:  Negative Discussed Plan of Care (goals of care): Yes Addressed Code Status: Do Not Resuscitate 
  
CARDIOVASCULAR Cardiac Gtts: None SBP Goal of: > 90 mmHg MAP Goal of: > 65 mmHg Transfusion Trigger (Hgb): <7 g/dL 
  
RESPIRATORY Vent Goals:  
Head of bed > 30 degrees Aggressive bronchopulmonary hygiene DVT Prophylaxis (if no, list reason): SCD's or Sequential Compression Device and Lovenox  
SPO2 Goal: > 92% 
  
GI/ Dutta Catheter Present: Yes GI Prophylaxis: Pepcid (famotidine)  
Nutrition: No NPO Bowel Movement: Yes Bowel Regimen: MiraLax, Milk of magnesia and Enema 
  
ANTIBIOTICS Antibiotics: 
Meropenem 
  
T/L/D Tubes: ETT and Nasogastric Tube Lines: Peripheral IV and None Drains: Dutta Catheter 
  
SPECIAL EQUIPMENT None 
  
DISPOSITION Stay in ICU. PEG done today and trach tomorrow.  
  
DNR status 
  
I personally spent  40    minutes of critical care time.  This is time spent at this critically ill patient's bedside actively involved in patient care as well as the coordination of care and discussions with the patient's family.  This does not include any procedural time which has been billed separately. Hospital Problems  Date Reviewed: 1/7/2021 Codes Class Noted POA * (Principal) Aspiration pneumonia of both lungs due to vomit ([facility] ICD-10-CM: J69.0 ICD-9-CM: 507.0 Acute 12/23/2020 Yes Post viral syndrome ICD-10-CM: G93.3 ICD-9-CM: 780.79 Acute 12/25/2020 No  
   
 Acute hypoxemic respiratory failure ([facility] ICD-10-CM: J96.01 
ICD-9-CM: 518.81 Acute 12/24/2020 Yes Kidney disease, chronic, stage IV (severe, EGFR 15-29 ml/min) (HCC) ICD-10-CM: N18.4 ICD-9-CM: 203. 4 Acute 12/23/2020 Yes Lactic acidosis ICD-10-CM: E87.2 ICD-9-CM: 276.2 Acute 12/23/2020 Yes [facility] imp[facility] ICD-10-CM: K56.41 
ICD-9-CM: 560.32 Acute 12/23/2020 Yes Dehydration with hypernatremia (Chronic) ICD-10-CM: E87.0 ICD-9-CM: 276.0 Acute 12/16/2020 Yes Gram negative septic shock (HCC) ICD-10-CM: A41.50, R65.21 ICD-9-CM: 038.40, 995.92, 785.52 Acute 1/23/2020 Yes Constipation due to pain medication therapy ICD-10-CM: K59.03 
ICD-9-CM: 564.09, E947.9 Acute 11/26/2020 Yes Large hiatal hernia (Chronic) ICD-10-CM: K44.9 ICD-9-CM: 335. 3 Chronic 11/2/2020 Yes Overview Signed 1/1/2021  4:08 PM by Ryan Lancaster MD  
  ([id number])- CT Abdomen: 
Distended fluid-filled esophagus and large hiatus hernia. Goals of care, counseling/discussion ICD-10-CM: Z71.89 ICD-9-CM: V65.49  12/28/2020 Yes HFrEF (heart failure with reduced ejection fraction) (HCC) (Chronic) ICD-10-CM: I50.20 ICD-9-CM: 428.20  12/24/2020 Yes Acquired hypothyroidism (Chronic) ICD-10-CM: E03.9 ICD-9-CM: 244.9  12/24/2020 Yes Severe sepsis with acute organ dysfunction (HCC) ICD-10-CM: A41.9, R65.20 ICD-9-CM: 038.9, 995.92 Acute 12/23/2020 Yes Respiratory failure ([address] ICD-10-CM: J96.90 ICD-9-CM: 518.81  12/23/2020 Yes Down syndrome (Chronic) ICD-10-CM: Q90.9 ICD-9-CM: 758.0 End Stage 12/23/2020 Yes Acute renal failure with acute renal cortical necrosis superimposed on stage 4 chronic kidney disease (HCC) (Chronic) ICD-10-CM: N17.1, N18.4 ICD-9-CM: 584.6, 585.4 Acute 12/23/2020 Yes Review of Systems:  
Review of systems not obtained due to patient factors. Vital Signs:  
 Last 24hrs VS reviewed since prior progress note. Most recent are: 
Visit Vitals /69 Pulse 62 Temp 98.6 °F (37 °C) Resp 18 Ht 5' 3\" (1.6 m) Wt 90.8 kg (200 lb 2.8 oz) SpO2 100% BMI 35.46 kg/m² Intake/Output Summary (Last 24 hours) at 1/7/2021 1747 Last data filed at 1/7/2021 1500 Gross per 24 hour Intake 2808.87 ml Output 1890 ml Net 918.87 ml Physical Examination:  
 
 
     
Constitutional:  No acute distress, cooperative, pleasant ENT:  Oral mucous moist, oropharynx benign. Resp:  CTA bilaterally. No wheezing/rhonchi/rales. No accessory muscle use CV:  Regular rhythm, normal rate, no murmurs, gallops, rubs GI:  Soft, non distended, non tender. normoactive bowel sounds, no hepatosplenomegaly Musculoskeletal:  No edema, warm, 2+ pulses throughout Neurologic:  Moves all extremities. AAOx3, CN II-XII reviewed Psych:  sedated Skin:  Good turgor, no rashes or ulcers Hematologic/Lymphatic/Immunlogic:  No jaundice nor lymph node swelling :  deferred Eyes:  PERRL, Sedated Data Review:  
 Review and/or order of clinical lab test 
 
 
Labs:  
 
Recent Labs 01/07/21 
6250 01/06/21 
0410 WBC 11.3* 14.8* HGB 6.9* 8.2*  
 HCT 20.8* 24.5*  
 300 Recent Labs 01/07/21 
1641 01/06/21 
0410 01/05/21 
4322  142 141  
K 3.5 3.8 4.7 * 109* 109* CO2 26 23 24 BUN 31* 39* 46* CREA 1.68* 1.77* 1.86* GLU 93 74 82 CA 8.8 9.4 9.2 MG 1.9  --   --   
PHOS 3.7  --  4.9* Recent Labs 01/06/21 
0410 01/05/21 
2936 ALT 17  --   
AP 45  --   
TBILI 0.8  --   
TP 6.9  --   
ALB 2.9* 3.0*  
GLOB 4.0  -- No results for input(s): INR, PTP, APTT, INREXT in the last 72 hours. No results for input(s): FE, TIBC, PSAT, FERR in the last 72 hours. Lab Results Component Value Date/Time Folate 18.2 12/04/2020 03:11 AM  
  
No results for input(s): PH, PCO2, PO2 in the last 72 hours. No results for input(s): CPK, CKNDX, TROIQ in the last 72 hours. No lab exists for component: CPKMB No results found for: CHOL, CHOLX, CHLST, CHOLV, HDL, HDLP, LDL, LDLC, DLDLP, TGLX, TRIGL, TRIGP, CHHD, CHHDX Lab Results Component Value Date/Time Glucose (POC) 92 01/07/2021 11:24 AM  
 Glucose (POC) 86 01/07/2021 05:41 AM  
 Glucose (POC) 117 (H) 01/06/2021 11:59 PM  
 Glucose (POC) 102 (H) 01/06/2021 05:51 PM  
 Glucose (POC) 87 01/06/2021 11:41 AM  
 
Lab Results Component Value Date/Time Color YELLOW/STRAW 12/30/2020 05:37 PM  
 Appearance TURBID (A) 12/30/2020 05:37 PM  
 Specific gravity 1.012 12/30/2020 05:37 PM  
 Specific gravity 1.010 11/20/2020 01:57 AM  
 pH (UA) 5.0 12/30/2020 05:37 PM  
 Protein 30 (A) 12/30/2020 05:37 PM  
 Glucose Negative 12/30/2020 05:37 PM  
 Ketone Negative 12/30/2020 05:37 PM  
 Bilirubin Negative 12/30/2020 05:37 PM  
 Urobilinogen 0.2 12/30/2020 05:37 PM  
 Nitrites Negative 12/30/2020 05:37 PM  
 Leukocyte Esterase SMALL (A) 12/30/2020 05:37 PM  
 Epithelial cells FEW 12/30/2020 05:37 PM  
 Bacteria Negative 12/30/2020 05:37 PM  
 WBC 20-50 12/30/2020 05:37 PM  
 RBC 5-10 12/30/2020 05:37 PM  
 
 
 
Medications Reviewed:  
 
Current Facility-Administered Medications Medication Dose Route Frequency  sodium chloride (NS) flush 5-40 mL  5-40 mL IntraVENous Q8H  
 sodium chloride (NS) flush 5-40 mL  5-40 mL IntraVENous PRN  
 simethicone (MYLICON) 61IF/6.9GZ oral drops 80 mg  1.2 mL Oral Multiple  atropine injection 0.5 mg  0.5 mg IntraVENous ONCE PRN  
 EPINEPHrine (ADRENALIN) 0.1 mg/mL syringe 1 mg  1 mg Endoscopically ONCE PRN  
 0.9% sodium chloride infusion  75 mL/hr IntraVENous CONTINUOUS  
 0.9% sodium chloride infusion 250 mL  250 mL IntraVENous PRN  
 midazolam (PF) (VERSED) 1 mg/mL injection  pantoprazole (PROTONIX) 40 mg in 0.9% sodium chloride 10 mL injection  40 mg IntraVENous Q12H  
 midazolam (VERSED) injection 2 mg  2 mg IntraVENous Q2H PRN  
 fentaNYL citrate (PF) injection 50 mcg  50 mcg IntraVENous Q4H PRN  polyethylene glycol (MIRALAX) packet 17 g  17 g Per G Tube BID  
 bisacodyL (DULCOLAX) suppository 10 mg  10 mg Rectal DAILY  0.45% sodium chloride infusion  75 mL/hr IntraVENous CONTINUOUS  
 lactulose (CHRONULAC) 10 gram/15 mL solution 15 mL  10 g Oral TID  dexmedeTOMidine (PRECEDEX) 400 mcg in 0.9% sodium chloride 100 mL infusion  0.1-1.5 mcg/kg/hr IntraVENous TITRATE  risperiDONE (RisperDAL) tablet 1 mg  1 mg Per NG tube QHS  levothyroxine (SYNTHROID) tablet 50 mcg  50 mcg Per NG tube DAILY  levothyroxine tube feed reminder note  1 Each Other BID  heparin (porcine) injection 5,000 Units  5,000 Units SubCUTAneous Q8H  
 balsam peru-castor oiL (VENELEX) ointment   Topical BID  alcohol 62% (NOZIN) nasal  1 Ampule  1 Ampule Topical Q12H  
 dextrose (D50W) injection syrg 12.5-25 g  25-50 mL IntraVENous PRN  
 insulin lispro (HUMALOG) injection   SubCUTAneous Q6H  
 acetaminophen (TYLENOL) tablet 650 mg  650 mg Per G Tube Q6H PRN Or  
 acetaminophen (TYLENOL) suppository 650 mg  650 mg Rectal Q6H PRN  chlorhexidine (ORAL CARE KIT) 0.12 % mouthwash 15 mL  15 mL Oral Q12H  heparin (porcine) pf 300 Units  300 Units InterCATHeter PRN  
 sodium chloride (NS) flush 5-40 mL  5-40 mL IntraVENous Q8H  
 sodium chloride (NS) flush 5-40 mL  5-40 mL IntraVENous PRN  
 ondansetron (ZOFRAN) injection 4 mg  4 mg IntraVENous Q6H PRN  
 
______________________________________________________________________ EXPECTED LENGTH OF STAY: 12d 7h 
ACTUAL LENGTH OF STAY:          501 Natali Small MD

## 2021-01-07 NOTE — PROGRESS NOTES
0700: Verbal shift change report given to Brittany Bermeo RN (oncoming nurse) by Marlin Fuentes RN (offgoing nurse). Report included the following information SBAR, Kardex, Intake/Output, MAR, Recent Results and Cardiac Rhythm NSR, shirley. RNs discussed Hgb of 6.9, pt to receive 1 unit PRBCs. 0800: Assessment completed; see flowsheets. Pt alert this morning, awaiting blood transfusion. 0840: Anesthesiologist Link aware of pt need for blood xfusion. Plan to proceed with PEG tube placement. 2533: Blood transfusion started. RN in room first 15 minutes to monitor for s/sx transfusion rxn. 0945: GI and endo staff at bedside for PEG tube placement. 1043: Verbal report received from endo GI staff regarding PEG tube success. Per MD Jacob Bowden, ok to start using PEG tube for all intake needs, restart trickle TF at 1600. OGT removed by staff during procedure. 1200: Reassessment completed; no changes from previous assessment. 1500: Pt given full CHG bath; clean gown and linens provided. PEG tube drsg CDI.  
 
1600: Reassessment completed, no changes from previous assessment. 1900: End of Shift Note Bedside shift change report given to Marlin Fuentes RN (oncoming nurse) by Edda Ferris (offgoing nurse). Report included the following information SBAR, Kardex, Intake/Output, MAR, Recent Results and Cardiac Rhythm NSR, shirley Shift worked:  7a-7p Shift summary and any significant changes:  
  See progress note Concerns for physician to address:  See progress note Zone phone for oncoming shift:   na Activity: 
Activity Level: Bed Rest 
Number times ambulated in hallways past shift: 0 Number of times OOB to chair past shift: 0 Cardiac:  
Cardiac Monitoring: Yes     
Cardiac Rhythm: Normal sinus rhythm Access:  
Current line(s): central line Genitourinary:  
Urinary status: mejia Respiratory:  
O2 Device: Ventilator Chronic home O2 use?: N/A 
 Incentive spirometer at bedside: N/A 
  
GI: 
Last Bowel Movement Date: 01/07/21 Current diet:  DIET TUBE FEEDING 
DIET NPO Passing flatus: YES Tolerating current diet: YES Pain Management:  
Patient states pain is manageable on current regimen: N/A Skin: 
Jai Score: 13 Interventions: turn team, speciality bed and float heels Patient Safety: 
Fall Score: Total Score: 3 Interventions: bed/chair alarm High Fall Risk: Yes Length of Stay: 
Expected LOS: 12d 7h Actual LOS: 15 Ramone Danny

## 2021-01-07 NOTE — PROGRESS NOTES
COVID positive on admission however COVID negative 12/21/20; PEG inserted today, tube feeding; 1/7/21; trach planned for 1/8/21; NPO; DNR 
 
ALESSANDRA 
TBD - may need LTAC due to trach Mom Red Hobbs is her caregiver; 
Patient has Downs Syndrome; Perla Cabrera is followed by Eric Freitas at [facility] has been patient's CM for the last 15 years. Valarie 78 prior to this admission by [facility]: PT/OT Transportation will be determined by discharge disposition 2ND IM letter will be needed prior to discharge CM will continue to follow for discharge needs and planning, Khris Martinez Care Manager Ext [phone]

## 2021-01-07 NOTE — ANESTHESIA PREPROCEDURE EVALUATION
Relevant Problems RESPIRATORY SYSTEM  
(+) Aspiration pneumonia of both lungs due to vomit ([address] GASTROINTESTINAL  
(+) Large hiatal hernia RENAL FAILURE  
(+) Acute renal failure with acute renal cortical necrosis superimposed on stage 4 chronic kidney disease (HCC)  
(+) Kidney disease, chronic, stage IV (severe, EGFR 15-29 ml/min) (HCC) ENDOCRINE  
(+) Acquired hypothyroidism Anesthetic History No history of anesthetic complications Review of Systems / Medical History Patient summary reviewed, nursing notes reviewed and pertinent labs reviewed Pulmonary Within defined limits Comments: Acute hypoxemic respiratory failure Pneumonia due to COVID-19 virus (Resolved) Neuro/Psych Within defined limits Cardiovascular CHF Comments: 12/4/2020 Calculated LVEF is 55%. GI/Hepatic/Renal 
  
 
 
Renal disease: CRI Hiatal hernia Endo/Other Hypothyroidism Obesity and anemia Other Findings Comments: Down's syndrome Sepsis with acute organ dysfunction due to aspiration bilateral bacterial pneumonia Physical Exam 
 
Airway Intubated Cardiovascular Regular rate and rhythm,  S1 and S2 normal,  no murmur, click, rub, or gallop Dental 
No notable dental hx Pulmonary Breath sounds clear to auscultation Abdominal 
GI exam deferred Other Findings Anesthetic Plan ASA: 4 Anesthesia type: total IV anesthesia and MAC Induction: Intravenous Anesthetic plan and risks discussed with: Patient

## 2021-01-07 NOTE — PROGRESS NOTES
Surgical Note 
 
Date/Time:  2021 2:45 PM  
   
 Assessment :    Plan: 
Patient Active Problem List  
Diagnosis Code  Severe sepsis with acute organ dysfunction ([facility]) A41.9, R65.20  Respiratory failure ([facility]) J96.90  
 HFrEF (heart failure with reduced ejection fraction) ([facility]) I50.20  Down syndrome Q90.9  Acquired hypothyroidism E03.9  Goals of care, counseling/discussion Z71.89  
 Acute renal failure with acute renal cortical necrosis superimposed on stage 4 chronic kidney disease ([facility]) N17.1, N18.4  Acute hypoxemic respiratory failure ([facility]) J96.01  
 Post viral syndrome G93.3  Large hiatal hernia K44.9  Dehydration with hypernatremia E87.0  
 Kidney disease, chronic, stage IV (severe, EGFR 15-29 ml/min) ([facility]) N18.4  Aspiration pneumonia of both lungs due to vomit ([facility]) J69.0  
 Gram negative septic shock ([facility]) A41.50, R65.21  
 Lactic acidosis E87.2  Constipation due to pain medication therapy K59.03  
 Fecal impaction of colon ([facility] K56.41 Assessment:  
 
Respiratory failure, ventilated, unable to ween Plan:  
 
Successful PEG today Agree with proceeding with Tracheostomy Tube Placement tomorrow. I had an extensive discussion with Raffy Rosen mother regarding the risks, benefits, and alternatives of proceeding. Risks of surgery including the risk of anesthesia, bleeding, infection, loss of airway, and the need for further surgery were discussed and she is in agreement to proceed. Subjective: Chief Complaint:   
 
 
 
 []Unable to obtain  ROS due to  []mental status change  []sedated   [x]intubated Objective: VITALS:  
Last 24hrs VS reviewed since prior hospitalist progress note. Most recent are: 
Visit Vitals /69 Pulse 68 Temp 98.7 °F (37.1 °C) Resp 21 Ht 5' 3\" (1.6 m) Wt 90.8 kg (200 lb 2.8 oz) SpO2 96% BMI 35.46 kg/m² Temp (24hrs), Av °F (37.2 °C), Min:98.5 °F (36.9 °C), Max:99.5 °F (37.5 °C) Intake/Output Summary (Last 24 hours) at 1/7/2021 1445 Last data filed at 1/7/2021 1200 Gross per 24 hour Intake 2838.87 ml Output 1870 ml Net 968.87 ml  
  
 
 []Dutta []NGT  []Intubated on vent PHYSICAL EXAM: 
Gen:  [] A&O  []non-toxic  [x] No acute distress 
           [] ill apearing  []  Critical     
 
HEENT:   [x]NC/AT/PERRLA/EOMI 
  []pink conjunctivae      []pale conjunctivae PERRL  []yes  []no      []moist mucosa    []dry mucosa 
  hearing intact to voice []yes  []No 
 
RESP:   [x]CTA bialterally/no wheezing/rhonchi/rales/crackles []clear bilaterally  []wheezing   []rhonchi   []crackles  
  use of accessory muscles []yes []no CARD:   [x] regular rate and rhythm/No murmurs/rubs/gallops 
  murmur  []yes ()  []no      Rubs  []yes  []no       Gallops []yes  []no Rate [] regular  [] irregular        carotid bruits  []Right  [] Left LE edema []yes  []no           JVP  [] yes   [] no 
 
ABD:    [x]soft  [x] distended  [x]non tender  [] NABS SKIN:   Rashes [] yes   [x] no    Ulcers [] yes   [x] no 
             [x]normal   []tight to palpitation    skin turgor [] good  []poor  []decreased Cyanosis/clubbing [] yes [x] no NEUR:   [x]cranial nerves II-XII grossly intact    
  []Cranial nerves deficit 
               [] facial droop    [] slurred speech   []aphasic []Strength normal     [] weakness  [] LUE  []  RUE/ [] LLE  []  RLE 
  follows commands  [] yes [] no        
 
PSYCH:   insight []poor [x]good   Alert and Oriented to  [x]person  [x]place  [x] time  
                 []depressed []anxious []agitated  []lethargic []stuporous  []sedated Lab Data Reviewed: (see below) Medications Reviewed: (see below) PMH/SH reviewed - no change compared to H&P Care Plan discussed with: 
[]Patient   [x]Family    []Care Manager   []RN    []Consultant/Specialist : 
 
 Prophylaxis:  []Lovenox  []Coumadin  []Hep SQ  []SCDs  []H2B/PPI Disposition:  []Home w/ Family   []HH PT,OT,RN   []SNF/LTC   []SAH/Rehab Ancillary Serices:   [] PT     []OT      []SW      []Nut      []HH ________________________________________________________________________ LABS: 
Recent Labs 01/07/21 
6695 01/06/21 
0410 WBC 11.3* 14.8* HGB 6.9* 8.2* HCT 20.8* 24.5*  
 300 Recent Labs 01/07/21 
3038 01/06/21 
0410 01/05/21 
9248  142 141  
K 3.5 3.8 4.7 * 109* 109* CO2 26 23 24 BUN 31* 39* 46* CREA 1.68* 1.77* 1.86* GLU 93 74 82 CA 8.8 9.4 9.2 MG 1.9  --   --   
PHOS 3.7  --  4.9* Recent Labs 01/06/21 
0410 01/05/21 
3937 ALT 17  --   
AP 45  --   
TBILI 0.8  --   
TP 6.9  --   
ALB 2.9* 3.0*  
GLOB 4.0  -- No results for input(s): INR, PTP, APTT, INREXT in the last 72 hours. No results for input(s): FE, TIBC, PSAT, FERR in the last 72 hours. No results for input(s): PH, PCO2, PO2 in the last 72 hours. No results for input(s): CPK, CKMB in the last 72 hours. No lab exists for component: TROPONINI Lab Results Component Value Date/Time Glucose (POC) 92 01/07/2021 11:24 AM  
 Glucose (POC) 86 01/07/2021 05:41 AM  
 Glucose (POC) 117 (H) 01/06/2021 11:59 PM  
 Glucose (POC) 102 (H) 01/06/2021 05:51 PM  
 Glucose (POC) 87 01/06/2021 11:41 AM  
 
 
MEDICATIONS: 
Current Facility-Administered Medications Medication Dose Route Frequency  sodium chloride (NS) flush 5-40 mL  5-40 mL IntraVENous Q8H  
 sodium chloride (NS) flush 5-40 mL  5-40 mL IntraVENous PRN  
 simethicone (MYLICON) 65DX/7.9YJ oral drops 80 mg  1.2 mL Oral Multiple  atropine injection 0.5 mg  0.5 mg IntraVENous ONCE PRN  
 EPINEPHrine (ADRENALIN) 0.1 mg/mL syringe 1 mg  1 mg Endoscopically ONCE PRN  
 0.9% sodium chloride infusion  75 mL/hr IntraVENous CONTINUOUS  
 0.9% sodium chloride infusion 250 mL  250 mL IntraVENous PRN  
 • midazolam (PF) (VERSED) 1 mg/mL injection      
• pantoprazole (PROTONIX) 40 mg in 0.9% sodium chloride 10 mL injection  40 mg IntraVENous Q12H  
• midazolam (VERSED) injection 2 mg  2 mg IntraVENous Q2H PRN  
• fentaNYL citrate (PF) injection 50 mcg  50 mcg IntraVENous Q4H PRN  
• polyethylene glycol (MIRALAX) packet 17 g  17 g Per G Tube BID  
• bisacodyL (DULCOLAX) suppository 10 mg  10 mg Rectal DAILY  
• 0.45% sodium chloride infusion  75 mL/hr IntraVENous CONTINUOUS  
• lactulose (CHRONULAC) 10 gram/15 mL solution 15 mL  10 g Oral TID  
• dexmedeTOMidine (PRECEDEX) 400 mcg in 0.9% sodium chloride 100 mL infusion  0.1-1.5 mcg/kg/hr IntraVENous TITRATE  
• risperiDONE (RisperDAL) tablet 1 mg  1 mg Per NG tube QHS  
• levothyroxine (SYNTHROID) tablet 50 mcg  50 mcg Per NG tube DAILY  
• levothyroxine tube feed reminder note  1 Each Other BID  
• heparin (porcine) injection 5,000 Units  5,000 Units SubCUTAneous Q8H  
• balsam peru-castor oiL (VENELEX) ointment   Topical BID  
• alcohol 62% (NOZIN) nasal  1 Ampule  1 Ampule Topical Q12H  
• dextrose (D50W) injection syrg 12.5-25 g  25-50 mL IntraVENous PRN  
• insulin lispro (HUMALOG) injection   SubCUTAneous Q6H  
• acetaminophen (TYLENOL) tablet 650 mg  650 mg Per G Tube Q6H PRN  
 Or  
• acetaminophen (TYLENOL) suppository 650 mg  650 mg Rectal Q6H PRN  
• chlorhexidine (ORAL CARE KIT) 0.12 % mouthwash 15 mL  15 mL Oral Q12H  
• heparin (porcine) pf 300 Units  300 Units InterCATHeter PRN  
• sodium chloride (NS) flush 5-40 mL  5-40 mL IntraVENous Q8H  
• sodium chloride (NS) flush 5-40 mL  5-40 mL IntraVENous PRN  
• ondansetron (ZOFRAN) injection 4 mg  4 mg IntraVENous Q6H PRN

## 2021-01-07 NOTE — PROGRESS NOTES
..PEG tube inserted by Dr. Stacey Alcocer [facility] catalog # [id number] Product # [id number] Lot # [id number] Expiration date 06-

## 2021-01-07 NOTE — PROGRESS NOTES
Jordan Osullivan TRANSFER - OUT REPORT: 
 
Verbal report given to Cat(name) on Andi Grajeda  being transferred to Counts include 234 beds at the [facility](unit) for ordered procedure Report consisted of patients Situation, Background, Assessment and  
Recommendations(SBAR). Information from the following report(s) Procedure Summary, MAR and Accordion was reviewed with the receiving nurse. Lines:  
Triple Lumen 12/23/20 Anterior;Right Neck (Active) Central Line Being Utilized Yes 01/06/21 2000 Criteria for Appropriate Use Hemodynamically unstable, requiring monitoring lines, vasopressors, or volume resuscitation 01/06/21 2000 Site Assessment Clean, dry, & intact 01/06/21 2000 Infiltration Assessment 0 01/06/21 2000 Affected Extremity/Extremities Color distal to insertion site pink (or appropriate for race) 01/06/21 2000 Date of Last Dressing Change 01/04/21 01/06/21 2000 Dressing Status Clean, dry, & intact 01/06/21 2000 Dressing Type Disk with Chlorhexadine gluconate (CHG); Transparent 01/06/21 2000 Action Taken Open ports on tubing capped 01/06/21 2000 Proximal Hub Color/Line Status White; Infusing 01/06/21 2000 Positive Blood Return (Medial Site) Yes 01/06/21 2000 Medial Hub Color/Line Status Blue; Infusing 01/06/21 2000 Positive Blood Return (Lateral Site) Yes 01/06/21 2000 Distal Hub Color/Line Status Levonia Arbour; Infusing 01/06/21 2000 Positive Blood Return (Site #3) Yes 01/06/21 2000 External Catheter Length (cm) 0 centimeters 01/05/21 0700 Alcohol Cap Used Yes 01/06/21 2000 Opportunity for questions and clarification was provided. Patient transported with: 
 Registered Nurse

## 2021-01-07 NOTE — PROCEDURES
NAME:  Trenton Arellano :   1980 MRN:   [id number] Date/Time:  2021 10:43 AM 
 
Esophagogastroduodenoscopy (EGD) Procedure Note Procedure: Esophagogastroduodenoscopy with placement of a percutaneous endoscopic gastrostomy tube Indication:  Oropharyngeal dysphagia Pre-operative Diagnosis: see indication above Post-operative Diagnosis: see findings below :  Ciro Chapin MD 
Referring Provider:   --Other, MD Kilo 
 
Exam: Airway: clear, no airway problems anticipated Heart: RRR, without gallops or rubs Lungs: clear bilaterally without wheezes, crackles, or rhonchi Abdomen: soft, nontender, nondistended, bowel sounds present Mental Status: awake, alert and oriented to person, place and time Anethesia/Sedation:  General anesthesia Procedure Details After informed consent was obtained for the procedure, with all risks and benefits of procedure explained the patient was taken to the endoscopy suite and placed in the left lateral decubitus position. Following sequential administration of sedation as per above, the [id number] gastroscope was inserted into the mouth and advanced under direct vision to second portion of the duodenum. A careful inspection was made as the gastroscope was withdrawn, including a retroflexed view of the proximal stomach; findings and interventions are described below. Findings:  
1. Normal proximal and mid esophagus 2. Medium sized sliding hiatal hernia 3. Moderate, patchy, non-erosive gastropathy in body and antrum. No bleeding/heme 4. Stomach otherwise normal, including retroflexion 5. Normal duodenal bulb 6. Successful placement of 20 Fr PEG via 'Pull' technique. External bumper at 5 cm and loose not touching skin Therapies:  1. 20 Fr externally removable PEG placement Specimens: None EBL:  None. Complications:   None; patient tolerated the procedure well. Impression: 1. Normal proximal and mid esophagus 2. Medium sized sliding hiatal hernia 3. Moderate, patchy, non-erosive gastropathy in body and antrum. No bleeding/heme 4. Stomach otherwise normal, including retroflexion 5. Normal duodenal bulb 6. Successful placement of 20 Fr PEG via 'Pull' technique. External bumper at 5 cm and loose not touching skin Recommendations: 1. PPI BID given anemia and gastritis (ordered) 2. Can use PEG for medications now 3. Can start TF's at 4 pm  
4. Do NOT put gauze between skin and bumper. HOB >30 degrees Personally called patient's mother after PEG placement Discharge disposition:  In ICU Cheryl Nieves MD

## 2021-01-07 NOTE — ANESTHESIA POSTPROCEDURE EVALUATION
Procedure(s): ESOPHAGOGASTRODUODENOSCOPY (EGD)  anesthesia needed PERCUTANEOUS ENDOSCOPIC GASTROSTOMY TUBE INSERTION  20 FR pull. 
 
total IV anesthesia, MAC Anesthesia Post Evaluation Patient location during evaluation: PACU Note status: Adequate. Level of consciousness: responsive to verbal stimuli and sleepy but conscious Pain management: satisfactory to patient Airway patency: patent Anesthetic complications: no 
Cardiovascular status: acceptable Respiratory status: acceptable Hydration status: acceptable Comments: +Post-Anesthesia Evaluation and Assessment Patient: Rohith Ling MRN: [id number]  SSN: [id number] YOB: 1980  Age: 47 y.o. Sex: female Cardiovascular Function/Vital Signs BP (!) 104/59   Pulse (!) 50   Temp 37 °C (98.6 °F)   Resp 18   Ht 5' 3\" (1.6 m)   Wt 90.8 kg (200 lb 2.8 oz)   SpO2 98%   BMI 35.46 kg/m² Patient is status post Procedure(s): ESOPHAGOGASTRODUODENOSCOPY (EGD)  anesthesia needed PERCUTANEOUS ENDOSCOPIC GASTROSTOMY TUBE INSERTION  20 FR pull. Nausea/Vomiting: Controlled. Postoperative hydration reviewed and adequate. Pain: 
Pain Scale 1: Visual (01/07/21 1041) Pain Intensity 1: 0 (01/07/21 1041) Managed. Neurological Status: At baseline. Mental Status and Level of Consciousness: Arousable. Pulmonary Status:  
O2 Device: Ventilator (01/07/21 1041) Adequate oxygenation and airway patent. Complications related to anesthesia: None Post-anesthesia assessment completed. No concerns. Signed By: Crispin Kline MD  
 1/7/2021 Post anesthesia nausea and vomiting:  controlled INITIAL Post-op Vital signs:  
Vitals Value Taken Time /65 01/07/21 1137 Temp 37 °C (98.6 °F) 01/07/21 1045 Pulse 63 01/07/21 1140 Resp 18 01/07/21 1140 SpO2 97 % 01/07/21 1140 Vitals shown include unvalidated device data.

## 2021-01-07 NOTE — PROGRESS NOTES
1900: Report from [facility], [address] [facility]: Assessment as noted. Plan for trach tomorrow. 0100: Tube feeds stopped and OGT flushed in prep for peg tube placement. 0400: Heparin SQ held in prep for peg placement. 0530: ZAK Bentley notified of labs. Orders to be received. 0630: Pt bedpad changed d/t leaking of FMS. FMS flushed with 30cc water.  
 
0700: Report to Everett Gtz

## 2021-01-08 NOTE — PROGRESS NOTES
[address] [facility] Adult  Hospitalist Group Critical Care Progress Note Alexander White MD 
Answering service: [phone] OR [phone] from in house phone Date of Service:  2021 NAME:  Erasmo Christensen :  1980 MRN:  [id number] Interval history / Subjective: On the vent 40% and peep 5. On precedex drip. No pressors. Assessment & Plan:  
 
Principal Problem: 
  Aspiration pneumonia of both lungs due to vomit ([address] (2020) 
  Active Problems: 
  Acute hypoxemic respiratory failure ([address] (2020)   
  Post viral syndrome (2020)   
  Dehydration with hypernatremia (2020)   
  Kidney disease, chronic, stage IV (severe, EGFR 15-29 ml/min) ([facility]) (2020)   
  Gram negative septic shock ([address] (2020)   
  Lactic acidosis (2020)   
  Fecal impaction of colon ([address] (2020)   
  Large hiatal hernia (2020) 
    Overview: ([id number])- CT Abdomen: 
    Distended fluid-filled esophagus and large hiatus hernia. 
  
  Constipation due to pain medication therapy (2020)   
  Severe sepsis with acute organ dysfunction ([address] (2020)   
  Respiratory failure ([address] (2020)   
  HFrEF (heart failure with reduced ejection fraction) ([address] (2020)   
  Down syndrome (2020)   
  Acquired hypothyroidism (2020)   
  Goals of care, counseling/discussion (2020)   
  Acute renal failure with acute renal cortical necrosis superimposed on stage 4 chronic kidney disease ([address] (2020)   
  
  
ICU Comprehensive Plan of Care:  
  
Plans for this Shift:  
1. Sepsis with acute organ dysfunction due to aspiration bilateral bacterial pneumonia- continue empiric IV antibiotics (meropenem) aggressive pulmonary toilet.  Covid pneumonia resolved, with post viral bilateral bacterial pneumonia. 
  
 2. DANY superimposed on CKD with severe hypernatremic dehydration and nonoliguric ATN- monitor daily labs, continue IV fluids with 1/2 NS. Cr better.  
  
Fecal impaction of rectum and ascending colon- holding enteral feeds. Concern for SBO vs ileus has resolved with the normal CT abdomen.  
  
Acute on Chronic Respiratory Failure Unable to wean from mechanical ventilation from a combination of massive aspiration event, Difficult to control pulmonary edema and given her body habitus she likely has undiagnosed Obstructive sleep apnea. She is unlikely to be able to take po for a long time safely especially Now with the added weakness from being down for 2 weeks.  As discussed with her mother She would benefit for a Trach and PEG combo. PEG done 1/7 by GI and then trach Booked for today by gen surg. 
  
ABCDEF Bundle/Checklist 
Pain Medications: Fentanyl Target RASS: 0 - Alert & Calm - Spontaneously pays attention to caregiver Sedation Medications: None CAM-ICU:  Negative Discussed Plan of Care (goals of care): Yes Addressed Code Status: Do Not Resuscitate 
  
CARDIOVASCULAR Cardiac Gtts: None SBP Goal of: > 90 mmHg MAP Goal of: > 65 mmHg Transfusion Trigger (Hgb): <7 g/dL 
  
RESPIRATORY Vent Goals:  
Head of bed > 30 degrees Aggressive bronchopulmonary hygiene DVT Prophylaxis (if no, list reason): SCD's or Sequential Compression Device and Lovenox  
SPO2 Goal: > 92% 
  
GI/ Dutta Catheter Present: Yes GI Prophylaxis: Pepcid (famotidine)  
Nutrition: No NPO for trach Bowel Movement: Yes Bowel Regimen: MiraLax, Milk of magnesia and Enema 
  
ANTIBIOTICS Antibiotics: 
Meropenem 
  
T/L/D Tubes: ETT and Nasogastric Tube Lines: Peripheral IV and None Drains: Dutta Catheter 
  
SPECIAL EQUIPMENT None 
  
DISPOSITION Stay in ICU.  Trach today 
  
DNR status 
  
 I personally spent  35   minutes of critical care time.  This is time spent at this critically ill patient's bedside actively involved in patient care as well as the coordination of care and discussions with the patient's family.  This does not include any procedural time which has been billed separately. Hospital Problems  Date Reviewed: 1/8/2021 Codes Class Noted POA * (Principal) Aspiration pneumonia of both lungs due to vomit ([facility] ICD-10-CM: J69.0 ICD-9-CM: 507.0 Acute 12/23/2020 Yes Post viral syndrome ICD-10-CM: G93.3 ICD-9-CM: 780.79 Acute 12/25/2020 No  
   
 Acute hypoxemic respiratory failure ([facility] ICD-10-CM: J96.01 
ICD-9-CM: 518.81 Acute 12/24/2020 Yes Kidney disease, chronic, stage IV (severe, EGFR 15-29 ml/min) ([facility]) ICD-10-CM: N18.4 ICD-9-CM: 221. 4 Acute 12/23/2020 Yes Lactic acidosis ICD-10-CM: E87.2 ICD-9-CM: 276.2 Acute 12/23/2020 Yes Fecal impaction of colon ([facility] ICD-10-CM: K56.41 
ICD-9-CM: 560.32 Acute 12/23/2020 Yes Dehydration with hypernatremia (Chronic) ICD-10-CM: E87.0 ICD-9-CM: 276.0 Acute 12/16/2020 Yes Gram negative septic shock (HCC) ICD-10-CM: A41.50, R65.21 ICD-9-CM: 038.40, 995.92, 785.52 Acute 1/23/2020 Yes Constipation due to pain medication therapy ICD-10-CM: K59.03 
ICD-9-CM: 564.09, E947.9 Acute 11/26/2020 Yes Large hiatal hernia (Chronic) ICD-10-CM: K44.9 ICD-9-CM: 834. 3 Chronic 11/2/2020 Yes Overview Signed 1/1/2021  4:08 PM by Erica Melissa MD  
  ([id number])- CT Abdomen: 
Distended fluid-filled esophagus and large hiatus hernia. Goals of care, counseling/discussion ICD-10-CM: Z71.89 ICD-9-CM: V65.49  12/28/2020 Yes HFrEF (heart failure with reduced ejection fraction) (HCC) (Chronic) ICD-10-CM: I50.20 ICD-9-CM: 428.20  12/24/2020 Yes Acquired hypothyroidism (Chronic) ICD-10-CM: E03.9 ICD-9-CM: 244.9  12/24/2020 Yes Severe sepsis with acute organ dysfunction (HCC) ICD-10-CM: A41.9, R65.20 ICD-9-CM: 038.9, 995.92 Acute 12/23/2020 Yes Respiratory failure ([address] ICD-10-CM: J96.90 ICD-9-CM: 518.81  12/23/2020 Yes Down syndrome (Chronic) ICD-10-CM: Q90.9 ICD-9-CM: 758.0 End Stage 12/23/2020 Yes Acute renal failure with acute renal cortical necrosis superimposed on stage 4 chronic kidney disease (HCC) (Chronic) ICD-10-CM: N17.1, N18.4 ICD-9-CM: 584.6, 585.4 Acute 12/23/2020 Yes Review of Systems:  
Review of systems not obtained due to patient factors. Vital Signs:  
 Last 24hrs VS reviewed since prior progress note. Most recent are: 
Visit Vitals BP (!) 99/58 Pulse 69 Temp 97.7 °F (36.5 °C) Resp 22 Ht 5' 3\" (1.6 m) Wt 90.5 kg (199 lb 8.3 oz) SpO2 100% BMI 35.34 kg/m² Intake/Output Summary (Last 24 hours) at 1/8/2021 1121 Last data filed at 1/8/2021 1000 Gross per 24 hour Intake 3116.98 ml Output 2070 ml Net 1046.98 ml Physical Examination:  
 
 
     
Constitutional:  No acute distress, cooperative, pleasant ENT:  Oral mucous moist, oropharynx benign. Resp:  CTA bilaterally. No wheezing/rhonchi/rales. No accessory muscle use CV:  Regular rhythm, normal rate, no murmurs, gallops, rubs GI:  Soft, non distended, non tender. normoactive bowel sounds, no hepatosplenomegaly Musculoskeletal:  No edema, warm, 2+ pulses throughout Neurologic:  Moves all extremities. AAOx3, CN II-XII reviewed Psych:  sedated Skin:  Good turgor, no rashes or ulcers Hematologic/Lymphatic/Immunlogic:  No jaundice nor lymph node swelling :  deferred Eyes:  EOMI. Anicteric sclerae, PERRL. Data Review:  
 Review and/or order of clinical lab test 
 
 
Labs:  
 
Recent Labs 01/08/21 
4663 01/07/21 
4478 WBC 11.6* 11.3* HGB 8.1* 6.9*  
HCT 24.0* 20.8*  272 Recent Labs 01/08/21 
7871 01/07/21 
7368 01/06/21 
041  142 142  
K 3.8 3.5 3.8 * 111* 109* CO2 22 26 23 BUN 24* 31* 39* CREA 1.68* 1.68* 1.77* * 93 74 CA 8.6 8.8 9.4 MG  --  1.9  --   
PHOS  --  3.7  --   
 
Recent Labs 01/06/21 
0410 ALT 17 AP 45 TBILI 0.8 TP 6.9 ALB 2.9*  
GLOB 4.0 No results for input(s): INR, PTP, APTT, INREXT in the last 72 hours. No results for input(s): FE, TIBC, PSAT, FERR in the last 72 hours. Lab Results Component Value Date/Time Folate 18.2 12/04/2020 03:11 AM  
  
No results for input(s): PH, PCO2, PO2 in the last 72 hours. No results for input(s): CPK, CKNDX, TROIQ in the last 72 hours. No lab exists for component: CPKMB No results found for: CHOL, CHOLX, CHLST, CHOLV, HDL, HDLP, LDL, LDLC, DLDLP, TGLX, TRIGL, TRIGP, CHHD, CHHDX Lab Results Component Value Date/Time Glucose (POC) 94 01/08/2021 06:52 AM  
 Glucose (POC) 93 01/07/2021 11:48 PM  
 Glucose (POC) 88 01/07/2021 06:25 PM  
 Glucose (POC) 92 01/07/2021 11:24 AM  
 Glucose (POC) 86 01/07/2021 05:41 AM  
 
Lab Results Component Value Date/Time Color YELLOW/STRAW 12/30/2020 05:37 PM  
 Appearance TURBID (A) 12/30/2020 05:37 PM  
 Specific gravity 1.012 12/30/2020 05:37 PM  
 Specific gravity 1.010 11/20/2020 01:57 AM  
 pH (UA) 5.0 12/30/2020 05:37 PM  
 Protein 30 (A) 12/30/2020 05:37 PM  
 Glucose Negative 12/30/2020 05:37 PM  
 Ketone Negative 12/30/2020 05:37 PM  
 Bilirubin Negative 12/30/2020 05:37 PM  
 Urobilinogen 0.2 12/30/2020 05:37 PM  
 Nitrites Negative 12/30/2020 05:37 PM  
 Leukocyte Esterase SMALL (A) 12/30/2020 05:37 PM  
 Epithelial cells FEW 12/30/2020 05:37 PM  
 Bacteria Negative 12/30/2020 05:37 PM  
 WBC 20-50 12/30/2020 05:37 PM  
 RBC 5-10 12/30/2020 05:37 PM  
 
 
 
Medications Reviewed:  
 
Current Facility-Administered Medications Medication Dose Route Frequency  sodium chloride (NS) flush 5-40 mL  5-40 mL IntraVENous Q8H  
  sodium chloride (NS) flush 5-40 mL  5-40 mL IntraVENous PRN  
 simethicone (MYLICON) 06LI/4.4OU oral drops 80 mg  1.2 mL Oral Multiple  0.9% sodium chloride infusion  75 mL/hr IntraVENous CONTINUOUS  
 0.9% sodium chloride infusion 250 mL  250 mL IntraVENous PRN  pantoprazole (PROTONIX) 40 mg in 0.9% sodium chloride 10 mL injection  40 mg IntraVENous Q12H  
 midazolam (VERSED) injection 2 mg  2 mg IntraVENous Q2H PRN  
 fentaNYL citrate (PF) injection 50 mcg  50 mcg IntraVENous Q4H PRN  polyethylene glycol (MIRALAX) packet 17 g  17 g Per G Tube BID  
 bisacodyL (DULCOLAX) suppository 10 mg  10 mg Rectal DAILY  0.45% sodium chloride infusion  75 mL/hr IntraVENous CONTINUOUS  
 lactulose (CHRONULAC) 10 gram/15 mL solution 15 mL  10 g Oral TID  dexmedeTOMidine (PRECEDEX) 400 mcg in 0.9% sodium chloride 100 mL infusion  0.1-1.5 mcg/kg/hr IntraVENous TITRATE  risperiDONE (RisperDAL) tablet 1 mg  1 mg Per NG tube QHS  levothyroxine (SYNTHROID) tablet 50 mcg  50 mcg Per NG tube DAILY  levothyroxine tube feed reminder note  1 Each Other BID  heparin (porcine) injection 5,000 Units  5,000 Units SubCUTAneous Q8H  
 balsam peru-castor oiL (VENELEX) ointment   Topical BID  alcohol 62% (NOZIN) nasal  1 Ampule  1 Ampule Topical Q12H  
 dextrose (D50W) injection syrg 12.5-25 g  25-50 mL IntraVENous PRN  
 insulin lispro (HUMALOG) injection   SubCUTAneous Q6H  
 acetaminophen (TYLENOL) tablet 650 mg  650 mg Per G Tube Q6H PRN Or  
 acetaminophen (TYLENOL) suppository 650 mg  650 mg Rectal Q6H PRN  chlorhexidine (ORAL CARE KIT) 0.12 % mouthwash 15 mL  15 mL Oral Q12H  
 heparin (porcine) pf 300 Units  300 Units InterCATHeter PRN  
 sodium chloride (NS) flush 5-40 mL  5-40 mL IntraVENous Q8H  
 sodium chloride (NS) flush 5-40 mL  5-40 mL IntraVENous PRN  
 ondansetron (ZOFRAN) injection 4 mg  4 mg IntraVENous Q6H PRN  
 
 ______________________________________________________________________ EXPECTED LENGTH OF STAY: 12d 7h 
ACTUAL LENGTH OF STAY:          [address] [place] Trinidad MD

## 2021-01-08 NOTE — INTERDISCIPLINARY ROUNDS
Critical care interdisciplinary rounds held on 01\08\2021.  Following members present, Pharmacy, Diabetes Treatment, Case Management,  Physical Therapy, Clinical Lead, Palliative Care and Nutrition.  Led by REGINALD Sullivan RN and Dr. Anne.  Plan of care discussed.  See clinical pathway for plan of care and interventions and desired outcomes.

## 2021-01-08 NOTE — PROGRESS NOTES
GI PROGRESS NOTE Ghulam Mann NP 
[phone] NP in-hospital cell phone M-F until 4:30 After 5pm or on weekends, please call  for physician on call NAME:Aundrea Cai Standing KATI:[id number] ATTG: Dr WHITEHEAD PCP: Sumit Ruiz MD 
Date/Time:  1/8/2021 10:32 AM  
 
Primary GI: Dr. Iasiah Arora- Dr Paris Ely covering Reason for following: dysphagia s/p peg Assessment:  
-Dysphagia, s/p PEG placement 1/7/2021  
- failure to thrive · 20Fr PEG placed with bumper at 5cm · EGD noted medium hiatal hernia and patchy, non-erosive gastropathy 
-Constipation - improving · Had KUB concerning for SBO · Had bowel movement yesterday, CT scan today showing no SBO, gas in large bowel with minimal amount of enteric contents. · On lactulose and miralax · 250cc of stool yesterday and 100cc of stool today 
-CKD stage IV 
-Sepsis, per ICU team 
-Respiratory failure · Ventilated, to get Trach this afternoon 
-Down's Syndrome, DM II, hypothyroid Plan: 1. Continue PPI BID given anemia and gastritis 2. Okay to use PEG as needed for nutrition and medications 3. Do NOT put gauze between skin and bumper. HOB >30 degrees 4. Please clean site BID 5. Continue bowel regimen as needed Nothing further to add per GI standpoint. Will sign off. Pleas call if further questions or concerns. Plan discussed with Dr. Paris Ely Subjective:  
Discussed with RN events overnight. No issues overnight with tube. Tolerated nutrition, now NPO for PEG later today. Could NOT obtain due to: intubated Objective: VITALS:  
Last 24hrs VS reviewed since prior progress note. Most recent are: 
Visit Vitals BP (!) 99/58 Pulse 69 Temp 97.7 °F (36.5 °C) Resp 22 Ht 5' 3\" (1.6 m) Wt 90.5 kg (199 lb 8.3 oz) SpO2 100% BMI 35.34 kg/m² Intake/Output Summary (Last 24 hours) at 1/8/2021 1032 Last data filed at 1/8/2021 1000 Gross per 24 hour Intake 3116.98 ml Output 2070 ml Net 1046.98 ml PHYSICAL EXAM: 
General: WD, WN. Drowsy, intubated, no acute distress   
HEENT: NC, Atraumatic. Anicteric sclerae. Lungs:  CTA Bilaterally. Heart:  Regular  rhythm Abdomen: Soft, Non distended, Non tender.  +Bowel sounds, no HSM. PEG tube mid abdomen, bumper at 5cm, small amount of red blood at site. No localized erythema. Extremities: BLE +1 edema. Neurologic:  Unable to assess Psych:   Unable to assess. Lab and Radiology Data Reviewed: (see below) Medications Reviewed: (see below) PMH/SH reviewed - no change compared to H&P 
________________________________________________________________________ Total time spent with patient: 20 minutes ________________________________________________________________________ Care Plan discussed with: 
Patient y Family RN y  
           Consultant: Francie Delacruz NP Procedures: see electronic medical records for all procedures/Xrays and details which were not copied into this note but were reviewed prior to creation of Plan. LABS: 
Recent Labs 01/08/21 
9815 01/07/21 
5147 WBC 11.6* 11.3* HGB 8.1* 6.9*  
HCT 24.0* 20.8*  272 Recent Labs 01/08/21 
5560 01/07/21 
0002 01/06/21 
0410  142 142  
K 3.8 3.5 3.8 * 111* 109* CO2 22 26 23 BUN 24* 31* 39* CREA 1.68* 1.68* 1.77* * 93 74 CA 8.6 8.8 9.4 MG  --  1.9  --   
PHOS  --  3.7  --   
 
Recent Labs 01/06/21 
0410 AP 45  
TP 6.9 ALB 2.9*  
GLOB 4.0 No results for input(s): INR, PTP, APTT, INREXT in the last 72 hours. No results for input(s): FE, TIBC, PSAT, FERR in the last 72 hours. Lab Results Component Value Date/Time Folate 18.2 12/04/2020 03:11 AM  
 
No results for input(s): PH, PCO2, PO2 in the last 72 hours. No results for input(s): CPK, CKMB in the last 72 hours. No lab exists for component: TROPONINI Lab Results Component Value Date/Time  Color YELLOW/STRAW 12/30/2020 05:37 PM  
  Appearance TURBID (A) 12/30/2020 05:37 PM  
 Specific gravity 1.012 12/30/2020 05:37 PM  
 Specific gravity 1.010 11/20/2020 01:57 AM  
 pH (UA) 5.0 12/30/2020 05:37 PM  
 Protein 30 (A) 12/30/2020 05:37 PM  
 Glucose Negative 12/30/2020 05:37 PM  
 Ketone Negative 12/30/2020 05:37 PM  
 Bilirubin Negative 12/30/2020 05:37 PM  
 Urobilinogen 0.2 12/30/2020 05:37 PM  
 Nitrites Negative 12/30/2020 05:37 PM  
 Leukocyte Esterase SMALL (A) 12/30/2020 05:37 PM  
 Epithelial cells FEW 12/30/2020 05:37 PM  
 Bacteria Negative 12/30/2020 05:37 PM  
 WBC 20-50 12/30/2020 05:37 PM  
 RBC 5-10 12/30/2020 05:37 PM  
 
 
MEDICATIONS: 
Current Facility-Administered Medications  
Medication Dose Route Frequency  
• sodium chloride (NS) flush 5-40 mL  5-40 mL IntraVENous Q8H  
• sodium chloride (NS) flush 5-40 mL  5-40 mL IntraVENous PRN  
• simethicone (MYLICON) 40mg/0.6mL oral drops 80 mg  1.2 mL Oral Multiple  
• 0.9% sodium chloride infusion  75 mL/hr IntraVENous CONTINUOUS  
• 0.9% sodium chloride infusion 250 mL  250 mL IntraVENous PRN  
• pantoprazole (PROTONIX) 40 mg in 0.9% sodium chloride 10 mL injection  40 mg IntraVENous Q12H  
• midazolam (VERSED) injection 2 mg  2 mg IntraVENous Q2H PRN  
• fentaNYL citrate (PF) injection 50 mcg  50 mcg IntraVENous Q4H PRN  
• polyethylene glycol (MIRALAX) packet 17 g  17 g Per G Tube BID  
• bisacodyL (DULCOLAX) suppository 10 mg  10 mg Rectal DAILY  
• 0.45% sodium chloride infusion  75 mL/hr IntraVENous CONTINUOUS  
• lactulose (CHRONULAC) 10 gram/15 mL solution 15 mL  10 g Oral TID  
• dexmedeTOMidine (PRECEDEX) 400 mcg in 0.9% sodium chloride 100 mL infusion  0.1-1.5 mcg/kg/hr IntraVENous TITRATE  
• risperiDONE (RisperDAL) tablet 1 mg  1 mg Per NG tube QHS  
• levothyroxine (SYNTHROID) tablet 50 mcg  50 mcg Per NG tube DAILY  
• levothyroxine tube feed reminder note  1 Each Other BID  
• heparin (porcine) injection 5,000 Units  5,000 Units SubCUTAneous Q8H  
  balsam peru-castor oiL (VENELEX) ointment   Topical BID  alcohol 62% (NOZIN) nasal  1 Ampule  1 Ampule Topical Q12H  
 dextrose (D50W) injection syrg 12.5-25 g  25-50 mL IntraVENous PRN  
 insulin lispro (HUMALOG) injection   SubCUTAneous Q6H  
 acetaminophen (TYLENOL) tablet 650 mg  650 mg Per G Tube Q6H PRN Or  
 acetaminophen (TYLENOL) suppository 650 mg  650 mg Rectal Q6H PRN  chlorhexidine (ORAL CARE KIT) 0.12 % mouthwash 15 mL  15 mL Oral Q12H  
 heparin (porcine) pf 300 Units  300 Units InterCATHeter PRN  
 sodium chloride (NS) flush 5-40 mL  5-40 mL IntraVENous Q8H  
 sodium chloride (NS) flush 5-40 mL  5-40 mL IntraVENous PRN  
 ondansetron (ZOFRAN) injection 4 mg  4 mg IntraVENous Q6H PRN

## 2021-01-08 NOTE — PROGRESS NOTES
1900: Report from [address], [facility]: Assessment completed. PEG tube site and dressing CDI.

## 2021-01-08 NOTE — PROGRESS NOTES
Nephrology Progress Note Edmundo Hill  
 
[facility]. [facility]  Phone - [phone] Patient: Frederick Bello    YOB: 1980     Admit Date: 12/23/2020 Date- 1/8/2021 CC: [facility] IMPRESSION & PLAN:  
? cyndi likely pre-renal + Vanc toxicity--improving ? Sepsis-gram-negative shock ? Hypokalemia ? Hypernatremia ? Anemia ? Lactic acidosis ? Protein malnutrition ? Bacterial pneumonia ? Fecal impaction ? Respiratory failure-on vent ? History of heart failure ? Atrophic right kidney ? Down syndrome with autism PLAN- 
? Change IV fluids to quarter normal saline at sodium level of increasing ? Avoid hypotension ? Check BMP in the morning ? Vent management per ICU team 
  
Subjective: Interval History: She remains on ventilation Creatinine continue to improve-  
Good urine output Objective:  
Vitals:  
 01/08/21 0630 01/08/21 0700 01/08/21 0800 01/08/21 5855 BP: (!) 85/37 (!) 99/54 113/82 Pulse: 70 62 67 63 Resp: 30 20 20 22 Temp:      
SpO2: 97% 100% 99% 100% Weight:      
Height:      
  
01/07 0701 - 01/08 0700 In: 2780.2 [I.V.:2590.2] Out: Jesus Pals [BMVWF:7273; Drains:250] Last 3 Recorded Weights in this Encounter 01/04/21 2213 01/06/21 1450 01/07/21 1821 Weight: 86.3 kg (190 lb 4.1 oz) 90.8 kg (200 lb 2.8 oz) 90.5 kg (199 lb 8.3 oz) Physical exam:  
GEN: intubated on vent NECK-no mass, ET TUBE RESP-clear b/l, no wheezing CVS: RRR,S1,S2 NEURO: Can't access due to patient's current condition  : mejia + Chart reviewed. Pertinent Notes reviewed. Data Review : 
Recent Labs 01/08/21 
3792 01/07/21 
3825 01/06/21 
0410  142 142  
K 3.8 3.5 3.8 * 111* 109* CO2 22 26 23 BUN 24* 31* 39* CREA 1.68* 1.68* 1.77* * 93 74 CA 8.6 8.8 9.4 MG  --  1.9  --   
PHOS  --  3.7  --   
 
Recent Labs 01/08/21 
1690 01/07/21 
1481 01/06/21 
0416 WBC 11.6* 11.3* 14.8* HGB 8.1* 6.9* 8.2* HCT 24.0* 20.8* 24.5*  
 272 300 No results for input(s): FE, TIBC, PSAT, FERR in the last 72 hours. Medication list  reviewed Current Facility-Administered Medications Medication Dose Route Frequency  sodium chloride (NS) flush 5-40 mL  5-40 mL IntraVENous Q8H  
 sodium chloride (NS) flush 5-40 mL  5-40 mL IntraVENous PRN  
 simethicone (MYLICON) 52EV/9.0TG oral drops 80 mg  1.2 mL Oral Multiple  atropine injection 0.5 mg  0.5 mg IntraVENous ONCE PRN  
 EPINEPHrine (ADRENALIN) 0.1 mg/mL syringe 1 mg  1 mg Endoscopically ONCE PRN  
 0.9% sodium chloride infusion  75 mL/hr IntraVENous CONTINUOUS  
 0.9% sodium chloride infusion 250 mL  250 mL IntraVENous PRN  pantoprazole (PROTONIX) 40 mg in 0.9% sodium chloride 10 mL injection  40 mg IntraVENous Q12H  
 midazolam (VERSED) injection 2 mg  2 mg IntraVENous Q2H PRN  
 fentaNYL citrate (PF) injection 50 mcg  50 mcg IntraVENous Q4H PRN  polyethylene glycol (MIRALAX) packet 17 g  17 g Per G Tube BID  
 bisacodyL (DULCOLAX) suppository 10 mg  10 mg Rectal DAILY  0.45% sodium chloride infusion  75 mL/hr IntraVENous CONTINUOUS  
 lactulose (CHRONULAC) 10 gram/15 mL solution 15 mL  10 g Oral TID  dexmedeTOMidine (PRECEDEX) 400 mcg in 0.9% sodium chloride 100 mL infusion  0.1-1.5 mcg/kg/hr IntraVENous TITRATE  risperiDONE (RisperDAL) tablet 1 mg  1 mg Per NG tube QHS  levothyroxine (SYNTHROID) tablet 50 mcg  50 mcg Per NG tube DAILY  levothyroxine tube feed reminder note  1 Each Other BID  heparin (porcine) injection 5,000 Units  5,000 Units SubCUTAneous Q8H  
 balsam peru-castor oiL (VENELEX) ointment   Topical BID  alcohol 62% (NOZIN) nasal  1 Ampule  1 Ampule Topical Q12H  
 dextrose (D50W) injection syrg 12.5-25 g  25-50 mL IntraVENous PRN  
 insulin lispro (HUMALOG) injection   SubCUTAneous Q6H  
  acetaminophen (TYLENOL) tablet 650 mg  650 mg Per G Tube Q6H PRN Or  
 acetaminophen (TYLENOL) suppository 650 mg  650 mg Rectal Q6H PRN  chlorhexidine (ORAL CARE KIT) 0.12 % mouthwash 15 mL  15 mL Oral Q12H  
 heparin (porcine) pf 300 Units  300 Units InterCATHeter PRN  
 sodium chloride (NS) flush 5-40 mL  5-40 mL IntraVENous Q8H  
 sodium chloride (NS) flush 5-40 mL  5-40 mL IntraVENous PRN  
 ondansetron (ZOFRAN) injection 4 mg  4 mg IntraVENous Q6H PRN Mark Cullens, [address] [place] [facility] [place], [address] Phone - [phone]               Fax - [phone]

## 2021-01-08 NOTE — PROGRESS NOTES
Surgery Spoke with pt's mother yesterday. Nursing asked me to speak with Mono Hui (son?) today I had an extensive discussion with Bekah Howell mother regarding the risks, benefits, and alternatives of proceeding. Risks of surgery including the risk of anesthesia, bleeding, infection, loss of airway, and the need for further surgery were discussed. He primarily had questions about patient's disposition when she leaves the hospital with the Trach and PEG. I asked him to work with case management in the coming days to make those plans. Addendum: 
 
N.p.o. orders on the chart however, after PEG was placed yesterday tube feeds were started. Tube feeds are currently running. Patient needs to be n.p.o. for 6 hours prior to anesthesia. We will delay the tracheostomy until 4 PM today.

## 2021-01-08 NOTE — PROGRESS NOTES
01/08/21 2053 ABCDEF Bundle SBT Safety Screen Passed Yes SBT Trial Passed No  
SBT Trial Reason for Failure Respiratory rate < 8;Low tidal volume Weaning Parameters Spontaneous Breathing Trial Complete Yes Resp Rate Observed 0 VT 0

## 2021-01-08 NOTE — PROGRESS NOTES
Comprehensive Nutrition Assessment 
 
Type and Reason for Visit: Reassess 
 
Nutrition Recommendations/Plan:  
S/p trach, recommend initiation of TF via PEG: 
 TwoCal HN @ 20mL/h, advance rate 10mL q 8h as tolerated to Goal Rate of 40mL/h + 50mL H2O flush q 4h (provides 1920kcals/80gPro/210gCHO/972mL)  
 
Nutrition Assessment:   Chart reviewed, case discussed during CCU rounds.  Pt remains intubated, needs re-estimated.  Plan for trach this afternoon.  She is s/p PEG placement yesterday.  Pt was on trophics, now on hold for procedure.  Provided TF recommendations above which will meet 100% kcal and protein needs.  BM noted today.  
 
Estimated Daily Nutrient Needs: 
Energy (kcal): PSU 1771 (MSJ 1554); Weight Used for Energy Requirements: Current 
Protein (g): 69-86g (0.8-1gPro/kg0; Weight Used for Protein Requirements: Current 
Fluid (ml/day): per MD; Method Used for Fluid Requirements: 1 ml/kcal 
 
 
Nutrition Related Findings:  Meds: dulcolax, pepcid, humalog, lactulose, synthroid, miralax, 1/2NS@75ml/h; Drips: precedex.  Edema: +2-generalized, +2 pitting-all extremities.  BM 1/8   
 
Wounds:   
Deep tissue injury, Moisture associate skin damage, Skin tears   (DTI-cheeks) 
 
Current Nutrition Therapies: 
NPO 
 
Anthropometric Measures: 
· Height:  5' 3\" (160 cm) 
· Current Body Wt:  90.5 kg (199 lb 8.3 oz)  
· Ideal Body Wt:  115 lbs:  161 %  
· BMI Category:  Obese class 1 (BMI 30.0-34.9)    
 
Nutrition Diagnosis:  
· Inadequate protein-energy intake related to impaired respiratory function as evidenced by other (specify)(trophic TF order) 
Previous dx resolving.  
 
Nutrition Interventions:  
Food and/or Nutrient Delivery: Start tube feeding 
Nutrition Education and Counseling: No recommendations at this time 
Coordination of Nutrition Care: Continue to monitor while inpatient, Interdisciplinary rounds 
 
Goals: 
Pt will tolerate resumption of TF with residuals <500mL in 2-4 days.    
 
 Nutrition Monitoring and Evaluation:  
Behavioral-Environmental Outcomes: None identified Food/Nutrient Intake Outcomes: Enteral nutrition intake/tolerance Physical Signs/Symptoms Outcomes: Biochemical data, Fluid status or edema, Nutrition focused physical findings, Weight, Hemodynamic status, GI status Discharge Planning: Too soon to determine Electronically signed by Yesenia Winchester [address], [address]  on 1/8/2021 at 2:08 PM 
 
Contact: [phone]

## 2021-01-08 NOTE — PROGRESS NOTES
COVID positive on admission 12/23/20 however COVID negative 12/21/20; PEG inserted today, tube feeding; 1/7/21; trach planned for today, 1/8/2021; NPO; DNR 
 
During ID rounds on 1/8/21, CM learned that family is wondering about disposition for patient with trach and peg.  CM placed call to brother Bean Silva [phone] and during discussion of disposition options (LTAC, some patient's return home, etc) the phone reception became very poor and line went dead.  CM did call number again and left voice message as well as CM's number to return call. Also advised him in voice message that there are care managers here to assist with discharge needs and planning.  Patient's RN Kerrie in CCU advised of the attempts to discuss help with patient's disposition with the brother. Patient's mother not at bedside at this time.  
  
ALESSANDRA 
TBD - may need LTAC due to trach 
Mom Natalie is her caregiver; 
Patient has Downs Syndrome;  She is followed by [facility].  Suzanna Elena at [facility] [phone] has been patient's CM for the last 15 years. 
  
[facility] prior to this admission provided by [facility] for SNV: PT/OT 
  
Transportation will be determined by discharge disposition 
  
2ND IM letter will be needed prior to discharge 
  
CM will continue to follow for discharge needs and planning, 
  
Elsie Wong,RN 
Care Manager 
Ext [phone]

## 2021-01-08 NOTE — OP NOTES
OPERATIVE REPORT 
 
1/8/2021 Pre-operative Diagnosis: respiratory failure, unable to wean Post-operative Diagnosis: respiratory failure, unable to wean OPERATIVE PROCEDURE: 
 Placement of Tracheostomy Tube Surgeon: Maria G Camargo MD 
 
Anesthesia: General 
 
Estimated Blood Loss: Minimal 
 
INDICATION: The patient is an 47 y.o. female who I have been  
consulted to evaluate for tracheostomy placement. She has been 
ventilator dependant without ability to be weaned. After discussion  
with critical care team, and discussion with her family, we are  
proceeding with a tracheostomy placement. Consent was placed in  
chart. DESCRIPTION OF PROCEDURE: The patient was brought to  
operating room #9, placed under general anesthesia, shoulder roll  
placed with appropriate padding. The neck was prepped and draped  
sterilely. A small vertical incision was  
made over the second tracheal ring. Incision was carried down through  
the soft tissue until the trachea was reached. A tracheal hook was  
used to elevate the trach and a 2-0 Prolene suture was placed on either side of  
the planned tracheostomy site and they were kept long to maintain  
control of the airway. Next, a small square of the second tracheal ring  
was excised. The balloon was visualized within the trachea and with  
coordination with anesthesia, the endotracheal tube was carefully  
withdrawn. As the tube passed our opening, I was able to gently  
place a tracheal  within the opening and passed a #8 size  
tracheostomy tube into the trachea. The balloon was insufflated,  
cannula replaced and the patient was able to be successfully ventilated  
with chest rise and positive end-tidal. 2 gms of Ravin was placed to ensure hemostasis. The trachea was secured to the neck in 4 spots with prolene suture and trach strap. The patient remained stable during my presence in the operating room. Attempted to update family María Elena Fox [phone]). Message left.

## 2021-01-08 NOTE — PROGRESS NOTES
Bedside and Verbal shift change report given to [facility] (oncoming nurse) by Dannie Jama (offgoing nurse). Report included the following information SBAR.  
 
0800 - Full assessment complete. Provided incontinence care. TF running @ 10 mL/hr. PEG dressing intact; scant amount of dried blood at site. 0058 - Patient's mother would like her son to be called by the physician to explain trach and plan of care. Son's name is Nydia Eden, [phone].  
 
3538 - Notified OR of mother's request for her son to be called before procedure. OR made aware that TF were not stopped at midnight. 65 - CM mgmt made aware that family has questions regarding disposition. Trach scheduled for 1600, will do CHG wipedown before procedure. 1300 - HR 49 -50's, weaned precedex from 1.3 to 1.1 mcg/kg/hr. 1500 - Notified patient's mother that trach is going to be done at 1600. HR sustaining 40's. Precedex gtt paused and MD paged. 1 - Dr. Earl Spear aware of sinus bradycardia, HR 40's. Given orders to keep precedex stopped. 1610 - Pt taken to OR. OR aware precedex held due to bradycardia; also aware of soft BP's.  
 
1630 - Dr. Powell Staff with nephrology would like NS stopped and start 1/4 NS when patient gets back from OR.  
 
1710 - Pt back from OR. Dr. Earl Spear aware patient is back and HR is 90's, bigeminy. Does not want precedex re-started, would like PRN fentanyl to be given if sedation is needed. [address] wound care orders for bilateral cheek DTI's. Treating with venelex currently and leaving ARETHA now that lele is removed. Trach collar is very tight around patient's neck and covering CVC site. CVC dressing was changed in the OR. RN to watch site closely to prevent skin breakdown, will notify oncoming RN of this as well. Scant bloody drainage from trach site. End of Shift Note Bedside shift change report given to Vaughn Zaragoza (oncoming nurse) by Jaye Dnig (offgoing nurse). Report included the following information SBAR Shift worked:  [id number] Shift summary and any significant changes:  
  Trach placed this afternoon. Small bloody drainage from site, split gauze placed. Precedex gtt off, on 1/4 NS @100. Tolerating trickle feeds Concerns for physician to address:   
  
Zone phone for oncoming shift:   
  
 
Activity: 
Activity Level: Bed Rest 
Number times ambulated in hallways past shift: 0 Number of times OOB to chair past shift: 0 Cardiac:  
Cardiac Monitoring: Yes     
Cardiac Rhythm: Normal sinus rhythm Access:  
Current line(s): central line Genitourinary:  
Urinary status: mejia Respiratory:  
O2 Device: Tracheostomy, Ventilator Chronic home O2 use?: NO Incentive spirometer at bedside: N/A 
  
GI: 
Last Bowel Movement Date: 01/08/21 Current diet:  DIET TUBE FEEDING 
DIET NPO Passing flatus: YES Tolerating current diet: YES Pain Management:  
Patient states pain is manageable on current regimen: N/A Skin: 
Jai Score: 13 Interventions: turn team 
 
Patient Safety: 
Fall Score: Total Score: 3 Interventions: bed/chair alarm High Fall Risk: Yes Length of Stay: 
Expected LOS: 12d 7h Actual LOS: 1240 [facility]

## 2021-01-09 NOTE — PROGRESS NOTES
1900: Report from [facility], [facility]: Assessment completed. Trach dressing changed, small amount of blood oozing from site. Pt coughs intermittently and can pop off vent. Mouth care and patricia care provided. Pt menses started. 2245: Pt more restless and coughing a lot. When suctioning pt HR goes up to 130s, but comes down after a few minutes. Fentanyl given to help with discomfort. 0130: Pt continues to be restless and coughing frequently. Versed given to help with discomfort. 0630: Pt trach dressing changed and inner cannula. Pt coughing and HR up to 150s for a few minutes. Pt pushed out Flexiseal, cleaned and re-inserted.

## 2021-01-09 NOTE — PROGRESS NOTES
01/09/21 2743 ABCDEF Bundle SBT Safety Screen Passed Yes SBT Trial Passed No  
SBT Trial Reason for Failure Low tidal volume

## 2021-01-09 NOTE — PROGRESS NOTES
Follow-up from tracheostomy yesterday, Dr. Deidra Liu, tracheostomy site clean with only minimal serosanguineous ooze but nothing significant, tracheostomy working well, call us if needed Face-to-face time, 5 minutes

## 2021-01-09 NOTE — PROGRESS NOTES
Bedside and Verbal shift change report given to Nacho Meyers (oncoming nurse) by Gabino King (offgoing nurse). Report included the following information SBAR.  
 
0730 - Pt having a coughing fit, 's. RN suctioned trach and orally, lots of clear secretions. Pt then vomited a large, tan amount. RN stopped TF. Checked residual, 5 mL. Will keep TF stopped until MD assesses pt. Administered zofran and versed. 46 - Updated patient's sister over the phone. 1015 - Administered PRN fentanyl for visible signs of discomfort and before bathing/turning. Surgeon assessed trach site, no new orders. Small amount of serosanguinous drainage, split gauze changed and site cleaned with normal saline. 65 - Dr. Stacy Mora aware of strong coughing fits, pt is tremorous and tachycardic. Minimal relief with PRN fentanyl and versed. Also aware of vomiting episode this morning, but residual now 0. 
 
1200 - Reassessment complete. Pt appears more calm and comfortable now, no longer tremorous and HR low 100's.  
 
1756 - Administered PRN fentanyl. End of Shift Note Bedside shift change report given to Phong guerra (oncoming nurse) by Elenore Curling (offgoing nurse). Report included the following information SBAR Shift worked:  [id number] Shift summary and any significant changes:  
  Strong coughing fits, requiring PRN fentanyl and versed. Trach care complete. Tolerating TF. Concerns for physician to address:   
  
Zone phone for oncoming shift:    
  
 
Activity: 
Activity Level: Bed Rest 
Number times ambulated in hallways past shift: 0 Number of times OOB to chair past shift: 0 Cardiac:  
Cardiac Monitoring: Yes     
Cardiac Rhythm: Sinus tachycardia Access:  
Current line(s): central line Genitourinary:  
Urinary status: mejia Respiratory:  
O2 Device: Ventilator, Tracheostomy Chronic home O2 use?: NO Incentive spirometer at bedside: N/A 
  
GI: 
Last Bowel Movement Date: 01/08/21 Current diet:  DIET TUBE FEEDING 
DIET NPO Passing flatus: YES Tolerating current diet: YES Pain Management:  
Patient states pain is manageable on current regimen: N/A Skin: 
Jai Score: 13 Interventions: turn team 
 
Patient Safety: 
Fall Score: Total Score: 4 Interventions: bed/chair alarm High Fall Risk: Yes Length of Stay: 
Expected LOS: 12d 7h Actual LOS: [facility]

## 2021-01-10 PROBLEM — E87.6 ACUTE HYPOKALEMIA: Status: ACTIVE | Noted: 2021-01-01

## 2021-01-10 NOTE — PROGRESS NOTES
Stable from renal stand point We will see her again tomorrow Wayne Pena MD 
[facility] Office :[phone] Fax: [phone]

## 2021-01-10 NOTE — PROGRESS NOTES
[facility] 
 
ICU Intensivist- Critical Care Progress Note Name: Mónica Cooley : 1980 MRN: [id number] Admit: 2020  7:26 PM   
 
Diagnosis:  
 
Principal Problem: 
  Aspiration pneumonia of both lungs due to vomit= 
  
Problem List: 
  Large hiatal hernia Acute hypokalemia 
  Post viral syndrome 
  Fecal impaction of colon 
  Dehydration with hypernatremia 
  Acute hypoxemic respiratory failure 
  Severe sepsis with acute organ dysfunction 
  Constipation due to pain medication therapy 
  Aspiration pneumonia of both lungs due to vomit 
  Pneumonia due to COVID-19 virus, resolved () 
  Acute renal failure with acute renal cortical necrosis superimposed on stage 4 chronic kidney disease 
  Kidney disease, chronic, stage IV (severe, EGFR 15-29 ml/min) 
  HFrEF (heart failure with reduced ejection fraction) 
  Goals of care, counseling/discussion 
  Acquired hypothyroidism 
  Down syndrome 
  
ICU Comprehensive Plan of Care:  
  
Plans for this Shift:  
1. Sepsis with acute organ dysfunction due to aspiration bilateral bacterial pneumonia- continue empiric IV antibiotics (meropenem) aggressive pulmonary toilet.  Covid pneumonia resolved, with post viral bilateral bacterial pneumonia. 
  
2. DANY superimposed on CKD with severe hypernatremic dehydration and nonoliguric ATN- aggressive vascular volume resuscitation in progress.  Monitor renal indices as well as intake and output. 
  
3. Fecal impaction of rectum and ascending colon- enteral feedings tolerated better. Subjective:  
 
Progress Note:  
1/10/2021  
10:53 AM  
 
Reason for ICU Admission:  
Aspiration pneumonia of both lungs due to vomit ([address] Overnight Events:  
Spontaneous breathing trials better tolerated. POD:2 Days Post-Op S/P: Procedure(s): 
TRACHEOSTOMY PLACEMENT (URGENT) Past Medical History: has a past medical history of Endocrine disease, Gram negative septic shock ([facility] [address] (1/23/2020), Hypothyroid (03/11/2018), Ill-defined condition, and Lactic acidosis (12/23/2020). Past Surgical History:  
 
 has no past surgical history on file. Current Medications:  
 
Current Facility-Administered Medications Medication Dose Route Frequency  sodium chloride 0.225 % in sterile water 1,000 mL infusion   IntraVENous CONTINUOUS  
 pantoprazole (PROTONIX) 40 mg in 0.9% sodium chloride 10 mL injection  40 mg IntraVENous Q12H  
 midazolam (VERSED) injection 2 mg  2 mg IntraVENous Q2H PRN  
 fentaNYL citrate (PF) injection 50 mcg  50 mcg IntraVENous Q4H PRN  polyethylene glycol (MIRALAX) packet 17 g  17 g Per G Tube BID  
 bisacodyL (DULCOLAX) suppository 10 mg  10 mg Rectal DAILY  lactulose (CHRONULAC) 10 gram/15 mL solution 15 mL  10 g Oral TID  dexmedeTOMidine (PRECEDEX) 400 mcg in 0.9% sodium chloride 100 mL infusion  0.1-1.5 mcg/kg/hr IntraVENous TITRATE  risperiDONE (RisperDAL) tablet 1 mg  1 mg Per NG tube QHS  levothyroxine (SYNTHROID) tablet 50 mcg  50 mcg Per NG tube DAILY  levothyroxine tube feed reminder note  1 Each Other BID  heparin (porcine) injection 5,000 Units  5,000 Units SubCUTAneous Q8H  
 balsam peru-castor oiL (VENELEX) ointment   Topical BID  alcohol 62% (NOZIN) nasal  1 Ampule  1 Ampule Topical Q12H  
 dextrose (D50W) injection syrg 12.5-25 g  25-50 mL IntraVENous PRN  
 insulin lispro (HUMALOG) injection   SubCUTAneous Q6H  
 acetaminophen (TYLENOL) tablet 650 mg  650 mg Per G Tube Q6H PRN Or  
 acetaminophen (TYLENOL) suppository 650 mg  650 mg Rectal Q6H PRN  chlorhexidine (ORAL CARE KIT) 0.12 % mouthwash 15 mL  15 mL Oral Q12H  
 heparin (porcine) pf 300 Units  300 Units InterCATHeter PRN  
 sodium chloride (NS) flush 5-40 mL  5-40 mL IntraVENous Q8H  
 sodium chloride (NS) flush 5-40 mL  5-40 mL IntraVENous PRN  
  ondansetron (ZOFRAN) injection 4 mg  4 mg IntraVENous Q6H PRN Facility-Administered Medications Ordered in Other Encounters Medication Dose Route Frequency  ceFAZolin (ANCEF) injection   IntraVENous PRN  
 0.9% sodium chloride infusion   IntraVENous CONTINUOUS  
 dexamethasone (DECADRON) 4 mg/mL injection   IntraVENous PRN Allergies/Social/Family History: No Known Allergies Social History Tobacco Use  Smoking status: Never Smoker  Smokeless tobacco: Never Used Substance Use Topics  Alcohol use: No  
  
History reviewed. No pertinent family history. Review of Systems:  
 
Review of systems not obtained due to patient factors. Objective:  
Vital Signs: 
Visit Vitals BP (!) 119/58 Pulse 87 Temp 100.2 °F (37.9 °C) Resp 25 Ht 5' 3\" (1.6 m) Wt 90.3 kg (199 lb 1.2 oz) SpO2 100% BMI 35.26 kg/m² O2 Device: Tracheostomy Temp (24hrs), Av °F (37.8 °C), Min:99.7 °F (37.6 °C), Max:100.2 °F (37.9 °C) Intake/Output:  
 
Intake/Output Summary (Last 24 hours) at 1/10/2021 1053 Last data filed at 1/10/2021 4915 Gross per 24 hour Intake 2376.66 ml Output 1065 ml Net 1311.66 ml Physical Exam: 
General:  Sedated and on the ventilator. No acute distress. Eyes:  Sclera anicteric. Pupils equal, round, reactive to light. Mouth/Throat: Normal.  
Neck: Tracheostomy clean. Lungs:   Clear to auscultation bilaterally, good effort. Cardiovascular:  Regular rate and rhythm, no murmur, click, rub, or gallop. Abdomen:   Soft, non-tender, bowel sounds normal, non-distended. Extremities: No cyanosis or edema. Skin: No acute rash or lesions. Lymph Nodes: Cervical and supraclavicular normal.  
Musculoskeletal:  No swelling or deformity. Lines/Devices:  Intact, no erythema, drainage, or tenderness. Psychiatric: Sedated and appears comfortable on ventilator. LABS AND  DATA: Personally reviewed Recent Labs  
  01/10/21 1840 01/09/21 
0440 WBC 16.8* 12.1* HGB 7.7* 8.6* HCT 23.5* 26.4*  
 240 Recent Labs  
  01/10/21 
0520 01/09/21 
0440  144  
K 3.5 4.1 * 115* CO2 23 19* BUN 19 22* CREA 1.77* 1.71* GLU 92 96  
CA 8.7 9.0 MG 1.6 1.7 PHOS 3.8  --   
 
Recent Labs  
  01/10/21 
0520 ALB 2.7* No results for input(s): INR, PTP, APTT, INREXT in the last 72 hours. No results for input(s): PHI, PCO2I, PO2I, FIO2I in the last 72 hours. No results for input(s): CPK, CKMB, TROIQ, BNPP in the last 72 hours. Ventilator Settings: 
Mode Rate Tidal Volume Pressure FiO2 PEEP Assist control   350 ml  10 cm H2O 40 % 5 cm H20 Peak airway pressure: 26 cm H2O Minute ventilation: 8.6 l/min MEDS: Reviewed RADIOLOGY: 
No results found. Assessment:  
 
Hospital Problems  Date Reviewed: 1/8/2021 Codes Class Noted POA Severe sepsis with acute organ dysfunction (HCC) ICD-10-CM: A41.9, R65.20 ICD-9-CM: 038.9, 995.92 Acute 12/23/2020 Yes * (Principal) Aspiration pneumonia of both lungs due to vomit ([facility] [address] ICD-10-CM: J69.0 ICD-9-CM: 507.0 Acute 12/23/2020 Yes Post viral syndrome ICD-10-CM: G93.3 ICD-9-CM: 780.79 Acute 12/25/2020 No  
   
 Acute hypoxemic respiratory failure ([facility] [address] ICD-10-CM: J96.01 
ICD-9-CM: 518.81 Acute 12/24/2020 Yes Acute renal failure with acute renal cortical necrosis superimposed on stage 4 chronic kidney disease (HCC) (Chronic) ICD-10-CM: N17.1, N18.4 ICD-9-CM: 584.6, 585.4 Acute 12/23/2020 Yes Kidney disease, chronic, stage IV (severe, EGFR 15-29 ml/min) (HCC) ICD-10-CM: N18.4 ICD-9-CM: 601. 4 Acute 12/23/2020 Yes Lactic acidosis ICD-10-CM: E87.2 ICD-9-CM: 276.2 Acute 12/23/2020 Yes Fecal impaction of colon ([facility] ICD-10-CM: K56.41 
ICD-9-CM: 560.32 Acute 12/23/2020 Yes Dehydration with hypernatremia (Chronic) ICD-10-CM: E87.0 ICD-9-CM: 276.0 Acute 12/16/2020 Yes Gram negative septic shock (HCC) ICD-10-CM: A41.50, R65.21 ICD-9-CM: 038.40, 995.92, 785.52 Acute 1/23/2020 Yes Down syndrome (Chronic) ICD-10-CM: Q90.9 ICD-9-CM: 758.0 End Stage 12/23/2020 Yes Constipation due to pain medication therapy ICD-10-CM: K59.03 
ICD-9-CM: 564.09, E947.9 Acute 11/26/2020 Yes Large hiatal hernia (Chronic) ICD-10-CM: K44.9 ICD-9-CM: 921. 3 Chronic 11/2/2020 Yes Overview Signed 1/1/2021  4:08 PM by Mason Gloria MD  
  ([id number])- CT Abdomen: 
Distended fluid-filled esophagus and large hiatus hernia. Goals of care, counseling/discussion ICD-10-CM: Z71.89 ICD-9-CM: V65.49  12/28/2020 Yes HFrEF (heart failure with reduced ejection fraction) (HCC) (Chronic) ICD-10-CM: I50.20 ICD-9-CM: 428.20  12/24/2020 Yes Acquired hypothyroidism (Chronic) ICD-10-CM: E03.9 ICD-9-CM: 244.9  12/24/2020 Yes Respiratory failure ([address] ICD-10-CM: J96.90 ICD-9-CM: 518.81  12/23/2020 Yes Multidisciplinary Rounds Completed: Yes ABCDEF Bundle/Checklist 
Pain Medications: None Target RASS: 0 - Alert & Calm - Spontaneously pays attention to caregiver Sedation Medications: Precedex and Versed CAM-ICU:  Negative Discussed Plan of Care (goals of care): Yes Addressed Code Status: Do Not Resuscitate CARDIOVASCULAR Cardiac Gtts: None SBP Goal of: > 90 mmHg MAP Goal of: > 65 mmHg Transfusion Trigger (Hgb): <7 g/dL RESPIRATORY Vent Goals:  
Head of bed > 30 degrees Aggressive bronchopulmonary hygiene DVT Prophylaxis (if no, list reason): SCD's or Sequential Compression Device and Lovenox SPO2 Goal: > 92% GI/ Dutta Catheter Present: Yes ANTIBIOTICS Antibiotics: Ancef T/L/D Tubes: Tracheostomy and PEG Tube Lines: Peripheral IV Drains: Dutta Catheter SPECIAL EQUIPMENT None DISPOSITION Stay in ICU CRITICAL CARE CONSULTANT NOTE I provided a face-to-face bedside physician/patient encounter, greater than the usual and customary amount normally needed, due to the high complexity of medical decision-making required. 
  
I reviewed and interpreted patient data including clinical events, labs, images, vital signs, I/O's, and examined patient.   
  
I have actively participated in multi-disciplinary discussions ([address]  Pharmacist, CCU nursing staff) regarding the case in formulating an optimal therapeutic plan, and effecting a management strategy for this patient. 
  
NOTE OF PERSONAL INVOLVEMENT IN CARE  
This patient has a high probability of imminent, clinically significant deterioration, which requires the highest level of preparedness to intervene urgently. I participated in the decision-making and personally managed, or directed the management of, a myriad of life and organ supporting interventions which required my frequent-interval clinical reassessments, in order to treat or prevent imminent deterioration. 
  
I personally spent 30 minutes of critical care time. This is time spent at this critically ill patient's bedside actively involved in patient care as well as the coordination of care and discussions with the patient's family. This does not include any procedural time which has been billed separately. Tomas Sherman MD, FACS Staff [facility]/ [address] 1/10/2021

## 2021-01-10 NOTE — PROGRESS NOTES
01/10/21 1149 Weaning Parameters Resp Rate Observed 50 Ve 15   
RSBI 225  
patient's respiratory rate increased to 50bpm/ vt dropped, placed patient back on original settings.

## 2021-01-10 NOTE — PROGRESS NOTES
[facility] 
 
ICU Intensivist- Critical Care Progress Note Name: Raquel Gamez : 1980 MRN: [id number] Admit: 2020  7:26 PM   
 
Diagnosis:  
 
Principal Problem: 
  Aspiration pneumonia of both lungs due to vomit= 
 
Problem List: 
  Large hiatal hernia 
  Post viral syndrome 
  Fecal impaction of colon 
  Gram negative septic shock 
  Dehydration with hypernatremia 
  Acute hypoxemic respiratory failure 
  Severe sepsis with acute organ dysfunction 
  Constipation due to pain medication therapy 
  Aspiration pneumonia of both lungs due to vomit 
  Pneumonia due to COVID-19 virus, resolved () 
  Acute renal failure with acute renal cortical necrosis superimposed on stage 4 chronic kidney disease 
  Kidney disease, chronic, stage IV (severe, EGFR 15-29 ml/min) 
  HFrEF (heart failure with reduced ejection fraction) 
  Goals of care, counseling/discussion 
  Acquired hypothyroidism 
  Down syndrome ICU Comprehensive Plan of Care:  
 
Plans for this Shift:  
1. Sepsis with acute organ dysfunction due to aspiration bilateral bacterial pneumonia- continue empiric IV antibiotics (meropenem) aggressive pulmonary toilet.  Covid pneumonia resolved, with post viral bilateral bacterial pneumonia. 
  
2. DANY superimposed on CKD with severe hypernatremic dehydration and nonoliguric ATN- aggressive vascular volume resuscitation in progress.  Monitor renal indices as well as intake and output. 
  
3. Fecal impaction of rectum and ascending colon- oral laxatives mechanical bowel prep ineffective, daily high colonic enemas in progress.  Enteral feedings will resume after resolution. 
  
 
 
Subjective:  
 
Progress Note:  
2021  
9:16 PM  
 
Reason for ICU Admission:  
Aspiration pneumonia of both lungs due to vomit POD:1 Day Post-Op S/P: Procedure(s): 
TRACHEOSTOMY PLACEMENT (URGENT) Past Medical History: has a past medical history of Endocrine disease, Gram negative septic shock ([facility] [address] (1/23/2020), Hypothyroid (03/11/2018), Ill-defined condition, and Lactic acidosis (12/23/2020). Past Surgical History:  
 
 has no past surgical history on file. Current Medications:  
 
Current Facility-Administered Medications Medication Dose Route Frequency  sodium chloride 0.225 % in sterile water 1,000 mL infusion   IntraVENous CONTINUOUS  
 pantoprazole (PROTONIX) 40 mg in 0.9% sodium chloride 10 mL injection  40 mg IntraVENous Q12H  
 midazolam (VERSED) injection 2 mg  2 mg IntraVENous Q2H PRN  
 fentaNYL citrate (PF) injection 50 mcg  50 mcg IntraVENous Q4H PRN  polyethylene glycol (MIRALAX) packet 17 g  17 g Per G Tube BID  
 bisacodyL (DULCOLAX) suppository 10 mg  10 mg Rectal DAILY  lactulose (CHRONULAC) 10 gram/15 mL solution 15 mL  10 g Oral TID  dexmedeTOMidine (PRECEDEX) 400 mcg in 0.9% sodium chloride 100 mL infusion  0.1-1.5 mcg/kg/hr IntraVENous TITRATE  risperiDONE (RisperDAL) tablet 1 mg  1 mg Per NG tube QHS  levothyroxine (SYNTHROID) tablet 50 mcg  50 mcg Per NG tube DAILY  levothyroxine tube feed reminder note  1 Each Other BID  heparin (porcine) injection 5,000 Units  5,000 Units SubCUTAneous Q8H  
 balsam peru-castor oiL (VENELEX) ointment   Topical BID  alcohol 62% (NOZIN) nasal  1 Ampule  1 Ampule Topical Q12H  
 dextrose (D50W) injection syrg 12.5-25 g  25-50 mL IntraVENous PRN  
 insulin lispro (HUMALOG) injection   SubCUTAneous Q6H  
 acetaminophen (TYLENOL) tablet 650 mg  650 mg Per G Tube Q6H PRN Or  
 acetaminophen (TYLENOL) suppository 650 mg  650 mg Rectal Q6H PRN  chlorhexidine (ORAL CARE KIT) 0.12 % mouthwash 15 mL  15 mL Oral Q12H  
 heparin (porcine) pf 300 Units  300 Units InterCATHeter PRN  
 sodium chloride (NS) flush 5-40 mL  5-40 mL IntraVENous Q8H  
 sodium chloride (NS) flush 5-40 mL  5-40 mL IntraVENous PRN  
  ondansetron (ZOFRAN) injection 4 mg  4 mg IntraVENous Q6H PRN Facility-Administered Medications Ordered in Other Encounters Medication Dose Route Frequency  ceFAZolin (ANCEF) injection   IntraVENous PRN  
 0.9% sodium chloride infusion   IntraVENous CONTINUOUS  
 dexamethasone (DECADRON) 4 mg/mL injection   IntraVENous PRN Allergies/Social/Family History: No Known Allergies Social History Tobacco Use  Smoking status: Never Smoker  Smokeless tobacco: Never Used Substance Use Topics  Alcohol use: No  
  
History reviewed. No pertinent family history. Review of Systems:  
 
Review of systems not obtained due to patient factors. Objective:  
Vital Signs: 
Visit Vitals BP (!) 121/48 Pulse (!) 105 Temp 99.9 °F (37.7 °C) Resp 24 Ht 5' 3\" (1.6 m) Wt 90.9 kg (200 lb 6.4 oz) SpO2 100% BMI 35.50 kg/m² O2 Device: Ventilator, Tracheostomy Temp (24hrs), Av.8 °F (37.7 °C), Min:98.8 °F (37.1 °C), Max:100.2 °F (37.9 °C) Intake/Output:  
 
Intake/Output Summary (Last 24 hours) at 2021 2116 Last data filed at 2021 1900 Gross per 24 hour Intake 2950 ml Output 1545 ml Net 1405 ml Physical Exam: 
General:  Sedated and on the ventilator. No acute distress. Eyes:  Sclera anicteric. Pupils equal, round, reactive to light. Mouth/Throat: Orotracheal tube in place. Neck: Supple. Lungs:   Clear to auscultation bilaterally, good effort. Cardiovascular:  Regular rate and rhythm, no murmur, click, rub, or gallop. Abdomen:   Soft, non-tender, bowel sounds normal, non-distended. Extremities: No cyanosis or edema. Skin: No acute rash or lesions. Lymph Nodes: Cervical and supraclavicular normal.  
Musculoskeletal:  No swelling or deformity. Lines/Devices:  Intact, no erythema, drainage, or tenderness. Psychiatric: Sedated and appears comfortable on ventilator. LABS AND  DATA: Personally reviewed Recent Labs 21 7902 01/08/21 
9316 WBC 12.1* 11.6* HGB 8.6* 8.1* HCT 26.4* 24.0*  
 241 Recent Labs 01/09/21 
9771 01/08/21 
7760 01/07/21 
6752  145 142  
K 4.1 3.8 3.5 * 117* 111* CO2 19* 22 26 BUN 22* 24* 31* CREA 1.71* 1.68* 1.68* GLU 96 110* 93  
CA 9.0 8.6 8.8 MG 1.7  --  1.9 PHOS  --   --  3.7 No results for input(s): AP, TBIL, TP, ALB, GLOB, AML, LPSE in the last 72 hours. No lab exists for component: SGOT, GPT, AMYP No results for input(s): INR, PTP, APTT, INREXT in the last 72 hours. No results for input(s): PHI, PCO2I, PO2I, FIO2I in the last 72 hours. No results for input(s): CPK, CKMB, TROIQ, BNPP in the last 72 hours. Ventilator Settings: 
Mode Rate Tidal Volume Pressure FiO2 PEEP Assist control   350 ml  10 cm H2O 40 % 5 cm H20 Peak airway pressure: 29 cm H2O Minute ventilation: 9.53 l/min MEDS: Reviewed RADIOLOGY: 
No results found. Assessment:  
 
Hospital Problems  Date Reviewed: 1/8/2021 Codes Class Noted POA Severe sepsis with acute organ dysfunction (HCC) ICD-10-CM: A41.9, R65.20 ICD-9-CM: 038.9, 995.92 Acute 12/23/2020 Yes * (Principal) Aspiration pneumonia of both lungs due to vomit ([facility] [address] ICD-10-CM: J69.0 ICD-9-CM: 507.0 Acute 12/23/2020 Yes Post viral syndrome ICD-10-CM: G93.3 ICD-9-CM: 780.79 Acute 12/25/2020 No  
   
 Acute hypoxemic respiratory failure ([facility] [address] ICD-10-CM: J96.01 
ICD-9-CM: 518.81 Acute 12/24/2020 Yes Acute renal failure with acute renal cortical necrosis superimposed on stage 4 chronic kidney disease (HCC) (Chronic) ICD-10-CM: N17.1, N18.4 ICD-9-CM: 584.6, 585.4 Acute 12/23/2020 Yes Kidney disease, chronic, stage IV (severe, EGFR 15-29 ml/min) (HCC) ICD-10-CM: N18.4 ICD-9-CM: 068. 4 Acute 12/23/2020 Yes Lactic acidosis ICD-10-CM: E87.2 ICD-9-CM: 276.2 Acute 12/23/2020 Yes  [facility] ICD-10-CM: K56.41 
 ICD-9-CM: 560.32 Acute 12/23/2020 Yes Dehydration with hypernatremia (Chronic) ICD-10-CM: E87.0 ICD-9-CM: 276.0 Acute 12/16/2020 Yes Gram negative septic shock (HCC) ICD-10-CM: A41.50, R65.21 ICD-9-CM: 038.40, 995.92, 785.52 Acute 1/23/2020 Yes Down syndrome (Chronic) ICD-10-CM: Q90.9 ICD-9-CM: 758.0 End Stage 12/23/2020 Yes Constipation due to pain medication therapy ICD-10-CM: K59.03 
ICD-9-CM: 564.09, E947.9 Acute 11/26/2020 Yes Large hiatal hernia (Chronic) ICD-10-CM: K44.9 ICD-9-CM: 720. 3 Chronic 11/2/2020 Yes Overview Signed 1/1/2021  4:08 PM by Gerald Diallo MD  
  ([id number])- CT Abdomen: 
Distended fluid-filled esophagus and large hiatus hernia. Goals of care, counseling/discussion ICD-10-CM: Z71.89 ICD-9-CM: V65.49  12/28/2020 Yes HFrEF (heart failure with reduced ejection fraction) (HCC) (Chronic) ICD-10-CM: I50.20 ICD-9-CM: 428.20  12/24/2020 Yes Acquired hypothyroidism (Chronic) ICD-10-CM: E03.9 ICD-9-CM: 244.9  12/24/2020 Yes Respiratory failure ([facility] [address] ICD-10-CM: J96.90 ICD-9-CM: 518.81  12/23/2020 Yes Multidisciplinary Rounds Completed:  Yes 
  
ABCDEF Bundle/Checklist 
Pain Medications: Fentanyl Target RASS: 0 - Alert & Calm - Spontaneously pays attention to caregiver Sedation Medications: None CAM-ICU:  Negative Discussed Plan of Care (goals of care): Yes Addressed Code Status: Do Not Resuscitate 
  
CARDIOVASCULAR Cardiac Gtts: None SBP Goal of: > 90 mmHg MAP Goal of: > 65 mmHg Transfusion Trigger (Hgb): <7 g/dL 
  
RESPIRATORY Vent Goals:  
Head of bed > 30 degrees Aggressive bronchopulmonary hygiene DVT Prophylaxis (if no, list reason): SCD's or Sequential Compression Device and Lovenox  
SPO2 Goal: > 92% 
  
GI/ Dutta Catheter Present: Yes GI Prophylaxis: Pepcid (famotidine)  
Nutrition: No NPO Bowel Movement: Yes Bowel Regimen: MiraLax, Milk of magnesia and Enema 
  
ANTIBIOTICS 
 Antibiotics: 
Meropenem 
  
T/L/D Tubes: ETT and Nasogastric Tube Lines: Peripheral IV and None Drains: Dutta Catheter 
  
SPECIAL EQUIPMENT None 
  
DISPOSITION Stay in ICU 
  
CRITICAL CARE CONSULTANT NOTE I provided a face-to-face bedside physician/patient encounter, greater than the usual and customary amount normally needed, due to the high complexity of medical decision-making required. 
  
I reviewed and interpreted patient data including clinical events, labs, images, vital signs, I/O's, and examined patient.   
  
I have actively participated in multi-disciplinary discussions (Respiratory Therapy, Clinical Pharmacist, CCU nursing staff) regarding the case in formulating an optimal therapeutic plan, and effecting a management strategy for this patient. 
  
NOTE OF PERSONAL INVOLVEMENT IN CARE  
This patient has a high probability of imminent, clinically significant deterioration, which requires the highest level of preparedness to intervene urgently. I participated in the decision-making and personally managed, or directed the management of, a myriad of life and organ supporting interventions which required my frequent-interval clinical reassessments, in order to treat or prevent imminent deterioration. I personally spent 30 minutes of critical care time. This is time spent at this critically ill patient's bedside actively involved in patient care as well as the coordination of care and discussions with the patient's family. This does not include any procedural time which has been billed separately. Natalie De Jesus MD, FACS Staff Intensivist 
[facility] Critical Care 
1/9/2021

## 2021-01-10 NOTE — PROGRESS NOTES
19: 00-Bedside and Verbal shift change report given to HEMA Bailon (oncoming nurse) by Rosa Elena Leger (offgoing nurse). Report included the following information SBAR, Kardex, Intake/Output, MAR, Recent Results and Cardiac Rhythm ST with coughing& sustaining MD Mike aware. 19:40-Pt has coughing  with copious amount of oral secretions, and HR elevated in 150s, gave versed to help with bucking the ventilator. 20:00-Complex assessment completed, mouth, and mejia care done. Pt has 1/4NS running @100, Trach, PEG, & mejia. Pt tolerating versed and HR decreased, see VS. 
22:45-Pt coughing spells increasing and coughing up large amounts of secretions, NP, Lisa Yusuf, wants Precedex restarted to keep pt calm and decrease agitation from vent&trach and pt has baseline mental disability with anxiety. 00:00-Reassessed, no changes, mouth and mejia care done. BS checked, no coverage required. 04:00-Reassessed, no changes, mouth and mejia care done 06:00-BS checked, no coverage required. Trach care done. 07:00-Bedside and Verbal shift change report given to HEMA Chowdhury (oncoming nurse) by Kaitlin Ken (offgoing nurse). Report included the following information SBAR, Kardex, Intake/Output, MAR and Recent Results.

## 2021-01-11 NOTE — WOUND CARE
Wound Care Wound care consulted for [facility] DTI's\". Patient obtained DTIs to her bilateral cheeks due to ET chau per staff. Patient does have prominent wide cheeks. Patient currently in ICU. She has a trach and is on the ventilator. She is non verbal at baseline and is unable to make her needs known at this time. Patient was admitted with sepsis and acute hypoxic resp failure secondary to covid 19/pneumonia has a past medical history. She has a past medical history od down syndrome, HTN, heart failure, anemia, hypothyroidism. Assessment: 
  
Patient requires total assist with ADL's and turning/repositioning. She is incontinent of urine and stool. She currently is on an ICU low air loss mattress. Her heels are being floated Wound Cheek Left ETT lele DTI 01/01/21 (Active) Wound Image    01/11/21 1613 Wound Etiology Deep Tissue/Injury 01/11/21 1613 Dressing Status New dressing applied 01/11/21 1613 Cleansed Cleansed with saline 01/11/21 1613 Dressing/Treatment Other (Comment) 01/11/21 1613 Wound Length (cm) 0.5 cm 01/11/21 1613 Wound Width (cm) 2.2 cm 01/11/21 1613 Wound Depth (cm) 0.1 cm 01/11/21 1613 Wound Surface Area (cm^2) 1.1 cm^2 01/11/21 1613 Wound Volume (cm^3) 0.11 cm^3 01/11/21 1613 Wound Assessment Pink/red;Purple/maroon 01/11/21 1613 Drainage Amount Scant 01/11/21 1613 Drainage Description Serosanguinous 01/01/21 1809 Wound Odor None 01/11/21 1613 Ana-Wound/Incision Assessment Intact 01/11/21 1613 Edges Undefined edges 01/11/21 1613 Number of days: 10 Wound Cheek Right ETT lele DTI 01/01/21 (Active) Wound Image   01/11/21 1613 Wound Etiology Deep Tissue/Injury 01/11/21 1613 Dressing Status New dressing applied 01/11/21 1613 Cleansed Cleansed with saline 01/11/21 1613 Dressing/Treatment Other (Comment) 01/11/21 1613 Wound Length (cm) 0.5 cm 01/11/21 1613 Wound Width (cm) 1.5 cm 01/11/21 4761 Wound Surface Area (cm^2) 0.75 cm^2 01/11/21 1613 Wound Assessment Purple/maroon 01/11/21 1613 Drainage Amount None 01/11/21 1613 Drainage Description Serosanguinous 01/01/21 1809 Wound Odor None 01/11/21 1613 Ana-Wound/Incision Assessment Intact 01/11/21 1613 Edges Undefined edges 01/11/21 1613 Number of days: 10 Plan: 
Continue to turn and reposition patient every 2 hours Use heel suspension boots or float heels by placing pillows under lower legs Keep HOB at 27 or less unless medically contraindicated to prevent shearing and pressure Use only one absorbent pad under patient, avoid use of diapers Wound Care recommendations: 
Cleanse areas to right facial cheek and left facial cheek with normal saline, apply thick layer of venelex oint BID Roque Coulter, RN, BSN CWON

## 2021-01-11 NOTE — PROGRESS NOTES
1900: Bedside report received from Milka Colvin: Shift assessment complete. See flowsheets 2000: Patient with a fever of 100.5. ice packs placed on patient. PRN tylenol too early from last dose to be administered. 0285: Reassessment complete. See flowsheets 9651: Reassessment complete.  See flowsheets 
 
0700: Report given to Scout Gunn

## 2021-01-11 NOTE — PROGRESS NOTES
Care Management: 
 
ALESSANDRA: Anticipate rehab needs before being able to return home with her mom. I spoke with her mom today on the phone and she is in agreement with consulting [facility] to see if they could accept. I have sent consult via [employer] [facility]. COVID positive on but now is testing Covid Negative. Has new Trach and  PEG .  DNR. Hx Downs Syndrome and her mom Elliott Ruiz is her caregiver. She has a brother Jordan Collins.  
  
Niru Zaidi is followed by Vilma Bear at [address] [place] [address] has been patient's CM for the last 15 years. 
  
HHC prior to this admission provided by [facility] for [facility]: PT/OT Vicki Bermeo RN [id number]

## 2021-01-11 NOTE — PROGRESS NOTES
Nephrology Progress Note Edm[facility]  
 
[email or web address]  Phone - [phone] Patient: Libia Loja    YOB: 1980     Admit Date: 12/23/2020 Date- 1/11/2021 CC: [facility]ORGE IMPRESSION & PLAN:  
? cyndi likely pre-renal + Vanc toxicity--improving ? Sepsis-gram-negative shock ? Hypokalemia ? Hypernatremia ? Anemia ? Lactic acidosis ? Protein malnutrition ? Bacterial pneumonia ? Fecal impaction ? Respiratory failure-on vent ? History of heart failure ? Atrophic right kidney ? Down syndrome with autism ? Anemia - iron sat=13% PLAN- 
? Continue present IVF; await labs ? Start IV iron and Epogen ? Serial labs ? Vent management per ICU team 
  
Subjective: Interval History:  
Trach/vent; awake Objective:  
Vitals:  
 01/11/21 0345 01/11/21 0400 01/11/21 0415 01/11/21 0430 BP:  (!) 103/59  (!) 105/51 Pulse: 91 90 96 92 Resp: 13 23 22 (!) 32 Temp:  99.1 °F (37.3 °C) SpO2: 96% 97% 96% 90% Weight:      
Height:      
  
01/10 0701 - 01/11 0700 In: 2841.5 [I.V.:1961.5] Out: 1110 [Urine:1010; Drains:100] Last 3 Recorded Weights in this Encounter 01/09/21 0700 01/09/21 0857 01/10/21 7770 Weight: 90.5 kg (199 lb 8.3 oz) 90.9 kg (200 lb 6.4 oz) 90.3 kg (199 lb 1.2 oz) Physical exam:  
GEN: intubated on vent NECK-trach RESP-ronchi bilateral 
CVS: RRR,S1,S2 NEURO: Can't access due to patient's current condition  : mejia + Chart reviewed. Pertinent Notes reviewed. Data Review : 
Recent Labs  
  01/10/21 
0520 01/09/21 
0440  144  
K 3.5 4.1 * 115* CO2 23 19* BUN 19 22* CREA 1.77* 1.71* GLU 92 96  
CA 8.7 9.0 MG 1.6 1.7 PHOS 3.8  --   
 
Recent Labs  
  01/10/21 
0520 01/09/21 
0440 WBC 16.8* 12.1* HGB 7.7* 8.6* HCT 23.5* 26.4*  
 240 Recent Labs 01/09/21 
0440 TIBC 120* PSAT 13* FERR 745* Medication list  reviewed Current Facility-Administered Medications Medication Dose Route Frequency  sodium chloride 0.225 % in sterile water 1,000 mL infusion   IntraVENous CONTINUOUS  
 pantoprazole (PROTONIX) 40 mg in 0.9% sodium chloride 10 mL injection  40 mg IntraVENous Q12H  
 midazolam (VERSED) injection 2 mg  2 mg IntraVENous Q2H PRN  
 fentaNYL citrate (PF) injection 50 mcg  50 mcg IntraVENous Q4H PRN  polyethylene glycol (MIRALAX) packet 17 g  17 g Per G Tube BID  
 bisacodyL (DULCOLAX) suppository 10 mg  10 mg Rectal DAILY  lactulose (CHRONULAC) 10 gram/15 mL solution 15 mL  10 g Oral TID  dexmedeTOMidine (PRECEDEX) 400 mcg in 0.9% sodium chloride 100 mL infusion  0.1-1.5 mcg/kg/hr IntraVENous TITRATE  risperiDONE (RisperDAL) tablet 1 mg  1 mg Per NG tube QHS  levothyroxine (SYNTHROID) tablet 50 mcg  50 mcg Per NG tube DAILY  levothyroxine tube feed reminder note  1 Each Other BID  heparin (porcine) injection 5,000 Units  5,000 Units SubCUTAneous Q8H  
 balsam peru-castor oiL (VENELEX) ointment   Topical BID  alcohol 62% (NOZIN) nasal  1 Ampule  1 Ampule Topical Q12H  
 dextrose (D50W) injection syrg 12.5-25 g  25-50 mL IntraVENous PRN  
 insulin lispro (HUMALOG) injection   SubCUTAneous Q6H  
 acetaminophen (TYLENOL) tablet 650 mg  650 mg Per G Tube Q6H PRN Or  
 acetaminophen (TYLENOL) suppository 650 mg  650 mg Rectal Q6H PRN  chlorhexidine (ORAL CARE KIT) 0.12 % mouthwash 15 mL  15 mL Oral Q12H  
 heparin (porcine) pf 300 Units  300 Units InterCATHeter PRN  
 sodium chloride (NS) flush 5-40 mL  5-40 mL IntraVENous Q8H  
 sodium chloride (NS) flush 5-40 mL  5-40 mL IntraVENous PRN  
 ondansetron (ZOFRAN) injection 4 mg  4 mg IntraVENous Q6H PRN Facility-Administered Medications Ordered in Other Encounters Medication Dose Route Frequency  ceFAZolin (ANCEF) injection   IntraVENous PRN  
 0.9% sodium chloride infusion   IntraVENous CONTINUOUS  
  dexamethasone (DECADRON) 4 mg/mL injection   IntraVENous PRN Darin Cazares MD             
[facility] [employer] [address] [place], [address] Phone - [phone]               Fax - [phone]

## 2021-01-11 NOTE — PROGRESS NOTES
01/11/21 9116 ABCDEF Bundle SBT Safety Screen Passed Yes SBT Trial Passed No  
SBT Trial Reason for Failure Low tidal volume

## 2021-01-11 NOTE — PROGRESS NOTES
1900: report received from [address]: Shift assessment complete. See flowsheets [id number] Judge NP notified of fever of 102. 6. orders placed. Verbal orders to Straight cath 3 hours post mejia removal for urinalysis 9061: Reassessment complete. See flowsheets 0200: Mejia removed. Patient placed on purewick 4891: Reassessment complete. See flowsheets

## 2021-01-11 NOTE — INTERDISCIPLINARY ROUNDS
Interdisciplinary team rounds were held 1/11/2021 with the following team members:Care Management, Diabetes Treatment Specialist, Nursing, Nutrition, Outcomes Management, Pharmacy, Physical Therapy, Physician and Respiratory Therapy. Plan of care discussed. See clinical pathway and/or care plan for interventions and desired outcomes.

## 2021-01-12 PROBLEM — E83.42 HYPOMAGNESEMIA: Status: ACTIVE | Noted: 2021-01-01

## 2021-01-12 PROBLEM — E83.39 HYPOPHOSPHATEMIA: Status: ACTIVE | Noted: 2021-01-01

## 2021-01-12 PROBLEM — N17.1 ACUTE RENAL FAILURE WITH ACUTE RENAL CORTICAL NECROSIS SUPERIMPOSED ON STAGE 4 CHRONIC KIDNEY DISEASE (HCC): Chronic | Status: RESOLVED | Noted: 2020-01-01 | Resolved: 2021-01-01

## 2021-01-12 PROBLEM — N18.4 ACUTE RENAL FAILURE WITH ACUTE RENAL CORTICAL NECROSIS SUPERIMPOSED ON STAGE 4 CHRONIC KIDNEY DISEASE (HCC): Chronic | Status: RESOLVED | Noted: 2020-01-01 | Resolved: 2021-01-01

## 2021-01-12 PROBLEM — N17.9 ACUTE KIDNEY INJURY SUPERIMPOSED ON CKD (HCC): Status: ACTIVE | Noted: 2020-01-01

## 2021-01-12 PROBLEM — N18.2 CHRONIC KIDNEY DISEASE (CKD), STAGE II (MILD): Chronic | Status: ACTIVE | Noted: 2020-01-01

## 2021-01-12 PROBLEM — N18.9 ACUTE KIDNEY INJURY SUPERIMPOSED ON CKD (HCC): Status: ACTIVE | Noted: 2020-01-01

## 2021-01-12 NOTE — INTERDISCIPLINARY ROUNDS
Interdisciplinary team rounds were held 1/12/2021 with the following team members:Care Management, Diabetes Treatment Specialist, Nursing, Nutrition, Pharmacy, Physical Therapy, Physician, Respiratory Therapy and Clinical Coordinator. Plan of care discussed. See clinical pathway and/or care plan for interventions and desired outcomes.

## 2021-01-12 NOTE — PROGRESS NOTES
[facility] 
 
ICU Intensivist- Critical Care Progress Note Name: Ángela Cornelius : 1980 MRN: [id number] Admit: 2020  7:26 PM   
 
Diagnosis:  
 
Principal Problem: 
  Aspiration pneumonia of both lungs due to vomit= 
  
Problem List: 
  Large hiatal hernia Acute hypokalemia 
  Post viral syndrome 
  Fecal impaction of colon 
  Dehydration with hypernatremia 
  Acute hypoxemic respiratory failure 
  Severe sepsis with acute organ dysfunction 
  Constipation due to pain medication therapy 
  Aspiration pneumonia of both lungs due to vomit 
  Pneumonia due to COVID-19 virus, resolved () 
  Acute renal failure with acute renal cortical necrosis superimposed on stage 4 chronic kidney disease 
  Kidney disease, chronic, stage IV (severe, EGFR 15-29 ml/min) 
  HFrEF (heart failure with reduced ejection fraction) 
  Goals of care, counseling/discussion 
  Acquired hypothyroidism 
  Down syndrome ICU Comprehensive Plan of Care:  
 
Plans for this Shift:  
1. Sepsis with acute organ dysfunction due to aspiration bilateral bacterial pneumonia- continue empiric IV antibiotics (meropenem) aggressive pulmonary toilet.  Covid pneumonia resolved, with post viral bilateral bacterial pneumonia. 
  
2. DANY superimposed on CKD with severe hypernatremic dehydration and nonoliguric ATN- resolving. 
  
 
Subjective:  
 
Progress Note:  
2021  
10:42 PM  
 
Reason for ICU Admission:  
Aspiration pneumonia of both lungs due to vomit ([address] Overnight Events:  
Failing spontaneous breathing trial 
 
POD:3 Days Post-Op S/P: Procedure(s): 
TRACHEOSTOMY PLACEMENT (URGENT) Past Medical History:  
 
 has a past medical history of Endocrine disease, Gram negative septic shock ([address] (2020), Hypothyroid (2018), Ill-defined condition, and Lactic acidosis (2020). Past Surgical History:  
 
 has no past surgical history on file. Current Medications: Current Facility-Administered Medications Medication Dose Route Frequency  propofoL (DIPRIVAN) 10 mg/mL injection  iron sucrose (VENOFER) injection 200 mg  200 mg IntraVENous DAILY  epoetin marc-epbx (RETACRIT) injection 40,000 Units  40,000 Units SubCUTAneous Q7D  
 pantoprazole (PROTONIX) 40 mg in 0.9% sodium chloride 10 mL injection  40 mg IntraVENous Q12H  
 midazolam (VERSED) injection 2 mg  2 mg IntraVENous Q2H PRN  
 fentaNYL citrate (PF) injection 50 mcg  50 mcg IntraVENous Q4H PRN  polyethylene glycol (MIRALAX) packet 17 g  17 g Per G Tube BID  
 bisacodyL (DULCOLAX) suppository 10 mg  10 mg Rectal DAILY  lactulose (CHRONULAC) 10 gram/15 mL solution 15 mL  10 g Oral TID  dexmedeTOMidine (PRECEDEX) 400 mcg in 0.9% sodium chloride 100 mL infusion  0.1-1.5 mcg/kg/hr IntraVENous TITRATE  risperiDONE (RisperDAL) tablet 1 mg  1 mg Per NG tube QHS  levothyroxine (SYNTHROID) tablet 50 mcg  50 mcg Per NG tube DAILY  levothyroxine tube feed reminder note  1 Each Other BID  heparin (porcine) injection 5,000 Units  5,000 Units SubCUTAneous Q8H  
 balsam peru-castor oiL (VENELEX) ointment   Topical BID  alcohol 62% (NOZIN) nasal  1 Ampule  1 Ampule Topical Q12H  
 dextrose (D50W) injection syrg 12.5-25 g  25-50 mL IntraVENous PRN  
 insulin lispro (HUMALOG) injection   SubCUTAneous Q6H  
 acetaminophen (TYLENOL) tablet 650 mg  650 mg Per G Tube Q6H PRN Or  
 acetaminophen (TYLENOL) suppository 650 mg  650 mg Rectal Q6H PRN  chlorhexidine (ORAL CARE KIT) 0.12 % mouthwash 15 mL  15 mL Oral Q12H  
 heparin (porcine) pf 300 Units  300 Units InterCATHeter PRN  
 sodium chloride (NS) flush 5-40 mL  5-40 mL IntraVENous Q8H  
 sodium chloride (NS) flush 5-40 mL  5-40 mL IntraVENous PRN  
 ondansetron (ZOFRAN) injection 4 mg  4 mg IntraVENous Q6H PRN Facility-Administered Medications Ordered in Other Encounters Medication Dose Route Frequency  ceFAZolin (ANCEF) injection   IntraVENous PRN  
 0.9% sodium chloride infusion   IntraVENous CONTINUOUS  
 dexamethasone (DECADRON) 4 mg/mL injection   IntraVENous PRN Allergies/Social/Family History: No Known Allergies Social History Tobacco Use  Smoking status: Never Smoker  Smokeless tobacco: Never Used Substance Use Topics  Alcohol use: No  
  
History reviewed. No pertinent family history. Review of Systems:  
 
Review of systems not obtained due to patient factors. Objective:  
Vital Signs: 
Visit Vitals BP (!) 115/54 Pulse (!) 109 Temp (!) 100.5 °F (38.1 °C) Resp (!) 38 Ht 5' 3\" (1.6 m) Wt 90.3 kg (199 lb 1.2 oz) SpO2 93% BMI 35.26 kg/m² O2 Device: Tracheostomy Temp (24hrs), Av.4 °F (38 °C), Min:97.8 °F (36.6 °C), Max:102.6 °F (39.2 °C) Intake/Output:  
 
Intake/Output Summary (Last 24 hours) at 2021 2342 Last data filed at 2021 2200 Gross per 24 hour Intake 2294.54 ml Output 1425 ml Net 869.54 ml Physical Exam: 
General:  Sedated and on the ventilator. No acute distress. Eyes:  Sclera anicteric. Pupils equal, round, reactive to light. Mouth/Throat: Normal.  
Neck: Tracheostomy clean. Lungs:   Clear to auscultation bilaterally, good effort. Cardiovascular:  Regular rate and rhythm, no murmur, click, rub, or gallop. Abdomen:   Soft, non-tender, bowel sounds normal, non-distended. Extremities: No cyanosis or edema. Skin: No acute rash or lesions. Lymph Nodes: Cervical and supraclavicular normal.  
Musculoskeletal:  No swelling or deformity. Lines/Devices:  Intact, no erythema, drainage, or tenderness. Psychiatric: Sedated and appears comfortable on ventilator. LABS AND  DATA: Personally reviewed Recent Labs  
  01/11/21 
0446 01/10/21 
0520 WBC 15.0* 16.8* HGB 7.5* 7.7* HCT 22.8* 23.5*  
 216 Recent Labs  
  01/11/21 
0446 01/10/21 
0520  144  
K 3.5 3.5 * 115* CO2 23 23 BUN 15 19 CREA 1.45* 1.77* GLU 90 92 CA 8.3* 8.7 MG 1.4* 1.6 PHOS 3.1 3.8 Recent Labs  
  01/10/21 
0520 ALB 2.7* No results for input(s): INR, PTP, APTT, INREXT in the last 72 hours. No results for input(s): PHI, PCO2I, PO2I, FIO2I in the last 72 hours. No results for input(s): CPK, CKMB, TROIQ, BNPP in the last 72 hours. Ventilator Settings: 
Mode Rate Tidal Volume Pressure FiO2 PEEP Assist control   350 ml  10 cm H2O 30 % 5 cm H20 Peak airway pressure: 14 cm H2O Minute ventilation: 7.36 l/min MEDS: Reviewed RADIOLOGY: 
Xr Chest [facility] Result Date: 1/11/2021 IMPRESSION: Tubes and lines, as described above. Persistent diffuse bilateral interstitial and patchy airspace disease. Assessment:  
 
Hospital Problems  Date Reviewed: 1/8/2021 Codes Class Noted POA Severe sepsis with acute organ dysfunction (HCC) ICD-10-CM: A41.9, R65.20 ICD-9-CM: 038.9, 995.92 Acute 12/23/2020 Yes * (Principal) Aspiration pneumonia of both lungs due to vomit ([facility] [address] ICD-10-CM: J69.0 ICD-9-CM: 507.0 Acute 12/23/2020 Yes Acute hypokalemia ICD-10-CM: E87.6 ICD-9-CM: 276.8 Acute 1/10/2021 No  
   
 Post viral syndrome ICD-10-CM: G93.3 ICD-9-CM: 780.79 Acute 12/25/2020 No  
   
 Acute hypoxemic respiratory failure ([facility] [address] ICD-10-CM: J96.01 
ICD-9-CM: 518.81 Acute 12/24/2020 Yes Acute renal failure with acute renal cortical necrosis superimposed on stage 4 chronic kidney disease (HCC) (Chronic) ICD-10-CM: N17.1, N18.4 ICD-9-CM: 584.6, 585.4 Acute 12/23/2020 Yes Kidney disease, chronic, stage IV (severe, EGFR 15-29 ml/min) (HCC) ICD-10-CM: N18.4 ICD-9-CM: 700. 4 Acute 12/23/2020 Yes Lactic acidosis ICD-10-CM: E87.2 ICD-9-CM: 276.2 Acute 12/23/2020 Yes Fecal impaction of colon ([facility] [address] ICD-10-CM: K56.41 
ICD-9-CM: 560.32 Acute 12/23/2020 Yes Dehydration with hypernatremia (Chronic) ICD-10-CM: E87.0 ICD-9-CM: 276.0 Acute 12/16/2020 Yes Gram negative septic shock (HCC) ICD-10-CM: A41.50, R65.21 ICD-9-CM: 038.40, 995.92, 785.52 Acute 1/23/2020 Yes Down syndrome (Chronic) ICD-10-CM: Q90.9 ICD-9-CM: 758.0 End Stage 12/23/2020 Yes Constipation due to pain medication therapy ICD-10-CM: K59.03 
ICD-9-CM: 564.09, E947.9 Acute 11/26/2020 Yes Large hiatal hernia (Chronic) ICD-10-CM: K44.9 ICD-9-CM: 019. 3 Chronic 11/2/2020 Yes Overview Signed 1/1/2021  4:08 PM by Milan Heck MD  
  ([id number])- CT Abdomen: 
Distended fluid-filled esophagus and large hiatus hernia. Goals of care, counseling/discussion ICD-10-CM: Z71.89 ICD-9-CM: V65.49  12/28/2020 Yes HFrEF (heart failure with reduced ejection fraction) (HCC) (Chronic) ICD-10-CM: I50.20 ICD-9-CM: 428.20  12/24/2020 Yes Acquired hypothyroidism (Chronic) ICD-10-CM: E03.9 ICD-9-CM: 244.9  12/24/2020 Yes Respiratory failure ([facility] [address] ICD-10-CM: J96.90 ICD-9-CM: 518.81  12/23/2020 Yes Multidisciplinary Rounds Completed: Yes 
  
ABCDEF Bundle/Checklist 
Pain Medications: None Target RASS: 0 - Alert & Calm - Spontaneously pays attention to caregiver Sedation Medications: Precedex and Versed CAM-ICU:  Negative Discussed Plan of Care (goals of care): Yes Addressed Code Status: Do Not Resuscitate 
  
CARDIOVASCULAR Cardiac Gtts: None SBP Goal of: > 90 mmHg MAP Goal of: > 65 mmHg Transfusion Trigger (Hgb): <7 g/dL 
  
RESPIRATORY Vent Goals:  
Head of bed > 30 degrees Aggressive bronchopulmonary hygiene DVT Prophylaxis (if no, list reason): SCD's or Sequential Compression Device and Lovenox SPO2 Goal: > 92% 
  
  
GI/ Dutta Catheter Present: Yes 
  
ANTIBIOTICS Antibiotics: Ancef 
  
T/L/D Tubes: Tracheostomy and PEG Tube Lines: Peripheral IV Drains: Dutta Catheter 
  
SPECIAL EQUIPMENT None 
  
DISPOSITION Stay in ICU 
  
CRITICAL CARE CONSULTANT NOTE I provided a face-to-face bedside physician/patient encounter, greater than the usual and customary amount normally needed, due to the high complexity of medical decision-making required. 
  
I reviewed and interpreted patient data including clinical events, labs, images, vital signs, I/O's, and examined patient.   
  
I have actively participated in multi-disciplinary discussions ([address]  Pharmacist, CCU nursing staff) regarding the case in formulating an optimal therapeutic plan, and effecting a management strategy for this patient. 
  
NOTE OF PERSONAL INVOLVEMENT IN CARE  
This patient has a high probability of imminent, clinically significant deterioration, which requires the highest level of preparedness to intervene urgently. I participated in the decision-making and personally managed, or directed the management of, a myriad of life and organ supporting interventions which required my frequent-interval clinical reassessments, in order to treat or prevent imminent deterioration. 
  
I personally spent 30 minutes of critical care time.  This is time spent at this critically ill patient's bedside actively involved in patient care as well as the coordination of care and discussions with the patient's family.  This does not include any procedural time which has been billed separately. Mikayla Goode MD, FACS Staff [facility] [address] 1/11/2021

## 2021-01-12 NOTE — PROGRESS NOTES
[facility] 
 
ICU Intensivist- Critical Care Progress Note Name: Getachew Ybarra : 1980 MRN: [id number] Admit: 2020  7:26 PM   
 
Diagnosis:  
 
Principal Problem: 
  Aspiration pneumonia of both lungs due to vomit= 
  
Problem List: 
  Large hiatal hernia 
  Acute hypokalemia 
  Post viral syndrome 
  Fecal impaction of colon 
  Dehydration with hypernatremia 
  Acute hypoxemic respiratory failure 
  Severe sepsis with acute organ dysfunction 
  Constipation due to pain medication therapy 
  Aspiration pneumonia of both lungs due to vomit 
  Pneumonia due to COVID-19 virus, resolved () 
  Acute renal failure with acute renal cortical necrosis superimposed on stage 4 chronic kidney disease 
  Kidney disease, chronic, stage IV (severe, EGFR 15-29 ml/min) 
  HFrEF (heart failure with reduced ejection fraction) 
  Goals of care, counseling/discussion 
  Acquired hypothyroidism 
  Down syndrome 
  
ICU Comprehensive Plan of Care:  
  
Plans for this Shift:  
1. Sepsis with acute organ dysfunction due to aspiration bilateral bacterial pneumonia- continue empiric IV antibiotics (meropenem) aggressive pulmonary toilet.  Covid pneumonia resolved, with post viral bilateral bacterial pneumonia. 
  
2. DANY superimposed on CKD with severe hypernatremic dehydration and nonoliguric ATN- resolving. 3. Acute hypoxemic respiratory failure with moderate muscular deconditioning- would benefit from long-term ventilatory rehabilitation transfer. Subjective:  
 
Progress Note:  
2021  
10:35 AM  
 
Reason for ICU Admission:  
Aspiration pneumonia of both lungs due to vomit ([address] Overnight Events:  
Failing daily spontaneous breathing trials. POD:4 Days Post-Op S/P: Procedure(s): 
TRACHEOSTOMY PLACEMENT (URGENT) Past Medical History: has a past medical history of Endocrine disease, Gram negative septic shock ([facility] [address] (1/23/2020), Hypothyroid (03/11/2018), Ill-defined condition, and Lactic acidosis (12/23/2020). Past Surgical History:  
 
 has no past surgical history on file. Current Medications:  
 
Current Facility-Administered Medications Medication Dose Route Frequency  iron sucrose (VENOFER) injection 200 mg  200 mg IntraVENous DAILY  epoetin marc-epbx (RETACRIT) injection 40,000 Units  40,000 Units SubCUTAneous Q7D  propofoL (DIPRIVAN) 10 mg/mL injection  pantoprazole (PROTONIX) 40 mg in 0.9% sodium chloride 10 mL injection  40 mg IntraVENous Q12H  
 midazolam (VERSED) injection 2 mg  2 mg IntraVENous Q2H PRN  
 fentaNYL citrate (PF) injection 50 mcg  50 mcg IntraVENous Q4H PRN  polyethylene glycol (MIRALAX) packet 17 g  17 g Per G Tube BID  
 bisacodyL (DULCOLAX) suppository 10 mg  10 mg Rectal DAILY  lactulose (CHRONULAC) 10 gram/15 mL solution 15 mL  10 g Oral TID  dexmedeTOMidine (PRECEDEX) 400 mcg in 0.9% sodium chloride 100 mL infusion  0.1-1.5 mcg/kg/hr IntraVENous TITRATE  risperiDONE (RisperDAL) tablet 1 mg  1 mg Per NG tube QHS  levothyroxine (SYNTHROID) tablet 50 mcg  50 mcg Per NG tube DAILY  levothyroxine tube feed reminder note  1 Each Other BID  heparin (porcine) injection 5,000 Units  5,000 Units SubCUTAneous Q8H  
 balsam peru-castor oiL (VENELEX) ointment   Topical BID  alcohol 62% (NOZIN) nasal  1 Ampule  1 Ampule Topical Q12H  
 dextrose (D50W) injection syrg 12.5-25 g  25-50 mL IntraVENous PRN  
 insulin lispro (HUMALOG) injection   SubCUTAneous Q6H  
 acetaminophen (TYLENOL) tablet 650 mg  650 mg Per G Tube Q6H PRN Or  
 acetaminophen (TYLENOL) suppository 650 mg  650 mg Rectal Q6H PRN  chlorhexidine (ORAL CARE KIT) 0.12 % mouthwash 15 mL  15 mL Oral Q12H  
 heparin (porcine) pf 300 Units  300 Units InterCATHeter PRN  
  sodium chloride (NS) flush 5-40 mL  5-40 mL IntraVENous Q8H  
 sodium chloride (NS) flush 5-40 mL  5-40 mL IntraVENous PRN  
 ondansetron (ZOFRAN) injection 4 mg  4 mg IntraVENous Q6H PRN Facility-Administered Medications Ordered in Other Encounters Medication Dose Route Frequency  ceFAZolin (ANCEF) injection   IntraVENous PRN  
 0.9% sodium chloride infusion   IntraVENous CONTINUOUS  
 dexamethasone (DECADRON) 4 mg/mL injection   IntraVENous PRN Allergies/Social/Family History: No Known Allergies Social History Tobacco Use  Smoking status: Never Smoker  Smokeless tobacco: Never Used Substance Use Topics  Alcohol use: No  
  
History reviewed. No pertinent family history. Review of Systems:  
 
Review of systems not obtained due to patient factors. Objective:  
Vital Signs: 
Visit Vitals /68 (BP 1 Location: Left arm, BP Patient Position: At rest) Pulse 99 Temp (!) 101.2 °F (38.4 °C) Resp 21 Ht 5' 3\" (1.6 m) Wt 90.3 kg (199 lb 1.2 oz) SpO2 100% BMI 35.26 kg/m² O2 Device: Ventilator, Tracheostomy Temp (24hrs), Av.3 °F (37.9 °C), Min:97.8 °F (36.6 °C), Max:101.3 °F (38.5 °C) Intake/Output:  
 
Intake/Output Summary (Last 24 hours) at 2021 2558 Last data filed at 2021 0500 Gross per 24 hour Intake 1639.28 ml Output 1325 ml Net 314.28 ml Physical Exam: 
General:  Sedated and on the ventilator. No acute distress. Eyes:  Sclera anicteric. Pupils equal, round, reactive to light. Mouth/Throat: Normal.  
Neck: Tracheostomy clean. Lungs:   Clear to auscultation bilaterally, good effort. Cardiovascular:  Regular rate and rhythm, no murmur, click, rub, or gallop. Abdomen:   Soft, non-tender, bowel sounds normal, non-distended. Extremities: No cyanosis or edema. Skin: No acute rash or lesions. Lymph Nodes: Cervical and supraclavicular normal.  
Musculoskeletal:  No swelling or deformity. Lines/Devices:  Intact, no erythema, drainage, or tenderness. Psychiatric: Sedated and appears comfortable on ventilator.  
  
 
LABS AND  DATA: Personally reviewed Recent Labs  
  01/12/21 
0625 01/11/21 
0446 WBC 16.8* 15.0* HGB 7.6* 7.5* HCT 23.7* 22.8*  
 191 Recent Labs  
  01/12/21 
0625 01/11/21 
0446  141  
K 4.1 3.5 * 112* CO2 20* 23 BUN 13 15 CREA 1.21* 1.45* GLU 94 90 CA 7.3* 8.3*  
MG 1.5* 1.4* PHOS 2.4* 3.1 Recent Labs  
  01/12/21 
0625 01/10/21 
0520 ALB 2.3* 2.7* No results for input(s): INR, PTP, APTT, INREXT in the last 72 hours. No results for input(s): PHI, PCO2I, PO2I, FIO2I in the last 72 hours. No results for input(s): CPK, CKMB, TROIQ, BNPP in the last 72 hours. Ventilator Settings: 
Mode Rate Tidal Volume Pressure FiO2 PEEP Assist control   350 ml  10 cm H2O 50 % 5 cm H20 Peak airway pressure: 37 cm H2O Minute ventilation: 10.7 l/min MEDS: Reviewed RADIOLOGY: 
No results found. Assessment:  
 
Hospital Problems  Date Reviewed: 1/8/2021 Codes Class Noted POA Severe sepsis with acute organ dysfunction (HCC) ICD-10-CM: A41.9, R65.20 ICD-9-CM: 038.9, 995.92 Acute 12/23/2020 Yes * (Principal) Aspiration pneumonia of both lungs due to vomit ([facility] [address] ICD-10-CM: J69.0 ICD-9-CM: 507.0 Acute 12/23/2020 Yes Acute hypokalemia ICD-10-CM: E87.6 ICD-9-CM: 276.8 Acute 1/10/2021 No  
   
 Post viral syndrome ICD-10-CM: G93.3 ICD-9-CM: 780.79 Acute 12/25/2020 No  
   
 Acute hypoxemic respiratory failure ([facility] [address] ICD-10-CM: J96.01 
ICD-9-CM: 518.81 Acute 12/24/2020 Yes Acute renal failure with acute renal cortical necrosis superimposed on stage 4 chronic kidney disease (HCC) (Chronic) ICD-10-CM: N17.1, N18.4 ICD-9-CM: 584.6, 585.4 Acute 12/23/2020 Yes Kidney disease, chronic, stage IV (severe, EGFR 15-29 ml/min) (HCC) ICD-10-CM: N18.4 ICD-9-CM: 019. 4 Acute 12/23/2020 Yes  Lactic acidosis ICD-10-CM: E87.2 
ICD-9-CM: 276.2 Acute 12/23/2020 Yes  
   
 Fecal impaction of colon (HCC) ICD-10-CM: K56.41 
ICD-9-CM: 560.32 Acute 12/23/2020 Yes  
   
 Dehydration with hypernatremia (Chronic) ICD-10-CM: E87.0 
ICD-9-CM: 276.0 Acute 12/16/2020 Yes  
   
 Gram negative septic shock (HCC) ICD-10-CM: A41.50, R65.21 
ICD-9-CM: 038.40, 995.92, 785.52 Acute 1/23/2020 Yes  
   
 Down syndrome (Chronic) ICD-10-CM: Q90.9 
ICD-9-CM: 758.0 End Stage 12/23/2020 Yes  
   
 Constipation due to pain medication therapy ICD-10-CM: K59.03 
ICD-9-CM: 564.09, E947.9 Acute 11/26/2020 Yes  
   
 Large hiatal hernia (Chronic) ICD-10-CM: K44.9 
ICD-9-CM: 553.3 Chronic 11/2/2020 Yes  
 Overview Signed 1/1/2021  4:08 PM by Dago Rajan MD  
  (02Nov2020)- CT Abdomen: 
Distended fluid-filled esophagus and large hiatus hernia. 
  
  
   
 Goals of care, counseling/discussion ICD-10-CM: Z71.89 
ICD-9-CM: V65.49  12/28/2020 Yes  
   
 HFrEF (heart failure with reduced ejection fraction) (HCC) (Chronic) ICD-10-CM: I50.20 
ICD-9-CM: 428.20  12/24/2020 Yes  
   
 Acquired hypothyroidism (Chronic) ICD-10-CM: E03.9 
ICD-9-CM: 244.9  12/24/2020 Yes  
   
 Respiratory failure (HCC) ICD-10-CM: J96.90 
ICD-9-CM: 518.81  12/23/2020 Yes  
   
  
 
Multidisciplinary Rounds Completed:  Yes 
  
ABCDEF Bundle/Checklist 
Pain Medications: None 
Target RASS: 0 - Alert & Calm - Spontaneously pays attention to caregiver 
Sedation Medications: Precedex and Versed 
CAM-ICU:  Negative 
Discussed Plan of Care (goals of care): Yes 
Addressed Code Status: Do Not Resuscitate 
  
CARDIOVASCULAR 
Cardiac Gtts: None 
SBP Goal of: > 90 mmHg 
MAP Goal of: > 65 mmHg 
Transfusion Trigger (Hgb): <7 g/dL 
  
RESPIRATORY 
Vent Goals:  
Head of bed > 30 degrees 
Aggressive bronchopulmonary hygiene 
DVT Prophylaxis (if no, list reason): SCD's or Sequential Compression Device and Lovenox  
SPO2 Goal: > 92% 
  
  
GI/ 
Dutta Catheter Present: Yes 
  
 ANTIBIOTICS Antibiotics: Ancef 
  
T/L/D Tubes: Tracheostomy and PEG Tube Lines: Peripheral IV Drains: Dutta Catheter 
  
SPECIAL EQUIPMENT None 
  
DISPOSITION Stay in ICU 
  
CRITICAL CARE CONSULTANT NOTE I provided a face-to-face bedside physician/patient encounter, greater than the usual and customary amount normally needed, due to the high complexity of medical decision-making required. 
  
I reviewed and interpreted patient data including clinical events, labs, images, vital signs, I/O's, and examined patient.   
  
I have actively participated in multi-disciplinary discussions (Respiratory Therapy, Case Management, CCU nursing staff) regarding the case in formulating an optimal therapeutic plan, and effecting a management strategy for this patient. 
  
NOTE OF PERSONAL INVOLVEMENT IN CARE  
This patient has a high probability of imminent, clinically significant deterioration, which requires the highest level of preparedness to intervene urgently. I participated in the decision-making and personally managed, or directed the management of, a myriad of life and organ supporting interventions which required my frequent-interval clinical reassessments, in order to treat or prevent imminent deterioration. 
  
I personally spent 30 minutes of critical care time.  This is time spent at this critically ill patient's bedside actively involved in patient care as well as the coordination of care and discussions with the patient's family.  This does not include any procedural time which has been billed separately. Obdulio Issa MD, FACS Staff [facility] [address] 1/12/2021

## 2021-01-12 NOTE — PROGRESS NOTES
01/12/21 1336 ABCDEF Bundle SBT Safety Screen Passed No  
SBT Screen Reason for Failure Increased inspiratory effort 
(RR > 50;low tidal volume)

## 2021-01-12 NOTE — PROGRESS NOTES
0700  Report from [facility] RN 
 
0800  Assessment complete  Patient awake alert non verbal has trach in place on vent. Peg in place with TF running. Right IJ infusing 0.225 sterile water at 50 ml/hr and Precedex 0.3 mcg. Flexi seal in place  Purewick in place. Temp and HR elevated will give Tylenol see MAR 
 
1200  Assessment complete  Temp better now  Patient resting comfortably. Precedex off.   
 
1600  Assessment complete Temp and HR elevated again patient shivering  Tylenol given see MAR 
 
1653  Vent alarm low tidal volume very tachypnea suctioned several times no relief  Fentanyl PRN given Precedex restarted. Respiratory therapy came found large cuff leak once air put in and repositioned trach alarms stopped.  
 
1850  Right IJ removed and trach care complete 
 
1900  Report to [facility] [place]

## 2021-01-12 NOTE — PROCEDURES
Existing #8-Shiley with substantial cuff leak, despite supplemental addition of volume into balloon. Exchanged uneventfully for #8-Bivona extra-long tracheostomy. Air leak issue resolved. Well tolerated. -Carl Branch MD, FACS Staff [facility] [address] 1/12/2021

## 2021-01-12 NOTE — PROGRESS NOTES
Comprehensive Nutrition Assessment Type and Reason for Visit: Toño Martinez Nutrition Recommendations/Plan:  
Continue TF as ordered Nutrition Assessment:   Chart reviewed, case discussed during CCU rounds. Pt remains on vent via trach, needs re-estimated. TF at goal rate with minimal residuals. No further vomiting episodes. BM noted yesterday. TF meets 93% kcal and 100% protein needs, appropriate at this time given BMI 35. DC planning is underway Estimated Daily Nutrient Needs: 
Energy (kcal): PSU 2073 (MSJ 8959); Weight Used for Energy Requirements: Current Protein (g): 69-86g (0.8-1gPro/kg0; Weight Used for Protein Requirements: Current Fluid (ml/day): per MD; Method Used for Fluid Requirements: 1 ml/kcal 
 
 
Nutrition Related Findings:  Meds: dulcolax, humalog, lactulose, synthroid, protonix, miralax. Edema: +2-generalizeed, all extremities. BM 1/11 Wounds:   
Deep tissue injury, Moisture associate skin damage, Skin tears (DTI-cheeks) Current Nutrition Therapies: 
Current Tube Feeding (TF) Orders: · Feeding Route: Nasogastric · Formula: TwoCal HN 
· Schedule:Continuous · Regimen: 40mL/h · Additives/Modulars:   
· Water Flushes: 50mL q 4h 
· Current TF & Flush Orders Provides: 1920kcals/80gPro/212gCHO/972mL · Goal TF & Flush Orders Provides: 1920kcals/80gPro/212gCHO/972mL Anthropometric Measures: 
· Height:  5' 3\" (160 cm) · Current Body Wt:  90.5 kg (199 lb 8.3 oz) · Ideal Body Wt:  115 lbs:  161 % · BMI Category:  Obese class 1 (BMI 30.0-34. 9) Nutrition Diagnosis:  
· Inadequate protein-energy intake related to impaired respiratory function as evidenced by other (specify)(trophic TF order) Previous dx resolved. Nutrition Interventions:  
Food and/or Nutrient Delivery: Continue tube feeding Nutrition Education and Counseling: No recommendations at this time Coordination of Nutrition Care: Continue to monitor while inpatient, Interdisciplinary rounds Goals: Pt will tolerate TF at goal rate with residuals <500mL in 2-4 days. Nutrition Monitoring and Evaluation:  
Behavioral-Environmental Outcomes: None identified Food/Nutrient Intake Outcomes: Enteral nutrition intake/tolerance Physical Signs/Symptoms Outcomes: Biochemical data, Fluid status or edema, Nutrition focused physical findings, Weight, Hemodynamic status, GI status Discharge Planning:   
Enteral nutrition Electronically signed by Nell Balbuena RD, [address]  on 1/12/2021 at 2:07 PM 
 
Contact: [phone]

## 2021-01-12 NOTE — PROGRESS NOTES
Nephrology Progress Note Edmundo Hill  
 
www. [facility]  Phone - [phone] Patient: Boris Hernandez    YOB: 1980     Admit Date: 12/23/2020 Date- 1/12/2021 CC: [facility] IMPRESSION & PLAN:  
? cyndi likely pre-renal + Vanc toxicity - labs pending today ? Sepsis-gram-negative shock ? Hypokalemia ? Hypernatremia - labs pending ? Anemia ? Lactic acidosis ? Protein malnutrition ? Bacterial pneumonia ? Fecal impaction ? Respiratory failure-on vent ? History of heart failure ? Atrophic right kidney ? Down syndrome with autism ? Anemia - iron sat=13% PLAN- 
? Ongoing TFs wihtwater - off IVF now; await labs ? Started IV iron and Epogen ? Vent management per ICU team 
  
Subjective: Interval History:  
Trach/vent; U/Oadequate,  
 
Objective:  
Vitals:  
 01/12/21 0345 01/12/21 0353 01/12/21 0400 01/12/21 6121 BP:   118/64 Pulse: 97 89 97 97 Resp: (!) 42 (!) 51 (!) 54 (!) 42 Temp:   (!) 100.7 °F (38.2 °C) SpO2: (!) 82% 100% 100% (!) 85% Weight:      
Height:      
  
01/11 0701 - 01/12 0700 In: 1699.3 [I.V.:679.3] Out: 1325 [Urine:1250; Drains:75] Last 3 Recorded Weights in this Encounter 01/09/21 0700 01/09/21 0857 01/10/21 6451 Weight: 90.5 kg (199 lb 8.3 oz) 90.9 kg (200 lb 6.4 oz) 90.3 kg (199 lb 1.2 oz) Physical exam:  
GEN: intubated on vent NECK-trach CVS: RRR on monitor LUNGS: bilateral rochi NEURO: Can't assess due to patient's current condition  : mejia + Chart reviewed. Pertinent Notes reviewed. Data Review : 
Recent Labs  
  01/11/21 
0446 01/10/21 
0520  144  
K 3.5 3.5 * 115* CO2 23 23 BUN 15 19 CREA 1.45* 1.77* GLU 90 92 CA 8.3* 8.7 MG 1.4* 1.6 PHOS 3.1 3.8 Recent Labs  
  01/11/21 
0446 01/10/21 
0520 WBC 15.0* 16.8* HGB 7.5* 7.7* HCT 22.8* 23.5*  
 216 No results for input(s): FE, TIBC, PSAT, FERR in the last 72 hours. Medication list  reviewed Current Facility-Administered Medications Medication Dose Route Frequency  propofoL (DIPRIVAN) 10 mg/mL injection  iron sucrose (VENOFER) injection 200 mg  200 mg IntraVENous DAILY  epoetin marc-epbx (RETACRIT) injection 40,000 Units  40,000 Units SubCUTAneous Q7D  
 pantoprazole (PROTONIX) 40 mg in 0.9% sodium chloride 10 mL injection  40 mg IntraVENous Q12H  
 midazolam (VERSED) injection 2 mg  2 mg IntraVENous Q2H PRN  
 fentaNYL citrate (PF) injection 50 mcg  50 mcg IntraVENous Q4H PRN  polyethylene glycol (MIRALAX) packet 17 g  17 g Per G Tube BID  
 bisacodyL (DULCOLAX) suppository 10 mg  10 mg Rectal DAILY  lactulose (CHRONULAC) 10 gram/15 mL solution 15 mL  10 g Oral TID  dexmedeTOMidine (PRECEDEX) 400 mcg in 0.9% sodium chloride 100 mL infusion  0.1-1.5 mcg/kg/hr IntraVENous TITRATE  risperiDONE (RisperDAL) tablet 1 mg  1 mg Per NG tube QHS  levothyroxine (SYNTHROID) tablet 50 mcg  50 mcg Per NG tube DAILY  levothyroxine tube feed reminder note  1 Each Other BID  heparin (porcine) injection 5,000 Units  5,000 Units SubCUTAneous Q8H  
 balsam peru-castor oiL (VENELEX) ointment   Topical BID  alcohol 62% (NOZIN) nasal  1 Ampule  1 Ampule Topical Q12H  
 dextrose (D50W) injection syrg 12.5-25 g  25-50 mL IntraVENous PRN  
 insulin lispro (HUMALOG) injection   SubCUTAneous Q6H  
 acetaminophen (TYLENOL) tablet 650 mg  650 mg Per G Tube Q6H PRN Or  
 acetaminophen (TYLENOL) suppository 650 mg  650 mg Rectal Q6H PRN  chlorhexidine (ORAL CARE KIT) 0.12 % mouthwash 15 mL  15 mL Oral Q12H  
 heparin (porcine) pf 300 Units  300 Units InterCATHeter PRN  
 sodium chloride (NS) flush 5-40 mL  5-40 mL IntraVENous Q8H  
 sodium chloride (NS) flush 5-40 mL  5-40 mL IntraVENous PRN  
 ondansetron (ZOFRAN) injection 4 mg  4 mg IntraVENous Q6H PRN  
 
 Facility-Administered Medications Ordered in Other Encounters Medication Dose Route Frequency  ceFAZolin (ANCEF) injection   IntraVENous PRN  
 0.9% sodium chloride infusion   IntraVENous CONTINUOUS  
 dexamethasone (DECADRON) 4 mg/mL injection   IntraVENous PRN Kulwinder Estrada MD             
[facility] [employer] [address] [place], [address] [place] Phone - [phone]               Fax - [phone]

## 2021-01-13 NOTE — ROUTINE PROCESS
0700: Bedside shift change report received from [facility] (offgoing nurse). Report included the following information SBAR, Kardex, ED Summary, Procedure Summary, Intake/Output, MAR and Recent Results. 0800: Shift assessment completed, see flowsheets 1200: Reassessment completed, see flowsheets 1600: Reassessment completed, see flowsheets 1900: Bedside shift change report given to German Kauffman RN (oncoming nurse) by Sandra Santos RN (offgoing nurse). Report included the following information SBAR, Kardex, ED Summary, Procedure Summary, Intake/Output, MAR and Recent Results.

## 2021-01-13 NOTE — PROGRESS NOTES
Nephrology Progress Note Edmundo Hill  
 
[email or web address]. [facility]  Phone - [phone] Patient: Minda Hahn    YOB: 1980     Admit Date: 12/23/2020 Date- 1/13/2021 CC: Follow up for [facility] JULISA SOUTH IMPRESSION & PLAN:  
? cyndi likely pre-renal + Vanc toxicity - much imporved ? Sepsis/shock - off pressors ? Hypokalemia ? Hypernatremia - mostly stable, may be creepong up ? Anemia ? Lactic acidosis ? Protein malnutrition - on TPN 
? Bacterial pneumonia ? Fecal impaction ? Respiratory failure-on vent ? History of heart failure - LVEF 55% 12/2020 ? Atrophic right kidney ? Down syndrome with autism ? Anemia - iron sat=13% PLAN- 
? Ongoing TFs/water - adjust water per labs, Na OK for now ? Diuretic prn - dose today for I>>O 
? ongoing IV iron and Epogen ? Vent management per ICU team 
  
Subjective: Interval History:  
Trach/vent; U/Oadequate, creat better Objective:  
Vitals:  
 01/13/21 0345 01/13/21 0353 01/13/21 0400 01/13/21 0415 BP: (!) 76/32  (!) 86/48 (!) 69/82 Pulse: (!) 105 (!) 104 (!) 101 Resp: 25 26 20 Temp:   99.7 °F (37.6 °C) SpO2: 100% 100% 99% 99% Weight:   87.4 kg (192 lb 10.9 oz) Height:      
  
01/12 0701 - 01/13 0700 In: 2243.3 [I.V.:1233.3] Out: 850 [Urine:800; Drains:50] Last 3 Recorded Weights in this Encounter 01/09/21 0857 01/10/21 0948 01/13/21 0400 Weight: 90.9 kg (200 lb 6.4 oz) 90.3 kg (199 lb 1.2 oz) 87.4 kg (192 lb 10.9 oz) Physical exam:  
GEN: intubated on vent NECK-trach CVS: RRR on monitor LUNGS: bilateral ronchi ABD:   Soft, + BS 
EXT:  1-2+ edema NEURO: Can't assess due to patient's current condition  : mejia + Chart reviewed. Pertinent Notes reviewed. Data Review : 
Recent Labs  
  01/13/21 
0052 01/12/21 
5278 01/11/21 
0446  142 141  
K 3.9 4.1 3.5 * 116* 112* CO2 24 20* 23 BUN 14 13 15 CREA 1.15* 1.21* 1.45* GLU 96 94 90 CA 7.9* 7.3* 8.3*  
MG 1.4* 1.5* 1.4* PHOS 2.0* 2.4* 3.1 Recent Labs  
  01/13/21 
0052 01/12/21 
0625 01/11/21 
0446 WBC 18.5* 16.8* 15.0* HGB 7.5* 7.6* 7.5* HCT 23.1* 23.7* 22.8*  
 177 191 No results for input(s): FE, TIBC, PSAT, FERR in the last 72 hours. Medication list  reviewed Current Facility-Administered Medications Medication Dose Route Frequency  balsam peru-castor oiL (VENELEX) ointment   Topical TID  QUEtiapine (SEROquel) tablet 50 mg  50 mg Oral QHS  cefepime (MAXIPIME) 1 g in 0.9% sodium chloride (MBP/ADV) 50 mL MBP  1 g IntraVENous Q8H  
 iron sucrose (VENOFER) injection 200 mg  200 mg IntraVENous DAILY  epoetin marc-epbx (RETACRIT) injection 40,000 Units  40,000 Units SubCUTAneous Q7D  
 pantoprazole (PROTONIX) 40 mg in 0.9% sodium chloride 10 mL injection  40 mg IntraVENous Q12H  
 midazolam (VERSED) injection 2 mg  2 mg IntraVENous Q2H PRN  
 fentaNYL citrate (PF) injection 50 mcg  50 mcg IntraVENous Q4H PRN  polyethylene glycol (MIRALAX) packet 17 g  17 g Per G Tube BID  
 bisacodyL (DULCOLAX) suppository 10 mg  10 mg Rectal DAILY  lactulose (CHRONULAC) 10 gram/15 mL solution 15 mL  10 g Oral TID  dexmedeTOMidine (PRECEDEX) 400 mcg in 0.9% sodium chloride 100 mL infusion  0.1-1.5 mcg/kg/hr IntraVENous TITRATE  risperiDONE (RisperDAL) tablet 1 mg  1 mg Per NG tube QHS  levothyroxine (SYNTHROID) tablet 50 mcg  50 mcg Per NG tube DAILY  levothyroxine tube feed reminder note  1 Each Other BID  heparin (porcine) injection 5,000 Units  5,000 Units SubCUTAneous Q8H  
 alcohol 62% (NOZIN) nasal  1 Ampule  1 Ampule Topical Q12H  
 dextrose (D50W) injection syrg 12.5-25 g  25-50 mL IntraVENous PRN  
 insulin lispro (HUMALOG) injection   SubCUTAneous Q6H  
 acetaminophen (TYLENOL) tablet 650 mg  650 mg Per G Tube Q6H PRN  Or  
 acetaminophen (TYLENOL) suppository 650 mg  650 mg Rectal Q6H PRN  
  chlorhexidine (ORAL CARE KIT) 0.12 % mouthwash 15 mL  15 mL Oral Q12H  
 heparin (porcine) pf 300 Units  300 Units InterCATHeter PRN  
 sodium chloride (NS) flush 5-40 mL  5-40 mL IntraVENous Q8H  
 sodium chloride (NS) flush 5-40 mL  5-40 mL IntraVENous PRN  
 ondansetron (ZOFRAN) injection 4 mg  4 mg IntraVENous Q6H PRN Facility-Administered Medications Ordered in Other Encounters Medication Dose Route Frequency  ceFAZolin (ANCEF) injection   IntraVENous PRN  
 0.9% sodium chloride infusion   IntraVENous CONTINUOUS  
 dexamethasone (DECADRON) 4 mg/mL injection   IntraVENous PRN Anna Haile MD             
[facility] [employer] [address] [place], [address] Phone - [phone]               Fax - [phone]

## 2021-01-13 NOTE — PROGRESS NOTES
01/13/21 0836 ABCDEF Bundle SBT Safety Screen Passed No  
SBT Screen Reason for Failure Increased inspiratory effort 
(RR > 35)

## 2021-01-13 NOTE — PROGRESS NOTES
0700  Report from [facility] RN 
 
0800  Assessment complete  Patient awake alert non verbal with trach in place continues to alarm with low tidal volume and and high peak pressures suctioned with no relief of alarms sedation given still issues. Respiratory therapy at bedside unable to fix alarms. Dr Karina Rasmussen called to bedside attempted to change trach with another #6 Shiley still having alarm issues. Dr Karina Rasmussen then changed to a Bivona #8 cuffed. Problem fixed no more alarms tidal volume better O2 sat's up. Purewick in place. Flexi-seal out replaced. Right IV infusing with precedex 0.7 mcg 
 
1200  Assessment complete no changes 1600  Assessment complete Temp and HR elevated Tylenol given see MAR 
 
1900  Report to [address] [facility]  Since trach change no major issues Tidal volumes remain well minimal alarms

## 2021-01-13 NOTE — PROGRESS NOTES
1900: Bedside report received from Vidal Dick, [address] [place] [address]. 
 
2007: Shift assessment complete. See flowsheets 2013Sandy Olmedo, NP notified of patient HR in the 130's. I also mentioned to her that the patient seemed very agitated, so prn versed was given and precedex was restarted. Np also suggested she may have some associated delirium. Seroquel ordered. 2200: Korey Dunne, NP notified of HR still in 140s and fever. Orders for blood cultures and antibiotics placed 0030: Reassessment complete. See flowsheets 3418: Reassessment complete.  See flowsheets 
 
0700: Report given to Vladimir Riddle

## 2021-01-14 PROBLEM — Z99.11 DEPENDENT ON VENTILATOR (HCC): Status: ACTIVE | Noted: 2021-01-01

## 2021-01-14 NOTE — PROGRESS NOTES
Palliative Medicine Consult [place]: [phone] ([phone]) Patient Name: Minda Hahn YOB: 1980 Date of Initial Consult: 12/28/2020 Reason for Consult: care decisions Requesting Provider: Carmel Bautista MD  
Primary Care Physician: Diego, MD Kilo 
 
 SUMMARY:  
Minda Hahn is a 47 y.o. with a past history of  Down's syndrome, hypothyroidism, obesity (BMI 30), CKD3, CHF (ECHO 2018 mod diffuse hypokinesis, EF 30%)  large hiatal hernia,, who was admitted on 12/23/2020 from home with a diagnosis of COVID PNA. Current medical issues leading to Palliative Medicine involvement include: ongoing decline from prolonged COVID infection (recent admission 11/8 to 11/16/20 for covid pna and again 11/19 to 12/6/20 for sepsis due to covid) during which she was seen by Palliative Medicine in Nov 2020. 
 
12/28: remains intubated, 40%, PEEP 6. Levophed 8 mcg/min. Worsening renal failure. 12/29: remains intubated, 35%, PEEP 5. Levophed 2 mcg/min. Cr stable at 4.09.  
1/4:     remains intubated, 40%, PEEP 5. Levophed 2 mcg/min. Cr down to 2.25.  
1/5:     remains intubated, 40%, PEEP 5. Cr down to 1.86. Plan for abd CT scan today to evaluate for SBO/ileus. 1/6:     remains intubated, 40%, PEEP 5. Failed SBT due to RR 40s and agitation. Cr down to 1.77. CT abd/pelv yesterday revealed no evidence of SBO. Incidentally lungs show bibasilar airspace disease  with bilat pleural effusions. 1/14:   PEG placed 1/7, trach placed 1/8 with hopes this would facilitate weaning off vent. As of today, she continues to fail daily spontaneous breathing trials. Yesterday she desatted to 30s with increased  WOB and required manual venting with ambu bag before sats returned to 90s. Trach changed and vent settings changed. She continues with high RR. Psychosocial: lives with and is cared for by mother Richa Pearce [phone].   
 PALLIATIVE DIAGNOSES:  
 1. Acute respiratory failure due to COVID-19 pneumonia 2. Morbid obesity BMI 30 
3. Large hiatal hernia 4. Dysphagia, aspiration risk, on modified diet 5. Debility 6. High risk for dying from COVID due to underlying CHF, obesity 7. Hypothermic (12/27) 8. Renal failure (Cr 4.18>4.09) 9. Care decisions PLAN:  
1. Prior to visit, I completed a review of patient's medical records, including medical documentation, vital signs, MARs, and results of various labs and other diagnostics. I also spoke with patient's nurse, Donte Frances and intensivist Dr. Susan Hill. 2. Spoke with mother: pt is not making progress as we had hoped, invited family to come in Monday to meet with  and myself to discuss options. Will call mother tomorrow to ensure family can make it Monday and what time. 3. Initial consult note routed to primary continuity provider and/or primary health care team members 4. Communicated plan of care with: Palliative IDTAlber 192 Team 
 
 GOALS OF CARE / TREATMENT PREFERENCES:  
 
GOALS OF CARE: 
Patient/Health Care Proxy Stated Goals: Prolong life TREATMENT PREFERENCES:  
Code Status: DNR Advance Care Planning: 
[x] [facility] Interdisciplinary Team has updated the ACP Navigator with [employer] and Patient Capacity Primary Decision Maker (Active): [email or web address] - Daysi Oneill - [phone] Secondary Decision Maker: Kitty Jason - [phone] Advance Care Planning 12/28/2020 Patient's Healthcare Decision Maker is: Legal Next of Kin Confirm Advance Directive None Patient Would Like to Complete Advance Directive Unable Medical Interventions: Full interventions Other: As far as possible, the palliative care team has discussed with patient / health care proxy about goals of care / treatment preferences for patient. HISTORY:  
 
History obtained from: chart, Sandra Fallow CHIEF COMPLAINT: SOB, fever HPI/SUBJECTIVE: The patient is:  
[] Verbal and participatory [x] Non-participatory due to: intubation 12/23: presented to [facility] ED for fever, c/o not feeling well and cough, with associated loss of appetite and very productive cough; this is her 3rd admission for  COVID PNA. While in the ED pt became tachypneic with sats dropping to 92% and placed on 100% NRB with sats 96-96%; her respiratory status continued to decline, requiring bipap, then intubation. hospitalist was not comfortable keeping pt at [facility] [place] [place] and had her transferred to [facility]. Clinical Pain Assessment (nonverbal scale for severity on nonverbal patients):  
Clinical Pain Assessment Severity: 0 Activity (Movement): Lying quietly, normal position Duration: for how long has pt been experiencing pain (e.g., 2 days, 1 month, years) Frequency: how often pain is an issue (e.g., several times per day, once every few days, constant) FUNCTIONAL ASSESSMENT:  
 
Palliative Performance Scale (PPS): PPS: 20 
 
 
 PSYCHOSOCIAL/SPIRITUAL SCREENING:  
 
Palliative IDT has assessed this patient for cultural preferences / practices and a referral made as appropriate to needs (Cultural Services, Patient Advocacy, Ethics, etc.) Any spiritual / Worship concerns: 
[] Yes /  [x] No 
 
Caregiver Burnout: 
[] Yes /  [x] No /  [] No Caregiver Present Anticipatory grief assessment:  
[x] Normal  / [] Maladaptive ESAS Anxiety: Anxiety: 0 
 
ESAS Depression:   unable to assess due to pt factors REVIEW OF SYSTEMS:  
 
Positive and pertinent negative findings in ROS are noted above in HPI. The following systems were [x] reviewed / [] unable to be reviewed as noted in HPI Other findings are noted below. Systems: constitutional, ears/nose/mouth/throat, respiratory, gastrointestinal, genitourinary, musculoskeletal, integumentary, neurologic, psychiatric, endocrine. Positive findings noted below. Modified ESAS Completed by: provider Pain: 0 Anxiety: 0 Dyspnea: 0 Stool Occurrence(s): 1 PHYSICAL EXAM:  
 
From RN flowsheet: 
Wt Readings from Last 3 Encounters:  
01/14/21 200 lb 6.4 oz (90.9 kg) 12/23/20 191 lb (86.6 kg) 12/04/20 191 lb (86.6 kg) Blood pressure (!) 114/57, pulse (!) 130, temperature 99.6 °F (37.6 °C), resp. rate (!) 43, height 5' 3\" (1.6 m), weight 200 lb 6.4 oz (90.9 kg), SpO2 96 %. Pain Scale 1: Behavioral Pain Scale (BPS) Pain Intensity 1: 3 Pain Intervention(s) 1: Medication (see MAR) Last bowel movement, if known:  
 
Constitutional: awake, makes eye contact but does not respond Eyes: opened, anicteric, tracking some HENT: mm moist, trach on vent Cardiac: RRR Lungs: CTAB 
MS: no deformities Neuro: wakes to verbal stim, makes eye contact Psycho: unable to assess due to pt factors HISTORY:  
 
Principal Problem: 
  Aspiration pneumonia of both lungs due to vomit ([address] (12/23/2020) Active Problems: 
  Acute hypoxemic respiratory failure ([address] (12/24/2020) Post viral syndrome (12/25/2020) Dehydration with hypernatremia (12/16/2020) Gram negative septic shock ([address] (1/23/2020) Lactic acidosis (12/23/2020) Fecal impaction of colon ([address] (12/23/2020) Acute hypokalemia (1/10/2021) Hypophosphatemia (1/11/2021) Hypomagnesemia (1/11/2021) Chronic kidney disease (CKD), stage II (mild) (12/23/2020) Large hiatal hernia (11/2/2020) Overview: ([id number])- CT Abdomen: 
    Distended fluid-filled esophagus and large hiatus hernia. Constipation due to pain medication therapy (11/26/2020) Severe sepsis with acute organ dysfunction ([address] (12/23/2020) Respiratory failure ([address] (12/23/2020) HFrEF (heart failure with reduced ejection fraction) ([facility] (12/24/2020) Down syndrome (12/23/2020) Acquired hypothyroidism (12/24/2020) Goals of care, counseling/discussion (12/28/2020) Acute kidney injury superimposed on CKD ([facility] (12/23/2020) Past Medical History:  
Diagnosis Date  Chronic kidney disease (CKD), stage II (mild) 1/12/2021  Endocrine disease  Gram negative septic shock ([facility] 1/23/2020  Hypomagnesemia 1/11/2021  Hypophosphatemia 1/11/2021  Hypothyroid 03/11/2018  Ill-defined condition Down's Syndrome  Lactic acidosis 12/23/2020 History reviewed. No pertinent surgical history. History reviewed. No pertinent family history. History reviewed, no pertinent family history. Social History Tobacco Use  Smoking status: Never Smoker  Smokeless tobacco: Never Used Substance Use Topics  Alcohol use: No  
 
No Known Allergies Current Facility-Administered Medications Medication Dose Route Frequency  [START ON 1/15/2021] lansoprazole (PREVACID) 3 mg/mL oral suspension 30 mg  30 mg Per G Tube ACB  risperiDONE (RisperDAL) tablet 2 mg  2 mg Per G Tube QHS  fentaNYL citrate (PF) injection 50 mcg  50 mcg IntraVENous Q2H PRN  
 [START ON 1/15/2021] Vancomycin  trough prior to dose due 1/15 at 1100    Other ONCE  
 vancomycin (VANCOCIN) 750 mg in 0.9% sodium chloride 250 mL (VIAL-MATE)  750 mg IntraVENous Q12H  
 balsam peru-castor oiL (VENELEX) ointment   Topical TID  cefepime (MAXIPIME) 1 g in 0.9% sodium chloride (MBP/ADV) 50 mL MBP  1 g IntraVENous Q8H  
 iron sucrose (VENOFER) injection 200 mg  200 mg IntraVENous DAILY  epoetin marc-epbx (RETACRIT) injection 40,000 Units  40,000 Units SubCUTAneous Q7D  
 midazolam (VERSED) injection 2 mg  2 mg IntraVENous Q2H PRN  
 lactulose (CHRONULAC) 10 gram/15 mL solution 15 mL  10 g Oral TID  levothyroxine (SYNTHROID) tablet 50 mcg  50 mcg Per NG tube DAILY  levothyroxine tube feed reminder note  1 Each Other BID  heparin (porcine) injection 5,000 Units  5,000 Units SubCUTAneous Q8H  
 alcohol 62% (NOZIN) nasal  1 Ampule  1 Ampule Topical Q12H  dextrose (D50W) injection syrg 12.5-25 g  25-50 mL IntraVENous PRN  
 insulin lispro (HUMALOG) injection   SubCUTAneous Q6H  
 acetaminophen (TYLENOL) tablet 650 mg  650 mg Per G Tube Q6H PRN Or  
 acetaminophen (TYLENOL) suppository 650 mg  650 mg Rectal Q6H PRN  chlorhexidine (ORAL CARE KIT) 0.12 % mouthwash 15 mL  15 mL Oral Q12H  
 heparin (porcine) pf 300 Units  300 Units InterCATHeter PRN  
 sodium chloride (NS) flush 5-40 mL  5-40 mL IntraVENous Q8H  
 sodium chloride (NS) flush 5-40 mL  5-40 mL IntraVENous PRN  
 ondansetron (ZOFRAN) injection 4 mg  4 mg IntraVENous Q6H PRN Facility-Administered Medications Ordered in Other Encounters Medication Dose Route Frequency  ceFAZolin (ANCEF) injection   IntraVENous PRN  
 0.9% sodium chloride infusion   IntraVENous CONTINUOUS  
 dexamethasone (DECADRON) 4 mg/mL injection   IntraVENous PRN  
 
 
 
 LAB AND IMAGING FINDINGS:  
 
Lab Results Component Value Date/Time WBC 19.4 (H) 01/14/2021 04:40 AM  
 HGB 7.5 (L) 01/14/2021 04:40 AM  
 PLATELET 829 87/78/4948 04:40 AM  
 
Lab Results Component Value Date/Time Sodium 146 (H) 01/14/2021 04:40 AM  
 Potassium 4.4 01/14/2021 04:40 AM  
 Chloride 112 (H) 01/14/2021 04:40 AM  
 CO2 28 01/14/2021 04:40 AM  
 BUN 18 01/14/2021 04:40 AM  
 Creatinine 1.25 (H) 01/14/2021 04:40 AM  
 Calcium 8.6 01/14/2021 04:40 AM  
 Magnesium 2.2 01/14/2021 04:40 AM  
 Phosphorus 3.1 01/14/2021 04:40 AM  
  
Lab Results Component Value Date/Time Alk. phosphatase 45 01/06/2021 04:10 AM  
 Protein, total 6.9 01/06/2021 04:10 AM  
 Albumin 2.4 (L) 01/14/2021 04:40 AM  
 Globulin 4.0 01/06/2021 04:10 AM  
 
Lab Results Component Value Date/Time INR 1.1 11/20/2020 01:57 AM  
 Prothrombin time 11.8 (H) 11/20/2020 01:57 AM  
 aPTT 40.0 (H) 12/28/2020 04:51 AM  
  
Lab Results Component Value Date/Time  Iron 15 (L) 01/09/2021 04:40 AM  
 TIBC 120 (L) 01/09/2021 04:40 AM  
  Iron % saturation 13 (L) 01/09/2021 04:40 AM  
 Ferritin 745 (H) 01/09/2021 04:40 AM  
  
Lab Results  
Component Value Date/Time  
 pH 7.33 (L) 12/24/2020 05:50 AM  
 PCO2 38 12/24/2020 05:50 AM  
 PO2 80 12/24/2020 05:50 AM  
 
No components found for: GLPOC  
Lab Results  
Component Value Date/Time  
  (H) 11/11/2020 01:00 AM  
  
 
 
   
 
Total time:  
Counseling / coordination time, spent as noted above:  
> 50% counseling / coordination?:  
 
Prolonged service was provided for  []30 min   []75 min in face to face time in the presence of the patient, spent as noted above. 
Time Start:  
Time End:  
Note: this can only be billed with 24823 (initial) or 52112 (follow up). 
If multiple start / stop times, list each separately.

## 2021-01-14 NOTE — PROGRESS NOTES
Nephrology Progress Note [facility] Patient: Rossy Blevins    YOB: 1980     Admit Date: 12/23/2020 CC: Follow up for ARF, Hypernatremia IMPRESSION & PLAN:  
? DAYN : vanc toxicity + pre renal azotemia : resolving ? Sepsis/shock - off pressors ? Hypokalemia ? Hypernatremia - mostly stable, may be creepong up ? Anemia ? Lactic acidosis ? Protein malnutrition - on TPN 
? Bacterial pneumonia ? Fecal impaction ? Respiratory failure-on vent ? History of heart failure - LVEF 55% 12/2020 ? Atrophic right kidney ? Down syndrome with autism ? Anemia - iron sat=13% PLAN- 
? Increased TF water flushes to 150 ml Q4 hr 
? Diuretic PRN 
? ongoing IV iron and Epogen ? Vent management per ICU team 
 
D/w Nursing staff Subjective: Interval History:  
 
Trach/vent; U/Oadequate, creat better Na creeping up for several days Tolerating FW flushes per RN  
 
Objective:  
Vitals:  
 01/14/21 0530 01/14/21 0545 01/14/21 0600 01/14/21 7944 BP: (!) 96/56 (!) 140/92 114/76 (!) 121/55 Pulse: (!) 137 (!) 134 (!) 135 (!) 131 Resp: (!) 32 (!) 47 (!) 31 (!) 35 Temp:      
SpO2: 96% 98% 91% 97% Weight:      
Height:      
  
01/13 0701 - 01/14 0700 In: 1490.9 [I.V.:800.9] Out: 1920 [Urine:1900; Drains:20] Last 3 Recorded Weights in this Encounter 01/10/21 0948 01/13/21 0400 01/14/21 0400 Weight: 90.3 kg (199 lb 1.2 oz) 87.4 kg (192 lb 10.9 oz) 90.9 kg (200 lb 6.4 oz) Physical exam:  
Gamaliel Anikar on vent NECK-trach CVS: RRR on monitor LUNGS: bilateral ronchi ABD:   Soft, + BS 
EXT:  trace+ edema NEURO: awake, alert, on vent  : mejia + Chart reviewed. Pertinent Notes reviewed. Data Review : 
Recent Labs  
  01/14/21 
0440 01/13/21 2132 01/13/21 0052 * 145 143  
K 4.4 4.3 3.9 * 112* 112* CO2 28 28 24 BUN 18 16 14 CREA 1.25* 1.28* 1.15* GLU 90 121* 96  
CA 8.6 8.4* 7.9*  
 MG 2.2 2.3 1.4* PHOS 3.1 3.5 2.0* Recent Labs  
  01/14/21 
0440 01/13/21 
0052 01/12/21 
8404 WBC 19.4* 18.5* 16.8* HGB 7.5* 7.5* 7.6* HCT 23.0* 23.1* 23.7*  
 208 177 No results for input(s): FE, TIBC, PSAT, FERR in the last 72 hours. Medication list  reviewed Current Facility-Administered Medications Medication Dose Route Frequency  risperiDONE (RisperDAL) tablet 2 mg  2 mg Per NG tube QHS  vancomycin (VANCOCIN) 750 mg in 0.9% sodium chloride 250 mL (VIAL-MATE)  750 mg IntraVENous Q12H  
 balsam peru-castor oiL (VENELEX) ointment   Topical TID  cefepime (MAXIPIME) 1 g in 0.9% sodium chloride (MBP/ADV) 50 mL MBP  1 g IntraVENous Q8H  
 iron sucrose (VENOFER) injection 200 mg  200 mg IntraVENous DAILY  epoetin marc-epbx (RETACRIT) injection 40,000 Units  40,000 Units SubCUTAneous Q7D  
 pantoprazole (PROTONIX) 40 mg in 0.9% sodium chloride 10 mL injection  40 mg IntraVENous Q12H  
 midazolam (VERSED) injection 2 mg  2 mg IntraVENous Q2H PRN  
 fentaNYL citrate (PF) injection 50 mcg  50 mcg IntraVENous Q4H PRN  polyethylene glycol (MIRALAX) packet 17 g  17 g Per G Tube BID  
 bisacodyL (DULCOLAX) suppository 10 mg  10 mg Rectal DAILY  lactulose (CHRONULAC) 10 gram/15 mL solution 15 mL  10 g Oral TID  dexmedeTOMidine (PRECEDEX) 400 mcg in 0.9% sodium chloride 100 mL infusion  0.1-1.5 mcg/kg/hr IntraVENous TITRATE  levothyroxine (SYNTHROID) tablet 50 mcg  50 mcg Per NG tube DAILY  levothyroxine tube feed reminder note  1 Each Other BID  heparin (porcine) injection 5,000 Units  5,000 Units SubCUTAneous Q8H  
 alcohol 62% (NOZIN) nasal  1 Ampule  1 Ampule Topical Q12H  
 dextrose (D50W) injection syrg 12.5-25 g  25-50 mL IntraVENous PRN  
 insulin lispro (HUMALOG) injection   SubCUTAneous Q6H  
 acetaminophen (TYLENOL) tablet 650 mg  650 mg Per G Tube Q6H PRN  Or  
 acetaminophen (TYLENOL) suppository 650 mg  650 mg Rectal Q6H PRN  
  chlorhexidine (ORAL CARE KIT) 0.12 % mouthwash 15 mL  15 mL Oral Q12H  
 heparin (porcine) pf 300 Units  300 Units InterCATHeter PRN  
 sodium chloride (NS) flush 5-40 mL  5-40 mL IntraVENous Q8H  
 sodium chloride (NS) flush 5-40 mL  5-40 mL IntraVENous PRN  
 ondansetron (ZOFRAN) injection 4 mg  4 mg IntraVENous Q6H PRN Facility-Administered Medications Ordered in Other Encounters Medication Dose Route Frequency  ceFAZolin (ANCEF) injection   IntraVENous PRN  
 0.9% sodium chloride infusion   IntraVENous CONTINUOUS  
 dexamethasone (DECADRON) 4 mg/mL injection   IntraVENous PRN Robyn Gooden, [address] [facility] [employer] [address], [address] [facility] Phone - [phone]               Fax - [phone]

## 2021-01-14 NOTE — PROGRESS NOTES
1900: Bedside report received from Feliz Mayberry 82: Shift Assessment complete. See flowsheets 1940: patient desatting. sats going into the 30s. Patient with increased work of breathing. Patient disconnected from ventilator and manually ventilated with ambu bag, sats return to 90s. Respiratory and Jetty Confer, NP paged to bedside. Xray ordered and completed. Trach changed and ventilator settings changed. Plan for emergancy bedside bronchoscopy to verify trach placement. Orders placed 2153: received a call from lab saying CBC sample clotted. Will redraw again with morning labs. 0008: Reassessment complete. See flowsheets 0451: Reassessement complete.  See flowsheets 
 
0700: Bedside report given to Maggie Masters

## 2021-01-14 NOTE — PROGRESS NOTES
[facility] 
 
ICU Intensivist- Critical Care Progress Note Name: Zoie Croft : 1980 MRN: [id number] Admit: 2020  7:26 PM   
 
Diagnosis:  
 
Principal Problem: 
  Aspiration pneumonia of both lungs due to vomit 
  
Problem List: 
  Large hiatal hernia 
  Acute hypokalemia 
  Post viral syndrome 
  Fecal impaction of colon 
  Dehydration with hypernatremia 
  Acute hypoxemic respiratory failure 
  Severe sepsis with acute organ dysfunction 
  Constipation due to pain medication therapy 
  Aspiration pneumonia of both lungs due to vomit 
  Pneumonia due to COVID-19 virus, resolved () 
  Acute renal failure with acute renal cortical necrosis superimposed on stage 4 chronic kidney disease 
  Kidney disease, chronic, stage IV (severe, EGFR 15-29 ml/min) 
  HFrEF (heart failure with reduced ejection fraction) 
  Goals of care, counseling/discussion 
  Acquired hypothyroidism 
  Down syndrome 
  
ICU Comprehensive Plan of Care:  
  
Plans for this Shift:  
1. Sepsis with acute organ dysfunction due to aspiration bilateral bacterial pneumonia- continue empiric IV antibiotics (meropenem) aggressive pulmonary toilet.  Covid pneumonia resolved, with post viral bilateral bacterial pneumonia. 
  
2. DANY superimposed on CKD with severe hypernatremic dehydration and nonoliguric ATN- resolving. 3. ST 2/2 to sepsis vs dehydration vs fever vs agitation/pain 
      -s/p 500 cc bolus 
     -Tylenol PRN for fever 
     -cont versed and fentanyl PRN for agitation and pain 4. Agitation 
      -increase risperidone Subjective:  
 
Progress Note:  
2021  
10:35 AM  
 
Reason for ICU Admission:  
Aspiration pneumonia of both lungs due to vomit ([address] Overnight Events:  
Failing daily spontaneous breathing trials. POD:4 Days Post-Op S/P: Procedure(s): 
TRACHEOSTOMY PLACEMENT (URGENT) Past Medical History: has a past medical history of Chronic kidney disease (CKD), stage II (mild) (1/12/2021), Endocrine disease, Gram negative septic shock ([facility] [address] (1/23/2020), Hypomagnesemia (1/11/2021), Hypophosphatemia (1/11/2021), Hypothyroid (03/11/2018), Ill-defined condition, and Lactic acidosis (12/23/2020). Past Surgical History:  
 
 has no past surgical history on file. Current Medications:  
 
Current Facility-Administered Medications Medication Dose Route Frequency  risperiDONE (RisperDAL) tablet 2 mg  2 mg Per NG tube QHS  bumetanide (BUMEX) injection 1 mg  1 mg IntraVENous ONCE  
 balsam peru-castor oiL (VENELEX) ointment   Topical TID  cefepime (MAXIPIME) 1 g in 0.9% sodium chloride (MBP/ADV) 50 mL MBP  1 g IntraVENous Q8H  
 iron sucrose (VENOFER) injection 200 mg  200 mg IntraVENous DAILY  epoetin marc-epbx (RETACRIT) injection 40,000 Units  40,000 Units SubCUTAneous Q7D  
 pantoprazole (PROTONIX) 40 mg in 0.9% sodium chloride 10 mL injection  40 mg IntraVENous Q12H  
 midazolam (VERSED) injection 2 mg  2 mg IntraVENous Q2H PRN  
 fentaNYL citrate (PF) injection 50 mcg  50 mcg IntraVENous Q4H PRN  polyethylene glycol (MIRALAX) packet 17 g  17 g Per G Tube BID  
 bisacodyL (DULCOLAX) suppository 10 mg  10 mg Rectal DAILY  lactulose (CHRONULAC) 10 gram/15 mL solution 15 mL  10 g Oral TID  dexmedeTOMidine (PRECEDEX) 400 mcg in 0.9% sodium chloride 100 mL infusion  0.1-1.5 mcg/kg/hr IntraVENous TITRATE  levothyroxine (SYNTHROID) tablet 50 mcg  50 mcg Per NG tube DAILY  levothyroxine tube feed reminder note  1 Each Other BID  heparin (porcine) injection 5,000 Units  5,000 Units SubCUTAneous Q8H  
 alcohol 62% (NOZIN) nasal  1 Ampule  1 Ampule Topical Q12H  
 dextrose (D50W) injection syrg 12.5-25 g  25-50 mL IntraVENous PRN  
 insulin lispro (HUMALOG) injection   SubCUTAneous Q6H  
 acetaminophen (TYLENOL) tablet 650 mg  650 mg Per G Tube Q6H PRN  Or  
  acetaminophen (TYLENOL) suppository 650 mg  650 mg Rectal Q6H PRN  chlorhexidine (ORAL CARE KIT) 0.12 % mouthwash 15 mL  15 mL Oral Q12H  
 heparin (porcine) pf 300 Units  300 Units InterCATHeter PRN  
 sodium chloride (NS) flush 5-40 mL  5-40 mL IntraVENous Q8H  
 sodium chloride (NS) flush 5-40 mL  5-40 mL IntraVENous PRN  
 ondansetron (ZOFRAN) injection 4 mg  4 mg IntraVENous Q6H PRN Facility-Administered Medications Ordered in Other Encounters Medication Dose Route Frequency  ceFAZolin (ANCEF) injection   IntraVENous PRN  
 0.9% sodium chloride infusion   IntraVENous CONTINUOUS  
 dexamethasone (DECADRON) 4 mg/mL injection   IntraVENous PRN Allergies/Social/Family History: No Known Allergies Social History Tobacco Use  Smoking status: Never Smoker  Smokeless tobacco: Never Used Substance Use Topics  Alcohol use: No  
  
History reviewed. No pertinent family history. Review of Systems:  
 
Review of systems not obtained due to patient factors. Objective:  
Vital Signs: 
Visit Vitals /82 Pulse (!) 131 Temp 99.6 °F (37.6 °C) Resp (!) 33 Ht 5' 3\" (1.6 m) Wt 87.4 kg (192 lb 10.9 oz) SpO2 96% BMI 34.13 kg/m² O2 Device: Tracheostomy, Ventilator Temp (24hrs), Av.7 °F (37.6 °C), Min:99.4 °F (37.4 °C), Max:100.3 °F (37.9 °C) Intake/Output:  
 
Intake/Output Summary (Last 24 hours) at 2021 Last data filed at 2021 1600 Gross per 24 hour Intake 1833.81 ml Output 950 ml Net 883.81 ml Physical Exam: 
General:  Sedated and on the ventilator. No acute distress. Eyes:  Sclera anicteric. Pupils equal, round, reactive to light. Mouth/Throat: Normal.  
Neck: Tracheostomy clean. Lungs:   Clear to auscultation bilaterally, good effort. Cardiovascular:  Regular rate and rhythm, no murmur, click, rub, or gallop. Abdomen:   Soft, non-tender, bowel sounds normal, non-distended. Extremities: No cyanosis or edema. Skin: No acute rash or lesions. Lymph Nodes: Cervical and supraclavicular normal.  
Musculoskeletal:  No swelling or deformity. Lines/Devices:  Intact, no erythema, drainage, or tenderness. Psychiatric: Sedated and appears comfortable on ventilator.  
  
 
LABS AND  DATA: Personally reviewed Recent Labs  
  01/13/21 0052 01/12/21 
4100 WBC 18.5* 16.8* HGB 7.5* 7.6* HCT 23.1* 23.7*  
 177 Recent Labs  
  01/13/21 
0052 01/12/21 
3784  142  
K 3.9 4.1 * 116* CO2 24 20* BUN 14 13 CREA 1.15* 1.21* GLU 96 94 CA 7.9* 7.3*  
MG 1.4* 1.5* PHOS 2.0* 2.4* Recent Labs  
  01/13/21 0052 01/12/21 
3615 ALB 2.4* 2.3* No results for input(s): INR, PTP, APTT, INREXT, INREXT in the last 72 hours. No results for input(s): PHI, PCO2I, PO2I, FIO2I in the last 72 hours. No results for input(s): CPK, CKMB, TROIQ, BNPP in the last 72 hours. Ventilator Settings: 
Mode Rate Tidal Volume Pressure FiO2 PEEP Assist control, Pressure control   350 ml  10 cm H2O 100 % 5 cm H20 Peak airway pressure: 33 cm H2O Minute ventilation: 11.6 l/min MEDS: Reviewed RADIOLOGY: 
Xr Chest [facility] Result Date: 1/13/2021 IMPRESSION: Increased bilateral airspace opacities with diminished lung volumes. Xr Chest [facility] Result Date: 1/13/2021 IMPRESSION: Persistent severe bilateral airspace disease has mildly worsened in the left suprahilar region. Assessment:  
 
Hospital Problems  Date Reviewed: 1/8/2021 Codes Class Noted POA * (Principal) Aspiration pneumonia of both lungs due to vomit ([facility] [address] ICD-10-CM: J69.0 ICD-9-CM: 507.0 Acute 12/23/2020 Yes Hypophosphatemia ICD-10-CM: E83.39 
ICD-9-CM: 275.3 Acute 1/11/2021 No  
   
 Hypomagnesemia ICD-10-CM: E83.42 
ICD-9-CM: 275.2 Acute 1/11/2021 No  
   
 Acute hypokalemia ICD-10-CM: E87.6 ICD-9-CM: 276.8 Acute 1/10/2021 No  
   
 Post viral syndrome ICD-10-CM: G93.3 ICD-9-CM: 780.79 Acute 12/25/2020 No  
   
 Acute hypoxemic respiratory failure ([facility] ICD-10-CM: J96.01 
ICD-9-CM: 518.81 Acute 12/24/2020 Yes Lactic acidosis ICD-10-CM: E87.2 ICD-9-CM: 276.2 Acute 12/23/2020 Yes Fecal impaction of colon ([facility] ICD-10-CM: K56.41 
ICD-9-CM: 560.32 Acute 12/23/2020 Yes Chronic kidney disease (CKD), stage II (mild) (Chronic) ICD-10-CM: N18.2 ICD-9-CM: 802. 2 Acute 12/23/2020 Yes Dehydration with hypernatremia (Chronic) ICD-10-CM: E87.0 ICD-9-CM: 276.0 Acute 12/16/2020 Yes Gram negative septic shock (HCC) ICD-10-CM: A41.50, R65.21 ICD-9-CM: 038.40, 995.92, 785.52 Acute 1/23/2020 Yes Constipation due to pain medication therapy ICD-10-CM: K59.03 
ICD-9-CM: 564.09, E947.9 Acute 11/26/2020 Yes Large hiatal hernia (Chronic) ICD-10-CM: K44.9 ICD-9-CM: 235. 3 Chronic 11/2/2020 Yes Overview Signed 1/1/2021  4:08 PM by Adrian Bess MD  
  ([id number])- CT Abdomen: 
Distended fluid-filled esophagus and large hiatus hernia. Goals of care, counseling/discussion ICD-10-CM: Z71.89 ICD-9-CM: V65.49  12/28/2020 Yes HFrEF (heart failure with reduced ejection fraction) (HCC) (Chronic) ICD-10-CM: I50.20 ICD-9-CM: 428.20  12/24/2020 Yes Acquired hypothyroidism (Chronic) ICD-10-CM: E03.9 ICD-9-CM: 244.9  12/24/2020 Yes Severe sepsis with acute organ dysfunction (HCC) ICD-10-CM: A41.9, R65.20 ICD-9-CM: 038.9, 995.92 Acute 12/23/2020 Yes Respiratory failure ([facility] ICD-10-CM: J96.90 ICD-9-CM: 518.81  12/23/2020 Yes Down syndrome (Chronic) ICD-10-CM: Q90.9 ICD-9-CM: 758.0 End Stage 12/23/2020 Yes Acute kidney injury superimposed on CKD ([facility] ICD-10-CM: N17.9, N18.9 ICD-9-CM: 866.00, 585.9 Acute 12/23/2020 Unknown Multidisciplinary Rounds Completed:  Yes 
  
ABCDEF Bundle/Checklist 
Pain Medications: None Target RASS: 0 - Alert & Calm - Spontaneously pays attention to caregiver Sedation Medications: Versed and fentanyl PRN 
CAM-ICU:  Negative Discussed Plan of Care (goals of care): Yes Addressed Code Status: Do Not Resuscitate 
  
CARDIOVASCULAR Cardiac Gtts: None SBP Goal of: > 90 mmHg MAP Goal of: > 65 mmHg Transfusion Trigger (Hgb): <7 g/dL 
  
RESPIRATORY Vent Goals:  
Head of bed > 30 degrees Aggressive bronchopulmonary hygiene DVT Prophylaxis (if no, list reason): SCD's or Sequential Compression Device and Lovenox  
SPO2 Goal: > 92% 
  
  
GI/ Dutta Catheter Present: Yes 
  
ANTIBIOTICS Antibiotics: Ancef 
  
T/L/D Tubes: Tracheostomy and PEG Tube Lines: Peripheral IV Drains: Dutta Catheter 
  
SPECIAL EQUIPMENT None 
  
DISPOSITION Stay in ICU 
  
CRITICAL CARE CONSULTANT NOTE I provided a face-to-face bedside physician/patient encounter, greater than the usual and customary amount normally needed, due to the high complexity of medical decision-making required. 
  
I reviewed and interpreted patient data including clinical events, labs, images, vital signs, I/O's, and examined patient.   
  
I have actively participated in multi-disciplinary discussions (Respiratory Therapy, Case Management, CCU nursing staff) regarding the case in formulating an optimal therapeutic plan, and effecting a management strategy for this patient. 
  
NOTE OF PERSONAL INVOLVEMENT IN CARE  
This patient has a high probability of imminent, clinically significant deterioration, which requires the highest level of preparedness to intervene urgently.  I participated in the decision-making and personally managed, or directed the management of, a myriad of life and organ supporting interventions which required my frequent-interval clinical reassessments, in order to treat or prevent imminent deterioration. 
  
 I personally spent 40 minutes of critical care time.  This is time spent at this critically ill patient's bedside actively involved in patient care as well as the coordination of care and discussions with the patient's family.  This does not include any procedural time which has been billed separately. Lety Michaels Staff LANNY/ Ariana [address] 1/13/2021

## 2021-01-14 NOTE — INTERDISCIPLINARY ROUNDS
Critical care interdisciplinary rounds held on 01/14/2021. Following members present, Pharmacy, Diabetes Treatment, Case Management,  Physical Therapy, Palliative Care and Nutrition. Led by Ghulam Levine. Sil Healy and Dr. Chelsea Hdez. Plan of care discussed. See clinical pathway for plan of care and interventions and desired outcomes.

## 2021-01-14 NOTE — PROGRESS NOTES
Pharmacy Automatic Renal Dosing Protocol - Antimicrobials Indication for Antimicrobials: sepsis Current Regimen of Each Antimicrobial:  
Cefepime 1g IV q8h; start , day 3 
vancomycin 1750 mg IV once then 750 mg IV every 12 hr; started ; day 2 Previous Antimicrobial Therapy:  
Azithromycin 500 mg IV q24h; [id number]/23; day 5 Cefepime 2g IV q24h ; started - Vancomycin - dosed based on random levels; started - (Recent hospitalization with 7 day course of vanc/cefepime) Meropenem 500 mg IV q12h; started - Goal Level: VANCOMYCIN TROUGH GOAL RANGE Vancomycin Trough: 15 - 20 mcg/mL  (AUC: 400 - 600 mg/hr/Liter/day) Date Dose & Interval Measured (mcg/mL) Extrapolated (mcg/mL)  
 N/a 35.1   
 holding 24.9 Significant Cultures:  
 blood NG, final 
 urine NG, final 
 sputum normal constance, final 
 paired blood cx: NGTD, pending  urine 2K GNRs  paired blood cx: NGTD, pending  sputum cx NGTD pending Paralysis, amputations, malnutrition: none noted Labs: 
Recent Labs  
  21 
0440 21 
2132 21 
0052 21 
6611 CREA 1.25* 1.28* 1.15* 1.21* BUN 18 16 14 13 WBC 19.4*  --  18.5* 16.8* Temp (24hrs), Av.5 °F (37.5 °C), Min:98.1 °F (36.7 °C), Max:101 °F (38.3 °C) Creatinine Clearance (mL/min) or Dialysis: 50.5 mL/min (IBW) Impression/Plan:  
Scr stable Continue current dose of abx; appropriate for indication/renal function Vancomycin  trough prior to dose due 1/15 at 1100 Antimicrobial stop date pending Pharmacy will follow daily and adjust medications as appropriate for renal function and/or serum levels. Thank you, RANJAN Reaves

## 2021-01-14 NOTE — PROGRESS NOTES
0700-Bedside shift change report given to Letty Rios (oncoming nurse) by Jayant Felipe (offgoing nurse). Report included the following information SBAR, Kardex and MAR. 
 
3438- Patient assessed. See flowsheet. Tylenol given for the patient's fever and the MD notified. 8070- Respiratory rate high. ? Pain. Versed and fentanyl given. 1026- Patient bathed. Linens changed. 1118- Fentanyl given for elevated respiratory rate and BPS 7. 
 
1222- Reassessed. No changes. 1230- RR 38. Versed given for agitation. 1440- Reassessed. No changes. 1721- Fentanyl and versed given for RR 42 and BPS 7. 
 
1747- Trach care completed. 1900- Report given to the oncoming shift.

## 2021-01-15 NOTE — ROUTINE PROCESS
5230: Bedside and Verbal shift change report received from HEMA Ceja (offgoing nurse). Report included the following information SBAR, Kardex, ED Summary, Procedure Summary, Intake/Output, MAR, Accordion, Recent Results, Med Rec Status, Cardiac Rhythm sinus tachycardia, Alarm Parameters  and Quality Measures. Noted awake, pleasant and cooperative with her plan of care. She is tolerating the current mechanical ventilator settings well. 0815:Shilpa Akers at the bedside. 1000: She is assisting me with repositioning from side to side. 6235:Tylenol administered for generalized discomfort, and repositioned for comfort. [phone]

## 2021-01-15 NOTE — PROGRESS NOTES
Nephrology Progress Note Edmundo Liz Patient: Ángela Cornelius    YOB: 1980     Admit Date: 12/23/2020 CC: Follow up for ARF, Hypernatremia IMPRESSION & PLAN:  
? DANY : vanc toxicity + pre renal azotemia : resolving ? Sepsis/shock - off pressors ? Hypokalemia ? Hypernatremia - stable ? Anemia ? Lactic acidosis ? Protein malnutrition - on TPN 
? Bacterial pneumonia ? Fecal impaction ? Respiratory failure-on vent ? History of heart failure - LVEF 55% 12/2020 ? Atrophic right kidney ? Down syndrome with autism ? Anemia - iron sat=13% PLAN- 
? Continue present TF water flushes ? Diuretic PRN ? conpleted IV iron; continue Epogen ? Vent management per ICU team 
 
D/w Nursing staff Subjective: Interval History:  
 
Trach/vent; U/O rmans good, creat stble Tolerating FW flushes per RN  
 
Objective:  
Vitals:  
 01/15/21 0300 01/15/21 0330 01/15/21 0400 01/15/21 0500 BP: (!) 104/23  122/61 (!) 121/59 Pulse: (!) 128 (!) 129 (!) 130 (!) 130 Resp: 27 29 (!) 38 13 Temp:      
SpO2: 100% 100% 92% 97% Weight:      
Height:      
  
01/14 0701 - 01/15 0700 In: 2977 [I.V.:376] Out: 300 [Urine:300] Last 3 Recorded Weights in this Encounter 01/10/21 0948 01/13/21 0400 01/14/21 0400 Weight: 90.3 kg (199 lb 1.2 oz) 87.4 kg (192 lb 10.9 oz) 90.9 kg (200 lb 6.4 oz) Physical exam:  
[place] on vent NECK-trach CVS: RRR on monitor LUNGS: bilateral ronchi ABD:   Soft, + BS 
EXT:  trace+ edema NEURO: awake, alert, on vent  : mejia + Chart reviewed. Pertinent Notes reviewed. Data Review : 
Recent Labs  
  01/14/21 
0440 01/13/21 
2132 01/13/21 
0052 * 145 143  
K 4.4 4.3 3.9 * 112* 112* CO2 28 28 24 BUN 18 16 14 CREA 1.25* 1.28* 1.15* GLU 90 121* 96  
CA 8.6 8.4* 7.9*  
MG 2.2 2.3 1.4* PHOS 3.1 3.5 2.0* Recent Labs  
  01/14/21 
0440 01/13/21 
0052 01/12/21 8769 WBC 19.4* 18.5* 16.8* HGB 7.5* 7.5* 7.6* HCT 23.0* 23.1* 23.7*  
 208 177 No results for input(s): FE, TIBC, PSAT, FERR in the last 72 hours. Medication list  reviewed Current Facility-Administered Medications Medication Dose Route Frequency  lansoprazole (PREVACID) 3 mg/mL oral suspension 30 mg  30 mg Per G Tube ACB  risperiDONE (RisperDAL) tablet 2 mg  2 mg Per G Tube QHS  fentaNYL citrate (PF) injection 50 mcg  50 mcg IntraVENous Q2H PRN  Vancomycin  trough prior to dose due 1/15 at 1100    Other ONCE  
 vancomycin (VANCOCIN) 750 mg in 0.9% sodium chloride 250 mL (VIAL-MATE)  750 mg IntraVENous Q12H  
 balsam peru-castor oiL (VENELEX) ointment   Topical TID  cefepime (MAXIPIME) 1 g in 0.9% sodium chloride (MBP/ADV) 50 mL MBP  1 g IntraVENous Q8H  
 iron sucrose (VENOFER) injection 200 mg  200 mg IntraVENous DAILY  epoetin marc-epbx (RETACRIT) injection 40,000 Units  40,000 Units SubCUTAneous Q7D  
 midazolam (VERSED) injection 2 mg  2 mg IntraVENous Q2H PRN  
 lactulose (CHRONULAC) 10 gram/15 mL solution 15 mL  10 g Oral TID  levothyroxine (SYNTHROID) tablet 50 mcg  50 mcg Per NG tube DAILY  levothyroxine tube feed reminder note  1 Each Other BID  heparin (porcine) injection 5,000 Units  5,000 Units SubCUTAneous Q8H  
 alcohol 62% (NOZIN) nasal  1 Ampule  1 Ampule Topical Q12H  
 dextrose (D50W) injection syrg 12.5-25 g  25-50 mL IntraVENous PRN  
 insulin lispro (HUMALOG) injection   SubCUTAneous Q6H  
 acetaminophen (TYLENOL) tablet 650 mg  650 mg Per G Tube Q6H PRN Or  
 acetaminophen (TYLENOL) suppository 650 mg  650 mg Rectal Q6H PRN  chlorhexidine (ORAL CARE KIT) 0.12 % mouthwash 15 mL  15 mL Oral Q12H  
 heparin (porcine) pf 300 Units  300 Units InterCATHeter PRN  
 sodium chloride (NS) flush 5-40 mL  5-40 mL IntraVENous Q8H  
 sodium chloride (NS) flush 5-40 mL  5-40 mL IntraVENous PRN  
  ondansetron (ZOFRAN) injection 4 mg  4 mg IntraVENous Q6H PRN Facility-Administered Medications Ordered in Other Encounters Medication Dose Route Frequency  ceFAZolin (ANCEF) injection   IntraVENous PRN  
 0.9% sodium chloride infusion   IntraVENous CONTINUOUS  
 dexamethasone (DECADRON) 4 mg/mL injection   IntraVENous PRN Anna Haile MD             
[facility] / [employer] [facility], [address] [place], [address] Phone - [phone]               Fax - [phone]

## 2021-01-15 NOTE — PROGRESS NOTES
Pharmacy Automatic Renal Dosing Protocol - Antimicrobials Indication for Antimicrobials: sepsis Current Regimen of Each Antimicrobial:  
Cefepime 1g IV q8h; start , day 4 Vancomycin 750 mg IV every 12 hr; started ; day 3 Previous Antimicrobial Therapy:  
Azithromycin 500 mg IV q24h; [id number]; day 5 Cefepime 2g IV q24h ; started - Vancomycin - dosed based on random levels; started - (Recent hospitalization with 7 day course of vanc/cefepime) Meropenem 500 mg IV q12h; started - Goal Level: VANCOMYCIN TROUGH GOAL RANGE Vancomycin Trough: 15 - 20 mcg/mL  (AUC: 400 - 600 mg/hr/Liter/day) Date Dose & Interval Measured (mcg/mL) Extrapolated (mcg/mL)  
 N/a 35.1   
 holding 24.9 1/15 750 q12 23.4 21.1 Significant Cultures:  
 blood NG, final 
 urine NG, final 
 sputum normal constance, final 
 paired blood cx: NGTD, pending  urine 2K GNRs  paired blood cx: NGTD, pending  sputum cx normal constance, final 
 
Paralysis, amputations, malnutrition: none noted Labs: 
Recent Labs  
  01/15/21 
0508 21 
0440 21 
2132 21 
0052 CREA 1.26* 1.25* 1.28* 1.15* BUN 19 18 16 14 WBC 16.3* 19.4*  --  18.5* Temp (24hrs), Av °F (37.2 °C), Min:97.7 °F (36.5 °C), Max:99.6 °F (37.6 °C) Creatinine Clearance (mL/min) or Dialysis: 50.1 mL/min (IBW) Impression/Plan:  
Scr stable Continue current dose of cefepime; appropriate for indication/renal function Vancomycin  trough above goal, will change to 750 mg q18h Vancomycin  trough prior to dose due  at 0600 Antimicrobial stop date pending Pharmacy will follow daily and adjust medications as appropriate for renal function and/or serum levels. Thank you, RANJAN Bernabe

## 2021-01-15 NOTE — PROGRESS NOTES
1900 - Bedside shift change report given to Robin Finch RN (oncoming nurse) by HEMA LYNCH (offgoing nurse). Report included the following information SBAR, Kardex, Intake/Output, MAR, Recent Results and Cardiac Rhythm Sinus tach. 2000 - Shift assessment complete, see flowsheets for details. Pt eye's open spontaneously, not interactive, and no command following. Moves all extremities spontaneously. No command following. Sinus tach on monitor. Lung sounds coarse, tracheostomy present. A scant amount of tan secretions suctioned from trach. ABD semi-soft and obese, PEG tube present. 0 gastric residuals. BS active. Twocal infusing at 40 mL/hr. Purwik present with feng hazy output. Pulses palpable in all extremities. 0000 - Reassessment complete, no changes documented. 0400 - Reassessment complete, no changes documented. 0500 - Trach care completed. 0700 - Bedside shift change report given to [place] (oncoming nurse) by Robin Finch RN (offgoing nurse). Report included the following information SBAR, Kardex, Intake/Output, MAR, Recent Results and Cardiac Rhythm Sinus tach.

## 2021-01-15 NOTE — PROGRESS NOTES
Comprehensive Nutrition Assessment Type and Reason for Visit: Pelon Roberts Nutrition Recommendations/Plan:  
Continue TF as ordered Nutrition Assessment:   Chart reviewed, case discussed during CCU rounds. Pt remains on vent via trach, needs re-estimated. TF at goal rate with no residuals. TF meets 96% kcal (provides 22kcals/kg) and meets 100% protein needs. BM noted yesterday. Labs reviewed, WNL. Flushes up a bit to bring Na down from 146 yesterday to 144 today. Will leave flushes as ordered at this time. Estimated Daily Nutrient Needs: 
Energy (kcal): PSU 1999 (TBM 1743); Weight Used for Energy Requirements: Current Protein (g): 69-86g (0.8-1gPro/kg0; Weight Used for Protein Requirements: Current Fluid (ml/day): per MD; Method Used for Fluid Requirements: 1 ml/kcal 
 
 
Nutrition Related Findings:  Meds: humalog, synthroid, prevacid, vancomycin, cefepime. Edema: +2-generalized, all extremities. BM 1/14 Wounds:   
Deep tissue injury, Moisture associate skin damage, Skin tears (DTI-cheeks) Current Nutrition Therapies: 
Current Tube Feeding (TF) Orders: · Feeding Route: Nasogastric · Formula: TwoCal HN 
· Schedule:Continuous · Regimen: 40mL/h · Water Flushes: 150mL q 4h  
· Current TF & Flush Orders Provides: 1920kcals/80gPro/212gCHO/[id number] free water · Goal TF & Flush Orders Provides: 1920kcals/80gPro/212gCHO/109fF0862gS free water Anthropometric Measures: 
· Height:  5' 3\" (160 cm) · Current Body Wt:  86.6 kg (190 lb 14.7 oz) · Ideal Body Wt:  115 lbs:  161 % · BMI Category:  Obese class 1 (BMI 30.0-34. 9) Nutrition Diagnosis:  
· Inadequate protein-energy intake related to impaired respiratory function as evidenced by other (specify)(trophic TF order) Previous dx resolved. Nutrition Interventions:  
Food and/or Nutrient Delivery: Continue tube feeding Nutrition Education and Counseling: No recommendations at this time Coordination of Nutrition Care: Continue to monitor while inpatient, Interdisciplinary rounds Goals: Pt will tolerate TF at goal rate with residuals <500mL in 3-5 days. Nutrition Monitoring and Evaluation:  
Behavioral-Environmental Outcomes: None identified Food/Nutrient Intake Outcomes: Enteral nutrition intake/tolerance Physical Signs/Symptoms Outcomes: Biochemical data, Fluid status or edema, Nutrition focused physical findings, Weight, Hemodynamic status, GI status Discharge Planning:   
Enteral nutrition Electronically signed by Ruma Clark RD, [address]  on 1/15/2021 at 12:51 PM 
 
Contact: [facility]0809

## 2021-01-15 NOTE — PROGRESS NOTES
Pharmacy - EPO Dosing & Monitoring Dose and schedule: epoetin 40,000 unit / q7d Labs: 
Recent Labs  
  01/15/21 
0508 01/14/21 
0440 01/13/21 
0052 HGB 7.6* 7.5* 7.5* Impression/Plan:  
Change epoetin to 35082 unit MWF per epoetin policy Next due 1/18 Thanks, Nathalie Koenig PHARMD 
 
 
[email or web address]. [email or web address]

## 2021-01-16 NOTE — PROGRESS NOTES
[facility] 
 
ICU Intensivist- Critical Care Progress Note Name: Zainab Trinidad : 1980 MRN: [id number] Admit: 2020  7:26 PM   
 
Diagnoses:  
 
Principal Problem: 
  Severe ARDS due to suspected aspiration PNA Recent COVID PNA - hospitalized twice in Nov 
  Prolonged VDRF Trach status (trach tube placed 21) 
  Septic shock, resolved DANY, resolving 
  Severe sepsis with MODS 
  Constipation due to opioids Hypothyroidism 
  Down syndrome with severe baseline cognitive impairment Autism 
  
ICU Comprehensive Plan of Care:  
- vent settings reviewed and adjusted 
- monitor renal panel daily - correct electrolytes as indicated - Continue TFs at goal 
- Continue lactulose - Continue Vanc/cefepime for now (initiated ) 
- transfuse per usual guidelines - Change risperidone to seroquel due to agitation and insomnia chronically overnight 
- Cont fentanyl and midaz as needed for ventilator synchrony 
- Start home med ivabrandine for ST 
- Needs further [address] discussions. Family meeting planned for  with mother, Palliative Care Subjective:  
 
Progress Note:  
2021  
10:35 AM  
 
Reason for ICU Admission:  
Aspiration pneumonia of both lungs due to vomit ([address] Overnight Events:  
No major events SUBJ: 
Eyes open. Regards examiner. Not following commands (that is her baseline). Frequent cough. Not weanable due to high vent requirements Past Medical History:  
 
 has a past medical history of Chronic kidney disease (CKD), stage II (mild) (2021), Endocrine disease, Gram negative septic shock ([address] (2020), Hypomagnesemia (2021), Hypophosphatemia (2021), Hypothyroid (2018), Ill-defined condition, and Lactic acidosis (2020). Past Surgical History:  
 
 has no past surgical history on file. Current Medications:  
 
Current Facility-Administered Medications Medication Dose Route Frequency  [START ON 1/17/2021] Vancomycin  trough prior to dose due 1/17 at 0600    Other ONCE  
 QUEtiapine (SEROquel) tablet 50 mg  50 mg Oral QHS  ivabradine (CORLANOR) tablet 5 mg  5 mg Oral BID WITH MEALS  vancomycin (VANCOCIN) 750 mg in 0.9% sodium chloride 250 mL (VIAL-MATE)  750 mg IntraVENous Q18H  
 [START ON 1/18/2021] epoetin marc-epbx (RETACRIT) 14,000 Units combo injection  14,000 Units SubCUTAneous Q MON, WED & FRI  melatonin tablet 3 mg  3 mg Oral QHS  lansoprazole (PREVACID) 3 mg/mL oral suspension 30 mg  30 mg Per G Tube ACB  fentaNYL citrate (PF) injection 50 mcg  50 mcg IntraVENous Q2H PRN  
 balsam peru-castor oiL (VENELEX) ointment   Topical TID  cefepime (MAXIPIME) 1 g in 0.9% sodium chloride (MBP/ADV) 50 mL MBP  1 g IntraVENous Q8H  
 midazolam (VERSED) injection 2 mg  2 mg IntraVENous Q2H PRN  
 lactulose (CHRONULAC) 10 gram/15 mL solution 15 mL  10 g Oral TID  levothyroxine (SYNTHROID) tablet 50 mcg  50 mcg Per NG tube DAILY  levothyroxine tube feed reminder note  1 Each Other BID  heparin (porcine) injection 5,000 Units  5,000 Units SubCUTAneous Q8H  
 alcohol 62% (NOZIN) nasal  1 Ampule  1 Ampule Topical Q12H  
 dextrose (D50W) injection syrg 12.5-25 g  25-50 mL IntraVENous PRN  
 insulin lispro (HUMALOG) injection   SubCUTAneous Q6H  
 acetaminophen (TYLENOL) tablet 650 mg  650 mg Per G Tube Q6H PRN Or  
 acetaminophen (TYLENOL) suppository 650 mg  650 mg Rectal Q6H PRN  chlorhexidine (ORAL CARE KIT) 0.12 % mouthwash 15 mL  15 mL Oral Q12H  
 heparin (porcine) pf 300 Units  300 Units InterCATHeter PRN  
 sodium chloride (NS) flush 5-40 mL  5-40 mL IntraVENous Q8H  
 sodium chloride (NS) flush 5-40 mL  5-40 mL IntraVENous PRN  
 ondansetron (ZOFRAN) injection 4 mg  4 mg IntraVENous Q6H PRN Facility-Administered Medications Ordered in Other Encounters Medication Dose Route Frequency  ceFAZolin (ANCEF) injection   IntraVENous PRN  
  0.9% sodium chloride infusion   IntraVENous CONTINUOUS  
 dexamethasone (DECADRON) 4 mg/mL injection   IntraVENous PRN Allergies/Social/Family History: No Known Allergies Social History Tobacco Use  Smoking status: Never Smoker  Smokeless tobacco: Never Used Substance Use Topics  Alcohol use: No  
  
History reviewed. No pertinent family history. Review of Systems:  
 
Review of systems not obtained due to patient factors. Objective:  
Vital Signs: 
Visit Vitals BP 91/65 Pulse (!) 111 Temp 100.2 °F (37.9 °C) Resp (!) 34 Ht 5' 3\" (1.6 m) Wt 86.6 kg (190 lb 14.7 oz) SpO2 96% BMI 33.82 kg/m² O2 Device: Tracheostomy Temp (24hrs), Av.2 °F (37.9 °C), Min:98.7 °F (37.1 °C), Max:101.1 °F (38.4 °C) Intake/Output:  
 
Intake/Output Summary (Last 24 hours) at 2021 1818 Last data filed at 2021 1746 Gross per 24 hour Intake 2760 ml Output 1075 ml Net 1685 ml Physical Exam: 
General:  RASS 0. Not F/C. Synchronous with vent HEENT: NCAT, sclerae white Neck: Tracheostomy clean. No JVD Lungs:   Clear to auscultation bilaterally, good effort. Cardiovascular:  Reg rate/rythym, no murmer Abdomen:   Soft, + BS, NT. Extremities: No edema, warm. Skin: No lesions noted. Lines/Devices:  Intact, no erythema, drainage, or tenderness.  
  
 
LABS AND  DATA: Personally reviewed Recent Labs  
  01/16/21 
0423 01/15/21 
1766 WBC 14.2* 16.3* HGB 7.2* 7.6* HCT 22.3* 23.0*  
 300 Recent Labs  
  01/16/21 
0423 01/15/21 
7081 * 144  
K 3.7 3.8 * 112* CO2 28 28 BUN 25* 19  
CREA 1.29* 1.26* GLU 99 110* CA 8.8 8.9 MG 2.0 1.8 PHOS 2.1* 2.5* Recent Labs  
  01/16/21 
0423 01/15/21 
0204 ALB 2.2* 2.4* No results for input(s): INR, PTP, APTT, INREXT, INREXT in the last 72 hours. No results for input(s): PHI, PCO2I, PO2I, FIO2I in the last 72 hours. No results for input(s): CPK, CKMB, TROIQ, BNPP in the last 72 hours. Ventilator Settings: 
Mode Rate Tidal Volume Pressure FiO2 PEEP Assist control   350 ml  10 cm H2O 50 % 6 cm H20 Peak airway pressure: 37 cm H2O Minute ventilation: 1.8 l/min MEDS: Reviewed RADIOLOGY: 
CXR: 01/15: Yunier ARDS pattern Multidisciplinary Rounds Completed:  N/A 
  
ABCDEF Bundle/Checklist - completed 1/16/2021  
  
T/L/D Tubes: Tracheostomy and PEG Tube Lines: Peripheral IV Drains: Dutta Catheter 
  
SPECIAL EQUIPMENT None 
  
DISPOSITION Stay in ICU 
  
CRITICAL CARE CONSULTANT NOTE I provided a face-to-face bedside physician/patient encounter, greater than the usual and customary amount normally needed, due to the high complexity of medical decision-making required. 
  
I reviewed and interpreted patient data including clinical events, labs, images, vital signs, I/O's, and examined patient.   
  
I have actively participated in multi-disciplinary discussions (Respiratory Therapy, Case Management, CCU nursing staff) regarding the case in formulating an optimal therapeutic plan, and effecting a management strategy for this patient. 
  
NOTE OF PERSONAL INVOLVEMENT IN CARE  
This patient has a high probability of imminent, clinically significant deterioration, which requires the highest level of preparedness to intervene urgently. I participated in the decision-making and personally managed, or directed the management of, a myriad of life and organ supporting interventions which required my frequent-interval clinical reassessments, in order to treat or prevent imminent deterioration. 
  
I personally spent 30 minutes of critical care time.  This is time spent at this critically ill patient's bedside actively involved in patient care as well as the coordination of care and discussions with the patient's family.  This does not include any procedural time which has been billed separately. Cathie Cummings, [address] 
[phone] 
1/16/2021

## 2021-01-16 NOTE — PROGRESS NOTES
1900 - Bedside shift change report given to Giacomo Toscano RN (oncoming nurse) by Puma Lopez RN (offgoing nurse). Report included the following information SBAR, Kardex, Intake/Output, MAR, Recent Results and Cardiac Rhythm Sinus tach. 2000 - Shift assessment complete, see flowsheets for details. Shift assessment complete, see flowsheets for details. Pt eye's open spontaneously, follows commands. Moves all extremities spontaneously. No command following. Sinus tach on monitor. Lung sounds coarse, tracheostomy present. A scant amount of tan secretions suctioned from trach. ABD semi-soft and obese, PEG tube present. 0 gastric residuals. BS active. Twocal infusing at 40 mL/hr. Purwik present with feng hazy output. Pulses palpable in all extremities. 0000 - Reassessment complete, no changes documented 0400 - Reassessment complete, no changes documented. 0600 - Trach care completed. 0700 - End of Shift Note Bedside shift change report given to Charlotte Gregg RN (oncoming nurse) by Marty Ochoa (offgoing nurse). Report included the following information SBAR, Kardex, Intake/Output, MAR, Recent Results and Cardiac Rhythm NSR Shift worked:  [id number] Shift summary and any significant changes: N/A Concerns for physician to address:  N/A Zone phone for oncoming shift:   N/A Activity: 
Activity Level: Bed Rest 
Number times ambulated in hallways past shift: 0 Number of times OOB to chair past shift: 0 Cardiac:  
Cardiac Monitoring: Yes     
Cardiac Rhythm: Sinus tachycardia Access:  
Current line(s): PIV Genitourinary:  
Urinary status: voiding and external catheter Respiratory:  
O2 Device: Tracheostomy Chronic home O2 use?: NO Incentive spirometer at bedside: NO 
  
GI: 
Last Bowel Movement Date: 01/15/21 Current diet:  DIET TUBE FEEDING Passing flatus: YES Tolerating current diet: YES Pain Management:  
Patient states pain is manageable on current regimen: N/A 
 
 Skin: 
Jai Score: 14 Interventions: float heels and internal/external urinary devices Patient Safety: 
Fall Score: Total Score: 3 Interventions: bed/chair alarm High Fall Risk: Yes Length of Stay: 
Expected LOS: 19d 14h Actual LOS: [address]

## 2021-01-16 NOTE — PROGRESS NOTES
01/16/21 0543 ABCDEF Bundle SBT Trial Passed No  
SBT Trial Reason for Failure Respiratory rate < 8;Respiratory rate > 35

## 2021-01-16 NOTE — PROGRESS NOTES
Pharmacy Automatic Renal Dosing Protocol - Antimicrobials Indication for Antimicrobials: sepsis Current Regimen of Each Antimicrobial:  
Cefepime 1g IV q8h; start , day 5 Vancomycin 750 mg IV every 18 hr; started ; day 4 Previous Antimicrobial Therapy:  
Azithromycin 500 mg IV q24h; qcpmkce66/23; day 5 Cefepime 2g IV q24h ; started  Vancomycin - dosed based on random levels; started  (Recent hospitalization with 7 day course of vanc/cefepime) Meropenem 500 mg IV q12h; started - Goal Level: VANCOMYCIN TROUGH GOAL RANGE Vancomycin Trough: 15 - 20 mcg/mL  (AUC: 400 - 600 mg/hr/Liter/day) Date Dose & Interval Measured (mcg/mL) Extrapolated (mcg/mL)  
 N/a 35.1   
 holding 24.9 1/15 750 q12 23.4 21.1 Significant Cultures:  
 blood NG, final 
 urine NG, final 
 sputum normal constance, final 
 paired blood cx: NGTD, pending  urine 2K GNRs  paired blood cx: NGTD, pending  sputum cx normal constance, final 
 
Paralysis, amputations, malnutrition: none noted Labs: 
Recent Labs  
  21 
0423 01/15/21 
2457 21 
0440 CREA 1.29* 1.26* 1.25* BUN 25* 19 18 WBC 14.2* 16.3* 19.4* Temp (24hrs), Av.7 °F (37.6 °C), Min:98.7 °F (37.1 °C), Max:101.1 °F (38.4 °C) Creatinine Clearance (mL/min) or Dialysis: 48.9 mL/min (IBW) Impression/Plan:  
Scr stable Continue current dose of cefepime; appropriate for indication/renal function Vancomycin  trough above goal, will change to 750 mg q18h Vancomycin  trough prior to dose due  at 0600 Antimicrobial stop date pending Pharmacy will follow daily and adjust medications as appropriate for renal function and/or serum levels. Thank you, RANJAN Paulino

## 2021-01-16 NOTE — PROGRESS NOTES
0700-Bedside shift change report given to Donte Frances (oncoming nurse) by Marciano Araya (offgoing nurse). Report included the following information SBAR, Kardex and MAR.  
 
0613- Patient assessed. See flowsheet. 9928- Fentanyl and versed given for elevated respiratory rate and BPS. 1016- Bed bath completed. Patient's linens changed. 1200- Reassessed. No changes. MD notified about fever and repeated coughing/ asynchronous ventilation. Fentanyl and versed givefor BPS 8 and coughing. Tylenol given and blankets removed. 1436- Reassessed. No changes. Trach care completed. 1521- Fentanyl and versed giving for coughing/agtation. BPS 8.  
 
1640- Purewick, tubing, and suction cannister changed. 1900- Report given to the oncoming shift.

## 2021-01-17 NOTE — PROGRESS NOTES
Pharmacy Automatic Renal Dosing Protocol - Antimicrobials Indication for Antimicrobials: sepsis Current Regimen of Each Antimicrobial:  
Cefepime 1g IV q8h; start , day 6 Vancomycin 750 mg IV every 18 hr; started ; day 5 Previous Antimicrobial Therapy:  
Azithromycin 500 mg IV q24h; iynyaxo75/23; day 5 Cefepime 2g IV q24h ; started  Vancomycin - dosed based on random levels; started  (Recent hospitalization with 7 day course of vanc/cefepime) Meropenem 500 mg IV q12h; started - Goal Level: VANCOMYCIN TROUGH GOAL RANGE Vancomycin Trough: 15 - 20 mcg/mL  (AUC: 400 - 600 mg/hr/Liter/day) Date Dose & Interval Measured (mcg/mL) Extrapolated (mcg/mL)  
 N/a 35.1   
 holding 24.9 1/15 750 q12 23.4 21.1  750 q18h 13.4 ~13 Significant Cultures:  
 blood NG, final 
 urine NG, final 
 sputum normal constance, final 
 paired blood cx: NGTD, pending  urine 2K GNRs  paired blood cx: NG, final 
 sputum cx normal constance, final 
 
Paralysis, amputations, malnutrition: none noted Labs: 
Recent Labs  
  21 
0419 21 
0423 01/15/21 
0397 CREA 1.16* 1.29* 1.26* BUN 22* 25* 19 WBC 16.4* 14.2* 16.3* Temp (24hrs), Av.9 °F (37.7 °C), Min:98.4 °F (36.9 °C), Max:102.2 °F (39 °C) Creatinine Clearance (mL/min) or Dialysis: 54.4 mL/min (IBW) Impression/Plan:  
Scr stable/slightly improved Procalcitonin trending down Continue current dose of cefepime; appropriate for indication/renal function Vancomycin  trough slightly below goal range, will change to q16h Antimicrobial stop date pending Pharmacy will follow daily and adjust medications as appropriate for renal function and/or serum levels. Thank you, RANJAN Gibbs

## 2021-01-17 NOTE — PROGRESS NOTES
1900 - Bedside shift change report given to Caitlin Martinez RN (oncoming nurse) by Golden Esqueda RN (offgoing nurse). Report included the following information SBAR, Kardex, Intake/Output, MAR, Recent Results and Cardiac Rhythm Sinus tach. 1945 - Shift assessment complete, see flowsheets for details. Pt eye's open spontaneously, follows commands. Moves all extremities spontaneously. Sinus tach on monitor. Lung sounds coarse, tracheostomy present. A scant amount of white thick secretions suctioned from trach. ABD soft and obese, PEG tube present. 0 gastric residuals. BS active. Twocal infusing at 40 mL/hr. Purwik present with feng hazy output. Pulses palpable in all extremities. 2100 - Pt's trach having lots of secretions around and trach collar wet. Trach care completed at this time. 0000 - Reassessment complete, no changes documented. Mouth care completed. 7299 - Pt breathing > 30 bpm, coughing against ventilator. 50 mcg fentanyl PRN given, see MAR for details. 0400 - Reassessment complete, no changes documented. Pt bathed and linen changed. Trach dressing changed due to excessive drainage. End of Shift Note Bedside shift change report given to Golden Esqueda RN (oncoming nurse) by Lonnie Mosqueda (offgoing nurse). Report included the following information SBAR, Kardex, Intake/Output, Recent Results and Cardiac Rhythm Sinus tach Shift worked:  8446-1730 Shift summary and any significant changes:  
  Coughing and fighting ventilator most of night. PRN versed and fentanyl given. Still continuing to cough. Concerns for physician to address:  N/A Zone phone for oncoming shift:   N/A Activity: 
Activity Level: Bed Rest 
Number times ambulated in hallways past shift: 0 Number of times OOB to chair past shift: 0 Cardiac:  
Cardiac Monitoring: Yes     
Cardiac Rhythm: Sinus tachycardia Access:  
Current line(s): PIV Genitourinary:  
Urinary status: voiding and external catheter Respiratory:  
O2 Device: Tracheostomy Chronic home O2 use?: NO Incentive spirometer at bedside: NO 
  
GI: 
Last Bowel Movement Date: 01/16/21 Current diet:  DIET TUBE FEEDING Passing flatus: YES Tolerating current diet: YES Pain Management:  
Patient states pain is manageable on current regimen: N/A Skin: 
Jai Score: 13 Interventions: turn team and internal/external urinary devices Patient Safety: 
Fall Score: Total Score: 3 Interventions: bed/chair alarm High Fall Risk: Yes Length of Stay: 
Expected LOS: 19d 14h Actual LOS: 25 [address]

## 2021-01-17 NOTE — PROGRESS NOTES
0700- Bedside shift change report given to SYED (oncoming nurse) by Roya Jorge (offgoing nurse). Report included the following information SBAR, Kardex and MAR.  
 
7236- Patient assessed. See flowsheet. Tylenol given for fever. MD notified of fever. 7691- Fentanyl and versed given for agitation and BPS 10. 
 
1000- Paired blood cultures sent. Sputum culture sent. 200- Patient's father at the bedside. Updated on the patient's status and plan of care. 1200- Reassessed. No changes. 80- Dr. Lynda Pedroza at the bedside talking with the patient's father. 1230- Fentanyl and versed given for an elevated BPS. 1330-Patient's mother updated on the patient's status and plan of care. 1530- Reassessed. No changes. Fentanyl given for BPS 7. Precedex titrated for respiratory rate in the high 40's. 
 
1600- SBP 60's. Orders received for a liter fluid bolus of normal saline. Precedex stopped. 1720- BP low. Orders received for George.   
 
4445- George started. 1850- George titrated up multiple times for low blood pressures. Trach care completed. 200- Patient's mother updated regarding the patient's need for blood pressure support. 1900- Report given to the oncoming shift.

## 2021-01-17 NOTE — PROGRESS NOTES
[facility] 
 
ICU Intensivist- Critical Care Progress Note Name: Libia Loja : 1980 MRN: [id number] Admit: 2020  7:26 PM   
 
Diagnoses:  
 
Principal Problem: 
  Severe ARDS Suspected aspiration PNA Recent COVID PNA - hospitalized twice in Nov 
  Prolonged VDRF Trach status (trach tube placed 21) 
  Septic shock, resolved DANY, resolving 
  Severe sepsis with MODS Recurrent fever  
  Constipation due to opioids Hypothyroidism 
  Down syndrome with severe baseline cognitive impairment Autism Severe cough Pt-ventilator dyssynchrony 
  
ICU Comprehensive Plan of Care:  
- vent settings reviewed and adjusted 
- monitor renal panel daily - correct electrolytes as indicated - Continue TFs at goal 
- Continue lactulose - Continue Vanc/cefepime for now (initiated ) - Blood, resp cultures ordered  
- transfuse per usual guidelines - Change back to risperidol - Initiate low dose IV valproate  for agitation/ventilator dyssynchrony - Initiate dexmedetomidine  
- Cont fentanyl and midaz as needed for ventilator synchrony - Needs further [address] discussions. Family meeting planned for  with mother, Palliative Care Father updated @ bedside  Subjective:  
 
Progress Note:  
2021  
10:35 AM  
 
Reason for ICU Admission:  
Aspiration pneumonia of both lungs due to vomit ([address] Overnight Events:  
No major events SUBJ: 
Remains tachypneic with frequent coughing and high pressure limiting on vent. Appears uncomfortable. Fever today Past Medical History:  
 
 has a past medical history of Chronic kidney disease (CKD), stage II (mild) (2021), Endocrine disease, Gram negative septic shock ([address] (2020), Hypomagnesemia (2021), Hypophosphatemia (2021), Hypothyroid (2018), Ill-defined condition, and Lactic acidosis (2020). Past Surgical History: has no past surgical history on file. Current Medications:  
 
Current Facility-Administered Medications Medication Dose Route Frequency  melatonin tablet 3 mg  3 mg Per G Tube QHS  risperiDONE (RisperDAL) tablet 2 mg  2 mg Per G Tube QHS  valproic acid (as sodium salt) (DEPAKENE) 250 mg/5 mL (5 mL) oral solution 250 mg  250 mg Per G Tube Q8H  
 fentaNYL (DURAGESIC) 25 mcg/hr patch 1 Patch  1 Patch TransDERmal Q72H  
 vancomycin (VANCOCIN) 750 mg in 0.9% sodium chloride 250 mL (VIAL-MATE)  750 mg IntraVENous Q16H  
 lactulose (CHRONULAC) 10 gram/15 mL solution 15 mL  10 g Per G Tube TID  dexmedeTOMidine (PRECEDEX) 400 mcg in 0.9% sodium chloride 100 mL infusion  0.1-1.5 mcg/kg/hr IntraVENous TITRATE  lansoprazole (PREVACID) 3 mg/mL oral suspension 30 mg  30 mg Per G Tube ACB  fentaNYL citrate (PF) injection 50 mcg  50 mcg IntraVENous Q2H PRN  
 balsam peru-castor oiL (VENELEX) ointment   Topical TID  cefepime (MAXIPIME) 1 g in 0.9% sodium chloride (MBP/ADV) 50 mL MBP  1 g IntraVENous Q8H  
 midazolam (VERSED) injection 2 mg  2 mg IntraVENous Q2H PRN  
 levothyroxine (SYNTHROID) tablet 50 mcg  50 mcg Per NG tube DAILY  levothyroxine tube feed reminder note  1 Each Other BID  heparin (porcine) injection 5,000 Units  5,000 Units SubCUTAneous Q8H  
 alcohol 62% (NOZIN) nasal  1 Ampule  1 Ampule Topical Q12H  
 dextrose (D50W) injection syrg 12.5-25 g  25-50 mL IntraVENous PRN  
 insulin lispro (HUMALOG) injection   SubCUTAneous Q6H  
 acetaminophen (TYLENOL) tablet 650 mg  650 mg Per G Tube Q6H PRN Or  
 acetaminophen (TYLENOL) suppository 650 mg  650 mg Rectal Q6H PRN  chlorhexidine (ORAL CARE KIT) 0.12 % mouthwash 15 mL  15 mL Oral Q12H  
 heparin (porcine) pf 300 Units  300 Units InterCATHeter PRN  
 sodium chloride (NS) flush 5-40 mL  5-40 mL IntraVENous Q8H  
 sodium chloride (NS) flush 5-40 mL  5-40 mL IntraVENous PRN  
  ondansetron (ZOFRAN) injection 4 mg  4 mg IntraVENous Q6H PRN Facility-Administered Medications Ordered in Other Encounters Medication Dose Route Frequency  ceFAZolin (ANCEF) injection   IntraVENous PRN  
 0.9% sodium chloride infusion   IntraVENous CONTINUOUS  
 dexamethasone (DECADRON) 4 mg/mL injection   IntraVENous PRN Allergies/Social/Family History: No Known Allergies Social History Tobacco Use  Smoking status: Never Smoker  Smokeless tobacco: Never Used Substance Use Topics  Alcohol use: No  
  
History reviewed. No pertinent family history. Review of Systems:  
 
Review of systems not obtained due to patient factors. Objective:  
Vital Signs: 
Visit Vitals BP (!) 105/44 Pulse (!) 105 Temp (!) 101.8 °F (38.8 °C) Resp 25 Ht 5' 3\" (1.6 m) Wt 88.1 kg (194 lb 3.6 oz) SpO2 97% BMI 34.41 kg/m² O2 Device: Tracheostomy Temp (24hrs), Av °F (37.8 °C), Min:98.4 °F (36.9 °C), Max:102.2 °F (39 °C) Intake/Output:  
 
Intake/Output Summary (Last 24 hours) at 2021 1519 Last data filed at 2021 1200 Gross per 24 hour Intake 2080 ml Output 950 ml Net 1130 ml Physical Exam: 
General:  RASS 0. Not F/C. Persistent coughing HEENT: NCAT, sclerae white Neck: Tracheostomy clean. No JVD Lungs:   No wheezes, coarse BS. Cardiovascular:  RRR, no murmer Abdomen:   Soft, + BS, NT Extremities: No edema, warm Skin: No lesions noted Lines/Devices:  Intact, no erythema, drainage, or tenderness  
  
 
LABS AND  DATA: Personally reviewed Recent Labs  
  21 WBC 16.4* 14.2* HGB 7.6* 7.2* HCT 23.7* 22.3*  
* 344 Recent Labs  
  21  146*  
K 3.9 3.7 * 114* CO2 27 28 BUN 22* 25* CREA 1.16* 1.29* GLU 99 99  
CA 8.9 8.8 MG 1.9 2.0 PHOS 1.8* 2.1* Recent Labs  
  21 
0419 21 
0423 ALB 2.5* 2.2*  
 
 No results for input(s): INR, PTP, APTT, INREXT, INREXT in the last 72 hours. No results for input(s): PHI, PCO2I, PO2I, FIO2I in the last 72 hours. No results for input(s): CPK, CKMB, TROIQ, BNPP in the last 72 hours. Ventilator Settings: 
Mode Rate Tidal Volume Pressure FiO2 PEEP Assist control   350 ml  10 cm H2O 40 % 5 cm H20 Peak airway pressure: 47 cm H2O Minute ventilation: 14.1 l/min MEDS: Reviewed RADIOLOGY: 
CXR 01/17: persistent, extensive diffuse bilateral interstitial and patchy airspace disease c/w ARDS Multidisciplinary Rounds Completed:  N/A 
  
ABCDEF Bundle/Checklist - completed 1/17/2021  
  
T/L/D Tubes: Tracheostomy and PEG Tube Lines: Peripheral IV Drains: Dutta Catheter 
  
SPECIAL EQUIPMENT None 
  
DISPOSITION Stay in ICU 
  
CRITICAL CARE CONSULTANT NOTE I provided a face-to-face bedside physician/patient encounter, greater than the usual and customary amount normally needed, due to the high complexity of medical decision-making required. 
  
I reviewed and interpreted patient data including clinical events, labs, images, vital signs, I/O's, and examined patient.   
  
I have actively participated in multi-disciplinary discussions (Respiratory Therapy, Case Management, CCU nursing staff) regarding the case in formulating an optimal therapeutic plan, and effecting a management strategy for this patient. 
  
NOTE OF PERSONAL INVOLVEMENT IN CARE  
This patient has a high probability of imminent, clinically significant deterioration, which requires the highest level of preparedness to intervene urgently.  I participated in the decision-making and personally managed, or directed the management of, a myriad of life and organ supporting interventions which required my frequent-interval clinical reassessments, in order to treat or prevent imminent deterioration. 
  
 I personally spent 40 minutes of critical care time.  This is time spent at this critically ill patient's bedside actively involved in patient care as well as the coordination of care and discussions with the patient's family.  This does not include any procedural time which has been billed separately. Chanell Preciado, [address] 
[phone] 
1/17/2021

## 2021-01-18 NOTE — DEATH NOTE
Death Note Events prior to patients death: 
Called to bedside for unresponsive and pulseless patient. On exam, no heart sounds or breath sounds were noted after 1 minute of auscultation. Pupils were fixed and dilated without pupillary light reflex.   
  
Patient was pronounced dead on 1/18/2021 at 4:12PM 
  
Date/Time of death:  1/18/2021 at 4:12PM 
. 
  
  
Cause: 
ARDS

## 2021-01-18 NOTE — PROCEDURES
[facility] Procedure Note - Central Venous Access:  
Performed by Ovi Rodriguez NP 
 
Obtained informed Consent. Immediately prior to the procedure, the patient was reevaluated and found suitable for the planned procedure and any planned medications. Immediately prior to the procedure a time out was called to verify the correct patient, procedure, equipment, staff, and marking as appropriate. Central line Bundle: 
Full sterile barrier precautions used. 7-Step Sterility Protocol followed. (cap, mask sterile gown, sterile gloves, large sterile sheet, hand hygiene, 2% chlorhexidine for cutaneous antisepsis) 5 mL 1% Lidocaine placed at insertion site. Patient positioned in Trendelenburg?yes The site was prepped with ChloraPrep. Using Seldinger technique a Triple Lumen CVC was placed in the Right, Internal Jugular Vein via direct cannulation with 1 number of attempts for Monitoring and IV Access. Ultrasound Guidance was utilized. There was good dark, non-pulsatile blood return in all ports. Femoral Site? no. If Yes, reason femoral site was chosen: NA 
Catheter secured. Biopatch in place? yes. Sterile Bio-occlusive dressing placed. The following complications were encountered: None. A follow-up chest x-ray was ordered post procedure. The procedure was tolerated well. Ovi Rodriguez NP Critical Care Medicine [facility]

## 2021-01-18 NOTE — PROGRESS NOTES
Spiritual Care Assessment/Progress Note [address] 
 
 
NAME: Derek Escobar      MRN: [id number] AGE: 47 y.o. SEX: female Taoist Affiliation: [facility]  
Language: [place] 1/18/2021     Total Time (in minutes): 8 Spiritual Assessment begun in [facility] 2 CRITICAL CARE 3 through conversation with: 
  
    []Patient        [] Family    [] Friend(s) Reason for Consult: Palliative Care, Initial/Spiritual Assessment Spiritual beliefs: (Please include comment if needed) 
   [] Identifies with a jr tradition:     
   [] Supported by a jr community:        
   [] Claims no spiritual orientation:       
   [] Seeking spiritual identity:            
   [] Adheres to an individual form of spirituality:       
   [x] Not able to assess:                   
 
    
Identified resources for coping:  
   [] Prayer                           
   [] Music                  [] Guided Imagery 
   [] Family/friends                 [] Pet visits [] Devotional reading                         [x] Unknown 
   [] Other:                                        
 
 
Interventions offered during this visit: (See comments for more details) Patient Interventions: Initial visit Plan of Care: 
 
 [x] Support spiritual and/or cultural needs  
 [] Support AMD and/or advance care planning process    
 [] Support grieving process 
 [] Coordinate Rites and/or Rituals  
 [] Coordination with community clergy [] No spiritual needs identified at this time 
 [] Detailed Plan of Care below (See Comments)  [] Make referral to Music Therapy 
[] Make referral to Pet Therapy    
[] Make referral to Addiction services 
[] Make referral to [facility] 
[] Make referral to Spiritual Care Partner 
[] No future visits requested       
[x] Follow up upon further referrals Comments:   2nd attempt to visit patient in CCU for spiritual assessment. No family/friends present. Patient is currently intubated. Unable to assess spiritual needs or concerns. LOLLY Jamison, [facility], Staff  [facility]  Paging Service  [phone])

## 2021-01-18 NOTE — INTERDISCIPLINARY ROUNDS
Interdisciplinary team rounds were held 1/18/2021 with the following team members:Care Management, Diabetes Treatment Specialist, Nursing, Nutrition, Palliative Care, Pharmacy, Physical Therapy and Physician. Plan of care discussed. See clinical pathway and/or care plan for interventions and desired outcomes.

## 2021-01-18 NOTE — PROGRESS NOTES
Music Therapy Assessment 
[facility] 
 
Aundrea Silva [id number]    
1980  40 y.o.  female   
Patient Telephone Number: [phone] (home)  
Congregation Affiliation: Adventist  
Language: English  
Patient Active Problem List  
 Diagnosis Date Noted  
• Aspiration pneumonia of both lungs due to vomit ([facility]) 12/23/2020  
  Priority: 2 - Two  
  Class: Acute  
• Hypophosphatemia 01/11/2021  
  Priority: 3 - Three  
  Class: Acute  
• Hypomagnesemia 01/11/2021  
  Priority: 3 - Three  
  Class: Acute  
• Acute hypokalemia 01/10/2021  
  Priority: 3 - Three  
  Class: Acute  
• Post viral syndrome 12/25/2020  
  Priority: 3 - Three  
  Class: Acute  
• Acute hypoxemic respiratory failure ([facility]) 12/24/2020  
  Priority: 3 - Three  
  Class: Acute  
• Lactic acidosis 12/23/2020  
  Priority: 3 - Three  
  Class: Acute  
• Fecal impaction of colon ([facility]) 12/23/2020  
  Priority: 3 - Three  
  Class: Acute  
• Chronic kidney disease (CKD), stage II (mild) 12/23/2020  
  Priority: 3 - Three  
  Class: Acute  
• Dehydration with hypernatremia 12/16/2020  
  Priority: 3 - Three  
  Class: Acute  
• Gram negative septic shock ([facility]) 01/23/2020  
  Priority: 3 - Three  
  Class: Acute  
• Constipation due to pain medication therapy 11/26/2020  
  Priority: 4 - Four  
  Class: Acute  
• Large hiatal hernia 11/02/2020  
  Priority: 4 - Four  
  Class: Chronic  
• Dependent on ventilator ([facility]) 01/14/2021  
• Goals of care, counseling/discussion 12/28/2020  
• HFrEF (heart failure with reduced ejection fraction) ([facility]) 12/24/2020  
• Acquired hypothyroidism 12/24/2020  
• Severe sepsis with acute organ dysfunction ([facility]) 12/23/2020  
  Class: Acute  
• Respiratory failure ([facility]) 12/23/2020  
• Down syndrome 12/23/2020  
  Class: End Stage  
• Acute kidney injury superimposed on CKD ([facility]) 12/23/2020  
  Class: Acute  
  
 
Date: 1/18/2021            Total Time (in minutes): 10          [facility] 2 CRITICAL CARE 3 
 
 Mental Status:   [  ] Alert [  ] Carlene Sor [  ]  Confused  [  ] Minimally responsive  [  ] Sleeping-N/A Communication Status: [  ] Impaired Speech [  ] Nonverbal -N/A Physical Status:   [x] Oxygen in use-ventilator  [  ] Hard of Hearing [  ] Vision Impaired [  ] Ambulatory  [  ] Ambulatory with assistance [  ] Non-ambulatory Music Preferences, Background: N/A: Please see Session Observations below. Clinical Problem to be addressed: Support relaxation and comfort, and family coping, 
 
Goal(s) met in session: N/A: Please see Session Observations below. Physical/Pain management (Scale of 1-10): Pre-session rating ___________    Post-session rating __________  
[  ] Increased relaxation   [  ] Affected breathing patterns [  ] Decreased muscle tension   [  ] Decreased agitation [  ] Affected heart rate    [  ] Increased alertness Emotional/Psychological: 
[  ] Increased self-expression   [  ] Decreased aggressive behavior [  ] Decreased feelings of stress  [  ] Discussed healthy coping skills [  ] Improved mood    [  ] Decreased withdrawn behavior Social: 
[  ] Decreased feelings of isolation/loneliness [  ] Positive social interaction [  ] Provided support and/or comfort for family/friends Spiritual: 
[  ] Spiritual support    [  ] Expressed peace [  ] Expressed jr    [  ] Discussed beliefs Techniques Utilized (Check all that apply): N/A: Please see Session Observations below. [  ] Procedural support MT [  ] Music for relaxation [  ] Patient preferred music 
[  ] Kavita analysis  [  ] Song choice  [  ] Music for validation [  ] Entrainment  [  ] Movement to music [  ] Guided visualization [  ] Marilyn Tobar  [  ] Patient instrument playing [  ] Nayann Ends writing [  ] Velna Bone along   [  ] Blase Gell  [  ] Sensory stimulation [  ] Active Listening  [  ] Music for spiritual support [  ] Making of CDs as gifts Session Observations:  Referral from Kirstin Hernandez, Palliative NP. The Palliative NP shared with this music therapist (MT) that the pt was going to be extubated soon. She said the patient's (pt's) family members were sitting in the unit consultation room. MT went to them and introduced self. About six family members were present, and the mood of the room was somber. MT briefly explained role and offered music therapy. Pt's family members declined, saying this wasn't a good time. MT expressed understanding and asked pt's family if they needed anything that MT could get for them or alert the nurse of. Pt's family denied any needs at this time. CAROLINE ZapataBC (Music Therapist-Board Certified) Spiritual Care Department Referral-based service

## 2021-01-18 NOTE — PROGRESS NOTES
01/18/21 0553  
ABCDEF Bundle  
SBT Safety Screen Passed No  
SBT Screen Reason for Failure Agitation

## 2021-01-18 NOTE — PROGRESS NOTES
[facility] 
 
ICU Intensivist- Critical Care Progress Note Name: Christie Tong : 1980 MRN: [id number] Admit: 2020  7:26 PM   
 
Diagnoses:  
 
Principal Problem: 
  Severe ARDS Suspected aspiration PNA Recent COVID PNA - hospitalized twice in Nov 
  Prolonged VDRF Trach status (trach tube placed 21) 
  Septic shock, resolved DANY, resolving 
  Severe sepsis with MODS Recurrent fever  
  Constipation due to opioids Hypothyroidism 
  Down syndrome with severe baseline cognitive impairment Autism Severe cough Pt-ventilator dyssynchrony Extended conversation with pt's mother, 2 brothers, sister and sister in law. I explained that after almost one month of support, her ARDS is far worse than it was on the day that she was admitted. I expressed my concerns that she is suffering and that likelihood of survival is vanishingly small (essentially nil). I offered that it would be medically reasonable and appropriate to proceed with compassionate termination of our support. After consideration and discussion among themselves they have decided to proceed as above 
  
ICU Comprehensive Plan of Care:  
Full comfort care Continue fentanyl @ 300 mcg/hr I have initiated midaz gtt @ 10 mg/hr PRN fentanyl and midaz ordered First will discontinue norepinephrine Then will remove from vent and continue comfort measures only Subjective:  
 
Progress Note:  
2021  
10:35 AM  
 
Reason for ICU Admission:  
Aspiration pneumonia of both lungs due to vomit ([address] SUBJ: 
Very tachypneic with frequent high pressure limiting despite high dose fentanyl. Minimally responsive Past Medical History: has a past medical history of Chronic kidney disease (CKD), stage II (mild) (1/12/2021), Endocrine disease, Gram negative septic shock ([facility] (1/23/2020), Hypomagnesemia (1/11/2021), Hypophosphatemia (1/11/2021), Hypothyroid (03/11/2018), Ill-defined condition, and Lactic acidosis (12/23/2020). Past Surgical History:  
 
 has no past surgical history on file. Current Medications:  
 
Current Facility-Administered Medications Medication Dose Route Frequency  fentaNYL (PF) 1,500 mcg/30 mL (50 mcg/mL) infusion  0-300 mcg/hr IntraVENous TITRATE  midazolam (VERSED) 100 mg in 0.9% sodium chloride 100 mL infusion  10 mg/hr IntraVENous CONTINUOUS  
 fentaNYL citrate (PF) injection 100 mcg  100 mcg IntraVENous Q15MIN PRN  
 midazolam (VERSED) injection 4 mg  4 mg IntraVENous Q15MIN PRN  
 balsam peru-castor oiL (VENELEX) ointment   Topical TID  alcohol 62% (NOZIN) nasal  1 Ampule  1 Ampule Topical Q12H  
 dextrose (D50W) injection syrg 12.5-25 g  25-50 mL IntraVENous PRN  
 acetaminophen (TYLENOL) tablet 650 mg  650 mg Per G Tube Q6H PRN Or  
 acetaminophen (TYLENOL) suppository 650 mg  650 mg Rectal Q6H PRN  chlorhexidine (ORAL CARE KIT) 0.12 % mouthwash 15 mL  15 mL Oral Q12H  
 heparin (porcine) pf 300 Units  300 Units InterCATHeter PRN  
 sodium chloride (NS) flush 5-40 mL  5-40 mL IntraVENous PRN  
 ondansetron (ZOFRAN) injection 4 mg  4 mg IntraVENous Q6H PRN Facility-Administered Medications Ordered in Other Encounters Medication Dose Route Frequency  ceFAZolin (ANCEF) injection   IntraVENous PRN  
 0.9% sodium chloride infusion   IntraVENous CONTINUOUS  
 dexamethasone (DECADRON) 4 mg/mL injection   IntraVENous PRN Allergies/Social/Family History: No Known Allergies Social History Tobacco Use  Smoking status: Never Smoker  Smokeless tobacco: Never Used Substance Use Topics  Alcohol use:  No  
  
 History reviewed. No pertinent family history. Review of Systems:  
 
Review of systems not obtained due to patient factors. Objective:  
Vital Signs: 
Visit Vitals BP (!) 97/40 Pulse 80 Temp (!) 96.5 °F (35.8 °C) Resp (!) 31 Ht 5' 3\" (1.6 m) Wt 90.8 kg (200 lb 2.8 oz) SpO2 100% BMI 35.46 kg/m² O2 Device: Tracheostomy, Ventilator Temp (24hrs), Av.6 °F (37 °C), Min:96.5 °F (35.8 °C), Max:100.4 °F (38 °C) Intake/Output:  
 
Intake/Output Summary (Last 24 hours) at 2021 1508 Last data filed at 2021 1200 Gross per 24 hour Intake 1753.98 ml Output 1670 ml Net 83.98 ml Physical Exam: 
General:  RASS -5, extremely tachypneic HEENT: NCAT, sclerae white Neck: Tracheostomy clean. No JVD Lungs:   coarse BS. Cardiovascular:  RRR, no murmer Abdomen:   Soft, + BS, NT Extremities: No edema, warm Skin: No lesions noted Lines/Devices:  Intact, no erythema, drainage, or tenderness  
  
 
LABS AND  DATA: Personally reviewed Recent Labs  
  21 WBC 20.6* 16.4* HGB 7.0* 7.6* HCT 21.3* 23.7*  
* 408* Recent Labs  
  21  142  
K 4.2 3.9 * 111* CO2 25 27 BUN 20 22* CREA 1.04* 1.16* * 99  
CA 8.2* 8.9 MG 1.8 1.9 PHOS 2.1* 1.8* Recent Labs  
  21 ALB 1.9* 2.5* No results for input(s): INR, PTP, APTT, INREXT, INREXT in the last 72 hours. No results for input(s): PHI, PCO2I, PO2I, FIO2I in the last 72 hours. No results for input(s): CPK, CKMB, TROIQ, BNPP in the last 72 hours. Ventilator Settings: 
Mode Rate Tidal Volume Pressure FiO2 PEEP Assist control   300 ml  10 cm H2O 70 % 10 cm H20 Peak airway pressure: 44 cm H2O Minute ventilation: 11.6 l/min MEDS: Reviewed RADIOLOGY: 
CXR : Severe ARDS Multidisciplinary Rounds Completed:  N/A 
  
ABCDEF Bundle/Checklist - completed 2021  
  
 T/L/D Tubes: Tracheostomy and PEG Tube Lines: Peripheral IV Drains: Dutta Catheter 
  
SPECIAL EQUIPMENT None 
  
DISPOSITION Stay in ICU 
  
CRITICAL CARE CONSULTANT NOTE I provided a face-to-face bedside physician/patient encounter, greater than the usual and customary amount normally needed, due to the high complexity of medical decision-making required. 
  
I reviewed and interpreted patient data including clinical events, labs, images, vital signs, I/O's, and examined patient.   
  
I have actively participated in multi-disciplinary discussions (Respiratory Therapy, Case Management, CCU nursing staff) regarding the case in formulating an optimal therapeutic plan, and effecting a management strategy for this patient. 
  
NOTE OF PERSONAL INVOLVEMENT IN CARE  
This patient has a high probability of imminent, clinically significant deterioration, which requires the highest level of preparedness to intervene urgently. I participated in the decision-making and personally managed, or directed the management of, a myriad of life and organ supporting interventions which required my frequent-interval clinical reassessments, in order to treat or prevent imminent deterioration. 
  
I personally spent 60 minutes of critical care time.  This is time spent at this critically ill patient's bedside actively involved in patient care as well as the coordination of care and discussions with the patient's family.  This does not include any procedural time which has been billed separately. Mariya Guerrero, [address] 
[phone] 
1/18/2021

## 2021-01-18 NOTE — PROGRESS NOTES
Spiritual Care Assessment/Progress Note [address] 
 
 
NAME: Boris Hernandez      MRN: [id number] AGE: 47 y.o. SEX: female Amish Affiliation: [place]  
Language: [place] 1/18/2021     Total Time (in minutes): 10 Spiritual Assessment begun in [facility] 2 CRITICAL CARE 3 through conversation with: 
  
    []Patient        [] Family    [] Friend(s) Reason for Consult: Palliative Care, Follow-up Spiritual beliefs: (Please include comment if needed) 
   [] Identifies with a jr tradition:     
   [] Supported by a jr community:        
   [] Claims no spiritual orientation:       
   [] Seeking spiritual identity:            
   [] Adheres to an individual form of spirituality:       
   [x] Not able to assess:                   
 
    
Identified resources for coping:  
   [] Prayer                           
   [] Music                  [] Guided Imagery 
   [] Family/friends                 [] Pet visits [] Devotional reading                         [x] Unknown 
   [] Other:                                       
 
 
Interventions offered during this visit: (See comments for more details) Patient Interventions: Other (comment)(none) Plan of Care: 
 
 [] Support spiritual and/or cultural needs  
 [] Support AMD and/or advance care planning process [x] Support grieving process 
 [] Coordinate Rites and/or Rituals  
 [] Coordination with community clergy [] No spiritual needs identified at this time 
 [] Detailed Plan of Care below (See Comments)  [] Make referral to Music Therapy 
[] Make referral to Pet Therapy    
[] Make referral to Addiction services 
[] Make referral to [facility] 
[] Make referral to Spiritual Care Partner 
[] No future visits requested       
[] Follow up upon further referrals Comments:   received page from palliative NP as family is preparing for compassionate withdrawal in CCU. Family was gathered in family consult room. Met Shelli Palliative NP in [facility]; she indicated family does not want a  at this time.  remains available for support. LOLLY Lin, [facility], Staff  [facility]  Paging Service  [phone])

## 2021-01-18 NOTE — PROGRESS NOTES
Spiritual Care Assessment/Progress Note [address] 
 
 
NAME: Rebeka Beltran      MRN: [id number] AGE: 47 y.o. SEX: female Baptism Affiliation: [facility]  
Language: [place] 1/18/2021     Total Time (in minutes): 17 Spiritual Assessment begun in [facility] 2 CRITICAL CARE 3 through conversation with: 
  
    []Patient        [x] Family    [] Friend(s) Reason for Consult: Family care Spiritual beliefs: (Please include comment if needed) [x] Identifies with a jr tradition:     
   [] Supported by a jr community:        
   [] Claims no spiritual orientation:       
   [] Seeking spiritual identity:            
   [] Adheres to an individual form of spirituality:       
   [] Not able to assess:                   
 
    
Identified resources for coping:  
   [] Prayer                           
   [] Music                  [] Guided Imagery [x] Family/friends                 [] Pet visits [] Devotional reading                         [] Unknown 
   [] Other:                                          
 
 
Interventions offered during this visit: (See comments for more details) Patient Interventions: Other (comment)(none) Family/Friend(s): Affirmation of emotions/emotional suffering, Life review/legacy, Normalization of emotional/spiritual concerns, Other (comment)(Pastoral Presence as family held silent donahue.) Plan of Care: 
 
 [] Support spiritual and/or cultural needs  
 [] Support AMD and/or advance care planning process    
 [] Support grieving process 
 [] Coordinate Rites and/or Rituals  
 [] Coordination with community clergy [] No spiritual needs identified at this time 
 [] Detailed Plan of Care below (See Comments)  [] Make referral to Music Therapy 
[] Make referral to Pet Therapy    
[] Make referral to Addiction services 
[] Make referral to [facility] 
[] Make referral to Spiritual Care Partner 
[] No future visits requested [x] Follow up upon further referrals Comments: Responded to staff request to provide family care for pt Ismael Zapata in CCU. Family was providing donahue at bedside. Consult with NP and bedside RN. Family were reserved and often maintained long moments of silence. They were comfortable having someone present with them. Affirmed presence at bedside and provided empathetic listening as one member shared about pt Ricardo's life. Advised of  services. [address] [facility] Spiritual Care Provider  Paging Service [phone])

## 2021-01-18 NOTE — PROGRESS NOTES
0710-Bedside and Verbal shift change report received from Shelly Bean, RN (offgoing nurse) to Jadiel Green RN (oncoming nurse). Report included the following information SBAR, Kardex, MAR and Recent Results. 0725-David Acevedo gave verbal orders to increase Fentanyl and give Vecuronium 8mg IV x1 dose as pt. is tachypneic and breathing over the vent. 0800-Shift assessment complete-see flowsheet. Pt. is awake and alert-focusing and tracking with her eyes; minimal command following prior to receiving paralytic. 0810-Vecuronium 8mg administered per order and pt's O2 sat decreased to 80% and RR dropped to ~10 from the 30's-40's. R.T. made aware and increased FiO2 from 50% to 70%. 0815-Pt's O2 sats increased to the mid-90's with RR in the teens-20's. 1015-Pt. has begun with vent asynchronization and high peak pressures again; tachypneic with RR in the 40's. Versed 2mg given IV at 1013. 
 
1020-Fentanyl 50mcg given IV at this time as pt's RR remains in the 40's-50's. 1045-IDT rounds held at this time. 1100-New orders received for Fentanyl dosing increase and another one time dose of Vecuronium. 1140-Pt's family in to visit and have family meeting with Palliative team and Dr. Armida Patterson. 1200-Reassessment complete-pt. is paralyzed. 1315-Precedex off and Versed gtt. initiated per orders. 1510-Compassionate withdrawal of care initiated. TF and Levo turned off per MD orders.  present at the bedside. 1555-Vent turned off per Laura, R.T.  
 
1612-Pt. without pulse/respirations-Shelli Palliative NP, in to pronounce pt. Versed and Fentanyl drips turned off. 
 
1615-, Hilario Tatum, made aware of pt's passing. 1630-Writer called [employer] and spoke with Crystal-approved release. 1635-Attempt made to contact nursing supervisor-no answer. Pt's family left at this time-unsure of  home. Writer printed a list of local  homes and given to pt's mother and brother. 1650-Nursing Supervisor, Linda Puente, made aware of pt's passing. 1730-Post mortem care completed by VON Jose. 1800-Transport picked up pt's body at this time to take to the [facility].

## 2021-01-18 NOTE — PROGRESS NOTES
Responded to RN page regarding death of pt Ismael Zapata on CCU. Family was present at bedside. Continued to offer supportive and empathetic presence, validating their gift of holding hands of pt and offering prayers for her at bedside. Affirmed desire to understand next steps and agreed to consult with RN. Processed acute grief and assured of continued prayers. [address] [facility] Spiritual Care Provider  Paging Service [phone])

## 2021-01-18 NOTE — PROGRESS NOTES
1900- Bedside change of shift report received from Du [facility] (offgoing nurse). Report included the following information SBAR, Kardex, Intake/Output, MAR, Recent Results, Cardiac Rhythm Normal Sinus/Sinus Tach. 2000- Assessment complete; see flowsheets for details. Patient is alert & appears somewhat agitated. She moves all extremities spontaneously with purposeful interaction. She is on the venilator with a #8 shiley tracheostomy. Pt sedated with precedex. Pt tachypenic & breathing over ventilator; precedex increased. 2320- Patient continuing to require increased amounts of Geogre to maintain MAP. Reached max dose of 100mcg/min; NP notified. 2325- New order placed to discontinue George & begin Levophed infusion following central line placement. 18- Patient's mother, Kathleen Vasques notified of line placement and consent obtained. 2345- NP at bedside for central line placement. 0000- Reassessment complete. 0100- Pt remains tachypenic/breathing over ventilator. New order placed for low dose fentanyl PCA. 0400- Reassessment complete. End of Shift Note Bedside shift change report given to Kaylah Davis RN (oncoming nurse) by Albert Mabry (offgoing nurse). Report included the following information SBAR, Kardex, Intake/Output, MAR, Recent Results and Cardiac Rhythm NSR Shift worked:  [id number] Shift summary and any significant changes:  
  Increased respiratory rate & breathing over the ventilator. Higher doses of vasopressors needed to maintain MAP >60. New central line placed. George discontinued and Levophed started. Fentanyl PCA restarted. Concerns for physician to address:  Pt remains asynchronous with ventilator despite increased sedation efforts. Zone phone for oncoming shift:   N/A Activity: 
Activity Level: Bed Rest 
Number times ambulated in hallways past shift: 0 Number of times OOB to chair past shift :0 
 
Cardiac:  
Cardiac Monitoring: YES     
 Cardiac Rhythm: Normal sinus rhythm Access:  
Current line(s): central line Genitourinary:  
Urinary status: incontinent Respiratory:  
O2 Device: Tracheostomy Chronic home O2 use?: NO Incentive spirometer at bedside: NO 
  
GI: 
Last Bowel Movement Date: 01/16/21 Current diet:  DIET TUBE FEEDING Passing flatus: yes Tolerating current diet: yes Pain Management:  
Patient states pain is manageable on current regimen: N/A Skin: 
Jai Score: 14 Interventions: turn team 
 
Patient Safety: 
Fall Score: Total Score: 3 Interventions: bed/chair alarm High Fall Risk: Yes Length of Stay: 
Expected LOS: 19d 14h Actual LOS: 26 Sammy Luevano

## 2021-01-18 NOTE — PROGRESS NOTES
Patient chart and labs reviewed. Serum creatinine is 1.0. Nothing more to add from renal standpoint. We will continue to monitor peripherally.

## 2021-01-18 NOTE — PROGRESS NOTES
Palliative Medicine Consult [place]: [phone] ([phone]) Patient Name: Mark Ag YOB: 1980 Date of Initial Consult: 12/28/2020 Reason for Consult: care decisions Requesting Provider: Myriam Sanches MD  
Primary Care Physician: Diego, MD K[place] 
 
 SUMMARY:  
Mrak Ag is a 47 y.o. with a past history of  Down's syndrome, hypothyroidism, obesity (BMI 30), CKD3, CHF (ECHO 2018 mod diffuse hypokinesis, EF 30%)  large hiatal hernia,, who was admitted on 12/23/2020 from home with a diagnosis of COVID PNA. Current medical issues leading to Palliative Medicine involvement include: ongoing decline from prolonged COVID infection (recent admission 11/8 to 11/16/20 for covid pna and again 11/19 to 12/6/20 for sepsis due to covid) during which she was seen by Palliative Medicine in Nov 2020. 
 
12/28: remains intubated, 40%, PEEP 6. Levophed 8 mcg/min. Worsening renal failure. 12/29: remains intubated, 35%, PEEP 5. Levophed 2 mcg/min. Cr stable at 4.09.  
1/4:     remains intubated, 40%, PEEP 5. Levophed 2 mcg/min. Cr down to 2.25.  
1/5:     remains intubated, 40%, PEEP 5. Cr down to 1.86. Plan for abd CT scan today to evaluate for SBO/ileus. 1/6:     remains intubated, 40%, PEEP 5. Failed SBT due to RR 40s and agitation. Cr down to 1.77. CT abd/pelv yesterday revealed no evidence of SBO. Incidentally lungs show bibasilar airspace disease  with bilat pleural effusions. 1/14:   PEG placed 1/7, trach placed 1/8 with hopes this would facilitate weaning off vent. As of today, she continues to fail daily spontaneous breathing trials. Yesterday she desatted to 30s with increased  WOB and required manual venting with ambu bag before sats returned to 90s. Trach changed and vent settings changed. She continues with high RR. 
1/18:   Remains tachypneic, coughing, high pressure limiting on vent. Febrile. Psychosocial: lives with and is cared for by mother Zaria Esquivel [phone]. PALLIATIVE DIAGNOSES:  
1. Acute respiratory failure due to COVID-19 pneumonia 2. Morbid obesity BMI 30 
3. Large hiatal hernia 4. Dysphagia, aspiration risk, on modified diet 5. Debility 6. High risk for dying from COVID due to underlying CHF, obesity 7. Hypothermic (12/27) 8. Renal failure (Cr 4.18>4.09) 9. Care decisions PLAN:  
1. Prior to visit, I completed a review of patient's medical records, including medical documentation, vital signs, MARs, and results of various labs and other diagnostics. I also spoke with patient's nurse, Manuel Rodriguez and intensivist Dr. Savanah Pozo. 2. Family meeting with pt's mother,  Sister, brother (another brother on the phone) and :  Discussed pt's current medical condition, likelihood that she has had a few subsequent lung infections following initial covid-19 PNA, chronic lung inflammation has caused irreversible damage to her lungs, now vent dependent with no signs of any meaningful recovery, recommendation is to withdraw artificial support and allow pt a peaceful, natural, dignified death. Family asked appropriate questions. Will wait for second brother to arrive before withdrawing support.  will manage transitioning orders. 3. Initial consult note routed to primary continuity provider and/or primary health care team members 4. Communicated plan of care with: Palliative IDTAlber 192 Team 
 
 GOALS OF CARE / TREATMENT PREFERENCES:  
 
GOALS OF CARE: 
Patient/Health Care Proxy Stated Goals: Comfort TREATMENT PREFERENCES:  
Code Status: DNR Advance Care Planning: 
[x] The [facility] Interdisciplinary Team has updated the ACP Navigator with Francine and Patient Capacity Primary Decision Maker (Active): [place] - Manuel Oneill  - [phone] Secondary Decision Maker: Nancy Nguyen - [phone] Advance Care Planning 12/28/2020 Patient's Healthcare Decision Maker is: Legal Next of Kin Confirm Advance Directive None Patient Would Like to Complete Advance Directive Unable Medical Interventions: Comfort measures Other: As far as possible, the palliative care team has discussed with patient / health care proxy about goals of care / treatment preferences for patient. HISTORY:  
 
History obtained from: deborah, Matti Nicole CHIEF COMPLAINT: SOB, fever HPI/SUBJECTIVE: The patient is:  
[] Verbal and participatory [x] Non-participatory due to: intubation 12/23: presented to [facility] ED for fever, c/o not feeling well and cough, with associated loss of appetite and very productive cough; this is her 3rd admission for  COVID PNA. While in the ED pt became tachypneic with sats dropping to 92% and placed on 100% NRB with sats 96-96%; her respiratory status continued to decline, requiring bipap, then intubation. hospitalist was not comfortable keeping pt at [facility] and had her transferred to [facility]. Clinical Pain Assessment (nonverbal scale for severity on nonverbal patients):  
Clinical Pain Assessment Severity: 0 Activity (Movement): Lying quietly, normal position Duration: for how long has pt been experiencing pain (e.g., 2 days, 1 month, years) Frequency: how often pain is an issue (e.g., several times per day, once every few days, constant) FUNCTIONAL ASSESSMENT:  
 
Palliative Performance Scale (PPS): PPS: 10 PSYCHOSOCIAL/SPIRITUAL SCREENING:  
 
Palliative IDT has assessed this patient for cultural preferences / practices and a referral made as appropriate to needs (Cultural Services, Patient Advocacy, Ethics, etc.) Any spiritual / Yazidism concerns: 
[] Yes /  [x] No 
 
Caregiver Burnout: 
[] Yes /  [x] No /  [] No Caregiver Present Anticipatory grief assessment:  
[x] Normal  / [] Maladaptive ESAS Anxiety: Anxiety: 0 ESAS Depression:   unable to assess due to pt factors REVIEW OF SYSTEMS:  
 
Positive and pertinent negative findings in ROS are noted above in HPI. The following systems were [x] reviewed / [] unable to be reviewed as noted in HPI Other findings are noted below. Systems: constitutional, ears/nose/mouth/throat, respiratory, gastrointestinal, genitourinary, musculoskeletal, integumentary, neurologic, psychiatric, endocrine. Positive findings noted below. Modified ESAS Completed by: provider Pain: 0 Anxiety: 0 Dyspnea: 0 Stool Occurrence(s): 1 PHYSICAL EXAM:  
 
From RN flowsheet: 
Wt Readings from Last 3 Encounters:  
01/18/21 200 lb 2.8 oz (90.8 kg) 12/23/20 191 lb (86.6 kg) 12/04/20 191 lb (86.6 kg) Blood pressure (!) 97/40, pulse 80, temperature (!) 96.5 °F (35.8 °C), resp. rate (!) 31, height 5' 3\" (1.6 m), weight 200 lb 2.8 oz (90.8 kg), SpO2 100 %. Pain Scale 1: Behavioral Pain Scale (BPS) Pain Intensity 1: 5 Pain Onset 1: With repositioning Pain Location 1: Other (comment) Pain Orientation 1: Other (comment)(UTD, with facial grimaces) Pain Intervention(s) 1: Medication (see MAR) Last bowel movement, if known:  
 
Constitutional: sedated Eyes: closed HENT: mm moist, trach on vent Cardiac: RRR Lungs: coarse bilat MS: no deformities Neuro: unable to assess due to pt factors Psycho: unable to assess due to pt factors HISTORY:  
 
Principal Problem: 
  Aspiration pneumonia of both lungs due to vomit ([address] (12/23/2020) Active Problems: 
  Acute hypoxemic respiratory failure ([address] (12/24/2020) Post viral syndrome (12/25/2020) Dehydration with hypernatremia (12/16/2020) Gram negative septic shock ([address] (1/23/2020) Lactic acidosis (12/23/2020) Fecal impaction of colon ([address] (12/23/2020) Acute hypokalemia (1/10/2021) Hypophosphatemia (1/11/2021) Hypomagnesemia (1/11/2021) Chronic kidney disease (CKD), stage II (mild) (12/23/2020) Large hiatal hernia (11/2/2020) Overview: ([id number])- CT Abdomen: 
    Distended fluid-filled esophagus and large hiatus hernia. Constipation due to pain medication therapy (11/26/2020) Severe sepsis with acute organ dysfunction ([address] (12/23/2020) Respiratory failure ([address] (12/23/2020) HFrEF (heart failure with reduced ejection fraction) ([address] (12/24/2020) Down syndrome (12/23/2020) Acquired hypothyroidism (12/24/2020) Goals of care, counseling/discussion (12/28/2020) Acute kidney injury superimposed on CKD ([address] (12/23/2020) Dependent on ventilator ([address] (1/14/2021) Past Medical History:  
Diagnosis Date  Chronic kidney disease (CKD), stage II (mild) 1/12/2021  Endocrine disease  Gram negative septic shock ([address] 1/23/2020  Hypomagnesemia 1/11/2021  Hypophosphatemia 1/11/2021  Hypothyroid 03/11/2018  Ill-defined condition Down's Syndrome  Lactic acidosis 12/23/2020 History reviewed. No pertinent surgical history. History reviewed. No pertinent family history. History reviewed, no pertinent family history. Social History Tobacco Use  Smoking status: Never Smoker  Smokeless tobacco: Never Used Substance Use Topics  Alcohol use: No  
 
No Known Allergies Current Facility-Administered Medications Medication Dose Route Frequency  fentaNYL (PF) 1,500 mcg/30 mL (50 mcg/mL) infusion  0-300 mcg/hr IntraVENous TITRATE  midazolam (VERSED) 100 mg in 0.9% sodium chloride 100 mL infusion  10 mg/hr IntraVENous CONTINUOUS  
 fentaNYL citrate (PF) injection 100 mcg  100 mcg IntraVENous Q15MIN PRN  
 midazolam (VERSED) injection 4 mg  4 mg IntraVENous Q15MIN PRN  
 balsam peru-castor oiL (VENELEX) ointment   Topical TID  alcohol 62% (NOZIN) nasal  1 Ampule  1 Ampule Topical Q12H  dextrose (D50W) injection syrg 12.5-25 g  25-50 mL IntraVENous PRN  
 acetaminophen (TYLENOL) tablet 650 mg  650 mg Per G Tube Q6H PRN Or  
 acetaminophen (TYLENOL) suppository 650 mg  650 mg Rectal Q6H PRN  chlorhexidine (ORAL CARE KIT) 0.12 % mouthwash 15 mL  15 mL Oral Q12H  
 heparin (porcine) pf 300 Units  300 Units InterCATHeter PRN  
 sodium chloride (NS) flush 5-40 mL  5-40 mL IntraVENous PRN  
 ondansetron (ZOFRAN) injection 4 mg  4 mg IntraVENous Q6H PRN Facility-Administered Medications Ordered in Other Encounters Medication Dose Route Frequency  ceFAZolin (ANCEF) injection   IntraVENous PRN  
 0.9% sodium chloride infusion   IntraVENous CONTINUOUS  
 dexamethasone (DECADRON) 4 mg/mL injection   IntraVENous PRN  
 
 
 
 LAB AND IMAGING FINDINGS:  
 
Lab Results Component Value Date/Time WBC 20.6 (H) 01/18/2021 05:18 AM  
 HGB 7.0 (L) 01/18/2021 05:18 AM  
 PLATELET 814 (H) 86/79/7960 05:18 AM  
 
Lab Results Component Value Date/Time Sodium 139 01/18/2021 05:18 AM  
 Potassium 4.2 01/18/2021 05:18 AM  
 Chloride 109 (H) 01/18/2021 05:18 AM  
 CO2 25 01/18/2021 05:18 AM  
 BUN 20 01/18/2021 05:18 AM  
 Creatinine 1.04 (H) 01/18/2021 05:18 AM  
 Calcium 8.2 (L) 01/18/2021 05:18 AM  
 Magnesium 1.8 01/18/2021 05:18 AM  
 Phosphorus 2.1 (L) 01/18/2021 05:18 AM  
  
Lab Results Component Value Date/Time Alk. phosphatase 45 01/06/2021 04:10 AM  
 Protein, total 6.9 01/06/2021 04:10 AM  
 Albumin 1.9 (L) 01/18/2021 05:18 AM  
 Globulin 4.0 01/06/2021 04:10 AM  
 
Lab Results Component Value Date/Time INR 1.1 11/20/2020 01:57 AM  
 Prothrombin time 11.8 (H) 11/20/2020 01:57 AM  
 aPTT 40.0 (H) 12/28/2020 04:51 AM  
  
Lab Results Component Value Date/Time Iron 15 (L) 01/09/2021 04:40 AM  
 TIBC 120 (L) 01/09/2021 04:40 AM  
 Iron % saturation 13 (L) 01/09/2021 04:40 AM  
 Ferritin 745 (H) 01/09/2021 04:40 AM  
  
Lab Results Component Value Date/Time pH 7.33 (L) 12/24/2020 05:50 AM  
 PCO2 38 12/24/2020 05:50 AM  
 PO2 80 12/24/2020 05:50 AM  
 
No components found for: D[place] Lab Results Component Value Date/Time  (H) 11/11/2020 01:00 AM  
  
 
 
   
 
Total time:  
Counseling / coordination time, spent as noted above:  
> 50% counseling / coordination?:  
 
Prolonged service was provided for  []30 min   []75 min in face to face time in the presence of the patient, spent as noted above. Time Start:  
Time End:  
Note: this can only be billed with 25676 (initial) or 99745 (follow up). If multiple start / stop times, list each separately.

## 2021-01-19 LAB
BACTERIA SPEC CULT: ABNORMAL
BACTERIA SPEC CULT: ABNORMAL
GRAM STN SPEC: ABNORMAL
SERVICE CMNT-IMP: ABNORMAL

## 2021-01-20 NOTE — DISCHARGE SUMMARY
[facility] 
DEATH SUMMARY Name: Garcia Guzmán : 1980 MRN: [id number] Date: 2021 DATE OF ADMISSION: 20 DATE OF DISCHARGE/DEATH: 21 ADMISSION DIAGNOSES: 
Recent COVID 19 PNA Pneumonia Acute kidney injury Leukocytosis Severe Down's Syndrome Autism History of hypertension, hypothyroidism DISCHARGE DIAGNOSES:  
Severe ARDS Suspected aspiration PNA Recent COVID PNA - hospitalized twice in Nov 
Prolonged VDRF Trach status (trach tube placed 21) Septic shock, recurrent DANY, resolved Severe sepsis with MODS Recurrent fever  Constipation due to opioids Hypothyroidism Down syndrome Severe baseline cognitive impairment Autism Severe cough Pt-ventilator dyssynchrony BRIEF SUMMARY OF ADMISSION: 
Pt was admitted with the following HPI and assessment and plan: HPI: 
Unable to obtain from patient as pt is sedated and intubated. Mother not in the department at this time. History obtained from chart review. Pt has been admitted 2-3 times in the last 3 months for complications of COVID 19 with pneumonia. Most recent admission was at [facility] from  to  and was discharged home to mother. Pt had been doing well until this afternoon when she presented to [facility] with complaint of increasing SOB and tachypnea. She was initially admitted to the hospitalist service there where labs obtained showed elevated D dimer, ferritin, LDH, and CRP. Pt became more hypoxic and had rapidly increased oxygen requirements from NC to NRB to BiPAP and ultimately to intubation and was transferred to [facility] for ICU admission. Since arrival to ER, pt difficult to sedate, became hypotensive requiring central line placement and levophed gtt.  
 
HOSPITAL COURSE: 
 She remained ventilator dependent throughout the course of her hospitalization with no progress towards weaning from the vent. She was again treated as COVID pneumonia. Vasopressor requirements initially improved as did her renal function. She was felt to have suffered aspiration pneumonia with worsening of ARDS and was treated accordingly with broad spectrum antibiotics. She underwent tracheostomy tube placement on 01/08/21. By 01/16 she became increasingly difficult to support on the ventilator with severe tachypnea and persistent cough requiring increasing oxygen requirements and with high peak airway pressures. By 01/18, she was requiring very high doses of sedatives to achieve comfort and was back on vasopressors. On that day, a goals of care meeting was held with the patient's family. It was conveyed to them that her likelihood of survival was small and that she would not be successfully weaned from ventilator support at any time in the near future. The concern was expressed that she was suffering with little likelihood of anything good coming from that suffering. The family decided on termination of ventilator and vasopressor support and comfort measures only. This was undertaken and she passed away peacefully shortly thereafter. No autopsy was performed. 
 
CAUSE OF DEATH: 
ARDS 
 
OTHER DIAGNOSES/CONDITIONS AT TIME OF DEATH: 
Recent COVID 19 pneumonia 
Aspiration pneumonia 
DANY 
Severe sepsis with shock, recurrent 
Prolonged ventilator dependence 
Tracheostomy status 
Down's syndrome 
Autism 
 
 
 
Darwin Santos MD 
[facility] 
[phone] 
1/20/2021 11:14 AM

## 2021-01-22 LAB
BACTERIA SPEC CULT: NORMAL
SERVICE CMNT-IMP: NORMAL

## 2022-01-01 NOTE — PROGRESS NOTES
[facility] 
 
ICU Intensivist- Critical Care Progress Note Name: Erasmo Christensen : 1980 MRN: [id number] Admit: 2020  7:26 PM   
 
Diagnoses:  
 
Principal Problem: 
  Severe ARDS due to suspected aspiration PNA Recent COVID PNA - hospitalized twice in Nov 
  Prolonged VDRF Trach status (trach tube placed 21) 
  Septic shock, resolved DANY, resolving 
  Severe sepsis with MODS 
  Constipation due to opioids Hypothyroidism 
  Down syndrome with severe baseline cognitive impairment Autism 
  
ICU Comprehensive Plan of Care:  
- vent settings reviewed and adjusted - Continue ICU hemodynamic monitoring - MAP goal > 65 mmHg 
- monitor renal panel daily - correct electrolytes as indicated - Continue TFs at goal 
- Continue lactulose - DC miralax and dulcolax supp  
- Continue Vanc/cefepime for now (initiated ) - Monitor CBC intermittently 
- transfuse per usual guidelines - Continue scheduled risperidone - Cont fentanyl and midaz as needed for ventilator synchrony - Needs further [address] discussions. Family meeting planned for  with mother, Palliative Care Subjective:  
 
Progress Note:  
2021  
10:35 AM  
 
Reason for ICU Admission:  
Aspiration pneumonia of both lungs due to vomit ([address] Overnight Events:  
No major events SUBJ: 
Eyes open. Regards examiner. Not following commands (that is her baseline). Synchronous with vent when not coughing. Not weanable due to high vent requirements Past Medical History:  
 
 has a past medical history of Chronic kidney disease (CKD), stage II (mild) (2021), Endocrine disease, Gram negative septic shock ([address] (2020), Hypomagnesemia (2021), Hypophosphatemia (2021), Hypothyroid (2018), Ill-defined condition, and Lactic acidosis (2020). Past Surgical History:  
 
 has no past surgical history on file. Current Medications: Current Facility-Administered Medications Medication Dose Route Frequency  [START ON 1/15/2021] lansoprazole (PREVACID) 3 mg/mL oral suspension 30 mg  30 mg Per G Tube ACB  risperiDONE (RisperDAL) tablet 2 mg  2 mg Per G Tube QHS  fentaNYL citrate (PF) injection 50 mcg  50 mcg IntraVENous Q2H PRN  
 [START ON 1/15/2021] Vancomycin  trough prior to dose due 1/15 at 1100    Other ONCE  
 vancomycin (VANCOCIN) 750 mg in 0.9% sodium chloride 250 mL (VIAL-MATE)  750 mg IntraVENous Q12H  
 balsam peru-castor oiL (VENELEX) ointment   Topical TID  cefepime (MAXIPIME) 1 g in 0.9% sodium chloride (MBP/ADV) 50 mL MBP  1 g IntraVENous Q8H  
 iron sucrose (VENOFER) injection 200 mg  200 mg IntraVENous DAILY  epoetin marc-epbx (RETACRIT) injection 40,000 Units  40,000 Units SubCUTAneous Q7D  
 midazolam (VERSED) injection 2 mg  2 mg IntraVENous Q2H PRN  
 lactulose (CHRONULAC) 10 gram/15 mL solution 15 mL  10 g Oral TID  levothyroxine (SYNTHROID) tablet 50 mcg  50 mcg Per NG tube DAILY  levothyroxine tube feed reminder note  1 Each Other BID  heparin (porcine) injection 5,000 Units  5,000 Units SubCUTAneous Q8H  
 alcohol 62% (NOZIN) nasal  1 Ampule  1 Ampule Topical Q12H  
 dextrose (D50W) injection syrg 12.5-25 g  25-50 mL IntraVENous PRN  
 insulin lispro (HUMALOG) injection   SubCUTAneous Q6H  
 acetaminophen (TYLENOL) tablet 650 mg  650 mg Per G Tube Q6H PRN Or  
 acetaminophen (TYLENOL) suppository 650 mg  650 mg Rectal Q6H PRN  chlorhexidine (ORAL CARE KIT) 0.12 % mouthwash 15 mL  15 mL Oral Q12H  
 heparin (porcine) pf 300 Units  300 Units InterCATHeter PRN  
 sodium chloride (NS) flush 5-40 mL  5-40 mL IntraVENous Q8H  
 sodium chloride (NS) flush 5-40 mL  5-40 mL IntraVENous PRN  
 ondansetron (ZOFRAN) injection 4 mg  4 mg IntraVENous Q6H PRN Facility-Administered Medications Ordered in Other Encounters Medication Dose Route Frequency  ceFAZolin (ANCEF) injection   IntraVENous PRN  
 0.9% sodium chloride infusion   IntraVENous CONTINUOUS  
 dexamethasone (DECADRON) 4 mg/mL injection   IntraVENous PRN Allergies/Social/Family History: No Known Allergies Social History Tobacco Use  Smoking status: Never Smoker  Smokeless tobacco: Never Used Substance Use Topics  Alcohol use: No  
  
History reviewed. No pertinent family history. Review of Systems:  
 
Review of systems not obtained due to patient factors. Objective:  
Vital Signs: 
Visit Vitals BP (!) 114/57 Pulse (!) 128 Temp 99.6 °F (37.6 °C) Resp (!) 34 Ht 5' 3\" (1.6 m) Wt 90.9 kg (200 lb 6.4 oz) SpO2 92% BMI 35.50 kg/m² O2 Device: Tracheostomy, Ventilator Temp (24hrs), Av.5 °F (37.5 °C), Min:98.1 °F (36.7 °C), Max:101 °F (38.3 °C) Intake/Output:  
 
Intake/Output Summary (Last 24 hours) at 2021 1624 Last data filed at 2021 1449 Gross per 24 hour Intake 3016.9 ml Output 1220 ml Net 1796.9 ml  
 
 
Physical Exam: 
General:  RASS 0. Not F/C. Synchronous with vent HEENT: NCAT, sclerae white Neck: Tracheostomy clean. No JVD Lungs:   Clear to auscultation bilaterally, good effort. Cardiovascular:  Reg, no M. Abdomen:   Soft, + BS, NT. Extremities: No edema, warm. Skin: No lesions noted. Lines/Devices:  Intact, no erythema, drainage, or tenderness.  
  
 
LABS AND  DATA: Personally reviewed Recent Labs  
  21 WBC 19.4* 18.5* HGB 7.5* 7.5* HCT 23.0* 23.1*  
 208 Recent Labs  
  21 
2132 * 145  
K 4.4 4.3 * 112* CO2 28 28 BUN 18 16 CREA 1.25* 1.28* GLU 90 121* CA 8.6 8.4* MG 2.2 2.3 PHOS 3.1 3.5 Recent Labs  
  21 
005 ALB 2.4* 2.4* No results for input(s): INR, PTP, APTT, INREXT, INREXT in the last 72 hours. No results for input(s): PHI, PCO2I, PO2I, FIO2I in the last 72 hours. No results for input(s): CPK, CKMB, TROIQ, BNPP in the last 72 hours. Ventilator Settings: 
Mode Rate Tidal Volume Pressure FiO2 PEEP Assist control   350 ml  10 cm H2O 50 % 8 cm H20 Peak airway pressure: 37 cm H2O Minute ventilation: 11.9 l/min MEDS: Reviewed RADIOLOGY: 
CXR: 01/14: ARDS pattern with improved bibasilar aeration Multidisciplinary Rounds Completed:  Yes 
  
ABCDEF Bundle/Checklist - completed 1/14/2021  
  
T/L/D Tubes: Tracheostomy and PEG Tube Lines: Peripheral IV Drains: Dutta Catheter 
  
SPECIAL EQUIPMENT None 
  
DISPOSITION Stay in ICU 
  
CRITICAL CARE CONSULTANT NOTE I provided a face-to-face bedside physician/patient encounter, greater than the usual and customary amount normally needed, due to the high complexity of medical decision-making required. 
  
I reviewed and interpreted patient data including clinical events, labs, images, vital signs, I/O's, and examined patient.   
  
I have actively participated in multi-disciplinary discussions (Respiratory Therapy, Case Management, CCU nursing staff) regarding the case in formulating an optimal therapeutic plan, and effecting a management strategy for this patient. 
  
NOTE OF PERSONAL INVOLVEMENT IN CARE  
This patient has a high probability of imminent, clinically significant deterioration, which requires the highest level of preparedness to intervene urgently.  I participated in the decision-making and personally managed, or directed the management of, a myriad of life and organ supporting interventions which required my frequent-interval clinical reassessments, in order to treat or prevent imminent deterioration. 
  
 I personally spent 40 minutes of critical care time.  This is time spent at this critically ill patient's bedside actively involved in patient care as well as the coordination of care and discussions with the patient's family.  This does not include any procedural time which has been billed separately. [facility], [address] 
[phone] 
1/14/2021 1. I was told the name of the doctor(s) who took care of my child while in the hospital.    2. I have been told about any important findings on my child's plan of care.    3. The doctor clearly explained my child's diagnosis and other possible diagnoses that were considered.    4. My child's doctor explained all the tests that were done and their results (if available). I understand that some of the test results may not be ready before we go home and I was told how I can get these results. I understand that a summary of my child's hospitalization and important test results will be shared with my child's outpatient doctor.    5. My child's doctor talked to me about what I need to do when we go home.    6. I understand what signs and symptoms to watch for. I understand what symptoms I would need to call my doctor for and/or return to the hospital.    7. I have the phone number to call the hospital for results and/or questions after I leave the hospital.

## (undated) DEVICE — Device

## (undated) DEVICE — SOLUTION IV 1000ML 0.9% SOD CHL

## (undated) DEVICE — 1200 GUARD II KIT W/5MM TUBE W/O VAC TUBE: Brand: GUARDIAN

## (undated) DEVICE — SUT SLK 2-0SH 30IN BLK --

## (undated) DEVICE — NEEDLE HYPO 18GA L1.5IN PNK S STL HUB POLYPR SHLD REG BVL

## (undated) DEVICE — Z DISCONTINUED PER MEDLINE LINE GAS SAMPLING O2/CO2 LNG AD 13 FT NSL W/ TBNG FILTERLINE

## (undated) DEVICE — SOLIDIFIER FLD 2OZ 1500CC N DISINF IN BTL DISP SAFESORB

## (undated) DEVICE — DRESSING,STRATASORB,COMP,ISLAND,6"X7.5": Brand: MEDLINE

## (undated) DEVICE — YANKAUER,TAPERED BULBOUS TIP,W/O VENT: Brand: MEDLINE

## (undated) DEVICE — NEEDLE HYPO 25GA L1.5IN BVL ORIENTED ECLIPSE

## (undated) DEVICE — GLOVE SURG SZ 7.5 L11.2IN THK9.8MIL STRW LTX POLYMER BEAD

## (undated) DEVICE — SURGICAL PROCEDURE PACK BASIN MAJ SET CUST NO CAUT

## (undated) DEVICE — PREP SKN CHLRAPRP APL 26ML STR --

## (undated) DEVICE — SUTURE PROL SZ 2-0 L42IN NONABSORBABLE BLU CT-1 L36MM 3/8 D9693

## (undated) DEVICE — FLOSEAL HEMOSTATIC MATRIX, 5 ML: Brand: FLOSEAL

## (undated) DEVICE — TOWEL SURG W17XL27IN STD BLU COT NONFENESTRATED PREWASHED

## (undated) DEVICE — ELECTRODE,RADIOTRANSLUCENT,FOAM,5PK: Brand: MEDLINE

## (undated) DEVICE — BASIN EMSIS 16OZ GRAPHITE PLAS KID SHP MOLD GRAD FOR ORAL

## (undated) DEVICE — SUTURE PERMAHAND SZ 2-0 L30IN NONABSORBABLE BLK SILK W/O A305H

## (undated) DEVICE — SUTURE PROL SZ 2-0 L36IN NONABSORBABLE BLU SH L26MM 1/2 CIR 8523H

## (undated) DEVICE — GARMENT,MEDLINE,DVT,INT,CALF,MED, GEN2: Brand: MEDLINE

## (undated) DEVICE — 3M™ TEGADERM™ TRANSPARENT FILM DRESSING FRAME STYLE, 1626W, 4 IN X 4-3/4 IN (10 CM X 12 CM), 50/CT 4CT/CASE: Brand: 3M™ TEGADERM™

## (undated) DEVICE — SPONGE DRAIN NONWOVEN 4X4IN -- 2/PK

## (undated) DEVICE — GOWN,SIRUS,NONRNF,SETINSLV,XL,20/CS: Brand: MEDLINE

## (undated) DEVICE — TAPE,CLOTH/SILK,CURAD,3"X10YD,LF,40/CS: Brand: CURAD

## (undated) DEVICE — BLOCK BITE ENDOSCP AD 21 MM W/ DIL BLU LF DISP

## (undated) DEVICE — SPONGE: SPECIALTY PEANUT XR 100/CS: Brand: MEDICAL ACTION INDUSTRIES

## (undated) DEVICE — SPONGE HEMOSTAT CELLULS 4X8IN -- SURGICEL

## (undated) DEVICE — AGENT HEMSTAT 5GM ARISTA AH

## (undated) DEVICE — KIT,1200CC CANISTER,3/16"X6' TUBING: Brand: MEDLINE INDUSTRIES, INC.

## (undated) DEVICE — SYR 3ML LL TIP 1/10ML GRAD --

## (undated) DEVICE — SYR 10ML LUER LOK 1/5ML GRAD --

## (undated) DEVICE — AEGIS 1" DISK 4MM HOLE, PEEL OPEN: Brand: MEDLINE

## (undated) DEVICE — TOWEL 4 PLY TISS 19X30 SUE WHT

## (undated) DEVICE — TRACHEOSTOMY TUBE CUFFED WITH DISPOSABLE INNER CANNULA: Brand: SHILEY

## (undated) DEVICE — NEEDLE HYPO 22GA L1.5IN BLK S STL HUB POLYPR SHLD REG BVL

## (undated) DEVICE — DBD-PACK,LAPAROTOMY,2 REINFORCED GOWNS: Brand: MEDLINE

## (undated) DEVICE — STRAP,POSITIONING,KNEE/BODY,FOAM,4X60": Brand: MEDLINE

## (undated) DEVICE — INFECTION CONTROL KIT SYS

## (undated) DEVICE — REM POLYHESIVE ADULT PATIENT RETURN ELECTRODE: Brand: VALLEYLAB

## (undated) DEVICE — SYR 20ML LL STRL LF --

## (undated) DEVICE — NEONATAL-ADULT SPO2 SENSOR: Brand: NELLCOR

## (undated) DEVICE — ROCKER SWITCH PENCIL BLADE ELECTRODE, HOLSTER: Brand: EDGE

## (undated) DEVICE — SUT ETHLN 3-0 18IN PS1 BLK --

## (undated) DEVICE — CATH SUC CTRL PRT TRIFLO 14FR --

## (undated) DEVICE — SET ADMIN 16ML TBNG L100IN 2 Y INJ SITE IV PIGGY BK DISP